# Patient Record
Sex: MALE | Race: WHITE | NOT HISPANIC OR LATINO | Employment: OTHER | URBAN - METROPOLITAN AREA
[De-identification: names, ages, dates, MRNs, and addresses within clinical notes are randomized per-mention and may not be internally consistent; named-entity substitution may affect disease eponyms.]

---

## 2019-05-14 ENCOUNTER — TELEPHONE (OUTPATIENT)
Dept: CARDIOLOGY | Facility: CLINIC | Age: 69
End: 2019-05-14

## 2019-05-14 NOTE — TELEPHONE ENCOUNTER
PATIENT NOTIFIED THAT WE HAVE NO MORE SAMPLES OF RANEXA.   IT WENT GENERIC AND TOLD HIM WE WOULD BE GLAD TO SEND IN A PRESCRIPTION FOR HIM.

## 2019-07-11 RX ORDER — RANOLAZINE 1000 MG/1
1000 TABLET, EXTENDED RELEASE ORAL EVERY 12 HOURS SCHEDULED
Qty: 60 TABLET | Refills: 5 | Status: SHIPPED | OUTPATIENT
Start: 2019-07-11 | End: 2020-01-06

## 2019-11-04 PROBLEM — E78.5 DYSLIPIDEMIA: Status: ACTIVE | Noted: 2019-11-04

## 2019-11-04 PROBLEM — I25.10 CORONARY ATHEROSCLEROSIS: Status: ACTIVE | Noted: 2019-11-04

## 2019-11-04 PROBLEM — I10 ESSENTIAL HYPERTENSION: Status: ACTIVE | Noted: 2019-11-04

## 2019-11-07 ENCOUNTER — OFFICE VISIT (OUTPATIENT)
Dept: CARDIOLOGY | Facility: CLINIC | Age: 69
End: 2019-11-07

## 2019-11-07 VITALS
DIASTOLIC BLOOD PRESSURE: 74 MMHG | SYSTOLIC BLOOD PRESSURE: 168 MMHG | HEIGHT: 71 IN | WEIGHT: 221.5 LBS | OXYGEN SATURATION: 98 % | HEART RATE: 66 BPM | BODY MASS INDEX: 31.01 KG/M2

## 2019-11-07 DIAGNOSIS — I10 ESSENTIAL HYPERTENSION: ICD-10-CM

## 2019-11-07 DIAGNOSIS — E78.5 DYSLIPIDEMIA: ICD-10-CM

## 2019-11-07 DIAGNOSIS — I25.10 ATHEROSCLEROSIS OF NATIVE CORONARY ARTERY OF NATIVE HEART WITHOUT ANGINA PECTORIS: Primary | ICD-10-CM

## 2019-11-07 DIAGNOSIS — Z95.1 HISTORY OF CORONARY ARTERY BYPASS SURGERY: ICD-10-CM

## 2019-11-07 PROCEDURE — 99213 OFFICE O/P EST LOW 20 MIN: CPT | Performed by: INTERNAL MEDICINE

## 2019-11-07 RX ORDER — LISINOPRIL 10 MG/1
10 TABLET ORAL 2 TIMES DAILY
Refills: 4 | COMMUNITY
Start: 2019-10-12 | End: 2021-02-25 | Stop reason: DRUGHIGH

## 2019-11-07 RX ORDER — HYDROCHLOROTHIAZIDE 25 MG/1
25 TABLET ORAL DAILY
Refills: 5 | COMMUNITY
Start: 2019-11-05 | End: 2022-03-24 | Stop reason: SDUPTHER

## 2019-11-08 RX ORDER — AMLODIPINE BESYLATE 2.5 MG/1
2.5 TABLET ORAL DAILY
Qty: 90 TABLET | Refills: 3 | Status: SHIPPED | OUTPATIENT
Start: 2019-11-08 | End: 2021-02-25 | Stop reason: DRUGHIGH

## 2019-11-08 RX ORDER — NITROGLYCERIN 0.4 MG/1
0.4 TABLET SUBLINGUAL
Qty: 25 TABLET | Refills: 3 | Status: SHIPPED | OUTPATIENT
Start: 2019-11-08

## 2019-11-09 NOTE — PROGRESS NOTES
\[facility]\"" HEART SPECIALISTS        Subjective:     Encounter Date:11/07/2019      Patient ID: Slade Martinez is a 68 y.o. male.      HPI: Saw Slade Martinez today for cardiovascular care.  He is a very pleasant 68-year-old male who is in good spirits and feels well.  Mr. Martinez has a history of coronary artery disease and is undergone previous coronary artery bypass surgery 8/25/2011 with a sequential saphenous vein graft to D1 and LAD, saphenous vein graft to marginal branch of the circumflex.  He is undergone previous percutaneous coronary interventions as well and currently remains free of chest pain pressure tightness no left arm neck or jaw discomfort.  He has a history nuclear stress testing 10/4/2018 revealing left ventricular ejection fraction 59% no evidence of ischemia.  Echocardiogram from 10/4/2018 reveals left ventricular ejection fraction 60 to 65% mild mitral and tricuspid insufficiency.    Mr. Martinez denies any progressive dyspnea on exertion and is free of orthopnea or PND no lower extremity.  He denies syncope near syncope or any significant palpitations.  He monitors his blood pressure at home remains primarily under 130/80 today in the office is 168/74.      The following portions of the patient's history were reviewed and updated as appropriate: allergies, current medications, past family history, past medical history, past social history, past surgical history and problem list.    Problem List:  Patient Active Problem List   Diagnosis   • Dyslipidemia   • Essential hypertension   • Coronary atherosclerosis       Past Medical History:  Past Medical History:   Diagnosis Date   • Coronary atherosclerosis 11/4/2019    H/O CABG SVG to first diagonal branch and to the LAD as well as SVG to the lateral marginal branch of the circumflex complicated by right sided subcortical infarct with left sided hemiparesis   • Dyslipidemia 11/4/2019   • Essential hypertension 11/4/2019       Past Surgical History:  No  "past surgical history on file.    Social History:  Social History     Socioeconomic History   • Marital status:      Spouse name: Not on file   • Number of children: Not on file   • Years of education: Not on file   • Highest education level: Not on file   Tobacco Use   • Smoking status: Former Smoker     Types: Cigarettes       Allergies:  Allergies   Allergen Reactions   • Phenergan [Promethazine Hcl] Unknown (See Comments)     .   • Pletal [Cilostazol] Unknown (See Comments)     .         ROS:  Review of Systems   Constitution: Negative for weakness and malaise/fatigue.   HENT: Negative for hearing loss and nosebleeds.    Eyes: Negative for double vision and visual disturbance.   Cardiovascular: Negative for chest pain, claudication, dyspnea on exertion, near-syncope, orthopnea, palpitations, paroxysmal nocturnal dyspnea and syncope.   Respiratory: Negative for cough, shortness of breath, sleep disturbances due to breathing, snoring, sputum production and wheezing.    Endocrine: Negative for cold intolerance, heat intolerance, polydipsia and polyuria.   Hematologic/Lymphatic: Negative for adenopathy and bleeding problem. Does not bruise/bleed easily.   Skin: Negative for flushing and itching.   Musculoskeletal: Negative for back pain, falls, joint pain, joint swelling, muscle weakness and neck pain.   Gastrointestinal: Negative for abdominal pain, dysphagia, heartburn, nausea and vomiting.   Genitourinary: Negative for dysuria and frequency.   Neurological: Negative for disturbances in coordination, dizziness, light-headedness, loss of balance and numbness.   Psychiatric/Behavioral: Negative for altered mental status and memory loss. The patient is not nervous/anxious.    Allergic/Immunologic: Negative for hives and persistent infections.          Objective:         /74 (BP Location: Right arm, Patient Position: Sitting, Cuff Size: Adult)   Pulse 66   Ht 180.3 cm (71\")   Wt 100 kg (221 lb 8 oz)   " SpO2 98%   BMI 30.89 kg/m²     Physical Exam   Constitutional: He is oriented to person, place, and time. He appears well-developed and well-nourished.   HENT:   Head: Normocephalic and atraumatic.   Eyes: Conjunctivae and EOM are normal. Pupils are equal, round, and reactive to light.   Neck: Neck supple. No thyromegaly present.   Cardiovascular: Normal rate, regular rhythm, S1 normal, S2 normal and intact distal pulses. PMI is not displaced. Exam reveals gallop and S4. Exam reveals no S3, no distant heart sounds, no friction rub, no midsystolic click and no opening snap.   No murmur heard.  Pulses:       Carotid pulses are 2+ on the right side, and 2+ on the left side.       Radial pulses are 2+ on the right side, and 2+ on the left side.        Femoral pulses are 2+ on the right side, and 2+ on the left side.       Dorsalis pedis pulses are 2+ on the right side, and 2+ on the left side.        Posterior tibial pulses are 2+ on the right side, and 2+ on the left side.   1/6 systolic murmur left sternal border   Pulmonary/Chest: Effort normal and breath sounds normal. No respiratory distress. He has no wheezes. He has no rales.   Abdominal: Soft. Bowel sounds are normal. He exhibits no distension and no mass. There is no tenderness.   Musculoskeletal: Normal range of motion. He exhibits no edema.   Neurological: He is alert and oriented to person, place, and time.   Skin: Skin is warm and dry. No erythema.   Psychiatric: He has a normal mood and affect.       In-Office Procedure(s):  Procedures    ASCVD RIsk Score::  The ASCVD Risk score (Michelle DC Jr., et al., 2013) failed to calculate for the following reasons:    Cannot find a previous HDL lab    Cannot find a previous total cholesterol lab        Assessment/Plan:         1. Atherosclerosis of native coronary artery of native heart without angina pectoris  Stable     2. History of coronary artery bypass surgery  Stable    3. Essential hypertension  Target less  than 130/80    4. Dyslipidemia  Observe low-cholesterol low saturated fat diet.    Mr. Martinez is free of angina euvolemic and active.  He is tolerating his medications well.  We discussed the importance of continued risk modification with salt restriction is close to 2000 mg a day as possible and following a low-cholesterol low saturated fat diet.  We have encouraged him to maintain and advance his activity level.  We will initiate amlodipine 2.5 mg daily for blood pressure control.  He will monitor his blood pressure and if it exceeds 130/80 he will contact us.  We will otherwise see him in follow-up in 6 months.           James Hubbard MD  11/08/19  .

## 2020-01-06 RX ORDER — RANOLAZINE 1000 MG/1
TABLET, EXTENDED RELEASE ORAL
Qty: 60 TABLET | Refills: 5 | Status: SHIPPED | OUTPATIENT
Start: 2020-01-06 | End: 2020-07-06

## 2020-04-20 ENCOUNTER — TELEMEDICINE (OUTPATIENT)
Dept: CARDIOLOGY | Facility: CLINIC | Age: 70
End: 2020-04-20

## 2020-04-20 VITALS — SYSTOLIC BLOOD PRESSURE: 136 MMHG | DIASTOLIC BLOOD PRESSURE: 81 MMHG

## 2020-04-20 DIAGNOSIS — I10 ESSENTIAL HYPERTENSION: ICD-10-CM

## 2020-04-20 DIAGNOSIS — I25.10 ATHEROSCLEROSIS OF NATIVE CORONARY ARTERY OF NATIVE HEART WITHOUT ANGINA PECTORIS: Primary | ICD-10-CM

## 2020-04-20 DIAGNOSIS — E78.5 DYSLIPIDEMIA: ICD-10-CM

## 2020-04-20 DIAGNOSIS — Z95.1 S/P CABG (CORONARY ARTERY BYPASS GRAFT): ICD-10-CM

## 2020-04-20 PROCEDURE — 99213 OFFICE O/P EST LOW 20 MIN: CPT | Performed by: INTERNAL MEDICINE

## 2020-04-20 NOTE — PROGRESS NOTES
[facility]  You have chosen to receive care through a telehealth visit.  Do you consent to use a video/audio connection for your medical care today? Yes      Subjective:     Encounter Date:04/20/2020      Patient ID: Slade Martinez is a 69 y.o. male.      HPI: Slade Martinez agreed to video visit secondary to the coronavirus pandemic.  He is a pleasant 69-year-old male with known coronary heart disease.  He underwent coronary artery bypass surgery 8/25/2011 with the placement of a sequential saphenous vein graft to the first diagonal and the LAD, saphenous vein graft to the marginal branch of the circumflex.  He is undergone previous percutaneous coronary interventions as well.  Mr. Martinez has remained free of fever cough or increased shortness of breath.  He is been observing the CDC guidelines for social distancing.  He does not report any significant chest pain pressure or tightness.  He has his baseline dyspnea on exertion but this has remained stable.  He is free of orthopnea or PND and does not report lower extremity edema.  He denies syncope near syncope or significant palpitations.  He has a history of hypertension which is controlled.  He has known dyslipidemia and observes a low-cholesterol low saturated fat diet and remains on atorvastatin 40 mg daily.      The following portions of the patient's history were reviewed and updated as appropriate: allergies, current medications, past family history, past medical history, past social history, past surgical history and problem list.    Problem List:  Patient Active Problem List   Diagnosis   • Dyslipidemia   • Essential hypertension   • Coronary atherosclerosis   • S/P CABG (coronary artery bypass graft)   • S/P CABG (coronary artery bypass graft)       Past Medical History:  Past Medical History:   Diagnosis Date   • Coronary atherosclerosis 11/4/2019    H/O CABG SVG to first diagonal branch and to the LAD as well as SVG to the lateral  marginal branch of the circumflex complicated by right sided subcortical infarct with left sided hemiparesis   • Dyslipidemia 11/4/2019   • Essential hypertension 11/4/2019       Past Surgical History:  Past Surgical History:   Procedure Laterality Date   • CAROTID ENDARTERECTOMY  2000    Left   • CHOLECYSTECTOMY     • CORONARY ARTERY BYPASS GRAFT     • CORONARY STENT PLACEMENT      X17 stents       Social History:  Social History     Socioeconomic History   • Marital status:      Spouse name: Not on file   • Number of children: Not on file   • Years of education: Not on file   • Highest education level: Not on file   Tobacco Use   • Smoking status: Former Smoker     Types: Cigarettes   • Smokeless tobacco: Never Used   Substance and Sexual Activity   • Alcohol use: Not Currently   • Drug use: Never   • Sexual activity: Defer       Allergies:  Allergies   Allergen Reactions   • Phenergan [Promethazine Hcl] Unknown (See Comments)     .   • Pletal [Cilostazol] Unknown (See Comments)     .         ROS:  Review of Systems   Constitution: Negative for malaise/fatigue.   HENT: Negative for hearing loss and nosebleeds.    Eyes: Negative for double vision and visual disturbance.   Cardiovascular: Positive for dyspnea on exertion. Negative for chest pain, claudication, near-syncope, orthopnea, palpitations, paroxysmal nocturnal dyspnea and syncope.   Respiratory: Negative for cough, shortness of breath, sleep disturbances due to breathing, snoring, sputum production and wheezing.    Endocrine: Negative for cold intolerance, heat intolerance, polydipsia and polyuria.   Hematologic/Lymphatic: Negative for adenopathy and bleeding problem. Does not bruise/bleed easily.   Skin: Negative for flushing and itching.   Musculoskeletal: Negative for back pain, falls, joint pain, joint swelling, muscle weakness and neck pain.   Gastrointestinal: Negative for abdominal pain, dysphagia, heartburn, nausea and vomiting.    Genitourinary: Negative for dysuria and frequency.   Neurological: Negative for disturbances in coordination, dizziness, light-headedness, loss of balance, numbness and weakness.   Psychiatric/Behavioral: Negative for altered mental status and memory loss. The patient is not nervous/anxious.    Allergic/Immunologic: Negative for hives and persistent infections.          Objective:         /81     Physical Exam not performed    In-Office Procedure(s):  Procedures    ASCVD RIsk Score::  The ASCVD Risk score (Michelle DOW Jr., et al., 2013) failed to calculate for the following reasons:    Cannot find a previous HDL lab    Cannot find a previous total cholesterol lab        Assessment/Plan:         1. Atherosclerosis of native coronary artery of native heart without angina pectoris  Stable free of angina    2. S/P CABG (coronary artery bypass graft)  Stable    3. Essential hypertension  Controlled    4. Dyslipidemia  Encourage low-cholesterol low saturated fat diet and continue statin therapy    Mr. Martinez currently is free of angina and his dyspnea on exertion is at its baseline.  He is not experiencing any significant side effects to his medical regimen.  I have counseled him on the importance of continued risk modification by observing a low-cholesterol low saturated fat salt restricted diet and remaining as active as possible.  He is observing CDC guidelines for the current coronavirus pandemic.    I spent 8 minutes on the video visit with Mr. Martinez in 8 minutes and completing his electronic medical record documentation.           James Hubbard MD  04/20/20  .

## 2020-06-04 ENCOUNTER — TELEPHONE (OUTPATIENT)
Dept: CARDIOLOGY | Facility: CLINIC | Age: 70
End: 2020-06-04

## 2020-06-04 NOTE — TELEPHONE ENCOUNTER
DR Ramirez PT    PT had bronchitis and PCP put him on steroids and antibiotics and since then his B/P is running low 90's/60's and he gets dizzy so he stopped taking lisinopril and norvasc hoping his B/P would not drop and today he took it and it is 116/76. He wants to know what to do.

## 2020-06-05 NOTE — TELEPHONE ENCOUNTER
Veronica tell patient to continue not taking lisinopril and Norvasc, monitor his blood pressure if it exceeds 130/80 will need to resume 1 of the medicines at a time but he can call us at that point.  Or we can set up a visit in 2 to 3 weeks.  Thank you

## 2020-07-06 RX ORDER — RANOLAZINE 1000 MG/1
TABLET, EXTENDED RELEASE ORAL
Qty: 60 TABLET | Refills: 5 | Status: SHIPPED | OUTPATIENT
Start: 2020-07-06 | End: 2020-12-30

## 2020-07-23 ENCOUNTER — OFFICE VISIT (OUTPATIENT)
Dept: CARDIOLOGY | Facility: CLINIC | Age: 70
End: 2020-07-23

## 2020-07-23 VITALS
DIASTOLIC BLOOD PRESSURE: 78 MMHG | HEART RATE: 63 BPM | WEIGHT: 224 LBS | BODY MASS INDEX: 31.36 KG/M2 | SYSTOLIC BLOOD PRESSURE: 128 MMHG | HEIGHT: 71 IN

## 2020-07-23 DIAGNOSIS — E78.5 DYSLIPIDEMIA: ICD-10-CM

## 2020-07-23 DIAGNOSIS — I25.10 ATHEROSCLEROSIS OF NATIVE CORONARY ARTERY OF NATIVE HEART WITHOUT ANGINA PECTORIS: Primary | ICD-10-CM

## 2020-07-23 DIAGNOSIS — I10 ESSENTIAL HYPERTENSION: ICD-10-CM

## 2020-07-23 DIAGNOSIS — Z95.1 S/P CABG (CORONARY ARTERY BYPASS GRAFT): ICD-10-CM

## 2020-07-23 PROCEDURE — 99213 OFFICE O/P EST LOW 20 MIN: CPT | Performed by: INTERNAL MEDICINE

## 2020-07-23 RX ORDER — MECLIZINE HCL 12.5 MG/1
12.5 TABLET ORAL DAILY
COMMUNITY

## 2020-07-23 NOTE — PROGRESS NOTES
[facility] SPECIAL[facility]        Subjective:     Encounter Date:07/23/2020      Patient ID: Slade Martinez is a 69 y.o. male.      HPI: I saw Slade Martinez today for cardiovascular care.  He is a pleasant 69-year-old male who is observing the CDC guidelines for social distance.  He remains free of fever cough or increased shortness of breath.  Mr. Martinez overall is doing well.  He is free of angina.  He does not report any progressive or significant dyspnea on exertion.  He denies orthopnea or PND.  He does have mild lower extremity edema which is nonprogressive.  He denies syncope near syncope or palpitations.  He has a history of hypertension which is controlled.  He has known dyslipidemia and remains on atorvastatin 40 mg daily.  He tolerates his medications well without significant side effects.      The following portions of the patient's history were reviewed and updated as appropriate: allergies, current medications, past family history, past medical history, past social history, past surgical history and problem list.    Problem List:  Patient Active Problem List   Diagnosis   • Dyslipidemia   • Essential hypertension   • Coronary atherosclerosis   • S/P CABG (coronary artery bypass graft)       Past Medical History:  Past Medical History:   Diagnosis Date   • Coronary atherosclerosis 11/4/2019    H/O CABG SVG to first diagonal branch and to the LAD as well as SVG to the lateral marginal branch of the circumflex complicated by right sided subcortical infarct with left sided hemiparesis   • Dyslipidemia 11/4/2019   • Essential hypertension 11/4/2019       Past Surgical History:  Past Surgical History:   Procedure Laterality Date   • CAROTID ENDARTERECTOMY  2000    Left   • CHOLECYSTECTOMY     • CORONARY ARTERY BYPASS GRAFT     • CORONARY STENT PLACEMENT      X17 stents       Social History:  Social History     Socioeconomic History   • Marital status:      Spouse name: Not on file   • Number of  "children: Not on file   • Years of education: Not on file   • Highest education level: Not on file   Tobacco Use   • Smoking status: Former Smoker     Types: Cigarettes   • Smokeless tobacco: Never Used   Substance and Sexual Activity   • Alcohol use: Not Currently   • Drug use: Never   • Sexual activity: Defer       Allergies:  Allergies   Allergen Reactions   • Phenergan [Promethazine Hcl] Irritability     IV ONLY   • Pletal [Cilostazol] Myalgia     .         ROS:  Review of Systems   Constitution: Negative for malaise/fatigue.   HENT: Negative for hearing loss and nosebleeds.    Eyes: Negative for double vision and visual disturbance.   Cardiovascular: Negative for chest pain, claudication, dyspnea on exertion, near-syncope, orthopnea, palpitations, paroxysmal nocturnal dyspnea and syncope.   Respiratory: Negative for cough, shortness of breath, sleep disturbances due to breathing, snoring, sputum production and wheezing.    Endocrine: Negative for cold intolerance, heat intolerance, polydipsia and polyuria.   Hematologic/Lymphatic: Negative for adenopathy and bleeding problem. Does not bruise/bleed easily.   Skin: Negative for flushing and itching.   Musculoskeletal: Negative for back pain, falls, joint pain, joint swelling, muscle weakness and neck pain.   Gastrointestinal: Negative for abdominal pain, dysphagia, heartburn, nausea and vomiting.   Genitourinary: Negative for dysuria and frequency.   Neurological: Negative for disturbances in coordination, dizziness, light-headedness, loss of balance, numbness and weakness.   Psychiatric/Behavioral: Negative for altered mental status and memory loss. The patient is not nervous/anxious.    Allergic/Immunologic: Negative for hives and persistent infections.          Objective:         /78   Pulse 63   Ht 180.3 cm (71\")   Wt 102 kg (224 lb)   BMI 31.24 kg/m²     Physical Exam   Constitutional: He is oriented to person, place, and time. He appears " well-developed and well-nourished.   HENT:   Head: Normocephalic and atraumatic.   Eyes: Pupils are equal, round, and reactive to light. Conjunctivae and EOM are normal.   Neck: Neck supple. No thyromegaly present.   Cardiovascular: Normal rate, regular rhythm, S1 normal, S2 normal and intact distal pulses. PMI is not displaced. Exam reveals gallop and S4. Exam reveals no S3, no distant heart sounds, no friction rub, no midsystolic click and no opening snap.   No murmur heard.  Pulses:       Carotid pulses are 2+ on the right side, and 2+ on the left side.       Radial pulses are 2+ on the right side, and 2+ on the left side.        Femoral pulses are 2+ on the right side, and 2+ on the left side.       Dorsalis pedis pulses are 2+ on the right side, and 2+ on the left side.        Posterior tibial pulses are 2+ on the right side, and 2+ on the left side.   1/6 systolic murmur left sternal border   Pulmonary/Chest: Effort normal and breath sounds normal. No respiratory distress. He has no wheezes. He has no rales.   Abdominal: Soft. Bowel sounds are normal. He exhibits no distension and no mass. There is no tenderness.   Musculoskeletal: Normal range of motion. He exhibits no edema.   Trace bilateral lower extremity edema   Neurological: He is alert and oriented to person, place, and time.   Skin: Skin is warm and dry. No erythema.   Psychiatric: He has a normal mood and affect.       In-Office Procedure(s):  Procedures    ASCVD RIsk Score::  The ASCVD Risk score (Michlele DOW Jr., et al., 2013) failed to calculate for the following reasons:    Cannot find a previous HDL lab    Cannot find a previous total cholesterol lab        Assessment/Plan:         1. Atherosclerosis of native coronary artery of native heart without angina pectoris  Stable free of angina    2. S/P CABG (coronary artery bypass graft)  Stable    3. Essential hypertension  Controlled    4. Dyslipidemia  Controlled    Mr. Martinez has maintained his  activity level while observing the CDC guidelines for social distancing.  He is free of angina and is near euvolemic.  His risk factors are modified he is tolerating his medications well I will see him in follow-up in 6 months, sooner if needed.           James Hubbard MD  07/23/20  .

## 2020-08-20 ENCOUNTER — TELEPHONE (OUTPATIENT)
Dept: CARDIOLOGY | Facility: CLINIC | Age: 70
End: 2020-08-20

## 2020-08-20 DIAGNOSIS — I25.10 ATHEROSCLEROSIS OF NATIVE CORONARY ARTERY OF NATIVE HEART WITHOUT ANGINA PECTORIS: ICD-10-CM

## 2020-08-20 DIAGNOSIS — Z02.4 ENCOUNTER FOR COMMERCIAL DRIVING LICENSE (CDL) EXAM: Primary | ICD-10-CM

## 2020-08-20 DIAGNOSIS — Z95.1 S/P CABG (CORONARY ARTERY BYPASS GRAFT): ICD-10-CM

## 2020-08-20 NOTE — TELEPHONE ENCOUNTER
James Hubbard M.D pt    Patient called, would like to know if Dr. Hubbard can order echo and stress test? States [employer] is requesting.

## 2020-08-21 NOTE — TELEPHONE ENCOUNTER
I spoke with PT an told him know that the echo/stress was ordered, the hospital will be calling him to set up the testing.

## 2020-09-17 ENCOUNTER — HOSPITAL ENCOUNTER (OUTPATIENT)
Dept: NUCLEAR MEDICINE | Facility: HOSPITAL | Age: 70
Discharge: HOME | End: 2020-09-17

## 2020-09-17 ENCOUNTER — HOSPITAL ENCOUNTER (OUTPATIENT)
Dept: CARDIOLOGY | Facility: HOSPITAL | Age: 70
Discharge: HOME | End: 2020-09-17
Admitting: INTERNAL MEDICINE

## 2020-09-17 VITALS
SYSTOLIC BLOOD PRESSURE: 132 MMHG | HEIGHT: 71 IN | DIASTOLIC BLOOD PRESSURE: 75 MMHG | BODY MASS INDEX: 31.36 KG/M2 | HEART RATE: 69 BPM | WEIGHT: 224 LBS

## 2020-09-17 DIAGNOSIS — I25.10 ATHEROSCLEROSIS OF NATIVE CORONARY ARTERY OF NATIVE HEART WITHOUT ANGINA PECTORIS: ICD-10-CM

## 2020-09-17 DIAGNOSIS — Z95.1 S/P CABG (CORONARY ARTERY BYPASS GRAFT): ICD-10-CM

## 2020-09-17 DIAGNOSIS — Z02.4 ENCOUNTER FOR COMMERCIAL DRIVING LICENSE (CDL) EXAM: ICD-10-CM

## 2020-09-17 LAB
AORTIC DIMENSIONLESS INDEX: 0.9 (DI)
BH CV ECHO MEAS - ACS: 2 CM
BH CV ECHO MEAS - AO MAX PG: 5 MMHG
BH CV ECHO MEAS - AO MEAN PG (FULL): 1 MMHG
BH CV ECHO MEAS - AO MEAN PG: 3 MMHG
BH CV ECHO MEAS - AO ROOT AREA (BSA CORRECTED): 1.6
BH CV ECHO MEAS - AO ROOT AREA: 10.2 CM^2
BH CV ECHO MEAS - AO ROOT DIAM: 3.6 CM
BH CV ECHO MEAS - AO V2 MAX: 112 CM/SEC
BH CV ECHO MEAS - AO V2 MEAN: 77.2 CM/SEC
BH CV ECHO MEAS - AO V2 VTI: 26.1 CM
BH CV ECHO MEAS - ASC AORTA: 3 CM
BH CV ECHO MEAS - AVA(I,A): 2.7 CM^2
BH CV ECHO MEAS - AVA(I,D): 2.7 CM^2
BH CV ECHO MEAS - BSA(HAYCOCK): 2.3 M^2
BH CV ECHO MEAS - BSA: 2.2 M^2
BH CV ECHO MEAS - BZI_BMI: 31.2 KILOGRAMS/M^2
BH CV ECHO MEAS - BZI_METRIC_HEIGHT: 180.3 CM
BH CV ECHO MEAS - BZI_METRIC_WEIGHT: 101.6 KG
BH CV ECHO MEAS - CONTRAST EF 4CH: 66 CM2
BH CV ECHO MEAS - EDV(CUBED): 110.6 ML
BH CV ECHO MEAS - EDV(MOD-SP2): 115 ML
BH CV ECHO MEAS - EDV(MOD-SP4): 123 ML
BH CV ECHO MEAS - EDV(TEICH): 107.5 ML
BH CV ECHO MEAS - EF(CUBED): 70.4 %
BH CV ECHO MEAS - EF(MOD-BP): 66 %
BH CV ECHO MEAS - EF(MOD-SP2): 70.4 %
BH CV ECHO MEAS - EF(MOD-SP4): 64.2 %
BH CV ECHO MEAS - EF(TEICH): 61.9 %
BH CV ECHO MEAS - ESV(CUBED): 32.8 ML
BH CV ECHO MEAS - ESV(MOD-SP2): 34 ML
BH CV ECHO MEAS - ESV(MOD-SP4): 44 ML
BH CV ECHO MEAS - ESV(TEICH): 41 ML
BH CV ECHO MEAS - FS: 33.3 %
BH CV ECHO MEAS - IVS/LVPW: 0.92
BH CV ECHO MEAS - IVSD: 1.1 CM
BH CV ECHO MEAS - LAT PEAK E' VEL: 8.4 CM/SEC
BH CV ECHO MEAS - LV DIASTOLIC VOL/BSA (35-75): 55.6 ML/M^2
BH CV ECHO MEAS - LV MASS(C)D: 206.4 GRAMS
BH CV ECHO MEAS - LV MASS(C)DI: 93.3 GRAMS/M^2
BH CV ECHO MEAS - LV MAX PG: 3.1 MMHG
BH CV ECHO MEAS - LV MEAN PG: 2 MMHG
BH CV ECHO MEAS - LV SYSTOLIC VOL/BSA (12-30): 19.9 ML/M^2
BH CV ECHO MEAS - LV V1 MAX: 88.7 CM/SEC
BH CV ECHO MEAS - LV V1 MEAN: 63 CM/SEC
BH CV ECHO MEAS - LV V1 VTI: 22.2 CM
BH CV ECHO MEAS - LVIDD: 4.8 CM
BH CV ECHO MEAS - LVIDS: 3.2 CM
BH CV ECHO MEAS - LVLD AP2: 7.6 CM
BH CV ECHO MEAS - LVLD AP4: 8.3 CM
BH CV ECHO MEAS - LVLS AP2: 6.5 CM
BH CV ECHO MEAS - LVLS AP4: 7.7 CM
BH CV ECHO MEAS - LVOT AREA (M): 3.1 CM^2
BH CV ECHO MEAS - LVOT AREA: 3.1 CM^2
BH CV ECHO MEAS - LVOT DIAM: 2 CM
BH CV ECHO MEAS - LVPWD: 1.2 CM
BH CV ECHO MEAS - MED PEAK E' VEL: 7.6 CM/SEC
BH CV ECHO MEAS - MV A DUR: 0.11 SEC
BH CV ECHO MEAS - MV A MAX VEL: 63.2 CM/SEC
BH CV ECHO MEAS - MV DEC SLOPE: 497 CM/SEC^2
BH CV ECHO MEAS - MV DEC TIME: 140 SEC
BH CV ECHO MEAS - MV E MAX VEL: 74 CM/SEC
BH CV ECHO MEAS - MV E/A: 1.2
BH CV ECHO MEAS - MV MEAN PG: 2 MMHG
BH CV ECHO MEAS - MV P1/2T MAX VEL: 126 CM/SEC
BH CV ECHO MEAS - MV P1/2T: 74.3 MSEC
BH CV ECHO MEAS - MV V2 MEAN: 65.6 CM/SEC
BH CV ECHO MEAS - MV V2 VTI: 33.5 CM
BH CV ECHO MEAS - MVA P1/2T LCG: 1.7 CM^2
BH CV ECHO MEAS - MVA(P1/2T): 3 CM^2
BH CV ECHO MEAS - MVA(VTI): 2.1 CM^2
BH CV ECHO MEAS - PA ACC SLOPE: 1159 CM/SEC^2
BH CV ECHO MEAS - PA ACC TIME: 0.08 SEC
BH CV ECHO MEAS - PA MAX PG: 3.8 MMHG
BH CV ECHO MEAS - PA PR(ACCEL): 42.6 MMHG
BH CV ECHO MEAS - PA V2 MAX: 97.8 CM/SEC
BH CV ECHO MEAS - PULM A REVS DUR: 0.1 SEC
BH CV ECHO MEAS - PULM A REVS VEL: 22.1 CM/SEC
BH CV ECHO MEAS - PULM DIAS VEL: 44.6 CM/SEC
BH CV ECHO MEAS - PULM S/D: 0.93
BH CV ECHO MEAS - PULM SYS VEL: 41.6 CM/SEC
BH CV ECHO MEAS - QP/QS: 1
BH CV ECHO MEAS - RAP SYSTOLE: 3 MMHG
BH CV ECHO MEAS - RV MEAN PG: 1 MMHG
BH CV ECHO MEAS - RV V1 MEAN: 45.1 CM/SEC
BH CV ECHO MEAS - RV V1 VTI: 15.5 CM
BH CV ECHO MEAS - RVOT AREA: 4.5 CM^2
BH CV ECHO MEAS - RVOT DIAM: 2.4 CM
BH CV ECHO MEAS - RVSP: 29 MMHG
BH CV ECHO MEAS - SI(AO): 120.1 ML/M^2
BH CV ECHO MEAS - SI(CUBED): 35.2 ML/M^2
BH CV ECHO MEAS - SI(LVOT): 31.5 ML/M^2
BH CV ECHO MEAS - SI(MOD-SP2): 36.6 ML/M^2
BH CV ECHO MEAS - SI(MOD-SP4): 35.7 ML/M^2
BH CV ECHO MEAS - SI(TEICH): 30.1 ML/M^2
BH CV ECHO MEAS - SV(AO): 265.7 ML
BH CV ECHO MEAS - SV(CUBED): 77.8 ML
BH CV ECHO MEAS - SV(LVOT): 69.7 ML
BH CV ECHO MEAS - SV(MOD-SP2): 81 ML
BH CV ECHO MEAS - SV(MOD-SP4): 79 ML
BH CV ECHO MEAS - SV(RVOT): 70.1 ML
BH CV ECHO MEAS - SV(TEICH): 66.6 ML
BH CV ECHO MEAS - TAPSE (>1.6): 2.1 CM2
BH CV ECHO MEAS - TR MAX PG: 26 MMHG
BH CV ECHO MEAS - TR MAX VEL: 256 CM/SEC
BH CV ECHO MEASUREMENTS AVERAGE E/E' RATIO: 9.25
BH CV XLRA - RV BASE: 3.8 CM
BH CV XLRA - TDI S': 9.1 CM/SEC
LEFT ATRIUM VOLUME INDEX: 24 ML/M2
MAXIMAL PREDICTED HEART RATE: 150 BPM
STRESS TARGET HR: 128 BPM

## 2020-09-17 PROCEDURE — 78452 HT MUSCLE IMAGE SPECT MULT: CPT | Performed by: INTERNAL MEDICINE

## 2020-09-17 PROCEDURE — 93306 TTE W/DOPPLER COMPLETE: CPT | Performed by: INTERNAL MEDICINE

## 2020-09-17 PROCEDURE — A9500 TC99M SESTAMIBI: HCPCS | Performed by: INTERNAL MEDICINE

## 2020-09-17 PROCEDURE — 78452 HT MUSCLE IMAGE SPECT MULT: CPT

## 2020-09-17 PROCEDURE — 93017 CV STRESS TEST TRACING ONLY: CPT

## 2020-09-17 PROCEDURE — 0 TECHNETIUM SESTAMIBI: Performed by: INTERNAL MEDICINE

## 2020-09-17 PROCEDURE — 93306 TTE W/DOPPLER COMPLETE: CPT

## 2020-09-17 PROCEDURE — 25010000002 PERFLUTREN (DEFINITY) 8.476 MG IN SODIUM CHLORIDE 0.9 % 10 ML INJECTION: Performed by: INTERNAL MEDICINE

## 2020-09-17 PROCEDURE — 93018 CV STRESS TEST I&R ONLY: CPT | Performed by: INTERNAL MEDICINE

## 2020-09-17 RX ADMIN — PERFLUTREN 2 ML: 6.52 INJECTION, SUSPENSION INTRAVENOUS at 08:45

## 2020-09-17 RX ADMIN — TECHNETIUM TC 99M SESTAMIBI 1 DOSE: 1 INJECTION INTRAVENOUS at 08:15

## 2020-09-17 RX ADMIN — TECHNETIUM TC 99M SESTAMIBI 1 DOSE: 1 INJECTION INTRAVENOUS at 11:00

## 2020-09-18 LAB
BH CV STRESS BP STAGE 1: NORMAL
BH CV STRESS BP STAGE 2: NORMAL
BH CV STRESS DURATION MIN STAGE 1: 3
BH CV STRESS DURATION MIN STAGE 2: 2
BH CV STRESS DURATION SEC STAGE 1: 0
BH CV STRESS DURATION SEC STAGE 2: 1
BH CV STRESS GRADE STAGE 1: 10
BH CV STRESS GRADE STAGE 2: 12
BH CV STRESS HR STAGE 1: 130
BH CV STRESS HR STAGE 2: 143
BH CV STRESS METS STAGE 1: 5
BH CV STRESS METS STAGE 2: 7.5
BH CV STRESS PROTOCOL 1: NORMAL
BH CV STRESS RECOVERY BP: NORMAL MMHG
BH CV STRESS RECOVERY HR: 85 BPM
BH CV STRESS SPEED STAGE 1: 1.7
BH CV STRESS SPEED STAGE 2: 2.5
BH CV STRESS STAGE 1: 1
BH CV STRESS STAGE 2: 2
MAXIMAL PREDICTED HEART RATE: 150 BPM
PERCENT MAX PREDICTED HR: 100 %
STRESS BASELINE BP: NORMAL MMHG
STRESS BASELINE HR: 68 BPM
STRESS PERCENT HR: 118 %
STRESS POST ESTIMATED WORKLOAD: 5.6 METS
STRESS POST EXERCISE DUR MIN: 5 MIN
STRESS POST EXERCISE DUR SEC: 1 SEC
STRESS POST PEAK BP: NORMAL MMHG
STRESS POST PEAK HR: 150 BPM
STRESS TARGET HR: 128 BPM

## 2020-09-25 ENCOUNTER — TELEPHONE (OUTPATIENT)
Dept: CARDIOLOGY | Facility: CLINIC | Age: 70
End: 2020-09-25

## 2020-10-06 ENCOUNTER — TELEPHONE (OUTPATIENT)
Dept: CARDIOLOGY | Facility: CLINIC | Age: 70
End: 2020-10-06

## 2020-10-06 NOTE — TELEPHONE ENCOUNTER
Patient calling for stress test results please  He also would like to  a copy of stress test and echo this Thursday at [place].

## 2020-10-06 NOTE — TELEPHONE ENCOUNTER
Lvm for the patient to c/b     Take prescribed medications as directed.  Be sure to finish all antibiotics.  May take Tylenol  Drink plenty of fluids.  May use nasal saline spray several times daily.....spray several times into both nostrils and then blow nose gently after 90 seconds.  Wash hands frequently and do not touch eyes to avoid conjunctivitis.  Expect improvement within one week or if symptoms get worse, see primary care provider.

## 2020-10-06 NOTE — TELEPHONE ENCOUNTER
Pt notified with results, he will  a copy of echo and stress test Thurs at [facility] or Fri in [place].

## 2020-10-06 NOTE — TELEPHONE ENCOUNTER
Looks like Veronica has spoken with him re: this, but I do not see your interpretation of the stress test to tell him again.

## 2020-10-06 NOTE — TELEPHONE ENCOUNTER
Diane, his stress test is under results.  It revealed normal contractility no evidence of ischemia.  Thank you

## 2020-12-30 RX ORDER — RANOLAZINE 1000 MG/1
TABLET, EXTENDED RELEASE ORAL
Qty: 60 TABLET | Refills: 11 | Status: SHIPPED | OUTPATIENT
Start: 2020-12-30 | End: 2021-08-19

## 2021-02-11 ENCOUNTER — HOSPITAL ENCOUNTER (EMERGENCY)
Facility: HOSPITAL | Age: 71
Discharge: HOME | End: 2021-02-11
Attending: EMERGENCY MEDICINE | Admitting: EMERGENCY MEDICINE

## 2021-02-11 ENCOUNTER — APPOINTMENT (OUTPATIENT)
Dept: GENERAL RADIOLOGY | Facility: HOSPITAL | Age: 71
End: 2021-02-11

## 2021-02-11 VITALS
TEMPERATURE: 96.5 F | SYSTOLIC BLOOD PRESSURE: 142 MMHG | HEART RATE: 57 BPM | OXYGEN SATURATION: 98 % | HEIGHT: 71 IN | DIASTOLIC BLOOD PRESSURE: 91 MMHG | BODY MASS INDEX: 31.24 KG/M2 | RESPIRATION RATE: 16 BRPM

## 2021-02-11 DIAGNOSIS — U07.1 COVID-19 VIRUS INFECTION: Primary | ICD-10-CM

## 2021-02-11 LAB
ALBUMIN SERPL-MCNC: 3.6 G/DL (ref 3.5–5.2)
ALBUMIN/GLOB SERPL: 1.2 G/DL
ALP SERPL-CCNC: 82 U/L (ref 39–117)
ALT SERPL W P-5'-P-CCNC: 15 U/L (ref 1–41)
ANION GAP SERPL CALCULATED.3IONS-SCNC: 8.5 MMOL/L (ref 5–15)
AST SERPL-CCNC: 16 U/L (ref 1–40)
BASOPHILS # BLD AUTO: 0.03 10*3/MM3 (ref 0–0.2)
BASOPHILS NFR BLD AUTO: 0.4 % (ref 0–1.5)
BILIRUB SERPL-MCNC: 0.7 MG/DL (ref 0–1.2)
BUN SERPL-MCNC: 13 MG/DL (ref 8–23)
BUN/CREAT SERPL: 13 (ref 7–25)
CALCIUM SPEC-SCNC: 9.8 MG/DL (ref 8.6–10.5)
CHLORIDE SERPL-SCNC: 97 MMOL/L (ref 98–107)
CO2 SERPL-SCNC: 28.5 MMOL/L (ref 22–29)
CREAT SERPL-MCNC: 1 MG/DL (ref 0.76–1.27)
DEPRECATED RDW RBC AUTO: 44.4 FL (ref 37–54)
EOSINOPHIL # BLD AUTO: 0.2 10*3/MM3 (ref 0–0.4)
EOSINOPHIL NFR BLD AUTO: 2.7 % (ref 0.3–6.2)
ERYTHROCYTE [DISTWIDTH] IN BLOOD BY AUTOMATED COUNT: 12.6 % (ref 12.3–15.4)
GFR SERPL CREATININE-BSD FRML MDRD: 74 ML/MIN/1.73
GLOBULIN UR ELPH-MCNC: 2.9 GM/DL
GLUCOSE SERPL-MCNC: 95 MG/DL (ref 65–99)
HCT VFR BLD AUTO: 40.8 % (ref 37.5–51)
HGB BLD-MCNC: 14.1 G/DL (ref 13–17.7)
IMM GRANULOCYTES # BLD AUTO: 0.08 10*3/MM3 (ref 0–0.05)
IMM GRANULOCYTES NFR BLD AUTO: 1.1 % (ref 0–0.5)
LYMPHOCYTES # BLD AUTO: 1.44 10*3/MM3 (ref 0.7–3.1)
LYMPHOCYTES NFR BLD AUTO: 19.5 % (ref 19.6–45.3)
MCH RBC QN AUTO: 33.2 PG (ref 26.6–33)
MCHC RBC AUTO-ENTMCNC: 34.6 G/DL (ref 31.5–35.7)
MCV RBC AUTO: 96 FL (ref 79–97)
MONOCYTES # BLD AUTO: 1 10*3/MM3 (ref 0.1–0.9)
MONOCYTES NFR BLD AUTO: 13.5 % (ref 5–12)
NEUTROPHILS NFR BLD AUTO: 4.64 10*3/MM3 (ref 1.7–7)
NEUTROPHILS NFR BLD AUTO: 62.8 % (ref 42.7–76)
NRBC BLD AUTO-RTO: 0 /100 WBC (ref 0–0.2)
NT-PROBNP SERPL-MCNC: 240.3 PG/ML (ref 0–900)
PLATELET # BLD AUTO: 259 10*3/MM3 (ref 140–450)
PMV BLD AUTO: 9.5 FL (ref 6–12)
POTASSIUM SERPL-SCNC: 3.9 MMOL/L (ref 3.5–5.2)
PROT SERPL-MCNC: 6.5 G/DL (ref 6–8.5)
RBC # BLD AUTO: 4.25 10*6/MM3 (ref 4.14–5.8)
SODIUM SERPL-SCNC: 134 MMOL/L (ref 136–145)
TROPONIN T SERPL-MCNC: <0.01 NG/ML (ref 0–0.03)
WBC # BLD AUTO: 7.39 10*3/MM3 (ref 3.4–10.8)

## 2021-02-11 PROCEDURE — 99283 EMERGENCY DEPT VISIT LOW MDM: CPT

## 2021-02-11 PROCEDURE — 84484 ASSAY OF TROPONIN QUANT: CPT | Performed by: EMERGENCY MEDICINE

## 2021-02-11 PROCEDURE — 80053 COMPREHEN METABOLIC PANEL: CPT | Performed by: EMERGENCY MEDICINE

## 2021-02-11 PROCEDURE — 93010 ELECTROCARDIOGRAM REPORT: CPT | Performed by: INTERNAL MEDICINE

## 2021-02-11 PROCEDURE — 83880 ASSAY OF NATRIURETIC PEPTIDE: CPT | Performed by: EMERGENCY MEDICINE

## 2021-02-11 PROCEDURE — 93005 ELECTROCARDIOGRAM TRACING: CPT | Performed by: EMERGENCY MEDICINE

## 2021-02-11 PROCEDURE — 85025 COMPLETE CBC W/AUTO DIFF WBC: CPT | Performed by: EMERGENCY MEDICINE

## 2021-02-11 PROCEDURE — 71045 X-RAY EXAM CHEST 1 VIEW: CPT

## 2021-02-11 RX ORDER — SODIUM CHLORIDE 0.9 % (FLUSH) 0.9 %
10 SYRINGE (ML) INJECTION AS NEEDED
Status: DISCONTINUED | OUTPATIENT
Start: 2021-02-11 | End: 2021-02-11 | Stop reason: HOSPADM

## 2021-02-11 NOTE — ED TRIAGE NOTES
Patient presents to er via private vehicle from home.  Patient tested positive for covid on 01/30/21.  Patient states he has increased shortness of breath over the past two days.  Patient was placed in face mask during first look triage.  Patient was wearing a face mask throughout encounter.  I wore personal protective equipment throughout the encounter.  Hand hygiene was performed before and after patient encounter.     Was treated with steroids, z-pack and antibody infusion.

## 2021-02-11 NOTE — ED PROVIDER NOTES
EMERGENCY DEPARTMENT ENCOUNTER    Room Number:  19/19  PCP: Triny Hannon MD  Historian: Patient  History Limited By: Nothing      HPI  Chief Complaint: Shortness of breath  Context: Slade Martinez is a 70 y.o. male who presents to the ED c/o shortness of breath.  Patient states tested positive for Covid January 30.  Patient has received antibiotics, steroids, antibody infusion.  Patient states he felt like he was getting better.  States he had some increasing shortness of breath last night.  Has had no chest pain.  Has had no leg swelling.  Symptoms gradual in onset.  Has had no diarrhea.  Has had no fevers.  Patient here with his wife for similar symptoms      Location: Shortness of breath  Radiation: None  Character: None  Duration: 1 day  Severity: Moderate  Progression: Improved  Aggravating Factors: Nothing  Alleviating Factors: Nothing        MEDICAL RECORD REVIEW    Patient with history of coronary disease followed here by cardiology          PAST MEDICAL HISTORY  Active Ambulatory Problems     Diagnosis Date Noted   • Dyslipidemia 11/04/2019   • Essential hypertension 11/04/2019   • Coronary atherosclerosis 11/04/2019   • S/P CABG (coronary artery bypass graft) 04/20/2020     Resolved Ambulatory Problems     Diagnosis Date Noted   • S/P CABG (coronary artery bypass graft) 04/20/2020     No Additional Past Medical History         PAST SURGICAL HISTORY  Past Surgical History:   Procedure Laterality Date   • CAROTID ENDARTERECTOMY  2000    Left   • CHOLECYSTECTOMY     • CORONARY ARTERY BYPASS GRAFT     • CORONARY STENT PLACEMENT      X17 stents         FAMILY HISTORY  Family History   Problem Relation Age of Onset   • COPD Mother    • Heart attack Father    • Heart failure Father    • COPD Brother          SOCIAL HISTORY  Social History     Socioeconomic History   • Marital status:      Spouse name: Not on file   • Number of children: Not on file   • Years of education: Not on file   • Highest  education level: Not on file   Tobacco Use   • Smoking status: Former Smoker     Types: Cigarettes   • Smokeless tobacco: Never Used   Substance and Sexual Activity   • Alcohol use: Not Currently   • Drug use: Never   • Sexual activity: Defer         ALLERGIES  Phenergan [promethazine hcl] and Pletal [cilostazol]        REVIEW OF SYSTEMS  Review of Systems   Constitutional: Negative for activity change, appetite change and fever.   HENT: Negative for congestion and sore throat.    Eyes: Negative.    Respiratory: Positive for shortness of breath. Negative for cough.    Cardiovascular: Negative for chest pain and leg swelling.   Gastrointestinal: Negative for abdominal pain, diarrhea and vomiting.   Endocrine: Negative.    Genitourinary: Negative for decreased urine volume and dysuria.   Musculoskeletal: Negative for neck pain.   Skin: Negative for rash and wound.   Allergic/Immunologic: Negative.    Neurological: Negative for weakness, numbness and headaches.   Hematological: Negative.    Psychiatric/Behavioral: Negative.    All other systems reviewed and are negative.           PHYSICAL EXAM  ED Triage Vitals [02/11/21 1310]   Temp Heart Rate Resp BP SpO2   96.5 °F (35.8 °C) 67 19 -- 98 %      Temp src Heart Rate Source Patient Position BP Location FiO2 (%)   Tympanic Monitor -- -- --       Physical Exam   Constitutional: He is oriented to person, place, and time. No distress.   HENT:   Head: Normocephalic and atraumatic.   Eyes: Pupils are equal, round, and reactive to light. EOM are normal.   Neck: Normal range of motion. Neck supple.   Cardiovascular: Normal rate, regular rhythm and normal heart sounds.   Pulmonary/Chest: Effort normal and breath sounds normal. No respiratory distress.   Abdominal: Soft. There is no abdominal tenderness. There is no rebound and no guarding.   Musculoskeletal: Normal range of motion.         General: No edema.   Neurological: He is alert and oriented to person, place, and time. He  has normal sensation and normal strength.   Skin: Skin is warm and dry.   Psychiatric: Mood and affect normal.   Nursing note and vitals reviewed.    Patient was wearing a face mask when I entered the room and they continued to wear a mask throughout their stay in the ED.  I wore PPE, including a gown, gloves, face mask with shield or face mask with goggles whenever I was in the room with patient.   N95 worn.    LAB RESULTS  Recent Results (from the past 24 hour(s))   Comprehensive Metabolic Panel    Collection Time: 02/11/21  1:33 PM    Specimen: Blood   Result Value Ref Range    Glucose 95 65 - 99 mg/dL    BUN 13 8 - 23 mg/dL    Creatinine 1.00 0.76 - 1.27 mg/dL    Sodium 134 (L) 136 - 145 mmol/L    Potassium 3.9 3.5 - 5.2 mmol/L    Chloride 97 (L) 98 - 107 mmol/L    CO2 28.5 22.0 - 29.0 mmol/L    Calcium 9.8 8.6 - 10.5 mg/dL    Total Protein 6.5 6.0 - 8.5 g/dL    Albumin 3.60 3.50 - 5.20 g/dL    ALT (SGPT) 15 1 - 41 U/L    AST (SGOT) 16 1 - 40 U/L    Alkaline Phosphatase 82 39 - 117 U/L    Total Bilirubin 0.7 0.0 - 1.2 mg/dL    eGFR Non African Amer 74 >60 mL/min/1.73    Globulin 2.9 gm/dL    A/G Ratio 1.2 g/dL    BUN/Creatinine Ratio 13.0 7.0 - 25.0    Anion Gap 8.5 5.0 - 15.0 mmol/L   BNP    Collection Time: 02/11/21  1:33 PM    Specimen: Blood   Result Value Ref Range    proBNP 240.3 0.0 - 900.0 pg/mL   Troponin    Collection Time: 02/11/21  1:33 PM    Specimen: Blood   Result Value Ref Range    Troponin T <0.010 0.000 - 0.030 ng/mL   CBC Auto Differential    Collection Time: 02/11/21  1:33 PM    Specimen: Blood   Result Value Ref Range    WBC 7.39 3.40 - 10.80 10*3/mm3    RBC 4.25 4.14 - 5.80 10*6/mm3    Hemoglobin 14.1 13.0 - 17.7 g/dL    Hematocrit 40.8 37.5 - 51.0 %    MCV 96.0 79.0 - 97.0 fL    MCH 33.2 (H) 26.6 - 33.0 pg    MCHC 34.6 31.5 - 35.7 g/dL    RDW 12.6 12.3 - 15.4 %    RDW-SD 44.4 37.0 - 54.0 fl    MPV 9.5 6.0 - 12.0 fL    Platelets 259 140 - 450 10*3/mm3    Neutrophil % 62.8 42.7 - 76.0 %     Lymphocyte % 19.5 (L) 19.6 - 45.3 %    Monocyte % 13.5 (H) 5.0 - 12.0 %    Eosinophil % 2.7 0.3 - 6.2 %    Basophil % 0.4 0.0 - 1.5 %    Immature Grans % 1.1 (H) 0.0 - 0.5 %    Neutrophils, Absolute 4.64 1.70 - 7.00 10*3/mm3    Lymphocytes, Absolute 1.44 0.70 - 3.10 10*3/mm3    Monocytes, Absolute 1.00 (H) 0.10 - 0.90 10*3/mm3    Eosinophils, Absolute 0.20 0.00 - 0.40 10*3/mm3    Basophils, Absolute 0.03 0.00 - 0.20 10*3/mm3    Immature Grans, Absolute 0.08 (H) 0.00 - 0.05 10*3/mm3    nRBC 0.0 0.0 - 0.2 /100 WBC   ECG 12 Lead    Collection Time: 02/11/21  1:37 PM   Result Value Ref Range    QT Interval 450 ms       Ordered the above labs and reviewed the results.        RADIOLOGY  XR Chest 1 View   Final Result   Chronic changes in the chest with a patch of opacity along   the lateral left lower lung zone which is favored represent pneumonia.       This report was finalized on 2/11/2021 2:16 PM by Dr. Luis E Carbajal M.D.               Ordered the above noted radiological studies. Reviewed by me in PACS.            PROCEDURES  Procedures      EKG:          EKG time: 1337  Rhythm/Rate: Normal sinus rhythm 61  P waves and PA: Normal P waves  QRS, axis: Normal QRS  ST and T waves: Flattened T waves diffusely    Interpreted Contemporaneously by me, independently viewed  No prior        MEDICATIONS GIVEN IN ER  Medications   sodium chloride 0.9 % flush 10 mL (has no administration in time range)             PROGRESS AND CONSULTS  ED Course as of Feb 11 1637   Thu Feb 11, 2021   1445 14:45 EST  Patient here for shortness of breath and Covid positive.  Patient's oxygen level is 99% at rest and does not get below 96% with exertion.  Patient's lab work appears normal.  His wife here with him being seen.  Patient's chest x-ray for the most part normal versus small infiltrate.  Discussed options with patient and he will go home.  He stands to return for any concerns.  Patient was placed on cardiac monitoring here and was in  normal sinus rhythm on rhythm strip review.    [SL]      ED Course User Index  [SL] Matthew Marcos MD           MEDICAL DECISION MAKING      MDM  Number of Diagnoses or Management Options  COVID-19 virus infection:      Amount and/or Complexity of Data Reviewed  Clinical lab tests: reviewed and ordered (Normal BMP and troponin)  Tests in the radiology section of CPT®: reviewed and ordered (Chest x-ray with chronic changes and possible pneumonia)  Tests in the medicine section of CPT®: reviewed               DIAGNOSIS  Final diagnoses:   COVID-19 virus infection           DISPOSITION  DISCHARGE    Patient discharged in stable condition.    Reviewed implications of results, diagnosis, meds, responsibility to follow up, warning signs and symptoms of possible worsening, potential complications and reasons to return to ER, including worsening shortness of breath.    Patient/Family voiced understanding of above instructions.    Discussed plan for discharge, as there is no emergent indication for admission. Patient referred to primary care provider for BP management due to today's BP. Pt/family is agreeable and understands need for follow up and repeat testing.  Pt is aware that discharge does not mean that nothing is wrong but it indicates no emergency is present that requires admission and they must continue care with follow-up as given below or physician of their choice.     FOLLOW-UP  Triny Hannon MD  [facility] [address]  [phone]    Schedule an appointment as soon as possible for a visit            Medication List      No changes were made to your prescriptions during this visit.             Latest Documented Vital Signs:  As of 16:37 EST  BP- 142/91 HR- 57 Temp- 96.5 °F (35.8 °C) (Tympanic) O2 sat- 98%                         Matthew Marcos MD  02/11/21 [id number]

## 2021-02-11 NOTE — ED NOTES
Pt ambulated with spO2 monitoring per Dr. Barrett. Pt's O2 sat stayed above 96%. Pt ambulated without any difficulty or assistance.      Mignon Valdes, RN  02/11/21 1990

## 2021-02-12 ENCOUNTER — READMISSION MANAGEMENT (OUTPATIENT)
Dept: CALL CENTER | Facility: HOSPITAL | Age: 71
End: 2021-02-12

## 2021-02-12 LAB — QT INTERVAL: 450 MS

## 2021-02-12 NOTE — OUTREACH NOTE
COVID-19 Week 1 Survey      Responses   [facility] patient discharged from?  [place]   Does the patient have one of the following disease processes/diagnoses(primary or secondary)?  COVID-19   COVID-19 underlying condition?  None   Call Number  Call 1   Week 1 Call successful?  No   Discharge diagnosis  shortness of breath,  Covid 19          Lizeth Barrera, RN

## 2021-02-12 NOTE — OUTREACH NOTE
Prep Survey      Responses   [facility] patient discharged from?  [place]   Is LACE score < 7 ?  Yes   Emergency Room discharge w/ pulse ox?  Yes   Eligibility  Readm Mgmt   Discharge diagnosis  shortness of breath,  Covid 19   Does the patient have one of the following disease processes/diagnoses(primary or secondary)?  COVID-19   Does the patient have Home health ordered?  No   Is there a DME ordered?  Yes   What DME was ordered?  sent home w/ pulse ox   General alerts for this patient  ED Discharge - call x 3 only   Prep survey completed?  Yes          Linnette Cramer RN

## 2021-02-13 ENCOUNTER — READMISSION MANAGEMENT (OUTPATIENT)
Dept: CALL CENTER | Facility: HOSPITAL | Age: 71
End: 2021-02-13

## 2021-02-13 NOTE — OUTREACH NOTE
COVID-19 Week 1 Survey      Responses   [facility] patient discharged from?  [place]   Does the patient have one of the following disease processes/diagnoses(primary or secondary)?  COVID-19   COVID-19 underlying condition?  None   Call Number  Call 2   Week 1 Call successful?  No   Discharge diagnosis  shortness of breath,  Covid 19          Adali Narayanan RN

## 2021-02-14 ENCOUNTER — READMISSION MANAGEMENT (OUTPATIENT)
Dept: CALL CENTER | Facility: HOSPITAL | Age: 71
End: 2021-02-14

## 2021-02-14 NOTE — OUTREACH NOTE
COVID-19 Week 1 Survey      Responses   [facility] patient discharged from?  [place]   Does the patient have one of the following disease processes/diagnoses(primary or secondary)?  COVID-19   COVID-19 underlying condition?  None   Call Number  Call 3   Week 1 Call successful?  No   Discharge diagnosis  shortness of breath,  Covid 19          Reva Mckeon RN

## 2021-02-24 PROBLEM — E78.5 DYSLIPIDEMIA: Chronic | Status: ACTIVE | Noted: 2019-11-04

## 2021-02-24 PROBLEM — I10 ESSENTIAL HYPERTENSION: Chronic | Status: ACTIVE | Noted: 2019-11-04

## 2021-02-24 PROBLEM — I25.10 CORONARY ATHEROSCLEROSIS: Chronic | Status: ACTIVE | Noted: 2019-11-04

## 2021-02-24 PROBLEM — Z95.1 S/P CABG (CORONARY ARTERY BYPASS GRAFT): Chronic | Status: ACTIVE | Noted: 2020-04-20

## 2021-02-25 ENCOUNTER — OFFICE VISIT (OUTPATIENT)
Dept: CARDIOLOGY | Facility: CLINIC | Age: 71
End: 2021-02-25

## 2021-02-25 VITALS
HEIGHT: 71 IN | BODY MASS INDEX: 31.36 KG/M2 | WEIGHT: 224 LBS | DIASTOLIC BLOOD PRESSURE: 62 MMHG | HEART RATE: 57 BPM | SYSTOLIC BLOOD PRESSURE: 120 MMHG

## 2021-02-25 DIAGNOSIS — I73.9 PERIPHERAL ARTERY DISEASE (HCC): Chronic | ICD-10-CM

## 2021-02-25 DIAGNOSIS — I25.10 ATHEROSCLEROSIS OF NATIVE CORONARY ARTERY OF NATIVE HEART WITHOUT ANGINA PECTORIS: Primary | Chronic | ICD-10-CM

## 2021-02-25 DIAGNOSIS — I10 ESSENTIAL HYPERTENSION: Chronic | ICD-10-CM

## 2021-02-25 DIAGNOSIS — U07.1 COVID-19 VIRUS INFECTION: ICD-10-CM

## 2021-02-25 DIAGNOSIS — E78.5 DYSLIPIDEMIA: Chronic | ICD-10-CM

## 2021-02-25 DIAGNOSIS — Z98.890 HISTORY OF LEFT-SIDED CAROTID ENDARTERECTOMY: Chronic | ICD-10-CM

## 2021-02-25 DIAGNOSIS — Z95.1 S/P CABG (CORONARY ARTERY BYPASS GRAFT): Chronic | ICD-10-CM

## 2021-02-25 PROCEDURE — 99214 OFFICE O/P EST MOD 30 MIN: CPT | Performed by: INTERNAL MEDICINE

## 2021-02-25 RX ORDER — LISINOPRIL 20 MG/1
1 TABLET ORAL 2 TIMES DAILY
COMMUNITY
Start: 2020-12-30 | End: 2023-03-03

## 2021-02-25 RX ORDER — AMLODIPINE BESYLATE 5 MG/1
5 TABLET ORAL DAILY
COMMUNITY
Start: 2021-02-16 | End: 2021-05-10

## 2021-02-25 NOTE — PROGRESS NOTES
"Chief Complaint  Coronary Artery Disease    Subjective    History of Present Illness      I saw Slade Martinez today for continued cardiovascular care.  He is a very pleasant 70-year-old male who was Covid + 1/20/2021 he developed a cough shortness of breath and generalized fatigue.  He is gradually improving.  He continues to observe the CDC guidelines during the coronavirus pandemic.  He does not report chest pain pressure or tightness.  He continues to have dyspnea on exertion no orthopnea or PND or lower extremity edema.  He denies syncope near syncope or palpitations.  His blood pressure remains well controlled.  He has a history of hyperlipidemia remains on atorvastatin 40 mg daily.  He has known coronary heart disease.  He is undergone previous coronary artery bypass surgery and multiple percutaneous coronary intervention with stent deployment.  He is status post left carotid endarterectomy in 2000.  Review of echocardiogram from 9/17/2020 reveals preserved left ventricular function no evidence of significant valvular disease.  Nuclear stress test on that same day also demonstrates preserved left ventricular function no evidence of ischemia.  He tolerates his medications well no reported side effects.  Neck MRI obtained 11/1/2016 revealed a right internal carotid artery with 60 to 65% stenosis, left internal carotid free of any significant disease.    Lab data reviewed from 1/11/2021 reveals white count 6.3 hemoglobin 14.6, BUN 11 creatinine 1.2 liver function test within normal limits, triglycerides 158, HDL 45, LDL 67.    Objective   Vital Signs:   /62   Pulse 57   Ht 180.3 cm (71\")   Wt 102 kg (224 lb)   BMI 31.24 kg/m²     Constitutional:       Appearance: Well-developed.   Eyes:      Conjunctiva/sclera: Conjunctivae normal.      Pupils: Pupils are equal, round, and reactive to light.   HENT:      Head: Normocephalic and atraumatic.   Neck:      Musculoskeletal: Neck supple.      Thyroid: No " thyromegaly.   Pulmonary:      Effort: Pulmonary effort is normal. No respiratory distress.      Breath sounds: Decreased air movement present. Examination of the right-lower field reveals decreased breath sounds. Examination of the left-lower field reveals decreased breath sounds. Decreased breath sounds present. No wheezing. No rales.   Cardiovascular:      Normal rate. Regular rhythm.      S4 Gallop. No S3 gallop. Midsystolic click click.   Edema:     Peripheral edema absent.   Abdominal:      General: Bowel sounds are normal. There is no distension.      Palpations: Abdomen is soft. There is no abdominal mass.      Tenderness: There is no abdominal tenderness.   Musculoskeletal: Normal range of motion.   Skin:     General: Skin is warm and dry.      Findings: No erythema.   Neurological:      Mental Status: Alert and oriented to person, place, and time.         Result Review :   The following data was reviewed by: James Hubbard MD on 02/25/2021: Review of lab data from 1/11/2021, lipid profile, comprehensive metabolic panel and CBC  Common labs    Common Labsle 2/11/21 2/11/21    1333 1333   Glucose  95   BUN  13   Creatinine  1.00   eGFR Non African Am  74   Sodium  134 (A)   Potassium  3.9   Chloride  97 (A)   Calcium  9.8   Albumin  3.60   Total Bilirubin  0.7   Alkaline Phosphatase  82   AST (SGOT)  16   ALT (SGPT)  15   WBC 7.39    Hemoglobin 14.1    Hematocrit 40.8    Platelets 259    (A) Abnormal value            Data reviewed: Cardiology studies Neck MRI 11/1/2016, nuclear stress test 9/17/2020, echocardiogram 9/17/2020          Assessment and Plan    1. Atherosclerosis of native coronary artery of native heart without angina pectoris  Stable free of angina    2. S/P CABG (coronary artery bypass graft)  Stable    3. Essential hypertension  Controlled    4. Dyslipidemia  Continue low-cholesterol low saturated fat diet and atorvastatin 40 mg daily    5. Peripheral artery disease (CMS/HCC)  Presently  stable, reassess    6. History of left-sided carotid endarterectomy  Stable reassess    7. COVID-19 virus infection  Resolved    Mr. Martinez is free of angina and appears euvolemic.  His respiratory status is improving following COVID-19 infection.  I will plan to see him in follow-up in 4 months at which time we will obtain a bilateral carotid ultrasound.  I have encouraged him to observe low-cholesterol low saturated fat diet restrict his salt intake is close to 2000 mg a day as possible and remain active while observing the CDC guidelines.        Follow Up   No follow-ups on file.  Patient was given instructions and counseling regarding his condition or for health maintenance advice. Please see specific information pulled into the AVS if appropriate.

## 2021-05-10 RX ORDER — AMLODIPINE BESYLATE 5 MG/1
TABLET ORAL
Qty: 30 TABLET | Refills: 5 | Status: SHIPPED | OUTPATIENT
Start: 2021-05-10 | End: 2021-11-06

## 2021-07-01 ENCOUNTER — OFFICE VISIT (OUTPATIENT)
Dept: CARDIOLOGY | Facility: CLINIC | Age: 71
End: 2021-07-01

## 2021-07-01 VITALS
HEIGHT: 71 IN | WEIGHT: 233 LBS | DIASTOLIC BLOOD PRESSURE: 58 MMHG | HEART RATE: 56 BPM | BODY MASS INDEX: 32.62 KG/M2 | SYSTOLIC BLOOD PRESSURE: 114 MMHG

## 2021-07-01 DIAGNOSIS — Z98.890 HISTORY OF LEFT-SIDED CAROTID ENDARTERECTOMY: Chronic | ICD-10-CM

## 2021-07-01 DIAGNOSIS — I10 ESSENTIAL HYPERTENSION: Chronic | ICD-10-CM

## 2021-07-01 DIAGNOSIS — E78.5 DYSLIPIDEMIA: Chronic | ICD-10-CM

## 2021-07-01 DIAGNOSIS — I25.10 ATHEROSCLEROSIS OF NATIVE CORONARY ARTERY OF NATIVE HEART WITHOUT ANGINA PECTORIS: Primary | Chronic | ICD-10-CM

## 2021-07-01 DIAGNOSIS — Z95.1 S/P CABG (CORONARY ARTERY BYPASS GRAFT): Chronic | ICD-10-CM

## 2021-07-01 DIAGNOSIS — I73.9 PERIPHERAL ARTERY DISEASE (HCC): Chronic | ICD-10-CM

## 2021-07-01 PROCEDURE — 99214 OFFICE O/P EST MOD 30 MIN: CPT | Performed by: INTERNAL MEDICINE

## 2021-07-01 NOTE — PROGRESS NOTES
"Chief Complaint  Coronary Artery Disease    Subjective    History of Present Illness      I saw Slade Martinez today for continued cardiovascular care.  He is a very pleasant 70-year-old male with a prior history of coronary heart disease.  He is status post previous coronary bypass surgery as well as percutaneous coronary interventions.  He is free of chest pain pressure or tightness.  He does not report any significant or progressive dyspnea on exertion and is free of orthopnea or PND.  He reports very mild lower extremity edema.  He denies syncope near syncope no significant palpitations.  He tolerates his medications well without reported side effects.  He has known hyperlipidemia and is on atorvastatin 40 mg daily.  His lipids are monitored by Dr. Triny Hannon his primary care physician.  His lipids remain well controlled.    Objective   Vital Signs:   /58   Pulse 56   Ht 180.3 cm (71\")   Wt 106 kg (233 lb)   BMI 32.50 kg/m²     Constitutional:       Appearance: Well-developed.   Eyes:      Conjunctiva/sclera: Conjunctivae normal.      Pupils: Pupils are equal, round, and reactive to light.   HENT:      Head: Normocephalic and atraumatic.   Neck:      Thyroid: No thyromegaly.   Pulmonary:      Effort: Pulmonary effort is normal. No respiratory distress.      Breath sounds: Normal breath sounds. No wheezing. No rales.   Cardiovascular:      Normal rate. Regular rhythm.      S4 Gallop. No S3 gallop. Midsystolic click click.   Abdominal:      General: Bowel sounds are normal. There is no distension.      Palpations: Abdomen is soft. There is no abdominal mass.      Tenderness: There is no abdominal tenderness.   Musculoskeletal: Normal range of motion.      Cervical back: Neck supple. Skin:     General: Skin is warm and dry.      Findings: No erythema.   Neurological:      Mental Status: Alert and oriented to person, place, and time.         Result Review :     Common labs    Common Labsle 2/11/21 2/11/21    " 1333 1333   Glucose  95   BUN  13   Creatinine  1.00   eGFR Non African Am  74   Sodium  134 (A)   Potassium  3.9   Chloride  97 (A)   Calcium  9.8   Albumin  3.60   Total Bilirubin  0.7   Alkaline Phosphatase  82   AST (SGOT)  16   ALT (SGPT)  15   WBC 7.39    Hemoglobin 14.1    Hematocrit 40.8    Platelets 259    (A) Abnormal value            Data reviewed: Cardiology studies Echocardiogram reviewed from 9/17/2020 reveals preserved left ventricular function no significant valvular abnormality.  Nuclear stress test from 9/17/2020 again revealed normal left ventricular contractility no evidence of ischemia.          Assessment and Plan    1. Atherosclerosis of native coronary artery of native heart without angina pectoris  Free of angina    2. S/P CABG (coronary artery bypass graft)  Free of angina    3. Essential hypertension  Controlled    4. Dyslipidemia  Controlled    5. Peripheral artery disease (CMS/HCC)  Carotid ultrasound by vascular surgery    6. History of left-sided carotid endarterectomy  Carotid ultrasound followed by vascular surgery    Mr. Martinez is in good spirits feels well free of angina and is euvolemic on physical examination.  His blood pressure and lipids are well controlled.  He tolerates his medications well.  I encouraged him to maintain his activity level.  He is to have repeat carotid ultrasound in the near future.  I will see him in follow-up in 6 months sooner if needed.        Follow Up   No follow-ups on file.  Patient was given instructions and counseling regarding his condition or for health maintenance advice. Please see specific information pulled into the AVS if appropriate.

## 2021-08-19 RX ORDER — RANOLAZINE 1000 MG/1
TABLET, EXTENDED RELEASE ORAL
Qty: 60 TABLET | Refills: 5 | Status: SHIPPED | OUTPATIENT
Start: 2021-08-19 | End: 2022-02-18

## 2021-11-02 ENCOUNTER — TRANSCRIBE ORDERS (OUTPATIENT)
Dept: ADMINISTRATIVE | Facility: HOSPITAL | Age: 71
End: 2021-11-02

## 2021-11-02 DIAGNOSIS — I73.9 PERIPHERAL VASCULAR DISEASE, UNSPECIFIED (HCC): Primary | ICD-10-CM

## 2021-11-06 RX ORDER — AMLODIPINE BESYLATE 5 MG/1
TABLET ORAL
Qty: 90 TABLET | Refills: 1 | Status: SHIPPED | OUTPATIENT
Start: 2021-11-06 | End: 2022-03-24 | Stop reason: DRUGHIGH

## 2021-11-09 NOTE — PROGRESS NOTES
"Chief Complaint  No chief complaint on file.    Subjective    History of Present Illness      I saw Slade Martinez today for cardiovascular care.  He is a pleasant 70-year-old male with coronary heart disease.  He is undergone previous coronary artery bypass surgery and percutaneous coronary intervention.  He is free of chest pain pressure or tightness.  He does not report any significant or limiting dyspnea on exertion.  He does not experience orthopnea PND or any significant lower extremity edema.  Review of nuclear stress test from 9/17/2020 reveals left ventricular ejection fraction 62% no evidence of ischemia.  Echocardiogram from the same date also demonstrates preserved left ventricular function no significant valvular abnormality.  He does not report syncope near syncope or any significant palpitations.  He specifically denies any orthostatic dizziness.    His primary complaint is that of left lower extremity discomfort in the hip and upper leg region.  He is undergoing evaluation for peripheral artery disease.  He is scheduled to undergo lower extremity arteriography 11/17/2021    Objective   Vital Signs:   /52   Pulse 60   Ht 180.3 cm (71\")   Wt 103 kg (226 lb)   BMI 31.52 kg/m²     Constitutional:       Appearance: Well-developed.   Eyes:      Conjunctiva/sclera: Conjunctivae normal.      Pupils: Pupils are equal, round, and reactive to light.   HENT:      Head: Normocephalic and atraumatic.   Neck:      Thyroid: No thyromegaly.   Pulmonary:      Effort: Pulmonary effort is normal. No respiratory distress.      Breath sounds: Normal breath sounds. No wheezing. No rales.   Cardiovascular:      Normal rate. Regular rhythm.      S4 Gallop. No S3 gallop. No rub.   Pulses:     Decreased pulses.      Popliteal: 1+ on the left side.     Dorsalis pedis: 0 on the left side.     Posterior tibial: 0 on the left side.  Edema:     Peripheral edema absent.   Abdominal:      General: Bowel sounds are normal. " There is no distension.      Palpations: Abdomen is soft. There is no abdominal mass.      Tenderness: There is no abdominal tenderness.   Musculoskeletal: Normal range of motion.      Cervical back: Neck supple. Skin:     General: Skin is warm and dry.      Findings: No erythema.   Neurological:      Mental Status: Alert and oriented to person, place, and time.         Result Review :     Common labs    Common Labsle 2/11/21 2/11/21    1333 1333   Glucose  95   BUN  13   Creatinine  1.00   eGFR Non African Am  74   Sodium  134 (A)   Potassium  3.9   Chloride  97 (A)   Calcium  9.8   Albumin  3.60   Total Bilirubin  0.7   Alkaline Phosphatase  82   AST (SGOT)  16   ALT (SGPT)  15   WBC 7.39    Hemoglobin 14.1    Hematocrit 40.8    Platelets 259    (A) Abnormal value                      Assessment and Plan    1. Atherosclerosis of native coronary artery of native heart without angina pectoris  Stable free of angina    2. S/P CABG (coronary artery bypass graft)  Stable    3. Essential hypertension  Controlled    4. Hyperlipidemia LDL goal <70  Continue low-cholesterol low saturated fat diet and atorvastatin 40 mg daily    5. Peripheral artery disease (HCC)  Left lower extremity claudication under evaluation    6. History of left-sided carotid endarterectomy  Continue to monitor    7. COVID-19 virus infection  Resolved    Bili history of angina and euvolemic on physical examination.  I have encouraged him to observe a low-cholesterol low saturated fat diet and follow his current medical regimen.  He will complete his evaluation for lower extremity claudication.  I will see him in follow-up in 6 months sooner if needed.        Follow Up   No follow-ups on file.  Patient was given instructions and counseling regarding his condition or for health maintenance advice. Please see specific information pulled into the AVS if appropriate.

## 2021-11-10 ENCOUNTER — OFFICE VISIT (OUTPATIENT)
Dept: CARDIOLOGY | Facility: CLINIC | Age: 71
End: 2021-11-10

## 2021-11-10 VITALS
HEART RATE: 60 BPM | DIASTOLIC BLOOD PRESSURE: 52 MMHG | SYSTOLIC BLOOD PRESSURE: 100 MMHG | WEIGHT: 226 LBS | BODY MASS INDEX: 31.64 KG/M2 | HEIGHT: 71 IN

## 2021-11-10 DIAGNOSIS — I73.9 PERIPHERAL ARTERY DISEASE (HCC): ICD-10-CM

## 2021-11-10 DIAGNOSIS — I25.10 ATHEROSCLEROSIS OF NATIVE CORONARY ARTERY OF NATIVE HEART WITHOUT ANGINA PECTORIS: Primary | ICD-10-CM

## 2021-11-10 DIAGNOSIS — Z95.1 S/P CABG (CORONARY ARTERY BYPASS GRAFT): ICD-10-CM

## 2021-11-10 DIAGNOSIS — Z98.890 HISTORY OF LEFT-SIDED CAROTID ENDARTERECTOMY: ICD-10-CM

## 2021-11-10 DIAGNOSIS — E78.5 HYPERLIPIDEMIA LDL GOAL <70: ICD-10-CM

## 2021-11-10 DIAGNOSIS — U07.1 COVID-19 VIRUS INFECTION: ICD-10-CM

## 2021-11-10 DIAGNOSIS — I10 ESSENTIAL HYPERTENSION: ICD-10-CM

## 2021-11-10 PROCEDURE — 99214 OFFICE O/P EST MOD 30 MIN: CPT | Performed by: INTERNAL MEDICINE

## 2021-11-17 ENCOUNTER — HOSPITAL ENCOUNTER (OUTPATIENT)
Dept: CARDIOLOGY | Facility: HOSPITAL | Age: 71
Discharge: HOME | End: 2021-11-17
Admitting: NURSE PRACTITIONER

## 2021-11-17 DIAGNOSIS — I73.9 PERIPHERAL VASCULAR DISEASE, UNSPECIFIED (HCC): ICD-10-CM

## 2021-11-17 LAB
BH CV LOWER ARTERIAL LEFT GREAT TOE SYS MAX: 122 MMHG
BH CV LOWER ARTERIAL LEFT TBI RATIO: 0.76
BH CV LOWER ARTERIAL RIGHT GREAT TOE SYS MAX: 139 MMHG
BH CV LOWER ARTERIAL RIGHT TBI RATIO: 0.86
MAXIMAL PREDICTED HEART RATE: 149 BPM
STRESS TARGET HR: 127 BPM
UPPER ARTERIAL LEFT ARM BRACHIAL SYS MAX: 161 MMHG
UPPER ARTERIAL RIGHT ARM BRACHIAL SYS MAX: 158 MMHG

## 2021-11-17 PROCEDURE — 93922 UPR/L XTREMITY ART 2 LEVELS: CPT

## 2022-02-18 RX ORDER — RANOLAZINE 1000 MG/1
TABLET, EXTENDED RELEASE ORAL
Qty: 60 TABLET | Refills: 11 | Status: SHIPPED | OUTPATIENT
Start: 2022-02-18 | End: 2022-02-21 | Stop reason: SDUPTHER

## 2022-02-21 RX ORDER — RANOLAZINE 1000 MG/1
1 TABLET, EXTENDED RELEASE ORAL EVERY 12 HOURS
Qty: 60 TABLET | Refills: 11 | Status: SHIPPED | OUTPATIENT
Start: 2022-02-21

## 2022-03-23 NOTE — PROGRESS NOTES
Chief Complaint  No chief complaint on file.    Subjective    History of Present Illness      I saw Slade Martinez today for continued cardiovascular care.  He is a very pleasant 71-year-old male accompanied by his wife today.  Suad has known coronary heart disease.  He is status post coronary artery bypass surgery and percutaneous coronary intervention.  He is free of angina.  He does not report any significant dyspnea on exertion and denies orthopnea or PND.  He does have bilateral lower extremity edema.  This remains stable but is persistent.  He denies syncope near syncope or any significant palpitations.  His blood pressure is controlled.  He tolerates his medications well.  Of note is he is on amlodipine which could be aggravating his lower extremity edema.Nuclear stress test 9/17/2020 revealed preserved left ventricular function no evidence of ischemia.  Echocardiogram demonstrated preserved left ventricular contractility no significant valvular abnormality.    Objective   Vital Signs:   There were no vitals taken for this visit.    Constitutional:       Appearance: Well-developed.   Eyes:      Conjunctiva/sclera: Conjunctivae normal.      Pupils: Pupils are equal, round, and reactive to light.   HENT:      Head: Normocephalic and atraumatic.   Neck:      Thyroid: No thyromegaly.   Pulmonary:      Effort: Pulmonary effort is normal. No respiratory distress.      Breath sounds: Normal breath sounds. No wheezing. No rales.   Cardiovascular:      Normal rate. Regular rhythm.      No gallop. No S3 and S4 gallop. No rub.   Edema:     Peripheral edema present.     Pretibial: bilateral trace edema of the pretibial area.     Ankle: bilateral trace edema of the ankle.  Abdominal:      General: Bowel sounds are normal. There is no distension.      Palpations: Abdomen is soft. There is no abdominal mass.      Tenderness: There is no abdominal tenderness.   Musculoskeletal: Normal range of motion.      Cervical back: Neck  supple. Skin:     General: Skin is warm and dry.      Findings: No erythema.   Neurological:      Mental Status: Alert and oriented to person, place, and time.         Result Review :                   Assessment and Plan    1. Atherosclerosis of native coronary artery of native heart without angina pectoris  Stable free of angina    2. S/P CABG (coronary artery bypass graft)  Stable    3. Essential hypertension  Controlled    4. Dyslipidemia  Continue low-cholesterol low saturated fat diet and atorvastatin 40 mg daily    5. History of left-sided carotid endarterectomy  Stable    6. Peripheral artery disease (HCC)  Stable    7. COVID-19 virus infection  Stable    8.  Lower extremity edema  Suspect secondary to amlodipine    I have asked Slade to reduce his amlodipine from 5 to 2.5 mg daily.  He will monitor his blood pressure if it exceeds 130/80 he will contact me.  If not he will discontinue amlodipine completely and continue to monitor his blood pressure if it exceeds 130/80 he will inform me and we can advance his lisinopril.  In the meantime he will monitor his lower extremity edema to see if there is any improvement off amlodipine.  I will tentatively plan to see him in follow-up in 6 months of course sooner if needed.        Follow Up   No follow-ups on file.  Patient was given instructions and counseling regarding his condition or for health maintenance advice. Please see specific information pulled into the AVS if appropriate.

## 2022-03-24 ENCOUNTER — OFFICE VISIT (OUTPATIENT)
Dept: CARDIOLOGY | Facility: CLINIC | Age: 72
End: 2022-03-24

## 2022-03-24 VITALS
HEIGHT: 71 IN | HEART RATE: 60 BPM | DIASTOLIC BLOOD PRESSURE: 62 MMHG | BODY MASS INDEX: 32.62 KG/M2 | WEIGHT: 233 LBS | SYSTOLIC BLOOD PRESSURE: 118 MMHG

## 2022-03-24 DIAGNOSIS — E78.5 DYSLIPIDEMIA: ICD-10-CM

## 2022-03-24 DIAGNOSIS — I73.9 PERIPHERAL ARTERY DISEASE: ICD-10-CM

## 2022-03-24 DIAGNOSIS — I25.10 ATHEROSCLEROSIS OF NATIVE CORONARY ARTERY OF NATIVE HEART WITHOUT ANGINA PECTORIS: Primary | ICD-10-CM

## 2022-03-24 DIAGNOSIS — Z95.1 S/P CABG (CORONARY ARTERY BYPASS GRAFT): ICD-10-CM

## 2022-03-24 DIAGNOSIS — U07.1 COVID-19 VIRUS INFECTION: ICD-10-CM

## 2022-03-24 DIAGNOSIS — Z98.890 HISTORY OF LEFT-SIDED CAROTID ENDARTERECTOMY: ICD-10-CM

## 2022-03-24 DIAGNOSIS — I10 ESSENTIAL HYPERTENSION: ICD-10-CM

## 2022-03-24 PROCEDURE — 99214 OFFICE O/P EST MOD 30 MIN: CPT | Performed by: INTERNAL MEDICINE

## 2022-03-24 RX ORDER — AMLODIPINE BESYLATE 5 MG/1
2.5 TABLET ORAL DAILY
COMMUNITY
Start: 2022-03-04 | End: 2022-07-11

## 2022-03-24 RX ORDER — HYDROCHLOROTHIAZIDE 25 MG/1
25 TABLET ORAL DAILY
Qty: 90 TABLET | Refills: 1 | Status: SHIPPED | OUTPATIENT
Start: 2022-03-24 | End: 2022-10-06

## 2022-04-29 ENCOUNTER — TELEPHONE (OUTPATIENT)
Dept: CARDIOLOGY | Facility: CLINIC | Age: 72
End: 2022-04-29

## 2022-04-29 NOTE — TELEPHONE ENCOUNTER
Resume amlodipine 2.5 mg daily.  Monitor blood pressure at home and report back after 1 week if blood pressure above 130/80

## 2022-04-29 NOTE — TELEPHONE ENCOUNTER
22-Dr James Carrasco  Pt: Slade Martinez  : 1950  Phone: [phone]  Reason for Call:  Pt. Saw Dr Ramirez and he told pt to stop Norvasc. Pt. States that his blood pressure has raised even though he is taking 2 20mg Lisinpril a day. Pt. States that top number running 160s/170s, bottom number 84/85. Please speak with Dr Ramirez and advise.

## 2022-07-11 RX ORDER — AMLODIPINE BESYLATE 5 MG/1
TABLET ORAL
Qty: 90 TABLET | Refills: 0 | Status: SHIPPED | OUTPATIENT
Start: 2022-07-11 | End: 2023-03-03

## 2022-09-28 NOTE — PROGRESS NOTES
"Chief Complaint  Coronary Artery Disease    Subjective    History of Present Illness      I saw Slade Martinez today for cardiovascular care.  He is a very pleasant 71-year-old male accompanied by his wife.  Slade feels well and denies any chest pain pressure or tightness.  He is free of respiratory insufficiency and does not report orthopnea PND or lower extremity edema.  He denies syncope near syncope or palpitation.  His primary complaint is that of some left hip and lumbar spine discomfort.  He continues to tolerate his medications well no reported side effects.    Objective   Vital Signs:   /60   Pulse 57   Ht 180.3 cm (71\")   Wt 101 kg (222 lb)   BMI 30.96 kg/m²     Constitutional:       Appearance: Well-developed.   Eyes:      Conjunctiva/sclera: Conjunctivae normal.      Pupils: Pupils are equal, round, and reactive to light.   HENT:      Head: Normocephalic and atraumatic.   Neck:      Thyroid: No thyromegaly.   Pulmonary:      Effort: Pulmonary effort is normal. No respiratory distress.      Breath sounds: Normal breath sounds. No wheezing. No rales.   Cardiovascular:      Normal rate. Regular rhythm.      No gallop. No S3 and S4 gallop. No rub.   Edema:     Peripheral edema absent.   Abdominal:      General: Bowel sounds are normal. There is no distension.      Palpations: Abdomen is soft. There is no abdominal mass.      Tenderness: There is no abdominal tenderness.   Musculoskeletal: Normal range of motion.      Cervical back: Neck supple. Skin:     General: Skin is warm and dry.      Findings: No erythema.   Neurological:      Mental Status: Alert and oriented to person, place, and time.         Result Review :                   Assessment and Plan    1. Atherosclerosis of native coronary artery of native heart without angina pectoris  Stable free of angina    2. S/P CABG (coronary artery bypass graft)  Stable    3. Essential hypertension  Controlled    4. Dyslipidemia  Continue atorvastatin " 40 mg daily    5. Peripheral artery disease (HCC)  Stable    6. History of left-sided carotid endarterectomy  Continue to monitor    Slade feels well and has maintained his activity level.  He is free of angina and is euvolemic on examination.  He will continue his current medical regimen.  I have encouraged him to observe a low-cholesterol diet.  I will arrange for follow-up in 6 months sooner if needed.        Follow Up   No follow-ups on file.  Patient was given instructions and counseling regarding his condition or for health maintenance advice. Please see specific information pulled into the AVS if appropriate.

## 2022-09-29 ENCOUNTER — OFFICE VISIT (OUTPATIENT)
Dept: CARDIOLOGY | Facility: CLINIC | Age: 72
End: 2022-09-29

## 2022-09-29 VITALS
SYSTOLIC BLOOD PRESSURE: 102 MMHG | HEIGHT: 71 IN | BODY MASS INDEX: 31.08 KG/M2 | HEART RATE: 57 BPM | DIASTOLIC BLOOD PRESSURE: 60 MMHG | WEIGHT: 222 LBS

## 2022-09-29 DIAGNOSIS — I10 ESSENTIAL HYPERTENSION: Chronic | ICD-10-CM

## 2022-09-29 DIAGNOSIS — I25.10 ATHEROSCLEROSIS OF NATIVE CORONARY ARTERY OF NATIVE HEART WITHOUT ANGINA PECTORIS: Primary | Chronic | ICD-10-CM

## 2022-09-29 DIAGNOSIS — Z95.1 S/P CABG (CORONARY ARTERY BYPASS GRAFT): Chronic | ICD-10-CM

## 2022-09-29 DIAGNOSIS — Z98.890 HISTORY OF LEFT-SIDED CAROTID ENDARTERECTOMY: Chronic | ICD-10-CM

## 2022-09-29 DIAGNOSIS — I73.9 PERIPHERAL ARTERY DISEASE: Chronic | ICD-10-CM

## 2022-09-29 DIAGNOSIS — E78.5 DYSLIPIDEMIA: Chronic | ICD-10-CM

## 2022-09-29 PROCEDURE — 99214 OFFICE O/P EST MOD 30 MIN: CPT | Performed by: INTERNAL MEDICINE

## 2022-09-29 RX ORDER — LORATADINE 10 MG/1
10 TABLET ORAL DAILY
COMMUNITY

## 2022-10-06 DIAGNOSIS — I10 ESSENTIAL HYPERTENSION: ICD-10-CM

## 2022-10-06 RX ORDER — HYDROCHLOROTHIAZIDE 25 MG/1
TABLET ORAL
Qty: 90 TABLET | Refills: 3 | Status: SHIPPED | OUTPATIENT
Start: 2022-10-06 | End: 2023-03-03

## 2023-03-03 ENCOUNTER — APPOINTMENT (OUTPATIENT)
Dept: GENERAL RADIOLOGY | Facility: HOSPITAL | Age: 73
End: 2023-03-03
Payer: MEDICARE

## 2023-03-03 ENCOUNTER — HOSPITAL ENCOUNTER (OUTPATIENT)
Facility: HOSPITAL | Age: 73
Setting detail: OBSERVATION
Discharge: HOME | End: 2023-03-04
Attending: EMERGENCY MEDICINE | Admitting: EMERGENCY MEDICINE
Payer: MEDICARE

## 2023-03-03 DIAGNOSIS — R50.9 FEVER, UNSPECIFIED FEVER CAUSE: Primary | ICD-10-CM

## 2023-03-03 LAB
ALBUMIN SERPL-MCNC: 3.4 G/DL (ref 3.5–5.2)
ALBUMIN/GLOB SERPL: 1.1 G/DL
ALP SERPL-CCNC: 84 U/L (ref 39–117)
ALT SERPL W P-5'-P-CCNC: 13 U/L (ref 1–41)
ANION GAP SERPL CALCULATED.3IONS-SCNC: 11.7 MMOL/L (ref 5–15)
AST SERPL-CCNC: 12 U/L (ref 1–40)
B PARAPERT DNA SPEC QL NAA+PROBE: NOT DETECTED
B PERT DNA SPEC QL NAA+PROBE: NOT DETECTED
BASOPHILS # BLD AUTO: 0.04 10*3/MM3 (ref 0–0.2)
BASOPHILS NFR BLD AUTO: 0.5 % (ref 0–1.5)
BILIRUB SERPL-MCNC: 0.7 MG/DL (ref 0–1.2)
BILIRUB UR QL STRIP: NEGATIVE
BUN SERPL-MCNC: 9 MG/DL (ref 8–23)
BUN/CREAT SERPL: 10.6 (ref 7–25)
C PNEUM DNA NPH QL NAA+NON-PROBE: NOT DETECTED
CALCIUM SPEC-SCNC: 9.2 MG/DL (ref 8.6–10.5)
CHLORIDE SERPL-SCNC: 96 MMOL/L (ref 98–107)
CLARITY UR: CLEAR
CO2 SERPL-SCNC: 23.3 MMOL/L (ref 22–29)
COLOR UR: YELLOW
CREAT SERPL-MCNC: 0.85 MG/DL (ref 0.76–1.27)
CRP SERPL-MCNC: 8.57 MG/DL (ref 0–0.5)
D-LACTATE SERPL-SCNC: 1.9 MMOL/L (ref 0.5–2)
DEPRECATED RDW RBC AUTO: 45.7 FL (ref 37–54)
EGFRCR SERPLBLD CKD-EPI 2021: 91.8 ML/MIN/1.73
EOSINOPHIL # BLD AUTO: 0.7 10*3/MM3 (ref 0–0.4)
EOSINOPHIL NFR BLD AUTO: 7.9 % (ref 0.3–6.2)
ERYTHROCYTE [DISTWIDTH] IN BLOOD BY AUTOMATED COUNT: 12.8 % (ref 12.3–15.4)
ERYTHROCYTE [SEDIMENTATION RATE] IN BLOOD: 46 MM/HR (ref 0–20)
FLUAV SUBTYP SPEC NAA+PROBE: NOT DETECTED
FLUBV RNA ISLT QL NAA+PROBE: NOT DETECTED
GLOBULIN UR ELPH-MCNC: 3.1 GM/DL
GLUCOSE SERPL-MCNC: 133 MG/DL (ref 65–99)
GLUCOSE UR STRIP-MCNC: NEGATIVE MG/DL
HADV DNA SPEC NAA+PROBE: NOT DETECTED
HCOV 229E RNA SPEC QL NAA+PROBE: NOT DETECTED
HCOV HKU1 RNA SPEC QL NAA+PROBE: NOT DETECTED
HCOV NL63 RNA SPEC QL NAA+PROBE: NOT DETECTED
HCOV OC43 RNA SPEC QL NAA+PROBE: NOT DETECTED
HCT VFR BLD AUTO: 36.8 % (ref 37.5–51)
HGB BLD-MCNC: 12.7 G/DL (ref 13–17.7)
HGB UR QL STRIP.AUTO: NEGATIVE
HMPV RNA NPH QL NAA+NON-PROBE: NOT DETECTED
HPIV1 RNA ISLT QL NAA+PROBE: NOT DETECTED
HPIV2 RNA SPEC QL NAA+PROBE: NOT DETECTED
HPIV3 RNA NPH QL NAA+PROBE: NOT DETECTED
HPIV4 P GENE NPH QL NAA+PROBE: NOT DETECTED
IMM GRANULOCYTES # BLD AUTO: 0.03 10*3/MM3 (ref 0–0.05)
IMM GRANULOCYTES NFR BLD AUTO: 0.3 % (ref 0–0.5)
KETONES UR QL STRIP: ABNORMAL
LEUKOCYTE ESTERASE UR QL STRIP.AUTO: NEGATIVE
LYMPHOCYTES # BLD AUTO: 1.35 10*3/MM3 (ref 0.7–3.1)
LYMPHOCYTES NFR BLD AUTO: 15.3 % (ref 19.6–45.3)
M PNEUMO IGG SER IA-ACNC: NOT DETECTED
MCH RBC QN AUTO: 34 PG (ref 26.6–33)
MCHC RBC AUTO-ENTMCNC: 34.5 G/DL (ref 31.5–35.7)
MCV RBC AUTO: 98.4 FL (ref 79–97)
MONOCYTES # BLD AUTO: 1.3 10*3/MM3 (ref 0.1–0.9)
MONOCYTES NFR BLD AUTO: 14.8 % (ref 5–12)
NEUTROPHILS NFR BLD AUTO: 5.39 10*3/MM3 (ref 1.7–7)
NEUTROPHILS NFR BLD AUTO: 61.2 % (ref 42.7–76)
NITRITE UR QL STRIP: NEGATIVE
NRBC BLD AUTO-RTO: 0.1 /100 WBC (ref 0–0.2)
PH UR STRIP.AUTO: 6 [PH] (ref 5–8)
PLATELET # BLD AUTO: 224 10*3/MM3 (ref 140–450)
PMV BLD AUTO: 9.7 FL (ref 6–12)
POTASSIUM SERPL-SCNC: 3.8 MMOL/L (ref 3.5–5.2)
PROT SERPL-MCNC: 6.5 G/DL (ref 6–8.5)
PROT UR QL STRIP: NEGATIVE
RBC # BLD AUTO: 3.74 10*6/MM3 (ref 4.14–5.8)
RHINOVIRUS RNA SPEC NAA+PROBE: NOT DETECTED
RSV RNA NPH QL NAA+NON-PROBE: NOT DETECTED
SARS-COV-2 RNA NPH QL NAA+NON-PROBE: NOT DETECTED
SODIUM SERPL-SCNC: 131 MMOL/L (ref 136–145)
SP GR UR STRIP: 1.02 (ref 1–1.03)
UROBILINOGEN UR QL STRIP: ABNORMAL
WBC NRBC COR # BLD: 8.81 10*3/MM3 (ref 3.4–10.8)

## 2023-03-03 PROCEDURE — 36415 COLL VENOUS BLD VENIPUNCTURE: CPT

## 2023-03-03 PROCEDURE — 87040 BLOOD CULTURE FOR BACTERIA: CPT | Performed by: EMERGENCY MEDICINE

## 2023-03-03 PROCEDURE — 84145 PROCALCITONIN (PCT): CPT | Performed by: STUDENT IN AN ORGANIZED HEALTH CARE EDUCATION/TRAINING PROGRAM

## 2023-03-03 PROCEDURE — 99284 EMERGENCY DEPT VISIT MOD MDM: CPT

## 2023-03-03 PROCEDURE — 83605 ASSAY OF LACTIC ACID: CPT | Performed by: EMERGENCY MEDICINE

## 2023-03-03 PROCEDURE — 71045 X-RAY EXAM CHEST 1 VIEW: CPT

## 2023-03-03 PROCEDURE — G0378 HOSPITAL OBSERVATION PER HR: HCPCS

## 2023-03-03 PROCEDURE — 85652 RBC SED RATE AUTOMATED: CPT | Performed by: EMERGENCY MEDICINE

## 2023-03-03 PROCEDURE — 85025 COMPLETE CBC W/AUTO DIFF WBC: CPT | Performed by: EMERGENCY MEDICINE

## 2023-03-03 PROCEDURE — 0202U NFCT DS 22 TRGT SARS-COV-2: CPT | Performed by: EMERGENCY MEDICINE

## 2023-03-03 PROCEDURE — 86140 C-REACTIVE PROTEIN: CPT | Performed by: EMERGENCY MEDICINE

## 2023-03-03 PROCEDURE — 80053 COMPREHEN METABOLIC PANEL: CPT | Performed by: EMERGENCY MEDICINE

## 2023-03-03 PROCEDURE — 81003 URINALYSIS AUTO W/O SCOPE: CPT | Performed by: EMERGENCY MEDICINE

## 2023-03-03 RX ORDER — SACCHAROMYCES BOULARDII 250 MG
250 CAPSULE ORAL 2 TIMES DAILY
COMMUNITY

## 2023-03-03 RX ORDER — SODIUM CHLORIDE 0.9 % (FLUSH) 0.9 %
10 SYRINGE (ML) INJECTION AS NEEDED
Status: DISCONTINUED | OUTPATIENT
Start: 2023-03-03 | End: 2023-03-04 | Stop reason: HOSPADM

## 2023-03-04 ENCOUNTER — APPOINTMENT (OUTPATIENT)
Dept: CT IMAGING | Facility: HOSPITAL | Age: 73
End: 2023-03-04
Payer: MEDICARE

## 2023-03-04 VITALS
TEMPERATURE: 98.5 F | OXYGEN SATURATION: 94 % | RESPIRATION RATE: 18 BRPM | HEIGHT: 71 IN | DIASTOLIC BLOOD PRESSURE: 62 MMHG | BODY MASS INDEX: 30.24 KG/M2 | WEIGHT: 216 LBS | SYSTOLIC BLOOD PRESSURE: 157 MMHG | HEART RATE: 67 BPM

## 2023-03-04 PROBLEM — S22.41XD: Status: ACTIVE | Noted: 2023-03-04

## 2023-03-04 LAB
ANION GAP SERPL CALCULATED.3IONS-SCNC: 7.1 MMOL/L (ref 5–15)
BUN SERPL-MCNC: 8 MG/DL (ref 8–23)
BUN/CREAT SERPL: 11.3 (ref 7–25)
CALCIUM SPEC-SCNC: 8.9 MG/DL (ref 8.6–10.5)
CHLORIDE SERPL-SCNC: 99 MMOL/L (ref 98–107)
CO2 SERPL-SCNC: 26.9 MMOL/L (ref 22–29)
CREAT SERPL-MCNC: 0.71 MG/DL (ref 0.76–1.27)
DEPRECATED RDW RBC AUTO: 44.4 FL (ref 37–54)
EGFRCR SERPLBLD CKD-EPI 2021: 96.9 ML/MIN/1.73
ERYTHROCYTE [DISTWIDTH] IN BLOOD BY AUTOMATED COUNT: 12.6 % (ref 12.3–15.4)
GLUCOSE SERPL-MCNC: 104 MG/DL (ref 65–99)
HCT VFR BLD AUTO: 31.8 % (ref 37.5–51)
HGB BLD-MCNC: 11 G/DL (ref 13–17.7)
MCH RBC QN AUTO: 33.8 PG (ref 26.6–33)
MCHC RBC AUTO-ENTMCNC: 34.6 G/DL (ref 31.5–35.7)
MCV RBC AUTO: 97.8 FL (ref 79–97)
PLATELET # BLD AUTO: 207 10*3/MM3 (ref 140–450)
PMV BLD AUTO: 9.7 FL (ref 6–12)
POTASSIUM SERPL-SCNC: 3.8 MMOL/L (ref 3.5–5.2)
PROCALCITONIN SERPL-MCNC: 0.08 NG/ML (ref 0–0.25)
RBC # BLD AUTO: 3.25 10*6/MM3 (ref 4.14–5.8)
SODIUM SERPL-SCNC: 133 MMOL/L (ref 136–145)
WBC NRBC COR # BLD: 7.52 10*3/MM3 (ref 3.4–10.8)

## 2023-03-04 PROCEDURE — 80048 BASIC METABOLIC PNL TOTAL CA: CPT | Performed by: STUDENT IN AN ORGANIZED HEALTH CARE EDUCATION/TRAINING PROGRAM

## 2023-03-04 PROCEDURE — 85027 COMPLETE CBC AUTOMATED: CPT | Performed by: STUDENT IN AN ORGANIZED HEALTH CARE EDUCATION/TRAINING PROGRAM

## 2023-03-04 PROCEDURE — G0378 HOSPITAL OBSERVATION PER HR: HCPCS

## 2023-03-04 PROCEDURE — 74176 CT ABD & PELVIS W/O CONTRAST: CPT

## 2023-03-04 PROCEDURE — 71250 CT THORAX DX C-: CPT

## 2023-03-04 RX ORDER — SODIUM CHLORIDE 0.9 % (FLUSH) 0.9 %
10 SYRINGE (ML) INJECTION EVERY 12 HOURS SCHEDULED
Status: DISCONTINUED | OUTPATIENT
Start: 2023-03-04 | End: 2023-03-04 | Stop reason: HOSPADM

## 2023-03-04 RX ORDER — PANTOPRAZOLE SODIUM 40 MG/1
40 TABLET, DELAYED RELEASE ORAL 2 TIMES DAILY
Status: DISCONTINUED | OUTPATIENT
Start: 2023-03-04 | End: 2023-03-04 | Stop reason: HOSPADM

## 2023-03-04 RX ORDER — ATORVASTATIN CALCIUM 20 MG/1
40 TABLET, FILM COATED ORAL DAILY
Status: DISCONTINUED | OUTPATIENT
Start: 2023-03-04 | End: 2023-03-04 | Stop reason: HOSPADM

## 2023-03-04 RX ORDER — ONDANSETRON 2 MG/ML
4 INJECTION INTRAMUSCULAR; INTRAVENOUS EVERY 6 HOURS PRN
Status: DISCONTINUED | OUTPATIENT
Start: 2023-03-04 | End: 2023-03-04 | Stop reason: HOSPADM

## 2023-03-04 RX ORDER — CLOPIDOGREL BISULFATE 75 MG/1
75 TABLET ORAL DAILY
Status: DISCONTINUED | OUTPATIENT
Start: 2023-03-04 | End: 2023-03-04 | Stop reason: HOSPADM

## 2023-03-04 RX ORDER — ASPIRIN 81 MG/1
81 TABLET ORAL DAILY
Status: DISCONTINUED | OUTPATIENT
Start: 2023-03-04 | End: 2023-03-04 | Stop reason: HOSPADM

## 2023-03-04 RX ORDER — ATENOLOL 50 MG/1
50 TABLET ORAL 2 TIMES DAILY
Status: DISCONTINUED | OUTPATIENT
Start: 2023-03-04 | End: 2023-03-04 | Stop reason: HOSPADM

## 2023-03-04 RX ORDER — HYDROCODONE BITARTRATE AND ACETAMINOPHEN 5; 325 MG/1; MG/1
1 TABLET ORAL EVERY 8 HOURS PRN
Status: DISCONTINUED | OUTPATIENT
Start: 2023-03-04 | End: 2023-03-04 | Stop reason: HOSPADM

## 2023-03-04 RX ORDER — CHOLECALCIFEROL (VITAMIN D3) 125 MCG
5 CAPSULE ORAL NIGHTLY PRN
Status: DISCONTINUED | OUTPATIENT
Start: 2023-03-04 | End: 2023-03-04 | Stop reason: HOSPADM

## 2023-03-04 RX ORDER — SODIUM CHLORIDE 9 MG/ML
40 INJECTION, SOLUTION INTRAVENOUS AS NEEDED
Status: DISCONTINUED | OUTPATIENT
Start: 2023-03-04 | End: 2023-03-04 | Stop reason: HOSPADM

## 2023-03-04 RX ORDER — ONDANSETRON 4 MG/1
4 TABLET, FILM COATED ORAL EVERY 6 HOURS PRN
Status: DISCONTINUED | OUTPATIENT
Start: 2023-03-04 | End: 2023-03-04 | Stop reason: HOSPADM

## 2023-03-04 RX ORDER — ISOSORBIDE MONONITRATE 30 MG/1
60 TABLET, EXTENDED RELEASE ORAL 2 TIMES DAILY
Status: DISCONTINUED | OUTPATIENT
Start: 2023-03-04 | End: 2023-03-04 | Stop reason: HOSPADM

## 2023-03-04 RX ORDER — SUCRALFATE 1 G/1
1 TABLET ORAL 2 TIMES DAILY
Status: DISCONTINUED | OUTPATIENT
Start: 2023-03-04 | End: 2023-03-04 | Stop reason: HOSPADM

## 2023-03-04 RX ORDER — RANOLAZINE 500 MG/1
1000 TABLET, EXTENDED RELEASE ORAL EVERY 12 HOURS
Status: DISCONTINUED | OUTPATIENT
Start: 2023-03-04 | End: 2023-03-04 | Stop reason: HOSPADM

## 2023-03-04 RX ORDER — NITROGLYCERIN 0.4 MG/1
0.4 TABLET SUBLINGUAL
Status: DISCONTINUED | OUTPATIENT
Start: 2023-03-04 | End: 2023-03-04 | Stop reason: HOSPADM

## 2023-03-04 RX ORDER — SODIUM CHLORIDE 0.9 % (FLUSH) 0.9 %
10 SYRINGE (ML) INJECTION AS NEEDED
Status: DISCONTINUED | OUTPATIENT
Start: 2023-03-04 | End: 2023-03-04 | Stop reason: HOSPADM

## 2023-03-04 RX ORDER — ACETAMINOPHEN 325 MG/1
650 TABLET ORAL EVERY 4 HOURS PRN
Status: DISCONTINUED | OUTPATIENT
Start: 2023-03-04 | End: 2023-03-04 | Stop reason: HOSPADM

## 2023-03-04 RX ADMIN — CLOPIDOGREL 75 MG: 75 TABLET, FILM COATED ORAL at 08:48

## 2023-03-04 RX ADMIN — Medication 10 ML: at 08:55

## 2023-03-04 RX ADMIN — SUCRALFATE 1 G: 1 TABLET ORAL at 08:48

## 2023-03-04 RX ADMIN — ATORVASTATIN CALCIUM 40 MG: 20 TABLET, FILM COATED ORAL at 08:47

## 2023-03-04 RX ADMIN — ASPIRIN 81 MG: 81 TABLET, COATED ORAL at 08:48

## 2023-03-04 RX ADMIN — ISOSORBIDE MONONITRATE 60 MG: 30 TABLET, EXTENDED RELEASE ORAL at 08:48

## 2023-03-04 RX ADMIN — Medication 10 ML: at 02:00

## 2023-03-04 RX ADMIN — PANTOPRAZOLE SODIUM 40 MG: 40 TABLET, DELAYED RELEASE ORAL at 08:47

## 2023-03-04 NOTE — NURSING NOTE
Pt states he took his own pain medication, Norco 5/325, at about 0845 today.  Pt instructed to not take any home medications pt verbalized understanding.  Pt's wife instructed to take all medications home and not to give any home medications to patient.  Pt's wife verbalized understanding as well.  APRN notified.

## 2023-03-04 NOTE — ED NOTES
Pt ambulatory to triage from home with c/o fever - states was just released from [facility] Wednesday after back surgery. tmax 102.5 at home. Last tylenol 2 hours prior to arrival. Pt wearing mask in triage. Triage personnel wore appropriate PPE    Update: Received notification from primary nurse - pt did not have back surgery, pain was being managed conservatively, per wife and verified by pt's back doctor.

## 2023-03-04 NOTE — PLAN OF CARE
Goal Outcome Evaluation:      Pt discharged to home.  Pt agreeable to discharge plan and verbalized understanding of discharge plans.  Pt transferred to private vehicle via wheelchair.

## 2023-03-04 NOTE — PROGRESS NOTES
ED OBSERVATION PROGRESS/DISCHARGE SUMMARY    Date of Admission: 3/3/2023   LOS: 0 days   PCP: Triny Hannon MD    Final Diagnosis right ninth and 10th rib fractures, fever of unknown origin      Subjective     Hospital Outcome:   Patient is a very pleasant afebrile ambulatory 72-year-old  gentleman admitted to the ED observation unit for fever.  His lab work-up yesterday showed no leukocytosis, normal lactic, normal Pro-Mao but did have an elevated CRP level.  He does have 2 rib fractures that are fairly recent as well as a traumatic lumbar compression fracture seen and treated at [facility].  I suspect this etiology of his elevated CRP inflammatory marker.  Patient's urinalysis and respiratory viral panel yesterday were unremarkable.  He was not started on antibiotics.    He had a fall on Sunday and has had right rib pain ever since.  He developed a fever 2 days subsequently.  Patient's vital signs have remained afebrile overnight.  He appears well is ambulatory and has no complaints this morning.    I suspect the patient could have had a transient viral infection where this reported fever could be secondary to splinting related to his rib fractures.  He appears well.  Blood cultures are pending.  He is agreeable to discharge home today.    ROS:  General: no fevers, chills  Respiratory: no cough, dyspnea  Cardiovascular: no chest pain, palpitations  Abdomen: No abdominal pain, nausea, vomiting, or diarrhea  Neurologic: No focal weakness    Objective   Physical Exam:  I have reviewed the vital signs.  Temp:  [98.1 °F (36.7 °C)-98.9 °F (37.2 °C)] 98.5 °F (36.9 °C)  Heart Rate:  [56-75] 67  Resp:  [16-18] 18  BP: (130-188)/(60-95) 157/62  General Appearance:    Alert, cooperative, no distress  Head:    Normocephalic, atraumatic  Eyes:    Sclerae anicteric  Neck:   Supple, no mass  Chest wall: Reproducible tenderness to right anterior lateral inferior ribs without crepitus or deformity  Lungs: Clear to  auscultation bilaterally, respirations unlabored  Heart: Regular rate and rhythm, S1 and S2 normal, no murmur, rub or gallop  Abdomen:  Soft, non-tender, bowel sounds active, nondistended  Extremities: No clubbing, cyanosis, or edema to lower extremities  Pulses:  2+ and symmetric in distal lower extremities  Skin: No rashes   Neurologic: Oriented x3, Normal strength to extremities    Results Review:    I have reviewed the labs, radiology results and diagnostic studies.    Results from last 7 days   Lab Units 03/04/23  0506   WBC 10*3/mm3 7.52   HEMOGLOBIN g/dL 11.0*   HEMATOCRIT % 31.8*   PLATELETS 10*3/mm3 207     Results from last 7 days   Lab Units 03/04/23  0506 03/03/23  1954   SODIUM mmol/L 133* 131*   POTASSIUM mmol/L 3.8 3.8   CHLORIDE mmol/L 99 96*   CO2 mmol/L 26.9 23.3   BUN mg/dL 8 9   CREATININE mg/dL 0.71* 0.85   CALCIUM mg/dL 8.9 9.2   BILIRUBIN mg/dL  --  0.7   ALK PHOS U/L  --  84   ALT (SGPT) U/L  --  13   AST (SGOT) U/L  --  12   GLUCOSE mg/dL 104* 133*     Imaging Results (Last 24 Hours)     Procedure Component Value Units Date/Time    CT Chest Without Contrast Diagnostic [[id number]] Collected: 03/04/23 0217     Updated: 03/04/23 0236    Narrative:      CT OF THE CHEST WITHOUT CONTRAST; CT OF THE ABDOMEN AND PELVIS WITHOUT  CONTRAST     HISTORY: Chest wall pain. Abdominal pain     COMPARISON: None available.     TECHNIQUE: Axial CT imaging was obtained from the thoracic inlet through  the symphysis pubis.  No IV contrast was administered.     FINDINGS:  CHEST: Reticulonodular infiltrates are noted throughout both lungs,  likely infectious or inflammatory. Patient is also felt to have  background chronic interstitial lung changes. There is a larger 7 mm  subpleural nodule within the lingula. CT follow-up 6 months is  recommended. Thyroid gland is atrophic. Trachea and esophagus are within  normal limits. There are extensive coronary artery calcifications.  Thoracic aorta is normal in caliber.  There are some prominent  mediastinal lymph nodes. For example, a subcarinal lymph node measures  up to 1.8 cm in short axis dimensions. There is a right ninth rib  fracture. There fractures of the right lateral ninth ribs.     ABDOMEN AND PELVIS: The stomach, duodenum, adrenal glands, and spleen  are all normal. No suspicious hepatic lesions are seen. Gallbladder is  absent. Pancreas is atrophic. No hydronephrosis is seen on either side.  No distal ureteral or bladder stones are seen. Prostate gland protrudes  into the base of the bladder. There is diverticulosis. The appendix is  normal. Dense vascular calcifications are noted. Patient has compression  deformity involving the L3 vertebral body. Right loss is approximately  40%. There is lumbar scoliosis, with convexity to the right.       Impression:         1. Fractures of the lateral right ninth and 10th ribs.  2. Suspected chronic interstitial lung changes, with superimposed  reticulonodular infiltrates, likely infectious or inflammatory.  3. 7 mm subpleural nodule within the lingula. Follow-up CT in 6 months  is suggested. Patient also has some prominent mediastinal lymph nodes,  and attention to this on follow-up study is recommended.  4. Compression fracture of L3.     Radiation dose reduction techniques were utilized, including automated  exposure control and exposure modulation based on body size.     This report was finalized on 3/4/2023 2:32 AM by Dr. Shawnee Preston M.D.       CT Abdomen Pelvis Without Contrast [[id number]] Collected: 03/04/23 0217     Updated: 03/04/23 0236    Narrative:      CT OF THE CHEST WITHOUT CONTRAST; CT OF THE ABDOMEN AND PELVIS WITHOUT  CONTRAST     HISTORY: Chest wall pain. Abdominal pain     COMPARISON: None available.     TECHNIQUE: Axial CT imaging was obtained from the thoracic inlet through  the symphysis pubis.  No IV contrast was administered.     FINDINGS:  CHEST: Reticulonodular infiltrates are noted throughout  both lungs,  likely infectious or inflammatory. Patient is also felt to have  background chronic interstitial lung changes. There is a larger 7 mm  subpleural nodule within the lingula. CT follow-up 6 months is  recommended. Thyroid gland is atrophic. Trachea and esophagus are within  normal limits. There are extensive coronary artery calcifications.  Thoracic aorta is normal in caliber. There are some prominent  mediastinal lymph nodes. For example, a subcarinal lymph node measures  up to 1.8 cm in short axis dimensions. There is a right ninth rib  fracture. There fractures of the right lateral ninth ribs.     ABDOMEN AND PELVIS: The stomach, duodenum, adrenal glands, and spleen  are all normal. No suspicious hepatic lesions are seen. Gallbladder is  absent. Pancreas is atrophic. No hydronephrosis is seen on either side.  No distal ureteral or bladder stones are seen. Prostate gland protrudes  into the base of the bladder. There is diverticulosis. The appendix is  normal. Dense vascular calcifications are noted. Patient has compression  deformity involving the L3 vertebral body. Right loss is approximately  40%. There is lumbar scoliosis, with convexity to the right.       Impression:         1. Fractures of the lateral right ninth and 10th ribs.  2. Suspected chronic interstitial lung changes, with superimposed  reticulonodular infiltrates, likely infectious or inflammatory.  3. 7 mm subpleural nodule within the lingula. Follow-up CT in 6 months  is suggested. Patient also has some prominent mediastinal lymph nodes,  and attention to this on follow-up study is recommended.  4. Compression fracture of L3.     Radiation dose reduction techniques were utilized, including automated  exposure control and exposure modulation based on body size.     This report was finalized on 3/4/2023 2:32 AM by Dr. Shawnee Preston M.D.       XR Chest 1 View [[id number]] Collected: 03/03/23 2003     Updated: 03/03/23 2012     Narrative:      Emergency portable view of the chest on 03/03/2023     CLINICAL HISTORY: Postop fever     This correlated to a prior portable chest x-ray from [facility] on 02/11/2021.     FINDINGS: There are multiple sternal wires from previous cardiac  surgery.. There is a loop recorder device projecting over the  anteromedial soft tissues of the left mid hemithorax. The  cardiomediastinal silhouette is upper limits of normal in size. The  pulmonary vasculature is within normal limits. There is mild  interstitial prominence in the periphery of the mid to lower lung zones,  right greater than left, unchanged since prior chest x-ray 02/11/2021  likely chronic. No focal dense airspace consolidation is seen. There are  several horizontal linear opacities at the lung bases which were present  previously and likely represent linear areas of bibasilar atelectasis or  scarring. The costophrenic angles are sharp.       Impression:      1. No convincing acute change when compared to the prior chest x-ray on  02/11/2021 with no definite active disease seen in the chest  2. There has been a previous median sternotomy and there is a loop  recorder device projecting over the anterior medial soft tissues of the  left mid hemithorax. The patient has a background pattern of  interstitial prominence within the periphery of the mid to lower lung  zones right greater than which is most likely chronic as it unchanged  since the prior chest x-ray on 02/11/2021. There are bibasilar linear  areas of atelectasis or scarring. The remainder of the chest x-ray is  unremarkable.     This report was finalized on 3/3/2023 8:09 PM by Dr. Joe Park M.D.             I have reviewed the medications.  ---------------------------------------------------------------------------------------------  Assessment & Plan   Assessment/Problem List    Fever of unknown origin      Plan:    Fever of unknown origin  -Chest x-ray no definite  disease  -UA unremarkable  -CTA of the chest abdomen and pelvis shows no infectious process but right ninth and 10th rib fractures  -WBC 8, lactate 1.9, Pro-Mao 0.08  -CRP 8.57, ESR 46  -Respiratory panel PCR negative  -Blood cultures pending     Recent L3 compression fracture  -Evaluated by [facility], conservative treatment  -Continue analgesics as needed     CAD  Hypertension  -Status post CABG  -Continue aspirin, Plavix, statin  -Continue Ranexa, Imdur, atenolol  -Monitor with vital signs every 4 hours     COPD  -No acute exacerbation  -Albuterol as needed    Disposition: Home    Follow-up after Discharge: PCP    This note will serve as a discharge summary    Adrianna Plata, APRN 03/04/23 09:24 EST          I have worn appropriate PPE during this patient encounter, sanitized my hands both with entering and exiting patient's room.    38 minutes has been spent by [facility] providers in the care of this patient while under observation status

## 2023-03-04 NOTE — H&P
[facility]   HISTORY AND PHYSICAL    Patient Name: Slade Martinez  : 1950  MRN: [id number]  Primary Care Physician:  Triny Hannon MD  Date of admission: 3/3/2023    Subjective   Subjective     Chief Complaint:   Chief Complaint   Patient presents with   • Fever         HPI:    Slade Maritnez is a 72 y.o. male with history of CAD status post CABG, COPD hypertension, and recent L3 compression fracture secondary to a fall who presents to [facility] ER with fever.  Patient states over the past 2 to 3 days he has not felt well.  He states he has been tired with generalized muscle aches.  He states he has had cold chills and has been having fevers at home.  Wife at bedside states he has had low-grade fevers for the past couple of days and they will low 100s however today his temp went up to 102.5  Patient reports he did have a slight diffuse headache today.  He denies any lightheadedness or dizziness.  Denies any cough or congestion.  Denies shortness of breath and chest pain.  Denies abdominal pain and nausea.  Denies diarrhea.  Denies pain with urination or change in frequency.  He reports pain in the back of his recent compression fracture however states controlled well with his home pain medication.    In the ER, chest x-ray reveals no definite active disease.  UA with no signs of infection.  Laboratory work-up notes a normal white count, lactate, and Pro-Mao.  ESR and CRP elevated.    Review of Systems   All systems were reviewed and negative except for: what is mentioned above in the HPI.     Personal History     Past Medical History:   Diagnosis Date   • Coronary atherosclerosis 2019    H/O CABG SVG to first diagonal branch and to the LAD as well as SVG to the lateral marginal branch of the circumflex complicated by right sided subcortical infarct with left sided hemiparesis   • Dyslipidemia 2019   • Essential hypertension 2019   • Injury of back     fractured  vertebra 2/26/23       Past Surgical History:   Procedure Laterality Date   • CAROTID ENDARTERECTOMY  2000    Left   • CHOLECYSTECTOMY     • CORONARY ARTERY BYPASS GRAFT     • CORONARY STENT PLACEMENT      X17 stents       Family History: family history includes COPD in his brother and mother; Heart attack in his father; Heart failure in his father. Otherwise pertinent FHx was reviewed and not pertinent to current issue.    Social History:  reports that he has quit smoking. His smoking use included cigarettes. He has never used smokeless tobacco. He reports that he does not currently use alcohol. He reports that he does not use drugs.    Home Medications:  Multiple Vitamins-Minerals, aspirin, atenolol, atorvastatin, clopidogrel, fish oil, isosorbide mononitrate, linaclotide, loratadine, meclizine, nitroglycerin, pantoprazole, ranolazine, saccharomyces boulardii, and sucralfate    Allergies:  Allergies   Allergen Reactions   • Pletal [Cilostazol] Myalgia     .       Objective   Objective     Vitals:   Temp:  [98.1 °F (36.7 °C)] 98.1 °F (36.7 °C)  Heart Rate:  [63-75] 63  Resp:  [16] 16  BP: (162-188)/(73-95) 162/95  Physical Exam    Constitutional: 72-year-old male, well-nourished, no acute distress on room air   Eyes: PERRLA, sclerae anicteric, no conjunctival injection   HENT: NCAT, mucous membranes moist   Neck: Supple, no thyromegaly, no lymphadenopathy, trachea midline   Respiratory: Clear to auscultation bilaterally, nonlabored respirations    Cardiovascular: RRR, no murmurs, rubs, or gallops, palpable pedal pulses bilaterally   Gastrointestinal: Positive bowel sounds, soft, nontender, nondistended   Musculoskeletal: No bilateral ankle edema, no clubbing or cyanosis to extremities, some tenderness to palpation of the lumbar spine   Psychiatric: Appropriate affect, cooperative   Neurologic: Oriented x 3, strength symmetric in all extremities, Cranial Nerves grossly intact to confrontation, speech clear   Skin:  No rashes     Result Review    Result Review:  I have personally reviewed the results from the time of this admission to 3/4/2023 00:16 EST and agree with these findings:  [x]  Laboratory list / accordion  []  Microbiology  [x]  Radiology  []  EKG/Telemetry   []  Cardiology/Vascular   []  Pathology  [x]  Old records  []  Other:  Most notable findings include:     XR Chest 1 View    Result Date: 3/3/2023  Emergency portable view of the chest on 03/03/2023  CLINICAL HISTORY: Postop fever  This correlated to a prior portable chest x-ray from [facility] on 02/11/2021.  FINDINGS: There are multiple sternal wires from previous cardiac surgery.. There is a loop recorder device projecting over the anteromedial soft tissues of the left mid hemithorax. The cardiomediastinal silhouette is upper limits of normal in size. The pulmonary vasculature is within normal limits. There is mild interstitial prominence in the periphery of the mid to lower lung zones, right greater than left, unchanged since prior chest x-ray 02/11/2021 likely chronic. No focal dense airspace consolidation is seen. There are several horizontal linear opacities at the lung bases which were present previously and likely represent linear areas of bibasilar atelectasis or scarring. The costophrenic angles are sharp.      1. No convincing acute change when compared to the prior chest x-ray on 02/11/2021 with no definite active disease seen in the chest 2. There has been a previous median sternotomy and there is a loop recorder device projecting over the anterior medial soft tissues of the left mid hemithorax. The patient has a background pattern of interstitial prominence within the periphery of the mid to lower lung zones right greater than which is most likely chronic as it unchanged since the prior chest x-ray on 02/11/2021. There are bibasilar linear areas of atelectasis or scarring. The remainder of the chest x-ray is unremarkable.  This  report was finalized on 3/3/2023 8:09 PM by Dr. Joe Park M.D.      XR L Spine Ap & Lateral    Result Date: 3/1/2023  REVIEWING YOUR TEST RESULTS IN [facility] IS NOT A SUBSTITUTE FOR DISCUSSING THOSE RESULTS WITH YOUR HEALTH CARE PROVIDER. PLEASE CONTACT YOUR PROVIDER VIA [employer] TO DISCUSS ANY QUESTIONS OR CONCERNS YOU MAY HAVE REGARDING THESE TEST RESULTS.  RADIOLOGY REPORT FACILITY:  [facility] UNIT/AGE/GENDER: N.4C  IN      AGE:72 Y          SEX:M PATIENT NAME/:  OTIS GUZMAN, LUCERO    1950 UNIT NUMBER:  [id number] ACCOUNT NUMBER:  [id number] ACCESSION NUMBER:  [id number] XR L SPINE AP AND LATERAL DATE: 3/1/2023 9:40 AM. COMPARISON: 2023 INDICATION: L3 compression fracture. FINDINGS: L3 compression fractures present with stable mild loss of height. There is concave deformity of the superior endplate. This is approximately 30%. Loss of height is greatest within the central portion. There is osteopenia present. There is no new fracture identified. Facet arthropathy is noted. And disc narrowing is present at L5-S1. There is extensive aortic atherosclerosis. IMPRESSION one. Stable L3 superior endplate compression fracture with 30% loss of height. Dictated by: Sigifredo Allen M.D. Images and Report reviewed and interpreted by: Sigifredo Allen M.D. <PS><Electronically signed by: Sigifredo Allen M.D.> 2023 1055 D: 2023 1053 T: 2023 1053    MRI Brain Wo & W Con    Result Date: 3/1/2023  REVIEWING YOUR TEST RESULTS IN [facility] IS NOT A SUBSTITUTE FOR DISCUSSING THOSE RESULTS WITH YOUR HEALTH CARE PROVIDER. PLEASE CONTACT YOUR PROVIDER VIA [employer] TO DISCUSS ANY QUESTIONS OR CONCERNS YOU MAY HAVE REGARDING THESE TEST RESULTS.  RADIOLOGY REPORT FACILITY:  [facility] UNIT/AGE/GENDER: N.4C  IN      AGE:72 Y          SEX:M PATIENT NAME/:  OTIS GUZMAN, LUCERO    1950 UNIT NUMBER:  [id number] ACCOUNT NUMBER:  [id number] ACCESSION NUMBER:  [id number]  CLINICAL HISTORY: Dizziness, persistent/recurrent, cardiac or vascular cause suspected     COMPARISON: CT head history 20/7/2023 TECHNIQUE: Sagittal T1; axial T1, T2, FLAIR, gradient echo, diffusion. Axial, sagittal, and coronal T1 postcontrast. FINDINGS: There is no evidence of intracranial hemorrhage, mass or edema. Chronic infarct within the left parietal lobe. There are non-specific signal changes in the periventricular and the white matter of the cerebral hemispheres in the nir on the T2 weighted and flair images. There is no diffusion abnormality to suggest acute ischemic change. No abnormal postcontrast intracranial enhancement. Incidental note of developmental venous anomaly within the left frontal lobe. The ventricles and basilar cisterns are normal in size and configuration for the patients age. There is no evidence of mass effect or shift of the midline structures. Limited imaging of the orbits demonstrates no orbital abnormality. There are normal flow voids in the arteries at the level of the [place]. Small right and trace left mastoid air cell effusions. Mucosal thickening of the ethmoid air cells and the right maxillary sinus. IMPRESSION: No acute intracranial findings. No acute ischemic change or abnormal postcontrast enhancement. Chronic infarct within the left parietal lobe. FLAIR hyperintense signal foci within the supratentorial white matter and the nir likely represent chronic small vessel ischemic change. Dictated by: Nisha Gallo M.D. Images and Report reviewed and interpreted by: Nisha Gallo M.D. <PS><Electronically signed by: Nisha Gallo M.D.> 03/01/2023 0737 D: 03/01/2023 0725 T: 03/01/2023 0725    Duplex US Carotid, Bilateral    Result Date: 2/27/2023  REVIEWING YOUR TEST RESULTS IN [facility] IS NOT A SUBSTITUTE FOR DISCUSSING THOSE RESULTS WITH YOUR HEALTH CARE PROVIDER. PLEASE CONTACT YOUR PROVIDER VIA [employer] TO DISCUSS ANY QUESTIONS OR CONCERNS YOU MAY HAVE  REGARDING THESE TEST RESULTS.  RADIOLOGY REPORT FACILITY: [facility] AGE/GENDER:  AGE: 72 Y            GENDER: M PATIENT NAME/: OTIS GUZMAN R    : 1950 UNIT NUMBER: [id number] ACCESSION NUMBER: [id number] ACCOUNT: PROCEDURE PERFORMED: DUPLEX US CAROTID, BILATERAL Conclusions: Right internal carotid artery with stenosis in the range of 50-69%. Left internal carotid artery with plaque, but <50% stenosis. Bilateral vertebral arteries with antegrade flow. No prior examination for comparison.   THIS DOCUMENT HAS BEEN ELECTRONICALLY SIGNED BY: Franc Garcia MD    CT Lumbar Spine Wo Contrast    Result Date: 2023  [facility]ING [facility] TEST RESULTS [facility] IS NOT A SUBSTITUTE FOR DISCUSSING THOSE RESULTS WITH YOUR HEALTH CARE PROVIDER. PLEASE CONTACT YOUR PROVIDER VIA [employer] TO DISCUSS ANY QUESTIONS OR CONCERNS YOU MAY HAVE REGARDING THESE TEST RESULTS.  RADIOLOGY REPORT FACILITY:  [facility] UNIT/AGE/GENDER: N.ED  ER      AGE:72 Y          SEX:M PATIENT NAME/:  OTIS GUZMAN R    1950 UNIT NUMBER:  [id number] ACCOUNT NUMBER:  [id number] ACCESSION NUMBER:  [id number] CT LUMBAR SPINE CLINICAL HISTORY:Back trauma, no prior imaging (Age >= 16y)     COMPARISON: CT chest 2023 TECHNIQUE: This CT study consists of contiguous axial images performed through the lumbar spine. Sagittal and coronal reformatted images were also performed. Dose reduction technique was utilized per ALARA protocol. FINDINGS: Transitional segmental anatomy with lumbarization of the S1 vertebral body with a rudimentary disc at the S1-2 level. The last rib-bearing vertebral body is designated as T12. Straightening of lumbar lordosis. The upper lumbar spine extends to the left. Acute compression deformity of L3 superior endplate with height loss of the central aspect of the vertebral body, approximately one third height loss, and one fourth height loss anteriorly. No other fracture identified. Remaining  lumbar vertebral body heights are maintained. Loss of disc space height at L5-S1, severe right laterally. Degenerative changes with resulting spinal canal and neural foraminal stenoses. Spinal canal stenosis is greatest at L3-4 where there is a left asymmetric disc bulge, ligament of flavum thickening, and likely a left-sided synovial cyst projecting into the spinal canal contributing to high-grade left asymmetric spinal canal stenosis. There is up to moderate neuroforaminal stenosis as seen on the right at L5-S1. IMPRESSION: Acute appearing compression deformity of L3 superior endplate with mild height loss of the vertebral body, greatest centrally. Degenerative changes, worst at L3-4 with resulting high-grade spinal canal stenosis at this level. Dictated by: Nisha Gallo M.D. Images and Report reviewed and interpreted by: Nisha Gallo M.D. <PS><Electronically signed by: Nisha Gallo M.D.> 2023 1227 D: 2023 1219 T: 2023 1219    XR Chest 1 Vw    Result Date: 2023  REVIEWING YOUR TEST RESULTS IN [facility] IS NOT A SUBSTITUTE FOR DISCUSSING THOSE RESULTS WITH YOUR HEALTH CARE PROVIDER. PLEASE CONTACT YOUR PROVIDER VIA [employer] TO DISCUSS ANY QUESTIONS OR CONCERNS YOU MAY HAVE REGARDING THESE TEST RESULTS.  RADIOLOGY REPORT FACILITY:  [facility] UNIT/AGE/GENDER: N.ED  ER      AGE:72 Y          SEX:M PATIENT NAME/:  OTIS GUZMAN R    1950 UNIT NUMBER:  [id number] ACCOUNT NUMBER:  [id number] ACCESSION NUMBER:  [id number] XR CHEST 1 VW 2023 9:14 AM HISTORY: Syncope. TECHNIQUE: Single view chest. COMPARISON: 2023. FINDINGS: A left chest wall loop recorder device is noted. There is stable enlargement of the cardiomediastinal silhouette with surgical changes of prior CABG. There is mild pulmonary vascular congestion and mild bibasilar atelectasis. No pleural effusion or pneumothorax is identified. IMPRESSION: Cardiomegaly with mild vascular congestion,  but no evidence of overt edema. Dictated by: Rafael iNelson M.D. Images and Report reviewed and interpreted by: Rafael Nielson M.D. <PS><Electronically signed by: Rafael Nielson M.D.> 2023 1023 D: 2023 1022 T: 2023 1022    CT Head Wo Contrast    Result Date: 2023  REVIEWING YOUR TEST RESULTS IN [facility] IS NOT A SUBSTITUTE FOR DISCUSSING THOSE RESULTS WITH YOUR HEALTH CARE PROVIDER. PLEASE CONTACT YOUR PROVIDER VIA [facility] TO DISCUSS ANY QUESTIONS OR CONCERNS YOU MAY HAVE REGARDING THESE TEST RESULTS.  RADIOLOGY REPORT FACILITY:  [facility] UNIT/AGE/GENDER: N.ED  ER      AGE:72 Y          SEX:M PATIENT NAME/:  OTIS GUZMAN R    1950 UNIT NUMBER:  [id number] ACCOUNT NUMBER:  [id number] ACCESSION NUMBER:  [id number] CLINICAL HISTORY: Head trauma, minor (Age >= 65y)       COMPARISON: None. TECHNIQUE: CT images of the head were performed without IV contrast. Dose reduction technique was utilized per ALARA protocol. FINDINGS:   There is no evidence of hemorrhage, mass or extra-axial fluid collection. Grey-white differentiation is maintained with no evidence of edema. There are non-specific foci of hypodensity in the periventricular and deep white matter of the cerebral hemispheres. Chronic infarct within the posterior lateral left frontal lobe and within the posterior superior right frontal lobe. The cerebellum and brainstem are unremarkable. There is no mass effect or shift of the intracranial structures. The ventricles, basilar cisterns and cortical sulci are normal in size and configuration for the patients stated age. The calvarium demonstrates no evidence of fracture or focal lesion. The visualized portions of the orbits are normal.     IMPRESSION: No acute intracranial findings on noncontrast head CT. Dictated by: Nisha Gallo M.D. Images and Report reviewed and interpreted by: Nisha Gallo M.D. <PS><Electronically signed by: Nisha Gallo M.D.>  02/27/2023 0932 D: 02/27/2023 0929 T: 02/27/2023 0929      Assessment & Plan   Assessment / Plan     Brief Patient Summary:  Slade Martinez is a 72 y.o. male who admitted to the observation unit for further evaluation of fever of unknown origin.  Plan for continued monitoring, CT of the chest and abdomen.    Active Hospital Problems:  Active Hospital Problems    Diagnosis    • **Fever of unknown origin      Plan:     Fever of unknown origin  -Chest x-ray no definite disease  -UA clean  -CTA of the chest abdomen and pelvis without pending  -WBC 8, lactate 1.9, Pro-Mao 0.08  -CRP 8.57, ESR 46  -Respiratory panel PCR negative  -Blood cultures pending    Recent L3 compression fracture  -Evaluated by Reggie khalil, conservative treatment  -Continue analgesics as needed    CAD  Hypertension  -Status post CABG  -Continue aspirin, Plavix, statin  -Continue Ranexa, Imdur, atenolol  -Monitor with vital signs every 4 hours    COPD  -No acute exacerbation  -Albuterol as needed      DVT prophylaxis:  Mechanical DVT prophylaxis orders are present.    CODE STATUS:    Code Status (Patient has no pulse and is not breathing): CPR (Attempt to Resuscitate)  Medical Interventions (Patient has pulse or is breathing): Full Support    Admission Status:  I believe this patient meets observation status.    75 minutes have been spent by [facility] providers in the care of this patient while under observation status.    I have discussed plan of care with patient including advance care plan and/or surrogate decision maker.  Patient advises that their spouse, Tricia Martinez will be their primary surrogate decision maker    I wore an face mask, eye protection, and gloves during this patient encounter. Patient also wearing a surgical mask. Hand hygeine performed before and after seeing the patient.      Electronically signed by Arabella Chandra PA-C, 03/04/23, 12:16 AM EST.

## 2023-03-04 NOTE — PROGRESS NOTES
MD ATTESTATION NOTE    The NADIYA and I have discussed this patient's history, physical exam, and treatment plan.  I have reviewed the documentation and personally had a face to face interaction with the patient. I affirm the documentation and agree with the treatment and plan.  The attached note describes my personal findings.      I provided a substantive portion of the care of the patient.  I personally performed the physical exam in its entirety, and below are my findings.  For this patient encounter, the patient wore surgical mask, I wore full protective PPE including N95 and eye protection.      Brief HPI: 72-year-old gentleman with back pain and fever.  Patient said he fell 3 days ago and was told that he has compression fractures in the lumbar spine.  He was seen at [facility] for that and discharged earlier this week taking Tylenol 3 for the pain.  Fever started today and really had no other localizing symptoms which would explain the fever.  He is being admitted the observation unit after the work-up in the ED is fairly unremarkable.  He did have a slightly elevated lactic acid, but no other identifiable sources of infection are found.  We will continue to look for sources, covered with broad-spectrum antibiotics, and watch his lab values.    PHYSICAL EXAM  ED Triage Vitals   Temp Heart Rate Resp BP SpO2   03/03/23 1941 03/03/23 1941 03/03/23 1941 03/03/23 1945 03/03/23 1941   98.1 °F (36.7 °C) 75 16 (!) 188/85 90 %      Temp src Heart Rate Source Patient Position BP Location FiO2 (%)   03/03/23 1941 03/03/23 1941 03/03/23 1945 03/03/23 1945 --   Oral Monitor Lying Left arm          GENERAL: no acute distress, not acutely toxic at this time, fevers down  HENT: nares patent  EYES: no scleral icterus  CV: regular rhythm, normal rate  RESPIRATORY: normal effort, CTA bilateral  ABDOMEN: soft, benign  MUSCULOSKELETAL: no deformity, tenderness palpation of the lumbar spine without an obvious deformity or  step-off.  NEURO: alert, moves all extremities, follows commands, neurovascular intact  PSYCH:  calm, cooperative  SKIN: warm, dry    Vital signs and nursing notes reviewed.    MDM DISCUSSION        Plan: Patient is admitted the observation unit for further evaluation of fever of unknown origin.  We will also treat him symptomatically for the known compression fractures in the lumbar spine, however do not think this is the etiology.  Continue to cover with broad-spectrum antibiotics and work-up.

## 2023-03-04 NOTE — ED NOTES
"Nursing report ED to floor  Slade Martinez  72 y.o.  male    HPI :   Chief Complaint   Patient presents with    Fever       Admitting doctor:   Enzo Mathis MD    Admitting diagnosis:   The encounter diagnosis was Fever, unspecified fever cause.    Code status:   Current Code Status       Date Active Code Status Order ID Comments User Context       Not on file            Allergies:   Pletal [cilostazol]    Isolation:   Enhanced Droplet/Contact     Intake and Output  No intake or output data in the 24 hours ending 03/03/23 2340    Weight:       03/03/23 1945   Weight: 98 kg (216 lb)       Most recent vitals:   Vitals:    03/03/23 1941 03/03/23 1945 03/03/23 2031 03/03/23 2201   BP:  (!) 188/85 167/73 162/95   BP Location:  Left arm     Patient Position:  Lying     Pulse: 75  68 63   Resp: 16      Temp: 98.1 °F (36.7 °C)      TempSrc: Oral      SpO2: 90%  97% 98%   Weight:  98 kg (216 lb)     Height:  180.3 cm (71\")         Active LDAs/IV Access:   Lines, Drains & Airways       Active LDAs       Name Placement date Placement time Site Days    Peripheral IV 03/03/23 2019 Left Antecubital 03/03/23 2019  Antecubital  less than 1                    Labs (abnormal labs have a star):   Labs Reviewed   COMPREHENSIVE METABOLIC PANEL - Abnormal; Notable for the following components:       Result Value    Glucose 133 (*)     Sodium 131 (*)     Chloride 96 (*)     Albumin 3.4 (*)     All other components within normal limits    Narrative:     GFR Normal >60  Chronic Kidney Disease <60  Kidney Failure <15    The GFR formula is only valid for adults with stable renal function between ages 18 and 70.   SEDIMENTATION RATE - Abnormal; Notable for the following components:    Sed Rate 46 (*)     All other components within normal limits   C-REACTIVE PROTEIN - Abnormal; Notable for the following components:    C-Reactive Protein 8.57 (*)     All other components within normal limits   CBC WITH AUTO DIFFERENTIAL - Abnormal; Notable " for the following components:    RBC 3.74 (*)     Hemoglobin 12.7 (*)     Hematocrit 36.8 (*)     MCV 98.4 (*)     MCH 34.0 (*)     Lymphocyte % 15.3 (*)     Monocyte % 14.8 (*)     Eosinophil % 7.9 (*)     Monocytes, Absolute 1.30 (*)     Eosinophils, Absolute 0.70 (*)     All other components within normal limits   URINALYSIS W/ MICROSCOPIC IF INDICATED (NO CULTURE) - Abnormal; Notable for the following components:    Ketones, UA Trace (*)     All other components within normal limits    Narrative:     Urine microscopic not indicated.   RESPIRATORY PANEL PCR W/ COVID-19 (SARS-COV-2) NI/[facility]/RAVI/PAD/COR/MAD/NIKKO IN-HOUSE, NP SWAB IN [facility], 3-4 HR TAT - Normal    Narrative:     In the setting of a positive respiratory panel with a viral infection PLUS a negative procalcitonin without other underlying concern for bacterial infection, consider observing off antibiotics or discontinuation of antibiotics and continue supportive care. If the respiratory panel is positive for atypical bacterial infection (Bordetella pertussis, Chlamydophila pneumoniae, or Mycoplasma pneumoniae), consider antibiotic de-escalation to target atypical bacterial infection.   LACTIC ACID, PLASMA - Normal   BLOOD CULTURE   BLOOD CULTURE   CBC AND DIFFERENTIAL    Narrative:     The following orders were created for panel order CBC & Differential.  Procedure                               Abnormality         Status                     ---------                               -----------         ------                     CBC Auto Differential[[id number]]        Abnormal            Final result                 Please view results for these tests on the individual orders.       EKG:   No orders to display       Meds given in ED:   Medications   sodium chloride 0.9 % flush 10 mL (has no administration in time range)       Imaging results:  XR Chest 1 View    Result Date: 3/3/2023  1. No convincing acute change when compared to the prior chest x-ray on  02/11/2021 with no definite active disease seen in the chest 2. There has been a previous median sternotomy and there is a loop recorder device projecting over the anterior medial soft tissues of the left mid hemithorax. The patient has a background pattern of interstitial prominence within the periphery of the mid to lower lung zones right greater than which is most likely chronic as it unchanged since the prior chest x-ray on 02/11/2021. There are bibasilar linear areas of atelectasis or scarring. The remainder of the chest x-ray is unremarkable.  This report was finalized on 3/3/2023 8:09 PM by Dr. Joe Park M.D.       Ambulatory status:   - up x1 assist    Social issues:   Social History     Socioeconomic History    Marital status:    Tobacco Use    Smoking status: Former     Types: Cigarettes    Smokeless tobacco: Never   Substance and Sexual Activity    Alcohol use: Not Currently    Drug use: Never    Sexual activity: Defer       NIH Stroke Scale:         Trinh Collier RN  03/03/23 23:40 EST

## 2023-03-04 NOTE — ED PROVIDER NOTES
EMERGENCY DEPARTMENT ENCOUNTER    Room Number:  34/34  Date seen:  3/3/2023  PCP: Triny Hannon MD  Historian: Patient, wife      HPI:  Chief Complaint: Fever  A complete HPI/ROS/PMH/PSH/SH/FH are unobtainable due to: Nothing  Context: Slade Martinez is a 72 y.o. male who presents to the ED c/o fever.  Patient reports associated chills today.  He reports that he was just recently released from [facility] after he was admitted for a syncopal episode with fall and secondary lumbar spine fracture.  He has been taking Tylenol 3 for the pain in his back.  He denies cough.  He denies vomiting or diarrhea.  No headache.  No rash.  No dysuria.              PAST MEDICAL HISTORY  Active Ambulatory Problems     Diagnosis Date Noted   • Dyslipidemia 11/04/2019   • Essential hypertension 11/04/2019   • Coronary atherosclerosis 11/04/2019   • S/P CABG (coronary artery bypass graft) 04/20/2020   • Peripheral artery disease (HCC) 02/25/2021   • History of left-sided carotid endarterectomy 02/25/2021   • COVID-19 virus infection 02/25/2021     Resolved Ambulatory Problems     Diagnosis Date Noted   • S/P CABG (coronary artery bypass graft) 04/20/2020     Past Medical History:   Diagnosis Date   • Injury of back          PAST SURGICAL HISTORY  Past Surgical History:   Procedure Laterality Date   • CAROTID ENDARTERECTOMY  2000    Left   • CHOLECYSTECTOMY     • CORONARY ARTERY BYPASS GRAFT     • CORONARY STENT PLACEMENT      X17 stents         FAMILY HISTORY  Family History   Problem Relation Age of Onset   • COPD Mother    • Heart attack Father    • Heart failure Father    • COPD Brother          SOCIAL HISTORY  Social History     Socioeconomic History   • Marital status:    Tobacco Use   • Smoking status: Former     Types: Cigarettes   • Smokeless tobacco: Never   Substance and Sexual Activity   • Alcohol use: Not Currently   • Drug use: Never   • Sexual activity: Defer         ALLERGIES  Pletal  [cilostazol]        REVIEW OF SYSTEMS  Review of Systems   Review of all 14 systems is negative other than stated in the HPI above.      PHYSICAL EXAM  ED Triage Vitals   Temp Heart Rate Resp BP SpO2   03/03/23 1941 03/03/23 1941 03/03/23 1941 03/03/23 1945 03/03/23 1941   98.1 °F (36.7 °C) 75 16 (!) 188/85 90 %      Temp src Heart Rate Source Patient Position BP Location FiO2 (%)   03/03/23 1941 03/03/23 1941 03/03/23 1945 03/03/23 1945 --   Oral Monitor Lying Left arm        Physical Exam      GENERAL: Awake and alert, no acute distress  HENT: nares patent  EYES: no scleral icterus, EOMI  CV: regular rhythm, normal rate  RESPIRATORY: normal effort, lungs clear bilaterally  ABDOMEN: soft, mild right upper quadrant tenderness without rebound or guarding, negative Shields sign  MUSCULOSKELETAL: no deformity  NEURO: alert, moves all extremities, follows commands, cranial nerves II through XII grossly intact, speech fluent and clear  PSYCH:  calm, cooperative  SKIN: warm, dry, no rash    Vital signs and nursing notes reviewed.          LAB RESULTS  Recent Results (from the past 24 hour(s))   Comprehensive Metabolic Panel    Collection Time: 03/03/23  7:54 PM    Specimen: Blood   Result Value Ref Range    Glucose 133 (H) 65 - 99 mg/dL    BUN 9 8 - 23 mg/dL    Creatinine 0.85 0.76 - 1.27 mg/dL    Sodium 131 (L) 136 - 145 mmol/L    Potassium 3.8 3.5 - 5.2 mmol/L    Chloride 96 (L) 98 - 107 mmol/L    CO2 23.3 22.0 - 29.0 mmol/L    Calcium 9.2 8.6 - 10.5 mg/dL    Total Protein 6.5 6.0 - 8.5 g/dL    Albumin 3.4 (L) 3.5 - 5.2 g/dL    ALT (SGPT) 13 1 - 41 U/L    AST (SGOT) 12 1 - 40 U/L    Alkaline Phosphatase 84 39 - 117 U/L    Total Bilirubin 0.7 0.0 - 1.2 mg/dL    Globulin 3.1 gm/dL    A/G Ratio 1.1 g/dL    BUN/Creatinine Ratio 10.6 7.0 - 25.0    Anion Gap 11.7 5.0 - 15.0 mmol/L    eGFR 91.8 >60.0 mL/min/1.73   Lactic Acid, Plasma    Collection Time: 03/03/23  7:54 PM    Specimen: Blood   Result Value Ref Range    Lactate  1.9 0.5 - 2.0 mmol/L   Sedimentation Rate    Collection Time: 03/03/23  7:54 PM    Specimen: Blood   Result Value Ref Range    Sed Rate 46 (H) 0 - 20 mm/hr   C-reactive Protein    Collection Time: 03/03/23  7:54 PM    Specimen: Blood   Result Value Ref Range    C-Reactive Protein 8.57 (H) 0.00 - 0.50 mg/dL   CBC Auto Differential    Collection Time: 03/03/23  7:54 PM    Specimen: Blood   Result Value Ref Range    WBC 8.81 3.40 - 10.80 10*3/mm3    RBC 3.74 (L) 4.14 - 5.80 10*6/mm3    Hemoglobin 12.7 (L) 13.0 - 17.7 g/dL    Hematocrit 36.8 (L) 37.5 - 51.0 %    MCV 98.4 (H) 79.0 - 97.0 fL    MCH 34.0 (H) 26.6 - 33.0 pg    MCHC 34.5 31.5 - 35.7 g/dL    RDW 12.8 12.3 - 15.4 %    RDW-SD 45.7 37.0 - 54.0 fl    MPV 9.7 6.0 - 12.0 fL    Platelets 224 140 - 450 10*3/mm3    Neutrophil % 61.2 42.7 - 76.0 %    Lymphocyte % 15.3 (L) 19.6 - 45.3 %    Monocyte % 14.8 (H) 5.0 - 12.0 %    Eosinophil % 7.9 (H) 0.3 - 6.2 %    Basophil % 0.5 0.0 - 1.5 %    Immature Grans % 0.3 0.0 - 0.5 %    Neutrophils, Absolute 5.39 1.70 - 7.00 10*3/mm3    Lymphocytes, Absolute 1.35 0.70 - 3.10 10*3/mm3    Monocytes, Absolute 1.30 (H) 0.10 - 0.90 10*3/mm3    Eosinophils, Absolute 0.70 (H) 0.00 - 0.40 10*3/mm3    Basophils, Absolute 0.04 0.00 - 0.20 10*3/mm3    Immature Grans, Absolute 0.03 0.00 - 0.05 10*3/mm3    nRBC 0.1 0.0 - 0.2 /100 WBC   Respiratory Panel PCR w/COVID-19(SARS-CoV-2) NI/[facility]/RAVI/PAD/COR/MAD/NIKKO In-House, NP Swab in UTM/VTM, 3-4 HR TAT - Swab, Nasopharynx    Collection Time: 03/03/23  8:20 PM    Specimen: Nasopharynx; Swab   Result Value Ref Range    ADENOVIRUS, PCR Not Detected Not Detected    Coronavirus 229E Not Detected Not Detected    Coronavirus HKU1 Not Detected Not Detected    Coronavirus NL63 Not Detected Not Detected    Coronavirus OC43 Not Detected Not Detected    COVID19 Not Detected Not Detected - Ref. Range    Human Metapneumovirus Not Detected Not Detected    Human Rhinovirus/Enterovirus Not Detected Not Detected     Influenza A PCR Not Detected Not Detected    Influenza B PCR Not Detected Not Detected    Parainfluenza Virus 1 Not Detected Not Detected    Parainfluenza Virus 2 Not Detected Not Detected    Parainfluenza Virus 3 Not Detected Not Detected    Parainfluenza Virus 4 Not Detected Not Detected    RSV, PCR Not Detected Not Detected    Bordetella pertussis pcr Not Detected Not Detected    Bordetella parapertussis PCR Not Detected Not Detected    Chlamydophila pneumoniae PCR Not Detected Not Detected    Mycoplasma pneumo by PCR Not Detected Not Detected   Urinalysis With Microscopic If Indicated (No Culture) - Urine, Clean Catch    Collection Time: 03/03/23  9:33 PM    Specimen: Urine, Clean Catch   Result Value Ref Range    Color, UA Yellow Yellow, Straw    Appearance, UA Clear Clear    pH, UA 6.0 5.0 - 8.0    Specific Gravity, UA 1.025 1.005 - 1.030    Glucose, UA Negative Negative    Ketones, UA Trace (A) Negative    Bilirubin, UA Negative Negative    Blood, UA Negative Negative    Protein, UA Negative Negative    Leuk Esterase, UA Negative Negative    Nitrite, UA Negative Negative    Urobilinogen, UA 1.0 E.U./dL 0.2 - 1.0 E.U./dL       Ordered the above labs and reviewed the results.        RADIOLOGY  XR Chest 1 View    Result Date: 3/3/2023  Emergency portable view of the chest on 03/03/2023  CLINICAL HISTORY: Postop fever  This correlated to a prior portable chest x-ray from [facility] on 02/11/2021.  FINDINGS: There are multiple sternal wires from previous cardiac surgery.. There is a loop recorder device projecting over the anteromedial soft tissues of the left mid hemithorax. The cardiomediastinal silhouette is upper limits of normal in size. The pulmonary vasculature is within normal limits. There is mild interstitial prominence in the periphery of the mid to lower lung zones, right greater than left, unchanged since prior chest x-ray 02/11/2021 likely chronic. No focal dense airspace  consolidation is seen. There are several horizontal linear opacities at the lung bases which were present previously and likely represent linear areas of bibasilar atelectasis or scarring. The costophrenic angles are sharp.      1. No convincing acute change when compared to the prior chest x-ray on 02/11/2021 with no definite active disease seen in the chest 2. There has been a previous median sternotomy and there is a loop recorder device projecting over the anterior medial soft tissues of the left mid hemithorax. The patient has a background pattern of interstitial prominence within the periphery of the mid to lower lung zones right greater than which is most likely chronic as it unchanged since the prior chest x-ray on 02/11/2021. There are bibasilar linear areas of atelectasis or scarring. The remainder of the chest x-ray is unremarkable.  This report was finalized on 3/3/2023 8:09 PM by Dr. Joe Park M.D.        Ordered the above noted radiological studies. Reviewed by me in PACS.            PROCEDURES  Procedures              MEDICATIONS GIVEN IN ER  Medications   sodium chloride 0.9 % flush 10 mL (has no administration in time range)                   MEDICAL DECISION MAKING, PROGRESS, and CONSULTS    All labs have been independently reviewed by me.  All radiology studies have been reviewed by me and I have also reviewed the radiology report.   EKG's independently viewed and interpreted by me.  Discussion below represents my analysis of pertinent findings related to patient's condition, differential diagnosis, treatment plan and final disposition.      Additional sources:  - Discussed/ obtained information from independent historians: Patient's wife at bedside who provided much of the history and HPI above    - External (non-ED) record review: Recent discharge summary from [facility] reviewed as summarized below          Orders placed during this visit:  Orders Placed This Encounter   Procedures   •  Respiratory Panel PCR w/COVID-19(SARS-CoV-2) NI/[facility]/RAVI/PAD/COR/MAD/NIKKO In-House, NP Swab in UTM/VTM, 3-4 HR TAT - Swab, Nasopharynx   • Blood Culture - Blood,   • Blood Culture - Blood,   • XR Chest 1 View   • Comprehensive Metabolic Panel   • Lactic Acid, Plasma   • Sedimentation Rate   • C-reactive Protein   • CBC Auto Differential   • Urinalysis With Microscopic If Indicated (No Culture) - Urine, Clean Catch   • Cardiac Monitoring   • Pulse Oximetry, Continuous   • Monitor Blood Pressure   • Insert Peripheral IV   • CBC & Differential           Differential diagnosis:    Viral syndrome  Pneumonia  UTI  Sepsis        Independent interpretation of labs, radiology studies, and discussions with consultants:  ED Course as of 03/03/23 2228   Fri Mar 03, 2023   2014 WBC: 8.81 [JR]   2014 Hemoglobin(!): 12.7 [JR]   2016 Record review: I reviewed discharge summary from [facility] dated 3/1/2023.  Discharge diagnosis was syncope secondary to orthostasis.  He has a history of CAD status post CABG, L3 spinal compression fracture.  They held his HCTZ and reduced his Imdur to 30 mg daily, atenolol 50 mg daily.  Spine surgery evaluated him for an L3 compression fracture and recommended conservative treatment. [JR]   2147 COVID19: Not Detected [JR]   2153 Leukocytes, UA: Negative [JR]   2212 Patient informed that his infectious work-up has been reassuring.  I explained that blood cultures are still pending although based on his preliminary lab work I have a low suspicion for bacteremia.  I discussed plan to observe patient overnight to monitor for recurrent fever and also to follow-up on blood cultures.  He is agreeable with this plan. [JR]   2227 Discussed with Charley in the ED observation unit who agrees to admit for continued monitoring. [JR]      ED Course User Index  [JR] Miguelito Stockton MD             I wore an N95 mask, face shield, and gloves during this patient encounter.  Patient also wearing a surgical  mask.  Hand hygeine performed before and after seeing the patient.    DIAGNOSIS  Final diagnoses:   Fever, unspecified fever cause         DISPOSITION  Admit            Latest Documented Vital Signs:  As of 22:28 EST  BP- 162/95 HR- 63 Temp- 98.1 °F (36.7 °C) (Oral) O2 sat- 98%              --    Please note that portions of this were completed with a voice recognition program.       Note Disclaimer: At [facility], we believe that sharing information builds trust and better relationships. You are receiving this note because you are receiving care at [facility] or recently visited. It is possible you will see health information before a provider has talked with you about it. This kind of information can be easy to misunderstand. To help you fully understand what it means for your health, we urge you to discuss this note with your provider.           Miguelito Stockton MD  03/03/23 9121

## 2023-03-04 NOTE — PLAN OF CARE
Goal Outcome Evaluation:      Pt came in fever/chills. Released from [place] 3 days ago for syncopal episode. No events overnight. Afebrile PT A & O x 4, up ad cecilio. NSR on the monitor. VSS. Morning EKG completed. No c/o chest pain, dizziness or soa. NPO since midnight. Consult for Cardiology to see pt in the morning. Will continue to monitor.

## 2023-03-08 LAB
BACTERIA SPEC AEROBE CULT: NORMAL
BACTERIA SPEC AEROBE CULT: NORMAL

## 2023-07-30 ENCOUNTER — HOSPITAL ENCOUNTER (EMERGENCY)
Facility: HOSPITAL | Age: 73
Discharge: HOME | End: 2023-07-30
Attending: EMERGENCY MEDICINE | Admitting: EMERGENCY MEDICINE
Payer: MEDICARE

## 2023-07-30 VITALS
BODY MASS INDEX: 28 KG/M2 | RESPIRATION RATE: 18 BRPM | OXYGEN SATURATION: 99 % | HEART RATE: 80 BPM | DIASTOLIC BLOOD PRESSURE: 70 MMHG | SYSTOLIC BLOOD PRESSURE: 136 MMHG | WEIGHT: 200 LBS | HEIGHT: 71 IN | TEMPERATURE: 97 F

## 2023-07-30 DIAGNOSIS — L60.0 INGROWN TOENAIL OF LEFT FOOT: Primary | ICD-10-CM

## 2023-07-30 DIAGNOSIS — L03.032 CELLULITIS OF GREAT TOE OF LEFT FOOT: ICD-10-CM

## 2023-07-30 PROCEDURE — 99283 EMERGENCY DEPT VISIT LOW MDM: CPT

## 2023-07-30 RX ORDER — CEPHALEXIN 500 MG/1
500 CAPSULE ORAL 4 TIMES DAILY
Qty: 28 CAPSULE | Refills: 0 | Status: SHIPPED | OUTPATIENT
Start: 2023-07-30 | End: 2023-08-06

## 2023-09-21 ENCOUNTER — APPOINTMENT (OUTPATIENT)
Dept: CT IMAGING | Facility: HOSPITAL | Age: 73
DRG: 065 | End: 2023-09-21
Payer: MEDICARE

## 2023-09-21 ENCOUNTER — HOSPITAL ENCOUNTER (INPATIENT)
Facility: HOSPITAL | Age: 73
LOS: 1 days | Discharge: HOME | DRG: 065 | End: 2023-09-22
Attending: EMERGENCY MEDICINE | Admitting: INTERNAL MEDICINE
Payer: MEDICARE

## 2023-09-21 DIAGNOSIS — K55.069 OCCLUSION OF SUPERIOR MESENTERIC ARTERY: ICD-10-CM

## 2023-09-21 DIAGNOSIS — E87.6 HYPOKALEMIA: ICD-10-CM

## 2023-09-21 DIAGNOSIS — R10.32 LEFT LOWER QUADRANT ABDOMINAL PAIN: ICD-10-CM

## 2023-09-21 DIAGNOSIS — I70.90 ATHEROSCLEROSIS: ICD-10-CM

## 2023-09-21 DIAGNOSIS — R41.82 ALTERED MENTAL STATUS, UNSPECIFIED ALTERED MENTAL STATUS TYPE: Primary | ICD-10-CM

## 2023-09-21 DIAGNOSIS — E87.1 HYPONATREMIA: ICD-10-CM

## 2023-09-21 PROBLEM — K55.1 CHRONIC MESENTERIC ISCHEMIA: Chronic | Status: ACTIVE | Noted: 2023-09-21

## 2023-09-21 PROBLEM — R10.9 ABDOMINAL PAIN: Status: ACTIVE | Noted: 2023-09-21

## 2023-09-21 PROBLEM — U07.1 COVID-19 VIRUS INFECTION: Status: RESOLVED | Noted: 2021-02-25 | Resolved: 2023-09-21

## 2023-09-21 LAB
ALBUMIN SERPL-MCNC: 3.5 G/DL (ref 3.5–5.2)
ALBUMIN/GLOB SERPL: 1.2 G/DL
ALP SERPL-CCNC: 70 U/L (ref 39–117)
ALT SERPL W P-5'-P-CCNC: 8 U/L (ref 1–41)
AMMONIA BLD-SCNC: 13 UMOL/L (ref 16–60)
AMPHET+METHAMPHET UR QL: NEGATIVE
ANION GAP SERPL CALCULATED.3IONS-SCNC: 9.4 MMOL/L (ref 5–15)
AST SERPL-CCNC: 14 U/L (ref 1–40)
ATMOSPHERIC PRESS: 754.2 MMHG
BARBITURATES UR QL SCN: NEGATIVE
BASE EXCESS BLDV CALC-SCNC: 1.1 MMOL/L (ref -2–2)
BASOPHILS # BLD AUTO: 0.02 10*3/MM3 (ref 0–0.2)
BASOPHILS NFR BLD AUTO: 0.3 % (ref 0–1.5)
BENZODIAZ UR QL SCN: NEGATIVE
BILIRUB SERPL-MCNC: 0.8 MG/DL (ref 0–1.2)
BILIRUB UR QL STRIP: NEGATIVE
BUN SERPL-MCNC: 9 MG/DL (ref 8–23)
BUN/CREAT SERPL: 11.1 (ref 7–25)
CALCIUM SPEC-SCNC: 8.5 MG/DL (ref 8.6–10.5)
CANNABINOIDS SERPL QL: NEGATIVE
CHLORIDE SERPL-SCNC: 93 MMOL/L (ref 98–107)
CLARITY UR: CLEAR
CO2 BLDA-SCNC: 26.8 MMOL/L (ref 23–27)
CO2 SERPL-SCNC: 26.6 MMOL/L (ref 22–29)
COCAINE UR QL: NEGATIVE
COLOR UR: YELLOW
CREAT SERPL-MCNC: 0.81 MG/DL (ref 0.76–1.27)
D-LACTATE SERPL-SCNC: 1.3 MMOL/L (ref 0.5–2)
DEPRECATED RDW RBC AUTO: 44.1 FL (ref 37–54)
DEVICE COMMENT: ABNORMAL
EGFRCR SERPLBLD CKD-EPI 2021: 93.1 ML/MIN/1.73
EOSINOPHIL # BLD AUTO: 0.06 10*3/MM3 (ref 0–0.4)
EOSINOPHIL NFR BLD AUTO: 1 % (ref 0.3–6.2)
ERYTHROCYTE [DISTWIDTH] IN BLOOD BY AUTOMATED COUNT: 12.2 % (ref 12.3–15.4)
ETHANOL BLD-MCNC: <10 MG/DL (ref 0–10)
ETHANOL UR QL: <0.01 %
FENTANYL UR-MCNC: NEGATIVE NG/ML
GEN 5 2HR TROPONIN T REFLEX: 11 NG/L
GLOBULIN UR ELPH-MCNC: 2.9 GM/DL
GLUCOSE SERPL-MCNC: 106 MG/DL (ref 65–99)
GLUCOSE UR STRIP-MCNC: NEGATIVE MG/DL
HCO3 BLDV-SCNC: 25.6 MMOL/L (ref 22–28)
HCT VFR BLD AUTO: 35.1 % (ref 37.5–51)
HGB BLD-MCNC: 12.2 G/DL (ref 13–17.7)
HGB UR QL STRIP.AUTO: NEGATIVE
IMM GRANULOCYTES # BLD AUTO: 0.01 10*3/MM3 (ref 0–0.05)
IMM GRANULOCYTES NFR BLD AUTO: 0.2 % (ref 0–0.5)
INR PPP: 1.21 (ref 0.9–1.1)
KETONES UR QL STRIP: NEGATIVE
LEUKOCYTE ESTERASE UR QL STRIP.AUTO: NEGATIVE
LYMPHOCYTES # BLD AUTO: 1.22 10*3/MM3 (ref 0.7–3.1)
LYMPHOCYTES NFR BLD AUTO: 20.7 % (ref 19.6–45.3)
MAGNESIUM SERPL-MCNC: 1.7 MG/DL (ref 1.6–2.4)
MCH RBC QN AUTO: 34.6 PG (ref 26.6–33)
MCHC RBC AUTO-ENTMCNC: 34.8 G/DL (ref 31.5–35.7)
MCV RBC AUTO: 99.4 FL (ref 79–97)
METHADONE UR QL SCN: NEGATIVE
MODALITY: ABNORMAL
MONOCYTES # BLD AUTO: 0.73 10*3/MM3 (ref 0.1–0.9)
MONOCYTES NFR BLD AUTO: 12.4 % (ref 5–12)
NEUTROPHILS NFR BLD AUTO: 3.85 10*3/MM3 (ref 1.7–7)
NEUTROPHILS NFR BLD AUTO: 65.4 % (ref 42.7–76)
NITRITE UR QL STRIP: NEGATIVE
NRBC BLD AUTO-RTO: 0 /100 WBC (ref 0–0.2)
OPIATES UR QL: NEGATIVE
OXYCODONE UR QL SCN: NEGATIVE
PCO2 BLDV: 39.6 MM HG (ref 41–51)
PH BLDV: 7.42 PH UNITS (ref 7.31–7.41)
PH UR STRIP.AUTO: 6.5 [PH] (ref 5–8)
PLATELET # BLD AUTO: 173 10*3/MM3 (ref 140–450)
PMV BLD AUTO: 10.2 FL (ref 6–12)
PO2 BLDV: 23.4 MM HG (ref 35–45)
POTASSIUM SERPL-SCNC: 3.1 MMOL/L (ref 3.5–5.2)
PROCALCITONIN SERPL-MCNC: 0.22 NG/ML (ref 0–0.25)
PROT SERPL-MCNC: 6.4 G/DL (ref 6–8.5)
PROT UR QL STRIP: NEGATIVE
PROTHROMBIN TIME: 15.4 SECONDS (ref 11.7–14.2)
QT INTERVAL: 492 MS
QTC INTERVAL: 475 MS
RBC # BLD AUTO: 3.53 10*6/MM3 (ref 4.14–5.8)
SAO2 % BLDCOV: 42.3 % (ref 45–75)
SODIUM SERPL-SCNC: 129 MMOL/L (ref 136–145)
SP GR UR STRIP: 1.01 (ref 1–1.03)
TOTAL RATE: 18 BREATHS/MINUTE
TROPONIN T DELTA: -2 NG/L
TROPONIN T SERPL HS-MCNC: 13 NG/L
TSH SERPL DL<=0.05 MIU/L-ACNC: 2.64 UIU/ML (ref 0.27–4.2)
UROBILINOGEN UR QL STRIP: NORMAL
WBC NRBC COR # BLD: 5.89 10*3/MM3 (ref 3.4–10.8)

## 2023-09-21 PROCEDURE — 82803 BLOOD GASES ANY COMBINATION: CPT

## 2023-09-21 PROCEDURE — 87040 BLOOD CULTURE FOR BACTERIA: CPT | Performed by: EMERGENCY MEDICINE

## 2023-09-21 PROCEDURE — 85025 COMPLETE CBC W/AUTO DIFF WBC: CPT | Performed by: EMERGENCY MEDICINE

## 2023-09-21 PROCEDURE — 80053 COMPREHEN METABOLIC PANEL: CPT | Performed by: EMERGENCY MEDICINE

## 2023-09-21 PROCEDURE — 99285 EMERGENCY DEPT VISIT HI MDM: CPT

## 2023-09-21 PROCEDURE — 80307 DRUG TEST PRSMV CHEM ANLYZR: CPT | Performed by: EMERGENCY MEDICINE

## 2023-09-21 PROCEDURE — 84484 ASSAY OF TROPONIN QUANT: CPT | Performed by: EMERGENCY MEDICINE

## 2023-09-21 PROCEDURE — 70450 CT HEAD/BRAIN W/O DYE: CPT

## 2023-09-21 PROCEDURE — 84145 PROCALCITONIN (PCT): CPT | Performed by: EMERGENCY MEDICINE

## 2023-09-21 PROCEDURE — 25510000001 IOPAMIDOL 61 % SOLUTION: Performed by: EMERGENCY MEDICINE

## 2023-09-21 PROCEDURE — 93005 ELECTROCARDIOGRAM TRACING: CPT | Performed by: EMERGENCY MEDICINE

## 2023-09-21 PROCEDURE — 81003 URINALYSIS AUTO W/O SCOPE: CPT | Performed by: EMERGENCY MEDICINE

## 2023-09-21 PROCEDURE — 25010000002 SODIUM CHLORIDE 0.9 % WITH KCL 20 MEQ 20-0.9 MEQ/L-% SOLUTION: Performed by: INTERNAL MEDICINE

## 2023-09-21 PROCEDURE — 85610 PROTHROMBIN TIME: CPT | Performed by: EMERGENCY MEDICINE

## 2023-09-21 PROCEDURE — 93010 ELECTROCARDIOGRAM REPORT: CPT | Performed by: INTERNAL MEDICINE

## 2023-09-21 PROCEDURE — 82077 ASSAY SPEC XCP UR&BREATH IA: CPT | Performed by: EMERGENCY MEDICINE

## 2023-09-21 PROCEDURE — 36415 COLL VENOUS BLD VENIPUNCTURE: CPT

## 2023-09-21 PROCEDURE — 82140 ASSAY OF AMMONIA: CPT | Performed by: EMERGENCY MEDICINE

## 2023-09-21 PROCEDURE — 83735 ASSAY OF MAGNESIUM: CPT | Performed by: EMERGENCY MEDICINE

## 2023-09-21 PROCEDURE — 74177 CT ABD & PELVIS W/CONTRAST: CPT

## 2023-09-21 PROCEDURE — 83605 ASSAY OF LACTIC ACID: CPT | Performed by: EMERGENCY MEDICINE

## 2023-09-21 PROCEDURE — 84443 ASSAY THYROID STIM HORMONE: CPT | Performed by: EMERGENCY MEDICINE

## 2023-09-21 RX ORDER — AMOXICILLIN 250 MG
2 CAPSULE ORAL 2 TIMES DAILY
Status: DISCONTINUED | OUTPATIENT
Start: 2023-09-21 | End: 2023-09-22 | Stop reason: HOSPADM

## 2023-09-21 RX ORDER — SODIUM CHLORIDE 0.9 % (FLUSH) 0.9 %
10 SYRINGE (ML) INJECTION AS NEEDED
Status: DISCONTINUED | OUTPATIENT
Start: 2023-09-21 | End: 2023-09-22 | Stop reason: HOSPADM

## 2023-09-21 RX ORDER — BISACODYL 10 MG
10 SUPPOSITORY, RECTAL RECTAL DAILY PRN
Status: DISCONTINUED | OUTPATIENT
Start: 2023-09-21 | End: 2023-09-22 | Stop reason: HOSPADM

## 2023-09-21 RX ORDER — CLOPIDOGREL BISULFATE 75 MG/1
75 TABLET ORAL DAILY
Status: DISCONTINUED | OUTPATIENT
Start: 2023-09-22 | End: 2023-09-22

## 2023-09-21 RX ORDER — SACCHAROMYCES BOULARDII 250 MG
250 CAPSULE ORAL 2 TIMES DAILY
Status: DISCONTINUED | OUTPATIENT
Start: 2023-09-21 | End: 2023-09-22 | Stop reason: HOSPADM

## 2023-09-21 RX ORDER — ONDANSETRON 2 MG/ML
4 INJECTION INTRAMUSCULAR; INTRAVENOUS EVERY 6 HOURS PRN
Status: DISCONTINUED | OUTPATIENT
Start: 2023-09-21 | End: 2023-09-22 | Stop reason: HOSPADM

## 2023-09-21 RX ORDER — ATORVASTATIN CALCIUM 20 MG/1
40 TABLET, FILM COATED ORAL DAILY
Status: DISCONTINUED | OUTPATIENT
Start: 2023-09-22 | End: 2023-09-22

## 2023-09-21 RX ORDER — ACETAMINOPHEN 325 MG/1
650 TABLET ORAL EVERY 4 HOURS PRN
Status: DISCONTINUED | OUTPATIENT
Start: 2023-09-21 | End: 2023-09-22 | Stop reason: HOSPADM

## 2023-09-21 RX ORDER — SODIUM CHLORIDE AND POTASSIUM CHLORIDE 150; 900 MG/100ML; MG/100ML
100 INJECTION, SOLUTION INTRAVENOUS CONTINUOUS
Status: DISCONTINUED | OUTPATIENT
Start: 2023-09-21 | End: 2023-09-22 | Stop reason: HOSPADM

## 2023-09-21 RX ORDER — ONDANSETRON 4 MG/1
4 TABLET, FILM COATED ORAL EVERY 6 HOURS PRN
Status: DISCONTINUED | OUTPATIENT
Start: 2023-09-21 | End: 2023-09-22 | Stop reason: HOSPADM

## 2023-09-21 RX ORDER — ASPIRIN 81 MG/1
81 TABLET ORAL DAILY
Status: DISCONTINUED | OUTPATIENT
Start: 2023-09-22 | End: 2023-09-22 | Stop reason: HOSPADM

## 2023-09-21 RX ORDER — POLYETHYLENE GLYCOL 3350 17 G/17G
17 POWDER, FOR SOLUTION ORAL DAILY PRN
Status: DISCONTINUED | OUTPATIENT
Start: 2023-09-21 | End: 2023-09-22 | Stop reason: HOSPADM

## 2023-09-21 RX ORDER — PANTOPRAZOLE SODIUM 40 MG/1
40 TABLET, DELAYED RELEASE ORAL 2 TIMES DAILY
Status: DISCONTINUED | OUTPATIENT
Start: 2023-09-21 | End: 2023-09-22 | Stop reason: HOSPADM

## 2023-09-21 RX ORDER — SODIUM CHLORIDE 9 MG/ML
125 INJECTION, SOLUTION INTRAVENOUS CONTINUOUS
Status: DISCONTINUED | OUTPATIENT
Start: 2023-09-21 | End: 2023-09-21

## 2023-09-21 RX ORDER — ISOSORBIDE MONONITRATE 60 MG/1
60 TABLET, EXTENDED RELEASE ORAL 2 TIMES DAILY
Status: DISCONTINUED | OUTPATIENT
Start: 2023-09-21 | End: 2023-09-22 | Stop reason: HOSPADM

## 2023-09-21 RX ORDER — ATENOLOL 50 MG/1
50 TABLET ORAL 2 TIMES DAILY
Status: DISCONTINUED | OUTPATIENT
Start: 2023-09-21 | End: 2023-09-22 | Stop reason: HOSPADM

## 2023-09-21 RX ORDER — RANOLAZINE 500 MG/1
1000 TABLET, EXTENDED RELEASE ORAL EVERY 12 HOURS
Status: DISCONTINUED | OUTPATIENT
Start: 2023-09-21 | End: 2023-09-22 | Stop reason: HOSPADM

## 2023-09-21 RX ORDER — BISACODYL 5 MG/1
5 TABLET, DELAYED RELEASE ORAL DAILY PRN
Status: DISCONTINUED | OUTPATIENT
Start: 2023-09-21 | End: 2023-09-22 | Stop reason: HOSPADM

## 2023-09-21 RX ORDER — ATENOLOL 50 MG/1
50 TABLET ORAL ONCE
Status: COMPLETED | OUTPATIENT
Start: 2023-09-21 | End: 2023-09-21

## 2023-09-21 RX ORDER — UREA 10 %
3 LOTION (ML) TOPICAL NIGHTLY PRN
Status: DISCONTINUED | OUTPATIENT
Start: 2023-09-21 | End: 2023-09-22 | Stop reason: HOSPADM

## 2023-09-21 RX ADMIN — POTASSIUM CHLORIDE AND SODIUM CHLORIDE 100 ML/HR: 900; 150 INJECTION, SOLUTION INTRAVENOUS at 20:20

## 2023-09-21 RX ADMIN — ATENOLOL 50 MG: 50 TABLET ORAL at 18:29

## 2023-09-21 RX ADMIN — SODIUM CHLORIDE 125 ML/HR: 9 INJECTION, SOLUTION INTRAVENOUS at 16:53

## 2023-09-21 RX ADMIN — IOPAMIDOL 85 ML: 612 INJECTION, SOLUTION INTRAVENOUS at 12:28

## 2023-09-21 RX ADMIN — SODIUM CHLORIDE 500 ML: 9 INJECTION, SOLUTION INTRAVENOUS at 12:04

## 2023-09-21 RX ADMIN — SODIUM CHLORIDE 125 ML/HR: 9 INJECTION, SOLUTION INTRAVENOUS at 14:00

## 2023-09-21 NOTE — ED TRIAGE NOTES
Patient reports he had abdominal pain that started on Sunday and vomited on Tuesday this week. Patient reports he has been getting progressively more weak throughout this week.

## 2023-09-21 NOTE — CONSULTS
Name: Slade Martinez ADMIT: 2023   : 1950  PCP: Triny Hannon MD    MRN: [id number] LOS: 0 days   AGE/SEX: 72 y.o. male  ROOM: [address]    Patient Care Team:  Triny Hannon MD as PCP - General (Internal Medicine)  aJmes Hubbard MD as Consulting Physician (Cardiology)  Chief Complaint   Patient presents with    Weakness - Generalized    Fall     CC: Abdominal pain    History of Present Illness  72-year-old gentleman known to my partner Dr. Junie Alcala for evaluation of cerebrovascular disease, presents to the emergency room with complaints of abdominal pain.  The patient is confused and several family members are present in the room, and a number of stories are being conveyed, so it is difficult to determine the precise nature and sequence of symptoms.  About a week ago he experienced gradual onset of left-sided abdominal pain with associated nausea, a single episode of vomiting and diarrhea.  Pain is constant, stabbing, and unaffected by food.  Patient has been able to eat satisfactorily, and has not vomited in 2 days.  Earlier today he experienced mental status changes including agitation, disorientation and unsteady gait, which resulted in a fall.  At present he is having pain and tenderness in his abdomen, about the same as it has been over the last several days.  Is known to have coronary artery disease post multiple revascularizations including coronary bypass procedures and multiple percutaneous revascularizations.  Also experienced stroke during coronary bypass, apparently affecting the left hemisphere.  Previous carotid endarterectomy.  Apparently ambulates satisfactorily without claudication symptoms.  Intentional 40 pound weight loss since December, which his family corroborates.  Review of Systems   Constitutional:  Positive for activity change. Negative for appetite change, chills, fatigue and fever.   Respiratory: Negative.     Gastrointestinal:  Positive  for abdominal pain, diarrhea and nausea. Negative for blood in stool.   Skin: Negative.    Neurological:  Positive for tremors.   Psychiatric/Behavioral:  Positive for agitation and confusion.      Past Medical History:   Diagnosis Date    Coronary atherosclerosis 11/04/2019    H/O CABG SVG to first diagonal branch and to the LAD as well as SVG to the lateral marginal branch of the circumflex complicated by right sided subcortical infarct with left sided hemiparesis    Dyslipidemia 11/04/2019    Essential hypertension 11/04/2019    Injury of back     fractured vertebra 2/26/23     Past Surgical History:   Procedure Laterality Date    CAROTID ENDARTERECTOMY  2000    Left    CHOLECYSTECTOMY      CORONARY ARTERY BYPASS GRAFT      CORONARY STENT PLACEMENT      X17 stents     Family History   Problem Relation Age of Onset    COPD Mother     Heart attack Father     Heart failure Father     COPD Brother      Social History     Tobacco Use    Smoking status: Former     Types: Cigarettes    Smokeless tobacco: Never   Vaping Use    Vaping Use: Never used   Substance Use Topics    Alcohol use: Not Currently    Drug use: Never     Medications Prior to Admission   Medication Sig Dispense Refill Last Dose    aspirin 81 MG tablet Take 1 tablet by mouth Daily.       atenolol (TENORMIN) 50 MG tablet Take 1 tablet by mouth 2 (Two) Times a Day.       atorvastatin (LIPITOR) 40 MG tablet Take 1 tablet by mouth Daily.       clopidogrel (PLAVIX) 75 MG tablet Take 1 tablet by mouth Daily.       isosorbide mononitrate (IMDUR) 60 MG 24 hr tablet Take 1 tablet by mouth 2 (Two) Times a Day.       linaclotide (LINZESS) 72 MCG capsule capsule Take 1 capsule by mouth 3 (Three) Times a Week if Needed.       loratadine (CLARITIN) 10 MG tablet Take 1 tablet by mouth Daily.       meclizine (ANTIVERT) 12.5 MG tablet Take 1 tablet by mouth Daily.       Multiple Vitamins-Minerals (MULTIVITAMIN ADULT EXTRA C PO) Take 1 tablet by mouth Daily.        "nitroglycerin (NITROSTAT) 0.4 MG SL tablet Place 1 tablet under the tongue Every 5 (Five) Minutes As Needed for Chest Pain. Take no more than 3 doses in 15 minutes. 25 tablet 3     Omega-3 Fatty Acids (FISH OIL) 1000 MG capsule capsule Take 1 capsule by mouth Daily.       pantoprazole (PROTONIX) 40 MG EC tablet Take 1 tablet by mouth 2 (Two) Times a Day.       ranolazine (RANEXA) 1000 MG 12 hr tablet Take 1 tablet by mouth Every 12 (Twelve) Hours. 60 tablet 11     saccharomyces boulardii (FLORASTOR) 250 MG capsule Take 1 capsule by mouth 2 (Two) Times a Day.       sucralfate (CARAFATE) 1 g tablet Take 1 tablet by mouth 2 (Two) Times a Day.         sodium chloride, 125 mL/hr, Last Rate: Stopped (09/21/23 1531)    Pletal [cilostazol]    Vital Signs and Labs:  Vital Signs Patient Vitals for the past 24 hrs:   BP Temp Temp src Pulse Resp SpO2 Height Weight   09/21/23 1502 174/75 -- -- 62 16 95 % -- --   09/21/23 1400 170/80 -- -- 58 16 95 % -- --   09/21/23 1204 178/83 -- -- 55 18 96 % -- --   09/21/23 1031 173/87 -- -- 55 -- 99 % -- --   09/21/23 1017 -- -- -- -- -- -- 180.3 cm (71\") 94.4 kg (208 lb 1.6 oz)   09/21/23 1013 162/80 -- -- -- -- -- -- --   09/21/23 1006 -- 98.9 °F (37.2 °C) Tympanic 74 20 98 % -- --     BMI:  Body mass index is 29.02 kg/m².    Physical Exam:  Physical Exam  Vitals reviewed.   Constitutional:       General: He is not in acute distress.     Appearance: He is not toxic-appearing or diaphoretic.   HENT:      Head: Normocephalic and atraumatic.      Right Ear: External ear normal.      Left Ear: External ear normal.      Nose: Nose normal.      Mouth/Throat:      Mouth: Mucous membranes are dry.   Eyes:      General: No scleral icterus.     Extraocular Movements: Extraocular movements intact.      Conjunctiva/sclera: Conjunctivae normal.      Pupils: Pupils are equal, round, and reactive to light.   Cardiovascular:      Rate and Rhythm: Normal rate and regular rhythm.      Pulses: Normal " pulses.      Heart sounds: Normal heart sounds.      Comments: Radial, femoral and pedal artery pulses palpated bilaterally.  Incisions proximal medial calves from previous endoscopic GSV harvest for coronary bypass.  Pulmonary:      Effort: Pulmonary effort is normal. No respiratory distress.      Breath sounds: Wheezing present. No rhonchi.      Comments: Satisfactory air movement.  Abdominal:      Comments: Round, nondistended.  No tympany.  Normal active bowel sounds.  Mild diffuse tenderness, worse in the left mid abdomen.  No guarding or rigidity.   Musculoskeletal:         General: No swelling.      Right lower leg: No edema.      Left lower leg: No edema.   Skin:     General: Skin is warm and dry.      Capillary Refill: Capillary refill takes less than 2 seconds.      Coloration: Skin is not jaundiced or pale.      Findings: No bruising, erythema, lesion or rash.   Neurological:      Mental Status: He is alert.      Cranial Nerves: No cranial nerve deficit.      Comments: Restless without agitation.  Taking blood pressure cuff off.  Tremulous.  Speech fluent, but disoriented to purpose.  No obvious sensorimotor abnormalities of the extremities.   Psychiatric:      Comments: Restless, tremulous.      CBC    Results from last 7 days   Lab Units 09/21/23  1052   WBC 10*3/mm3 5.89   HEMOGLOBIN g/dL 12.2*   PLATELETS 10*3/mm3 173     Cr Clearance Estimated Creatinine Clearance: 95.2 mL/min (by C-G formula based on SCr of 0.81 mg/dL).  Coag   Results from last 7 days   Lab Units 09/21/23  1052   INR  1.21*     HbA1C   Lab Results   Component Value Date    HGBA1C 5.8 (H) 02/28/2023     CMP   Results from last 7 days   Lab Units 09/21/23  1530 09/21/23  1052   SODIUM mmol/L  --  129*   POTASSIUM mmol/L  --  3.1*   CHLORIDE mmol/L  --  93*   CO2 mmol/L  --  26.6   BUN mg/dL  --  9   CREATININE mg/dL  --  0.81   GLUCOSE mg/dL  --  106*   ALBUMIN g/dL  --  3.5   BILIRUBIN mg/dL  --  0.8   ALK PHOS U/L  --  70   AST  (SGOT) U/L  --  14   ALT (SGPT) U/L  --  8   AMMONIA umol/L 13*  --      UA    Results from last 7 days   Lab Units 09/21/23  1420   NITRITE UA  Negative     Radiology(recent) CT Head Without Contrast    Result Date: 9/21/2023   There is a focus of encephalomalacia within the left parietal lobe that is compatible with a chronic infarct within the left MCA distribution and this measures up to 4.8 x 1.7 cm in greatest axial dimensions. Moderate changes of chronic small vessel ischemic phenomena are noted. There is no CT evidence to suggest acute intracranial pathology. The etiology of the patient's confusion and progressive weakness is not further elucidated on this examination; and if further assessment is required, one could obtain an MRI of the brain for follow-up.   Radiation dose reduction techniques were utilized, including automated exposure control and exposure modulation based on body size.  This report was finalized on 9/21/2023 1:45 PM by Dr. Beltran Tripathi M.D.      CT Abdomen Pelvis With Contrast    Result Date: 9/21/2023  1. SMA occlusion and additional areas of severe atherosclerosis  2. Progression of L3 compression deformity  3. Incidental findings as above.  This report was finalized on 9/21/2023 1:01 PM by Dr. Jesus Flores M.D.       CT abdomen demonstrating chronic-appearing calcified SMA occlusion:    Similar findings were present on CT abdomen and pelvis March, 2023.    Active Hospital Problems    Diagnosis  POA    **Altered mental status [R41.82]  Yes    Abdominal pain [R10.9]  Yes    Chronic mesenteric ischemia [K55.1]  Yes    S/P CABG (coronary artery bypass graft) [Z95.1]  Not Applicable    Dyslipidemia [E78.5]  Yes    Essential hypertension [I10]  Yes      Resolved Hospital Problems   No resolved problems to display.     Problem Points:  3:  Patient has a new problem, with no additional work-up planned (max of 1)  Total problem points:3    Data Points:  1:  I have reviewed or order clinical  lab test  1:  I have reviewed or order radiology test (except heart catheterization or echo)  2:  I have personally and independently review of image, tracing, or specimen  Total data points:4 or more    Risk Points:  Moderate: New diagnosis with unknown prognosis    MDM requires 2/3 (Problem points, Data points and Risk)  MDM Prob point Data point Risk   SF 1 1 Minimal   Low 2 2 Low   Mod 3 3 Moderate   High 4 4 High     Code requires 3/3 (MDM, History and Exam)  Code MDM History Exam Time   05964 SF/Low Detailed Detailed 30   73505 Mod Comprehensive Comprehensive 50   45752 High Comprehensive Comprehensive 70     Detailed history:  4 elements HPI or status of 3 chronic problems; 2-9 system ROS  Comprehensive:  4 elements HPI or status of 3 chronic problems;  10 system ROS    Detailed Exam:  12 findings from any organ system  Comprehensive Exam:  2 findings from each of 9 systems.   42800    Assessment & Plan       Altered mental status    Dyslipidemia    Essential hypertension    S/P CABG (coronary artery bypass graft)    Abdominal pain    Chronic mesenteric ischemia    72 y.o. male admitted to the hospital with mental status changes and a fall, in the context of a 1 week history of left-sided abdominal pain with nausea and diarrhea.  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  No abnormalities associated with the left hemiabdomen.  SMA findings are similar to previous CT scan March of this year.  Acute mesenteric ischemia seems unlikely, and his intestinal tract does not seem to be immediately threatening.  He would seem to have few revascularization options, owing to the extensive calcifications in his aorta and superior mesenteric artery rendering both endovascular stent angioplasty or open SMA bypass not to be favorable from an anatomic perspective.   Another diagnosis in the differential is a gastrointestinal illness with associated diarrhea, dehydration and electrolyte abnormalities.  Plan hospital admission, rehydration and anticipate correction of electrolyte abnormalities.  Serial exam.  Plans to follow.  Patient is known to have severe atherosclerosis involving coronary, carotid and mesenteric arterial distributions.  Present medications include DAPT and atorvastatin.    I discussed the patients findings and my recommendations with patient and family.    Sanford Monge MD  09/21/23  16:18 EDT    Please call my office with any question: [phone]

## 2023-09-21 NOTE — PLAN OF CARE
Goal Outcome Evaluation:   Patient was admitted from ER with altered mental status and abdominal pain. Patient had elevated blood pressure upon arrival to unit. This RN notified MD Graves. Patients family at bedside. Patient on room air.

## 2023-09-21 NOTE — ED NOTES
Nursing report ED to floor  Slade Martinez  72 y.o.  male    HPI :   Chief Complaint   Patient presents with    Weakness - Generalized    Fall       Admitting doctor:   Shaye Graves MD    Admitting diagnosis:   The primary encounter diagnosis was Altered mental status, unspecified altered mental status type. Diagnoses of Left lower quadrant abdominal pain, Atherosclerosis, Occlusion of superior mesenteric artery, Hypokalemia, and Hyponatremia were also pertinent to this visit.    Code status:   Current Code Status       Date Active Code Status Order ID Comments User Context       Prior            Allergies:   Pletal [cilostazol]    Isolation:   No active isolations    Intake and Output  No intake or output data in the 24 hours ending 09/21/23 1540    Weight:       09/21/23  1017   Weight: 94.4 kg (208 lb 1.6 oz)       Most recent vitals:   Vitals:    09/21/23 1031 09/21/23 1204 09/21/23 1400 09/21/23 1502   BP: 173/87 178/83 170/80 174/75   Pulse: 55 55 58 62   Resp:  18 16 16   Temp:       TempSrc:       SpO2: 99% 96% 95% 95%   Weight:       Height:           Active LDAs/IV Access:   Lines, Drains & Airways       Active LDAs       Name Placement date Placement time Site Days    Peripheral IV 09/21/23 1018 Right Antecubital 09/21/23  1018  Antecubital  less than 1                    Labs (abnormal labs have a star):   Labs Reviewed   COMPREHENSIVE METABOLIC PANEL - Abnormal; Notable for the following components:       Result Value    Glucose 106 (*)     Sodium 129 (*)     Potassium 3.1 (*)     Chloride 93 (*)     Calcium 8.5 (*)     All other components within normal limits    Narrative:     GFR Normal >60  Chronic Kidney Disease <60  Kidney Failure <15    The GFR formula is only valid for adults with stable renal function between ages 18 and 70.   PROTIME-INR - Abnormal; Notable for the following components:    Protime 15.4 (*)     INR 1.21 (*)     All other components within normal limits   CBC WITH AUTO  "DIFFERENTIAL - Abnormal; Notable for the following components:    RBC 3.53 (*)     Hemoglobin 12.2 (*)     Hematocrit 35.1 (*)     MCV 99.4 (*)     MCH 34.6 (*)     RDW 12.2 (*)     Monocyte % 12.4 (*)     All other components within normal limits   BLOOD GAS, VENOUS - Abnormal; Notable for the following components:    pH, Venous 7.419 (*)     pCO2, Venous 39.6 (*)     pO2, Venous 23.4 (*)     O2 Saturation, Venous 42.3 (*)     All other components within normal limits   TROPONIN - Normal    Narrative:     High Sensitive Troponin T Reference Range:  <10.0 ng/L- Negative Female for AMI  <15.0 ng/L- Negative Male for AMI  >=10 - Abnormal Female indicating possible myocardial injury.  >=15 - Abnormal Male indicating possible myocardial injury.   Clinicians would have to utilize clinical acumen, EKG, Troponin, and serial changes to determine if it is an Acute Myocardial Infarction or myocardial injury due to an underlying chronic condition.        URINALYSIS W/ MICROSCOPIC IF INDICATED (NO CULTURE) - Normal    Narrative:     Urine microscopic not indicated.   MAGNESIUM - Normal   TSH - Normal   PROCALCITONIN - Normal    Narrative:     As a Marker for Sepsis (Non-Neonates):    1. <0.5 ng/mL represents a low risk of severe sepsis and/or septic shock.  2. >2 ng/mL represents a high risk of severe sepsis and/or septic shock.    As a Marker for Lower Respiratory Tract Infections that require antibiotic therapy:    PCT on Admission    Antibiotic Therapy       6-12 Hrs later    >0.5                Strongly Recommended  >0.25 - <0.5        Recommended   0.1 - 0.25          Discouraged              Remeasure/reassess PCT  <0.1                Strongly Discouraged     Remeasure/reassess PCT    As 28 day mortality risk marker: \"Change in Procalcitonin Result\" (>80% or <=80%) if Day 0 (or Day 1) and Day 4 values are available. Refer to http://www.[employer]-pct-calculator.com    Change in PCT <=80%  A decrease of PCT levels below or " equal to 80% defines a positive change in PCT test result representing a higher risk for 28-day all-cause mortality of patients diagnosed with severe sepsis for septic shock.    Change in PCT >80%  A decrease of PCT levels of more than 80% defines a negative change in PCT result representing a lower risk for 28-day all-cause mortality of patients diagnosed with severe sepsis or septic shock.      LACTIC ACID, PLASMA - Normal   BLOOD CULTURE   BLOOD CULTURE   BLOOD GAS, VENOUS   ETHANOL   URINE DRUG SCREEN   HIGH SENSITIVITIY TROPONIN T 2HR   AMMONIA   POCT GLUCOSE FINGERSTICK   CBC AND DIFFERENTIAL    Narrative:     The following orders were created for panel order CBC & Differential.  Procedure                               Abnormality         Status                     ---------                               -----------         ------                     CBC Auto Differential[[id number]]        Abnormal            Final result                 Please view results for these tests on the individual orders.       EKG:   ECG 12 Lead Other; Generalized weakness   Final Result   HEART RATE= 56  bpm   RR Interval= 1071  ms   MA Interval= 235  ms   P Horizontal Axis= -81  deg   P Front Axis= -25  deg   QRSD Interval= 108  ms   QT Interval= 492  ms   QTcB= 475  ms   QRS Axis= 10  deg   T Wave Axis= 80  deg   - ABNORMAL ECG -   Sinus rhythm   Prolonged MA interval   RSR' in V1 or V2, probably normal variant   Borderline T abnormalities, lateral leads   No change from previous tracing   Electronically Signed By: Gurwinder Hurd ([facility]) 21-Sep-2023 13:00:37   Date and Time of Study: 2023-09-21 10:56:16          Meds given in ED:   Medications   sodium chloride 0.9 % flush 10 mL (has no administration in time range)   sodium chloride 0.9 % flush 10 mL (has no administration in time range)   sodium chloride 0.9 % infusion (0 mL/hr Intravenous Stopped 9/21/23 1531)   Potassium Replacement - Follow Nurse / BPA Driven Protocol  (has no administration in time range)   sodium chloride 0.9 % bolus 500 mL (0 mL Intravenous Stopped 9/21/23 1257)   iopamidol (ISOVUE-300) 61 % injection 100 mL (85 mL Intravenous Given 9/21/23 1228)       Imaging results:  CT Head Without Contrast    Result Date: 9/21/2023   There is a focus of encephalomalacia within the left parietal lobe that is compatible with a chronic infarct within the left MCA distribution and this measures up to 4.8 x 1.7 cm in greatest axial dimensions. Moderate changes of chronic small vessel ischemic phenomena are noted. There is no CT evidence to suggest acute intracranial pathology. The etiology of the patient's confusion and progressive weakness is not further elucidated on this examination; and if further assessment is required, one could obtain an MRI of the brain for follow-up.   Radiation dose reduction techniques were utilized, including automated exposure control and exposure modulation based on body size.  This report was finalized on 9/21/2023 1:45 PM by Dr. Beltran Tripathi M.D.      CT Abdomen Pelvis With Contrast    Result Date: 9/21/2023  1. SMA occlusion and additional areas of severe atherosclerosis  2. Progression of L3 compression deformity  3. Incidental findings as above.  This report was finalized on 9/21/2023 1:01 PM by Dr. Jesus Flores M.D.       Ambulatory status:   - bedrest at this time     Social issues:   Social History     Socioeconomic History    Marital status:    Tobacco Use    Smoking status: Former     Types: Cigarettes    Smokeless tobacco: Never   Vaping Use    Vaping Use: Never used   Substance and Sexual Activity    Alcohol use: Not Currently    Drug use: Never    Sexual activity: Defer       NIH Stroke Scale:       Carolina Salcido RN  09/21/23 15:40 EDT

## 2023-09-21 NOTE — ED PROVIDER NOTES
EMERGENCY DEPARTMENT ENCOUNTER    Room Number:  N545/1  Date of encounter:  9/21/2023  PCP: Triny Hannon MD  Historian: Patient and spouse  Relevant information and history provided by sources other than the patient will be included below and in the ED Course.  Review of pertinent past medical records may also be included in record below and ED Course.    HPI:  Chief Complaint: Abdominal pain and confusion  A complete HPI/ROS/PMH/PSH/SH/FH are unobtainable due to: History is obtained from the patient as well as the spouse.  Context: Slade Martinez is a 72 y.o. male who presents to the ED c/o patient symptoms started on Sunday.  Today is Friday.  Started after Religion with some pain in the left side of his abdomen mainly left lower side of his abdomen.  Felt very similar to his previous diverticulitis.  The pain persisted.  He was prescribed Bentyl in the past when he had diverticulitis.  He has been taking Bentyl but the pain persisted.  He has had decreased intake over the past several days not eating or drinking much because it would make the pain worse.  He had 4 loose bowel movements but no definitive diarrhea.  No blood in the diarrhea.  No vomiting.  The symptoms persisted with no resolution and then last night spouse felt that he started getting fever.  He had episodes of trembling and shaking.  At baseline he has some jerking but it was worse.  Spouse also states he started being confused and talking out of his head and not making sense.  This morning when she was driving to [facility] the patient felt that his wife stopped to  to people and they got in the backseat.  The wife states this did not occur.  The patient was talking to the 2 people in the backseat..  The spouse states this happened in the past when he has had his previous strokes when he was admitted to [facility] ([place]  He does have some residual mild weakness on the left side.  There potentially was report that he had some chest pain  last night.  When I am seeing the patient now he denies pain anywhere no headache or chest pain.  But when I do press on his abdomen he does report some abdominal pain in the left lower side.  He is aware that he did have abdominal pain he is not really aware of his confusion.  He does not know all the specifics about his past history or why he is here at this time.  He denies any visual change, speech change.  He has generalized weakness but no new focal weakness or sensory change        Previous Episodes: Yes with diverticulitis and yes confusion with previous strokes.  Current Symptoms: See above    MEDICAL HISTORY REVIEWED  I can see this patient was seen 7/30/2023 in the emergency department had an ingrown toenail and cellulitis of the great toe.  Patient was treated in the ER with antibiotics and discharged home.  Patient has a history of peripheral artery disease.  History of coronary artery disease status post CABG.  History of elevated cholesterol and hypertension.  Did review the patient's medicine list.      PAST MEDICAL HISTORY  Active Ambulatory Problems     Diagnosis Date Noted    Dyslipidemia 11/04/2019    Essential hypertension 11/04/2019    Coronary atherosclerosis 11/04/2019    S/P CABG (coronary artery bypass graft) 04/20/2020    Peripheral artery disease 02/25/2021    History of left-sided carotid endarterectomy 02/25/2021    Fever of unknown origin 03/03/2023    Closed traumatic nondisplaced fracture of two ribs of right side with routine healing 03/04/2023     Resolved Ambulatory Problems     Diagnosis Date Noted    S/P CABG (coronary artery bypass graft) 04/20/2020    COVID-19 virus infection 02/25/2021     Past Medical History:   Diagnosis Date    Injury of back          PAST SURGICAL HISTORY  Past Surgical History:   Procedure Laterality Date    CAROTID ENDARTERECTOMY  2000    Left    CHOLECYSTECTOMY      CORONARY ARTERY BYPASS GRAFT      CORONARY STENT PLACEMENT      X17 stents          FAMILY HISTORY  Family History   Problem Relation Age of Onset    COPD Mother     Heart attack Father     Heart failure Father     COPD Brother          SOCIAL HISTORY  Social History     Socioeconomic History    Marital status:    Tobacco Use    Smoking status: Former     Types: Cigarettes    Smokeless tobacco: Never   Vaping Use    Vaping Use: Never used   Substance and Sexual Activity    Alcohol use: Not Currently    Drug use: Never    Sexual activity: Defer         ALLERGIES  Pletal [cilostazol]        REVIEW OF SYSTEMS  Review of Systems     All systems reviewed and negative except for those discussed in HPI.       PHYSICAL EXAM    I have reviewed the triage vital signs and nursing notes.    ED Triage Vitals   Temp Heart Rate Resp BP SpO2   09/21/23 1006 09/21/23 1006 09/21/23 1006 09/21/23 1013 09/21/23 1006   98.9 °F (37.2 °C) 74 20 162/80 98 %      Temp src Heart Rate Source Patient Position BP Location FiO2 (%)   09/21/23 1006 -- -- -- --   Tympanic           GENERAL: This is an elderly male..  He is sickly appearing.  No acute cardiovascular or respiratory distress.Vital signs on my initial evaluation have been reviewed and unremarkable  HENT: nares patent  Head/neck/ face are symmetric without gross deformity, signs of trauma, or swelling  EYES: no scleral icterus, no conjunctival pallor.  Extraocular muscles intact.  No visual impairment.  NECK: Supple, no meningismus  CV: regular rhythm, regular rate with intact distal pulses.  RESPIRATORY: normal effort and no respiratory distress.  Clear to auscultation bilaterally  ABDOMEN: soft and obese.  Does have some reproducible pain in palpation to the left mid and left lower quadrant of his abdomen.  Normal bowel sounds.  There is no guarding  MUSCULOSKELETAL: no deformity.  No edema.  Intact distal pulses to upper and lower extremities that are equal strong symmetric.  NEURO: alert and appropriate, moves all extremities, follows commands.   Patient confuses easily.  He is alert and oriented x3.  Cannot remember specifics about his medical condition or recent history.  Sometimes he will mumble and will not answer questions.  Mainly likes to stay in bed with his eyes closed.  He does have some increased tremors more than normal more prominent to his upper extremity when we raise his upper extremities bilaterally.  He has generalized weakness but no focal motor or sensory changes.  Patient's NIH stroke scale is 0.  SKIN: warm, dry    Vital signs and nursing notes reviewed.        LAB RESULTS  Recent Results (from the past 24 hour(s))   Comprehensive Metabolic Panel    Collection Time: 09/21/23 10:52 AM    Specimen: Blood   Result Value Ref Range    Glucose 106 (H) 65 - 99 mg/dL    BUN 9 8 - 23 mg/dL    Creatinine 0.81 0.76 - 1.27 mg/dL    Sodium 129 (L) 136 - 145 mmol/L    Potassium 3.1 (L) 3.5 - 5.2 mmol/L    Chloride 93 (L) 98 - 107 mmol/L    CO2 26.6 22.0 - 29.0 mmol/L    Calcium 8.5 (L) 8.6 - 10.5 mg/dL    Total Protein 6.4 6.0 - 8.5 g/dL    Albumin 3.5 3.5 - 5.2 g/dL    ALT (SGPT) 8 1 - 41 U/L    AST (SGOT) 14 1 - 40 U/L    Alkaline Phosphatase 70 39 - 117 U/L    Total Bilirubin 0.8 0.0 - 1.2 mg/dL    Globulin 2.9 gm/dL    A/G Ratio 1.2 g/dL    BUN/Creatinine Ratio 11.1 7.0 - 25.0    Anion Gap 9.4 5.0 - 15.0 mmol/L    eGFR 93.1 >60.0 mL/min/1.73   Protime-INR    Collection Time: 09/21/23 10:52 AM    Specimen: Blood   Result Value Ref Range    Protime 15.4 (H) 11.7 - 14.2 Seconds    INR 1.21 (H) 0.90 - 1.10   High Sensitivity Troponin T    Collection Time: 09/21/23 10:52 AM    Specimen: Blood   Result Value Ref Range    HS Troponin T 13 <15 ng/L   Magnesium    Collection Time: 09/21/23 10:52 AM    Specimen: Blood   Result Value Ref Range    Magnesium 1.7 1.6 - 2.4 mg/dL   CBC Auto Differential    Collection Time: 09/21/23 10:52 AM    Specimen: Blood   Result Value Ref Range    WBC 5.89 3.40 - 10.80 10*3/mm3    RBC 3.53 (L) 4.14 - 5.80 10*6/mm3     Hemoglobin 12.2 (L) 13.0 - 17.7 g/dL    Hematocrit 35.1 (L) 37.5 - 51.0 %    MCV 99.4 (H) 79.0 - 97.0 fL    MCH 34.6 (H) 26.6 - 33.0 pg    MCHC 34.8 31.5 - 35.7 g/dL    RDW 12.2 (L) 12.3 - 15.4 %    RDW-SD 44.1 37.0 - 54.0 fl    MPV 10.2 6.0 - 12.0 fL    Platelets 173 140 - 450 10*3/mm3    Neutrophil % 65.4 42.7 - 76.0 %    Lymphocyte % 20.7 19.6 - 45.3 %    Monocyte % 12.4 (H) 5.0 - 12.0 %    Eosinophil % 1.0 0.3 - 6.2 %    Basophil % 0.3 0.0 - 1.5 %    Immature Grans % 0.2 0.0 - 0.5 %    Neutrophils, Absolute 3.85 1.70 - 7.00 10*3/mm3    Lymphocytes, Absolute 1.22 0.70 - 3.10 10*3/mm3    Monocytes, Absolute 0.73 0.10 - 0.90 10*3/mm3    Eosinophils, Absolute 0.06 0.00 - 0.40 10*3/mm3    Basophils, Absolute 0.02 0.00 - 0.20 10*3/mm3    Immature Grans, Absolute 0.01 0.00 - 0.05 10*3/mm3    nRBC 0.0 0.0 - 0.2 /100 WBC   TSH    Collection Time: 09/21/23 10:52 AM    Specimen: Blood   Result Value Ref Range    TSH 2.640 0.270 - 4.200 uIU/mL   Ethanol    Collection Time: 09/21/23 10:52 AM    Specimen: Blood   Result Value Ref Range    Ethanol <10 0 - 10 mg/dL    Ethanol % <0.010 %   Procalcitonin    Collection Time: 09/21/23 10:52 AM    Specimen: Blood   Result Value Ref Range    Procalcitonin 0.22 0.00 - 0.25 ng/mL   ECG 12 Lead Other; Generalized weakness    Collection Time: 09/21/23 10:56 AM   Result Value Ref Range    QT Interval 492 ms    QTC Interval 475 ms   Lactic Acid, Plasma    Collection Time: 09/21/23 12:14 PM    Specimen: Blood   Result Value Ref Range    Lactate 1.3 0.5 - 2.0 mmol/L   Blood Gas, Venous -    Collection Time: 09/21/23 12:15 PM    Specimen: Venous Blood   Result Value Ref Range    pH, Venous 7.419 (H) 7.310 - 7.410 pH Units    pCO2, Venous 39.6 (L) 41.0 - 51.0 mm Hg    pO2, Venous 23.4 (L) 35.0 - 45.0 mm Hg    HCO3, Venous 25.6 22.0 - 28.0 mmol/L    Base Excess, Venous 1.1 -2.0 - 2.0 mmol/L    O2 Saturation, Venous 42.3 (L) 45.0 - 75.0 %    CO2 Content 26.8 23 - 27 mmol/L    Barometric Pressure  for Blood Gas 754.2000 mmHg    Modality Room Air     Rate 18 Breaths/minute    Device Comment [id number]    Urinalysis With Microscopic If Indicated (No Culture) - Urine, Clean Catch    Collection Time: 09/21/23  2:20 PM    Specimen: Urine, Clean Catch   Result Value Ref Range    Color, UA Yellow Yellow, Straw    Appearance, UA Clear Clear    pH, UA 6.5 5.0 - 8.0    Specific Gravity, UA 1.015 1.005 - 1.030    Glucose, UA Negative Negative    Ketones, UA Negative Negative    Bilirubin, UA Negative Negative    Blood, UA Negative Negative    Protein, UA Negative Negative    Leuk Esterase, UA Negative Negative    Nitrite, UA Negative Negative    Urobilinogen, UA 1.0 E.U./dL 0.2 - 1.0 E.U./dL   Urine Drug Screen - Urine, Clean Catch    Collection Time: 09/21/23  2:20 PM    Specimen: Urine, Clean Catch   Result Value Ref Range    Amphet/Methamphet, Screen Negative Negative    Barbiturates Screen, Urine Negative Negative    Benzodiazepine Screen, Urine Negative Negative    Cocaine Screen, Urine Negative Negative    Opiate Screen Negative Negative    THC, Screen, Urine Negative Negative    Methadone Screen, Urine Negative Negative    Oxycodone Screen, Urine Negative Negative    Fentanyl, Urine Negative Negative   High Sensitivity Troponin T 2Hr    Collection Time: 09/21/23  3:30 PM    Specimen: Blood   Result Value Ref Range    HS Troponin T 11 <15 ng/L    Troponin T Delta -2 >=-4 - <+4 ng/L   Ammonia    Collection Time: 09/21/23  3:30 PM    Specimen: Blood   Result Value Ref Range    Ammonia 13 (L) 16 - 60 umol/L       Ordered the above labs and independently reviewed the results.        RADIOLOGY  CT Head Without Contrast    Result Date: 9/21/2023  CT HEAD WITHOUT CONTRAST  CLINICAL HISTORY: Confused. Progressive weakness.  TECHNIQUE: CT scan of the head was obtained with 3 mm axial soft tissue algorithm images. No intravenous contrast was administered. Sagittal and coronal reconstructions were obtained.  COMPARISON: No  previous similar studies are available for comparison.  FINDINGS:   There is a focus of encephalomalacia within the left parietal lobe that is compatible with a chronic infarct within the left MCA distribution and measures up to approximately 4.8 x 1.7 cm in greatest axial dimensions. There are moderate changes of chronic small vessel ischemic phenomenon. The basal ganglia and thalami are unremarkable. The posterior fossa structures are within normal limits. Atherosclerotic calcifications are incidentally appreciated within the intracranial vasculature.  Incidental note is made of mild mucosal thickening within the ethmoid air cells and under pneumatization of the right mastoid air cells.       There is a focus of encephalomalacia within the left parietal lobe that is compatible with a chronic infarct within the left MCA distribution and this measures up to 4.8 x 1.7 cm in greatest axial dimensions. Moderate changes of chronic small vessel ischemic phenomena are noted. There is no CT evidence to suggest acute intracranial pathology. The etiology of the patient's confusion and progressive weakness is not further elucidated on this examination; and if further assessment is required, one could obtain an MRI of the brain for follow-up.   Radiation dose reduction techniques were utilized, including automated exposure control and exposure modulation based on body size.  This report was finalized on 9/21/2023 1:45 PM by Dr. Beltran Tripathi M.D.      CT Abdomen Pelvis With Contrast    Result Date: 9/21/2023  EXAMINATION: CT OF THE ABDOMEN AND PELVIS WITH CONTRAST  TECHNIQUE: Computed tomography of the abdomen and pelvis after the uneventful administration of nonionic intravenous contrast per protocol. Radiation dose reduction techniques were utilized, including automated exposure control and exposure modulation based on body size.  HISTORY: Left-sided abdominal pain  COMPARISON: 3/4/2023  FINDINGS: Limited evaluation of the  inferior thorax demonstrates atelectasis, without consolidation, pleural effusion, or pneumothorax. The heart is normal in size and configuration, without pericardial effusion. Coronary calcifications are seen.  The gallbladder is absent. Colonic diverticula are seen, without evidence of acute diverticulitis.  The liver, spleen, adrenal glands, kidneys, pancreas, stomach, small bowel, appendix, large bowel, urinary bladder are normal. There is near occlusion of the left femoral artery with areas of severe atherosclerotic seen elsewhere. No intraperitoneal fluid collection or free gas are seen. No enlarged lymph nodes are seen. 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  Bone windows demonstrate degenerative changes, without suspicious osseous lesion seen.L3 compression deformity has progressed      1. SMA occlusion and additional areas of severe atherosclerosis  2. Progression of L3 compression deformity  3. Incidental findings as above.  This report was finalized on 9/21/2023 1:01 PM by Dr. Jesus Flores M.D.       I ordered the above noted radiological studies. Reviewed by me and discussed with radiologist.  See dictation for official radiology interpretation.      PROCEDURES    Procedures      MEDICATIONS GIVEN IN ER    Medications   sodium chloride 0.9 % flush 10 mL (has no administration in time range)   sodium chloride 0.9 % flush 10 mL (has no administration in time range)   sodium chloride 0.9 % infusion (0 mL/hr Intravenous Stopped 9/21/23 1531)   Potassium Replacement - Follow Nurse / BPA Driven Protocol (has no administration in time range)   sodium chloride 0.9 % bolus 500 mL (0 mL Intravenous Stopped 9/21/23 1257)   iopamidol (ISOVUE-300) 61 % injection 100 mL (85 mL Intravenous Given 9/21/23 1228)         All labs have been independently reviewed by me.  All radiology studies have been reviewed by me and I discussed with radiologist dictating the report when indicated below.  All EKG's independently viewed and interpreted  by me.  Discussion below represents my analysis of pertinent findings related to patient's condition, differential diagnosis, treatment plan and final disposition.        PROGRESS, DATA ANALYSIS, CONSULTS, AND MEDICAL DECISION MAKING    Differential diagnosis includes   - hepatobiliary pathology such as cholecystitis, cholangitis, and symptomatic cholelithiasis  -PUD  -Mesenteric ischemia  - Pancreatitis  - Dyspepsia  - Small bowel or large bowel obstruction  - Appendicitis  - Diverticulitis  - UTI including pyelonephritis  - Ureteral stone  - Zoster  - Colitis, including infectious and ischemic  - Atypical ACS    Differential diagnosis for altered mental status includes:  - vital sign abnormalities such as HTN encephalopathy, hypotension, hypoxemia, hypercarbia, heat stroke, or hypothermia  - toxic/metabolic pathology such as hypoglycemia, DKA, hypo/hyper-natremia, thyroid storm, myxedema coma, medication side effect (either intentional or accidental)  - infectious etiology  - intracranial pathology such as stroke, seizure, intracranial mass, intracranial hemorrhage  - psychiatric pathology    This started with abdominal pain in the left lower side very typical of his past diverticulitis.  Not eating or drinking much with some loose stools.  Then developed fevers last night.  He then developed confusion last night and this morning.  Informed the patient and the spouse in the room of the test that we will order.  All questions answered at this time.  This gentleman will need to be admitted to the hospital.    ED Course as of 09/21/23 1627   Thu Sep 21, 2023   1110 My own independent TURP rotation of the EKG that was done at 10:56 AM reveals a rate of 56 it is sinus rhythm there is some intraventricular conduction delay with normal axis I do not appreciate any acute injury pattern there are some nonspecific T wave changes and QT looks unremarkable  I compared to the previous EKG that was done on 2/11/2021 and I do  not see any significant change. [MM]   1254 pH, Venous(!): 7.419 [MM]   1254 pCO2, Venous(!): 39.6 [MM]   1254 Ethanol %: <0.010 [MM]   1255 Lactate: 1.3 [MM]   1255 Sodium(!): 129 [MM]   1255 Potassium(!): 3.1 [MM]   1256 Hemoglobin(!): 12.2  Chronic anemia no significant change. [MM]   1427 I reviewed the CT scan report from the radiologist there is a superior mesenteric artery occluded patient has also severe atherosclerosis.  Has progression of an L3 compression deformity and some other incidental findings.  Please see complete dictated report from radiologist. [MM]   1428 Patient has an area of left parietal lobe encephalomalacia concerning for area of chronic infarct in the left MCA distribution that measures 4.8 x 1.7 cm there is other areas of chronic small vessel ischemic changes no acute hemorrhage.  Please see complete dictated report from radiologist [MM]   1453 I have reevaluated this patient.  Patient is sleeping and does not appear to be in any pain or discomfort.  According to the patient's sister and wife he has been sleeping.  He will states he has to go to the bathroom but then does not urinate or have a BM.  He is talkative.  He might be a little confused but not as confused as what he was prior to arrival here.  He states he has some abdominal pain.  But he again does not appear to be in pain.  On repeat exam of his belly it is soft but does report some left-sided abdominal pain in the left mid and left lower quadrant.  There is no guarding or rebound.  He denies chest pain or headache.  He is alert to his name and his age.  He is little confused about the month and the year.  Patient's family states that this gentleman has a very long history of abdominal pain and also has a long history of significant heart disease and coronary artery disease. [MM]   1455 I clinically suspect this SMA occlusion is likely something longstanding.  Patient CO2 is normal.  His lactic acid is normal as well.  He  sees Dr. Alcala from vascular surgery in the past.  We will put a consult out to them.  This gentleman will need to be admitted to the hospital.  Talked at length with the patient and family.  I am not certain of the all of the etiology of his symptoms.  We will also check an ammonia level on him [MM]   1532 I did discuss the case with Dr Jesus who agrees to admit the patient to the hospital.  I informed her of the patient's presenting symptoms and results of the test..  All questions answered at this time [MM]      ED Course User Index  [MM] Aric Mccauley MD       AS OF 16:27 EDT VITALS:    BP - 174/75  HR - 62  TEMP - 98.9 °F (37.2 °C) (Tympanic)  02 SATS - 95%    SOCIAL DETERMINANTS OF HEALTH THAT IMPACT OR LIMIT CARE (For example..Homelessness,safe discharge, inability to obtain care, follow up, or prescriptions):      DIAGNOSIS  Final diagnoses:   Altered mental status, unspecified altered mental status type   Left lower quadrant abdominal pain   Atherosclerosis   Occlusion of superior mesenteric artery   Hypokalemia   Hyponatremia         DISPOSITION  I have reviewed the test results with my patient and explained the current treatment plan.  I answered all of the patient's questions.  The patient will be admitted to monitor bed at this time.  The patient is not hypotensive and is tolerating their current disease condition well enough for a monitored bed at this time.  The patient's current condition does not require intensive care treatment at this time.            DICTATED UTILIZING DRAGON DICTATION    Note Disclaimer: At [facility], we believe that sharing information builds trust and better relationships. You are receiving this note because you recently visited [facility]. It is possible you will see health information before a provider has talked with you about it. This kind of information can be easy to misunderstand. To help you fully understand what it means for your health, we urge you to  discuss this note with your provider.       Aric Mccauley MD  09/21/23 5589

## 2023-09-22 ENCOUNTER — APPOINTMENT (OUTPATIENT)
Dept: MRI IMAGING | Facility: HOSPITAL | Age: 73
DRG: 065 | End: 2023-09-22
Payer: MEDICARE

## 2023-09-22 VITALS
SYSTOLIC BLOOD PRESSURE: 169 MMHG | HEART RATE: 56 BPM | OXYGEN SATURATION: 97 % | WEIGHT: 204.37 LBS | TEMPERATURE: 97.4 F | RESPIRATION RATE: 18 BRPM | HEIGHT: 71 IN | DIASTOLIC BLOOD PRESSURE: 52 MMHG | BODY MASS INDEX: 28.61 KG/M2

## 2023-09-22 LAB
ANION GAP SERPL CALCULATED.3IONS-SCNC: 12 MMOL/L (ref 5–15)
BUN SERPL-MCNC: 8 MG/DL (ref 8–23)
BUN/CREAT SERPL: 10.4 (ref 7–25)
CALCIUM SPEC-SCNC: 8.2 MG/DL (ref 8.6–10.5)
CHLORIDE SERPL-SCNC: 102 MMOL/L (ref 98–107)
CO2 SERPL-SCNC: 20 MMOL/L (ref 22–29)
CREAT SERPL-MCNC: 0.77 MG/DL (ref 0.76–1.27)
DEPRECATED RDW RBC AUTO: 45 FL (ref 37–54)
EGFRCR SERPLBLD CKD-EPI 2021: 94.5 ML/MIN/1.73
ERYTHROCYTE [DISTWIDTH] IN BLOOD BY AUTOMATED COUNT: 12 % (ref 12.3–15.4)
GLUCOSE SERPL-MCNC: 91 MG/DL (ref 65–99)
HCT VFR BLD AUTO: 34.9 % (ref 37.5–51)
HGB BLD-MCNC: 11.7 G/DL (ref 13–17.7)
MCH RBC QN AUTO: 34.4 PG (ref 26.6–33)
MCHC RBC AUTO-ENTMCNC: 33.5 G/DL (ref 31.5–35.7)
MCV RBC AUTO: 102.6 FL (ref 79–97)
PLATELET # BLD AUTO: 183 10*3/MM3 (ref 140–450)
PMV BLD AUTO: 10.4 FL (ref 6–12)
POTASSIUM SERPL-SCNC: 4 MMOL/L (ref 3.5–5.2)
RBC # BLD AUTO: 3.4 10*6/MM3 (ref 4.14–5.8)
SODIUM SERPL-SCNC: 134 MMOL/L (ref 136–145)
WBC NRBC COR # BLD: 6.39 10*3/MM3 (ref 3.4–10.8)

## 2023-09-22 PROCEDURE — 97165 OT EVAL LOW COMPLEX 30 MIN: CPT

## 2023-09-22 PROCEDURE — 70551 MRI BRAIN STEM W/O DYE: CPT

## 2023-09-22 PROCEDURE — 97535 SELF CARE MNGMENT TRAINING: CPT

## 2023-09-22 PROCEDURE — 80048 BASIC METABOLIC PNL TOTAL CA: CPT | Performed by: INTERNAL MEDICINE

## 2023-09-22 PROCEDURE — 85027 COMPLETE CBC AUTOMATED: CPT | Performed by: INTERNAL MEDICINE

## 2023-09-22 PROCEDURE — 25010000002 SODIUM CHLORIDE 0.9 % WITH KCL 20 MEQ 20-0.9 MEQ/L-% SOLUTION: Performed by: INTERNAL MEDICINE

## 2023-09-22 RX ORDER — CLOPIDOGREL BISULFATE 75 MG/1
75 TABLET ORAL DAILY
Status: DISCONTINUED | OUTPATIENT
Start: 2023-09-22 | End: 2023-09-22 | Stop reason: HOSPADM

## 2023-09-22 RX ORDER — CLOPIDOGREL BISULFATE 75 MG/1
75 TABLET ORAL NIGHTLY
Status: DISCONTINUED | OUTPATIENT
Start: 2023-09-22 | End: 2023-09-22

## 2023-09-22 RX ORDER — ATORVASTATIN CALCIUM 20 MG/1
40 TABLET, FILM COATED ORAL NIGHTLY
Status: DISCONTINUED | OUTPATIENT
Start: 2023-09-22 | End: 2023-09-22 | Stop reason: HOSPADM

## 2023-09-22 RX ADMIN — ATENOLOL 50 MG: 50 TABLET ORAL at 01:07

## 2023-09-22 RX ADMIN — RANOLAZINE 1000 MG: 500 TABLET, FILM COATED, EXTENDED RELEASE ORAL at 12:18

## 2023-09-22 RX ADMIN — PANTOPRAZOLE SODIUM 40 MG: 40 TABLET, DELAYED RELEASE ORAL at 01:08

## 2023-09-22 RX ADMIN — Medication 250 MG: at 09:00

## 2023-09-22 RX ADMIN — RANOLAZINE 1000 MG: 500 TABLET, FILM COATED, EXTENDED RELEASE ORAL at 01:07

## 2023-09-22 RX ADMIN — ASPIRIN 81 MG: 81 TABLET, COATED ORAL at 08:59

## 2023-09-22 RX ADMIN — PANTOPRAZOLE SODIUM 40 MG: 40 TABLET, DELAYED RELEASE ORAL at 09:00

## 2023-09-22 RX ADMIN — Medication 250 MG: at 01:07

## 2023-09-22 RX ADMIN — CLOPIDOGREL BISULFATE 75 MG: 75 TABLET, FILM COATED ORAL at 09:00

## 2023-09-22 RX ADMIN — ISOSORBIDE MONONITRATE 60 MG: 60 TABLET, EXTENDED RELEASE ORAL at 01:06

## 2023-09-22 RX ADMIN — ATENOLOL 50 MG: 50 TABLET ORAL at 09:00

## 2023-09-22 RX ADMIN — POTASSIUM CHLORIDE AND SODIUM CHLORIDE 100 ML/HR: 900; 150 INJECTION, SOLUTION INTRAVENOUS at 06:09

## 2023-09-22 RX ADMIN — ISOSORBIDE MONONITRATE 60 MG: 60 TABLET, EXTENDED RELEASE ORAL at 08:59

## 2023-09-22 NOTE — PLAN OF CARE
Goal Outcome Evaluation:  Plan of Care Reviewed With: patient        Progress: no change  Pt been alert and oriented x4.  Pt been up to the bathroom with walker with stand by assist. Pt denies any pain or discomfort.

## 2023-09-22 NOTE — CASE MANAGEMENT/SOCIAL WORK
Discharge Planning Assessment  [facility]     Patient Name: Slade Martinez  MRN: [id number]  Today's Date: 9/22/2023    Admit Date: 9/21/2023    Plan: Home no additional dc orders   Discharge Needs Assessment       Row Name 09/22/23 1624       Living Environment    People in Home spouse    Current Living Arrangements home    Potentially Unsafe Housing Conditions none    Primary Care Provided by self;spouse/significant other    Provides Primary Care For no one, unable/limited ability to care for self    Family Caregiver if Needed spouse    Quality of Family Relationships helpful;involved    Able to Return to Prior Arrangements yes       Resource/Environmental Concerns    Resource/Environmental Concerns none    Transportation Concerns none       Transition Planning    Patient/Family Anticipates Transition to home with family    Patient/Family Anticipated Services at Transition none    Transportation Anticipated family or friend will provide       Discharge Needs Assessment    Readmission Within the Last 30 Days no previous admission in last 30 days    Equipment Currently Used at Home none    Concerns to be Addressed denies needs/concerns at this time;no discharge needs identified;discharge planning    Anticipated Changes Related to Illness none    Equipment Needed After Discharge none                   Discharge Plan       Row Name 09/22/23 1611       Plan    Plan Home no additional dc orders    Roadmap to Recovery Yes    Patient/Family in Agreement with Plan yes    Provided Post Acute Provider List? N/A    Plan Comments CCP spoke with pt and wife Tricia [phone] at bedside, introduced self and explained CCP role. Verified facesheet and confirmed local pharmacy is [facility] [place]. Pt denies problems with medication costs or management. Pt denies advance directive. PCP is Dr. Triny Hannon. Pt lives at home with wife, in a home with no steps to enter the home. Prior to admission pt is IADL with mobility. Pt has a  walking stick and foldable cane if needed but rarely uses. Pt has been to [facility] in the past and used outpt PT. CCP discussed dc planning and pt reports plan is home with wife. Pt much improved from admission and they deny any dc needs. Juan Diego SINGLETON/CCP                  Continued Care and Services - Admitted Since 9/21/2023    Coordination has not been started for this encounter.       Expected Discharge Date and Time       Expected Discharge Date Expected Discharge Time    Sep 22, 2023            Demographic Summary       Row Name 09/22/23 1623       General Information    Admission Type inpatient    Referral Source admission list    Reason for Consult discharge planning    Preferred Language English       Contact Information    Permission Granted to Share Info With facility ;family/designee                   Functional Status       Row Name 09/22/23 1624       Functional Status    Usual Activity Tolerance good    Current Activity Tolerance moderate       Functional Status, IADL    Medications independent    Meal Preparation independent    Housekeeping independent    Laundry independent    Shopping independent       Mental Status    General Appearance WDL WDL       Mental Status Summary    Recent Changes in Mental Status/Cognitive Functioning no changes       Employment/    Employment Status retired                   Psychosocial    No documentation.                  Abuse/Neglect    No documentation.                  Legal    No documentation.                  Substance Abuse    No documentation.                  Patient Forms    No documentation.                     Anastasia Fernandez RN

## 2023-09-22 NOTE — CONSULTS
Visited with patient and patient's spouse. Discussed spiritual background and opportunities for prayer support. Received thanks for visit

## 2023-09-22 NOTE — THERAPY EVALUATION
Patient Name: Slade Martinez  : 1950    MRN: [id number]                              Today's Date: 2023       Admit Date: 2023    Visit Dx:     ICD-10-CM ICD-9-CM   1. Altered mental status, unspecified altered mental status type  R41.82 780.97   2. Left lower quadrant abdominal pain  R10.32 789.04   3. Atherosclerosis  I70.90 440.9   4. Occlusion of superior mesenteric artery  K55.069 557.0   5. Hypokalemia  E87.6 276.8   6. Hyponatremia  E87.1 276.1     Patient Active Problem List   Diagnosis    Dyslipidemia    Essential hypertension    Coronary atherosclerosis    S/P CABG (coronary artery bypass graft)    Peripheral artery disease    History of left-sided carotid endarterectomy    Fever of unknown origin    Closed traumatic nondisplaced fracture of two ribs of right side with routine healing    Altered mental status    Abdominal pain    Chronic mesenteric ischemia     Past Medical History:   Diagnosis Date    Coronary atherosclerosis 2019    H/O CABG SVG to first diagonal branch and to the LAD as well as SVG to the lateral marginal branch of the circumflex complicated by right sided subcortical infarct with left sided hemiparesis    Dyslipidemia 2019    Essential hypertension 2019    Injury of back     fractured vertebra 23     Past Surgical History:   Procedure Laterality Date    CAROTID ENDARTERECTOMY      Left    CHOLECYSTECTOMY      CORONARY ARTERY BYPASS GRAFT      CORONARY STENT PLACEMENT      X17 stents      General Information       Row Name 23 1405          OT Time and Intention    Document Type evaluation  -JW     Mode of Treatment occupational therapy  -JW       Row Name 23 1405          General Information    Patient Profile Reviewed yes  -JW     Prior Level of Function independent:;ADL's;all household mobility  w/o AD  -JW     Existing Precautions/Restrictions no known precautions/restrictions  -JW     Barriers to Rehab none identified  -JW        Row Name 09/22/23 1405          Living Environment    People in Home spouse  -       Row Name 09/22/23 1405          Cognition    Orientation Status (Cognition) oriented x 4  -               User Key  (r) = Recorded By, (t) = Taken By, (c) = Cosigned By      Initials Name Provider Type    Genny Doll OT Occupational Therapist                     Mobility/ADL's       Row Name 09/22/23 1406          Bed Mobility    Bed Mobility supine-sit;sit-supine  -     Supine-Sit [place] (Bed Mobility) independent  -     Sit-Supine [place] (Bed Mobility) independent  -       Row Name 09/22/23 1406          Transfers    Transfers toilet transfer;sit-stand transfer  -[facility] Name 09/22/23 1406          Sit-Stand Transfer    Sit-Stand [place] (Transfers) supervision  -JW       Row Name 09/22/23 1406          Toilet Transfer    Type (Toilet Transfer) sit-stand;stand-sit  -     [place] Level (Toilet Transfer) supervision  -     Assistive Device (Toilet Transfer) commode;grab bars/safety frame  -JW       Row Name 09/22/23 1406          Functional Mobility    Functional Mobility- Ind. Level supervision required  -     Functional Mobility- Comment fxl ambulation around the room w/o AD, no LOB  -[facility] Name 09/22/23 1406          Activities of Daily Living    BADL Assessment/Intervention grooming;lower body dressing  -JW       Row Name 09/22/23 1406          Grooming Assessment/Training    [place] Level (Grooming) oral care regimen;wash face, hands;shave face;independent  -     Position (Grooming) sink side;unsupported standing  -JW       Row Name 09/22/23 1406          Lower Body Dressing Assessment/Training    [place] Level (Lower Body Dressing) don;doff;socks;independent  -     Position (Lower Body Dressing) edge of bed sitting  -               User Key  (r) = Recorded By, (t) = Taken By, (c) = Cosigned By      Initials Name Provider Type    Genny Doll OT  Occupational Therapist                   Obj/Interventions       Row Name 09/22/23 1407          Sensory Assessment (Somatosensory)    Sensory Assessment (Somatosensory) sensation intact  -JW       Row Name 09/22/23 1407          Vision Assessment/Intervention    Visual Impairment/Limitations WNL  -JW       Row Name 09/22/23 1407          Range of Motion Comprehensive    General Range of Motion no range of motion deficits identified  -JW       Row Name 09/22/23 1407          Strength Comprehensive (MMT)    General Manual Muscle Testing (MMT) Assessment no strength deficits identified  -JW       Row Name 09/22/23 1407          Balance    Balance Assessment sitting static balance;sitting dynamic balance;standing static balance;standing dynamic balance  -     Static Sitting Balance independent  -     Dynamic Sitting Balance independent  -JW     Position, Sitting Balance sitting edge of bed  -     Static Standing Balance independent  -     Dynamic Standing Balance supervision  -     Balance Interventions occupation based/functional task  -               User Key  (r) = Recorded By, (t) = Taken By, (c) = Cosigned By      Initials Name Provider Type    JW Genny Vidal OT Occupational Therapist                   Goals/Plan       Row Name 09/22/23 1411          Transfer Goal 1 (OT)    Activity/Assistive Device (Transfer Goal 1, OT) transfers, all  -     [place] Level/Cues Needed (Transfer Goal 1, OT) independent  -     Time Frame (Transfer Goal 1, OT) short term goal (STG);2 weeks  -     Progress/Outcome (Transfer Goal 1, OT) goal met  -               User Key  (r) = Recorded By, (t) = Taken By, (c) = Cosigned By      Initials Name Provider Type    JW Genny Vidal OT Occupational Therapist                   Clinical Impression       Row Name 09/22/23 1407          Pain Assessment    Pretreatment Pain Rating 0/10 - no pain  -     Posttreatment Pain Rating 0/10 - no pain  -JW       Row Name 09/22/23  1407          Plan of Care Review    Plan of Care Reviewed With patient  -     Outcome Evaluation Pt is a 73 y/o M admitted AMS, abd pain, N/V, weakness. 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 Pt lives with his wife and is indep with ADLs and fxl mobility w/o AD. Pt working part time. Pt presents today back to baseline fxn. He performs fxl ambulation around the room and into the bathroom w/o AD. Completes toileting TF (I) and grooming at the sink w/o assist. No acute OT needs identified at this time, will sign off  -[facility] Name 09/22/23 1407          Therapy Assessment/Plan (OT)    Criteria for Skilled Therapeutic Interventions Met (OT) no problems identified which require skilled intervention  -     Therapy Frequency (OT) evaluation only  -       Row Name 09/22/23 1407          Therapy Plan Review/Discharge Plan (OT)    Anticipated Discharge Disposition (OT) home  -[facility] Name 09/22/23 1407          Positioning and Restraints    Pre-Treatment Position in bed  -     Post Treatment Position bed  -JW     In Bed fowlers;call light within reach;encouraged to call for assist;with family/caregiver  bed alarm not on upon OT entry  -               User Key  (r) = Recorded By, (t) = Taken By, (c) = Cosigned By      Initials Name Provider Type    Genny Doll OT Occupational Therapist                   Outcome Measures       Row Name 09/22/23 1411          How much help from another is currently needed...    Putting on and taking off regular lower body clothing? 4  -JW     Bathing (including washing, rinsing, and drying) 4  -JW     Toileting (which includes using toilet bed pan or urinal) 4  -JW     Putting on and taking off regular upper body clothing 4  -JW     Taking care of personal grooming (such as brushing teeth) 4  -JW     Eating meals 4  -JW     AM-PAC 6 Clicks Score (OT) 24  -       Row Name 09/22/23 1411          Functional Assessment    Outcome Measure Options AM-PAC 6 Clicks Daily  Activity (OT)  -               User Key  (r) = Recorded By, (t) = Taken By, (c) = Cosigned By      Initials Name Provider Type     Genny Vidal OT Occupational Therapist                    Occupational Therapy Education       Title: PT OT SLP Therapies (Done)       Topic: Occupational Therapy (Done)       Point: ADL training (Done)       Description:   Instruct learner(s) on proper safety adaptation and remediation techniques during self care or transfers.   Instruct in proper use of assistive devices.                  Learning Progress Summary             Patient Acceptance, E, VU by  at 9/22/2023 1411                         Point: Home exercise program (Done)       Description:   Instruct learner(s) on appropriate technique for monitoring, assisting and/or progressing therapeutic exercises/activities.                  Learning Progress Summary             Patient Acceptance, E, VU by  at 9/22/2023 1411                         Point: Precautions (Done)       Description:   Instruct learner(s) on prescribed precautions during self-care and functional transfers.                  Learning Progress Summary             Patient Acceptance, E, VU by  at 9/22/2023 1411                         Point: Body mechanics (Done)       Description:   Instruct learner(s) on proper positioning and spine alignment during self-care, functional mobility activities and/or exercises.                  Learning Progress Summary             Patient Acceptance, E, VU by  at 9/22/2023 1411                                         User Key       Initials Effective Dates Name Provider Type [facility] 06/10/21 -  Genny Vidal OT Occupational Therapist OT                  OT Recommendation and Plan  Therapy Frequency (OT): evaluation only  Plan of Care Review  Plan of Care Reviewed With: patient  Outcome Evaluation: Pt is a 73 y/o M admitted AMS, abd pain, N/V, weakness. 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 Pt lives with his  wife and is indep with ADLs and fxl mobility w/o AD. Pt working part time. Pt presents today back to baseline fxn. He performs fxl ambulation around the room and into the bathroom w/o AD. Completes toileting TF (I) and grooming at the sink w/o assist. No acute OT needs identified at this time, will sign off     Time Calculation:   Evaluation Complexity (OT)  Review Occupational Profile/Medical/Therapy History Complexity: brief/low complexity  Assessment, Occupational Performance/Identification of Deficit Complexity: 1-3 performance deficits  Clinical Decision Making Complexity (OT): problem focused assessment/low complexity  Overall Complexity of Evaluation (OT): low complexity     Time Calculation- OT       Row Name 09/22/23 1411             Time Calculation- OT    OT Start Time 0923  -      OT Stop Time 0600  -      OT Time Calculation (min) 1237 min  -      OT Received On 09/22/23  -         Timed Charges    75342 - OT Self Care/Mgmt Minutes 8  -JW         Untimed Charges    OT Eval/Re-eval Minutes 5  -JW         Total Minutes    Timed Charges Total Minutes 8  -JW      Untimed Charges Total Minutes 5  -JW       Total Minutes 13  -JW                User Key  (r) = Recorded By, (t) = Taken By, (c) = Cosigned By      Initials Name Provider Type     Genny Vidal OT Occupational Therapist                  Therapy Charges for Today       Code Description Service Date Service Provider Modifiers Qty    [id number]  OT SELF CARE/MGMT/TRAIN EA 15 MIN 9/22/2023 Genny Vidal OT GO 1    [id number] HC OT EVAL LOW COMPLEXITY 3 9/22/2023 Genny Vidal OT GO 1                 Genny Vidal OT  9/22/2023

## 2023-09-22 NOTE — DISCHARGE SUMMARY
PHYSICIAN DISCHARGE SUMMARY                                                                        [facility]    Patient Identification:  Name: Slade Martinez  Age: 72 y.o.  Sex: male  :  1950  MRN: [id number]  Primary Care Physician: Triny Hannon MD    Admit date: 2023  Discharge date and time:2023  Discharged Condition: good    Discharge Diagnoses:  Active Hospital Problems    Diagnosis  POA    **Altered mental status [R41.82]  Yes    Abdominal pain [R10.9]  Yes    Chronic mesenteric ischemia [K55.1]  Yes    S/P CABG (coronary artery bypass graft) [Z95.1]  Not Applicable    Dyslipidemia [E78.5]  Yes    Essential hypertension [I10]  Yes      Resolved Hospital Problems   No resolved problems to display.          PMHX:   Past Medical History:   Diagnosis Date    Coronary atherosclerosis 2019    H/O CABG SVG to first diagonal branch and to the LAD as well as SVG to the lateral marginal branch of the circumflex complicated by right sided subcortical infarct with left sided hemiparesis    Dyslipidemia 2019    Essential hypertension 2019    Injury of back     fractured vertebra 23     PSHX:   Past Surgical History:   Procedure Laterality Date    CAROTID ENDARTERECTOMY      Left    CHOLECYSTECTOMY      CORONARY ARTERY BYPASS GRAFT      CORONARY STENT PLACEMENT      X17 stents       Hospital Course: Slade Martinez  is a  72 year old gentleman who presented to the emergency room with confusion and abdominal pain which started four days ago; he has some nausea and vomiting two days ago; no fever or chills; the patient is not able to give the history but the wife is present and answering questions; he has been getting weaker; the wife thought he might have diverticulitis and she gave him bentyl but he did not improve; he has had poor po intake; he has not bloody stool; while his wife was  driving to the hospital the patient was talking to people he thought were in the backseat who were not present; he has a past history of stroke; no change in speech or vision; no difficulty swallowing; no focal numbness weakness or tingling.  The patient was admitted to the hospital and seen by vascular surgery.  He was feeling better after being in the hospital for a day or so.  He was able to tolerate diet and vascular surgery did not feel any intervention was needed at this time.  He has a follow-up appoint with Dr. Alcala.  He will follow-up with his primary care physician 1 week for ongoing care.    Consults:     Consults       Date and Time Order Name Status Description    9/21/2023  2:31 PM Vascular Surgery Consult Completed           Results from last 7 days   Lab Units 09/22/23  0459   WBC 10*3/mm3 6.39   HEMOGLOBIN g/dL 11.7*   HEMATOCRIT % 34.9*   PLATELETS 10*3/mm3 183     Results from last 7 days   Lab Units 09/22/23  0459   SODIUM mmol/L 134*   POTASSIUM mmol/L 4.0   CHLORIDE mmol/L 102   CO2 mmol/L 20.0*   BUN mg/dL 8   CREATININE mg/dL 0.77   GLUCOSE mg/dL 91   CALCIUM mg/dL 8.2*     Significant Diagnostic Studies:   WBC   Date Value Ref Range Status   09/22/2023 6.39 3.40 - 10.80 10*3/mm3 Final     Hemoglobin   Date Value Ref Range Status   09/22/2023 11.7 (L) 13.0 - 17.7 g/dL Final     Hematocrit   Date Value Ref Range Status   09/22/2023 34.9 (L) 37.5 - 51.0 % Final     Platelets   Date Value Ref Range Status   09/22/2023 183 140 - 450 10*3/mm3 Final     Sodium   Date Value Ref Range Status   09/22/2023 134 (L) 136 - 145 mmol/L Final     Potassium   Date Value Ref Range Status   09/22/2023 4.0 3.5 - 5.2 mmol/L Final     Comment:     Slight hemolysis detected by analyzer. Results may be affected.     Chloride   Date Value Ref Range Status   09/22/2023 102 98 - 107 mmol/L Final     CO2   Date Value Ref Range Status   09/22/2023 20.0 (L) 22.0 - 29.0 mmol/L Final     BUN   Date Value Ref Range Status    09/22/2023 8 8 - 23 mg/dL Final     Creatinine   Date Value Ref Range Status   09/22/2023 0.77 0.76 - 1.27 mg/dL Final     Glucose   Date Value Ref Range Status   09/22/2023 91 65 - 99 mg/dL Final     Calcium   Date Value Ref Range Status   09/22/2023 8.2 (L) 8.6 - 10.5 mg/dL Final     Magnesium   Date Value Ref Range Status   09/21/2023 1.7 1.6 - 2.4 mg/dL Final     AST (SGOT)   Date Value Ref Range Status   09/21/2023 14 1 - 40 U/L Final     ALT (SGPT)   Date Value Ref Range Status   09/21/2023 8 1 - 41 U/L Final     Alkaline Phosphatase   Date Value Ref Range Status   09/21/2023 70 39 - 117 U/L Final     INR   Date Value Ref Range Status   09/21/2023 1.21 (H) 0.90 - 1.10 Final     Color, UA   Date Value Ref Range Status   09/21/2023 Yellow Yellow, Straw Final     Appearance, UA   Date Value Ref Range Status   09/21/2023 Clear Clear Final     pH, UA   Date Value Ref Range Status   09/21/2023 6.5 5.0 - 8.0 Final     Glucose, UA   Date Value Ref Range Status   09/21/2023 Negative Negative Final     Ketones, UA   Date Value Ref Range Status   09/21/2023 Negative Negative Final     Blood, UA   Date Value Ref Range Status   09/21/2023 Negative Negative Final     Leuk Esterase, UA   Date Value Ref Range Status   09/21/2023 Negative Negative Final     Bilirubin, UA   Date Value Ref Range Status   09/21/2023 Negative Negative Final     Urobilinogen, UA   Date Value Ref Range Status   09/21/2023 1.0 E.U./dL 0.2 - 1.0 E.U./dL Final     HS Troponin T   Date Value Ref Range Status   09/21/2023 11 <15 ng/L Final   09/21/2023 13 <15 ng/L Final     No components found for: HGBA1C;2  No components found for: TSH;2  Imaging Results (All)       Procedure Component Value Units Date/Time    MRI Brain Without Contrast [[id number]] Collected: 09/22/23 1207     Updated: 09/22/23 1307    Narrative:      MRI OF THE BRAIN WITHOUT CONTRAST     CLINICAL HISTORY: Delirium.     TECHNIQUE: MRI of the brain was obtained with sagittal T1, axial  T1,  axial FLAIR, axial T2, axial diffusion, and susceptibility weighted  images.     COMPARISON: CT head dated 09/21/2023.  FINDINGS:     There is a focus of encephalomalacia within the left parietal lobe that  is compatible with a chronic infarct within the left MCA distribution.  This area of encephalomalacia measures up to approximately 4.8 x 2.7 cm  in greatest axial dimensions. Additional smaller foci of  encephalomalacia are noted within the right precentral gyrus, compatible  with chronic infarcts within the right MCA distribution. These measure  up to approximately 4 mm in maximal diameter. Small foci of hemosiderin  deposition are appreciated at the sites of these chronic infarcts. There  are mild-to-moderate changes of chronic small vessel ischemic  phenomenon.     There are no abnormal foci of restricted diffusion. The ventricles,  sulci, and cisterns are age-appropriate. The major intracranial flow  related signal voids are within normal limits.     Fluid signal intensity is incidentally noted within the right mastoid  air cells statistically likely representative of an incidental mastoid  effusion. Mild mucosal thickening is seen within the ethmoids.       Impression:         There is no evidence for acute intracranial pathology.     There are findings compatible with chronic infarcts within the left  superior parietal lobule and right precentral gyrus within both MCA  distributions. Mild-to-moderate changes of chronic small vessel ischemic  phenomena are noted.              This report was finalized on 9/22/2023 1:04 PM by Dr. Beltran Tripathi M.D.       CT Head Without Contrast [[id number]] Collected: 09/21/23 1332     Updated: 09/21/23 1348    Narrative:      CT HEAD WITHOUT CONTRAST     CLINICAL HISTORY: Confused. Progressive weakness.     TECHNIQUE: CT scan of the head was obtained with 3 mm axial soft tissue  algorithm images. No intravenous contrast was administered. Sagittal and  coronal  reconstructions were obtained.     COMPARISON: No previous similar studies are available for comparison.     FINDINGS:       There is a focus of encephalomalacia within the left parietal lobe that  is compatible with a chronic infarct within the left MCA distribution  and measures up to approximately 4.8 x 1.7 cm in greatest axial  dimensions. There are moderate changes of chronic small vessel ischemic  phenomenon. The basal ganglia and thalami are unremarkable. The  posterior fossa structures are within normal limits. Atherosclerotic  calcifications are incidentally appreciated within the intracranial  vasculature.     Incidental note is made of mild mucosal thickening within the ethmoid  air cells and under pneumatization of the right mastoid air cells.       Impression:         There is a focus of encephalomalacia within the left parietal lobe that  is compatible with a chronic infarct within the left MCA distribution  and this measures up to 4.8 x 1.7 cm in greatest axial dimensions.  Moderate changes of chronic small vessel ischemic phenomena are noted.  There is no CT evidence to suggest acute intracranial pathology. The  etiology of the patient's confusion and progressive weakness is not  further elucidated on this examination; and if further assessment is  required, one could obtain an MRI of the brain for follow-up.        Radiation dose reduction techniques were utilized, including automated  exposure control and exposure modulation based on body size.     This report was finalized on 9/21/2023 1:45 PM by Dr. Beltran Tripathi M.D.       CT Abdomen Pelvis With Contrast [[id number]] Collected: 09/21/23 1259     Updated: 09/21/23 1304    Narrative:      EXAMINATION: CT OF THE ABDOMEN AND PELVIS WITH CONTRAST     TECHNIQUE: Computed tomography of the abdomen and pelvis after the  uneventful administration of nonionic intravenous contrast per protocol.  Radiation dose reduction techniques were utilized, including  automated  exposure control and exposure modulation based on body size.     HISTORY: Left-sided abdominal pain     COMPARISON: 3/4/2023     FINDINGS: Limited evaluation of the inferior thorax demonstrates  atelectasis, without consolidation, pleural effusion, or pneumothorax.  The heart is normal in size and configuration, without pericardial  effusion. Coronary calcifications are seen.     The gallbladder is absent. Colonic diverticula are seen, without  evidence of acute diverticulitis.     The liver, spleen, adrenal glands, kidneys, pancreas, stomach, small  bowel, appendix, large bowel, urinary bladder are normal. There is near  occlusion of the left femoral artery with areas of severe  atherosclerotic seen elsewhere. No intraperitoneal fluid collection or  free gas are seen. No enlarged lymph nodes are seen. 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     Bone windows demonstrate degenerative changes, without suspicious  osseous lesion seen.L3 compression deformity has progressed       Impression:      1. 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 Progression of L3 compression deformity     3. Incidental findings as above.     This report was finalized on 9/21/2023 1:01 PM by Dr. Jesus Flores M.D.             Lab Results (last 7 days)       Procedure Component Value Units Date/Time    Blood Culture - Blood, Arm, Left [[id number]]  (Normal) Collected: 09/21/23 1214    Specimen: Blood from Arm, Left Updated: 09/22/23 1230     Blood Culture No growth at 24 hours    Blood Culture - Blood, Arm, Left [[id number]]  (Normal) Collected: 09/21/23 1137    Specimen: Blood from Arm, Left Updated: 09/22/23 1145     Blood Culture No growth at 24 hours    Basic Metabolic Panel [[id number]]  (Abnormal) Collected: 09/22/23 0459    Specimen: Blood Updated: 09/22/23 0543     Glucose 91 mg/dL      BUN 8 mg/dL      Creatinine 0.77 mg/dL      Sodium 134 mmol/L      Potassium 4.0 mmol/L      Comment: Slight hemolysis detected by  analyzer. Results may be affected.        Chloride 102 mmol/L      CO2 20.0 mmol/L      Calcium 8.2 mg/dL      BUN/Creatinine Ratio 10.4     Anion Gap 12.0 mmol/L      eGFR 94.5 mL/min/1.73     Narrative:      GFR Normal >60  Chronic Kidney Disease <60  Kidney Failure <15    The GFR formula is only valid for adults with stable renal function between ages 18 and 70.    CBC (No Diff) [[id number]]  (Abnormal) Collected: 09/22/23 0459    Specimen: Blood Updated: 09/22/23 0523     WBC 6.39 10*3/mm3      RBC 3.40 10*6/mm3      Hemoglobin 11.7 g/dL      Hematocrit 34.9 %      .6 fL      MCH 34.4 pg      MCHC 33.5 g/dL      RDW 12.0 %      RDW-SD 45.0 fl      MPV 10.4 fL      Platelets 183 10*3/mm3     Ammonia [[id number]]  (Abnormal) Collected: 09/21/23 1530    Specimen: Blood Updated: 09/21/23 1559     Ammonia 13 umol/L     High Sensitivity Troponin T 2Hr [[id number]]  (Normal) Collected: 09/21/23 1530    Specimen: Blood Updated: 09/21/23 1559     HS Troponin T 11 ng/L      Troponin T Delta -2 ng/L     Narrative:      High Sensitive Troponin T Reference Range:  <10.0 ng/L- Negative Female for AMI  <15.0 ng/L- Negative Male for AMI  >=10 - Abnormal Female indicating possible myocardial injury.  >=15 - Abnormal Male indicating possible myocardial injury.   Clinicians would have to utilize clinical acumen, EKG, Troponin, and serial changes to determine if it is an Acute Myocardial Infarction or myocardial injury due to an underlying chronic condition.         Urine Drug Screen - Urine, Clean Catch [[id number]]  (Normal) Collected: 09/21/23 1420    Specimen: Urine, Clean Catch Updated: 09/21/23 1542     Amphet/Methamphet, Screen Negative     Barbiturates Screen, Urine Negative     Benzodiazepine Screen, Urine Negative     Cocaine Screen, Urine Negative     Opiate Screen Negative     THC, Screen, Urine Negative     Methadone Screen, Urine Negative     Oxycodone Screen, Urine Negative     Fentanyl, Urine Negative     Narrative:      Negative Thresholds Per Drugs Screened:    Amphetamines                 500 ng/ml  Barbiturates                 200 ng/ml  Benzodiazepines              100 ng/ml  Cocaine                      300 ng/ml  Methadone                    300 ng/ml  Opiates                      300 ng/ml  Oxycodone                    100 ng/ml  THC                           50 ng/ml  Fentanyl                       5 ng/ml      The Normal Value for all drugs tested is negative. This report includes final unconfirmed screening results to be used for medical treatment purposes only. Unconfirmed results must not be used for non-medical purposes such as employment or legal testing. Clinical consideration should be applied to any drug of abuse test, particularly when unconfirmed results are used.            Urinalysis With Microscopic If Indicated (No Culture) - Urine, Clean Catch [[id number]]  (Normal) Collected: 09/21/23 1420    Specimen: Urine, Clean Catch Updated: 09/21/23 1514     Color, UA Yellow     Appearance, UA Clear     pH, UA 6.5     Specific Gravity, UA 1.015     Glucose, UA Negative     Ketones, UA Negative     Bilirubin, UA Negative     Blood, UA Negative     Protein, UA Negative     Leuk Esterase, UA Negative     Nitrite, UA Negative     Urobilinogen, UA 1.0 E.U./dL    Narrative:      Urine microscopic not indicated.    Lactic Acid, Plasma [[id number]]  (Normal) Collected: 09/21/23 1214    Specimen: Blood Updated: 09/21/23 1240     Lactate 1.3 mmol/L     Blood Gas, Venous - [[id number]]  (Abnormal) Collected: 09/21/23 1215    Specimen: Venous Blood Updated: 09/21/23 1218     pH, Venous 7.419 pH Units      pCO2, Venous 39.6 mm Hg      pO2, Venous 23.4 mm Hg      HCO3, Venous 25.6 mmol/L      Base Excess, Venous 1.1 mmol/L      Comment: Serial Number: [id number]:  [id number]        O2 Saturation, Venous 42.3 %      CO2 Content 26.8 mmol/L      Barometric Pressure for Blood Gas 754.2000 mmHg      Modality Room Air      "Rate 18 Breaths/minute      Device Comment [id number]    Procalcitonin [[id number]]  (Normal) Collected: 09/21/23 1052    Specimen: Blood Updated: 09/21/23 1208     Procalcitonin 0.22 ng/mL     Narrative:      As a Marker for Sepsis (Non-Neonates):    1. <0.5 ng/mL represents a low risk of severe sepsis and/or septic shock.  2. >2 ng/mL represents a high risk of severe sepsis and/or septic shock.    As a Marker for Lower Respiratory Tract Infections that require antibiotic therapy:    PCT on Admission    Antibiotic Therapy       6-12 Hrs later    >0.5                Strongly Recommended  >0.25 - <0.5        Recommended   0.1 - 0.25          Discouraged              Remeasure/reassess PCT  <0.1                Strongly Discouraged     Remeasure/reassess PCT    As 28 day mortality risk marker: \"Change in Procalcitonin Result\" (>80% or <=80%) if Day 0 (or Day 1) and Day 4 values are available. Refer to http://www.[employer]-pct-calculator.com    Change in PCT <=80%  A decrease of PCT levels below or equal to 80% defines a positive change in PCT test result representing a higher risk for 28-day all-cause mortality of patients diagnosed with severe sepsis for septic shock.    Change in PCT >80%  A decrease of PCT levels of more than 80% defines a negative change in PCT result representing a lower risk for 28-day all-cause mortality of patients diagnosed with severe sepsis or septic shock.       Ethanol [[id number]] Collected: 09/21/23 1052    Specimen: Blood Updated: 09/21/23 1156     Ethanol <10 mg/dL      Ethanol % <0.010 %     TSH [[id number]]  (Normal) Collected: 09/21/23 1052    Specimen: Blood Updated: 09/21/23 1143     TSH 2.640 uIU/mL     Protime-INR [[id number]]  (Abnormal) Collected: 09/21/23 1052    Specimen: Blood Updated: 09/21/23 1126     Protime 15.4 Seconds      INR 1.21    Magnesium [[id number]]  (Normal) Collected: 09/21/23 1052    Specimen: Blood Updated: 09/21/23 1126     Magnesium 1.7 mg/dL     Comprehensive " Metabolic Panel [[id number]]  (Abnormal) Collected: 09/21/23 1052    Specimen: Blood Updated: 09/21/23 1126     Glucose 106 mg/dL      BUN 9 mg/dL      Creatinine 0.81 mg/dL      Sodium 129 mmol/L      Potassium 3.1 mmol/L      Chloride 93 mmol/L      CO2 26.6 mmol/L      Calcium 8.5 mg/dL      Total Protein 6.4 g/dL      Albumin 3.5 g/dL      ALT (SGPT) 8 U/L      AST (SGOT) 14 U/L      Alkaline Phosphatase 70 U/L      Total Bilirubin 0.8 mg/dL      Globulin 2.9 gm/dL      A/G Ratio 1.2 g/dL      BUN/Creatinine Ratio 11.1     Anion Gap 9.4 mmol/L      eGFR 93.1 mL/min/1.73     Narrative:      GFR Normal >60  Chronic Kidney Disease <60  Kidney Failure <15    The GFR formula is only valid for adults with stable renal function between ages 18 and 70.    High Sensitivity Troponin T [[id number]]  (Normal) Collected: 09/21/23 1052    Specimen: Blood Updated: 09/21/23 1126     HS Troponin T 13 ng/L     Narrative:      High Sensitive Troponin T Reference Range:  <10.0 ng/L- Negative Female for AMI  <15.0 ng/L- Negative Male for AMI  >=10 - Abnormal Female indicating possible myocardial injury.  >=15 - Abnormal Male indicating possible myocardial injury.   Clinicians would have to utilize clinical acumen, EKG, Troponin, and serial changes to determine if it is an Acute Myocardial Infarction or myocardial injury due to an underlying chronic condition.         CBC & Differential [[id number]]  (Abnormal) Collected: 09/21/23 1052    Specimen: Blood Updated: 09/21/23 1107    Narrative:      The following orders were created for panel order CBC & Differential.  Procedure                               Abnormality         Status                     ---------                               -----------         ------                     CBC Auto Differential[[id number]]        Abnormal            Final result                 Please view results for these tests on the individual orders.    CBC Auto Differential [[id number]]  (Abnormal)  "Collected: 09/21/23 1052    Specimen: Blood Updated: 09/21/23 1107     WBC 5.89 10*3/mm3      RBC 3.53 10*6/mm3      Hemoglobin 12.2 g/dL      Hematocrit 35.1 %      MCV 99.4 fL      MCH 34.6 pg      MCHC 34.8 g/dL      RDW 12.2 %      RDW-SD 44.1 fl      MPV 10.2 fL      Platelets 173 10*3/mm3      Neutrophil % 65.4 %      Lymphocyte % 20.7 %      Monocyte % 12.4 %      Eosinophil % 1.0 %      Basophil % 0.3 %      Immature Grans % 0.2 %      Neutrophils, Absolute 3.85 10*3/mm3      Lymphocytes, Absolute 1.22 10*3/mm3      Monocytes, Absolute 0.73 10*3/mm3      Eosinophils, Absolute 0.06 10*3/mm3      Basophils, Absolute 0.02 10*3/mm3      Immature Grans, Absolute 0.01 10*3/mm3      nRBC 0.0 /100 WBC           /52 (BP Location: Right arm, Patient Position: Lying)   Pulse 56   Temp 97.4 °F (36.3 °C) (Oral)   Resp 18   Ht 180.3 cm (71\")   Wt 92.7 kg (204 lb 5.9 oz)   SpO2 97%   BMI 28.50 kg/m²     Discharge Exam:  General Appearance:    Alert, cooperative, no distress                          Head:    Normocephalic, without obvious abnormality, atraumatic                          Eyes:                            Throat:   Lips, tongue, gums normal                          Neck:   Supple, symmetrical, trachea midline, no JVD                        Lungs:     Clear to auscultation bilaterally, respirations unlabored                Chest Wall:    No tenderness or deformity                        Heart:    Regular rate and rhythm, S1 and S2 normal, no murmur,no  Rub  or gallop                  Abdomen:     Soft, non-tender, bowel sounds active, no masses, no organomegaly                  Extremities:   Extremities normal, atraumatic, no cyanosis or edema                             Skin:   Skin is warm and dry,  no rashes or palpable lesions                  Neurologic:   no focal deficits noted     Disposition:  Home    Activity as tolerated    Diet as tolerated  Diet Order   Procedures    Diet: Cardiac " Diets; Healthy Heart (2-3 Na+); Texture: Regular Texture (IDDSI 7); Fluid Consistency: Thin (IDDSI 0)       Patient Instructions:      Discharge Medications        Continue These Medications        Instructions Start Date   aspirin 81 MG tablet   81 mg, Oral, Daily      atenolol 50 MG tablet  Commonly known as: TENORMIN   50 mg, Oral, 2 Times Daily      atorvastatin 40 MG tablet  Commonly known as: LIPITOR   40 mg, Oral, Daily      clopidogrel 75 MG tablet  Commonly known as: PLAVIX   75 mg, Oral, Daily      fish oil 1000 MG capsule capsule   1,000 mg, Oral, Daily      isosorbide mononitrate 60 MG 24 hr tablet  Commonly known as: IMDUR   60 mg, Oral, 2 Times Daily      linaclotide 72 MCG capsule capsule  Commonly known as: LINZESS   72 mcg, Oral, 3 Times Weekly PRN      loratadine 10 MG tablet  Commonly known as: CLARITIN   10 mg, Oral, Daily      meclizine 12.5 MG tablet  Commonly known as: ANTIVERT   12.5 mg, Oral, Daily      MULTIVITAMIN ADULT EXTRA C PO   1 tablet, Oral, Daily      nitroglycerin 0.4 MG SL tablet  Commonly known as: NITROSTAT   0.4 mg, Sublingual, Every 5 Minutes PRN, Take no more than 3 doses in 15 minutes.       pantoprazole 40 MG EC tablet  Commonly known as: PROTONIX   40 mg, Oral, 2 Times Daily      ranolazine 1000 MG 12 hr tablet  Commonly known as: RANEXA   1,000 mg, Oral, Every 12 Hours      saccharomyces boulardii 250 MG capsule  Commonly known as: FLORASTOR   250 mg, Oral, 2 Times Daily      sucralfate 1 g tablet  Commonly known as: CARAFATE   1 g, Oral, 2 Times Daily             No future appointments.   Follow-up Information       Triny Hannon MD Follow up in 1 week(s).    Specialty: Internal Medicine  Contact information:  [address] [facility] [address] [phone]                           Discharge Order (From admission, onward)       Start     Ordered    09/22/23 1619  Discharge patient  Once        Expected Discharge Date: 09/22/23   Discharge Disposition: Home or  Self Care   Physician of Record for Attribution - Please select from Treatment Team: VINOD MATTA [[id number]]   Review needed by CMO to determine Physician of Record: No      Question Answer Comment   Physician of Record for Attribution - Please select from Treatment Team VINOD MATTA    Review needed by CMO to determine Physician of Record No        09/22/23 1620                    Total time spent discharging patient including evaluation,post hospitalization follow up,  medication and post hospitalization instructions and education total time exceeds 30 minutes.    Signed:  Vinod Matta MD  9/22/2023  16:22 EDT

## 2023-09-22 NOTE — PROGRESS NOTES
"Name: Slade Martinez ADMIT: 2023   : 1950  PCP: Triny Hannon MD    MRN: [id number] LOS: 1 days   AGE/SEX: 72 y.o. male  ROOM: [address]    [place], Subsequent Hospital Care    72 y.o. male admitted to the hospital with abdominal pain, nausea and diarrhea, and experienced a fall with disorientation.  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  Feels much better today.  No abdominal pain.  No nausea or vomiting, and one small formed stool.    Scheduled Medications:   aspirin, 81 mg, Oral, Daily  atenolol, 50 mg, Oral, BID  atorvastatin, 40 mg, Oral, Daily  clopidogrel, 75 mg, Oral, Daily  isosorbide mononitrate, 60 mg, Oral, BID  pantoprazole, 40 mg, Oral, BID  ranolazine, 1,000 mg, Oral, Q12H  saccharomyces boulardii, 250 mg, Oral, BID  senna-docusate sodium, 2 tablet, Oral, BID    Active Infusions:  sodium chloride 0.9 % with KCl 20 mEq, 100 mL/hr, Last Rate: 100 mL/hr (23 0609)    Vital Signs  Vital Signs Patient Vitals for the past 24 hrs:   BP Temp Temp src Pulse Resp SpO2 Height Weight   23 0628 147/62 97.9 °F (36.6 °C) Oral 71 18 98 % -- --   23 0419 -- -- -- -- -- -- -- 92.7 kg (204 lb 5.9 oz)   23 2337 152/63 97.9 °F (36.6 °C) Oral 70 18 98 % -- --   23 1700 (!) 188/79 -- -- -- -- -- -- --   23 1634 (!) 208/77 98.9 °F (37.2 °C) Oral 62 18 93 % -- --   23 1631 (!) 201/74 -- -- 62 -- 97 % -- --   23 1502 174/75 -- -- 62 16 95 % -- --   23 1400 170/80 -- -- 58 16 95 % -- --   23 1204 178/83 -- -- 55 18 96 % -- --   23 1031 173/87 -- -- 55 -- 99 % -- --   23 1017 -- -- -- -- -- -- 180.3 cm (71\") 94.4 kg (208 lb 1.6 oz)   23 1013 162/80 -- -- -- -- -- -- --   23 1006 -- 98.9 °F (37.2 °C) Tympanic 74 20 98 % -- --     I/O:  I/O last 3 completed shifts:  In: 210 [P.O.:210]  Out: -     Physical Exam: Sensorium clear.  Looks " well.  BP still high.  Abdomen nondistended, no tympany.  Absolutely soft and nontender today, much improved from yesterday.  No guarding or rigidity.     CBC    Results from last 7 days   Lab Units 09/22/23  0459 09/21/23  1052   WBC 10*3/mm3 6.39 5.89   HEMOGLOBIN g/dL 11.7* 12.2*   PLATELETS 10*3/mm3 183 173     BMP   Results from last 7 days   Lab Units 09/22/23  0459 09/21/23  1052   SODIUM mmol/L 134* 129*   POTASSIUM mmol/L 4.0 3.1*   CHLORIDE mmol/L 102 93*   CO2 mmol/L 20.0* 26.6   BUN mg/dL 8 9   CREATININE mg/dL 0.77 0.81   GLUCOSE mg/dL 91 106*   MAGNESIUM mg/dL  --  1.7     Cr Clearance Estimated Creatinine Clearance: 99.5 mL/min (by C-G formula based on SCr of 0.77 mg/dL).  Assessment & Plan   Assessment & Plan    Altered mental status    Dyslipidemia    Essential hypertension    S/P CABG (coronary artery bypass graft)    Abdominal pain    Chronic mesenteric ischemia    72 y.o. male with abdominal pain nausea and diarrhea causing mental status changes and resulting in a fall.  Also has mesenteric atherosclerosis, with what I believe is chronic minimally symptomatic or asymptomatic mesenteric ischemia.  Pain, nausea and diarrhea have resolved.  I do not believe he has acute mesenteric ischemia.  From surgical perspective, I plan no further diagnostic testing or intervention.  If he does well with breakfast, he may be able to go home.  Continue follow-up as previously scheduled with Dr. Alcala.      Sanford Monge MD  09/22/23  07:33 EDT    Please call my office with any question: [phone]    Active Hospital Problems    Diagnosis  POA    **Altered mental status [R41.82]  Yes    Abdominal pain [R10.9]  Yes    Chronic mesenteric ischemia [K55.1]  Yes    S/P CABG (coronary artery bypass graft) [Z95.1]  Not Applicable    Dyslipidemia [E78.5]  Yes    Essential hypertension [I10]  Yes      Resolved Hospital Problems   No resolved problems to display.

## 2023-09-22 NOTE — H&P
HISTORY AND PHYSICAL   [facility]        Date of Admission: 2023  Patient Identification:  Name: Slade Martinez  Age: 72 y.o.  Sex: male  :  1950  MRN: [id number]                     Primary Care Physician: Triny Hannon MD    Chief Complaint:  72 year old gentleman who presented to the emergency room with confusion and abdominal pain which started four days ago; he has some nausea and vomiting two days ago; no fever or chills; the patient is not able to give the history but the wife is present and answering questions; he has been getting weaker; the wife thought he might have diverticulitis and she gave him bentyl but he did not improve; he has had poor po intake; he has not bloody stool; while his wife was driving to the hospital the patient was talking to people he thought were in the backseat who were not present; he has a past history of stroke; no change in speech or vision; no difficulty swallowing; no focal numbness weakness or tingling    History of Present Illness:   As above    Past Medical History:  Past Medical History:   Diagnosis Date    Coronary atherosclerosis 2019    H/O CABG SVG to first diagonal branch and to the LAD as well as SVG to the lateral marginal branch of the circumflex complicated by right sided subcortical infarct with left sided hemiparesis    Dyslipidemia 2019    Essential hypertension 2019    Injury of back     fractured vertebra 23     Past Surgical History:  Past Surgical History:   Procedure Laterality Date    CAROTID ENDARTERECTOMY      Left    CHOLECYSTECTOMY      CORONARY ARTERY BYPASS GRAFT      CORONARY STENT PLACEMENT      X17 stents      Home Meds:  Medications Prior to Admission   Medication Sig Dispense Refill Last Dose    aspirin 81 MG tablet Take 1 tablet by mouth Daily.   2023    atenolol (TENORMIN) 50 MG tablet Take 1 tablet by mouth 2 (Two) Times a Day.   2023    atorvastatin (LIPITOR) 40 MG  tablet Take 1 tablet by mouth Daily.   9/20/2023    clopidogrel (PLAVIX) 75 MG tablet Take 1 tablet by mouth Daily.   9/21/2023    isosorbide mononitrate (IMDUR) 60 MG 24 hr tablet Take 1 tablet by mouth 2 (Two) Times a Day.   9/21/2023    linaclotide (LINZESS) 72 MCG capsule capsule Take 1 capsule by mouth 3 (Three) Times a Week if Needed.   Past Week at 0500    loratadine (CLARITIN) 10 MG tablet Take 1 tablet by mouth Daily.   9/20/2023    meclizine (ANTIVERT) 12.5 MG tablet Take 1 tablet by mouth Daily.   9/21/2023    Multiple Vitamins-Minerals (MULTIVITAMIN ADULT EXTRA C PO) Take 1 tablet by mouth Daily.   9/20/2023    Omega-3 Fatty Acids (FISH OIL) 1000 MG capsule capsule Take 1 capsule by mouth Daily.   9/20/2023    pantoprazole (PROTONIX) 40 MG EC tablet Take 1 tablet by mouth 2 (Two) Times a Day.   9/21/2023    ranolazine (RANEXA) 1000 MG 12 hr tablet Take 1 tablet by mouth Every 12 (Twelve) Hours. 60 tablet 11 9/21/2023    saccharomyces boulardii (FLORASTOR) 250 MG capsule Take 1 capsule by mouth 2 (Two) Times a Day.   9/20/2023    sucralfate (CARAFATE) 1 g tablet Take 1 tablet by mouth 2 (Two) Times a Day.   9/21/2023    nitroglycerin (NITROSTAT) 0.4 MG SL tablet Place 1 tablet under the tongue Every 5 (Five) Minutes As Needed for Chest Pain. Take no more than 3 doses in 15 minutes. 25 tablet 3 Unknown       Allergies:  Allergies   Allergen Reactions    Pletal [Cilostazol] Myalgia     .     Immunizations:  Immunization History   Administered Date(s) Administered    COVID-19 (MODERNA) 1st,2nd,3rd Dose Monovalent 05/10/2021, 06/10/2021     Social History:   Social History     Social History Narrative    Not on file     Social History     Socioeconomic History    Marital status:    Tobacco Use    Smoking status: Former     Types: Cigarettes    Smokeless tobacco: Never   Vaping Use    Vaping Use: Never used   Substance and Sexual Activity    Alcohol use: Not Currently    Drug use: Never    Sexual  "activity: Defer       Family History:  Family History   Problem Relation Age of Onset    COPD Mother     Heart attack Father     Heart failure Father     COPD Brother         Review of Systems  Not obtainable from the patient      Objective:  T Max 24 hrs: Temp (24hrs), Av.9 °F (37.2 °C), Min:98.9 °F (37.2 °C), Max:98.9 °F (37.2 °C)    Vitals Ranges:   Temp:  [98.9 °F (37.2 °C)] 98.9 °F (37.2 °C)  Heart Rate:  [55-74] 62  Resp:  [16-20] 18  BP: (162-208)/(74-87) 188/79      Exam:  BP (!) 188/79   Pulse 62   Temp 98.9 °F (37.2 °C) (Oral)   Resp 18   Ht 180.3 cm (71\")   Wt 94.4 kg (208 lb 1.6 oz)   SpO2 93%   BMI 29.02 kg/m²     General Appearance:    Alert, cooperative, no distress, appears stated age   Head:    Normocephalic, without obvious abnormality, atraumatic   Eyes:    PERRL, conjunctivae/corneas clear, EOM's intact, both eyes   Ears:    Normal external ear canals, both ears   Nose:   Nares normal, septum midline, mucosa normal, no drainage    or sinus tenderness   Throat:   Lips, mucosa, and tongue normal   Neck:   Supple, symmetrical, trachea midline, no adenopathy;     thyroid:  no enlargement/tenderness/nodules; no carotid    bruit or JVD   Back:     Symmetric, no curvature, ROM normal, no CVA tenderness   Lungs:     Decreased breath sounds bilaterally, respirations unlabored   Chest Wall:    No tenderness or deformity    Heart:    Regular rate and rhythm, S1 and S2 normal, no murmur, rub   or gallop   Abdomen:     Soft, nontender, bowel sounds active all four quadrants,     no masses, no hepatomegaly, no splenomegaly   Extremities:   Extremities normal, atraumatic, no cyanosis or edema                       .    Data Review:  Labs in chart were reviewed.  WBC   Date Value Ref Range Status   2023 5.89 3.40 - 10.80 10*3/mm3 Final     Hemoglobin   Date Value Ref Range Status   2023 12.2 (L) 13.0 - 17.7 g/dL Final     Hematocrit   Date Value Ref Range Status   2023 35.1 (L) 37.5 " - 51.0 % Final     Platelets   Date Value Ref Range Status   09/21/2023 173 140 - 450 10*3/mm3 Final     Sodium   Date Value Ref Range Status   09/21/2023 129 (L) 136 - 145 mmol/L Final     Potassium   Date Value Ref Range Status   09/21/2023 3.1 (L) 3.5 - 5.2 mmol/L Final     Chloride   Date Value Ref Range Status   09/21/2023 93 (L) 98 - 107 mmol/L Final     CO2   Date Value Ref Range Status   09/21/2023 26.6 22.0 - 29.0 mmol/L Final     BUN   Date Value Ref Range Status   09/21/2023 9 8 - 23 mg/dL Final     Creatinine   Date Value Ref Range Status   09/21/2023 0.81 0.76 - 1.27 mg/dL Final     Glucose   Date Value Ref Range Status   09/21/2023 106 (H) 65 - 99 mg/dL Final     Calcium   Date Value Ref Range Status   09/21/2023 8.5 (L) 8.6 - 10.5 mg/dL Final     Magnesium   Date Value Ref Range Status   09/21/2023 1.7 1.6 - 2.4 mg/dL Final     AST (SGOT)   Date Value Ref Range Status   09/21/2023 14 1 - 40 U/L Final     ALT (SGPT)   Date Value Ref Range Status   09/21/2023 8 1 - 41 U/L Final     Alkaline Phosphatase   Date Value Ref Range Status   09/21/2023 70 39 - 117 U/L Final       Results from last 7 days   Lab Units 09/21/23  1052   TSH uIU/mL 2.640          Imaging Results (All)       Procedure Component Value Units Date/Time    CT Head Without Contrast [[id number]] Collected: 09/21/23 1332     Updated: 09/21/23 1348    Narrative:      CT HEAD WITHOUT CONTRAST     CLINICAL HISTORY: Confused. Progressive weakness.     TECHNIQUE: CT scan of the head was obtained with 3 mm axial soft tissue  algorithm images. No intravenous contrast was administered. Sagittal and  coronal reconstructions were obtained.     COMPARISON: No previous similar studies are available for comparison.     FINDINGS:       There is a focus of encephalomalacia within the left parietal lobe that  is compatible with a chronic infarct within the left MCA distribution  and measures up to approximately 4.8 x 1.7 cm in greatest axial  dimensions.  There are moderate changes of chronic small vessel ischemic  phenomenon. The basal ganglia and thalami are unremarkable. The  posterior fossa structures are within normal limits. Atherosclerotic  calcifications are incidentally appreciated within the intracranial  vasculature.     Incidental note is made of mild mucosal thickening within the ethmoid  air cells and under pneumatization of the right mastoid air cells.       Impression:         There is a focus of encephalomalacia within the left parietal lobe that  is compatible with a chronic infarct within the left MCA distribution  and this measures up to 4.8 x 1.7 cm in greatest axial dimensions.  Moderate changes of chronic small vessel ischemic phenomena are noted.  There is no CT evidence to suggest acute intracranial pathology. The  etiology of the patient's confusion and progressive weakness is not  further elucidated on this examination; and if further assessment is  required, one could obtain an MRI of the brain for follow-up.        Radiation dose reduction techniques were utilized, including automated  exposure control and exposure modulation based on body size.     This report was finalized on 9/21/2023 1:45 PM by Dr. Beltran Tripathi M.D.       CT Abdomen Pelvis With Contrast [[id number]] Collected: 09/21/23 1259     Updated: 09/21/23 1304    Narrative:      EXAMINATION: CT OF THE ABDOMEN AND PELVIS WITH CONTRAST     TECHNIQUE: Computed tomography of the abdomen and pelvis after the  uneventful administration of nonionic intravenous contrast per protocol.  Radiation dose reduction techniques were utilized, including automated  exposure control and exposure modulation based on body size.     HISTORY: Left-sided abdominal pain     COMPARISON: 3/4/2023     FINDINGS: Limited evaluation of the inferior thorax demonstrates  atelectasis, without consolidation, pleural effusion, or pneumothorax.  The heart is normal in size and configuration, without  pericardial  effusion. Coronary calcifications are seen.     The gallbladder is absent. Colonic diverticula are seen, without  evidence of acute diverticulitis.     The liver, spleen, adrenal glands, kidneys, pancreas, stomach, small  bowel, appendix, large bowel, urinary bladder are normal. There is near  occlusion of the left femoral artery with areas of severe  atherosclerotic seen elsewhere. No intraperitoneal fluid collection or  free gas are seen. No enlarged lymph nodes are seen. 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     Bone windows demonstrate degenerative changes, without suspicious  osseous lesion seen.L3 compression deformity has progressed       Impression:      1. 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 Progression of L3 compression deformity     3. Incidental findings as above.     This report was finalized on 9/21/2023 1:01 PM by Dr. Jesus Flores M.D.                 Assessment:  Active Hospital Problems    Diagnosis  POA    **Altered mental status [R41.82]  Yes    Abdominal pain [R10.9]  Yes    Chronic mesenteric ischemia [K55.1]  Yes    S/P CABG (coronary artery bypass graft) [Z95.1]  Not Applicable    Dyslipidemia [E78.5]  Yes    Essential hypertension [I10]  Yes      Resolved Hospital Problems   No resolved problems to display.   Hypokalemia  Hyponatremia  Anemia  Weakness  cad    Plan:  Will continue fluids  Appreciate input from vascular  Check mri brain  Replace potassium  Pt.ot to see  Trend labs  Dw patient and wife as well as ed provider    Shaye Graves MD  9/21/2023  22:14 EDT

## 2023-09-22 NOTE — PLAN OF CARE
Goal Outcome Evaluation:  Plan of Care Reviewed With: patient           Outcome Evaluation: Pt is a 71 y/o M admitted AMS, abd pain, N/V, weakness. 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 Pt lives with his wife and is indep with ADLs and fxl mobility w/o AD. Pt working part time. Pt presents today back to baseline fxn. He performs fxl ambulation around the room and into the bathroom w/o AD. Completes toileting TF (I) and grooming at the sink w/o assist. No acute OT needs identified at this time, will sign off

## 2023-09-26 LAB
BACTERIA SPEC AEROBE CULT: NORMAL
BACTERIA SPEC AEROBE CULT: NORMAL

## 2023-09-26 NOTE — CASE MANAGEMENT/SOCIAL WORK
Case Management Discharge Note      Final Note: Home no additional dc orders noted for CCP. Juan Diego SINGLETON/CCP    Provided Post Acute Provider List?: N/A    Selected Continued Care - Discharged on 9/22/2023 Admission date: 9/21/2023 - Discharge disposition: Home or Self Care      Destination    No services have been selected for the patient.                Durable Medical Equipment    No services have been selected for the patient.                Dialysis/Infusion    No services have been selected for the patient.                Home Medical Care    No services have been selected for the patient.                Therapy    No services have been selected for the patient.                Community Resources    No services have been selected for the patient.                Community & DME    No services have been selected for the patient.                    Transportation Services  Private: Car    Final Discharge Disposition Code: 01 - home or self-care

## 2024-01-01 ENCOUNTER — APPOINTMENT (OUTPATIENT)
Dept: GENERAL RADIOLOGY | Facility: HOSPITAL | Age: 74
DRG: 329 | End: 2024-01-01
Payer: MEDICARE

## 2024-01-01 ENCOUNTER — APPOINTMENT (OUTPATIENT)
Dept: CT IMAGING | Facility: HOSPITAL | Age: 74
DRG: 329 | End: 2024-01-01
Payer: MEDICARE

## 2024-01-01 ENCOUNTER — APPOINTMENT (OUTPATIENT)
Dept: CARDIOLOGY | Facility: HOSPITAL | Age: 74
DRG: 329 | End: 2024-01-01
Payer: MEDICARE

## 2024-01-01 ENCOUNTER — HOSPITAL ENCOUNTER (INPATIENT)
Facility: HOSPITAL | Age: 74
LOS: 17 days | DRG: 329 | End: 2024-07-19
Attending: EMERGENCY MEDICINE | Admitting: HOSPITALIST
Payer: MEDICARE

## 2024-01-01 VITALS
OXYGEN SATURATION: 95 % | TEMPERATURE: 101.1 F | RESPIRATION RATE: 26 BRPM | SYSTOLIC BLOOD PRESSURE: 104 MMHG | HEIGHT: 71 IN | BODY MASS INDEX: 27.19 KG/M2 | DIASTOLIC BLOOD PRESSURE: 49 MMHG | WEIGHT: 194.22 LBS | HEART RATE: 93 BPM

## 2024-01-01 DIAGNOSIS — E87.20 LACTIC ACIDOSIS: ICD-10-CM

## 2024-01-01 DIAGNOSIS — D72.829 LEUKOCYTOSIS, UNSPECIFIED TYPE: ICD-10-CM

## 2024-01-01 DIAGNOSIS — R07.9 CHEST PAIN, UNSPECIFIED TYPE: ICD-10-CM

## 2024-01-01 DIAGNOSIS — R10.32 LEFT LOWER QUADRANT ABDOMINAL PAIN: Primary | ICD-10-CM

## 2024-01-01 DIAGNOSIS — I16.0 HYPERTENSIVE URGENCY: ICD-10-CM

## 2024-01-01 DIAGNOSIS — K55.9 SMALL BOWEL ISCHEMIA: ICD-10-CM

## 2024-01-01 DIAGNOSIS — E87.1 HYPONATREMIA: ICD-10-CM

## 2024-01-01 LAB
ADV 40+41 DNA STL QL NAA+NON-PROBE: NOT DETECTED
ALBUMIN SERPL-MCNC: 1.5 G/DL (ref 3.5–5.2)
ALBUMIN SERPL-MCNC: 1.7 G/DL (ref 3.5–5.2)
ALBUMIN SERPL-MCNC: 1.7 G/DL (ref 3.5–5.2)
ALBUMIN SERPL-MCNC: 1.8 G/DL (ref 3.5–5.2)
ALBUMIN SERPL-MCNC: 1.9 G/DL (ref 3.5–5.2)
ALBUMIN SERPL-MCNC: 2 G/DL (ref 3.5–5.2)
ALBUMIN SERPL-MCNC: 2.2 G/DL (ref 3.5–5.2)
ALBUMIN SERPL-MCNC: 2.3 G/DL (ref 3.5–5.2)
ALBUMIN SERPL-MCNC: 2.3 G/DL (ref 3.5–5.2)
ALBUMIN SERPL-MCNC: 2.5 G/DL (ref 3.5–5.2)
ALBUMIN SERPL-MCNC: 2.5 G/DL (ref 3.5–5.2)
ALBUMIN SERPL-MCNC: 2.9 G/DL (ref 3.5–5.2)
ALBUMIN SERPL-MCNC: 2.9 G/DL (ref 3.5–5.2)
ALBUMIN SERPL-MCNC: 3 G/DL (ref 3.5–5.2)
ALBUMIN SERPL-MCNC: 3 G/DL (ref 3.5–5.2)
ALBUMIN SERPL-MCNC: 3.1 G/DL (ref 3.5–5.2)
ALBUMIN SERPL-MCNC: 3.3 G/DL (ref 3.5–5.2)
ALBUMIN SERPL-MCNC: 3.8 G/DL (ref 3.5–5.2)
ALBUMIN/GLOB SERPL: 0.5 G/DL
ALBUMIN/GLOB SERPL: 0.5 G/DL
ALBUMIN/GLOB SERPL: 0.6 G/DL
ALBUMIN/GLOB SERPL: 0.7 G/DL
ALBUMIN/GLOB SERPL: 0.7 G/DL
ALBUMIN/GLOB SERPL: 0.8 G/DL
ALBUMIN/GLOB SERPL: 1 G/DL
ALBUMIN/GLOB SERPL: 1.1 G/DL
ALP SERPL-CCNC: 110 U/L (ref 39–117)
ALP SERPL-CCNC: 114 U/L (ref 39–117)
ALP SERPL-CCNC: 115 U/L (ref 39–117)
ALP SERPL-CCNC: 129 U/L (ref 39–117)
ALP SERPL-CCNC: 85 U/L (ref 39–117)
ALP SERPL-CCNC: 86 U/L (ref 39–117)
ALP SERPL-CCNC: 86 U/L (ref 39–117)
ALP SERPL-CCNC: 88 U/L (ref 39–117)
ALP SERPL-CCNC: 91 U/L (ref 39–117)
ALP SERPL-CCNC: 91 U/L (ref 39–117)
ALP SERPL-CCNC: 97 U/L (ref 39–117)
ALT SERPL W P-5'-P-CCNC: 102 U/L (ref 1–41)
ALT SERPL W P-5'-P-CCNC: 1431 U/L (ref 1–41)
ALT SERPL W P-5'-P-CCNC: 16 U/L (ref 1–41)
ALT SERPL W P-5'-P-CCNC: 18 U/L (ref 1–41)
ALT SERPL W P-5'-P-CCNC: 20 U/L (ref 1–41)
ALT SERPL W P-5'-P-CCNC: 22 U/L (ref 1–41)
ALT SERPL W P-5'-P-CCNC: 34 U/L (ref 1–41)
ALT SERPL W P-5'-P-CCNC: 37 U/L (ref 1–41)
ALT SERPL W P-5'-P-CCNC: 41 U/L (ref 1–41)
ALT SERPL W P-5'-P-CCNC: 42 U/L (ref 1–41)
ALT SERPL W P-5'-P-CCNC: 475 U/L (ref 1–41)
ANION GAP SERPL CALCULATED.3IONS-SCNC: 10.6 MMOL/L (ref 5–15)
ANION GAP SERPL CALCULATED.3IONS-SCNC: 10.7 MMOL/L (ref 5–15)
ANION GAP SERPL CALCULATED.3IONS-SCNC: 10.7 MMOL/L (ref 5–15)
ANION GAP SERPL CALCULATED.3IONS-SCNC: 11 MMOL/L (ref 5–15)
ANION GAP SERPL CALCULATED.3IONS-SCNC: 11.7 MMOL/L (ref 5–15)
ANION GAP SERPL CALCULATED.3IONS-SCNC: 12 MMOL/L (ref 5–15)
ANION GAP SERPL CALCULATED.3IONS-SCNC: 12.2 MMOL/L (ref 5–15)
ANION GAP SERPL CALCULATED.3IONS-SCNC: 13 MMOL/L (ref 5–15)
ANION GAP SERPL CALCULATED.3IONS-SCNC: 13.7 MMOL/L (ref 5–15)
ANION GAP SERPL CALCULATED.3IONS-SCNC: 14.1 MMOL/L (ref 5–15)
ANION GAP SERPL CALCULATED.3IONS-SCNC: 14.6 MMOL/L (ref 5–15)
ANION GAP SERPL CALCULATED.3IONS-SCNC: 19.1 MMOL/L (ref 5–15)
ANION GAP SERPL CALCULATED.3IONS-SCNC: 7.1 MMOL/L (ref 5–15)
ANION GAP SERPL CALCULATED.3IONS-SCNC: 9 MMOL/L (ref 5–15)
ANION GAP SERPL CALCULATED.3IONS-SCNC: 9.8 MMOL/L (ref 5–15)
APTT PPP: 37.2 SECONDS (ref 22.7–35.4)
ARTERIAL PATENCY WRIST A: ABNORMAL
ARTERIAL PATENCY WRIST A: ABNORMAL
ARTERIAL PATENCY WRIST A: POSITIVE
AST SERPL-CCNC: 1492 U/L (ref 1–40)
AST SERPL-CCNC: 16 U/L (ref 1–40)
AST SERPL-CCNC: 19 U/L (ref 1–40)
AST SERPL-CCNC: 22 U/L (ref 1–40)
AST SERPL-CCNC: 25 U/L (ref 1–40)
AST SERPL-CCNC: 287 U/L (ref 1–40)
AST SERPL-CCNC: 44 U/L (ref 1–40)
AST SERPL-CCNC: 46 U/L (ref 1–40)
AST SERPL-CCNC: 4714 U/L (ref 1–40)
AST SERPL-CCNC: 62 U/L (ref 1–40)
AST SERPL-CCNC: 80 U/L (ref 1–40)
ASTRO TYP 1-8 RNA STL QL NAA+NON-PROBE: NOT DETECTED
ATMOSPHERIC PRESS: 740.9 MMHG
ATMOSPHERIC PRESS: 746.6 MMHG
ATMOSPHERIC PRESS: 746.8 MMHG
ATMOSPHERIC PRESS: 746.8 MMHG
ATMOSPHERIC PRESS: 747.1 MMHG
ATMOSPHERIC PRESS: 748.7 MMHG
ATMOSPHERIC PRESS: 749.3 MMHG
ATMOSPHERIC PRESS: 749.4 MMHG
ATMOSPHERIC PRESS: 749.6 MMHG
ATMOSPHERIC PRESS: 752 MMHG
ATMOSPHERIC PRESS: 752.1 MMHG
BACTERIA BLD CULT: ABNORMAL
BACTERIA SPEC AEROBE CULT: ABNORMAL
BACTERIA SPEC AEROBE CULT: NORMAL
BACTERIA SPEC AEROBE CULT: NORMAL
BACTERIA SPEC RESP CULT: NORMAL
BACTERIA UR QL AUTO: NORMAL /HPF
BASE EXCESS BLDA CALC-SCNC: -0.7 MMOL/L (ref 0–2)
BASE EXCESS BLDA CALC-SCNC: -2.2 MMOL/L (ref 0–2)
BASE EXCESS BLDA CALC-SCNC: -7.7 MMOL/L (ref 0–2)
BASE EXCESS BLDA CALC-SCNC: 0.1 MMOL/L (ref 0–2)
BASE EXCESS BLDA CALC-SCNC: 0.9 MMOL/L (ref 0–2)
BASE EXCESS BLDA CALC-SCNC: 0.9 MMOL/L (ref 0–2)
BASE EXCESS BLDA CALC-SCNC: 2.9 MMOL/L (ref 0–2)
BASE EXCESS BLDA CALC-SCNC: 3.6 MMOL/L (ref 0–2)
BASE EXCESS BLDA CALC-SCNC: 3.9 MMOL/L (ref 0–2)
BASE EXCESS BLDA CALC-SCNC: 4 MMOL/L (ref 0–2)
BASE EXCESS BLDA CALC-SCNC: 5 MMOL/L (ref 0–2)
BASOPHILS # BLD AUTO: 0.02 10*3/MM3 (ref 0–0.2)
BASOPHILS # BLD AUTO: 0.02 10*3/MM3 (ref 0–0.2)
BASOPHILS # BLD AUTO: 0.03 10*3/MM3 (ref 0–0.2)
BASOPHILS # BLD AUTO: 0.04 10*3/MM3 (ref 0–0.2)
BASOPHILS # BLD AUTO: 0.05 10*3/MM3 (ref 0–0.2)
BASOPHILS # BLD AUTO: 0.05 10*3/MM3 (ref 0–0.2)
BASOPHILS NFR BLD AUTO: 0.1 % (ref 0–1.5)
BASOPHILS NFR BLD AUTO: 0.1 % (ref 0–1.5)
BASOPHILS NFR BLD AUTO: 0.2 % (ref 0–1.5)
BASOPHILS NFR BLD AUTO: 0.3 % (ref 0–1.5)
BASOPHILS NFR BLD AUTO: 0.3 % (ref 0–1.5)
BASOPHILS NFR BLD AUTO: 0.4 % (ref 0–1.5)
BDY SITE: ABNORMAL
BDY SITE: NORMAL
BH CV ECHO MEAS - EDV(CUBED): 98.4 ML
BH CV ECHO MEAS - EDV(MOD-SP2): 90 ML
BH CV ECHO MEAS - EDV(MOD-SP4): 85 ML
BH CV ECHO MEAS - EF(MOD-BP): 67.1 %
BH CV ECHO MEAS - EF(MOD-SP2): 68.9 %
BH CV ECHO MEAS - EF(MOD-SP4): 63.5 %
BH CV ECHO MEAS - ESV(CUBED): 34 ML
BH CV ECHO MEAS - ESV(MOD-SP2): 28 ML
BH CV ECHO MEAS - ESV(MOD-SP4): 31 ML
BH CV ECHO MEAS - FS: 29.8 %
BH CV ECHO MEAS - IVS/LVPW: 1.13 CM
BH CV ECHO MEAS - IVSD: 1.21 CM
BH CV ECHO MEAS - LAT PEAK E' VEL: 6.4 CM/SEC
BH CV ECHO MEAS - LV MASS(C)D: 192.9 GRAMS
BH CV ECHO MEAS - LVIDD: 4.6 CM
BH CV ECHO MEAS - LVIDS: 3.2 CM
BH CV ECHO MEAS - LVPWD: 1.08 CM
BH CV ECHO MEAS - MED PEAK E' VEL: 4.5 CM/SEC
BH CV ECHO MEAS - MV A DUR: 0.14 SEC
BH CV ECHO MEAS - MV A MAX VEL: 70.6 CM/SEC
BH CV ECHO MEAS - MV DEC SLOPE: 368.6 CM/SEC2
BH CV ECHO MEAS - MV DEC TIME: 0.23 SEC
BH CV ECHO MEAS - MV E MAX VEL: 78.8 CM/SEC
BH CV ECHO MEAS - MV E/A: 1.12
BH CV ECHO MEAS - MV MAX PG: 3.2 MMHG
BH CV ECHO MEAS - MV MEAN PG: 1.63 MMHG
BH CV ECHO MEAS - MV P1/2T: 73.8 MSEC
BH CV ECHO MEAS - MV V2 VTI: 30.2 CM
BH CV ECHO MEAS - MVA(P1/2T): 3 CM2
BH CV ECHO MEAS - SV(MOD-SP2): 62 ML
BH CV ECHO MEAS - SV(MOD-SP4): 54 ML
BH CV ECHO MEASUREMENTS AVERAGE E/E' RATIO: 14.46
BILIRUB SERPL-MCNC: 0.5 MG/DL (ref 0–1.2)
BILIRUB SERPL-MCNC: 0.6 MG/DL (ref 0–1.2)
BILIRUB SERPL-MCNC: 0.7 MG/DL (ref 0–1.2)
BILIRUB SERPL-MCNC: 0.7 MG/DL (ref 0–1.2)
BILIRUB SERPL-MCNC: 0.8 MG/DL (ref 0–1.2)
BILIRUB SERPL-MCNC: 0.8 MG/DL (ref 0–1.2)
BILIRUB SERPL-MCNC: 0.9 MG/DL (ref 0–1.2)
BILIRUB SERPL-MCNC: 0.9 MG/DL (ref 0–1.2)
BILIRUB SERPL-MCNC: 1.3 MG/DL (ref 0–1.2)
BILIRUB SERPL-MCNC: 1.5 MG/DL (ref 0–1.2)
BILIRUB SERPL-MCNC: 2.8 MG/DL (ref 0–1.2)
BILIRUB UR QL STRIP: NEGATIVE
BOTTLE TYPE: ABNORMAL
BUN BLDA-MCNC: 83 MG/DL (ref 8–26)
BUN SERPL-MCNC: 11 MG/DL (ref 8–23)
BUN SERPL-MCNC: 13 MG/DL (ref 8–23)
BUN SERPL-MCNC: 17 MG/DL (ref 8–23)
BUN SERPL-MCNC: 17 MG/DL (ref 8–23)
BUN SERPL-MCNC: 18 MG/DL (ref 8–23)
BUN SERPL-MCNC: 18 MG/DL (ref 8–23)
BUN SERPL-MCNC: 20 MG/DL (ref 8–23)
BUN SERPL-MCNC: 20 MG/DL (ref 8–23)
BUN SERPL-MCNC: 23 MG/DL (ref 8–23)
BUN SERPL-MCNC: 24 MG/DL (ref 8–23)
BUN SERPL-MCNC: 26 MG/DL (ref 8–23)
BUN SERPL-MCNC: 45 MG/DL (ref 8–23)
BUN SERPL-MCNC: 7 MG/DL (ref 8–23)
BUN SERPL-MCNC: 77 MG/DL (ref 8–23)
BUN/CREAT SERPL: 14.7 (ref 7–25)
BUN/CREAT SERPL: 19.4 (ref 7–25)
BUN/CREAT SERPL: 20.6 (ref 7–25)
BUN/CREAT SERPL: 23.6 (ref 7–25)
BUN/CREAT SERPL: 24.7 (ref 7–25)
BUN/CREAT SERPL: 24.7 (ref 7–25)
BUN/CREAT SERPL: 27 (ref 7–25)
BUN/CREAT SERPL: 27.7 (ref 7–25)
BUN/CREAT SERPL: 27.9 (ref 7–25)
BUN/CREAT SERPL: 28.2 (ref 7–25)
BUN/CREAT SERPL: 32.4 (ref 7–25)
BUN/CREAT SERPL: 33.3 (ref 7–25)
BUN/CREAT SERPL: 35.4 (ref 7–25)
BUN/CREAT SERPL: 35.8 (ref 7–25)
BUN/CREAT SERPL: 37.1 (ref 7–25)
BUN/CREAT SERPL: 39.9 (ref 7–25)
BUN/CREAT SERPL: 40 (ref 7–25)
BUN/CREAT SERPL: 40.9 (ref 7–25)
BUN/CREAT SERPL: 9.3 (ref 7–25)
C CAYETANENSIS DNA STL QL NAA+NON-PROBE: NOT DETECTED
C COLI+JEJ+UPSA DNA STL QL NAA+NON-PROBE: NOT DETECTED
C DIFF TOX GENS STL QL NAA+PROBE: NEGATIVE
CA-I BLD-MCNC: 4.8 MG/DL (ref 4.6–5.4)
CA-I BLDA-SCNC: 1.21 MMOL/L (ref 2.2–2.55)
CA-I SERPL ISE-MCNC: 1.21 MMOL/L (ref 1.15–1.35)
CALCIUM SPEC-SCNC: 7.6 MG/DL (ref 8.6–10.5)
CALCIUM SPEC-SCNC: 7.7 MG/DL (ref 8.6–10.5)
CALCIUM SPEC-SCNC: 8 MG/DL (ref 8.6–10.5)
CALCIUM SPEC-SCNC: 8.1 MG/DL (ref 8.6–10.5)
CALCIUM SPEC-SCNC: 8.2 MG/DL (ref 8.6–10.5)
CALCIUM SPEC-SCNC: 8.3 MG/DL (ref 8.6–10.5)
CALCIUM SPEC-SCNC: 8.4 MG/DL (ref 8.6–10.5)
CALCIUM SPEC-SCNC: 8.5 MG/DL (ref 8.6–10.5)
CALCIUM SPEC-SCNC: 8.7 MG/DL (ref 8.6–10.5)
CALCIUM SPEC-SCNC: 8.9 MG/DL (ref 8.6–10.5)
CALCIUM SPEC-SCNC: 9 MG/DL (ref 8.6–10.5)
CALCIUM SPEC-SCNC: 9.1 MG/DL (ref 8.6–10.5)
CALCIUM SPEC-SCNC: 9.1 MG/DL (ref 8.6–10.5)
CALCIUM SPEC-SCNC: 9.4 MG/DL (ref 8.6–10.5)
CALCIUM SPEC-SCNC: 9.6 MG/DL (ref 8.6–10.5)
CHLORIDE BLDA-SCNC: 112 MMOL/L (ref 98–107)
CHLORIDE SERPL-SCNC: 103 MMOL/L (ref 98–107)
CHLORIDE SERPL-SCNC: 103 MMOL/L (ref 98–107)
CHLORIDE SERPL-SCNC: 104 MMOL/L (ref 98–107)
CHLORIDE SERPL-SCNC: 105 MMOL/L (ref 98–107)
CHLORIDE SERPL-SCNC: 106 MMOL/L (ref 98–107)
CHLORIDE SERPL-SCNC: 107 MMOL/L (ref 98–107)
CHLORIDE SERPL-SCNC: 108 MMOL/L (ref 98–107)
CHLORIDE SERPL-SCNC: 108 MMOL/L (ref 98–107)
CHLORIDE SERPL-SCNC: 111 MMOL/L (ref 98–107)
CHLORIDE SERPL-SCNC: 112 MMOL/L (ref 98–107)
CHLORIDE SERPL-SCNC: 114 MMOL/L (ref 98–107)
CHLORIDE SERPL-SCNC: 91 MMOL/L (ref 98–107)
CHLORIDE SERPL-SCNC: 92 MMOL/L (ref 98–107)
CHLORIDE SERPL-SCNC: 93 MMOL/L (ref 98–107)
CHLORIDE SERPL-SCNC: 94 MMOL/L (ref 98–107)
CHLORIDE SERPL-SCNC: 95 MMOL/L (ref 98–107)
CHLORIDE SERPL-SCNC: 95 MMOL/L (ref 98–107)
CHLORIDE SERPL-SCNC: 97 MMOL/L (ref 98–107)
CHLORIDE SERPL-SCNC: 98 MMOL/L (ref 98–107)
CLARITY UR: CLEAR
CO2 BLDA-SCNC: 18.1 MMOL/L (ref 23–27)
CO2 BLDA-SCNC: 21.7 MMOL/L (ref 23–27)
CO2 BLDA-SCNC: 25.1 MMOL/L (ref 23–27)
CO2 BLDA-SCNC: 25.8 MMOL/L (ref 23–27)
CO2 BLDA-SCNC: 26.3 MMOL/L (ref 23–27)
CO2 BLDA-SCNC: 26.8 MMOL/L (ref 23–27)
CO2 BLDA-SCNC: 27.3 MMOL/L (ref 23–27)
CO2 BLDA-SCNC: 27.4 MMOL/L (ref 23–27)
CO2 BLDA-SCNC: 30 MMOL/L (ref 23–27)
CO2 BLDA-SCNC: 30 MMOL/L (ref 23–27)
CO2 BLDA-SCNC: 31.7 MMOL/L (ref 23–27)
CO2 SERPL-SCNC: 19 MMOL/L (ref 22–29)
CO2 SERPL-SCNC: 20 MMOL/L (ref 22–29)
CO2 SERPL-SCNC: 20.3 MMOL/L (ref 22–29)
CO2 SERPL-SCNC: 20.9 MMOL/L (ref 22–29)
CO2 SERPL-SCNC: 22.2 MMOL/L (ref 22–29)
CO2 SERPL-SCNC: 22.3 MMOL/L (ref 22–29)
CO2 SERPL-SCNC: 22.3 MMOL/L (ref 22–29)
CO2 SERPL-SCNC: 23 MMOL/L (ref 22–29)
CO2 SERPL-SCNC: 23.4 MMOL/L (ref 22–29)
CO2 SERPL-SCNC: 23.9 MMOL/L (ref 22–29)
CO2 SERPL-SCNC: 24 MMOL/L (ref 22–29)
CO2 SERPL-SCNC: 24.4 MMOL/L (ref 22–29)
CO2 SERPL-SCNC: 24.9 MMOL/L (ref 22–29)
CO2 SERPL-SCNC: 25 MMOL/L (ref 22–29)
CO2 SERPL-SCNC: 25 MMOL/L (ref 22–29)
CO2 SERPL-SCNC: 25.3 MMOL/L (ref 22–29)
CO2 SERPL-SCNC: 25.8 MMOL/L (ref 22–29)
CO2 SERPL-SCNC: 26 MMOL/L (ref 22–29)
CO2 SERPL-SCNC: 27 MMOL/L (ref 22–29)
COLOR UR: ABNORMAL
CREAT BLDA-MCNC: 1.97 MG/DL (ref 0.6–1.3)
CREAT SERPL-MCNC: 0.62 MG/DL (ref 0.76–1.27)
CREAT SERPL-MCNC: 0.63 MG/DL (ref 0.76–1.27)
CREAT SERPL-MCNC: 0.63 MG/DL (ref 0.76–1.27)
CREAT SERPL-MCNC: 0.65 MG/DL (ref 0.76–1.27)
CREAT SERPL-MCNC: 0.67 MG/DL (ref 0.76–1.27)
CREAT SERPL-MCNC: 0.69 MG/DL (ref 0.76–1.27)
CREAT SERPL-MCNC: 0.71 MG/DL (ref 0.76–1.27)
CREAT SERPL-MCNC: 0.71 MG/DL (ref 0.76–1.27)
CREAT SERPL-MCNC: 0.72 MG/DL (ref 0.76–1.27)
CREAT SERPL-MCNC: 0.73 MG/DL (ref 0.76–1.27)
CREAT SERPL-MCNC: 0.75 MG/DL (ref 0.76–1.27)
CREAT SERPL-MCNC: 0.75 MG/DL (ref 0.76–1.27)
CREAT SERPL-MCNC: 0.86 MG/DL (ref 0.76–1.27)
CREAT SERPL-MCNC: 0.97 MG/DL (ref 0.76–1.27)
CREAT SERPL-MCNC: 1.03 MG/DL (ref 0.76–1.27)
CREAT SERPL-MCNC: 1.1 MG/DL (ref 0.76–1.27)
CREAT SERPL-MCNC: 1.93 MG/DL (ref 0.76–1.27)
CREAT UR-MCNC: 153.4 MG/DL
CRP SERPL-MCNC: 6.41 MG/DL (ref 0–0.5)
CRYPTOSP DNA STL QL NAA+NON-PROBE: NOT DETECTED
D-LACTATE SERPL-SCNC: 2.2 MMOL/L (ref 0.5–2)
D-LACTATE SERPL-SCNC: 2.3 MMOL/L (ref 0.5–2)
D-LACTATE SERPL-SCNC: 2.5 MMOL/L (ref 0.5–2)
D-LACTATE SERPL-SCNC: 2.5 MMOL/L (ref 0.5–2)
D-LACTATE SERPL-SCNC: 2.6 MMOL/L (ref 0.5–2)
D-LACTATE SERPL-SCNC: 2.7 MMOL/L (ref 0.5–2)
D-LACTATE SERPL-SCNC: 2.8 MMOL/L (ref 0.5–2)
D-LACTATE SERPL-SCNC: 3.3 MMOL/L (ref 0.5–2)
D-LACTATE SERPL-SCNC: 4.1 MMOL/L (ref 0.5–2)
D-LACTATE SERPL-SCNC: 4.4 MMOL/L (ref 0.5–2)
D-LACTATE SERPL-SCNC: 4.9 MMOL/L (ref 0.5–2)
D-LACTATE SERPL-SCNC: 5 MMOL/L (ref 0.5–2)
D-LACTATE SERPL-SCNC: 5.2 MMOL/L (ref 0.5–2)
D-LACTATE SERPL-SCNC: 5.3 MMOL/L
DEPRECATED RDW RBC AUTO: 47.2 FL (ref 37–54)
DEPRECATED RDW RBC AUTO: 47.3 FL (ref 37–54)
DEPRECATED RDW RBC AUTO: 47.3 FL (ref 37–54)
DEPRECATED RDW RBC AUTO: 47.7 FL (ref 37–54)
DEPRECATED RDW RBC AUTO: 48 FL (ref 37–54)
DEPRECATED RDW RBC AUTO: 48.2 FL (ref 37–54)
DEPRECATED RDW RBC AUTO: 48.5 FL (ref 37–54)
DEPRECATED RDW RBC AUTO: 48.6 FL (ref 37–54)
DEPRECATED RDW RBC AUTO: 48.6 FL (ref 37–54)
DEPRECATED RDW RBC AUTO: 48.9 FL (ref 37–54)
DEPRECATED RDW RBC AUTO: 49 FL (ref 37–54)
DEPRECATED RDW RBC AUTO: 49.2 FL (ref 37–54)
DEPRECATED RDW RBC AUTO: 49.6 FL (ref 37–54)
DEPRECATED RDW RBC AUTO: 50.5 FL (ref 37–54)
DEPRECATED RDW RBC AUTO: 50.6 FL (ref 37–54)
DEPRECATED RDW RBC AUTO: 51.2 FL (ref 37–54)
DEPRECATED RDW RBC AUTO: 51.3 FL (ref 37–54)
DEVICE COMMENT 2: ABNORMAL
DEVICE COMMENT: ABNORMAL
DEVICE COMMENT: NORMAL
E HISTOLYT DNA STL QL NAA+NON-PROBE: NOT DETECTED
EAEC PAA PLAS AGGR+AATA ST NAA+NON-PRB: NOT DETECTED
EC STX1+STX2 GENES STL QL NAA+NON-PROBE: NOT DETECTED
EGFRCR SERPLBLD CKD-EPI 2021: 100.3 ML/MIN/1.73
EGFRCR SERPLBLD CKD-EPI 2021: 35.9 ML/MIN/1.73
EGFRCR SERPLBLD CKD-EPI 2021: 70.4 ML/MIN/1.73
EGFRCR SERPLBLD CKD-EPI 2021: 76.2 ML/MIN/1.73
EGFRCR SERPLBLD CKD-EPI 2021: 81.9 ML/MIN/1.73
EGFRCR SERPLBLD CKD-EPI 2021: 90.9 ML/MIN/1.73
EGFRCR SERPLBLD CKD-EPI 2021: 94.7 ML/MIN/1.73
EGFRCR SERPLBLD CKD-EPI 2021: 94.7 ML/MIN/1.73
EGFRCR SERPLBLD CKD-EPI 2021: 95.5 ML/MIN/1.73
EGFRCR SERPLBLD CKD-EPI 2021: 95.9 ML/MIN/1.73
EGFRCR SERPLBLD CKD-EPI 2021: 96.3 ML/MIN/1.73
EGFRCR SERPLBLD CKD-EPI 2021: 96.3 ML/MIN/1.73
EGFRCR SERPLBLD CKD-EPI 2021: 97.1 ML/MIN/1.73
EGFRCR SERPLBLD CKD-EPI 2021: 98 ML/MIN/1.73
EGFRCR SERPLBLD CKD-EPI 2021: 98.9 ML/MIN/1.73
EGFRCR SERPLBLD CKD-EPI 2021: 99.8 ML/MIN/1.73
EGFRCR SERPLBLD CKD-EPI 2021: 99.8 ML/MIN/1.73
ELASTASE PANC STL-MCNT: 235 UG ELAST./G
EOSINOPHIL # BLD AUTO: 0 10*3/MM3 (ref 0–0.4)
EOSINOPHIL # BLD AUTO: 0.02 10*3/MM3 (ref 0–0.4)
EOSINOPHIL # BLD AUTO: 0.02 10*3/MM3 (ref 0–0.4)
EOSINOPHIL # BLD AUTO: 0.08 10*3/MM3 (ref 0–0.4)
EOSINOPHIL # BLD AUTO: 0.11 10*3/MM3 (ref 0–0.4)
EOSINOPHIL # BLD AUTO: 0.14 10*3/MM3 (ref 0–0.4)
EOSINOPHIL NFR BLD AUTO: 0 % (ref 0.3–6.2)
EOSINOPHIL NFR BLD AUTO: 0.1 % (ref 0.3–6.2)
EOSINOPHIL NFR BLD AUTO: 0.1 % (ref 0.3–6.2)
EOSINOPHIL NFR BLD AUTO: 0.5 % (ref 0.3–6.2)
EOSINOPHIL NFR BLD AUTO: 0.6 % (ref 0.3–6.2)
EOSINOPHIL NFR BLD AUTO: 1.3 % (ref 0.3–6.2)
EPEC EAE GENE STL QL NAA+NON-PROBE: NOT DETECTED
ERYTHROCYTE [DISTWIDTH] IN BLOOD BY AUTOMATED COUNT: 14.3 % (ref 12.3–15.4)
ERYTHROCYTE [DISTWIDTH] IN BLOOD BY AUTOMATED COUNT: 14.3 % (ref 12.3–15.4)
ERYTHROCYTE [DISTWIDTH] IN BLOOD BY AUTOMATED COUNT: 14.4 % (ref 12.3–15.4)
ERYTHROCYTE [DISTWIDTH] IN BLOOD BY AUTOMATED COUNT: 14.5 % (ref 12.3–15.4)
ERYTHROCYTE [DISTWIDTH] IN BLOOD BY AUTOMATED COUNT: 14.6 % (ref 12.3–15.4)
ERYTHROCYTE [DISTWIDTH] IN BLOOD BY AUTOMATED COUNT: 14.6 % (ref 12.3–15.4)
ERYTHROCYTE [DISTWIDTH] IN BLOOD BY AUTOMATED COUNT: 14.7 % (ref 12.3–15.4)
ERYTHROCYTE [DISTWIDTH] IN BLOOD BY AUTOMATED COUNT: 14.8 % (ref 12.3–15.4)
ERYTHROCYTE [DISTWIDTH] IN BLOOD BY AUTOMATED COUNT: 14.8 % (ref 12.3–15.4)
ERYTHROCYTE [DISTWIDTH] IN BLOOD BY AUTOMATED COUNT: 14.9 % (ref 12.3–15.4)
ERYTHROCYTE [SEDIMENTATION RATE] IN BLOOD: 43 MM/HR (ref 0–20)
ETEC LTA+ST1A+ST1B TOX ST NAA+NON-PROBE: NOT DETECTED
G LAMBLIA DNA STL QL NAA+NON-PROBE: NOT DETECTED
GAS FLOW AIRWAY: 15 LPM
GAS FLOW AIRWAY: 2 LPM
GAS FLOW AIRWAY: 6 LPM
GEN 5 2HR TROPONIN T REFLEX: 18 NG/L
GEN 5 2HR TROPONIN T REFLEX: 20 NG/L
GLOBULIN UR ELPH-MCNC: 2.8 GM/DL
GLOBULIN UR ELPH-MCNC: 2.8 GM/DL
GLOBULIN UR ELPH-MCNC: 3 GM/DL
GLOBULIN UR ELPH-MCNC: 3.2 GM/DL
GLOBULIN UR ELPH-MCNC: 3.2 GM/DL
GLOBULIN UR ELPH-MCNC: 3.3 GM/DL
GLOBULIN UR ELPH-MCNC: 3.4 GM/DL
GLOBULIN UR ELPH-MCNC: 3.4 GM/DL
GLOBULIN UR ELPH-MCNC: 3.5 GM/DL
GLOBULIN UR ELPH-MCNC: 3.5 GM/DL
GLOBULIN UR ELPH-MCNC: 3.8 GM/DL
GLUCOSE BLDC GLUCOMTR-MCNC: 106 MG/DL (ref 70–130)
GLUCOSE BLDC GLUCOMTR-MCNC: 110 MG/DL (ref 70–130)
GLUCOSE BLDC GLUCOMTR-MCNC: 122 MG/DL (ref 70–130)
GLUCOSE BLDC GLUCOMTR-MCNC: 129 MG/DL (ref 70–130)
GLUCOSE BLDC GLUCOMTR-MCNC: 129 MG/DL (ref 70–130)
GLUCOSE BLDC GLUCOMTR-MCNC: 134 MG/DL (ref 70–130)
GLUCOSE BLDC GLUCOMTR-MCNC: 150 MG/DL (ref 70–130)
GLUCOSE BLDC GLUCOMTR-MCNC: 152 MG/DL (ref 70–130)
GLUCOSE BLDC GLUCOMTR-MCNC: 155 MG/DL (ref 70–130)
GLUCOSE BLDC GLUCOMTR-MCNC: 158 MG/DL (ref 70–130)
GLUCOSE BLDC GLUCOMTR-MCNC: 160 MG/DL (ref 70–130)
GLUCOSE BLDC GLUCOMTR-MCNC: 161 MG/DL (ref 70–130)
GLUCOSE BLDC GLUCOMTR-MCNC: 164 MG/DL (ref 70–130)
GLUCOSE BLDC GLUCOMTR-MCNC: 169 MG/DL (ref 70–130)
GLUCOSE BLDC GLUCOMTR-MCNC: 171 MG/DL (ref 70–130)
GLUCOSE BLDC GLUCOMTR-MCNC: 177 MG/DL (ref 65–99)
GLUCOSE BLDC GLUCOMTR-MCNC: 177 MG/DL (ref 70–130)
GLUCOSE BLDC GLUCOMTR-MCNC: 199 MG/DL (ref 70–130)
GLUCOSE BLDC GLUCOMTR-MCNC: 200 MG/DL (ref 70–130)
GLUCOSE BLDC GLUCOMTR-MCNC: 201 MG/DL (ref 70–130)
GLUCOSE BLDC GLUCOMTR-MCNC: 206 MG/DL (ref 70–130)
GLUCOSE BLDC GLUCOMTR-MCNC: 210 MG/DL (ref 70–130)
GLUCOSE BLDC GLUCOMTR-MCNC: 253 MG/DL (ref 70–130)
GLUCOSE BLDC GLUCOMTR-MCNC: 37 MG/DL (ref 70–130)
GLUCOSE SERPL-MCNC: 113 MG/DL (ref 65–99)
GLUCOSE SERPL-MCNC: 124 MG/DL (ref 65–99)
GLUCOSE SERPL-MCNC: 127 MG/DL (ref 65–99)
GLUCOSE SERPL-MCNC: 131 MG/DL (ref 65–99)
GLUCOSE SERPL-MCNC: 131 MG/DL (ref 65–99)
GLUCOSE SERPL-MCNC: 135 MG/DL (ref 65–99)
GLUCOSE SERPL-MCNC: 136 MG/DL (ref 65–99)
GLUCOSE SERPL-MCNC: 137 MG/DL (ref 65–99)
GLUCOSE SERPL-MCNC: 145 MG/DL (ref 65–99)
GLUCOSE SERPL-MCNC: 146 MG/DL (ref 65–99)
GLUCOSE SERPL-MCNC: 160 MG/DL (ref 65–99)
GLUCOSE SERPL-MCNC: 165 MG/DL (ref 65–99)
GLUCOSE SERPL-MCNC: 167 MG/DL (ref 65–99)
GLUCOSE SERPL-MCNC: 167 MG/DL (ref 65–99)
GLUCOSE SERPL-MCNC: 184 MG/DL (ref 65–99)
GLUCOSE SERPL-MCNC: 204 MG/DL (ref 65–99)
GLUCOSE SERPL-MCNC: 213 MG/DL (ref 65–99)
GLUCOSE SERPL-MCNC: 94 MG/DL (ref 65–99)
GLUCOSE SERPL-MCNC: 99 MG/DL (ref 65–99)
GLUCOSE UR STRIP-MCNC: NEGATIVE MG/DL
GRAM STN SPEC: ABNORMAL
GRAM STN SPEC: NORMAL
GRAM STN SPEC: NORMAL
HCO3 BLDA-SCNC: 18.3 MMOL/L (ref 22–28)
HCO3 BLDA-SCNC: 20.8 MMOL/L (ref 22–28)
HCO3 BLDA-SCNC: 23.9 MMOL/L (ref 22–28)
HCO3 BLDA-SCNC: 24.7 MMOL/L (ref 22–28)
HCO3 BLDA-SCNC: 25.4 MMOL/L (ref 22–28)
HCO3 BLDA-SCNC: 25.5 MMOL/L (ref 22–28)
HCO3 BLDA-SCNC: 26 MMOL/L (ref 22–28)
HCO3 BLDA-SCNC: 26.4 MMOL/L (ref 22–28)
HCO3 BLDA-SCNC: 28.6 MMOL/L (ref 22–28)
HCO3 BLDA-SCNC: 29 MMOL/L (ref 22–28)
HCO3 BLDA-SCNC: 30.1 MMOL/L (ref 22–28)
HCT VFR BLD AUTO: 30.7 % (ref 37.5–51)
HCT VFR BLD AUTO: 30.9 % (ref 37.5–51)
HCT VFR BLD AUTO: 31.6 % (ref 37.5–51)
HCT VFR BLD AUTO: 31.7 % (ref 37.5–51)
HCT VFR BLD AUTO: 32 % (ref 37.5–51)
HCT VFR BLD AUTO: 32.2 % (ref 37.5–51)
HCT VFR BLD AUTO: 32.4 % (ref 37.5–51)
HCT VFR BLD AUTO: 32.6 % (ref 37.5–51)
HCT VFR BLD AUTO: 32.8 % (ref 37.5–51)
HCT VFR BLD AUTO: 33.8 % (ref 37.5–51)
HCT VFR BLD AUTO: 33.9 % (ref 37.5–51)
HCT VFR BLD AUTO: 34.2 % (ref 37.5–51)
HCT VFR BLD AUTO: 34.4 % (ref 37.5–51)
HCT VFR BLD AUTO: 36.9 % (ref 37.5–51)
HCT VFR BLD AUTO: 38.4 % (ref 37.5–51)
HCT VFR BLD AUTO: 38.7 % (ref 37.5–51)
HCT VFR BLD AUTO: 39 % (ref 37.5–51)
HCT VFR BLD AUTO: 39.4 % (ref 37.5–51)
HCT VFR BLD AUTO: 39.5 % (ref 37.5–51)
HCT VFR BLDA CALC: 32 % (ref 38–51)
HEMODILUTION: NO
HGB BLD-MCNC: 10 G/DL (ref 13–17.7)
HGB BLD-MCNC: 10.4 G/DL (ref 13–17.7)
HGB BLD-MCNC: 10.4 G/DL (ref 13–17.7)
HGB BLD-MCNC: 10.5 G/DL (ref 13–17.7)
HGB BLD-MCNC: 10.7 G/DL (ref 13–17.7)
HGB BLD-MCNC: 10.7 G/DL (ref 13–17.7)
HGB BLD-MCNC: 11.1 G/DL (ref 13–17.7)
HGB BLD-MCNC: 11.2 G/DL (ref 13–17.7)
HGB BLD-MCNC: 11.2 G/DL (ref 13–17.7)
HGB BLD-MCNC: 11.3 G/DL (ref 13–17.7)
HGB BLD-MCNC: 11.9 G/DL (ref 13–17.7)
HGB BLD-MCNC: 12.6 G/DL (ref 13–17.7)
HGB BLD-MCNC: 12.6 G/DL (ref 13–17.7)
HGB BLD-MCNC: 12.8 G/DL (ref 13–17.7)
HGB BLD-MCNC: 12.9 G/DL (ref 13–17.7)
HGB BLD-MCNC: 13.1 G/DL (ref 13–17.7)
HGB BLD-MCNC: 9.6 G/DL (ref 13–17.7)
HGB BLD-MCNC: 9.8 G/DL (ref 13–17.7)
HGB BLD-MCNC: 9.9 G/DL (ref 13–17.7)
HGB BLDA-MCNC: 10.9 G/DL (ref 12–17)
HGB UR QL STRIP.AUTO: NEGATIVE
HOLD SPECIMEN: NORMAL
HOLD SPECIMEN: NORMAL
HYALINE CASTS UR QL AUTO: NORMAL /LPF
IMM GRANULOCYTES # BLD AUTO: 0.06 10*3/MM3 (ref 0–0.05)
IMM GRANULOCYTES # BLD AUTO: 0.08 10*3/MM3 (ref 0–0.05)
IMM GRANULOCYTES # BLD AUTO: 0.09 10*3/MM3 (ref 0–0.05)
IMM GRANULOCYTES # BLD AUTO: 0.11 10*3/MM3 (ref 0–0.05)
IMM GRANULOCYTES # BLD AUTO: 0.2 10*3/MM3 (ref 0–0.05)
IMM GRANULOCYTES # BLD AUTO: 0.25 10*3/MM3 (ref 0–0.05)
IMM GRANULOCYTES NFR BLD AUTO: 0.5 % (ref 0–0.5)
IMM GRANULOCYTES NFR BLD AUTO: 0.5 % (ref 0–0.5)
IMM GRANULOCYTES NFR BLD AUTO: 0.6 % (ref 0–0.5)
IMM GRANULOCYTES NFR BLD AUTO: 0.6 % (ref 0–0.5)
IMM GRANULOCYTES NFR BLD AUTO: 1.3 % (ref 0–0.5)
IMM GRANULOCYTES NFR BLD AUTO: 1.3 % (ref 0–0.5)
INHALED O2 CONCENTRATION: 100 %
INHALED O2 CONCENTRATION: 45 %
INHALED O2 CONCENTRATION: 50 %
INHALED O2 CONCENTRATION: 50 %
INHALED O2 CONCENTRATION: 55 %
INHALED O2 CONCENTRATION: 55 %
INHALED O2 CONCENTRATION: 60 %
INHALED O2 CONCENTRATION: 65 %
INR PPP: 1.05 (ref 0.9–1.1)
INR PPP: 2.89 (ref 0.9–1.1)
INSPIRATORY TIME: 1
INSPIRATORY TIME: 32
ISOLATED FROM: ABNORMAL
KETONES UR QL STRIP: NEGATIVE
LAB AP CASE REPORT: NORMAL
LEFT ATRIUM VOLUME INDEX: 20.6 ML/M2
LEUKOCYTE ESTERASE UR QL STRIP.AUTO: NEGATIVE
LIPASE SERPL-CCNC: 18 U/L (ref 13–60)
LYMPHOCYTES # BLD AUTO: 1.12 10*3/MM3 (ref 0.7–3.1)
LYMPHOCYTES # BLD AUTO: 1.32 10*3/MM3 (ref 0.7–3.1)
LYMPHOCYTES # BLD AUTO: 1.39 10*3/MM3 (ref 0.7–3.1)
LYMPHOCYTES # BLD AUTO: 1.61 10*3/MM3 (ref 0.7–3.1)
LYMPHOCYTES # BLD AUTO: 1.63 10*3/MM3 (ref 0.7–3.1)
LYMPHOCYTES # BLD AUTO: 2.37 10*3/MM3 (ref 0.7–3.1)
LYMPHOCYTES NFR BLD AUTO: 11.1 % (ref 19.6–45.3)
LYMPHOCYTES NFR BLD AUTO: 12.6 % (ref 19.6–45.3)
LYMPHOCYTES NFR BLD AUTO: 15.1 % (ref 19.6–45.3)
LYMPHOCYTES NFR BLD AUTO: 6.7 % (ref 19.6–45.3)
LYMPHOCYTES NFR BLD AUTO: 7.2 % (ref 19.6–45.3)
LYMPHOCYTES NFR BLD AUTO: 8 % (ref 19.6–45.3)
Lab: ABNORMAL
MAGNESIUM SERPL-MCNC: 1.8 MG/DL (ref 1.6–2.4)
MAGNESIUM SERPL-MCNC: 2 MG/DL (ref 1.6–2.4)
MAGNESIUM SERPL-MCNC: 2.2 MG/DL (ref 1.6–2.4)
MAGNESIUM SERPL-MCNC: 2.2 MG/DL (ref 1.6–2.4)
MAGNESIUM SERPL-MCNC: 2.3 MG/DL (ref 1.6–2.4)
MAGNESIUM SERPL-MCNC: 2.5 MG/DL (ref 1.6–2.4)
MAGNESIUM SERPL-MCNC: 2.7 MG/DL (ref 1.6–2.4)
MCH RBC QN AUTO: 28.7 PG (ref 26.6–33)
MCH RBC QN AUTO: 29.1 PG (ref 26.6–33)
MCH RBC QN AUTO: 29.2 PG (ref 26.6–33)
MCH RBC QN AUTO: 29.3 PG (ref 26.6–33)
MCH RBC QN AUTO: 29.5 PG (ref 26.6–33)
MCH RBC QN AUTO: 29.5 PG (ref 26.6–33)
MCH RBC QN AUTO: 29.8 PG (ref 26.6–33)
MCH RBC QN AUTO: 29.9 PG (ref 26.6–33)
MCH RBC QN AUTO: 30 PG (ref 26.6–33)
MCH RBC QN AUTO: 30.1 PG (ref 26.6–33)
MCH RBC QN AUTO: 30.1 PG (ref 26.6–33)
MCH RBC QN AUTO: 30.2 PG (ref 26.6–33)
MCH RBC QN AUTO: 30.4 PG (ref 26.6–33)
MCH RBC QN AUTO: 30.9 PG (ref 26.6–33)
MCHC RBC AUTO-ENTMCNC: 30.7 G/DL (ref 31.5–35.7)
MCHC RBC AUTO-ENTMCNC: 30.9 G/DL (ref 31.5–35.7)
MCHC RBC AUTO-ENTMCNC: 31.3 G/DL (ref 31.5–35.7)
MCHC RBC AUTO-ENTMCNC: 32.1 G/DL (ref 31.5–35.7)
MCHC RBC AUTO-ENTMCNC: 32.2 G/DL (ref 31.5–35.7)
MCHC RBC AUTO-ENTMCNC: 32.4 G/DL (ref 31.5–35.7)
MCHC RBC AUTO-ENTMCNC: 32.5 G/DL (ref 31.5–35.7)
MCHC RBC AUTO-ENTMCNC: 32.6 G/DL (ref 31.5–35.7)
MCHC RBC AUTO-ENTMCNC: 32.7 G/DL (ref 31.5–35.7)
MCHC RBC AUTO-ENTMCNC: 32.7 G/DL (ref 31.5–35.7)
MCHC RBC AUTO-ENTMCNC: 32.8 G/DL (ref 31.5–35.7)
MCHC RBC AUTO-ENTMCNC: 33 G/DL (ref 31.5–35.7)
MCHC RBC AUTO-ENTMCNC: 33.1 G/DL (ref 31.5–35.7)
MCHC RBC AUTO-ENTMCNC: 33.2 G/DL (ref 31.5–35.7)
MCHC RBC AUTO-ENTMCNC: 33.2 G/DL (ref 31.5–35.7)
MCV RBC AUTO: 89.4 FL (ref 79–97)
MCV RBC AUTO: 89.8 FL (ref 79–97)
MCV RBC AUTO: 90.8 FL (ref 79–97)
MCV RBC AUTO: 91.2 FL (ref 79–97)
MCV RBC AUTO: 91.4 FL (ref 79–97)
MCV RBC AUTO: 91.4 FL (ref 79–97)
MCV RBC AUTO: 91.5 FL (ref 79–97)
MCV RBC AUTO: 91.6 FL (ref 79–97)
MCV RBC AUTO: 91.6 FL (ref 79–97)
MCV RBC AUTO: 91.9 FL (ref 79–97)
MCV RBC AUTO: 92.4 FL (ref 79–97)
MCV RBC AUTO: 92.5 FL (ref 79–97)
MCV RBC AUTO: 92.5 FL (ref 79–97)
MCV RBC AUTO: 93.1 FL (ref 79–97)
MCV RBC AUTO: 93.1 FL (ref 79–97)
MCV RBC AUTO: 95.3 FL (ref 79–97)
MCV RBC AUTO: 95.5 FL (ref 79–97)
MODALITY: ABNORMAL
MODALITY: NORMAL
MONOCYTES # BLD AUTO: 0.78 10*3/MM3 (ref 0.1–0.9)
MONOCYTES # BLD AUTO: 1.27 10*3/MM3 (ref 0.1–0.9)
MONOCYTES # BLD AUTO: 1.3 10*3/MM3 (ref 0.1–0.9)
MONOCYTES # BLD AUTO: 1.49 10*3/MM3 (ref 0.1–0.9)
MONOCYTES # BLD AUTO: 2.12 10*3/MM3 (ref 0.1–0.9)
MONOCYTES # BLD AUTO: 2.46 10*3/MM3 (ref 0.1–0.9)
MONOCYTES NFR BLD AUTO: 10.1 % (ref 5–12)
MONOCYTES NFR BLD AUTO: 11.3 % (ref 5–12)
MONOCYTES NFR BLD AUTO: 12.2 % (ref 5–12)
MONOCYTES NFR BLD AUTO: 14.2 % (ref 5–12)
MONOCYTES NFR BLD AUTO: 4 % (ref 5–12)
MONOCYTES NFR BLD AUTO: 8.2 % (ref 5–12)
MRSA DNA SPEC QL NAA+PROBE: NORMAL
NEUTROPHILS NFR BLD AUTO: 11.35 10*3/MM3 (ref 1.7–7)
NEUTROPHILS NFR BLD AUTO: 12.89 10*3/MM3 (ref 1.7–7)
NEUTROPHILS NFR BLD AUTO: 13.3 10*3/MM3 (ref 1.7–7)
NEUTROPHILS NFR BLD AUTO: 14.01 10*3/MM3 (ref 1.7–7)
NEUTROPHILS NFR BLD AUTO: 17.28 10*3/MM3 (ref 1.7–7)
NEUTROPHILS NFR BLD AUTO: 7.52 10*3/MM3 (ref 1.7–7)
NEUTROPHILS NFR BLD AUTO: 70.4 % (ref 42.7–76)
NEUTROPHILS NFR BLD AUTO: 74.6 % (ref 42.7–76)
NEUTROPHILS NFR BLD AUTO: 77 % (ref 42.7–76)
NEUTROPHILS NFR BLD AUTO: 77.5 % (ref 42.7–76)
NEUTROPHILS NFR BLD AUTO: 83.2 % (ref 42.7–76)
NEUTROPHILS NFR BLD AUTO: 87.8 % (ref 42.7–76)
NITRITE UR QL STRIP: NEGATIVE
NOROVIRUS GI+II RNA STL QL NAA+NON-PROBE: NOT DETECTED
NOTIFIED WHO: ABNORMAL
NRBC BLD AUTO-RTO: 0 /100 WBC (ref 0–0.2)
NRBC BLD AUTO-RTO: 0.4 /100 WBC (ref 0–0.2)
NT-PROBNP SERPL-MCNC: 734 PG/ML (ref 0–900)
O2 A-A PPRESDIFF RESPIRATORY: 0.1 MMHG
O2 A-A PPRESDIFF RESPIRATORY: 0.2 MMHG
O2 A-A PPRESDIFF RESPIRATORY: 0.3 MMHG
OSMOLALITY SERPL: 270 MOSM/KG (ref 280–301)
OSMOLALITY UR: 515 MOSM/KG
P SHIGELLOIDES DNA STL QL NAA+NON-PROBE: NOT DETECTED
PATH REPORT.FINAL DX SPEC: NORMAL
PATH REPORT.GROSS SPEC: NORMAL
PAW @ PEAK INSP FLOW SETTING VENT: 15 CMH2O
PCO2 BLDA: 30 MM HG (ref 35–45)
PCO2 BLDA: 30.8 MM HG (ref 35–45)
PCO2 BLDA: 32.8 MM HG (ref 35–45)
PCO2 BLDA: 35.4 MM HG (ref 35–45)
PCO2 BLDA: 36.2 MM HG (ref 35–45)
PCO2 BLDA: 38.3 MM HG (ref 35–45)
PCO2 BLDA: 38.4 MM HG (ref 35–45)
PCO2 BLDA: 42.5 MM HG (ref 35–45)
PCO2 BLDA: 43.3 MM HG (ref 35–45)
PCO2 BLDA: 47.5 MM HG (ref 35–45)
PCO2 BLDA: 52 MM HG (ref 35–45)
PEEP RESPIRATORY: 10 CM[H2O]
PEEP RESPIRATORY: 4 CM[H2O]
PEEP RESPIRATORY: 5 CM[H2O]
PEEP RESPIRATORY: 6 CM[H2O]
PEEP RESPIRATORY: 6 CM[H2O]
PH BLDA: 7.29 PH UNITS (ref 7.35–7.45)
PH BLDA: 7.37 PH UNITS (ref 7.35–7.45)
PH BLDA: 7.38 PH UNITS (ref 7.35–7.45)
PH BLDA: 7.39 PH UNITS (ref 7.35–7.45)
PH BLDA: 7.39 PH UNITS (ref 7.35–7.45)
PH BLDA: 7.4 PH UNITS (ref 7.35–7.45)
PH BLDA: 7.44 PH UNITS (ref 7.35–7.45)
PH BLDA: 7.44 PH UNITS (ref 7.35–7.45)
PH BLDA: 7.51 PH UNITS (ref 7.35–7.45)
PH BLDA: 7.52 PH UNITS (ref 7.35–7.45)
PH BLDA: 7.54 PH UNITS (ref 7.35–7.45)
PH UR STRIP.AUTO: 6 [PH] (ref 5–8)
PHOSPHATE SERPL-MCNC: 2.1 MG/DL (ref 2.5–4.5)
PHOSPHATE SERPL-MCNC: 2.3 MG/DL (ref 2.5–4.5)
PHOSPHATE SERPL-MCNC: 2.5 MG/DL (ref 2.5–4.5)
PHOSPHATE SERPL-MCNC: 2.5 MG/DL (ref 2.5–4.5)
PHOSPHATE SERPL-MCNC: 2.6 MG/DL (ref 2.5–4.5)
PHOSPHATE SERPL-MCNC: 2.7 MG/DL (ref 2.5–4.5)
PHOSPHATE SERPL-MCNC: 3.1 MG/DL (ref 2.5–4.5)
PHOSPHATE SERPL-MCNC: 3.3 MG/DL (ref 2.5–4.5)
PHOSPHATE SERPL-MCNC: 3.4 MG/DL (ref 2.5–4.5)
PHOSPHATE SERPL-MCNC: 3.4 MG/DL (ref 2.5–4.5)
PHOSPHATE SERPL-MCNC: 3.6 MG/DL (ref 2.5–4.5)
PHOSPHATE SERPL-MCNC: 4.3 MG/DL (ref 2.5–4.5)
PHOSPHATE SERPL-MCNC: 4.6 MG/DL (ref 2.5–4.5)
PLATELET # BLD AUTO: 129 10*3/MM3 (ref 140–450)
PLATELET # BLD AUTO: 187 10*3/MM3 (ref 140–450)
PLATELET # BLD AUTO: 205 10*3/MM3 (ref 140–450)
PLATELET # BLD AUTO: 244 10*3/MM3 (ref 140–450)
PLATELET # BLD AUTO: 253 10*3/MM3 (ref 140–450)
PLATELET # BLD AUTO: 267 10*3/MM3 (ref 140–450)
PLATELET # BLD AUTO: 277 10*3/MM3 (ref 140–450)
PLATELET # BLD AUTO: 279 10*3/MM3 (ref 140–450)
PLATELET # BLD AUTO: 279 10*3/MM3 (ref 140–450)
PLATELET # BLD AUTO: 294 10*3/MM3 (ref 140–450)
PLATELET # BLD AUTO: 298 10*3/MM3 (ref 140–450)
PLATELET # BLD AUTO: 310 10*3/MM3 (ref 140–450)
PLATELET # BLD AUTO: 321 10*3/MM3 (ref 140–450)
PLATELET # BLD AUTO: 327 10*3/MM3 (ref 140–450)
PLATELET # BLD AUTO: 332 10*3/MM3 (ref 140–450)
PLATELET # BLD AUTO: 334 10*3/MM3 (ref 140–450)
PLATELET # BLD AUTO: 342 10*3/MM3 (ref 140–450)
PLATELET # BLD AUTO: 347 10*3/MM3 (ref 140–450)
PLATELET # BLD AUTO: 59 10*3/MM3 (ref 140–450)
PMV BLD AUTO: 10.3 FL (ref 6–12)
PMV BLD AUTO: 10.3 FL (ref 6–12)
PMV BLD AUTO: 10.4 FL (ref 6–12)
PMV BLD AUTO: 10.7 FL (ref 6–12)
PMV BLD AUTO: 11.2 FL (ref 6–12)
PMV BLD AUTO: 11.6 FL (ref 6–12)
PMV BLD AUTO: 12.2 FL (ref 6–12)
PMV BLD AUTO: 12.4 FL (ref 6–12)
PMV BLD AUTO: 12.8 FL (ref 6–12)
PMV BLD AUTO: 9.4 FL (ref 6–12)
PMV BLD AUTO: 9.5 FL (ref 6–12)
PMV BLD AUTO: 9.6 FL (ref 6–12)
PMV BLD AUTO: 9.6 FL (ref 6–12)
PMV BLD AUTO: 9.7 FL (ref 6–12)
PMV BLD AUTO: 9.8 FL (ref 6–12)
PMV BLD AUTO: 9.8 FL (ref 6–12)
PMV BLD AUTO: 9.9 FL (ref 6–12)
PMV BLD AUTO: 9.9 FL (ref 6–12)
PMV BLD AUTO: ABNORMAL FL
PO2 BLD: 100 MM[HG] (ref 0–500)
PO2 BLD: 107 MM[HG] (ref 0–500)
PO2 BLD: 115 MM[HG] (ref 0–500)
PO2 BLD: 129 MM[HG] (ref 0–500)
PO2 BLD: 168 MM[HG] (ref 0–500)
PO2 BLD: 185 MM[HG] (ref 0–500)
PO2 BLD: 204 MM[HG] (ref 0–500)
PO2 BLD: 51 MM[HG] (ref 0–500)
PO2 BLDA: 102 MM HG (ref 80–100)
PO2 BLDA: 51.1 MM HG (ref 80–100)
PO2 BLDA: 51.1 MM HG (ref 80–100)
PO2 BLDA: 55.2 MM HG (ref 80–100)
PO2 BLDA: 62.9 MM HG (ref 80–100)
PO2 BLDA: 63.3 MM HG (ref 80–100)
PO2 BLDA: 69.3 MM HG (ref 80–100)
PO2 BLDA: 74 MM HG (ref 80–100)
PO2 BLDA: 75.6 MM HG (ref 80–100)
PO2 BLDA: 77.5 MM HG (ref 80–100)
PO2 BLDA: 92.6 MM HG (ref 80–100)
POTASSIUM BLDA-SCNC: 5.2 MMOL/L (ref 3.5–5.2)
POTASSIUM SERPL-SCNC: 3.1 MMOL/L (ref 3.5–5.2)
POTASSIUM SERPL-SCNC: 3.3 MMOL/L (ref 3.5–5.2)
POTASSIUM SERPL-SCNC: 3.5 MMOL/L (ref 3.5–5.2)
POTASSIUM SERPL-SCNC: 3.5 MMOL/L (ref 3.5–5.2)
POTASSIUM SERPL-SCNC: 3.6 MMOL/L (ref 3.5–5.2)
POTASSIUM SERPL-SCNC: 3.7 MMOL/L (ref 3.5–5.2)
POTASSIUM SERPL-SCNC: 3.8 MMOL/L (ref 3.5–5.2)
POTASSIUM SERPL-SCNC: 3.9 MMOL/L (ref 3.5–5.2)
POTASSIUM SERPL-SCNC: 4 MMOL/L (ref 3.5–5.2)
POTASSIUM SERPL-SCNC: 4.1 MMOL/L (ref 3.5–5.2)
POTASSIUM SERPL-SCNC: 4.2 MMOL/L (ref 3.5–5.2)
POTASSIUM SERPL-SCNC: 4.2 MMOL/L (ref 3.5–5.2)
POTASSIUM SERPL-SCNC: 4.3 MMOL/L (ref 3.5–5.2)
POTASSIUM SERPL-SCNC: 4.5 MMOL/L (ref 3.5–5.2)
POTASSIUM SERPL-SCNC: 4.6 MMOL/L (ref 3.5–5.2)
POTASSIUM SERPL-SCNC: 4.9 MMOL/L (ref 3.5–5.2)
POTASSIUM SERPL-SCNC: 5.2 MMOL/L (ref 3.5–5.2)
POTASSIUM UR-SCNC: 56.5 MMOL/L
PROCALCITONIN SERPL-MCNC: 0.12 NG/ML (ref 0–0.25)
PROT ?TM UR-MCNC: 42.8 MG/DL
PROT SERPL-MCNC: 4.3 G/DL (ref 6–8.5)
PROT SERPL-MCNC: 4.7 G/DL (ref 6–8.5)
PROT SERPL-MCNC: 5 G/DL (ref 6–8.5)
PROT SERPL-MCNC: 5 G/DL (ref 6–8.5)
PROT SERPL-MCNC: 5.4 G/DL (ref 6–8.5)
PROT SERPL-MCNC: 5.7 G/DL (ref 6–8.5)
PROT SERPL-MCNC: 6 G/DL (ref 6–8.5)
PROT SERPL-MCNC: 6 G/DL (ref 6–8.5)
PROT SERPL-MCNC: 7.3 G/DL (ref 6–8.5)
PROT UR QL STRIP: ABNORMAL
PROT/CREAT UR: 279 MG/G CREA (ref 0–200)
PROTHROMBIN TIME: 13.9 SECONDS (ref 11.7–14.2)
PROTHROMBIN TIME: 30.5 SECONDS (ref 11.7–14.2)
QT INTERVAL: 309 MS
QT INTERVAL: 312 MS
QT INTERVAL: 326 MS
QT INTERVAL: 327 MS
QT INTERVAL: 333 MS
QT INTERVAL: 363 MS
QT INTERVAL: 365 MS
QT INTERVAL: 384 MS
QT INTERVAL: 392 MS
QT INTERVAL: 406 MS
QTC INTERVAL: 426 MS
QTC INTERVAL: 428 MS
QTC INTERVAL: 432 MS
QTC INTERVAL: 437 MS
QTC INTERVAL: 442 MS
QTC INTERVAL: 443 MS
QTC INTERVAL: 454 MS
QTC INTERVAL: 472 MS
QTC INTERVAL: 481 MS
QTC INTERVAL: 495 MS
RBC # BLD AUTO: 3.32 10*6/MM3 (ref 4.14–5.8)
RBC # BLD AUTO: 3.34 10*6/MM3 (ref 4.14–5.8)
RBC # BLD AUTO: 3.38 10*6/MM3 (ref 4.14–5.8)
RBC # BLD AUTO: 3.39 10*6/MM3 (ref 4.14–5.8)
RBC # BLD AUTO: 3.48 10*6/MM3 (ref 4.14–5.8)
RBC # BLD AUTO: 3.48 10*6/MM3 (ref 4.14–5.8)
RBC # BLD AUTO: 3.56 10*6/MM3 (ref 4.14–5.8)
RBC # BLD AUTO: 3.58 10*6/MM3 (ref 4.14–5.8)
RBC # BLD AUTO: 3.58 10*6/MM3 (ref 4.14–5.8)
RBC # BLD AUTO: 3.63 10*6/MM3 (ref 4.14–5.8)
RBC # BLD AUTO: 3.67 10*6/MM3 (ref 4.14–5.8)
RBC # BLD AUTO: 3.74 10*6/MM3 (ref 4.14–5.8)
RBC # BLD AUTO: 3.83 10*6/MM3 (ref 4.14–5.8)
RBC # BLD AUTO: 3.99 10*6/MM3 (ref 4.14–5.8)
RBC # BLD AUTO: 4.18 10*6/MM3 (ref 4.14–5.8)
RBC # BLD AUTO: 4.21 10*6/MM3 (ref 4.14–5.8)
RBC # BLD AUTO: 4.26 10*6/MM3 (ref 4.14–5.8)
RBC # BLD AUTO: 4.27 10*6/MM3 (ref 4.14–5.8)
RBC # BLD AUTO: 4.31 10*6/MM3 (ref 4.14–5.8)
RBC # UR STRIP: NORMAL /HPF
READ BACK: YES
REF LAB TEST METHOD: NORMAL
RVA RNA STL QL NAA+NON-PROBE: NOT DETECTED
S ENT+BONG DNA STL QL NAA+NON-PROBE: NOT DETECTED
SAO2 % BLDCOA: 86.8 % (ref 92–98.5)
SAO2 % BLDCOA: 89.6 % (ref 92–98.5)
SAO2 % BLDCOA: 90.3 % (ref 92–98.5)
SAO2 % BLDCOA: 91.3 % (ref 92–98.5)
SAO2 % BLDCOA: 91.8 % (ref 92–98.5)
SAO2 % BLDCOA: 93.3 % (ref 92–98.5)
SAO2 % BLDCOA: 95.2 % (ref 92–98.5)
SAO2 % BLDCOA: 95.4 % (ref 92–98.5)
SAO2 % BLDCOA: 95.4 % (ref 92–98.5)
SAO2 % BLDCOA: 97 % (ref 92–98.5)
SAO2 % BLDCOA: 98.1 % (ref 92–98.5)
SAPO I+II+IV+V RNA STL QL NAA+NON-PROBE: NOT DETECTED
SET MECH RESP RATE: 10
SET MECH RESP RATE: 12
SET MECH RESP RATE: 14
SET MECH RESP RATE: 24
SHIGELLA SP+EIEC IPAH ST NAA+NON-PROBE: NOT DETECTED
SODIUM BLD-SCNC: 142 MMOL/L (ref 136–145)
SODIUM SERPL-SCNC: 127 MMOL/L (ref 136–145)
SODIUM SERPL-SCNC: 129 MMOL/L (ref 136–145)
SODIUM SERPL-SCNC: 130 MMOL/L (ref 136–145)
SODIUM SERPL-SCNC: 131 MMOL/L (ref 136–145)
SODIUM SERPL-SCNC: 132 MMOL/L (ref 136–145)
SODIUM SERPL-SCNC: 135 MMOL/L (ref 136–145)
SODIUM SERPL-SCNC: 136 MMOL/L (ref 136–145)
SODIUM SERPL-SCNC: 137 MMOL/L (ref 136–145)
SODIUM SERPL-SCNC: 139 MMOL/L (ref 136–145)
SODIUM SERPL-SCNC: 140 MMOL/L (ref 136–145)
SODIUM SERPL-SCNC: 142 MMOL/L (ref 136–145)
SODIUM SERPL-SCNC: 142 MMOL/L (ref 136–145)
SODIUM SERPL-SCNC: 143 MMOL/L (ref 136–145)
SODIUM SERPL-SCNC: 143 MMOL/L (ref 136–145)
SODIUM SERPL-SCNC: 144 MMOL/L (ref 136–145)
SODIUM SERPL-SCNC: 145 MMOL/L (ref 136–145)
SODIUM SERPL-SCNC: 149 MMOL/L (ref 136–145)
SODIUM UR-SCNC: 41 MMOL/L
SP GR UR STRIP: 1.02 (ref 1–1.03)
SQUAMOUS #/AREA URNS HPF: NORMAL /HPF
TOTAL RATE: 14 BREATHS/MINUTE
TOTAL RATE: 15 BREATHS/MINUTE
TOTAL RATE: 17 BREATHS/MINUTE
TOTAL RATE: 20 BREATHS/MINUTE
TOTAL RATE: 21 BREATHS/MINUTE
TOTAL RATE: 24 BREATHS/MINUTE
TOTAL RATE: 24 BREATHS/MINUTE
TOTAL RATE: 27 BREATHS/MINUTE
TOTAL RATE: 27 BREATHS/MINUTE
TOTAL RATE: 32 BREATHS/MINUTE
TRIGL SERPL-MCNC: 106 MG/DL (ref 0–150)
TRIGL SERPL-MCNC: 201 MG/DL (ref 0–150)
TROPONIN T DELTA: -2 NG/L
TROPONIN T DELTA: -3 NG/L
TROPONIN T SERPL HS-MCNC: 20 NG/L
TROPONIN T SERPL HS-MCNC: 23 NG/L
URATE SERPL-MCNC: 2.4 MG/DL (ref 3.4–7)
URATE SERPL-MCNC: 2.7 MG/DL (ref 3.4–7)
URATE SERPL-MCNC: 3.2 MG/DL (ref 3.4–7)
URATE SERPL-MCNC: 4.4 MG/DL (ref 3.4–7)
UROBILINOGEN UR QL STRIP: ABNORMAL
V CHOL+PARA+VUL DNA STL QL NAA+NON-PROBE: NOT DETECTED
V CHOLERAE DNA STL QL NAA+NON-PROBE: NOT DETECTED
VENTILATOR MODE: ABNORMAL
VENTILATOR MODE: NORMAL
VT ON VENT VENT: 531 ML
VT ON VENT VENT: 749 ML
WBC # UR STRIP: NORMAL /HPF
WBC NRBC COR # BLD AUTO: 10.67 10*3/MM3 (ref 3.4–10.8)
WBC NRBC COR # BLD AUTO: 12.88 10*3/MM3 (ref 3.4–10.8)
WBC NRBC COR # BLD AUTO: 14.31 10*3/MM3 (ref 3.4–10.8)
WBC NRBC COR # BLD AUTO: 14.68 10*3/MM3 (ref 3.4–10.8)
WBC NRBC COR # BLD AUTO: 15.17 10*3/MM3 (ref 3.4–10.8)
WBC NRBC COR # BLD AUTO: 15.5 10*3/MM3 (ref 3.4–10.8)
WBC NRBC COR # BLD AUTO: 15.55 10*3/MM3 (ref 3.4–10.8)
WBC NRBC COR # BLD AUTO: 16.43 10*3/MM3 (ref 3.4–10.8)
WBC NRBC COR # BLD AUTO: 16.78 10*3/MM3 (ref 3.4–10.8)
WBC NRBC COR # BLD AUTO: 17.29 10*3/MM3 (ref 3.4–10.8)
WBC NRBC COR # BLD AUTO: 17.89 10*3/MM3 (ref 3.4–10.8)
WBC NRBC COR # BLD AUTO: 17.91 10*3/MM3 (ref 3.4–10.8)
WBC NRBC COR # BLD AUTO: 18.77 10*3/MM3 (ref 3.4–10.8)
WBC NRBC COR # BLD AUTO: 18.83 10*3/MM3 (ref 3.4–10.8)
WBC NRBC COR # BLD AUTO: 18.87 10*3/MM3 (ref 3.4–10.8)
WBC NRBC COR # BLD AUTO: 19.67 10*3/MM3 (ref 3.4–10.8)
WBC NRBC COR # BLD AUTO: 23.17 10*3/MM3 (ref 3.4–10.8)
WBC NRBC COR # BLD AUTO: 27.88 10*3/MM3 (ref 3.4–10.8)
WBC NRBC COR # BLD AUTO: 29.86 10*3/MM3 (ref 3.4–10.8)
WHOLE BLOOD HOLD COAG: NORMAL
WHOLE BLOOD HOLD SPECIMEN: NORMAL
Y ENTEROCOL DNA STL QL NAA+NON-PROBE: NOT DETECTED

## 2024-01-01 PROCEDURE — 80053 COMPREHEN METABOLIC PANEL: CPT | Performed by: INTERNAL MEDICINE

## 2024-01-01 PROCEDURE — 83605 ASSAY OF LACTIC ACID: CPT

## 2024-01-01 PROCEDURE — 99232 SBSQ HOSP IP/OBS MODERATE 35: CPT

## 2024-01-01 PROCEDURE — 93308 TTE F-UP OR LMTD: CPT

## 2024-01-01 PROCEDURE — 71045 X-RAY EXAM CHEST 1 VIEW: CPT

## 2024-01-01 PROCEDURE — 25810000003 SODIUM CHLORIDE 0.9 % SOLUTION: Performed by: INTERNAL MEDICINE

## 2024-01-01 PROCEDURE — 94799 UNLISTED PULMONARY SVC/PX: CPT

## 2024-01-01 PROCEDURE — 25010000002 ONDANSETRON PER 1 MG: Performed by: SURGERY

## 2024-01-01 PROCEDURE — 25010000002 FENTANYL CITRATE (PF) 2500 MCG/50ML SOLUTION: Performed by: INTERNAL MEDICINE

## 2024-01-01 PROCEDURE — 80053 COMPREHEN METABOLIC PANEL: CPT | Performed by: HOSPITALIST

## 2024-01-01 PROCEDURE — 93005 ELECTROCARDIOGRAM TRACING: CPT

## 2024-01-01 PROCEDURE — 99232 SBSQ HOSP IP/OBS MODERATE 35: CPT | Performed by: INTERNAL MEDICINE

## 2024-01-01 PROCEDURE — 85025 COMPLETE CBC W/AUTO DIFF WBC: CPT | Performed by: INTERNAL MEDICINE

## 2024-01-01 PROCEDURE — 63710000001 INSULIN REGULAR HUMAN PER 5 UNITS: Performed by: INTERNAL MEDICINE

## 2024-01-01 PROCEDURE — 83605 ASSAY OF LACTIC ACID: CPT | Performed by: EMERGENCY MEDICINE

## 2024-01-01 PROCEDURE — 74022 RADEX COMPL AQT ABD SERIES: CPT

## 2024-01-01 PROCEDURE — 25010000002 CALCIUM GLUCONATE PER 10 ML: Performed by: SURGERY

## 2024-01-01 PROCEDURE — 99222 1ST HOSP IP/OBS MODERATE 55: CPT | Performed by: PHYSICIAN ASSISTANT

## 2024-01-01 PROCEDURE — 82803 BLOOD GASES ANY COMBINATION: CPT

## 2024-01-01 PROCEDURE — 94760 N-INVAS EAR/PLS OXIMETRY 1: CPT

## 2024-01-01 PROCEDURE — 84550 ASSAY OF BLOOD/URIC ACID: CPT | Performed by: INTERNAL MEDICINE

## 2024-01-01 PROCEDURE — 85025 COMPLETE CBC W/AUTO DIFF WBC: CPT

## 2024-01-01 PROCEDURE — 82948 REAGENT STRIP/BLOOD GLUCOSE: CPT

## 2024-01-01 PROCEDURE — 94761 N-INVAS EAR/PLS OXIMETRY MLT: CPT

## 2024-01-01 PROCEDURE — 83735 ASSAY OF MAGNESIUM: CPT | Performed by: INTERNAL MEDICINE

## 2024-01-01 PROCEDURE — 82330 ASSAY OF CALCIUM: CPT | Performed by: SURGERY

## 2024-01-01 PROCEDURE — 84478 ASSAY OF TRIGLYCERIDES: CPT | Performed by: SURGERY

## 2024-01-01 PROCEDURE — 25010000002 AMIODARONE IN DEXTROSE 5% 360-4.14 MG/200ML-% SOLUTION: Performed by: NURSE PRACTITIONER

## 2024-01-01 PROCEDURE — 83735 ASSAY OF MAGNESIUM: CPT | Performed by: SURGERY

## 2024-01-01 PROCEDURE — 25010000002 PIPERACILLIN SOD-TAZOBACTAM PER 1 G: Performed by: HOSPITALIST

## 2024-01-01 PROCEDURE — 85027 COMPLETE CBC AUTOMATED: CPT | Performed by: INTERNAL MEDICINE

## 2024-01-01 PROCEDURE — 63710000001 INSULIN GLARGINE PER 5 UNITS: Performed by: INTERNAL MEDICINE

## 2024-01-01 PROCEDURE — 93010 ELECTROCARDIOGRAM REPORT: CPT | Performed by: INTERNAL MEDICINE

## 2024-01-01 PROCEDURE — 25010000002 PIPERACILLIN SOD-TAZOBACTAM PER 1 G: Performed by: INTERNAL MEDICINE

## 2024-01-01 PROCEDURE — 25010000002 PROPOFOL 10 MG/ML EMULSION: Performed by: INTERNAL MEDICINE

## 2024-01-01 PROCEDURE — 82653 EL-1 FECAL QUANTITATIVE: CPT | Performed by: INTERNAL MEDICINE

## 2024-01-01 PROCEDURE — 94003 VENT MGMT INPAT SUBQ DAY: CPT

## 2024-01-01 PROCEDURE — 80069 RENAL FUNCTION PANEL: CPT | Performed by: INTERNAL MEDICINE

## 2024-01-01 PROCEDURE — 86140 C-REACTIVE PROTEIN: CPT | Performed by: INTERNAL MEDICINE

## 2024-01-01 PROCEDURE — 93325 DOPPLER ECHO COLOR FLOW MAPG: CPT

## 2024-01-01 PROCEDURE — 25010000002 FENTANYL CITRATE (PF) 50 MCG/ML SOLUTION: Performed by: ANESTHESIOLOGY

## 2024-01-01 PROCEDURE — 87507 IADNA-DNA/RNA PROBE TQ 12-25: CPT | Performed by: NURSE PRACTITIONER

## 2024-01-01 PROCEDURE — 25010000002 HYDROMORPHONE 1 MG/ML SOLUTION: Performed by: SURGERY

## 2024-01-01 PROCEDURE — 25010000002 AMIODARONE IN DEXTROSE 5% 150-4.21 MG/100ML-% SOLUTION: Performed by: INTERNAL MEDICINE

## 2024-01-01 PROCEDURE — 25810000003 SODIUM CHLORIDE 0.9 % SOLUTION

## 2024-01-01 PROCEDURE — 25010000002 PIPERACILLIN SOD-TAZOBACTAM PER 1 G: Performed by: EMERGENCY MEDICINE

## 2024-01-01 PROCEDURE — 99233 SBSQ HOSP IP/OBS HIGH 50: CPT | Performed by: INTERNAL MEDICINE

## 2024-01-01 PROCEDURE — 81001 URINALYSIS AUTO W/SCOPE: CPT | Performed by: EMERGENCY MEDICINE

## 2024-01-01 PROCEDURE — 25010000002 FUROSEMIDE PER 20 MG: Performed by: INTERNAL MEDICINE

## 2024-01-01 PROCEDURE — 25810000003 SODIUM CHLORIDE 0.9 % SOLUTION: Performed by: HOSPITALIST

## 2024-01-01 PROCEDURE — 71260 CT THORAX DX C+: CPT

## 2024-01-01 PROCEDURE — 25010000002 METOCLOPRAMIDE PER 10 MG: Performed by: SURGERY

## 2024-01-01 PROCEDURE — 25010000002 PIPERACILLIN SOD-TAZOBACTAM PER 1 G: Performed by: SURGERY

## 2024-01-01 PROCEDURE — 85027 COMPLETE CBC AUTOMATED: CPT | Performed by: HOSPITALIST

## 2024-01-01 PROCEDURE — 82803 BLOOD GASES ANY COMBINATION: CPT | Performed by: INTERNAL MEDICINE

## 2024-01-01 PROCEDURE — 83690 ASSAY OF LIPASE: CPT | Performed by: EMERGENCY MEDICINE

## 2024-01-01 PROCEDURE — 85730 THROMBOPLASTIN TIME PARTIAL: CPT | Performed by: SURGERY

## 2024-01-01 PROCEDURE — 84100 ASSAY OF PHOSPHORUS: CPT | Performed by: SURGERY

## 2024-01-01 PROCEDURE — 80048 BASIC METABOLIC PNL TOTAL CA: CPT | Performed by: HOSPITALIST

## 2024-01-01 PROCEDURE — 83735 ASSAY OF MAGNESIUM: CPT

## 2024-01-01 PROCEDURE — 25510000001 IOPAMIDOL 61 % SOLUTION: Performed by: HOSPITALIST

## 2024-01-01 PROCEDURE — 99024 POSTOP FOLLOW-UP VISIT: CPT | Performed by: SURGERY

## 2024-01-01 PROCEDURE — 80069 RENAL FUNCTION PANEL: CPT

## 2024-01-01 PROCEDURE — 80053 COMPREHEN METABOLIC PANEL: CPT | Performed by: SURGERY

## 2024-01-01 PROCEDURE — 0 INSULIN REGULAR HUMAN PER 5 UNITS: Performed by: SURGERY

## 2024-01-01 PROCEDURE — 93325 DOPPLER ECHO COLOR FLOW MAPG: CPT | Performed by: INTERNAL MEDICINE

## 2024-01-01 PROCEDURE — B548ZZA ULTRASONOGRAPHY OF SUPERIOR VENA CAVA, GUIDANCE: ICD-10-PCS | Performed by: INTERNAL MEDICINE

## 2024-01-01 PROCEDURE — 25010000002 POTASSIUM CHLORIDE PER 2 MEQ: Performed by: INTERNAL MEDICINE

## 2024-01-01 PROCEDURE — 25010000002 DIGOXIN PER 500 MCG: Performed by: INTERNAL MEDICINE

## 2024-01-01 PROCEDURE — 36600 WITHDRAWAL OF ARTERIAL BLOOD: CPT | Performed by: INTERNAL MEDICINE

## 2024-01-01 PROCEDURE — 87641 MR-STAPH DNA AMP PROBE: CPT | Performed by: HOSPITALIST

## 2024-01-01 PROCEDURE — 84100 ASSAY OF PHOSPHORUS: CPT | Performed by: INTERNAL MEDICINE

## 2024-01-01 PROCEDURE — 83605 ASSAY OF LACTIC ACID: CPT | Performed by: INTERNAL MEDICINE

## 2024-01-01 PROCEDURE — 85027 COMPLETE CBC AUTOMATED: CPT | Performed by: SURGERY

## 2024-01-01 PROCEDURE — 93005 ELECTROCARDIOGRAM TRACING: CPT | Performed by: HOSPITALIST

## 2024-01-01 PROCEDURE — 83605 ASSAY OF LACTIC ACID: CPT | Performed by: HOSPITALIST

## 2024-01-01 PROCEDURE — 25010000002 LABETALOL 5 MG/ML SOLUTION: Performed by: EMERGENCY MEDICINE

## 2024-01-01 PROCEDURE — 87040 BLOOD CULTURE FOR BACTERIA: CPT | Performed by: EMERGENCY MEDICINE

## 2024-01-01 PROCEDURE — 87493 C DIFF AMPLIFIED PROBE: CPT | Performed by: INTERNAL MEDICINE

## 2024-01-01 PROCEDURE — 87205 SMEAR GRAM STAIN: CPT | Performed by: INTERNAL MEDICINE

## 2024-01-01 PROCEDURE — 3E0436Z INTRODUCTION OF NUTRITIONAL SUBSTANCE INTO CENTRAL VEIN, PERCUTANEOUS APPROACH: ICD-10-PCS | Performed by: STUDENT IN AN ORGANIZED HEALTH CARE EDUCATION/TRAINING PROGRAM

## 2024-01-01 PROCEDURE — 93308 TTE F-UP OR LMTD: CPT | Performed by: INTERNAL MEDICINE

## 2024-01-01 PROCEDURE — 36600 WITHDRAWAL OF ARTERIAL BLOOD: CPT

## 2024-01-01 PROCEDURE — 84484 ASSAY OF TROPONIN QUANT: CPT

## 2024-01-01 PROCEDURE — 99285 EMERGENCY DEPT VISIT HI MDM: CPT

## 2024-01-01 PROCEDURE — 25010000002 POTASSIUM CHLORIDE PER 2 MEQ OF POTASSIUM: Performed by: SURGERY

## 2024-01-01 PROCEDURE — 74018 RADEX ABDOMEN 1 VIEW: CPT

## 2024-01-01 PROCEDURE — 83930 ASSAY OF BLOOD OSMOLALITY: CPT

## 2024-01-01 PROCEDURE — 85027 COMPLETE CBC AUTOMATED: CPT | Performed by: NURSE PRACTITIONER

## 2024-01-01 PROCEDURE — 93005 ELECTROCARDIOGRAM TRACING: CPT | Performed by: INTERNAL MEDICINE

## 2024-01-01 PROCEDURE — 99232 SBSQ HOSP IP/OBS MODERATE 35: CPT | Performed by: PHYSICIAN ASSISTANT

## 2024-01-01 PROCEDURE — G0378 HOSPITAL OBSERVATION PER HR: HCPCS

## 2024-01-01 PROCEDURE — 80047 BASIC METABLC PNL IONIZED CA: CPT

## 2024-01-01 PROCEDURE — 84478 ASSAY OF TRIGLYCERIDES: CPT | Performed by: INTERNAL MEDICINE

## 2024-01-01 PROCEDURE — C1751 CATH, INF, PER/CENT/MIDLINE: HCPCS

## 2024-01-01 PROCEDURE — 93321 DOPPLER ECHO F-UP/LMTD STD: CPT | Performed by: INTERNAL MEDICINE

## 2024-01-01 PROCEDURE — 84133 ASSAY OF URINE POTASSIUM: CPT | Performed by: INTERNAL MEDICINE

## 2024-01-01 PROCEDURE — 25510000001 PERFLUTREN (DEFINITY) 8.476 MG IN SODIUM CHLORIDE (PF) 0.9 % 10 ML INJECTION: Performed by: INTERNAL MEDICINE

## 2024-01-01 PROCEDURE — 80053 COMPREHEN METABOLIC PANEL: CPT

## 2024-01-01 PROCEDURE — 25810000003 SODIUM CHLORIDE 0.9 % SOLUTION: Performed by: SURGERY

## 2024-01-01 PROCEDURE — 83935 ASSAY OF URINE OSMOLALITY: CPT | Performed by: NURSE PRACTITIONER

## 2024-01-01 PROCEDURE — 85610 PROTHROMBIN TIME: CPT | Performed by: SURGERY

## 2024-01-01 PROCEDURE — 25010000002 POTASSIUM CHLORIDE 10 MEQ/100ML SOLUTION: Performed by: HOSPITALIST

## 2024-01-01 PROCEDURE — 85025 COMPLETE CBC W/AUTO DIFF WBC: CPT | Performed by: HOSPITALIST

## 2024-01-01 PROCEDURE — 25810000003 SODIUM CHLORIDE 0.9 % SOLUTION: Performed by: NURSE PRACTITIONER

## 2024-01-01 PROCEDURE — 74176 CT ABD & PELVIS W/O CONTRAST: CPT

## 2024-01-01 PROCEDURE — 0 BUPIVACAINE LIPOSOME 1.3 % SUSPENSION 20 ML VIAL: Performed by: SURGERY

## 2024-01-01 PROCEDURE — 88307 TISSUE EXAM BY PATHOLOGIST: CPT | Performed by: SURGERY

## 2024-01-01 PROCEDURE — 94664 DEMO&/EVAL PT USE INHALER: CPT

## 2024-01-01 PROCEDURE — 63710000001 INSULIN REGULAR HUMAN PER 5 UNITS: Performed by: SURGERY

## 2024-01-01 PROCEDURE — 97530 THERAPEUTIC ACTIVITIES: CPT

## 2024-01-01 PROCEDURE — 82570 ASSAY OF URINE CREATININE: CPT

## 2024-01-01 PROCEDURE — 85610 PROTHROMBIN TIME: CPT | Performed by: EMERGENCY MEDICINE

## 2024-01-01 PROCEDURE — 25010000002 FENTANYL CITRATE (PF) 50 MCG/ML SOLUTION: Performed by: INTERNAL MEDICINE

## 2024-01-01 PROCEDURE — 25010000002 HYDROMORPHONE PER 4 MG: Performed by: SURGERY

## 2024-01-01 PROCEDURE — 74177 CT ABD & PELVIS W/CONTRAST: CPT

## 2024-01-01 PROCEDURE — 25010000002 LABETALOL 5 MG/ML SOLUTION: Performed by: HOSPITALIST

## 2024-01-01 PROCEDURE — 0BH17EZ INSERTION OF ENDOTRACHEAL AIRWAY INTO TRACHEA, VIA NATURAL OR ARTIFICIAL OPENING: ICD-10-PCS | Performed by: INTERNAL MEDICINE

## 2024-01-01 PROCEDURE — 83880 ASSAY OF NATRIURETIC PEPTIDE: CPT | Performed by: EMERGENCY MEDICINE

## 2024-01-01 PROCEDURE — 71250 CT THORAX DX C-: CPT

## 2024-01-01 PROCEDURE — 25810000003 SODIUM CHLORIDE 0.9 % SOLUTION: Performed by: EMERGENCY MEDICINE

## 2024-01-01 PROCEDURE — 84132 ASSAY OF SERUM POTASSIUM: CPT | Performed by: INTERNAL MEDICINE

## 2024-01-01 PROCEDURE — 99222 1ST HOSP IP/OBS MODERATE 55: CPT | Performed by: INTERNAL MEDICINE

## 2024-01-01 PROCEDURE — 99223 1ST HOSP IP/OBS HIGH 75: CPT | Performed by: SURGERY

## 2024-01-01 PROCEDURE — 99232 SBSQ HOSP IP/OBS MODERATE 35: CPT | Performed by: STUDENT IN AN ORGANIZED HEALTH CARE EDUCATION/TRAINING PROGRAM

## 2024-01-01 PROCEDURE — 94002 VENT MGMT INPAT INIT DAY: CPT

## 2024-01-01 PROCEDURE — 25010000002 MAGNESIUM SULFATE PER 500 MG OF MAGNESIUM: Performed by: SURGERY

## 2024-01-01 PROCEDURE — 44120 REMOVAL OF SMALL INTESTINE: CPT

## 2024-01-01 PROCEDURE — 5A1955Z RESPIRATORY VENTILATION, GREATER THAN 96 CONSECUTIVE HOURS: ICD-10-PCS | Performed by: INTERNAL MEDICINE

## 2024-01-01 PROCEDURE — 84156 ASSAY OF PROTEIN URINE: CPT

## 2024-01-01 PROCEDURE — 25010000002 POTASSIUM CHLORIDE PER 2 MEQ

## 2024-01-01 PROCEDURE — 87154 CUL TYP ID BLD PTHGN 6+ TRGT: CPT | Performed by: INTERNAL MEDICINE

## 2024-01-01 PROCEDURE — 0DB80ZZ EXCISION OF SMALL INTESTINE, OPEN APPROACH: ICD-10-PCS | Performed by: SURGERY

## 2024-01-01 PROCEDURE — 85652 RBC SED RATE AUTOMATED: CPT | Performed by: INTERNAL MEDICINE

## 2024-01-01 PROCEDURE — 83735 ASSAY OF MAGNESIUM: CPT | Performed by: HOSPITALIST

## 2024-01-01 PROCEDURE — C9290 INJ, BUPIVACAINE LIPOSOME: HCPCS | Performed by: SURGERY

## 2024-01-01 PROCEDURE — 25010000002 HYDROMORPHONE PER 4 MG: Performed by: ANESTHESIOLOGY

## 2024-01-01 PROCEDURE — 84484 ASSAY OF TROPONIN QUANT: CPT | Performed by: INTERNAL MEDICINE

## 2024-01-01 PROCEDURE — 87070 CULTURE OTHR SPECIMN AEROBIC: CPT | Performed by: INTERNAL MEDICINE

## 2024-01-01 PROCEDURE — 84300 ASSAY OF URINE SODIUM: CPT | Performed by: NURSE PRACTITIONER

## 2024-01-01 PROCEDURE — 93321 DOPPLER ECHO F-UP/LMTD STD: CPT

## 2024-01-01 PROCEDURE — 84132 ASSAY OF SERUM POTASSIUM: CPT

## 2024-01-01 PROCEDURE — 94640 AIRWAY INHALATION TREATMENT: CPT

## 2024-01-01 PROCEDURE — 87147 CULTURE TYPE IMMUNOLOGIC: CPT | Performed by: INTERNAL MEDICINE

## 2024-01-01 PROCEDURE — 84100 ASSAY OF PHOSPHORUS: CPT

## 2024-01-01 PROCEDURE — 25010000002 LABETALOL 5 MG/ML SOLUTION: Performed by: ANESTHESIOLOGY

## 2024-01-01 PROCEDURE — 25010000002 PHENYLEPHRINE 10 MG/ML SOLUTION: Performed by: INTERNAL MEDICINE

## 2024-01-01 PROCEDURE — 31500 INSERT EMERGENCY AIRWAY: CPT

## 2024-01-01 PROCEDURE — 84484 ASSAY OF TROPONIN QUANT: CPT | Performed by: HOSPITALIST

## 2024-01-01 PROCEDURE — 87040 BLOOD CULTURE FOR BACTERIA: CPT | Performed by: INTERNAL MEDICINE

## 2024-01-01 PROCEDURE — 93005 ELECTROCARDIOGRAM TRACING: CPT | Performed by: SURGERY

## 2024-01-01 PROCEDURE — 25010000002 HYDROMORPHONE PER 4 MG: Performed by: HOSPITALIST

## 2024-01-01 PROCEDURE — 36415 COLL VENOUS BLD VENIPUNCTURE: CPT

## 2024-01-01 PROCEDURE — 25010000002 MORPHINE PER 10 MG: Performed by: HOSPITALIST

## 2024-01-01 PROCEDURE — 85018 HEMOGLOBIN: CPT

## 2024-01-01 PROCEDURE — 99222 1ST HOSP IP/OBS MODERATE 55: CPT | Performed by: SURGERY

## 2024-01-01 PROCEDURE — 02HV33Z INSERTION OF INFUSION DEVICE INTO SUPERIOR VENA CAVA, PERCUTANEOUS APPROACH: ICD-10-PCS | Performed by: INTERNAL MEDICINE

## 2024-01-01 PROCEDURE — 36415 COLL VENOUS BLD VENIPUNCTURE: CPT | Performed by: EMERGENCY MEDICINE

## 2024-01-01 PROCEDURE — 83735 ASSAY OF MAGNESIUM: CPT | Performed by: EMERGENCY MEDICINE

## 2024-01-01 PROCEDURE — 84145 PROCALCITONIN (PCT): CPT | Performed by: EMERGENCY MEDICINE

## 2024-01-01 PROCEDURE — 25010000002 AMIODARONE IN DEXTROSE 5% 360-4.14 MG/200ML-% SOLUTION: Performed by: INTERNAL MEDICINE

## 2024-01-01 PROCEDURE — 44120 REMOVAL OF SMALL INTESTINE: CPT | Performed by: SURGERY

## 2024-01-01 PROCEDURE — 25010000002 FUROSEMIDE PER 20 MG: Performed by: SURGERY

## 2024-01-01 PROCEDURE — 25010000002 HYALURONIDASE (HUMAN) 150 UNIT/ML SOLUTION 1 ML VIAL: Performed by: INTERNAL MEDICINE

## 2024-01-01 PROCEDURE — 97162 PT EVAL MOD COMPLEX 30 MIN: CPT

## 2024-01-01 DEVICE — PROXIMATE RELOADABLE LINEAR CUTTER WITH SAFETY LOCK-OUT, 75MM
Type: IMPLANTABLE DEVICE | Site: ABDOMEN | Status: FUNCTIONAL
Brand: PROXIMATE

## 2024-01-01 DEVICE — PROXIMATE LINEAR CUTTER RELOAD, BLUE, 75MM
Type: IMPLANTABLE DEVICE | Site: ABDOMEN | Status: FUNCTIONAL
Brand: PROXIMATE

## 2024-01-01 DEVICE — HORIZON TI LG 6 CLIPS/CART
Type: IMPLANTABLE DEVICE | Site: ABDOMEN | Status: FUNCTIONAL
Brand: WECK

## 2024-01-01 RX ORDER — CLOPIDOGREL BISULFATE 75 MG/1
75 TABLET ORAL DAILY
Status: DISCONTINUED | OUTPATIENT
Start: 2024-01-01 | End: 2024-01-01

## 2024-01-01 RX ORDER — NOREPINEPHRINE BITARTRATE 0.03 MG/ML
INJECTION, SOLUTION INTRAVENOUS
Status: COMPLETED
Start: 2024-01-01 | End: 2024-01-01

## 2024-01-01 RX ORDER — FENTANYL CITRATE 50 UG/ML
100 INJECTION, SOLUTION INTRAMUSCULAR; INTRAVENOUS ONCE
Status: COMPLETED | OUTPATIENT
Start: 2024-01-01 | End: 2024-01-01

## 2024-01-01 RX ORDER — BUPIVACAINE HYDROCHLORIDE AND EPINEPHRINE 5; 5 MG/ML; UG/ML
INJECTION, SOLUTION EPIDURAL; INTRACAUDAL; PERINEURAL AS NEEDED
Status: DISCONTINUED | OUTPATIENT
Start: 2024-01-01 | End: 2024-01-01 | Stop reason: HOSPADM

## 2024-01-01 RX ORDER — BISACODYL 5 MG/1
10 TABLET, DELAYED RELEASE ORAL DAILY PRN
Status: CANCELLED | OUTPATIENT
Start: 2024-01-01

## 2024-01-01 RX ORDER — UREA 10 %
1000 LOTION (ML) TOPICAL DAILY
Status: DISCONTINUED | OUTPATIENT
Start: 2024-01-01 | End: 2024-01-01

## 2024-01-01 RX ORDER — MIDAZOLAM HYDROCHLORIDE 1 MG/ML
INJECTION INTRAMUSCULAR; INTRAVENOUS
Status: DISCONTINUED
Start: 2024-01-01 | End: 2024-01-01 | Stop reason: HOSPADM

## 2024-01-01 RX ORDER — IBUPROFEN 600 MG/1
1 TABLET ORAL
Status: DISCONTINUED | OUTPATIENT
Start: 2024-01-01 | End: 2024-01-01 | Stop reason: HOSPADM

## 2024-01-01 RX ORDER — IPRATROPIUM BROMIDE AND ALBUTEROL SULFATE 2.5; .5 MG/3ML; MG/3ML
3 SOLUTION RESPIRATORY (INHALATION)
Status: DISCONTINUED | OUTPATIENT
Start: 2024-01-01 | End: 2024-01-01

## 2024-01-01 RX ORDER — BISACODYL 10 MG
10 SUPPOSITORY, RECTAL RECTAL ONCE
Status: COMPLETED | OUTPATIENT
Start: 2024-01-01 | End: 2024-01-01

## 2024-01-01 RX ORDER — NALOXONE HCL 0.4 MG/ML
0.2 VIAL (ML) INJECTION AS NEEDED
Status: DISCONTINUED | OUTPATIENT
Start: 2024-01-01 | End: 2024-01-01 | Stop reason: HOSPADM

## 2024-01-01 RX ORDER — CHOLECALCIFEROL (VITAMIN D3) 25 MCG
2000 TABLET ORAL DAILY
Status: DISCONTINUED | OUTPATIENT
Start: 2024-01-01 | End: 2024-01-01

## 2024-01-01 RX ORDER — SODIUM CHLORIDE 0.9 % (FLUSH) 0.9 %
10 SYRINGE (ML) INJECTION AS NEEDED
Status: DISCONTINUED | OUTPATIENT
Start: 2024-01-01 | End: 2024-01-01 | Stop reason: HOSPADM

## 2024-01-01 RX ORDER — NICOTINE POLACRILEX 4 MG
15 LOZENGE BUCCAL
Status: DISCONTINUED | OUTPATIENT
Start: 2024-01-01 | End: 2024-01-01 | Stop reason: HOSPADM

## 2024-01-01 RX ORDER — BISACODYL 10 MG
10 SUPPOSITORY, RECTAL RECTAL DAILY PRN
Status: DISCONTINUED | OUTPATIENT
Start: 2024-01-01 | End: 2024-01-01 | Stop reason: HOSPADM

## 2024-01-01 RX ORDER — SODIUM CHLORIDE 0.9 % (FLUSH) 0.9 %
10 SYRINGE (ML) INJECTION EVERY 12 HOURS SCHEDULED
Status: DISCONTINUED | OUTPATIENT
Start: 2024-01-01 | End: 2024-01-01 | Stop reason: HOSPADM

## 2024-01-01 RX ORDER — FENTANYL CITRATE 50 UG/ML
25 INJECTION, SOLUTION INTRAMUSCULAR; INTRAVENOUS
Status: DISCONTINUED | OUTPATIENT
Start: 2024-01-01 | End: 2024-01-01 | Stop reason: HOSPADM

## 2024-01-01 RX ORDER — MORPHINE SULFATE 2 MG/ML
INJECTION, SOLUTION INTRAMUSCULAR; INTRAVENOUS
Status: DISCONTINUED
Start: 2024-01-01 | End: 2024-01-01 | Stop reason: WASHOUT

## 2024-01-01 RX ORDER — PANTOPRAZOLE SODIUM 40 MG/10ML
40 INJECTION, POWDER, LYOPHILIZED, FOR SOLUTION INTRAVENOUS
Status: DISCONTINUED | OUTPATIENT
Start: 2024-01-01 | End: 2024-01-01 | Stop reason: HOSPADM

## 2024-01-01 RX ORDER — PHENYLEPHRINE HCL IN 0.9% NACL 0.5 MG/5ML
.5-3 SYRINGE (ML) INTRAVENOUS
Status: DISCONTINUED | OUTPATIENT
Start: 2024-01-01 | End: 2024-01-01

## 2024-01-01 RX ORDER — IPRATROPIUM BROMIDE AND ALBUTEROL SULFATE 2.5; .5 MG/3ML; MG/3ML
3 SOLUTION RESPIRATORY (INHALATION) ONCE AS NEEDED
Status: DISCONTINUED | OUTPATIENT
Start: 2024-01-01 | End: 2024-01-01 | Stop reason: HOSPADM

## 2024-01-01 RX ORDER — PANTOPRAZOLE SODIUM 40 MG/1
40 TABLET, DELAYED RELEASE ORAL 2 TIMES DAILY
Status: DISCONTINUED | OUTPATIENT
Start: 2024-01-01 | End: 2024-01-01

## 2024-01-01 RX ORDER — HYDROCODONE BITARTRATE AND ACETAMINOPHEN 7.5; 325 MG/1; MG/1
1 TABLET ORAL EVERY 4 HOURS PRN
Status: DISPENSED | OUTPATIENT
Start: 2024-01-01 | End: 2024-01-01

## 2024-01-01 RX ORDER — FENTANYL CITRATE 50 UG/ML
50 INJECTION, SOLUTION INTRAMUSCULAR; INTRAVENOUS ONCE AS NEEDED
Status: COMPLETED | OUTPATIENT
Start: 2024-01-01 | End: 2024-01-01

## 2024-01-01 RX ORDER — POTASSIUM CHLORIDE 7.45 MG/ML
10 INJECTION INTRAVENOUS
Status: DISCONTINUED | OUTPATIENT
Start: 2024-01-01 | End: 2024-01-01

## 2024-01-01 RX ORDER — IPRATROPIUM BROMIDE AND ALBUTEROL SULFATE 2.5; .5 MG/3ML; MG/3ML
3 SOLUTION RESPIRATORY (INHALATION) EVERY 4 HOURS PRN
Status: DISCONTINUED | OUTPATIENT
Start: 2024-01-01 | End: 2024-01-01 | Stop reason: HOSPADM

## 2024-01-01 RX ORDER — HYDROCODONE BITARTRATE AND ACETAMINOPHEN 5; 325 MG/1; MG/1
1 TABLET ORAL ONCE AS NEEDED
Status: DISCONTINUED | OUTPATIENT
Start: 2024-01-01 | End: 2024-01-01 | Stop reason: HOSPADM

## 2024-01-01 RX ORDER — AMLODIPINE BESYLATE 5 MG/1
5 TABLET ORAL
Status: DISCONTINUED | OUTPATIENT
Start: 2024-01-01 | End: 2024-01-01

## 2024-01-01 RX ORDER — FLUMAZENIL 0.1 MG/ML
0.2 INJECTION INTRAVENOUS AS NEEDED
Status: DISCONTINUED | OUTPATIENT
Start: 2024-01-01 | End: 2024-01-01 | Stop reason: HOSPADM

## 2024-01-01 RX ORDER — DROPERIDOL 2.5 MG/ML
0.62 INJECTION, SOLUTION INTRAMUSCULAR; INTRAVENOUS
Status: DISCONTINUED | OUTPATIENT
Start: 2024-01-01 | End: 2024-01-01 | Stop reason: HOSPADM

## 2024-01-01 RX ORDER — HYDROCODONE BITARTRATE AND ACETAMINOPHEN 7.5; 325 MG/1; MG/1
1 TABLET ORAL EVERY 4 HOURS PRN
Status: DISCONTINUED | OUTPATIENT
Start: 2024-01-01 | End: 2024-01-01 | Stop reason: HOSPADM

## 2024-01-01 RX ORDER — PROMETHAZINE HYDROCHLORIDE 25 MG/1
25 SUPPOSITORY RECTAL ONCE AS NEEDED
Status: DISCONTINUED | OUTPATIENT
Start: 2024-01-01 | End: 2024-01-01 | Stop reason: HOSPADM

## 2024-01-01 RX ORDER — PANTOPRAZOLE SODIUM 40 MG/10ML
40 INJECTION, POWDER, LYOPHILIZED, FOR SOLUTION INTRAVENOUS
Status: DISCONTINUED | OUTPATIENT
Start: 2024-01-01 | End: 2024-01-01

## 2024-01-01 RX ORDER — LOPERAMIDE HYDROCHLORIDE 2 MG/1
2 CAPSULE ORAL ONCE
Status: COMPLETED | OUTPATIENT
Start: 2024-01-01 | End: 2024-01-01

## 2024-01-01 RX ORDER — ACETAMINOPHEN 650 MG/1
650 SUPPOSITORY RECTAL EVERY 4 HOURS PRN
Status: DISCONTINUED | OUTPATIENT
Start: 2024-01-01 | End: 2024-01-01

## 2024-01-01 RX ORDER — SODIUM CHLORIDE 9 MG/ML
100 INJECTION, SOLUTION INTRAVENOUS CONTINUOUS
Status: DISCONTINUED | OUTPATIENT
Start: 2024-01-01 | End: 2024-01-01

## 2024-01-01 RX ORDER — ACETAMINOPHEN 160 MG/5ML
650 SOLUTION ORAL EVERY 6 HOURS PRN
Status: DISCONTINUED | OUTPATIENT
Start: 2024-01-01 | End: 2024-01-01

## 2024-01-01 RX ORDER — ACETAMINOPHEN 325 MG/1
650 TABLET ORAL EVERY 4 HOURS PRN
Status: DISCONTINUED | OUTPATIENT
Start: 2024-01-01 | End: 2024-01-01 | Stop reason: SDUPTHER

## 2024-01-01 RX ORDER — FUROSEMIDE 10 MG/ML
40 INJECTION INTRAMUSCULAR; INTRAVENOUS ONCE
Status: COMPLETED | OUTPATIENT
Start: 2024-01-01 | End: 2024-01-01

## 2024-01-01 RX ORDER — AMOXICILLIN 250 MG
2 CAPSULE ORAL 2 TIMES DAILY
Status: DISCONTINUED | OUTPATIENT
Start: 2024-01-01 | End: 2024-01-01 | Stop reason: HOSPADM

## 2024-01-01 RX ORDER — HEPARIN SODIUM 10000 [USP'U]/100ML
18 INJECTION, SOLUTION INTRAVENOUS
Status: DISCONTINUED | OUTPATIENT
Start: 2024-01-01 | End: 2024-01-01

## 2024-01-01 RX ORDER — POLYETHYLENE GLYCOL 3350 17 G/17G
17 POWDER, FOR SOLUTION ORAL DAILY PRN
Status: DISCONTINUED | OUTPATIENT
Start: 2024-01-01 | End: 2024-01-01 | Stop reason: HOSPADM

## 2024-01-01 RX ORDER — ACETAMINOPHEN 160 MG/5ML
650 SOLUTION ORAL EVERY 4 HOURS PRN
Status: DISCONTINUED | OUTPATIENT
Start: 2024-01-01 | End: 2024-01-01

## 2024-01-01 RX ORDER — LABETALOL HYDROCHLORIDE 5 MG/ML
5 INJECTION, SOLUTION INTRAVENOUS
Status: DISCONTINUED | OUTPATIENT
Start: 2024-01-01 | End: 2024-01-01 | Stop reason: HOSPADM

## 2024-01-01 RX ORDER — FAMOTIDINE 10 MG/ML
20 INJECTION, SOLUTION INTRAVENOUS ONCE
Status: COMPLETED | OUTPATIENT
Start: 2024-01-01 | End: 2024-01-01

## 2024-01-01 RX ORDER — ONDANSETRON 2 MG/ML
4 INJECTION INTRAMUSCULAR; INTRAVENOUS EVERY 6 HOURS PRN
Status: DISCONTINUED | OUTPATIENT
Start: 2024-01-01 | End: 2024-01-01 | Stop reason: HOSPADM

## 2024-01-01 RX ORDER — ONDANSETRON 2 MG/ML
4 INJECTION INTRAMUSCULAR; INTRAVENOUS ONCE AS NEEDED
Status: DISCONTINUED | OUTPATIENT
Start: 2024-01-01 | End: 2024-01-01 | Stop reason: HOSPADM

## 2024-01-01 RX ORDER — METOCLOPRAMIDE HYDROCHLORIDE 5 MG/ML
10 INJECTION INTRAMUSCULAR; INTRAVENOUS EVERY 6 HOURS
Status: DISCONTINUED | OUTPATIENT
Start: 2024-01-01 | End: 2024-01-01

## 2024-01-01 RX ORDER — FENTANYL CITRATE 50 UG/ML
50 INJECTION, SOLUTION INTRAMUSCULAR; INTRAVENOUS
Status: DISCONTINUED | OUTPATIENT
Start: 2024-01-01 | End: 2024-01-01 | Stop reason: HOSPADM

## 2024-01-01 RX ORDER — HYDROCODONE BITARTRATE AND ACETAMINOPHEN 7.5; 325 MG/1; MG/1
1 TABLET ORAL EVERY 4 HOURS PRN
Status: DISCONTINUED | OUTPATIENT
Start: 2024-01-01 | End: 2024-01-01

## 2024-01-01 RX ORDER — HYDRALAZINE HYDROCHLORIDE 20 MG/ML
5 INJECTION INTRAMUSCULAR; INTRAVENOUS
Status: DISCONTINUED | OUTPATIENT
Start: 2024-01-01 | End: 2024-01-01 | Stop reason: HOSPADM

## 2024-01-01 RX ORDER — SODIUM CHLORIDE 9 MG/ML
40 INJECTION, SOLUTION INTRAVENOUS AS NEEDED
Status: DISCONTINUED | OUTPATIENT
Start: 2024-01-01 | End: 2024-01-01 | Stop reason: HOSPADM

## 2024-01-01 RX ORDER — BISACODYL 5 MG/1
5 TABLET, DELAYED RELEASE ORAL DAILY PRN
Status: DISCONTINUED | OUTPATIENT
Start: 2024-01-01 | End: 2024-01-01 | Stop reason: HOSPADM

## 2024-01-01 RX ORDER — SUCRALFATE 1 G/1
1 TABLET ORAL
Status: DISCONTINUED | OUTPATIENT
Start: 2024-01-01 | End: 2024-01-01

## 2024-01-01 RX ORDER — MECLIZINE HCL 12.5 MG/1
12.5 TABLET ORAL DAILY PRN
Status: DISCONTINUED | OUTPATIENT
Start: 2024-01-01 | End: 2024-01-01

## 2024-01-01 RX ORDER — NOREPINEPHRINE BITARTRATE 0.03 MG/ML
.02-.3 INJECTION, SOLUTION INTRAVENOUS
Status: DISCONTINUED | OUTPATIENT
Start: 2024-01-01 | End: 2024-01-01

## 2024-01-01 RX ORDER — PREGABALIN 75 MG/1
150 CAPSULE ORAL 3 TIMES DAILY
Status: DISCONTINUED | OUTPATIENT
Start: 2024-01-01 | End: 2024-01-01 | Stop reason: HOSPADM

## 2024-01-01 RX ORDER — ATORVASTATIN CALCIUM 20 MG/1
40 TABLET, FILM COATED ORAL NIGHTLY
Status: DISCONTINUED | OUTPATIENT
Start: 2024-01-01 | End: 2024-01-01

## 2024-01-01 RX ORDER — HYDROMORPHONE HYDROCHLORIDE 1 MG/ML
0.25 INJECTION, SOLUTION INTRAMUSCULAR; INTRAVENOUS; SUBCUTANEOUS
Status: DISCONTINUED | OUTPATIENT
Start: 2024-01-01 | End: 2024-01-01 | Stop reason: HOSPADM

## 2024-01-01 RX ORDER — SODIUM CHLORIDE 0.9 % (FLUSH) 0.9 %
3-10 SYRINGE (ML) INJECTION AS NEEDED
Status: DISCONTINUED | OUTPATIENT
Start: 2024-01-01 | End: 2024-01-01 | Stop reason: HOSPADM

## 2024-01-01 RX ORDER — FUROSEMIDE 10 MG/ML
40 INJECTION INTRAMUSCULAR; INTRAVENOUS EVERY 8 HOURS
Status: COMPLETED | OUTPATIENT
Start: 2024-01-01 | End: 2024-01-01

## 2024-01-01 RX ORDER — SODIUM CHLORIDE 0.9 % (FLUSH) 0.9 %
3 SYRINGE (ML) INJECTION EVERY 12 HOURS SCHEDULED
Status: DISCONTINUED | OUTPATIENT
Start: 2024-01-01 | End: 2024-01-01 | Stop reason: HOSPADM

## 2024-01-01 RX ORDER — METOCLOPRAMIDE HYDROCHLORIDE 5 MG/ML
10 INJECTION INTRAMUSCULAR; INTRAVENOUS EVERY 6 HOURS
Status: COMPLETED | OUTPATIENT
Start: 2024-01-01 | End: 2024-01-01

## 2024-01-01 RX ORDER — POLYETHYLENE GLYCOL 3350 17 G/17G
17 POWDER, FOR SOLUTION ORAL DAILY PRN
Status: DISCONTINUED | OUTPATIENT
Start: 2024-01-01 | End: 2024-01-01

## 2024-01-01 RX ORDER — MIDAZOLAM HYDROCHLORIDE 1 MG/ML
0.5 INJECTION INTRAMUSCULAR; INTRAVENOUS
Status: DISCONTINUED | OUTPATIENT
Start: 2024-01-01 | End: 2024-01-01 | Stop reason: HOSPADM

## 2024-01-01 RX ORDER — SODIUM CHLORIDE 0.9 % (FLUSH) 0.9 %
20 SYRINGE (ML) INJECTION AS NEEDED
Status: DISCONTINUED | OUTPATIENT
Start: 2024-01-01 | End: 2024-01-01 | Stop reason: HOSPADM

## 2024-01-01 RX ORDER — LABETALOL HYDROCHLORIDE 5 MG/ML
10 INJECTION, SOLUTION INTRAVENOUS
Status: DISCONTINUED | OUTPATIENT
Start: 2024-01-01 | End: 2024-01-01 | Stop reason: HOSPADM

## 2024-01-01 RX ORDER — PROMETHAZINE HYDROCHLORIDE 25 MG/1
25 TABLET ORAL ONCE AS NEEDED
Status: DISCONTINUED | OUTPATIENT
Start: 2024-01-01 | End: 2024-01-01 | Stop reason: HOSPADM

## 2024-01-01 RX ORDER — BISACODYL 5 MG/1
5 TABLET, DELAYED RELEASE ORAL DAILY PRN
Status: DISCONTINUED | OUTPATIENT
Start: 2024-01-01 | End: 2024-01-01

## 2024-01-01 RX ORDER — ATENOLOL 50 MG/1
50 TABLET ORAL ONCE
Status: COMPLETED | OUTPATIENT
Start: 2024-01-01 | End: 2024-01-01

## 2024-01-01 RX ORDER — HEPARIN SODIUM 5000 [USP'U]/ML
80 INJECTION, SOLUTION INTRAVENOUS; SUBCUTANEOUS ONCE
Status: DISCONTINUED | OUTPATIENT
Start: 2024-01-01 | End: 2024-01-01

## 2024-01-01 RX ORDER — EPHEDRINE SULFATE 50 MG/ML
5 INJECTION, SOLUTION INTRAVENOUS ONCE AS NEEDED
Status: DISCONTINUED | OUTPATIENT
Start: 2024-01-01 | End: 2024-01-01 | Stop reason: HOSPADM

## 2024-01-01 RX ORDER — MAGNESIUM HYDROXIDE 1200 MG/15ML
LIQUID ORAL AS NEEDED
Status: DISCONTINUED | OUTPATIENT
Start: 2024-01-01 | End: 2024-01-01 | Stop reason: HOSPADM

## 2024-01-01 RX ORDER — DEXTROSE MONOHYDRATE 25 G/50ML
25 INJECTION, SOLUTION INTRAVENOUS
Status: DISCONTINUED | OUTPATIENT
Start: 2024-01-01 | End: 2024-01-01 | Stop reason: HOSPADM

## 2024-01-01 RX ORDER — HEPARIN SODIUM 5000 [USP'U]/ML
40-80 INJECTION, SOLUTION INTRAVENOUS; SUBCUTANEOUS EVERY 6 HOURS PRN
Status: DISCONTINUED | OUTPATIENT
Start: 2024-01-01 | End: 2024-01-01

## 2024-01-01 RX ORDER — MORPHINE SULFATE 2 MG/ML
2 INJECTION, SOLUTION INTRAMUSCULAR; INTRAVENOUS
Status: DISCONTINUED | OUTPATIENT
Start: 2024-01-01 | End: 2024-01-01

## 2024-01-01 RX ORDER — LIDOCAINE HYDROCHLORIDE 10 MG/ML
0.5 INJECTION, SOLUTION INFILTRATION; PERINEURAL ONCE AS NEEDED
Status: DISCONTINUED | OUTPATIENT
Start: 2024-01-01 | End: 2024-01-01 | Stop reason: HOSPADM

## 2024-01-01 RX ORDER — POTASSIUM CHLORIDE 29.8 MG/ML
20 INJECTION INTRAVENOUS
Status: COMPLETED | OUTPATIENT
Start: 2024-01-01 | End: 2024-01-01

## 2024-01-01 RX ORDER — HYDROCHLOROTHIAZIDE 25 MG/1
25 TABLET ORAL DAILY PRN
Status: DISCONTINUED | OUTPATIENT
Start: 2024-01-01 | End: 2024-01-01

## 2024-01-01 RX ORDER — NOREPINEPHRINE BITARTRATE 0.03 MG/ML
.02-.3 INJECTION, SOLUTION INTRAVENOUS
Status: DISCONTINUED | OUTPATIENT
Start: 2024-01-01 | End: 2024-01-01 | Stop reason: HOSPADM

## 2024-01-01 RX ORDER — CHLORHEXIDINE GLUCONATE ORAL RINSE 1.2 MG/ML
15 SOLUTION DENTAL EVERY 12 HOURS SCHEDULED
Status: DISCONTINUED | OUTPATIENT
Start: 2024-01-01 | End: 2024-01-01 | Stop reason: HOSPADM

## 2024-01-01 RX ORDER — SODIUM CHLORIDE, SODIUM LACTATE, POTASSIUM CHLORIDE, CALCIUM CHLORIDE 600; 310; 30; 20 MG/100ML; MG/100ML; MG/100ML; MG/100ML
9 INJECTION, SOLUTION INTRAVENOUS CONTINUOUS
Status: DISCONTINUED | OUTPATIENT
Start: 2024-01-01 | End: 2024-01-01

## 2024-01-01 RX ORDER — CLOPIDOGREL BISULFATE 75 MG/1
75 TABLET ORAL DAILY
Status: DISCONTINUED | OUTPATIENT
Start: 2024-01-01 | End: 2024-01-01 | Stop reason: HOSPADM

## 2024-01-01 RX ORDER — MECLIZINE HCL 12.5 MG/1
12.5 TABLET ORAL DAILY
Status: DISCONTINUED | OUTPATIENT
Start: 2024-01-01 | End: 2024-01-01

## 2024-01-01 RX ORDER — SIMETHICONE 80 MG
80 TABLET,CHEWABLE ORAL 4 TIMES DAILY PRN
Status: DISCONTINUED | OUTPATIENT
Start: 2024-01-01 | End: 2024-01-01

## 2024-01-01 RX ORDER — BISACODYL 10 MG
10 SUPPOSITORY, RECTAL RECTAL DAILY PRN
Status: DISCONTINUED | OUTPATIENT
Start: 2024-01-01 | End: 2024-01-01

## 2024-01-01 RX ORDER — GLYCOPYRROLATE 0.2 MG/ML
INJECTION INTRAMUSCULAR; INTRAVENOUS
Status: DISCONTINUED
Start: 2024-01-01 | End: 2024-01-01 | Stop reason: HOSPADM

## 2024-01-01 RX ORDER — NITROGLYCERIN 0.4 MG/1
0.4 TABLET SUBLINGUAL
Status: DISCONTINUED | OUTPATIENT
Start: 2024-01-01 | End: 2024-01-01 | Stop reason: HOSPADM

## 2024-01-01 RX ORDER — FENTANYL CITRATE 50 UG/ML
50 INJECTION, SOLUTION INTRAMUSCULAR; INTRAVENOUS
Status: DISCONTINUED | OUTPATIENT
Start: 2024-01-01 | End: 2024-01-01

## 2024-01-01 RX ORDER — DEXMEDETOMIDINE HYDROCHLORIDE 4 UG/ML
.2-1.5 INJECTION, SOLUTION INTRAVENOUS
Status: DISCONTINUED | OUTPATIENT
Start: 2024-01-01 | End: 2024-01-01 | Stop reason: HOSPADM

## 2024-01-01 RX ORDER — DIPHENHYDRAMINE HYDROCHLORIDE 50 MG/ML
12.5 INJECTION INTRAMUSCULAR; INTRAVENOUS
Status: DISCONTINUED | OUTPATIENT
Start: 2024-01-01 | End: 2024-01-01 | Stop reason: HOSPADM

## 2024-01-01 RX ORDER — SODIUM CHLORIDE 450 MG/100ML
125 INJECTION, SOLUTION INTRAVENOUS CONTINUOUS
Status: DISCONTINUED | OUTPATIENT
Start: 2024-01-01 | End: 2024-01-01

## 2024-01-01 RX ORDER — POTASSIUM CHLORIDE 29.8 MG/ML
20 INJECTION INTRAVENOUS ONCE
Status: COMPLETED | OUTPATIENT
Start: 2024-01-01 | End: 2024-01-01

## 2024-01-01 RX ORDER — BISACODYL 5 MG/1
10 TABLET, DELAYED RELEASE ORAL DAILY PRN
Status: DISCONTINUED | OUTPATIENT
Start: 2024-01-01 | End: 2024-01-01

## 2024-01-01 RX ORDER — LISINOPRIL 40 MG/1
40 TABLET ORAL
Status: DISCONTINUED | OUTPATIENT
Start: 2024-01-01 | End: 2024-01-01

## 2024-01-01 RX ORDER — CETIRIZINE HYDROCHLORIDE 10 MG/1
10 TABLET ORAL DAILY
Status: DISCONTINUED | OUTPATIENT
Start: 2024-01-01 | End: 2024-01-01

## 2024-01-01 RX ORDER — HYDROMORPHONE HYDROCHLORIDE 1 MG/ML
0.5 INJECTION, SOLUTION INTRAMUSCULAR; INTRAVENOUS; SUBCUTANEOUS
Status: DISPENSED | OUTPATIENT
Start: 2024-01-01 | End: 2024-01-01

## 2024-01-01 RX ORDER — AMOXICILLIN 250 MG
2 CAPSULE ORAL 2 TIMES DAILY
Status: DISCONTINUED | OUTPATIENT
Start: 2024-01-01 | End: 2024-01-01

## 2024-01-01 RX ORDER — DIGOXIN 0.25 MG/ML
500 INJECTION INTRAMUSCULAR; INTRAVENOUS ONCE
Status: COMPLETED | OUTPATIENT
Start: 2024-01-01 | End: 2024-01-01

## 2024-01-01 RX ORDER — SODIUM CHLORIDE 1 G/1
2 TABLET ORAL ONCE
Status: COMPLETED | OUTPATIENT
Start: 2024-01-01 | End: 2024-01-01

## 2024-01-01 RX ORDER — HYDROCODONE BITARTRATE AND ACETAMINOPHEN 7.5; 325 MG/1; MG/1
1 TABLET ORAL EVERY 8 HOURS PRN
Status: DISCONTINUED | OUTPATIENT
Start: 2024-01-01 | End: 2024-01-01

## 2024-01-01 RX ORDER — ATENOLOL 50 MG/1
50 TABLET ORAL 2 TIMES DAILY
Status: DISCONTINUED | OUTPATIENT
Start: 2024-01-01 | End: 2024-01-01

## 2024-01-01 RX ORDER — TORSEMIDE 20 MG/1
20 TABLET ORAL DAILY
Status: DISCONTINUED | OUTPATIENT
Start: 2024-01-01 | End: 2024-01-01

## 2024-01-01 RX ORDER — SODIUM CHLORIDE 9 MG/ML
50 INJECTION, SOLUTION INTRAVENOUS CONTINUOUS
Status: DISCONTINUED | OUTPATIENT
Start: 2024-01-01 | End: 2024-01-01

## 2024-01-01 RX ORDER — ASPIRIN 325 MG
325 TABLET ORAL ONCE
Status: DISCONTINUED | OUTPATIENT
Start: 2024-01-01 | End: 2024-01-01

## 2024-01-01 RX ORDER — FENTANYL CITRATE-0.9 % NACL/PF 10 MCG/ML
50-300 PLASTIC BAG, INJECTION (ML) INTRAVENOUS
Status: DISCONTINUED | OUTPATIENT
Start: 2024-01-01 | End: 2024-01-01 | Stop reason: HOSPADM

## 2024-01-01 RX ORDER — DICYCLOMINE HYDROCHLORIDE 10 MG/1
10 CAPSULE ORAL EVERY 6 HOURS PRN
Status: DISCONTINUED | OUTPATIENT
Start: 2024-01-01 | End: 2024-01-01

## 2024-01-01 RX ORDER — PSYLLIUM SEED (WITH DEXTROSE)
2 POWDER (GRAM) ORAL DAILY
Status: DISCONTINUED | OUTPATIENT
Start: 2024-01-01 | End: 2024-01-01

## 2024-01-01 RX ORDER — LABETALOL HYDROCHLORIDE 5 MG/ML
10 INJECTION, SOLUTION INTRAVENOUS ONCE
Status: COMPLETED | OUTPATIENT
Start: 2024-01-01 | End: 2024-01-01

## 2024-01-01 RX ORDER — SENNOSIDES A AND B 8.6 MG/1
2 TABLET, FILM COATED ORAL NIGHTLY
Status: DISCONTINUED | OUTPATIENT
Start: 2024-01-01 | End: 2024-01-01

## 2024-01-01 RX ORDER — SACCHAROMYCES BOULARDII 250 MG
250 CAPSULE ORAL NIGHTLY
Status: DISCONTINUED | OUTPATIENT
Start: 2024-01-01 | End: 2024-01-01

## 2024-01-01 RX ORDER — GUAIFENESIN 600 MG/1
600 TABLET, EXTENDED RELEASE ORAL EVERY 12 HOURS SCHEDULED
Status: COMPLETED | OUTPATIENT
Start: 2024-01-01 | End: 2024-01-01

## 2024-01-01 RX ADMIN — PANTOPRAZOLE SODIUM 40 MG: 40 INJECTION, POWDER, FOR SOLUTION INTRAVENOUS at 18:13

## 2024-01-01 RX ADMIN — Medication 10 ML: at 09:00

## 2024-01-01 RX ADMIN — PANTOPRAZOLE SODIUM 40 MG: 40 INJECTION, POWDER, FOR SOLUTION INTRAVENOUS at 07:46

## 2024-01-01 RX ADMIN — FENTANYL CITRATE 100 MCG/HR: 0.05 INJECTION, SOLUTION INTRAMUSCULAR; INTRAVENOUS at 05:24

## 2024-01-01 RX ADMIN — DICYCLOMINE HYDROCHLORIDE 10 MG: 10 CAPSULE ORAL at 12:08

## 2024-01-01 RX ADMIN — ATENOLOL 50 MG: 50 TABLET ORAL at 21:27

## 2024-01-01 RX ADMIN — SODIUM CHLORIDE 1000 ML: 9 INJECTION, SOLUTION INTRAVENOUS at 15:16

## 2024-01-01 RX ADMIN — Medication 1000 MCG: at 09:04

## 2024-01-01 RX ADMIN — ACETAMINOPHEN 650 MG: 160 SOLUTION ORAL at 21:40

## 2024-01-01 RX ADMIN — LISINOPRIL 40 MG: 40 TABLET ORAL at 09:11

## 2024-01-01 RX ADMIN — PREGABALIN 150 MG: 75 CAPSULE ORAL at 20:14

## 2024-01-01 RX ADMIN — ACETAMINOPHEN 325MG 650 MG: 325 TABLET ORAL at 23:49

## 2024-01-01 RX ADMIN — Medication 10 ML: at 21:47

## 2024-01-01 RX ADMIN — AMLODIPINE BESYLATE 5 MG: 5 TABLET ORAL at 08:32

## 2024-01-01 RX ADMIN — AMIODARONE HYDROCHLORIDE 150 MG: 1.5 INJECTION, SOLUTION INTRAVENOUS at 13:07

## 2024-01-01 RX ADMIN — PANTOPRAZOLE SODIUM 40 MG: 40 INJECTION, POWDER, FOR SOLUTION INTRAVENOUS at 17:09

## 2024-01-01 RX ADMIN — HYDROCODONE BITARTRATE AND ACETAMINOPHEN 1 TABLET: 7.5; 325 TABLET ORAL at 13:26

## 2024-01-01 RX ADMIN — INSULIN HUMAN 4 UNITS: 100 INJECTION, SOLUTION PARENTERAL at 12:24

## 2024-01-01 RX ADMIN — IPRATROPIUM BROMIDE AND ALBUTEROL SULFATE 3 ML: .5; 3 SOLUTION RESPIRATORY (INHALATION) at 02:13

## 2024-01-01 RX ADMIN — Medication 10 ML: at 08:33

## 2024-01-01 RX ADMIN — PIPERACILLIN AND TAZOBACTAM 3.38 G: 3; .375 INJECTION, POWDER, FOR SOLUTION INTRAVENOUS at 09:48

## 2024-01-01 RX ADMIN — PANTOPRAZOLE SODIUM 40 MG: 40 TABLET, DELAYED RELEASE ORAL at 09:08

## 2024-01-01 RX ADMIN — ACETAMINOPHEN 650 MG: 650 SUPPOSITORY RECTAL at 04:12

## 2024-01-01 RX ADMIN — POTASSIUM CHLORIDE 10 MEQ: 7.46 INJECTION, SOLUTION INTRAVENOUS at 14:04

## 2024-01-01 RX ADMIN — LISINOPRIL 40 MG: 40 TABLET ORAL at 08:39

## 2024-01-01 RX ADMIN — Medication 2000 UNITS: at 08:32

## 2024-01-01 RX ADMIN — CLOPIDOGREL BISULFATE 75 MG: 75 TABLET, FILM COATED ORAL at 09:08

## 2024-01-01 RX ADMIN — PIPERACILLIN AND TAZOBACTAM 3.38 G: 3; .375 INJECTION, POWDER, FOR SOLUTION INTRAVENOUS at 01:31

## 2024-01-01 RX ADMIN — Medication 10 ML: at 09:19

## 2024-01-01 RX ADMIN — LISINOPRIL 40 MG: 40 TABLET ORAL at 08:54

## 2024-01-01 RX ADMIN — FUROSEMIDE 40 MG: 10 INJECTION, SOLUTION INTRAMUSCULAR; INTRAVENOUS at 12:31

## 2024-01-01 RX ADMIN — ONDANSETRON 4 MG: 2 INJECTION INTRAMUSCULAR; INTRAVENOUS at 10:44

## 2024-01-01 RX ADMIN — POTASSIUM CHLORIDE 10 MEQ: 7.46 INJECTION, SOLUTION INTRAVENOUS at 10:21

## 2024-01-01 RX ADMIN — HYDROMORPHONE HYDROCHLORIDE 0.5 MG: 1 INJECTION, SOLUTION INTRAMUSCULAR; INTRAVENOUS; SUBCUTANEOUS at 04:17

## 2024-01-01 RX ADMIN — PIPERACILLIN AND TAZOBACTAM 3.38 G: 3; .375 INJECTION, POWDER, FOR SOLUTION INTRAVENOUS at 16:53

## 2024-01-01 RX ADMIN — PREGABALIN 150 MG: 75 CAPSULE ORAL at 15:41

## 2024-01-01 RX ADMIN — AMLODIPINE BESYLATE 5 MG: 5 TABLET ORAL at 08:37

## 2024-01-01 RX ADMIN — Medication 2 WAFER: at 11:16

## 2024-01-01 RX ADMIN — Medication 10 ML: at 09:08

## 2024-01-01 RX ADMIN — ONDANSETRON 4 MG: 2 INJECTION INTRAMUSCULAR; INTRAVENOUS at 20:28

## 2024-01-01 RX ADMIN — METOPROLOL TARTRATE 5 MG: 1 INJECTION, SOLUTION INTRAVENOUS at 15:59

## 2024-01-01 RX ADMIN — INSULIN HUMAN 4 UNITS: 100 INJECTION, SOLUTION PARENTERAL at 01:16

## 2024-01-01 RX ADMIN — Medication 10 ML: at 09:29

## 2024-01-01 RX ADMIN — PIPERACILLIN AND TAZOBACTAM 3.38 G: 3; .375 INJECTION, POWDER, FOR SOLUTION INTRAVENOUS at 00:34

## 2024-01-01 RX ADMIN — CLOPIDOGREL BISULFATE 75 MG: 75 TABLET, FILM COATED ORAL at 08:38

## 2024-01-01 RX ADMIN — INSULIN GLARGINE 10 UNITS: 100 INJECTION, SOLUTION SUBCUTANEOUS at 09:10

## 2024-01-01 RX ADMIN — DICYCLOMINE HYDROCHLORIDE 10 MG: 10 CAPSULE ORAL at 11:29

## 2024-01-01 RX ADMIN — IPRATROPIUM BROMIDE AND ALBUTEROL SULFATE 3 ML: .5; 3 SOLUTION RESPIRATORY (INHALATION) at 19:10

## 2024-01-01 RX ADMIN — Medication 10 ML: at 21:38

## 2024-01-01 RX ADMIN — LABETALOL HYDROCHLORIDE 10 MG: 5 INJECTION, SOLUTION INTRAVENOUS at 15:49

## 2024-01-01 RX ADMIN — PIPERACILLIN AND TAZOBACTAM 3.38 G: 3; .375 INJECTION, POWDER, FOR SOLUTION INTRAVENOUS at 05:54

## 2024-01-01 RX ADMIN — AMIODARONE HYDROCHLORIDE 0.5 MG/MIN: 1.8 INJECTION, SOLUTION INTRAVENOUS at 09:52

## 2024-01-01 RX ADMIN — PROPOFOL INJECTABLE EMULSION 90 MCG/KG/MIN: 10 INJECTION, EMULSION INTRAVENOUS at 16:14

## 2024-01-01 RX ADMIN — SUCRALFATE 1 G: 1 TABLET ORAL at 16:32

## 2024-01-01 RX ADMIN — PIPERACILLIN AND TAZOBACTAM 4.5 G: 4; .5 INJECTION, POWDER, FOR SOLUTION INTRAVENOUS at 05:28

## 2024-01-01 RX ADMIN — PREGABALIN 150 MG: 75 CAPSULE ORAL at 08:38

## 2024-01-01 RX ADMIN — ATENOLOL 50 MG: 50 TABLET ORAL at 09:36

## 2024-01-01 RX ADMIN — Medication 2000 UNITS: at 09:08

## 2024-01-01 RX ADMIN — ATENOLOL 50 MG: 50 TABLET ORAL at 20:14

## 2024-01-01 RX ADMIN — Medication 10 ML: at 21:42

## 2024-01-01 RX ADMIN — PREGABALIN 150 MG: 75 CAPSULE ORAL at 21:25

## 2024-01-01 RX ADMIN — POTASSIUM CHLORIDE 20 MEQ: 29.8 INJECTION, SOLUTION INTRAVENOUS at 15:31

## 2024-01-01 RX ADMIN — PREGABALIN 150 MG: 75 CAPSULE ORAL at 08:53

## 2024-01-01 RX ADMIN — HYDROMORPHONE HYDROCHLORIDE 1 MG: 1 INJECTION, SOLUTION INTRAMUSCULAR; INTRAVENOUS; SUBCUTANEOUS at 20:32

## 2024-01-01 RX ADMIN — ACETAMINOPHEN 325MG 650 MG: 325 TABLET ORAL at 13:12

## 2024-01-01 RX ADMIN — PREGABALIN 150 MG: 75 CAPSULE ORAL at 16:03

## 2024-01-01 RX ADMIN — FENTANYL CITRATE 75 MCG/HR: 0.05 INJECTION, SOLUTION INTRAMUSCULAR; INTRAVENOUS at 12:56

## 2024-01-01 RX ADMIN — SUCRALFATE 1 G: 1 TABLET ORAL at 11:29

## 2024-01-01 RX ADMIN — METOCLOPRAMIDE 10 MG: 5 INJECTION, SOLUTION INTRAMUSCULAR; INTRAVENOUS at 10:44

## 2024-01-01 RX ADMIN — SODIUM CHLORIDE 500 ML: 9 INJECTION, SOLUTION INTRAVENOUS at 03:06

## 2024-01-01 RX ADMIN — AMIODARONE HYDROCHLORIDE 0.5 MG/MIN: 1.8 INJECTION, SOLUTION INTRAVENOUS at 10:21

## 2024-01-01 RX ADMIN — FUROSEMIDE 40 MG: 10 INJECTION, SOLUTION INTRAMUSCULAR; INTRAVENOUS at 14:30

## 2024-01-01 RX ADMIN — HYDROCODONE BITARTRATE AND ACETAMINOPHEN 1 TABLET: 7.5; 325 TABLET ORAL at 06:17

## 2024-01-01 RX ADMIN — PIPERACILLIN AND TAZOBACTAM 4.5 G: 4; .5 INJECTION, POWDER, FOR SOLUTION INTRAVENOUS at 12:24

## 2024-01-01 RX ADMIN — ACETAMINOPHEN 325MG 650 MG: 325 TABLET ORAL at 04:46

## 2024-01-01 RX ADMIN — METOPROLOL TARTRATE 2.5 MG: 1 INJECTION, SOLUTION INTRAVENOUS at 05:28

## 2024-01-01 RX ADMIN — POTASSIUM CHLORIDE 20 MEQ: 29.8 INJECTION, SOLUTION INTRAVENOUS at 14:29

## 2024-01-01 RX ADMIN — LISINOPRIL 40 MG: 40 TABLET ORAL at 11:09

## 2024-01-01 RX ADMIN — PREGABALIN 150 MG: 75 CAPSULE ORAL at 09:30

## 2024-01-01 RX ADMIN — CETIRIZINE HYDROCHLORIDE 10 MG: 10 TABLET ORAL at 08:38

## 2024-01-01 RX ADMIN — INSULIN HUMAN 2 UNITS: 100 INJECTION, SOLUTION PARENTERAL at 18:20

## 2024-01-01 RX ADMIN — SUCRALFATE 1 G: 1 TABLET ORAL at 16:31

## 2024-01-01 RX ADMIN — Medication 10 ML: at 09:09

## 2024-01-01 RX ADMIN — INSULIN HUMAN 4 UNITS: 100 INJECTION, SOLUTION PARENTERAL at 06:36

## 2024-01-01 RX ADMIN — INSULIN HUMAN 2 UNITS: 100 INJECTION, SOLUTION PARENTERAL at 12:31

## 2024-01-01 RX ADMIN — PANTOPRAZOLE SODIUM 40 MG: 40 INJECTION, POWDER, FOR SOLUTION INTRAVENOUS at 18:05

## 2024-01-01 RX ADMIN — Medication 10 ML: at 22:42

## 2024-01-01 RX ADMIN — AMLODIPINE BESYLATE 5 MG: 5 TABLET ORAL at 20:19

## 2024-01-01 RX ADMIN — IPRATROPIUM BROMIDE AND ALBUTEROL SULFATE 3 ML: .5; 3 SOLUTION RESPIRATORY (INHALATION) at 15:39

## 2024-01-01 RX ADMIN — PROPOFOL INJECTABLE EMULSION 100 MCG/KG/MIN: 10 INJECTION, EMULSION INTRAVENOUS at 14:17

## 2024-01-01 RX ADMIN — CETIRIZINE HYDROCHLORIDE 10 MG: 10 TABLET ORAL at 09:11

## 2024-01-01 RX ADMIN — Medication 10 ML: at 21:40

## 2024-01-01 RX ADMIN — PANTOPRAZOLE SODIUM 40 MG: 40 INJECTION, POWDER, FOR SOLUTION INTRAVENOUS at 16:53

## 2024-01-01 RX ADMIN — ATENOLOL 50 MG: 50 TABLET ORAL at 08:52

## 2024-01-01 RX ADMIN — PANTOPRAZOLE SODIUM 40 MG: 40 TABLET, DELAYED RELEASE ORAL at 08:53

## 2024-01-01 RX ADMIN — AMIODARONE HYDROCHLORIDE 0.5 MG/MIN: 1.8 INJECTION, SOLUTION INTRAVENOUS at 21:08

## 2024-01-01 RX ADMIN — CETIRIZINE HYDROCHLORIDE 10 MG: 10 TABLET ORAL at 08:32

## 2024-01-01 RX ADMIN — PANTOPRAZOLE SODIUM 40 MG: 40 TABLET, DELAYED RELEASE ORAL at 21:19

## 2024-01-01 RX ADMIN — Medication 10 ML: at 21:41

## 2024-01-01 RX ADMIN — PREGABALIN 150 MG: 75 CAPSULE ORAL at 20:32

## 2024-01-01 RX ADMIN — FUROSEMIDE 40 MG: 10 INJECTION, SOLUTION INTRAMUSCULAR; INTRAVENOUS at 22:17

## 2024-01-01 RX ADMIN — ATORVASTATIN CALCIUM 40 MG: 20 TABLET, FILM COATED ORAL at 20:28

## 2024-01-01 RX ADMIN — HYDROMORPHONE HYDROCHLORIDE 1 MG: 1 INJECTION, SOLUTION INTRAMUSCULAR; INTRAVENOUS; SUBCUTANEOUS at 22:56

## 2024-01-01 RX ADMIN — IPRATROPIUM BROMIDE AND ALBUTEROL SULFATE 3 ML: .5; 3 SOLUTION RESPIRATORY (INHALATION) at 12:33

## 2024-01-01 RX ADMIN — PIPERACILLIN AND TAZOBACTAM 3.38 G: 3; .375 INJECTION, POWDER, FOR SOLUTION INTRAVENOUS at 01:02

## 2024-01-01 RX ADMIN — PIPERACILLIN AND TAZOBACTAM 3.38 G: 3; .375 INJECTION, POWDER, FOR SOLUTION INTRAVENOUS at 09:04

## 2024-01-01 RX ADMIN — FENTANYL CITRATE 50 MCG: 50 INJECTION, SOLUTION INTRAMUSCULAR; INTRAVENOUS at 12:45

## 2024-01-01 RX ADMIN — HYDROMORPHONE HYDROCHLORIDE 1 MG: 1 INJECTION, SOLUTION INTRAMUSCULAR; INTRAVENOUS; SUBCUTANEOUS at 05:29

## 2024-01-01 RX ADMIN — PIPERACILLIN AND TAZOBACTAM 3.38 G: 3; .375 INJECTION, POWDER, FOR SOLUTION INTRAVENOUS at 17:02

## 2024-01-01 RX ADMIN — DICYCLOMINE HYDROCHLORIDE 10 MG: 10 CAPSULE ORAL at 05:20

## 2024-01-01 RX ADMIN — PANTOPRAZOLE SODIUM 40 MG: 40 INJECTION, POWDER, FOR SOLUTION INTRAVENOUS at 09:10

## 2024-01-01 RX ADMIN — MECLIZINE HCL 12.5 MG 12.5 MG: 12.5 TABLET ORAL at 09:08

## 2024-01-01 RX ADMIN — Medication 10 ML: at 08:40

## 2024-01-01 RX ADMIN — SODIUM CHLORIDE 100 ML/HR: 9 INJECTION, SOLUTION INTRAVENOUS at 17:54

## 2024-01-01 RX ADMIN — SODIUM CHLORIDE 125 ML/HR: 9 INJECTION, SOLUTION INTRAVENOUS at 09:27

## 2024-01-01 RX ADMIN — Medication 250 MG: at 20:14

## 2024-01-01 RX ADMIN — SODIUM CHLORIDE 1000 ML: 9 INJECTION, SOLUTION INTRAVENOUS at 15:53

## 2024-01-01 RX ADMIN — DEXMEDETOMIDINE HYDROCHLORIDE 0.2 MCG/KG/HR: 4 INJECTION, SOLUTION INTRAVENOUS at 20:23

## 2024-01-01 RX ADMIN — PANTOPRAZOLE SODIUM 40 MG: 40 INJECTION, POWDER, FOR SOLUTION INTRAVENOUS at 06:36

## 2024-01-01 RX ADMIN — PREGABALIN 150 MG: 75 CAPSULE ORAL at 15:36

## 2024-01-01 RX ADMIN — Medication 2000 UNITS: at 09:11

## 2024-01-01 RX ADMIN — Medication 10 ML: at 22:53

## 2024-01-01 RX ADMIN — AMIODARONE HYDROCHLORIDE 1 MG/MIN: 1.8 INJECTION, SOLUTION INTRAVENOUS at 10:45

## 2024-01-01 RX ADMIN — PROPOFOL INJECTABLE EMULSION 10 MCG/KG/MIN: 10 INJECTION, EMULSION INTRAVENOUS at 17:18

## 2024-01-01 RX ADMIN — MORPHINE SULFATE 2 MG: 2 INJECTION, SOLUTION INTRAMUSCULAR; INTRAVENOUS at 16:29

## 2024-01-01 RX ADMIN — IPRATROPIUM BROMIDE AND ALBUTEROL SULFATE 3 ML: .5; 3 SOLUTION RESPIRATORY (INHALATION) at 14:43

## 2024-01-01 RX ADMIN — AMIODARONE HYDROCHLORIDE 0.5 MG/MIN: 1.8 INJECTION, SOLUTION INTRAVENOUS at 09:49

## 2024-01-01 RX ADMIN — INSULIN HUMAN 4 UNITS: 100 INJECTION, SOLUTION PARENTERAL at 00:24

## 2024-01-01 RX ADMIN — ATENOLOL 50 MG: 50 TABLET ORAL at 17:06

## 2024-01-01 RX ADMIN — HYDROMORPHONE HYDROCHLORIDE 0.5 MG: 1 INJECTION, SOLUTION INTRAMUSCULAR; INTRAVENOUS; SUBCUTANEOUS at 08:54

## 2024-01-01 RX ADMIN — Medication 10 ML: at 08:08

## 2024-01-01 RX ADMIN — SUCRALFATE 1 G: 1 TABLET ORAL at 05:04

## 2024-01-01 RX ADMIN — PREGABALIN 150 MG: 75 CAPSULE ORAL at 15:03

## 2024-01-01 RX ADMIN — PANCRELIPASE 24000 UNITS OF LIPASE: 60000; 12000; 38000 CAPSULE, DELAYED RELEASE PELLETS ORAL at 07:37

## 2024-01-01 RX ADMIN — CETIRIZINE HYDROCHLORIDE 10 MG: 10 TABLET ORAL at 08:37

## 2024-01-01 RX ADMIN — PIPERACILLIN AND TAZOBACTAM 4.5 G: 4; .5 INJECTION, POWDER, FOR SOLUTION INTRAVENOUS at 21:47

## 2024-01-01 RX ADMIN — ATENOLOL 50 MG: 50 TABLET ORAL at 20:24

## 2024-01-01 RX ADMIN — AMIODARONE HYDROCHLORIDE 0.5 MG/MIN: 1.8 INJECTION, SOLUTION INTRAVENOUS at 21:39

## 2024-01-01 RX ADMIN — PANTOPRAZOLE SODIUM 40 MG: 40 TABLET, DELAYED RELEASE ORAL at 08:37

## 2024-01-01 RX ADMIN — PIPERACILLIN AND TAZOBACTAM 3.38 G: 3; .375 INJECTION, POWDER, FOR SOLUTION INTRAVENOUS at 15:53

## 2024-01-01 RX ADMIN — METOPROLOL TARTRATE 5 MG: 1 INJECTION, SOLUTION INTRAVENOUS at 20:00

## 2024-01-01 RX ADMIN — IPRATROPIUM BROMIDE AND ALBUTEROL SULFATE 3 ML: .5; 3 SOLUTION RESPIRATORY (INHALATION) at 10:50

## 2024-01-01 RX ADMIN — SENNOSIDES AND DOCUSATE SODIUM 2 TABLET: 50; 8.6 TABLET ORAL at 21:41

## 2024-01-01 RX ADMIN — METOPROLOL TARTRATE 5 MG: 1 INJECTION, SOLUTION INTRAVENOUS at 17:07

## 2024-01-01 RX ADMIN — CLOPIDOGREL BISULFATE 75 MG: 75 TABLET, FILM COATED ORAL at 08:37

## 2024-01-01 RX ADMIN — Medication 10 ML: at 08:54

## 2024-01-01 RX ADMIN — FENTANYL CITRATE 50 MCG: 50 INJECTION, SOLUTION INTRAMUSCULAR; INTRAVENOUS at 17:51

## 2024-01-01 RX ADMIN — Medication 10 ML: at 20:16

## 2024-01-01 RX ADMIN — IPRATROPIUM BROMIDE AND ALBUTEROL SULFATE 3 ML: .5; 3 SOLUTION RESPIRATORY (INHALATION) at 15:32

## 2024-01-01 RX ADMIN — PROPOFOL INJECTABLE EMULSION 40 MCG/KG/MIN: 10 INJECTION, EMULSION INTRAVENOUS at 13:58

## 2024-01-01 RX ADMIN — PANTOPRAZOLE SODIUM 40 MG: 40 TABLET, DELAYED RELEASE ORAL at 08:39

## 2024-01-01 RX ADMIN — SODIUM CHLORIDE 125 ML/HR: 4.5 INJECTION, SOLUTION INTRAVENOUS at 04:29

## 2024-01-01 RX ADMIN — IPRATROPIUM BROMIDE AND ALBUTEROL SULFATE 3 ML: .5; 3 SOLUTION RESPIRATORY (INHALATION) at 23:39

## 2024-01-01 RX ADMIN — AMIODARONE HYDROCHLORIDE 0.5 MG/MIN: 1.8 INJECTION, SOLUTION INTRAVENOUS at 21:42

## 2024-01-01 RX ADMIN — INSULIN HUMAN 12 UNITS: 100 INJECTION, SOLUTION PARENTERAL at 18:21

## 2024-01-01 RX ADMIN — PANTOPRAZOLE SODIUM 40 MG: 40 INJECTION, POWDER, FOR SOLUTION INTRAVENOUS at 06:15

## 2024-01-01 RX ADMIN — SODIUM CHLORIDE 100 ML/HR: 9 INJECTION, SOLUTION INTRAVENOUS at 20:54

## 2024-01-01 RX ADMIN — AMIODARONE HYDROCHLORIDE 0.5 MG/MIN: 1.8 INJECTION, SOLUTION INTRAVENOUS at 11:45

## 2024-01-01 RX ADMIN — FENTANYL CITRATE 90 MCG/HR: 0.05 INJECTION, SOLUTION INTRAMUSCULAR; INTRAVENOUS at 04:59

## 2024-01-01 RX ADMIN — Medication 2000 UNITS: at 08:53

## 2024-01-01 RX ADMIN — Medication 10 ML: at 08:01

## 2024-01-01 RX ADMIN — PIPERACILLIN AND TAZOBACTAM 3.38 G: 3; .375 INJECTION, POWDER, FOR SOLUTION INTRAVENOUS at 09:29

## 2024-01-01 RX ADMIN — CETIRIZINE HYDROCHLORIDE 10 MG: 10 TABLET ORAL at 08:54

## 2024-01-01 RX ADMIN — Medication 10 ML: at 20:22

## 2024-01-01 RX ADMIN — PREGABALIN 150 MG: 75 CAPSULE ORAL at 16:28

## 2024-01-01 RX ADMIN — IPRATROPIUM BROMIDE AND ALBUTEROL SULFATE 3 ML: .5; 3 SOLUTION RESPIRATORY (INHALATION) at 16:40

## 2024-01-01 RX ADMIN — PANCRELIPASE 24000 UNITS OF LIPASE: 60000; 12000; 38000 CAPSULE, DELAYED RELEASE PELLETS ORAL at 16:31

## 2024-01-01 RX ADMIN — LABETALOL HYDROCHLORIDE 5 MG: 5 INJECTION, SOLUTION INTRAVENOUS at 22:47

## 2024-01-01 RX ADMIN — MECLIZINE HCL 12.5 MG 12.5 MG: 12.5 TABLET ORAL at 10:01

## 2024-01-01 RX ADMIN — FENTANYL CITRATE 200 MCG/HR: 0.05 INJECTION, SOLUTION INTRAMUSCULAR; INTRAVENOUS at 11:04

## 2024-01-01 RX ADMIN — ATORVASTATIN CALCIUM 40 MG: 20 TABLET, FILM COATED ORAL at 21:27

## 2024-01-01 RX ADMIN — Medication 250 MG: at 21:14

## 2024-01-01 RX ADMIN — SODIUM CHLORIDE 125 ML/HR: 4.5 INJECTION, SOLUTION INTRAVENOUS at 20:17

## 2024-01-01 RX ADMIN — PROPOFOL INJECTABLE EMULSION 50 MCG/KG/MIN: 10 INJECTION, EMULSION INTRAVENOUS at 05:53

## 2024-01-01 RX ADMIN — HYDROMORPHONE HYDROCHLORIDE 0.5 MG: 1 INJECTION, SOLUTION INTRAMUSCULAR; INTRAVENOUS; SUBCUTANEOUS at 14:27

## 2024-01-01 RX ADMIN — PREGABALIN 150 MG: 75 CAPSULE ORAL at 16:29

## 2024-01-01 RX ADMIN — HYALURONIDASE (HUMAN RECOMBINANT) 150 UNITS: 150 INJECTION, SOLUTION SUBCUTANEOUS at 14:29

## 2024-01-01 RX ADMIN — PREGABALIN 150 MG: 75 CAPSULE ORAL at 08:32

## 2024-01-01 RX ADMIN — MECLIZINE HCL 12.5 MG 12.5 MG: 12.5 TABLET ORAL at 08:37

## 2024-01-01 RX ADMIN — PIPERACILLIN AND TAZOBACTAM 4.5 G: 4; .5 INJECTION, POWDER, FOR SOLUTION INTRAVENOUS at 14:00

## 2024-01-01 RX ADMIN — ACETAMINOPHEN 325MG 650 MG: 325 TABLET ORAL at 08:38

## 2024-01-01 RX ADMIN — INSULIN HUMAN 8 UNITS: 100 INJECTION, SOLUTION PARENTERAL at 12:12

## 2024-01-01 RX ADMIN — ATENOLOL 50 MG: 50 TABLET ORAL at 08:32

## 2024-01-01 RX ADMIN — SENNOSIDES AND DOCUSATE SODIUM 2 TABLET: 50; 8.6 TABLET ORAL at 08:33

## 2024-01-01 RX ADMIN — PIPERACILLIN AND TAZOBACTAM 3.38 G: 3; .375 INJECTION, POWDER, FOR SOLUTION INTRAVENOUS at 16:29

## 2024-01-01 RX ADMIN — AMIODARONE HYDROCHLORIDE 0.5 MG/MIN: 1.8 INJECTION, SOLUTION INTRAVENOUS at 10:32

## 2024-01-01 RX ADMIN — LISINOPRIL 40 MG: 40 TABLET ORAL at 20:19

## 2024-01-01 RX ADMIN — ATORVASTATIN CALCIUM 40 MG: 20 TABLET, FILM COATED ORAL at 20:14

## 2024-01-01 RX ADMIN — IPRATROPIUM BROMIDE AND ALBUTEROL SULFATE 3 ML: .5; 3 SOLUTION RESPIRATORY (INHALATION) at 07:20

## 2024-01-01 RX ADMIN — MECLIZINE HCL 12.5 MG 12.5 MG: 12.5 TABLET ORAL at 09:10

## 2024-01-01 RX ADMIN — Medication 10 ML: at 08:24

## 2024-01-01 RX ADMIN — PANTOPRAZOLE SODIUM 40 MG: 40 INJECTION, POWDER, FOR SOLUTION INTRAVENOUS at 06:24

## 2024-01-01 RX ADMIN — CALCIUM GLUCONATE: 98 INJECTION, SOLUTION INTRAVENOUS at 18:07

## 2024-01-01 RX ADMIN — Medication 10 ML: at 11:02

## 2024-01-01 RX ADMIN — SODIUM CHLORIDE 125 ML/HR: 9 INJECTION, SOLUTION INTRAVENOUS at 00:48

## 2024-01-01 RX ADMIN — Medication 10 ML: at 09:16

## 2024-01-01 RX ADMIN — ACETAMINOPHEN 325MG 650 MG: 325 TABLET ORAL at 09:22

## 2024-01-01 RX ADMIN — HYDROMORPHONE HYDROCHLORIDE 0.5 MG: 1 INJECTION, SOLUTION INTRAMUSCULAR; INTRAVENOUS; SUBCUTANEOUS at 20:47

## 2024-01-01 RX ADMIN — MECLIZINE HCL 12.5 MG 12.5 MG: 12.5 TABLET ORAL at 08:39

## 2024-01-01 RX ADMIN — Medication 15 G: at 08:51

## 2024-01-01 RX ADMIN — Medication 0.08 MCG/KG/MIN: at 03:38

## 2024-01-01 RX ADMIN — LABETALOL HYDROCHLORIDE 10 MG: 5 INJECTION, SOLUTION INTRAVENOUS at 17:05

## 2024-01-01 RX ADMIN — ATORVASTATIN CALCIUM 40 MG: 20 TABLET, FILM COATED ORAL at 21:14

## 2024-01-01 RX ADMIN — ACETAMINOPHEN 325MG 650 MG: 325 TABLET ORAL at 15:41

## 2024-01-01 RX ADMIN — Medication 2000 UNITS: at 08:38

## 2024-01-01 RX ADMIN — HYDROMORPHONE HYDROCHLORIDE 0.5 MG: 1 INJECTION, SOLUTION INTRAMUSCULAR; INTRAVENOUS; SUBCUTANEOUS at 20:22

## 2024-01-01 RX ADMIN — METOCLOPRAMIDE 10 MG: 5 INJECTION, SOLUTION INTRAMUSCULAR; INTRAVENOUS at 21:38

## 2024-01-01 RX ADMIN — AMLODIPINE BESYLATE 5 MG: 5 TABLET ORAL at 09:10

## 2024-01-01 RX ADMIN — Medication 250 MG: at 21:20

## 2024-01-01 RX ADMIN — FENTANYL CITRATE 100 MCG/HR: 0.05 INJECTION, SOLUTION INTRAMUSCULAR; INTRAVENOUS at 23:37

## 2024-01-01 RX ADMIN — ATENOLOL 50 MG: 50 TABLET ORAL at 08:37

## 2024-01-01 RX ADMIN — METOPROLOL TARTRATE 5 MG: 1 INJECTION, SOLUTION INTRAVENOUS at 17:46

## 2024-01-01 RX ADMIN — FAMOTIDINE 20 MG: 10 INJECTION INTRAVENOUS at 19:29

## 2024-01-01 RX ADMIN — Medication 10 ML: at 20:32

## 2024-01-01 RX ADMIN — HYDROMORPHONE HYDROCHLORIDE 0.25 MG: 1 INJECTION, SOLUTION INTRAMUSCULAR; INTRAVENOUS; SUBCUTANEOUS at 22:42

## 2024-01-01 RX ADMIN — FENTANYL CITRATE 100 MCG/HR: 0.05 INJECTION, SOLUTION INTRAMUSCULAR; INTRAVENOUS at 18:25

## 2024-01-01 RX ADMIN — DICYCLOMINE HYDROCHLORIDE 10 MG: 10 CAPSULE ORAL at 17:47

## 2024-01-01 RX ADMIN — PROPOFOL INJECTABLE EMULSION 40 MCG/KG/MIN: 10 INJECTION, EMULSION INTRAVENOUS at 18:24

## 2024-01-01 RX ADMIN — ONDANSETRON 4 MG: 2 INJECTION INTRAMUSCULAR; INTRAVENOUS at 15:50

## 2024-01-01 RX ADMIN — AMIODARONE HYDROCHLORIDE 0.5 MG/MIN: 1.8 INJECTION, SOLUTION INTRAVENOUS at 21:57

## 2024-01-01 RX ADMIN — IPRATROPIUM BROMIDE AND ALBUTEROL SULFATE 3 ML: .5; 3 SOLUTION RESPIRATORY (INHALATION) at 20:03

## 2024-01-01 RX ADMIN — Medication 2000 UNITS: at 18:23

## 2024-01-01 RX ADMIN — PIPERACILLIN AND TAZOBACTAM 3.38 G: 3; .375 INJECTION, POWDER, FOR SOLUTION INTRAVENOUS at 18:13

## 2024-01-01 RX ADMIN — PROPOFOL INJECTABLE EMULSION 80 MCG/KG/MIN: 10 INJECTION, EMULSION INTRAVENOUS at 18:35

## 2024-01-01 RX ADMIN — PIPERACILLIN AND TAZOBACTAM 4.5 G: 4; .5 INJECTION, POWDER, FOR SOLUTION INTRAVENOUS at 14:30

## 2024-01-01 RX ADMIN — HYDROMORPHONE HYDROCHLORIDE 0.5 MG: 1 INJECTION, SOLUTION INTRAMUSCULAR; INTRAVENOUS; SUBCUTANEOUS at 18:16

## 2024-01-01 RX ADMIN — METOPROLOL TARTRATE 5 MG: 1 INJECTION, SOLUTION INTRAVENOUS at 08:22

## 2024-01-01 RX ADMIN — Medication 15 G: at 09:07

## 2024-01-01 RX ADMIN — PROPOFOL INJECTABLE EMULSION 10 MCG/KG/MIN: 10 INJECTION, EMULSION INTRAVENOUS at 15:34

## 2024-01-01 RX ADMIN — MECLIZINE HCL 12.5 MG 12.5 MG: 12.5 TABLET ORAL at 08:54

## 2024-01-01 RX ADMIN — CHLORHEXIDINE GLUCONATE 15 ML: 1.2 RINSE ORAL at 20:56

## 2024-01-01 RX ADMIN — MINERAL OIL, PETROLATUM, PHENYLEPHRINE HCL 1 APPLICATION: 14; 74.9; .25 OINTMENT RECTAL at 20:22

## 2024-01-01 RX ADMIN — METOCLOPRAMIDE 10 MG: 5 INJECTION, SOLUTION INTRAMUSCULAR; INTRAVENOUS at 21:54

## 2024-01-01 RX ADMIN — IPRATROPIUM BROMIDE AND ALBUTEROL SULFATE 3 ML: .5; 3 SOLUTION RESPIRATORY (INHALATION) at 11:40

## 2024-01-01 RX ADMIN — LISINOPRIL 40 MG: 40 TABLET ORAL at 10:01

## 2024-01-01 RX ADMIN — METOPROLOL TARTRATE 5 MG: 1 INJECTION, SOLUTION INTRAVENOUS at 09:48

## 2024-01-01 RX ADMIN — ATENOLOL 50 MG: 50 TABLET ORAL at 21:14

## 2024-01-01 RX ADMIN — METOPROLOL TARTRATE 5 MG: 1 INJECTION, SOLUTION INTRAVENOUS at 23:23

## 2024-01-01 RX ADMIN — PREGABALIN 150 MG: 75 CAPSULE ORAL at 21:54

## 2024-01-01 RX ADMIN — INSULIN GLARGINE 10 UNITS: 100 INJECTION, SOLUTION SUBCUTANEOUS at 12:11

## 2024-01-01 RX ADMIN — PROPOFOL INJECTABLE EMULSION 15 MCG/KG/MIN: 10 INJECTION, EMULSION INTRAVENOUS at 12:36

## 2024-01-01 RX ADMIN — METOPROLOL TARTRATE 5 MG: 1 INJECTION, SOLUTION INTRAVENOUS at 17:09

## 2024-01-01 RX ADMIN — Medication 10 ML: at 08:34

## 2024-01-01 RX ADMIN — PIPERACILLIN AND TAZOBACTAM 4.5 G: 4; .5 INJECTION, POWDER, FOR SOLUTION INTRAVENOUS at 06:24

## 2024-01-01 RX ADMIN — METOCLOPRAMIDE 10 MG: 5 INJECTION, SOLUTION INTRAMUSCULAR; INTRAVENOUS at 04:23

## 2024-01-01 RX ADMIN — PANTOPRAZOLE SODIUM 40 MG: 40 TABLET, DELAYED RELEASE ORAL at 21:14

## 2024-01-01 RX ADMIN — ATENOLOL 50 MG: 50 TABLET ORAL at 21:54

## 2024-01-01 RX ADMIN — CHLORHEXIDINE GLUCONATE 15 ML: 1.2 RINSE ORAL at 09:21

## 2024-01-01 RX ADMIN — Medication 2000 UNITS: at 08:37

## 2024-01-01 RX ADMIN — FENTANYL CITRATE 200 MCG/HR: 0.05 INJECTION, SOLUTION INTRAMUSCULAR; INTRAVENOUS at 01:24

## 2024-01-01 RX ADMIN — ATORVASTATIN CALCIUM 40 MG: 20 TABLET, FILM COATED ORAL at 20:32

## 2024-01-01 RX ADMIN — CALCIUM GLUCONATE: 98 INJECTION, SOLUTION INTRAVENOUS at 18:28

## 2024-01-01 RX ADMIN — PANTOPRAZOLE SODIUM 40 MG: 40 INJECTION, POWDER, FOR SOLUTION INTRAVENOUS at 07:15

## 2024-01-01 RX ADMIN — SODIUM CHLORIDE 1000 ML: 9 INJECTION, SOLUTION INTRAVENOUS at 08:45

## 2024-01-01 RX ADMIN — Medication 2 WAFER: at 09:08

## 2024-01-01 RX ADMIN — PIPERACILLIN AND TAZOBACTAM 3.38 G: 3; .375 INJECTION, POWDER, FOR SOLUTION INTRAVENOUS at 16:04

## 2024-01-01 RX ADMIN — POTASSIUM CHLORIDE 10 MEQ: 7.46 INJECTION, SOLUTION INTRAVENOUS at 17:34

## 2024-01-01 RX ADMIN — ACETAMINOPHEN 325MG 650 MG: 325 TABLET ORAL at 05:05

## 2024-01-01 RX ADMIN — FENTANYL CITRATE 100 MCG/HR: 0.05 INJECTION, SOLUTION INTRAMUSCULAR; INTRAVENOUS at 21:41

## 2024-01-01 RX ADMIN — SODIUM CHLORIDE 125 ML/HR: 9 INJECTION, SOLUTION INTRAVENOUS at 01:05

## 2024-01-01 RX ADMIN — IPRATROPIUM BROMIDE AND ALBUTEROL SULFATE 3 ML: .5; 3 SOLUTION RESPIRATORY (INHALATION) at 11:35

## 2024-01-01 RX ADMIN — POTASSIUM CHLORIDE 20 MEQ: 29.8 INJECTION, SOLUTION INTRAVENOUS at 16:08

## 2024-01-01 RX ADMIN — PROPOFOL INJECTABLE EMULSION 50 MCG/KG/MIN: 10 INJECTION, EMULSION INTRAVENOUS at 09:55

## 2024-01-01 RX ADMIN — DEXMEDETOMIDINE HYDROCHLORIDE 0.2 MCG/KG/HR: 4 INJECTION, SOLUTION INTRAVENOUS at 09:42

## 2024-01-01 RX ADMIN — IPRATROPIUM BROMIDE AND ALBUTEROL SULFATE 3 ML: .5; 3 SOLUTION RESPIRATORY (INHALATION) at 20:33

## 2024-01-01 RX ADMIN — AMLODIPINE BESYLATE 5 MG: 5 TABLET ORAL at 08:40

## 2024-01-01 RX ADMIN — POTASSIUM CHLORIDE 10 MEQ: 7.46 INJECTION, SOLUTION INTRAVENOUS at 08:25

## 2024-01-01 RX ADMIN — PREGABALIN 150 MG: 75 CAPSULE ORAL at 08:07

## 2024-01-01 RX ADMIN — DICYCLOMINE HYDROCHLORIDE 10 MG: 10 CAPSULE ORAL at 18:35

## 2024-01-01 RX ADMIN — IPRATROPIUM BROMIDE AND ALBUTEROL SULFATE 3 ML: .5; 3 SOLUTION RESPIRATORY (INHALATION) at 19:49

## 2024-01-01 RX ADMIN — FENTANYL CITRATE 200 MCG/HR: 0.05 INJECTION, SOLUTION INTRAMUSCULAR; INTRAVENOUS at 05:54

## 2024-01-01 RX ADMIN — HYDROMORPHONE HYDROCHLORIDE 1 MG: 1 INJECTION, SOLUTION INTRAMUSCULAR; INTRAVENOUS; SUBCUTANEOUS at 20:33

## 2024-01-01 RX ADMIN — Medication 10 ML: at 21:12

## 2024-01-01 RX ADMIN — PANTOPRAZOLE SODIUM 40 MG: 40 INJECTION, POWDER, FOR SOLUTION INTRAVENOUS at 05:50

## 2024-01-01 RX ADMIN — PANTOPRAZOLE SODIUM 40 MG: 40 TABLET, DELAYED RELEASE ORAL at 21:25

## 2024-01-01 RX ADMIN — ATENOLOL 50 MG: 50 TABLET ORAL at 21:19

## 2024-01-01 RX ADMIN — METOPROLOL TARTRATE 5 MG: 1 INJECTION, SOLUTION INTRAVENOUS at 08:33

## 2024-01-01 RX ADMIN — PROPOFOL INJECTABLE EMULSION 20 MCG/KG/MIN: 10 INJECTION, EMULSION INTRAVENOUS at 04:59

## 2024-01-01 RX ADMIN — PANTOPRAZOLE SODIUM 40 MG: 40 INJECTION, POWDER, FOR SOLUTION INTRAVENOUS at 06:12

## 2024-01-01 RX ADMIN — METOCLOPRAMIDE 10 MG: 5 INJECTION, SOLUTION INTRAMUSCULAR; INTRAVENOUS at 17:02

## 2024-01-01 RX ADMIN — GUAIFENESIN 600 MG: 600 TABLET, EXTENDED RELEASE ORAL at 22:19

## 2024-01-01 RX ADMIN — HYDROMORPHONE HYDROCHLORIDE 1 MG: 1 INJECTION, SOLUTION INTRAMUSCULAR; INTRAVENOUS; SUBCUTANEOUS at 01:15

## 2024-01-01 RX ADMIN — HYDROMORPHONE HYDROCHLORIDE 0.25 MG: 1 INJECTION, SOLUTION INTRAMUSCULAR; INTRAVENOUS; SUBCUTANEOUS at 22:48

## 2024-01-01 RX ADMIN — Medication 10 ML: at 20:50

## 2024-01-01 RX ADMIN — IPRATROPIUM BROMIDE AND ALBUTEROL SULFATE 3 ML: .5; 3 SOLUTION RESPIRATORY (INHALATION) at 07:13

## 2024-01-01 RX ADMIN — DICYCLOMINE HYDROCHLORIDE 10 MG: 10 CAPSULE ORAL at 09:19

## 2024-01-01 RX ADMIN — PIPERACILLIN AND TAZOBACTAM 4.5 G: 4; .5 INJECTION, POWDER, FOR SOLUTION INTRAVENOUS at 21:39

## 2024-01-01 RX ADMIN — ACETAMINOPHEN 325MG 650 MG: 325 TABLET ORAL at 08:51

## 2024-01-01 RX ADMIN — BISACODYL 10 MG: 10 SUPPOSITORY RECTAL at 08:23

## 2024-01-01 RX ADMIN — SUCRALFATE 1 G: 1 TABLET ORAL at 16:46

## 2024-01-01 RX ADMIN — INSULIN HUMAN 4 UNITS: 100 INJECTION, SOLUTION PARENTERAL at 01:05

## 2024-01-01 RX ADMIN — INSULIN HUMAN 4 UNITS: 100 INJECTION, SOLUTION PARENTERAL at 06:47

## 2024-01-01 RX ADMIN — PANTOPRAZOLE SODIUM 40 MG: 40 TABLET, DELAYED RELEASE ORAL at 20:29

## 2024-01-01 RX ADMIN — CLOPIDOGREL BISULFATE 75 MG: 75 TABLET, FILM COATED ORAL at 08:32

## 2024-01-01 RX ADMIN — PIPERACILLIN AND TAZOBACTAM 4.5 G: 4; .5 INJECTION, POWDER, FOR SOLUTION INTRAVENOUS at 21:42

## 2024-01-01 RX ADMIN — PREGABALIN 150 MG: 75 CAPSULE ORAL at 21:38

## 2024-01-01 RX ADMIN — Medication 15 G: at 20:14

## 2024-01-01 RX ADMIN — DICYCLOMINE HYDROCHLORIDE 10 MG: 10 CAPSULE ORAL at 22:16

## 2024-01-01 RX ADMIN — HYDROMORPHONE HYDROCHLORIDE 1 MG: 1 INJECTION, SOLUTION INTRAMUSCULAR; INTRAVENOUS; SUBCUTANEOUS at 16:41

## 2024-01-01 RX ADMIN — HYDROMORPHONE HYDROCHLORIDE 1 MG: 1 INJECTION, SOLUTION INTRAMUSCULAR; INTRAVENOUS; SUBCUTANEOUS at 11:36

## 2024-01-01 RX ADMIN — CHLORHEXIDINE GLUCONATE 15 ML: 1.2 RINSE ORAL at 07:47

## 2024-01-01 RX ADMIN — CHLORHEXIDINE GLUCONATE 15 ML: 1.2 RINSE ORAL at 21:42

## 2024-01-01 RX ADMIN — SODIUM CHLORIDE TAB 1 GM 2 G: 1 TAB at 16:50

## 2024-01-01 RX ADMIN — PIPERACILLIN AND TAZOBACTAM 3.38 G: 3; .375 INJECTION, POWDER, FOR SOLUTION INTRAVENOUS at 22:17

## 2024-01-01 RX ADMIN — Medication 10 ML: at 21:54

## 2024-01-01 RX ADMIN — AMIODARONE HYDROCHLORIDE 0.5 MG/MIN: 1.8 INJECTION, SOLUTION INTRAVENOUS at 22:50

## 2024-01-01 RX ADMIN — PIPERACILLIN AND TAZOBACTAM 3.38 G: 3; .375 INJECTION, POWDER, FOR SOLUTION INTRAVENOUS at 00:00

## 2024-01-01 RX ADMIN — PANTOPRAZOLE SODIUM 40 MG: 40 INJECTION, POWDER, FOR SOLUTION INTRAVENOUS at 17:10

## 2024-01-01 RX ADMIN — CALCIUM GLUCONATE: 98 INJECTION, SOLUTION INTRAVENOUS at 18:13

## 2024-01-01 RX ADMIN — CLOPIDOGREL BISULFATE 75 MG: 75 TABLET, FILM COATED ORAL at 08:33

## 2024-01-01 RX ADMIN — Medication 1000 MCG: at 08:32

## 2024-01-01 RX ADMIN — PANTOPRAZOLE SODIUM 40 MG: 40 INJECTION, POWDER, FOR SOLUTION INTRAVENOUS at 18:49

## 2024-01-01 RX ADMIN — POTASSIUM CHLORIDE 20 MEQ: 29.8 INJECTION, SOLUTION INTRAVENOUS at 05:26

## 2024-01-01 RX ADMIN — METOPROLOL TARTRATE 5 MG: 1 INJECTION, SOLUTION INTRAVENOUS at 12:25

## 2024-01-01 RX ADMIN — Medication 10 ML: at 21:43

## 2024-01-01 RX ADMIN — CETIRIZINE HYDROCHLORIDE 10 MG: 10 TABLET ORAL at 09:08

## 2024-01-01 RX ADMIN — CLOPIDOGREL BISULFATE 75 MG: 75 TABLET, FILM COATED ORAL at 16:19

## 2024-01-01 RX ADMIN — ACETAMINOPHEN 325MG 650 MG: 325 TABLET ORAL at 18:04

## 2024-01-01 RX ADMIN — AMIODARONE HYDROCHLORIDE 0.5 MG/MIN: 1.8 INJECTION, SOLUTION INTRAVENOUS at 08:41

## 2024-01-01 RX ADMIN — Medication 10 ML: at 21:19

## 2024-01-01 RX ADMIN — Medication 0.02 MCG/KG/MIN: at 12:24

## 2024-01-01 RX ADMIN — PIPERACILLIN AND TAZOBACTAM 3.38 G: 3; .375 INJECTION, POWDER, FOR SOLUTION INTRAVENOUS at 08:08

## 2024-01-01 RX ADMIN — PREGABALIN 150 MG: 75 CAPSULE ORAL at 21:14

## 2024-01-01 RX ADMIN — AMLODIPINE BESYLATE 5 MG: 5 TABLET ORAL at 09:08

## 2024-01-01 RX ADMIN — PREGABALIN 150 MG: 75 CAPSULE ORAL at 20:28

## 2024-01-01 RX ADMIN — PIPERACILLIN AND TAZOBACTAM 3.38 G: 3; .375 INJECTION, POWDER, FOR SOLUTION INTRAVENOUS at 00:52

## 2024-01-01 RX ADMIN — ATORVASTATIN CALCIUM 40 MG: 20 TABLET, FILM COATED ORAL at 21:38

## 2024-01-01 RX ADMIN — Medication 1000 MCG: at 12:08

## 2024-01-01 RX ADMIN — SUCRALFATE 1 G: 1 TABLET ORAL at 12:53

## 2024-01-01 RX ADMIN — AMIODARONE HYDROCHLORIDE 0.5 MG/MIN: 1.8 INJECTION, SOLUTION INTRAVENOUS at 21:53

## 2024-01-01 RX ADMIN — PREGABALIN 150 MG: 75 CAPSULE ORAL at 15:23

## 2024-01-01 RX ADMIN — AMIODARONE HYDROCHLORIDE 0.5 MG/MIN: 1.8 INJECTION, SOLUTION INTRAVENOUS at 17:07

## 2024-01-01 RX ADMIN — SUCRALFATE 1 G: 1 TABLET ORAL at 13:31

## 2024-01-01 RX ADMIN — PIPERACILLIN AND TAZOBACTAM 3.38 G: 3; .375 INJECTION, POWDER, FOR SOLUTION INTRAVENOUS at 09:49

## 2024-01-01 RX ADMIN — SODIUM CHLORIDE 1000 ML: 9 INJECTION, SOLUTION INTRAVENOUS at 21:11

## 2024-01-01 RX ADMIN — METOPROLOL TARTRATE 5 MG: 1 INJECTION, SOLUTION INTRAVENOUS at 18:21

## 2024-01-01 RX ADMIN — METOCLOPRAMIDE 10 MG: 5 INJECTION, SOLUTION INTRAMUSCULAR; INTRAVENOUS at 16:03

## 2024-01-01 RX ADMIN — Medication 10 ML: at 20:28

## 2024-01-01 RX ADMIN — CLOPIDOGREL BISULFATE 75 MG: 75 TABLET, FILM COATED ORAL at 15:42

## 2024-01-01 RX ADMIN — FENTANYL CITRATE 100 MCG: 50 INJECTION, SOLUTION INTRAMUSCULAR; INTRAVENOUS at 12:10

## 2024-01-01 RX ADMIN — PROPOFOL INJECTABLE EMULSION 70 MCG/KG/MIN: 10 INJECTION, EMULSION INTRAVENOUS at 22:16

## 2024-01-01 RX ADMIN — DICYCLOMINE HYDROCHLORIDE 10 MG: 10 CAPSULE ORAL at 00:16

## 2024-01-01 RX ADMIN — SODIUM CHLORIDE 75 ML/HR: 4.5 INJECTION, SOLUTION INTRAVENOUS at 11:45

## 2024-01-01 RX ADMIN — SODIUM CHLORIDE 100 ML/HR: 9 INJECTION, SOLUTION INTRAVENOUS at 20:19

## 2024-01-01 RX ADMIN — SODIUM CHLORIDE 100 ML/HR: 9 INJECTION, SOLUTION INTRAVENOUS at 08:14

## 2024-01-01 RX ADMIN — PANCRELIPASE 24000 UNITS OF LIPASE: 60000; 12000; 38000 CAPSULE, DELAYED RELEASE PELLETS ORAL at 12:40

## 2024-01-01 RX ADMIN — METOCLOPRAMIDE 10 MG: 5 INJECTION, SOLUTION INTRAMUSCULAR; INTRAVENOUS at 03:55

## 2024-01-01 RX ADMIN — PANTOPRAZOLE SODIUM 40 MG: 40 INJECTION, POWDER, FOR SOLUTION INTRAVENOUS at 16:07

## 2024-01-01 RX ADMIN — PANTOPRAZOLE SODIUM 40 MG: 40 TABLET, DELAYED RELEASE ORAL at 20:14

## 2024-01-01 RX ADMIN — Medication 2 WAFER: at 12:38

## 2024-01-01 RX ADMIN — Medication 10 ML: at 09:49

## 2024-01-01 RX ADMIN — IPRATROPIUM BROMIDE AND ALBUTEROL SULFATE 3 ML: .5; 3 SOLUTION RESPIRATORY (INHALATION) at 08:04

## 2024-01-01 RX ADMIN — PANTOPRAZOLE SODIUM 40 MG: 40 INJECTION, POWDER, FOR SOLUTION INTRAVENOUS at 05:29

## 2024-01-01 RX ADMIN — ACETAMINOPHEN 325MG 650 MG: 325 TABLET ORAL at 15:03

## 2024-01-01 RX ADMIN — CALCIUM GLUCONATE: 98 INJECTION, SOLUTION INTRAVENOUS at 17:51

## 2024-01-01 RX ADMIN — CHLORHEXIDINE GLUCONATE 15 ML: 1.2 RINSE ORAL at 21:40

## 2024-01-01 RX ADMIN — IOPAMIDOL 95 ML: 612 INJECTION, SOLUTION INTRAVENOUS at 15:39

## 2024-01-01 RX ADMIN — CETIRIZINE HYDROCHLORIDE 10 MG: 10 TABLET ORAL at 18:23

## 2024-01-01 RX ADMIN — SODIUM CHLORIDE 125 ML/HR: 9 INJECTION, SOLUTION INTRAVENOUS at 16:37

## 2024-01-01 RX ADMIN — CETIRIZINE HYDROCHLORIDE 10 MG: 10 TABLET ORAL at 08:07

## 2024-01-01 RX ADMIN — PROPOFOL INJECTABLE EMULSION 20 MCG/KG/MIN: 10 INJECTION, EMULSION INTRAVENOUS at 09:52

## 2024-01-01 RX ADMIN — ACETAMINOPHEN 325MG 650 MG: 325 TABLET ORAL at 20:28

## 2024-01-01 RX ADMIN — ATENOLOL 50 MG: 50 TABLET ORAL at 09:08

## 2024-01-01 RX ADMIN — AMLODIPINE BESYLATE 5 MG: 5 TABLET ORAL at 09:37

## 2024-01-01 RX ADMIN — IPRATROPIUM BROMIDE AND ALBUTEROL SULFATE 3 ML: .5; 3 SOLUTION RESPIRATORY (INHALATION) at 10:35

## 2024-01-01 RX ADMIN — Medication 1000 MCG: at 08:39

## 2024-01-01 RX ADMIN — MECLIZINE HCL 12.5 MG 12.5 MG: 12.5 TABLET ORAL at 08:52

## 2024-01-01 RX ADMIN — Medication 1000 MCG: at 09:10

## 2024-01-01 RX ADMIN — LOPERAMIDE HYDROCHLORIDE 2 MG: 2 CAPSULE ORAL at 22:43

## 2024-01-01 RX ADMIN — MORPHINE SULFATE 2 MG: 2 INJECTION, SOLUTION INTRAMUSCULAR; INTRAVENOUS at 09:45

## 2024-01-01 RX ADMIN — METOPROLOL TARTRATE 5 MG: 1 INJECTION, SOLUTION INTRAVENOUS at 17:48

## 2024-01-01 RX ADMIN — AMLODIPINE BESYLATE 5 MG: 5 TABLET ORAL at 08:52

## 2024-01-01 RX ADMIN — PIPERACILLIN AND TAZOBACTAM 3.38 G: 3; .375 INJECTION, POWDER, FOR SOLUTION INTRAVENOUS at 13:44

## 2024-01-01 RX ADMIN — PREGABALIN 150 MG: 75 CAPSULE ORAL at 15:40

## 2024-01-01 RX ADMIN — Medication 15 G: at 21:30

## 2024-01-01 RX ADMIN — ATENOLOL 50 MG: 50 TABLET ORAL at 20:32

## 2024-01-01 RX ADMIN — ATENOLOL 50 MG: 50 TABLET ORAL at 09:11

## 2024-01-01 RX ADMIN — ATORVASTATIN CALCIUM 40 MG: 20 TABLET, FILM COATED ORAL at 21:19

## 2024-01-01 RX ADMIN — POTASSIUM CHLORIDE 10 MEQ: 7.46 INJECTION, SOLUTION INTRAVENOUS at 00:12

## 2024-01-01 RX ADMIN — SUCRALFATE 1 G: 1 TABLET ORAL at 11:09

## 2024-01-01 RX ADMIN — LISINOPRIL 40 MG: 40 TABLET ORAL at 08:37

## 2024-01-01 RX ADMIN — Medication 250 MG: at 20:28

## 2024-01-01 RX ADMIN — IPRATROPIUM BROMIDE AND ALBUTEROL SULFATE 3 ML: .5; 3 SOLUTION RESPIRATORY (INHALATION) at 15:01

## 2024-01-01 RX ADMIN — HYDROMORPHONE HYDROCHLORIDE 0.5 MG: 1 INJECTION, SOLUTION INTRAMUSCULAR; INTRAVENOUS; SUBCUTANEOUS at 23:23

## 2024-01-01 RX ADMIN — FENTANYL CITRATE 200 MCG/HR: 0.05 INJECTION, SOLUTION INTRAMUSCULAR; INTRAVENOUS at 19:07

## 2024-01-01 RX ADMIN — PROPOFOL INJECTABLE EMULSION 20 MCG/KG/MIN: 10 INJECTION, EMULSION INTRAVENOUS at 05:26

## 2024-01-01 RX ADMIN — PERFLUTREN 3 ML: 6.52 INJECTION, SUSPENSION INTRAVENOUS at 11:20

## 2024-01-01 RX ADMIN — AMIODARONE HYDROCHLORIDE 0.5 MG/MIN: 1.8 INJECTION, SOLUTION INTRAVENOUS at 12:12

## 2024-01-01 RX ADMIN — METOPROLOL TARTRATE 5 MG: 1 INJECTION, SOLUTION INTRAVENOUS at 12:12

## 2024-01-01 RX ADMIN — ATORVASTATIN CALCIUM 40 MG: 20 TABLET, FILM COATED ORAL at 21:54

## 2024-01-01 RX ADMIN — ACETAMINOPHEN 325MG 650 MG: 325 TABLET ORAL at 21:19

## 2024-01-01 RX ADMIN — ATENOLOL 50 MG: 50 TABLET ORAL at 20:28

## 2024-01-01 RX ADMIN — SENNOSIDES AND DOCUSATE SODIUM 2 TABLET: 50; 8.6 TABLET ORAL at 20:53

## 2024-01-01 RX ADMIN — ACETAMINOPHEN 325MG 650 MG: 325 TABLET ORAL at 18:23

## 2024-01-01 RX ADMIN — FENTANYL CITRATE 50 MCG: 50 INJECTION, SOLUTION INTRAMUSCULAR; INTRAVENOUS at 19:31

## 2024-01-01 RX ADMIN — PIPERACILLIN AND TAZOBACTAM 4.5 G: 4; .5 INJECTION, POWDER, FOR SOLUTION INTRAVENOUS at 06:48

## 2024-01-01 RX ADMIN — CLOPIDOGREL BISULFATE 75 MG: 75 TABLET, FILM COATED ORAL at 10:36

## 2024-01-01 RX ADMIN — CHLORHEXIDINE GLUCONATE 15 ML: 1.2 RINSE ORAL at 10:49

## 2024-01-01 RX ADMIN — ACETAMINOPHEN 650 MG: 650 SUPPOSITORY RECTAL at 15:42

## 2024-01-01 RX ADMIN — ATENOLOL 50 MG: 50 TABLET ORAL at 21:38

## 2024-01-01 RX ADMIN — CLOPIDOGREL BISULFATE 75 MG: 75 TABLET, FILM COATED ORAL at 16:32

## 2024-01-01 RX ADMIN — METOCLOPRAMIDE 10 MG: 5 INJECTION, SOLUTION INTRAMUSCULAR; INTRAVENOUS at 10:36

## 2024-01-01 RX ADMIN — METOPROLOL TARTRATE 5 MG: 1 INJECTION, SOLUTION INTRAVENOUS at 23:56

## 2024-01-01 RX ADMIN — HYDROMORPHONE HYDROCHLORIDE 0.25 MG: 1 INJECTION, SOLUTION INTRAMUSCULAR; INTRAVENOUS; SUBCUTANEOUS at 23:06

## 2024-01-01 RX ADMIN — IPRATROPIUM BROMIDE AND ALBUTEROL SULFATE 3 ML: .5; 3 SOLUTION RESPIRATORY (INHALATION) at 15:08

## 2024-01-01 RX ADMIN — IPRATROPIUM BROMIDE AND ALBUTEROL SULFATE 3 ML: .5; 3 SOLUTION RESPIRATORY (INHALATION) at 20:08

## 2024-01-01 RX ADMIN — MORPHINE SULFATE 2 MG: 2 INJECTION, SOLUTION INTRAMUSCULAR; INTRAVENOUS at 12:36

## 2024-01-01 RX ADMIN — PROPOFOL INJECTABLE EMULSION 60 MCG/KG/MIN: 10 INJECTION, EMULSION INTRAVENOUS at 00:04

## 2024-01-01 RX ADMIN — Medication 10 ML: at 08:00

## 2024-01-01 RX ADMIN — Medication 0.02 MCG/KG/MIN: at 08:40

## 2024-01-01 RX ADMIN — SODIUM CHLORIDE 1000 ML: 9 INJECTION, SOLUTION INTRAVENOUS at 05:54

## 2024-01-01 RX ADMIN — PROPOFOL INJECTABLE EMULSION 50 MCG/KG/MIN: 10 INJECTION, EMULSION INTRAVENOUS at 03:38

## 2024-01-01 RX ADMIN — FUROSEMIDE 40 MG: 10 INJECTION, SOLUTION INTRAMUSCULAR; INTRAVENOUS at 05:54

## 2024-01-01 RX ADMIN — DEXMEDETOMIDINE HYDROCHLORIDE 0.4 MCG/KG/HR: 4 INJECTION, SOLUTION INTRAVENOUS at 20:47

## 2024-01-01 RX ADMIN — POTASSIUM CHLORIDE 10 MEQ: 7.46 INJECTION, SOLUTION INTRAVENOUS at 15:41

## 2024-01-01 RX ADMIN — CETIRIZINE HYDROCHLORIDE 10 MG: 10 TABLET ORAL at 08:52

## 2024-01-01 RX ADMIN — AMLODIPINE BESYLATE 5 MG: 5 TABLET ORAL at 08:07

## 2024-01-01 RX ADMIN — SUCRALFATE 1 G: 1 TABLET ORAL at 17:05

## 2024-01-01 RX ADMIN — ATENOLOL 50 MG: 50 TABLET ORAL at 08:40

## 2024-01-01 RX ADMIN — Medication 10 ML: at 08:38

## 2024-01-01 RX ADMIN — INSULIN HUMAN 2 UNITS: 100 INJECTION, SOLUTION PARENTERAL at 06:24

## 2024-01-01 RX ADMIN — DEXMEDETOMIDINE HYDROCHLORIDE 0.6 MCG/KG/HR: 4 INJECTION, SOLUTION INTRAVENOUS at 04:32

## 2024-01-01 RX ADMIN — MECLIZINE HCL 12.5 MG 12.5 MG: 12.5 TABLET ORAL at 08:07

## 2024-01-01 RX ADMIN — PIPERACILLIN AND TAZOBACTAM 4.5 G: 4; .5 INJECTION, POWDER, FOR SOLUTION INTRAVENOUS at 05:56

## 2024-01-01 RX ADMIN — METOPROLOL TARTRATE 5 MG: 1 INJECTION, SOLUTION INTRAVENOUS at 17:26

## 2024-01-01 RX ADMIN — SUCRALFATE 1 G: 1 TABLET ORAL at 07:37

## 2024-01-01 RX ADMIN — AMLODIPINE BESYLATE 5 MG: 5 TABLET ORAL at 08:54

## 2024-01-01 RX ADMIN — INSULIN HUMAN 4 UNITS: 100 INJECTION, SOLUTION PARENTERAL at 18:26

## 2024-01-01 RX ADMIN — HYDROMORPHONE HYDROCHLORIDE 1 MG: 1 INJECTION, SOLUTION INTRAMUSCULAR; INTRAVENOUS; SUBCUTANEOUS at 09:28

## 2024-01-01 RX ADMIN — PANTOPRAZOLE SODIUM 40 MG: 40 INJECTION, POWDER, FOR SOLUTION INTRAVENOUS at 07:55

## 2024-01-01 RX ADMIN — SENNOSIDES 2 TABLET: 8.6 TABLET, FILM COATED ORAL at 20:14

## 2024-01-01 RX ADMIN — POTASSIUM CHLORIDE 10 MEQ: 7.46 INJECTION, SOLUTION INTRAVENOUS at 22:50

## 2024-01-01 RX ADMIN — PANTOPRAZOLE SODIUM 40 MG: 40 INJECTION, POWDER, FOR SOLUTION INTRAVENOUS at 17:50

## 2024-01-01 RX ADMIN — PIPERACILLIN AND TAZOBACTAM 3.38 G: 3; .375 INJECTION, POWDER, FOR SOLUTION INTRAVENOUS at 17:34

## 2024-01-01 RX ADMIN — PIPERACILLIN AND TAZOBACTAM 3.38 G: 3; .375 INJECTION, POWDER, FOR SOLUTION INTRAVENOUS at 10:00

## 2024-01-01 RX ADMIN — IPRATROPIUM BROMIDE AND ALBUTEROL SULFATE 3 ML: .5; 3 SOLUTION RESPIRATORY (INHALATION) at 07:28

## 2024-01-01 RX ADMIN — IOPAMIDOL 85 ML: 612 INJECTION, SOLUTION INTRAVENOUS at 11:08

## 2024-01-01 RX ADMIN — SIMETHICONE 80 MG: 80 TABLET, CHEWABLE ORAL at 09:45

## 2024-01-01 RX ADMIN — CHLORHEXIDINE GLUCONATE 15 ML: 1.2 RINSE ORAL at 20:50

## 2024-01-01 RX ADMIN — Medication 250 MG: at 21:28

## 2024-01-01 RX ADMIN — ACETAMINOPHEN 325MG 650 MG: 325 TABLET ORAL at 10:01

## 2024-01-01 RX ADMIN — ACETAMINOPHEN 325MG 650 MG: 325 TABLET ORAL at 06:00

## 2024-01-01 RX ADMIN — PHENYLEPHRINE HYDROCHLORIDE 0.5 MCG/KG/MIN: 50 INJECTION INTRAVENOUS at 10:44

## 2024-01-01 RX ADMIN — METOPROLOL TARTRATE 5 MG: 1 INJECTION, SOLUTION INTRAVENOUS at 15:42

## 2024-01-01 RX ADMIN — SODIUM CHLORIDE 50 ML/HR: 9 INJECTION, SOLUTION INTRAVENOUS at 23:12

## 2024-01-01 RX ADMIN — Medication 10 ML: at 21:09

## 2024-01-01 RX ADMIN — FENTANYL CITRATE 50 MCG/HR: 0.05 INJECTION, SOLUTION INTRAMUSCULAR; INTRAVENOUS at 13:04

## 2024-01-01 RX ADMIN — SODIUM CHLORIDE 100 ML/HR: 9 INJECTION, SOLUTION INTRAVENOUS at 22:28

## 2024-01-01 RX ADMIN — SUCRALFATE 1 G: 1 TABLET ORAL at 06:00

## 2024-01-01 RX ADMIN — AMIODARONE HYDROCHLORIDE 0.5 MG/MIN: 1.8 INJECTION, SOLUTION INTRAVENOUS at 22:17

## 2024-01-01 RX ADMIN — PREGABALIN 150 MG: 75 CAPSULE ORAL at 21:19

## 2024-01-01 RX ADMIN — Medication 10 ML: at 20:51

## 2024-01-01 RX ADMIN — SODIUM CHLORIDE 125 ML/HR: 9 INJECTION, SOLUTION INTRAVENOUS at 09:07

## 2024-01-01 RX ADMIN — SODIUM CHLORIDE 100 ML/HR: 9 INJECTION, SOLUTION INTRAVENOUS at 07:39

## 2024-01-01 RX ADMIN — PREGABALIN 150 MG: 75 CAPSULE ORAL at 09:08

## 2024-01-01 RX ADMIN — TORSEMIDE 20 MG: 20 TABLET ORAL at 18:04

## 2024-01-01 RX ADMIN — CLOPIDOGREL BISULFATE 75 MG: 75 TABLET, FILM COATED ORAL at 08:53

## 2024-01-01 RX ADMIN — CLOPIDOGREL BISULFATE 75 MG: 75 TABLET, FILM COATED ORAL at 15:18

## 2024-01-01 RX ADMIN — HYDROMORPHONE HYDROCHLORIDE 1 MG: 1 INJECTION, SOLUTION INTRAMUSCULAR; INTRAVENOUS; SUBCUTANEOUS at 03:09

## 2024-01-01 RX ADMIN — PREGABALIN 150 MG: 75 CAPSULE ORAL at 08:37

## 2024-01-01 RX ADMIN — HYDROMORPHONE HYDROCHLORIDE 0.5 MG: 1 INJECTION, SOLUTION INTRAMUSCULAR; INTRAVENOUS; SUBCUTANEOUS at 04:23

## 2024-01-01 RX ADMIN — PIPERACILLIN AND TAZOBACTAM 3.38 G: 3; .375 INJECTION, POWDER, FOR SOLUTION INTRAVENOUS at 16:37

## 2024-01-01 RX ADMIN — Medication 10 ML: at 20:33

## 2024-01-01 RX ADMIN — ATENOLOL 50 MG: 50 TABLET ORAL at 08:07

## 2024-01-01 RX ADMIN — PANTOPRAZOLE SODIUM 40 MG: 40 TABLET, DELAYED RELEASE ORAL at 08:32

## 2024-01-01 RX ADMIN — FENTANYL CITRATE 90 MCG/HR: 0.05 INJECTION, SOLUTION INTRAMUSCULAR; INTRAVENOUS at 15:31

## 2024-01-01 RX ADMIN — PIPERACILLIN AND TAZOBACTAM 4.5 G: 4; .5 INJECTION, POWDER, FOR SOLUTION INTRAVENOUS at 14:34

## 2024-01-01 RX ADMIN — METOPROLOL TARTRATE 5 MG: 1 INJECTION, SOLUTION INTRAVENOUS at 01:25

## 2024-01-01 RX ADMIN — ATENOLOL 50 MG: 50 TABLET ORAL at 08:54

## 2024-01-01 RX ADMIN — Medication 1000 MCG: at 08:37

## 2024-01-01 RX ADMIN — PROPOFOL INJECTABLE EMULSION 40 MCG/KG/MIN: 10 INJECTION, EMULSION INTRAVENOUS at 21:42

## 2024-01-01 RX ADMIN — DIGOXIN 500 MCG: 0.25 INJECTION INTRAMUSCULAR; INTRAVENOUS at 01:03

## 2024-01-01 RX ADMIN — PANTOPRAZOLE SODIUM 40 MG: 40 INJECTION, POWDER, FOR SOLUTION INTRAVENOUS at 18:21

## 2024-01-01 RX ADMIN — ONDANSETRON 4 MG: 2 INJECTION INTRAMUSCULAR; INTRAVENOUS at 06:15

## 2024-01-01 RX ADMIN — Medication 10 ML: at 09:18

## 2024-01-01 RX ADMIN — PANTOPRAZOLE SODIUM 40 MG: 40 INJECTION, POWDER, FOR SOLUTION INTRAVENOUS at 08:23

## 2024-01-01 RX ADMIN — POTASSIUM CHLORIDE 20 MEQ: 29.8 INJECTION, SOLUTION INTRAVENOUS at 06:24

## 2024-01-01 RX ADMIN — HYDROMORPHONE HYDROCHLORIDE 1 MG: 1 INJECTION, SOLUTION INTRAMUSCULAR; INTRAVENOUS; SUBCUTANEOUS at 01:02

## 2024-01-01 RX ADMIN — PANTOPRAZOLE SODIUM 40 MG: 40 TABLET, DELAYED RELEASE ORAL at 09:11

## 2024-01-01 RX ADMIN — CHLORHEXIDINE GLUCONATE 15 ML: 1.2 RINSE ORAL at 08:33

## 2024-01-01 RX ADMIN — IPRATROPIUM BROMIDE AND ALBUTEROL SULFATE 3 ML: .5; 3 SOLUTION RESPIRATORY (INHALATION) at 15:48

## 2024-01-01 RX ADMIN — SENNOSIDES AND DOCUSATE SODIUM 2 TABLET: 50; 8.6 TABLET ORAL at 15:18

## 2024-01-01 RX ADMIN — IPRATROPIUM BROMIDE AND ALBUTEROL SULFATE 3 ML: .5; 3 SOLUTION RESPIRATORY (INHALATION) at 11:39

## 2024-01-01 RX ADMIN — PIPERACILLIN AND TAZOBACTAM 4.5 G: 4; .5 INJECTION, POWDER, FOR SOLUTION INTRAVENOUS at 21:33

## 2024-01-01 RX ADMIN — CLOPIDOGREL BISULFATE 75 MG: 75 TABLET, FILM COATED ORAL at 09:11

## 2024-01-01 RX ADMIN — PIPERACILLIN AND TAZOBACTAM 3.38 G: 3; .375 INJECTION, POWDER, FOR SOLUTION INTRAVENOUS at 00:47

## 2024-01-01 RX ADMIN — BISACODYL 10 MG: 5 TABLET, COATED ORAL at 09:04

## 2024-03-15 DIAGNOSIS — I65.23 BILATERAL CAROTID ARTERY STENOSIS: Primary | ICD-10-CM

## 2024-04-04 ENCOUNTER — HOSPITAL ENCOUNTER (OUTPATIENT)
Facility: HOSPITAL | Age: 74
Setting detail: OBSERVATION
Discharge: HOME | End: 2024-04-05
Attending: EMERGENCY MEDICINE | Admitting: EMERGENCY MEDICINE
Payer: MEDICARE

## 2024-04-04 ENCOUNTER — APPOINTMENT (OUTPATIENT)
Dept: CARDIOLOGY | Facility: HOSPITAL | Age: 74
End: 2024-04-04
Payer: MEDICARE

## 2024-04-04 ENCOUNTER — APPOINTMENT (OUTPATIENT)
Dept: CT IMAGING | Facility: HOSPITAL | Age: 74
End: 2024-04-04
Payer: MEDICARE

## 2024-04-04 ENCOUNTER — APPOINTMENT (OUTPATIENT)
Dept: GENERAL RADIOLOGY | Facility: HOSPITAL | Age: 74
End: 2024-04-04
Payer: MEDICARE

## 2024-04-04 DIAGNOSIS — R07.9 CHEST PAIN WITH HIGH RISK FOR CARDIAC ETIOLOGY: Primary | ICD-10-CM

## 2024-04-04 LAB
ALBUMIN SERPL-MCNC: 3.6 G/DL (ref 3.5–5.2)
ALBUMIN/GLOB SERPL: 1 G/DL
ALP SERPL-CCNC: 87 U/L (ref 39–117)
ALT SERPL W P-5'-P-CCNC: 7 U/L (ref 1–41)
ANION GAP SERPL CALCULATED.3IONS-SCNC: 11.4 MMOL/L (ref 5–15)
AST SERPL-CCNC: 12 U/L (ref 1–40)
BASOPHILS # BLD AUTO: 0.06 10*3/MM3 (ref 0–0.2)
BASOPHILS NFR BLD AUTO: 0.7 % (ref 0–1.5)
BH CV LOWER VASCULAR LEFT COMMON FEMORAL AUGMENT: NORMAL
BH CV LOWER VASCULAR LEFT COMMON FEMORAL COMPETENT: NORMAL
BH CV LOWER VASCULAR LEFT COMMON FEMORAL COMPRESS: NORMAL
BH CV LOWER VASCULAR LEFT COMMON FEMORAL PHASIC: NORMAL
BH CV LOWER VASCULAR LEFT COMMON FEMORAL SPONT: NORMAL
BH CV LOWER VASCULAR RIGHT COMMON FEMORAL AUGMENT: NORMAL
BH CV LOWER VASCULAR RIGHT COMMON FEMORAL COMPETENT: NORMAL
BH CV LOWER VASCULAR RIGHT COMMON FEMORAL COMPRESS: NORMAL
BH CV LOWER VASCULAR RIGHT COMMON FEMORAL PHASIC: NORMAL
BH CV LOWER VASCULAR RIGHT COMMON FEMORAL SPONT: NORMAL
BH CV LOWER VASCULAR RIGHT DISTAL FEMORAL COMPRESS: NORMAL
BH CV LOWER VASCULAR RIGHT GASTRONEMIUS COMPRESS: NORMAL
BH CV LOWER VASCULAR RIGHT GREATER SAPH AK COMPRESS: NORMAL
BH CV LOWER VASCULAR RIGHT GREATER SAPH BK COMPRESS: NORMAL
BH CV LOWER VASCULAR RIGHT LESSER SAPH COMPRESS: NORMAL
BH CV LOWER VASCULAR RIGHT MID FEMORAL AUGMENT: NORMAL
BH CV LOWER VASCULAR RIGHT MID FEMORAL COMPETENT: NORMAL
BH CV LOWER VASCULAR RIGHT MID FEMORAL COMPRESS: NORMAL
BH CV LOWER VASCULAR RIGHT MID FEMORAL PHASIC: NORMAL
BH CV LOWER VASCULAR RIGHT MID FEMORAL SPONT: NORMAL
BH CV LOWER VASCULAR RIGHT PERONEAL COMPRESS: NORMAL
BH CV LOWER VASCULAR RIGHT POPLITEAL AUGMENT: NORMAL
BH CV LOWER VASCULAR RIGHT POPLITEAL COMPETENT: NORMAL
BH CV LOWER VASCULAR RIGHT POPLITEAL COMPRESS: NORMAL
BH CV LOWER VASCULAR RIGHT POPLITEAL PHASIC: NORMAL
BH CV LOWER VASCULAR RIGHT POPLITEAL SPONT: NORMAL
BH CV LOWER VASCULAR RIGHT POSTERIOR TIBIAL COMPRESS: NORMAL
BH CV LOWER VASCULAR RIGHT PROFUNDA FEMORAL COMPRESS: NORMAL
BH CV LOWER VASCULAR RIGHT PROXIMAL FEMORAL COMPRESS: NORMAL
BH CV LOWER VASCULAR RIGHT SAPHENOFEMORAL JUNCTION COMPRESS: NORMAL
BH CV LOWER VASCULAR RIGHT SOLEAL COMPRESS: NORMAL
BH CV VAS PRELIMINARY FINDINGS SCRIPTING: 1
BILIRUB SERPL-MCNC: 0.6 MG/DL (ref 0–1.2)
BUN SERPL-MCNC: 11 MG/DL (ref 8–23)
BUN/CREAT SERPL: 11.8 (ref 7–25)
CALCIUM SPEC-SCNC: 9.3 MG/DL (ref 8.6–10.5)
CHLORIDE SERPL-SCNC: 101 MMOL/L (ref 98–107)
CO2 SERPL-SCNC: 24.6 MMOL/L (ref 22–29)
CREAT SERPL-MCNC: 0.93 MG/DL (ref 0.76–1.27)
D DIMER PPP FEU-MCNC: 1.4 MCGFEU/ML (ref 0–0.74)
DEPRECATED RDW RBC AUTO: 46 FL (ref 37–54)
EGFRCR SERPLBLD CKD-EPI 2021: 86.2 ML/MIN/1.73
EOSINOPHIL # BLD AUTO: 0.26 10*3/MM3 (ref 0–0.4)
EOSINOPHIL NFR BLD AUTO: 2.8 % (ref 0.3–6.2)
ERYTHROCYTE [DISTWIDTH] IN BLOOD BY AUTOMATED COUNT: 12.8 % (ref 12.3–15.4)
GEN 5 2HR TROPONIN T REFLEX: 12 NG/L
GLOBULIN UR ELPH-MCNC: 3.5 GM/DL
GLUCOSE SERPL-MCNC: 112 MG/DL (ref 65–99)
HCT VFR BLD AUTO: 39.9 % (ref 37.5–51)
HGB BLD-MCNC: 13.2 G/DL (ref 13–17.7)
HOLD SPECIMEN: NORMAL
HOLD SPECIMEN: NORMAL
IMM GRANULOCYTES # BLD AUTO: 0.02 10*3/MM3 (ref 0–0.05)
IMM GRANULOCYTES NFR BLD AUTO: 0.2 % (ref 0–0.5)
LYMPHOCYTES # BLD AUTO: 2.25 10*3/MM3 (ref 0.7–3.1)
LYMPHOCYTES NFR BLD AUTO: 24.6 % (ref 19.6–45.3)
MCH RBC QN AUTO: 32.4 PG (ref 26.6–33)
MCHC RBC AUTO-ENTMCNC: 33.1 G/DL (ref 31.5–35.7)
MCV RBC AUTO: 98 FL (ref 79–97)
MONOCYTES # BLD AUTO: 1.07 10*3/MM3 (ref 0.1–0.9)
MONOCYTES NFR BLD AUTO: 11.7 % (ref 5–12)
NEUTROPHILS NFR BLD AUTO: 5.5 10*3/MM3 (ref 1.7–7)
NEUTROPHILS NFR BLD AUTO: 60 % (ref 42.7–76)
NRBC BLD AUTO-RTO: 0 /100 WBC (ref 0–0.2)
PLATELET # BLD AUTO: 282 10*3/MM3 (ref 140–450)
PMV BLD AUTO: 10.1 FL (ref 6–12)
POTASSIUM SERPL-SCNC: 4.2 MMOL/L (ref 3.5–5.2)
PROT SERPL-MCNC: 7.1 G/DL (ref 6–8.5)
QT INTERVAL: 351 MS
QT INTERVAL: 367 MS
QT INTERVAL: 436 MS
QTC INTERVAL: 436 MS
QTC INTERVAL: 482 MS
QTC INTERVAL: 486 MS
RBC # BLD AUTO: 4.07 10*6/MM3 (ref 4.14–5.8)
SODIUM SERPL-SCNC: 137 MMOL/L (ref 136–145)
TROPONIN T DELTA: -1 NG/L
TROPONIN T SERPL HS-MCNC: 13 NG/L
WBC NRBC COR # BLD AUTO: 9.16 10*3/MM3 (ref 3.4–10.8)
WHOLE BLOOD HOLD COAG: NORMAL
WHOLE BLOOD HOLD SPECIMEN: NORMAL

## 2024-04-04 PROCEDURE — 25510000001 IOPAMIDOL PER 1 ML: Performed by: EMERGENCY MEDICINE

## 2024-04-04 PROCEDURE — G0378 HOSPITAL OBSERVATION PER HR: HCPCS

## 2024-04-04 PROCEDURE — 36415 COLL VENOUS BLD VENIPUNCTURE: CPT

## 2024-04-04 PROCEDURE — 71275 CT ANGIOGRAPHY CHEST: CPT

## 2024-04-04 PROCEDURE — 80053 COMPREHEN METABOLIC PANEL: CPT | Performed by: EMERGENCY MEDICINE

## 2024-04-04 PROCEDURE — 93971 EXTREMITY STUDY: CPT | Performed by: SURGERY

## 2024-04-04 PROCEDURE — 25810000003 SODIUM CHLORIDE 0.9 % SOLUTION: Performed by: EMERGENCY MEDICINE

## 2024-04-04 PROCEDURE — 85025 COMPLETE CBC W/AUTO DIFF WBC: CPT | Performed by: EMERGENCY MEDICINE

## 2024-04-04 PROCEDURE — 99285 EMERGENCY DEPT VISIT HI MDM: CPT

## 2024-04-04 PROCEDURE — 84484 ASSAY OF TROPONIN QUANT: CPT | Performed by: EMERGENCY MEDICINE

## 2024-04-04 PROCEDURE — 85379 FIBRIN DEGRADATION QUANT: CPT | Performed by: EMERGENCY MEDICINE

## 2024-04-04 PROCEDURE — 93010 ELECTROCARDIOGRAM REPORT: CPT | Performed by: INTERNAL MEDICINE

## 2024-04-04 PROCEDURE — 96365 THER/PROPH/DIAG IV INF INIT: CPT

## 2024-04-04 PROCEDURE — 96366 THER/PROPH/DIAG IV INF ADDON: CPT

## 2024-04-04 PROCEDURE — 71045 X-RAY EXAM CHEST 1 VIEW: CPT

## 2024-04-04 PROCEDURE — 99214 OFFICE O/P EST MOD 30 MIN: CPT | Performed by: INTERNAL MEDICINE

## 2024-04-04 PROCEDURE — 93971 EXTREMITY STUDY: CPT

## 2024-04-04 PROCEDURE — 25010000002 AMIODARONE IN DEXTROSE 5% 360-4.14 MG/200ML-% SOLUTION: Performed by: INTERNAL MEDICINE

## 2024-04-04 PROCEDURE — 93005 ELECTROCARDIOGRAM TRACING: CPT

## 2024-04-04 PROCEDURE — 93005 ELECTROCARDIOGRAM TRACING: CPT | Performed by: EMERGENCY MEDICINE

## 2024-04-04 PROCEDURE — 93005 ELECTROCARDIOGRAM TRACING: CPT | Performed by: INTERNAL MEDICINE

## 2024-04-04 RX ORDER — ONDANSETRON 4 MG/1
4 TABLET, ORALLY DISINTEGRATING ORAL EVERY 6 HOURS PRN
Status: DISCONTINUED | OUTPATIENT
Start: 2024-04-04 | End: 2024-04-05 | Stop reason: HOSPADM

## 2024-04-04 RX ORDER — SODIUM CHLORIDE 9 MG/ML
40 INJECTION, SOLUTION INTRAVENOUS AS NEEDED
Status: DISCONTINUED | OUTPATIENT
Start: 2024-04-04 | End: 2024-04-05 | Stop reason: HOSPADM

## 2024-04-04 RX ORDER — SODIUM CHLORIDE 0.9 % (FLUSH) 0.9 %
10 SYRINGE (ML) INJECTION EVERY 12 HOURS SCHEDULED
Status: DISCONTINUED | OUTPATIENT
Start: 2024-04-04 | End: 2024-04-05 | Stop reason: HOSPADM

## 2024-04-04 RX ORDER — AMLODIPINE BESYLATE 5 MG/1
5 TABLET ORAL DAILY PRN
Status: DISCONTINUED | OUTPATIENT
Start: 2024-04-04 | End: 2024-04-05 | Stop reason: HOSPADM

## 2024-04-04 RX ORDER — AMLODIPINE BESYLATE 5 MG/1
5 TABLET ORAL DAILY PRN
COMMUNITY

## 2024-04-04 RX ORDER — NITROGLYCERIN 0.4 MG/1
0.4 TABLET SUBLINGUAL
Status: DISCONTINUED | OUTPATIENT
Start: 2024-04-04 | End: 2024-04-05 | Stop reason: HOSPADM

## 2024-04-04 RX ORDER — CLOPIDOGREL BISULFATE 75 MG/1
75 TABLET ORAL DAILY
Status: DISCONTINUED | OUTPATIENT
Start: 2024-04-05 | End: 2024-04-05 | Stop reason: HOSPADM

## 2024-04-04 RX ORDER — ASPIRIN 81 MG/1
324 TABLET, CHEWABLE ORAL ONCE
Status: COMPLETED | OUTPATIENT
Start: 2024-04-04 | End: 2024-04-04

## 2024-04-04 RX ORDER — ASPIRIN 81 MG/1
81 TABLET ORAL DAILY
Status: DISCONTINUED | OUTPATIENT
Start: 2024-04-05 | End: 2024-04-05 | Stop reason: HOSPADM

## 2024-04-04 RX ORDER — HYDROCHLOROTHIAZIDE 25 MG/1
25 TABLET ORAL DAILY PRN
Status: DISCONTINUED | OUTPATIENT
Start: 2024-04-04 | End: 2024-04-05 | Stop reason: HOSPADM

## 2024-04-04 RX ORDER — PANTOPRAZOLE SODIUM 40 MG/1
40 TABLET, DELAYED RELEASE ORAL 2 TIMES DAILY
Status: DISCONTINUED | OUTPATIENT
Start: 2024-04-04 | End: 2024-04-05 | Stop reason: HOSPADM

## 2024-04-04 RX ORDER — HYDROCHLOROTHIAZIDE 25 MG/1
25 TABLET ORAL DAILY PRN
COMMUNITY

## 2024-04-04 RX ORDER — RANOLAZINE 500 MG/1
1000 TABLET, EXTENDED RELEASE ORAL EVERY 12 HOURS
Status: DISCONTINUED | OUTPATIENT
Start: 2024-04-04 | End: 2024-04-05 | Stop reason: HOSPADM

## 2024-04-04 RX ORDER — ACETAMINOPHEN 325 MG/1
650 TABLET ORAL EVERY 4 HOURS PRN
Status: DISCONTINUED | OUTPATIENT
Start: 2024-04-04 | End: 2024-04-05 | Stop reason: HOSPADM

## 2024-04-04 RX ORDER — POTASSIUM CHLORIDE 750 MG/1
20 TABLET, FILM COATED, EXTENDED RELEASE ORAL DAILY
Status: DISCONTINUED | OUTPATIENT
Start: 2024-04-05 | End: 2024-04-05 | Stop reason: HOSPADM

## 2024-04-04 RX ORDER — ATENOLOL 50 MG/1
50 TABLET ORAL 2 TIMES DAILY
Status: DISCONTINUED | OUTPATIENT
Start: 2024-04-04 | End: 2024-04-05 | Stop reason: HOSPADM

## 2024-04-04 RX ORDER — BACLOFEN 10 MG/1
10 TABLET ORAL NIGHTLY PRN
Status: DISCONTINUED | OUTPATIENT
Start: 2024-04-04 | End: 2024-04-05 | Stop reason: HOSPADM

## 2024-04-04 RX ORDER — LISINOPRIL 40 MG/1
40 TABLET ORAL 2 TIMES DAILY
COMMUNITY

## 2024-04-04 RX ORDER — LINACLOTIDE 145 UG/1
1 CAPSULE, GELATIN COATED ORAL DAILY
COMMUNITY
Start: 2023-11-10

## 2024-04-04 RX ORDER — ONDANSETRON 2 MG/ML
4 INJECTION INTRAMUSCULAR; INTRAVENOUS EVERY 6 HOURS PRN
Status: DISCONTINUED | OUTPATIENT
Start: 2024-04-04 | End: 2024-04-05 | Stop reason: HOSPADM

## 2024-04-04 RX ORDER — SODIUM CHLORIDE 0.9 % (FLUSH) 0.9 %
10 SYRINGE (ML) INJECTION AS NEEDED
Status: DISCONTINUED | OUTPATIENT
Start: 2024-04-04 | End: 2024-04-05 | Stop reason: HOSPADM

## 2024-04-04 RX ORDER — ISOSORBIDE MONONITRATE 30 MG/1
60 TABLET, EXTENDED RELEASE ORAL 2 TIMES DAILY
Status: DISCONTINUED | OUTPATIENT
Start: 2024-04-04 | End: 2024-04-05 | Stop reason: HOSPADM

## 2024-04-04 RX ORDER — LISINOPRIL 20 MG/1
40 TABLET ORAL 2 TIMES DAILY
Status: DISCONTINUED | OUTPATIENT
Start: 2024-04-04 | End: 2024-04-05 | Stop reason: HOSPADM

## 2024-04-04 RX ORDER — ATORVASTATIN CALCIUM 20 MG/1
40 TABLET, FILM COATED ORAL NIGHTLY
Status: DISCONTINUED | OUTPATIENT
Start: 2024-04-04 | End: 2024-04-05 | Stop reason: HOSPADM

## 2024-04-04 RX ORDER — POTASSIUM CHLORIDE 750 MG/1
20 TABLET, FILM COATED, EXTENDED RELEASE ORAL DAILY
COMMUNITY

## 2024-04-04 RX ORDER — ASPIRIN 325 MG
325 TABLET ORAL ONCE
Status: DISCONTINUED | OUTPATIENT
Start: 2024-04-04 | End: 2024-04-04

## 2024-04-04 RX ORDER — SUCRALFATE 1 G/1
1 TABLET ORAL 2 TIMES DAILY
Status: DISCONTINUED | OUTPATIENT
Start: 2024-04-04 | End: 2024-04-05 | Stop reason: HOSPADM

## 2024-04-04 RX ORDER — BACLOFEN 10 MG/1
10 TABLET ORAL
COMMUNITY
Start: 2024-03-01

## 2024-04-04 RX ORDER — TRIAMCINOLONE ACETONIDE 1 MG/G
1 CREAM TOPICAL 2 TIMES DAILY
COMMUNITY

## 2024-04-04 RX ADMIN — NITROGLYCERIN 1 INCH: 20 OINTMENT TOPICAL at 12:11

## 2024-04-04 RX ADMIN — Medication 10 ML: at 21:22

## 2024-04-04 RX ADMIN — SODIUM CHLORIDE 500 ML: 9 INJECTION, SOLUTION INTRAVENOUS at 13:22

## 2024-04-04 RX ADMIN — LISINOPRIL 40 MG: 20 TABLET ORAL at 21:22

## 2024-04-04 RX ADMIN — ATENOLOL 50 MG: 50 TABLET ORAL at 22:35

## 2024-04-04 RX ADMIN — ISOSORBIDE MONONITRATE 60 MG: 30 TABLET, EXTENDED RELEASE ORAL at 21:22

## 2024-04-04 RX ADMIN — SUCRALFATE 1 G: 1 TABLET ORAL at 21:22

## 2024-04-04 RX ADMIN — AMIODARONE HYDROCHLORIDE 0.5 MG/MIN: 1.8 INJECTION, SOLUTION INTRAVENOUS at 22:59

## 2024-04-04 RX ADMIN — RANOLAZINE 1000 MG: 500 TABLET, FILM COATED, EXTENDED RELEASE ORAL at 22:35

## 2024-04-04 RX ADMIN — ATORVASTATIN CALCIUM 40 MG: 20 TABLET, FILM COATED ORAL at 21:22

## 2024-04-04 RX ADMIN — PANTOPRAZOLE SODIUM 40 MG: 40 TABLET, DELAYED RELEASE ORAL at 21:22

## 2024-04-04 RX ADMIN — ASPIRIN 243 MG: 81 TABLET, CHEWABLE ORAL at 12:10

## 2024-04-04 RX ADMIN — AMIODARONE HYDROCHLORIDE 1 MG/MIN: 1.8 INJECTION, SOLUTION INTRAVENOUS at 17:23

## 2024-04-04 RX ADMIN — IOPAMIDOL 95 ML: 755 INJECTION, SOLUTION INTRAVENOUS at 14:11

## 2024-04-04 NOTE — NURSING NOTE
Pt converted to NSR with 1 AVB for about 10 minutes around 1830.  Now back in Afib.  Amio continuing to infuse.

## 2024-04-04 NOTE — PLAN OF CARE
Goal Outcome Evaluation:   Pt admitted for CP.  CP decreased after ntg patch applied in ED.  Still remains at a 2/10.  In new onset afib.  Loading amiodarone.  Monitoring Qtc intervals qshift to ensure <500.  Monitoring tele closely as well.

## 2024-04-04 NOTE — H&P
"[facility]   HISTORY AND PHYSICAL    Patient Name: Sldae Martinez  : 1950  MRN: [id number]  Primary Care Physician:  Triny Hannon MD  Date of admission: 2024    Subjective   Subjective     Chief Complaint:   Chief Complaint   Patient presents with    Chest Pain         HPI:    Slade Martinez is a 73 y.o. male with a PMH of CAD s/p 2-vessel coronary artery bypass graft and 19 stents presenting to the ED with chest pain and pressure that started this morning after he got out of bed around 6:30 am. Patient stated he went ahead and took his morning medications when the pain started with no relief of symptoms. Pt also stated he felt as if his heart was \"beating out of his chest.\" The pain is on the left side of his chest and radiates to his shoulder and down the arm. Patient is still currently having chest pain. Patient denies SOA, nausea, or vomiting.    ED evaluation reviewed: Patient arrived slightly tachycardic at 114. High sensitivity troponin negative, D-Dimer 1.40, CTA chest negative for PE, EKG sinus tachycardia.    Review of Systems   All systems were reviewed and negative except for: those mentioned in the HPI.    Personal History     Past Medical History:   Diagnosis Date    Coronary atherosclerosis 2019    H/O CABG SVG to first diagonal branch and to the LAD as well as SVG to the lateral marginal branch of the circumflex complicated by right sided subcortical infarct with left sided hemiparesis    Dyslipidemia 2019    Essential hypertension 2019    Injury of back     fractured vertebra 23       Past Surgical History:   Procedure Laterality Date    CAROTID ENDARTERECTOMY      Left    CHOLECYSTECTOMY      CORONARY ARTERY BYPASS GRAFT      CORONARY STENT PLACEMENT      X17 stents       Family History: family history includes COPD in his brother and mother; Heart attack in his father; Heart failure in his father. Otherwise pertinent FHx was reviewed and not " pertinent to current issue.    Social History:  reports that he has quit smoking. His smoking use included cigarettes. He has never used smokeless tobacco. He reports that he does not currently use alcohol. He reports that he does not use drugs.    Home Medications:  Multiple Vitamins-Minerals, amLODIPine, aspirin, atenolol, atorvastatin, baclofen, clopidogrel, fish oil, hydroCHLOROthiazide, isosorbide mononitrate, linaclotide, lisinopril, loratadine, meclizine, nitroglycerin, pantoprazole, potassium chloride, ranolazine, saccharomyces boulardii, sucralfate, and triamcinolone    Allergies:  Allergies   Allergen Reactions    Pletal [Cilostazol] Myalgia     .       Objective   Objective     Vitals:   Temp:  [96.9 °F (36.1 °C)-98.2 °F (36.8 °C)] 98.2 °F (36.8 °C)  Heart Rate:  [107-114] 107  Resp:  [16] 16  BP: (136-168)/(70-99) 137/70  Physical Exam     GENERAL: 73 y.o. YO male a/o x 4, NAD  SKIN: Warm pink and dry   HEENT:  PERRL, EOM intact, conjunctivae normal, sclerae clear  NECK: supple, no JVD  LUNGS: Clear to auscultation bilaterally without wheezes, rales or rhonchi.  No accessory muscle use and no nasal flaring.  CARDIAC:  Tachycardia with regular rhythm, S1-S2.  No murmurs, rubs or gallops.  No peripheral edema.  Equal pulses bilaterally.  ABDOMEN: Soft, nontender, nondistended.  No guarding or rebound tenderness.  Normal bowel sounds.  MUSCULOSKELETAL: Moves all extremities well.  No deformity.  NEURO: Cranial nerves II through XII grossly intact.  No gross focal deficits.  Alert.  Normal speech and motor.  PSYCH: Normal mood and affect      Result Review    Result Review:  I have personally reviewed the results from the time of this admission to 4/4/2024 15:41 EDT and agree with these findings:  [x]  Laboratory list / accordion  []  Microbiology  [x]  Radiology  [x]  EKG/Telemetry   []  Cardiology/Vascular   []  Pathology  [x]  Old records  []  Other:  Most notable findings include: Negative troponin,  D-Dimer 1.40    XR Chest 1 View    Result Date: 4/4/2024  No focal pulmonary consolidation. Borderline heart size. Follow-up as clinical indications persist.  This report was finalized on 4/4/2024 12:29 PM by Dr. Fredrick Love M.D on Workstation: [id number]       CT ANGIOGRAPHY OF THE CHEST WITH INTRAVENOUS CONTRAST AND 3D  RECONSTRUCTIONS     HISTORY: Chest pain.     The CT scan was performed as an emergency procedure with CT angiography  protocol using intravenous contrast and 3D reconstructions. The  following findings are present:  1. The pulmonary arteries are well-opacified and there is no evidence of  pulmonary embolus. The thoracic aorta shows no evidence of aneurysm or  dissection. There is prominent coronary artery calcification.     2. The lungs are well expanded with some minimal vague chronic  interstitial scarring with clearing of somewhat more diffuse  interstitial changes noted on the chest CT scan dated 03/04/2023. There  are a few scattered calcified granulomas as well as several noncalcified  nodules measuring up to 6 mm which are unchanged from 03/04/2023, the  largest is a subpleural nodule in the left lower lobe as seen on image  114. A follow-up CT scan without contrast in 1 year may be appropriate  to ensure appropriate longer-term stability.     3. There are several slightly prominent mediastinal lymph nodes that are  unchanged. There is no hilar or axillary adenopathy. There is no  pericardial effusion. The CT images through the upper liver, spleen, and  both adrenal glands are unremarkable.        Radiation dose reduction techniques were utilized, including automated  exposure control and exposure modulation based on body size.        This report was finalized on 4/4/2024 2:59 PM by Dr. Milo Oliveira M.D  on Workstation: [id number]          Assessment & Plan   Assessment / Plan     Brief Patient Summary:  Slade Martinez is a 73 y.o. male who presented to the ED with chest pain being  admitted to the Observation Unit for further evaluation and workup.    Active Hospital Problems:  Active Hospital Problems    Diagnosis     **Chest pain      Plan:     Chest Pain:  Tachycardia  CAD  - Consult cardiology  - Trend Troponin  - Serial ECGs  - CBC and CMP in AM  - NPO after midnight  - IV fluids    Pulmonary nodule  - Seen incidentally on imaging  - Radiology recommends repeat CT scan in 1 year  - Follow up with PCP    Hyperlipidemia  - Continue home medication regimen    Hypertension  - Continue home medication regimen.    DVT prophylaxis:  Mechanical DVT prophylaxis orders are present.        CODE STATUS:    Level Of Support Discussed With: Patient  Code Status (Patient has no pulse and is not breathing): CPR (Attempt to Resuscitate)  Medical Interventions (Patient has pulse or is breathing): Full Support    Admission Status:  I believe this patient meets observation status.     75 minutes has been spent by [facility] providers in the care of this patient while under observation status    I wore an appropriate PPE during this patient encounter.  Hand hygeine performed before and after seeing the patient.    This note was written by FLORES Pierre. I have read, reviewed, and edited for any mistakes.  Patient was seen by myself with student.  I agree with assessment and plan.     Electronically signed by KOLTON Nuno, 04/04/24, 3:41 PM EDT.

## 2024-04-04 NOTE — PROGRESS NOTES
SHARED VISIT: This visit was performed by BOTH a physician and an APC. The substantive portion of the medical decision making was performed by this attesting physician who made or approved the management plan and takes responsibility for patient management. All studies in the APC note (if performed) were independently interpreted by me.   MD ATTESTATION NOTE    The NADIYA and I have discussed this patient's history, physical exam, and treatment plan.  I have reviewed the documentation and personally had a face to face interaction with the patient. I affirm the documentation and agree with the treatment and plan.  The attached note describes my personal findings.      I provided a substantive portion of the care of the patient.  I personally performed the physical exam in its entirety, and below are my findings.    History is obtained from patient, spouse and family at bedside  Brief HPI: This is a gentleman that is 73 years old history of hypertension hyperlipidemia history of coronary artery disease and peripheral vascular disease.  This gentleman had a bypass approximately 12 years ago.  He states that he has also had 19 stents.  He started this morning at about 630 when he awoke with some chest tightness felt like a crushing it was right substernally.  There was a radiation down the left arm.  Really did not report much shortness of breath.  No nausea or vomiting.  He states that he also checked his heart rate and it was fast about 130 bpm.  Denies any palpitations at this time.  When I am seeing him right now he states the discomfort and pain is essentially 100% resolved.  He is on nitroglycerin paste.  He states this is similar to his previous heart problems.  He also does not entertain any recent exertional chest pain or exertional shortness of breath over the past few days.  Denies any fevers chills coughs or colds.  He was admitted to the observation unit for further evaluation and monitoring and cardiology  consult.    General : Elderly male.  Appears comfortable.  He is in no acute cardiovascular respiratory distress.  Patient is awake alert and oriented  HEENT: NCAT  CV: Patient's heart rate is 92 low 100s.  Is normal sinus on the monitor.  Normal inspection.  Pain is not present with palpation or deep breathing  Respiratory: CTA bilaterally  Abd: Soft and nontender  Ext: No acute abnormalities.  Intact distal pulses to upper and lower extremities are equal strong and symmetric.  Skin: No rash  Neuro: Cranial nerves II through XII grossly intact as tested.  No acute lateralizing deficits.  No focal motor or sensory changes  Psych: Normal mood and affect    I have reviewed the patient's vital signs, lab work, EKG and imaging.  I reviewed the EKG.  There is a tachycardia without seeing any distinct P waves.  There is fluctuation in his heart rate.  I do not appreciate any acute injury pattern there are some nonspecific ST and T wave changes again cardiology has been consulted.  Plan:  1.  Chest pain: Patient's EKG and heart enzymes are unremarkable other than the tachycardia..  Also had a CT angiogram of his chest that showed no acute pulmonary embolism.  He has some chronic changes with some adenopathy and some interstitial changes.  Patient also had a right venous Doppler which was negative for any acute DVT.  He currently is feeling much better and essentially pain-free.  He is on nitroglycerin paste.  He has received an aspirin.  He feels this is similar to his previous pain from a cardiac etiology.  But again the EKG shows no acute changes or injury pattern and troponins are stable.  Cardiology has been consulted.  Informed him and the family the results of the tests.  All questions answered at this time.      17:01 EDT  Cardiology Dr. Barajas consulted with the EP physician Dr. Webster concerning the abnormal rhythm on the EKG.  Concern for an elevated junctional rhythm.  Will get a go ahead and try  amiodarone tonight.  Will see if he converts.  If he does not convert potentially adenosine or electrical cardioversion tomorrow.  I discussed the plan with Dr. Barajas the cardiologist.  Note Disclaimer: At [facility], we believe that sharing information builds trust and better relationships. You are receiving this note because you recently visited [facility]. It is possible you will see health information before a provider has talked with you about it. This kind of information can be easy to misunderstand. To help you fully understand what it means for your health, we urge you to discuss this note with your provider.

## 2024-04-04 NOTE — ED PROVIDER NOTES
EMERGENCY DEPARTMENT ENCOUNTER    Room Number:  23/23  PCP: Triny Hannon MD    HPI:  Chief Complaint: Chest pain  A complete HPI/ROS/PMH/PSH/SH/FH are unobtainable due to: None  Context: Slade Martinez is a 73 y.o. male who presents to the ED c/o acute chest pain.  Onset 6:30 AM this morning.  Pain has been mostly constant in his words.  He is retrosternal pain that radiates down the left arm.  It feels like heaviness.  He is unsure if this worsens with walking.  Not pleuritic in nature.  He has an extensive history of CAD.        PAST MEDICAL HISTORY  Active Ambulatory Problems     Diagnosis Date Noted    Dyslipidemia 11/04/2019    Essential hypertension 11/04/2019    Coronary atherosclerosis 11/04/2019    S/P CABG (coronary artery bypass graft) 04/20/2020    Peripheral artery disease 02/25/2021    History of left-sided carotid endarterectomy 02/25/2021    Fever of unknown origin 03/03/2023    Closed traumatic nondisplaced fracture of two ribs of right side with routine healing 03/04/2023    Altered mental status 09/21/2023    Abdominal pain 09/21/2023    Chronic mesenteric ischemia 09/21/2023     Resolved Ambulatory Problems     Diagnosis Date Noted    S/P CABG (coronary artery bypass graft) 04/20/2020    COVID-19 virus infection 02/25/2021     Past Medical History:   Diagnosis Date    Injury of back          PAST SURGICAL HISTORY  Past Surgical History:   Procedure Laterality Date    CAROTID ENDARTERECTOMY  2000    Left    CHOLECYSTECTOMY      CORONARY ARTERY BYPASS GRAFT      CORONARY STENT PLACEMENT      X17 stents         FAMILY HISTORY  Family History   Problem Relation Age of Onset    COPD Mother     Heart attack Father     Heart failure Father     COPD Brother          SOCIAL HISTORY  Social History     Socioeconomic History    Marital status:    Tobacco Use    Smoking status: Former     Types: Cigarettes    Smokeless tobacco: Never   Vaping Use    Vaping status: Never Used   Substance and  Sexual Activity    Alcohol use: Not Currently    Drug use: Never    Sexual activity: Defer         ALLERGIES  Pletal [cilostazol]        REVIEW OF SYSTEMS  Review of Systems     Included in HPI  All systems reviewed and negative except for those discussed in HPI.       PHYSICAL EXAM  ED Triage Vitals   Temp Heart Rate Resp BP SpO2   04/04/24 1143 04/04/24 1143 04/04/24 1143 04/04/24 1147 04/04/24 1143   96.9 °F (36.1 °C) 114 16 168/99 98 %      Temp src Heart Rate Source Patient Position BP Location FiO2 (%)   04/04/24 1143 04/04/24 1143 -- -- --   Tympanic Monitor          Physical Exam      GENERAL: no acute distress  HENT: nares patent  EYES: no scleral icterus  CV: regular rhythm, normal rate, 2+ radial pulses bilaterally  RESPIRATORY: normal effort, clear to auscultation bilaterally  ABDOMEN: soft, nontender  MUSCULOSKELETAL: no deformity, 1+ right lower extremity edema  NEURO: alert, moves all extremities, follows commands  PSYCH:  calm, cooperative  SKIN: warm, dry    Vital signs and nursing notes reviewed.          LAB RESULTS  Recent Results (from the past 24 hour(s))   ECG 12 Lead ED Triage Standing Order; Chest Pain    Collection Time: 04/04/24 11:47 AM   Result Value Ref Range    QT Interval 351 ms    QTC Interval 482 ms   Comprehensive Metabolic Panel    Collection Time: 04/04/24 12:00 PM    Specimen: Blood   Result Value Ref Range    Glucose 112 (H) 65 - 99 mg/dL    BUN 11 8 - 23 mg/dL    Creatinine 0.93 0.76 - 1.27 mg/dL    Sodium 137 136 - 145 mmol/L    Potassium 4.2 3.5 - 5.2 mmol/L    Chloride 101 98 - 107 mmol/L    CO2 24.6 22.0 - 29.0 mmol/L    Calcium 9.3 8.6 - 10.5 mg/dL    Total Protein 7.1 6.0 - 8.5 g/dL    Albumin 3.6 3.5 - 5.2 g/dL    ALT (SGPT) 7 1 - 41 U/L    AST (SGOT) 12 1 - 40 U/L    Alkaline Phosphatase 87 39 - 117 U/L    Total Bilirubin 0.6 0.0 - 1.2 mg/dL    Globulin 3.5 gm/dL    A/G Ratio 1.0 g/dL    BUN/Creatinine Ratio 11.8 7.0 - 25.0    Anion Gap 11.4 5.0 - 15.0 mmol/L    eGFR  86.2 >60.0 mL/min/1.73   High Sensitivity Troponin T    Collection Time: 04/04/24 12:00 PM    Specimen: Blood   Result Value Ref Range    HS Troponin T 13 <22 ng/L   Green Top (Gel)    Collection Time: 04/04/24 12:00 PM   Result Value Ref Range    Extra Tube Hold for add-ons.    Lavender Top    Collection Time: 04/04/24 12:00 PM   Result Value Ref Range    Extra Tube hold for add-on    Gold Top - SST    Collection Time: 04/04/24 12:00 PM   Result Value Ref Range    Extra Tube Hold for add-ons.    Light Blue Top    Collection Time: 04/04/24 12:00 PM   Result Value Ref Range    Extra Tube Hold for add-ons.    CBC Auto Differential    Collection Time: 04/04/24 12:00 PM    Specimen: Blood   Result Value Ref Range    WBC 9.16 3.40 - 10.80 10*3/mm3    RBC 4.07 (L) 4.14 - 5.80 10*6/mm3    Hemoglobin 13.2 13.0 - 17.7 g/dL    Hematocrit 39.9 37.5 - 51.0 %    MCV 98.0 (H) 79.0 - 97.0 fL    MCH 32.4 26.6 - 33.0 pg    MCHC 33.1 31.5 - 35.7 g/dL    RDW 12.8 12.3 - 15.4 %    RDW-SD 46.0 37.0 - 54.0 fl    MPV 10.1 6.0 - 12.0 fL    Platelets 282 140 - 450 10*3/mm3    Neutrophil % 60.0 42.7 - 76.0 %    Lymphocyte % 24.6 19.6 - 45.3 %    Monocyte % 11.7 5.0 - 12.0 %    Eosinophil % 2.8 0.3 - 6.2 %    Basophil % 0.7 0.0 - 1.5 %    Immature Grans % 0.2 0.0 - 0.5 %    Neutrophils, Absolute 5.50 1.70 - 7.00 10*3/mm3    Lymphocytes, Absolute 2.25 0.70 - 3.10 10*3/mm3    Monocytes, Absolute 1.07 (H) 0.10 - 0.90 10*3/mm3    Eosinophils, Absolute 0.26 0.00 - 0.40 10*3/mm3    Basophils, Absolute 0.06 0.00 - 0.20 10*3/mm3    Immature Grans, Absolute 0.02 0.00 - 0.05 10*3/mm3    nRBC 0.0 0.0 - 0.2 /100 WBC   D-dimer, Quantitative    Collection Time: 04/04/24 12:00 PM    Specimen: Blood   Result Value Ref Range    D-Dimer, Quantitative 1.40 (H) 0.00 - 0.74 MCGFEU/mL   Duplex Venous Lower Extremity - RIGHT    Collection Time: 04/04/24 12:52 PM   Result Value Ref Range    Right Common Femoral Spont Y     Right Common Femoral Competent Y     Right  Common Femoral Phasic Y     Right Common Femoral Compress C     Right Common Femoral Augment Y     Right Saphenofemoral Junction Compress C     Right Profunda Femoral Compress C     Right Proximal Femoral Compress C     Right Mid Femoral Spont Y     Right Mid Femoral Competent Y     Right Mid Femoral Phasic Y     Right Mid Femoral Compress C     Right Mid Femoral Augment Y     Right Distal Femoral Compress C     Right Popliteal Spont Y     Right Popliteal Competent Y     Right Popliteal Phasic Y     Right Popliteal Compress C     Right Popliteal Augment Y     Right Posterior Tibial Compress C     Right Peroneal Compress C     Right Gastronemius Compress C     BH CV LOWER VASCULAR RIGHT SOLEAL COMPRESS C     Right Greater Saph AK Compress C     Right Greater Saph BK Compress C     Right Lesser Saph Compress C     Left Common Femoral Spont Y     Left Common Femoral Competent Y     Left Common Femoral Phasic Y     Left Common Femoral Compress C     Left Common Femoral Augment Y     BH CV VAS PRELIMINARY FINDINGS SCRIPTING 1.0        Ordered the above labs and reviewed the results.        RADIOLOGY  CT Angiogram Chest    Result Date: 4/4/2024  CT ANGIOGRAPHY OF THE CHEST WITH INTRAVENOUS CONTRAST AND 3D RECONSTRUCTIONS  HISTORY: Chest pain.  The CT scan was performed as an emergency procedure with CT angiography protocol using intravenous contrast and 3D reconstructions. The following findings are present: 1. The pulmonary arteries are well-opacified and there is no evidence of pulmonary embolus. The thoracic aorta shows no evidence of aneurysm or dissection. There is prominent coronary artery calcification.  2. The lungs are well expanded with some minimal vague chronic interstitial scarring with clearing of somewhat more diffuse interstitial changes noted on the chest CT scan dated 03/04/2023. There are a few scattered calcified granulomas as well as several noncalcified nodules measuring up to 6 mm which are  unchanged from 03/04/2023, the largest is a subpleural nodule in the left lower lobe as seen on image 114. A follow-up CT scan without contrast in 1 year may be appropriate to ensure appropriate longer-term stability.  3. There are several slightly prominent mediastinal lymph nodes that are unchanged. There is no hilar or axillary adenopathy. There is no pericardial effusion. The CT images through the upper liver, spleen, and both adrenal glands are unremarkable.   Radiation dose reduction techniques were utilized, including automated exposure control and exposure modulation based on body size.       Duplex Venous Lower Extremity - RIGHT    Result Date: 4/4/2024    Normal right lower extremity venous duplex scan.     XR Chest 1 View    Result Date: 4/4/2024  XR CHEST 1 VW-  HISTORY: Male who is 73 years-old, chest pain  TECHNIQUE: Frontal view of the chest  COMPARISON: 3/3/2023  FINDINGS: The heart size is borderline. Pulmonary vasculature is unremarkable. Sternotomy wires are noted. Aorta is calcified. An electronic device projects over the cardiac shadow. No focal pulmonary consolidation, pleural effusion, or pneumothorax. No acute osseous process.      No focal pulmonary consolidation. Borderline heart size. Follow-up as clinical indications persist.  This report was finalized on 4/4/2024 12:29 PM by Dr. Fredrick Love M.D on Workstation: [facility]       Ordered the above noted radiological studies. Reviewed by me in PACS.        MEDICATIONS GIVEN IN ER  Medications   sodium chloride 0.9 % flush 10 mL (has no administration in time range)   nitroglycerin (NITROSTAT) ointment 1 inch (1 inch Topical Given 4/4/24 1211)   aspirin chewable tablet 324 mg (243 mg Oral Given 4/4/24 1210)   sodium chloride 0.9 % bolus 500 mL (0 mL Intravenous Stopped 4/4/24 1414)   iopamidol (ISOVUE-370) 76 % injection 95 mL (95 mL Intravenous Given by Other 4/4/24 1411)         ORDERS PLACED DURING THIS VISIT:  Orders Placed This  Encounter   Procedures    XR Chest 1 View    CT Angiogram Chest    [place] Draw    Comprehensive Metabolic Panel    High Sensitivity Troponin T    CBC Auto Differential    D-dimer, Quantitative    High Sensitivity Troponin T 2Hr    NPO Diet NPO Type: Strict NPO    Undress & Gown    Continuous Pulse Oximetry    Oxygen Therapy- Nasal Cannula; Titrate 1-6 LPM Per SpO2; 90 - 95%    ECG 12 Lead ED Triage Standing Order; Chest Pain    ECG 12 Lead ED Triage Standing Order; Chest Pain    Insert Peripheral IV    Initiate ED Observation Status    CBC & Differential    Green Top (Gel)    Lavender Top    Gold Top - SST    Light Blue Top         OUTPATIENT MEDICATION MANAGEMENT:  Current Facility-Administered Medications Ordered in Epic   Medication Dose Route Frequency Provider Last Rate Last Admin    sodium chloride 0.9 % flush 10 mL  10 mL Intravenous PRN Davon Ch II, MD         Current Outpatient Medications Ordered in Epic   Medication Sig Dispense Refill    aspirin 81 MG tablet Take 1 tablet by mouth Daily.      atenolol (TENORMIN) 50 MG tablet Take 1 tablet by mouth 2 (Two) Times a Day.      atorvastatin (LIPITOR) 40 MG tablet Take 1 tablet by mouth Daily.      clopidogrel (PLAVIX) 75 MG tablet Take 1 tablet by mouth Daily.      isosorbide mononitrate (IMDUR) 60 MG 24 hr tablet Take 1 tablet by mouth 2 (Two) Times a Day.      linaclotide (LINZESS) 72 MCG capsule capsule Take 1 capsule by mouth 3 (Three) Times a Week if Needed.      loratadine (CLARITIN) 10 MG tablet Take 1 tablet by mouth Daily.      meclizine (ANTIVERT) 12.5 MG tablet Take 1 tablet by mouth Daily.      Multiple Vitamins-Minerals (MULTIVITAMIN ADULT EXTRA C PO) Take 1 tablet by mouth Daily.      nitroglycerin (NITROSTAT) 0.4 MG SL tablet Place 1 tablet under the tongue Every 5 (Five) Minutes As Needed for Chest Pain. Take no more than 3 doses in 15 minutes. 25 tablet 3    Omega-3 Fatty Acids (FISH OIL) 1000 MG capsule capsule Take 1 capsule by  mouth Daily.      pantoprazole (PROTONIX) 40 MG EC tablet Take 1 tablet by mouth 2 (Two) Times a Day.      ranolazine (RANEXA) 1000 MG 12 hr tablet Take 1 tablet by mouth Every 12 (Twelve) Hours. 60 tablet 11    saccharomyces boulardii (FLORASTOR) 250 MG capsule Take 1 capsule by mouth 2 (Two) Times a Day.      sucralfate (CARAFATE) 1 g tablet Take 1 tablet by mouth 2 (Two) Times a Day.         PROCEDURES  Procedures          MEDICAL DECISION MAKING, PROGRESS, and CONSULTS    Discussion below represents my analysis of pertinent findings related to patient's condition, differential diagnosis, treatment plan and final disposition.          Differential diagnosis:    Differential diagnosis includes but not limited to acute coronary syndrome, pulmonary embolism, thoracic aortic dissection, pneumonia, pneumothorax, musculoskeletal pain, GERD or esophageal spasm, anxiety, myocarditis/pericarditis, esophageal rupture, pancreatitis.     History: 1  EC  Age: 1  Risk factors: 2    HEAR score: 7    HEART Score: 7            Independent interpretation of labs, radiology studies, and discussions with consultants:  ED Course as of 24 1437   Thu 2024   1157 EKG independently interpreted by myself.  Time 11:47 AM.  Junctional rhythm.  Heart rate 113.  ST depression in the precordial leads specifically V3 through V6. [TD]   1238 D-Dimer, Quant(!): 1.40 [TD]   1426 CT angiogram of the chest independently interpreted by myself.  I see no evidence of pulmonary embolism. [TD]   1436 I discussed the case with KOLTON Waller.  We reviewed the patient's labs, history, imaging.  She will admit to observation. [TD]      ED Course User Index  [TD] Davon Ch II, MD         I discussed the case with Dr. Oliveira, radiology.  CTA is negative.        DIAGNOSIS  Final diagnoses:   Chest pain with high risk for cardiac etiology         DISPOSITION  Admit      Latest Documented Vital Signs:  As of 14:37 EDT  BP-  160/81 HR- 107 Temp- 96.9 °F (36.1 °C) (Tympanic) O2 sat- 98%      --    Please note that portions of this were completed with a voice recognition program.       Note Disclaimer: At [facility], we believe that sharing information builds trust and better relationships. You are receiving this note because you are receiving care at [facility] or recently visited. It is possible you will see health information before a provider has talked with you about it. This kind of information can be easy to misunderstand. To help you fully understand what it means for your health, we urge you to discuss this note with your provider.         Davon Ch II, MD  04/04/24 [id number]

## 2024-04-04 NOTE — ED NOTES
Pt woke c racing heart then started having cp.  He has 3 nitro since 0900.  Last one 15 minutes ago

## 2024-04-05 ENCOUNTER — APPOINTMENT (OUTPATIENT)
Dept: CARDIOLOGY | Facility: HOSPITAL | Age: 74
End: 2024-04-05
Payer: MEDICARE

## 2024-04-05 VITALS
SYSTOLIC BLOOD PRESSURE: 134 MMHG | RESPIRATION RATE: 16 BRPM | HEART RATE: 60 BPM | HEIGHT: 71 IN | WEIGHT: 200 LBS | DIASTOLIC BLOOD PRESSURE: 64 MMHG | TEMPERATURE: 97.9 F | BODY MASS INDEX: 28 KG/M2 | OXYGEN SATURATION: 99 %

## 2024-04-05 LAB
ANION GAP SERPL CALCULATED.3IONS-SCNC: 11.4 MMOL/L (ref 5–15)
ASCENDING AORTA: 2.6 CM
BH CV ECHO MEAS - ACS: 1.7 CM
BH CV ECHO MEAS - AO MAX PG: 9.4 MMHG
BH CV ECHO MEAS - AO MEAN PG: 4.5 MMHG
BH CV ECHO MEAS - AO ROOT DIAM: 3 CM
BH CV ECHO MEAS - AO V2 MAX: 153.2 CM/SEC
BH CV ECHO MEAS - AO V2 VTI: 36.7 CM
BH CV ECHO MEAS - AVA(I,D): 2.09 CM2
BH CV ECHO MEAS - EDV(CUBED): 139.3 ML
BH CV ECHO MEAS - EDV(MOD-SP2): 103 ML
BH CV ECHO MEAS - EDV(MOD-SP4): 102 ML
BH CV ECHO MEAS - EF(MOD-BP): 56.3 %
BH CV ECHO MEAS - EF(MOD-SP2): 63.1 %
BH CV ECHO MEAS - EF(MOD-SP4): 51 %
BH CV ECHO MEAS - ESV(CUBED): 50.7 ML
BH CV ECHO MEAS - ESV(MOD-SP2): 38 ML
BH CV ECHO MEAS - ESV(MOD-SP4): 50 ML
BH CV ECHO MEAS - FS: 28.6 %
BH CV ECHO MEAS - IVS/LVPW: 1.09 CM
BH CV ECHO MEAS - IVSD: 1.08 CM
BH CV ECHO MEAS - LAT PEAK E' VEL: 6.5 CM/SEC
BH CV ECHO MEAS - LV DIASTOLIC VOL/BSA (35-75): 48.4 CM2
BH CV ECHO MEAS - LV MASS(C)D: 203.9 GRAMS
BH CV ECHO MEAS - LV MAX PG: 3.5 MMHG
BH CV ECHO MEAS - LV MEAN PG: 1.81 MMHG
BH CV ECHO MEAS - LV SYSTOLIC VOL/BSA (12-30): 23.7 CM2
BH CV ECHO MEAS - LV V1 MAX: 93 CM/SEC
BH CV ECHO MEAS - LV V1 VTI: 25.8 CM
BH CV ECHO MEAS - LVIDD: 5.2 CM
BH CV ECHO MEAS - LVIDS: 3.7 CM
BH CV ECHO MEAS - LVOT AREA: 3 CM2
BH CV ECHO MEAS - LVOT DIAM: 1.95 CM
BH CV ECHO MEAS - LVPWD: 1 CM
BH CV ECHO MEAS - MED PEAK E' VEL: 6.9 CM/SEC
BH CV ECHO MEAS - MV A DUR: 0.15 SEC
BH CV ECHO MEAS - MV A MAX VEL: 84.5 CM/SEC
BH CV ECHO MEAS - MV DEC SLOPE: 344 CM/SEC2
BH CV ECHO MEAS - MV DEC TIME: 0.21 SEC
BH CV ECHO MEAS - MV E MAX VEL: 105 CM/SEC
BH CV ECHO MEAS - MV E/A: 1.24
BH CV ECHO MEAS - MV MAX PG: 5.7 MMHG
BH CV ECHO MEAS - MV MEAN PG: 1.79 MMHG
BH CV ECHO MEAS - MV P1/2T: 95.9 MSEC
BH CV ECHO MEAS - MV V2 VTI: 38.7 CM
BH CV ECHO MEAS - MVA(P1/2T): 2.29 CM2
BH CV ECHO MEAS - MVA(VTI): 1.98 CM2
BH CV ECHO MEAS - PA ACC TIME: 0.11 SEC
BH CV ECHO MEAS - PA V2 MAX: 86.9 CM/SEC
BH CV ECHO MEAS - PULM A REVS DUR: 0.13 SEC
BH CV ECHO MEAS - PULM A REVS VEL: 17 CM/SEC
BH CV ECHO MEAS - PULM DIAS VEL: 31.7 CM/SEC
BH CV ECHO MEAS - PULM S/D: 1
BH CV ECHO MEAS - PULM SYS VEL: 31.7 CM/SEC
BH CV ECHO MEAS - RAP SYSTOLE: 3 MMHG
BH CV ECHO MEAS - RV MAX PG: 1.57 MMHG
BH CV ECHO MEAS - RV V1 MAX: 62.6 CM/SEC
BH CV ECHO MEAS - RV V1 VTI: 16 CM
BH CV ECHO MEAS - RVSP: 33 MMHG
BH CV ECHO MEAS - SI(MOD-SP2): 30.8 ML/M2
BH CV ECHO MEAS - SI(MOD-SP4): 24.7 ML/M2
BH CV ECHO MEAS - SV(LVOT): 76.8 ML
BH CV ECHO MEAS - SV(MOD-SP2): 65 ML
BH CV ECHO MEAS - SV(MOD-SP4): 52 ML
BH CV ECHO MEAS - TAPSE (>1.6): 1.93 CM
BH CV ECHO MEAS - TR MAX PG: 30.1 MMHG
BH CV ECHO MEAS - TR MAX VEL: 274.5 CM/SEC
BH CV ECHO MEASUREMENTS AVERAGE E/E' RATIO: 15.67
BH CV XLRA - RV BASE: 3.4 CM
BH CV XLRA - RV LENGTH: 8.1 CM
BH CV XLRA - RV MID: 2.8 CM
BH CV XLRA - TDI S': 9 CM/SEC
BUN SERPL-MCNC: 11 MG/DL (ref 8–23)
BUN/CREAT SERPL: 11.8 (ref 7–25)
CALCIUM SPEC-SCNC: 8.8 MG/DL (ref 8.6–10.5)
CHLORIDE SERPL-SCNC: 107 MMOL/L (ref 98–107)
CO2 SERPL-SCNC: 22.6 MMOL/L (ref 22–29)
CREAT SERPL-MCNC: 0.93 MG/DL (ref 0.76–1.27)
DEPRECATED RDW RBC AUTO: 42.4 FL (ref 37–54)
EGFRCR SERPLBLD CKD-EPI 2021: 86.2 ML/MIN/1.73
ERYTHROCYTE [DISTWIDTH] IN BLOOD BY AUTOMATED COUNT: 12.1 % (ref 12.3–15.4)
GLUCOSE SERPL-MCNC: 100 MG/DL (ref 65–99)
HCT VFR BLD AUTO: 35.4 % (ref 37.5–51)
HGB BLD-MCNC: 12 G/DL (ref 13–17.7)
LEFT ATRIUM VOLUME INDEX: 22.4 ML/M2
MCH RBC QN AUTO: 32.6 PG (ref 26.6–33)
MCHC RBC AUTO-ENTMCNC: 33.9 G/DL (ref 31.5–35.7)
MCV RBC AUTO: 96.2 FL (ref 79–97)
PLATELET # BLD AUTO: 246 10*3/MM3 (ref 140–450)
PMV BLD AUTO: 10.2 FL (ref 6–12)
POTASSIUM SERPL-SCNC: 3.5 MMOL/L (ref 3.5–5.2)
QT INTERVAL: 479 MS
QTC INTERVAL: 459 MS
RBC # BLD AUTO: 3.68 10*6/MM3 (ref 4.14–5.8)
SINUS: 3 CM
SODIUM SERPL-SCNC: 141 MMOL/L (ref 136–145)
STJ: 2.47 CM
TROPONIN T SERPL HS-MCNC: 17 NG/L
TSH SERPL DL<=0.05 MIU/L-ACNC: 4.12 UIU/ML (ref 0.27–4.2)
WBC NRBC COR # BLD AUTO: 6.98 10*3/MM3 (ref 3.4–10.8)

## 2024-04-05 PROCEDURE — 80048 BASIC METABOLIC PNL TOTAL CA: CPT | Performed by: PHYSICIAN ASSISTANT

## 2024-04-05 PROCEDURE — 93010 ELECTROCARDIOGRAM REPORT: CPT | Performed by: INTERNAL MEDICINE

## 2024-04-05 PROCEDURE — 25510000001 PERFLUTREN (DEFINITY) 8.476 MG IN SODIUM CHLORIDE (PF) 0.9 % 10 ML INJECTION: Performed by: INTERNAL MEDICINE

## 2024-04-05 PROCEDURE — 85027 COMPLETE CBC AUTOMATED: CPT | Performed by: PHYSICIAN ASSISTANT

## 2024-04-05 PROCEDURE — G0378 HOSPITAL OBSERVATION PER HR: HCPCS

## 2024-04-05 PROCEDURE — 99214 OFFICE O/P EST MOD 30 MIN: CPT | Performed by: INTERNAL MEDICINE

## 2024-04-05 PROCEDURE — 93306 TTE W/DOPPLER COMPLETE: CPT

## 2024-04-05 PROCEDURE — 84443 ASSAY THYROID STIM HORMONE: CPT | Performed by: INTERNAL MEDICINE

## 2024-04-05 PROCEDURE — 84484 ASSAY OF TROPONIN QUANT: CPT | Performed by: PHYSICIAN ASSISTANT

## 2024-04-05 PROCEDURE — 93306 TTE W/DOPPLER COMPLETE: CPT | Performed by: INTERNAL MEDICINE

## 2024-04-05 PROCEDURE — 96366 THER/PROPH/DIAG IV INF ADDON: CPT

## 2024-04-05 PROCEDURE — 93005 ELECTROCARDIOGRAM TRACING: CPT | Performed by: PHYSICIAN ASSISTANT

## 2024-04-05 RX ORDER — AMIODARONE HYDROCHLORIDE 200 MG/1
200 TABLET ORAL DAILY
Qty: 30 TABLET | Refills: 0 | Status: SHIPPED | OUTPATIENT
Start: 2024-04-05

## 2024-04-05 RX ORDER — AMIODARONE HYDROCHLORIDE 200 MG/1
200 TABLET ORAL
Status: DISCONTINUED | OUTPATIENT
Start: 2024-04-05 | End: 2024-04-05 | Stop reason: HOSPADM

## 2024-04-05 RX ADMIN — CLOPIDOGREL BISULFATE 75 MG: 75 TABLET, FILM COATED ORAL at 07:59

## 2024-04-05 RX ADMIN — ISOSORBIDE MONONITRATE 60 MG: 30 TABLET, EXTENDED RELEASE ORAL at 11:30

## 2024-04-05 RX ADMIN — POTASSIUM CHLORIDE 20 MEQ: 750 TABLET, EXTENDED RELEASE ORAL at 08:00

## 2024-04-05 RX ADMIN — LISINOPRIL 40 MG: 20 TABLET ORAL at 11:30

## 2024-04-05 RX ADMIN — ATENOLOL 50 MG: 50 TABLET ORAL at 11:31

## 2024-04-05 RX ADMIN — SUCRALFATE 1 G: 1 TABLET ORAL at 07:59

## 2024-04-05 RX ADMIN — PANTOPRAZOLE SODIUM 40 MG: 40 TABLET, DELAYED RELEASE ORAL at 07:59

## 2024-04-05 RX ADMIN — ASPIRIN 81 MG: 81 TABLET, COATED ORAL at 08:00

## 2024-04-05 RX ADMIN — PERFLUTREN 2 ML: 6.52 INJECTION, SUSPENSION INTRAVENOUS at 12:35

## 2024-04-05 RX ADMIN — AMIODARONE HYDROCHLORIDE 200 MG: 200 TABLET ORAL at 09:58

## 2024-04-05 RX ADMIN — RANOLAZINE 1000 MG: 500 TABLET, FILM COATED, EXTENDED RELEASE ORAL at 11:30

## 2024-04-05 NOTE — NURSING NOTE
Patient's EKG this morning showing SR with a heart rate of 55. Provider acknowledged the strip. Will continue the amiodarone drip at 16.6ml/hr. Cardiology to start him on an oral dose when they see him this morning.

## 2024-04-05 NOTE — NURSING NOTE
Heart rhythm strip printed and reviewed with Provider prior to rate change. Paperwork filed. Rate change from 33.3 to 16.6ml/hr. Heart rate currently 61, Afib

## 2024-04-05 NOTE — DISCHARGE SUMMARY
ED OBSERVATION PROGRESS/DISCHARGE SUMMARY    Date of Admission: 4/4/2024   LOS: 0 days   PCP: Triny Hannon MD    Final Diagnosis tachycardia     Subjective   No acute events overnight.  No pain or dyspnea currently.    Hospital Outcome:   73-year-old male admitted to the observation unit with a complaint of chest pain.  Troponin was 13, 12, 17.  D-dimer 1.40.  Chest x-ray shows no focal pulmonary consolidation, borderline heart size.  CT angio chest shows that the pulmonary arteries are well opacified and there is no evidence of pulmonary embolism.  The thoracic aortic shows no evidence of aneurysm or dissection.  There is prominent coronary artery calcifications.  The lungs are well-expanded with some minimal vague chronic interstitial scarring with clearing of somewhat more diffuse interstitial changes noted on the CT scan of 3/4/2023.  There are a few scattered calcified granulomas as well as several noncalcified nodules measuring up to 6 mm which are unchanged from 3/4/2023.  The largest is a subpleural nodule in the left lower lobe seen on image 114.  Follow-up CT scan without contrast in 1 year is appropriate.  There are several slightly prominent mediastinal lymph nodes that are unchanged.  There is no hilar or axillary adenopathy.  There is no pericardial effusion.  CT images through the upper liver, spleen, and both adrenal glands are is unremarkable.  Venous Doppler of the right lower extremity is normal.  EKG shows atrial flutter with a rate of 105.    Patient was started on an amiodarone drip and has converted to sinus rhythm.  Cardiology has been consulted and they have seen the patient.  He had an echocardiogram which showed normal left ventricular systolic function, calculated ejection fraction 56 to 60%, mild mitral valve regurgitation present, mild to moderate tricuspid valve regurgitation present, no evidence of pericardial effusion.  He was started on oral amiodarone.  He will be  discharged home with plans for close follow-up with Dr. Hood.    ROS:  General: no fevers, chills  Respiratory: no cough, dyspnea  Cardiovascular: no chest pain, palpitations  Abdomen: No abdominal pain, nausea, vomiting, or diarrhea  Neurologic: No focal weakness    Objective   Physical Exam:  I have reviewed the vital signs.  Temp:  [97.9 °F (36.6 °C)-99.9 °F (37.7 °C)] 97.9 °F (36.6 °C)  Heart Rate:  [57-82] 60  Resp:  [16] 16  BP: (105-153)/(54-68) 134/64  General Appearance:    Alert, cooperative, no distress  Head:    Normocephalic, atraumatic  Eyes:    Sclerae anicteric  Neck:   Supple, no mass  Lungs: Clear to auscultation bilaterally, respirations unlabored  Heart: Regular rate and rhythm, S1 and S2 normal, no murmur, rub or gallop  Abdomen:  Soft, non-tender, bowel sounds active, nondistended  Extremities: No clubbing, cyanosis, or edema to lower extremities  Pulses:  2+ and symmetric in distal lower extremities  Skin: No rashes   Neurologic: Oriented x3, Normal strength to extremities    Results Review:    I have reviewed the labs, radiology results and diagnostic studies.    Results from last 7 days   Lab Units 04/05/24  0432   WBC 10*3/mm3 6.98   HEMOGLOBIN g/dL 12.0*   HEMATOCRIT % 35.4*   PLATELETS 10*3/mm3 246     Results from last 7 days   Lab Units 04/05/24  0432 04/04/24  1200   SODIUM mmol/L 141 137   POTASSIUM mmol/L 3.5 4.2   CHLORIDE mmol/L 107 101   CO2 mmol/L 22.6 24.6   BUN mg/dL 11 11   CREATININE mg/dL 0.93 0.93   CALCIUM mg/dL 8.8 9.3   BILIRUBIN mg/dL  --  0.6   ALK PHOS U/L  --  87   ALT (SGPT) U/L  --  7   AST (SGOT) U/L  --  12   GLUCOSE mg/dL 100* 112*     Imaging Results (Last 24 Hours)       Procedure Component Value Units Date/Time    CT Angiogram Chest [[id number]] Collected: 04/04/24 1436     Updated: 04/04/24 1502    Narrative:      CT ANGIOGRAPHY OF THE CHEST WITH INTRAVENOUS CONTRAST AND 3D  RECONSTRUCTIONS     HISTORY: Chest pain.     The CT scan was performed as an  emergency procedure with CT angiography  protocol using intravenous contrast and 3D reconstructions. The  following findings are present:  1. The pulmonary arteries are well-opacified and there is no evidence of  pulmonary embolus. The thoracic aorta shows no evidence of aneurysm or  dissection. There is prominent coronary artery calcification.     2. The lungs are well expanded with some minimal vague chronic  interstitial scarring with clearing of somewhat more diffuse  interstitial changes noted on the chest CT scan dated 03/04/2023. There  are a few scattered calcified granulomas as well as several noncalcified  nodules measuring up to 6 mm which are unchanged from 03/04/2023, the  largest is a subpleural nodule in the left lower lobe as seen on image  114. A follow-up CT scan without contrast in 1 year may be appropriate  to ensure appropriate longer-term stability.     3. There are several slightly prominent mediastinal lymph nodes that are  unchanged. There is no hilar or axillary adenopathy. There is no  pericardial effusion. The CT images through the upper liver, spleen, and  both adrenal glands are unremarkable.        Radiation dose reduction techniques were utilized, including automated  exposure control and exposure modulation based on body size.        This report was finalized on 4/4/2024 2:59 PM by Dr. Milo Oliveira M.D  on Workstation: [id number]               I have reviewed the medications.  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  -TSH was normal at 4.12.  -Patient may be discharged home with 200 mg of amiodarone daily.  -He needs to follow-up with Dr. Hood early next  week.  -If this recurs consider EP referral for possible ablation.     Pulmonary nodules  - Seen incidentally on imaging  - Radiology recommends repeat CT scan in 1 year  - Follow up with PCP     Hyperlipidemia  - Continue atorvastatin    Hypertension  - Continue home medication regimen.  -Check blood pressure at home daily.    Disposition: Home    Follow-up after Discharge: Follow-up with Dr. Hood early next week.  Follow-up with your primary care provider in the next 1 to 2 weeks.    This note will serve as a discharge summary    Abby Villegas, SHERRILL 04/05/24 17:01 EDT    I have worn appropriate PPE during this patient encounter, sanitized my hands both with entering and exiting patient's room.      30 minutes has been spent by [facility] providers in the care of this patient while under observation status

## 2024-04-05 NOTE — PLAN OF CARE
Goal Outcome Evaluation:      Patient continues in the observation unit for treatment of chest pain. Pain level has maintained at 2 out of 10 throughout the evening. Cardiology consulting. Currently on an Amiodarone drip at 16.6ml/hr. Vss. Will continue to monitor for any changes

## 2024-04-05 NOTE — DISCHARGE INSTRUCTIONS
I am sending you home with a prescription for amiodarone.    Follow-up with Dr. Hood early next week.

## 2024-04-05 NOTE — PROGRESS NOTES
LOS: 0 days   Patient Care Team:  Triny Hannon MD as PCP - General (Internal Medicine)  James Hubbard MD as Consulting Physician (Cardiology)    Chief Complaint: Follow-up slow AVNRT, chest discomfort, coronary artery disease.    Interval History: He converted on IV amiodarone, and feels much better.  He has not had further chest discomfort.  He is not short of breath.    Vital Signs:  Temp:  [97.9 °F (36.6 °C)-99.9 °F (37.7 °C)] 97.9 °F (36.6 °C)  Heart Rate:  [] 60  Resp:  [16] 16  BP: (105-160)/(54-82) 134/64    Intake/Output Summary (Last 24 hours) at 4/5/2024 1323  Last data filed at 4/5/2024 0739  Gross per 24 hour   Intake 1100 ml   Output 100 ml   Net 1000 ml       Physical Exam:   General Appearance:    No acute distress, alert and oriented x4   Lungs:     Clear to auscultation bilaterally     Heart:    Regular rhythm and normal rate.  No murmurs, gallops, or rubs noted.    Abdomen:     Soft, nontender, nondistended.    Extremities:   Trace edema of the lower right lower extremity (improved).     Results Review:    Results from last 7 days   Lab Units 04/05/24  0432   SODIUM mmol/L 141   POTASSIUM mmol/L 3.5   CHLORIDE mmol/L 107   CO2 mmol/L 22.6   BUN mg/dL 11   CREATININE mg/dL 0.93   GLUCOSE mg/dL 100*   CALCIUM mg/dL 8.8     Results from last 7 days   Lab Units 04/05/24  0432 04/04/24  1423 04/04/24  1200   HSTROP T ng/L 17 12 13     Results from last 7 days   Lab Units 04/05/24  0432   WBC 10*3/mm3 6.98   HEMOGLOBIN g/dL 12.0*   HEMATOCRIT % 35.4*   PLATELETS 10*3/mm3 246                       I reviewed the patient's new clinical results.        Assessment:  1.  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  Palpitations, secondary to #1  3.  Chest pain secondary to #1  4.  Coronary artery disease (3 vessel CABG in 2011 and multiple interventions afterwards, none recently)  5.  History of CVA x 2 following his CABG in 2011  6.  Carotid artery disease, status post left CEA in  2000  7.  Hypertension  8.  Dyslipidemia    Plan:  -Again, I discussed his EKG on presentation with Dr. Webster from electrophysiology.  He felt that this was very likely a slow reentrant AVNRT.  Much less likely, it may have been atrial tachycardia.  He did well on IV amiodarone and converted out of this.  I feel like this is also the cause for his chest discomfort and palpitations.  I am going to place him on amiodarone 200 mg/day at discharge.  He will remain on his atenolol.    -Again, symptoms resolved after his rhythm improved.  I do not feel that he requires stress testing.  He did not have ischemic changes, and his troponin was negative.    -TSH was checked and is normal at 4.12.    -I did review his echocardiogram.  His ejection fraction is normal at 55 to 60%.  He had mild to moderate tricuspid regurgitation.    -He is okay to discharge on the amiodarone.  He is going to follow-up with Dr. Hood, his normal cardiologist.  We tried to call his office several times, but could not get an answer.  The patient will need to call early next week for follow-up.  I will defer to Dr. Hood whether he needs to stay on the amiodarone long-term.  If this recurs, could consider EP referral for ablation.    -Discussed in detail with the patient and his wife.    Quentin Barajas MD  04/05/24  13:23 EDT

## 2024-04-05 NOTE — CONSULTS
Date of Consultation: 24    Referral Provider: Davon Ch II*     Reason for Consultation: Chest pain, tachycardia.    Encounter Provider: Quentin Barajas MD    Group of Service: [facility]     Patient Name: Slade Martinez    :1950    Chief complaint: Chest pain, palpitations.    History of Present Illness:      This is a very pleasant 73 year-old male who is normally followed by Dr. James Hubbard cardiology.  He has a history of coronary artery disease and underwent a 3 vessel CABG in  (sequential SVG to D1 and LAD, SVG to OM).  He evidently had 2 strokes following his CABG for which he has recovered.  He has also had numerous stents in the past, although none recently.  He also has a history of a left carotid endarterectomy.    The patient was doing well up until earlier today.  He had palpitations and felt like his heart was pounding, accompanied by chest pressure over the left precordium with some radiation down the left arm.  He did not feel short of breath.  However, when he came in to the emergency department, his EKG showed what appeared to be a junctional or atrial tachycardia with a rate of around 113.  When I saw him, he was still in this rhythm with a rate of 110.  He does not have a history of atrial fibrillation in the past.  His troponin was negative when I saw him, his chest discomfort had improved significantly.    For unclear reasons, he does have unilateral edema of the right lower extremity, although a venous Doppler ultrasound was normal.  He also had a CT angiogram of the chest which showed no evidence of pulmonary embolism or aortic pathology.  There was heavy coronary artery calcification noted.      Past Medical History:   Diagnosis Date    Coronary atherosclerosis 2019    H/O CABG SVG to first diagonal branch and to the LAD as well as SVG to the lateral marginal branch of the circumflex complicated by right sided subcortical infarct  with left sided hemiparesis    Dyslipidemia 11/04/2019    Essential hypertension 11/04/2019    Injury of back     fractured vertebra 2/26/23         Past Surgical History:   Procedure Laterality Date    CAROTID ENDARTERECTOMY  2000    Left    CHOLECYSTECTOMY      CORONARY ARTERY BYPASS GRAFT      CORONARY STENT PLACEMENT      X17 stents         Allergies   Allergen Reactions    Pletal [Cilostazol] Myalgia     .         No current facility-administered medications on file prior to encounter.     Current Outpatient Medications on File Prior to Encounter   Medication Sig Dispense Refill    amLODIPine (NORVASC) 5 MG tablet Take 1 tablet by mouth Daily As Needed (Instructed to not take medication if BP <110/70).      aspirin 81 MG tablet Take 1 tablet by mouth Daily.      atenolol (TENORMIN) 50 MG tablet Take 1 tablet by mouth 2 (Two) Times a Day.      atorvastatin (LIPITOR) 40 MG tablet Take 1 tablet by mouth Every Night.      baclofen (LIORESAL) 10 MG tablet Take 1 tablet by mouth every night at bedtime.      clopidogrel (PLAVIX) 75 MG tablet Take 1 tablet by mouth Daily.      isosorbide mononitrate (IMDUR) 60 MG 24 hr tablet Take 1 tablet by mouth 2 (Two) Times a Day.      Linzess 145 MCG capsule capsule Take 1 capsule by mouth Daily.      lisinopril (PRINIVIL,ZESTRIL) 40 MG tablet Take 1 tablet by mouth 2 (Two) Times a Day.      loratadine (CLARITIN) 10 MG tablet Take 1 tablet by mouth Daily.      meclizine (ANTIVERT) 12.5 MG tablet Take 1 tablet by mouth Daily.      Multiple Vitamins-Minerals (MULTIVITAMIN ADULT EXTRA C PO) Take 1 tablet by mouth Daily.      nitroglycerin (NITROSTAT) 0.4 MG SL tablet Place 1 tablet under the tongue Every 5 (Five) Minutes As Needed for Chest Pain. Take no more than 3 doses in 15 minutes. 25 tablet 3    Omega-3 Fatty Acids (FISH OIL) 1000 MG capsule capsule Take 1 capsule by mouth Daily.      pantoprazole (PROTONIX) 40 MG EC tablet Take 1 tablet by mouth 2 (Two) Times a Day.       "potassium chloride 10 MEQ CR tablet Take 2 tablets by mouth Daily.      ranolazine (RANEXA) 1000 MG 12 hr tablet Take 1 tablet by mouth Every 12 (Twelve) Hours. 60 tablet 11    saccharomyces boulardii (FLORASTOR) 250 MG capsule Take 1 capsule by mouth 2 (Two) Times a Day.      sucralfate (CARAFATE) 1 g tablet Take 1 tablet by mouth 2 (Two) Times a Day.      triamcinolone (KENALOG) 0.1 % cream Apply 1 Application topically to the appropriate area as directed 2 (Two) Times a Day.      [DISCONTINUED] linaclotide (LINZESS) 72 MCG capsule capsule Take 1 capsule by mouth 3 (Three) Times a Week if Needed.      hydroCHLOROthiazide 25 MG tablet Take 1 tablet by mouth Daily As Needed (swelling).           Social History     Socioeconomic History    Marital status:    Tobacco Use    Smoking status: Former     Types: Cigarettes    Smokeless tobacco: Never   Vaping Use    Vaping status: Never Used   Substance and Sexual Activity    Alcohol use: Not Currently    Drug use: Never    Sexual activity: Defer         Family History   Problem Relation Age of Onset    COPD Mother     Heart attack Father     Heart failure Father     COPD Brother        REVIEW OF SYSTEMS:   Pertinent positives are noted in the HPI above.  Otherwise, all other systems were reviewed, and are negative.     Objective:     Vitals:    04/04/24 1516 04/04/24 1824 04/04/24 1825 04/04/24 1938   BP: 137/70 105/68  113/54   BP Location: Left arm Left arm  Left arm   Patient Position: Lying Lying  Lying   Pulse: 107 58 82 62   Resp: 16   16   Temp: 98.2 °F (36.8 °C)   98.8 °F (37.1 °C)   TempSrc: Oral   Oral   SpO2: 99%   97%   Weight:       Height:         Body mass index is 27.89 kg/m².  Flowsheet Rows      Flowsheet Row First Filed Value   Admission Height 180.3 cm (71\") Documented at 04/04/2024 1147   Admission Weight 90.7 kg (200 lb) Documented at 04/04/2024 1147             General:    No acute distress, alert and oriented x4, pleasant                   " Head:    Normocephalic, atraumatic.   Eyes:          Conjunctivae and sclerae normal, no icterus.   Throat:   No oral lesions, no thrush, oral mucosa moist.    Neck:   Supple, trachea midline.   Lungs:     Clear to auscultation bilaterally     Heart:    Regular rhythm and mildly tachycardic rate.  No murmurs, gallops, or rubs noted.   Abdomen:     Soft, non-tender, non-distended, positive bowel sounds.    Extremities:   1+ edema of the right lower extremity.   Pulses:   Pulses palpable and equal bilaterally.    Skin:   No bleeding or rash.   Neuro:   Non-focal.  Moves all extremities well.    Psychiatric:   Normal mood and affect.             Lab Review:                Results from last 7 days   Lab Units 04/04/24  1200   SODIUM mmol/L 137   POTASSIUM mmol/L 4.2   CHLORIDE mmol/L 101   CO2 mmol/L 24.6   BUN mg/dL 11   CREATININE mg/dL 0.93   GLUCOSE mg/dL 112*   CALCIUM mg/dL 9.3     Results from last 7 days   Lab Units 04/04/24  1423 04/04/24  1200   HSTROP T ng/L 12 13     Results from last 7 days   Lab Units 04/04/24  1200   WBC 10*3/mm3 9.16   HEMOGLOBIN g/dL 13.2   HEMATOCRIT % 39.9   PLATELETS 10*3/mm3 282                       EKG (reviewed by me personally): SVT versus atrial tachycardia, nonspecific ST changes.      Assessment:   1.  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  Palpitations, secondary to #1  3.  Chest pain  4.  Coronary artery disease (3 vessel CABG in 2011 and multiple interventions afterwards, none recently)  5.  History of CVA x 2 following his CABG in 2011  6.  Carotid artery disease, status post left CEA in 2000  7.  Hypertension  8.  Dyslipidemia    Plan:       I reviewed his presenting EKG with Dr. Webster.  He felt that this was most likely a slow reentrant tachycardia (AVNRT).  On a much less chance, this could be an atrial tachycardia.  I suspect that this is the cause of his symptoms, including the chest discomfort.    I am going to start him on IV amiodarone and see if  he will convert out of this.  We will also see if his symptoms resolved.  If not, I can try adenosine tomorrow morning under controlled circumstances.  I am also going to check an echocardiogram at this time.  He had an echo at Dr. Hubbard' office within the last several months, and I will get this for comparison as well.  However, given the chest pain, I want to make sure he does not have any new wall motion abnormalities.    His high-sensitivity troponin is reassuring.  However, I will keep him n.p.o. after midnight in case a stress test or other ischemic testing is needed tomorrow morning.    Discussed in detail with the patient and his family at bedside.    Thank you very much for this consult.    Amrik Barajas MD

## 2024-05-05 ENCOUNTER — APPOINTMENT (OUTPATIENT)
Dept: CT IMAGING | Facility: HOSPITAL | Age: 74
DRG: 324 | End: 2024-05-05
Payer: MEDICARE

## 2024-05-05 ENCOUNTER — HOSPITAL ENCOUNTER (INPATIENT)
Facility: HOSPITAL | Age: 74
LOS: 2 days | Discharge: HOME | DRG: 324 | End: 2024-05-09
Attending: EMERGENCY MEDICINE | Admitting: STUDENT IN AN ORGANIZED HEALTH CARE EDUCATION/TRAINING PROGRAM
Payer: MEDICARE

## 2024-05-05 ENCOUNTER — APPOINTMENT (OUTPATIENT)
Dept: MRI IMAGING | Facility: HOSPITAL | Age: 74
DRG: 324 | End: 2024-05-05
Payer: MEDICARE

## 2024-05-05 ENCOUNTER — APPOINTMENT (OUTPATIENT)
Dept: GENERAL RADIOLOGY | Facility: HOSPITAL | Age: 74
DRG: 324 | End: 2024-05-05
Payer: MEDICARE

## 2024-05-05 DIAGNOSIS — R07.2 PRECORDIAL PAIN: ICD-10-CM

## 2024-05-05 DIAGNOSIS — R07.9 RIGHT-SIDED CHEST PAIN: Primary | ICD-10-CM

## 2024-05-05 DIAGNOSIS — M51.34 DEGENERATIVE DISC DISEASE, THORACIC: ICD-10-CM

## 2024-05-05 LAB
ALBUMIN SERPL-MCNC: 3.9 G/DL (ref 3.5–5.2)
ALBUMIN/GLOB SERPL: 1.3 G/DL
ALP SERPL-CCNC: 83 U/L (ref 39–117)
ALT SERPL W P-5'-P-CCNC: 14 U/L (ref 1–41)
AMYLASE SERPL-CCNC: 22 U/L (ref 28–100)
ANION GAP SERPL CALCULATED.3IONS-SCNC: 10.1 MMOL/L (ref 5–15)
AST SERPL-CCNC: 12 U/L (ref 1–40)
BACTERIA UR QL AUTO: NORMAL /HPF
BASOPHILS # BLD AUTO: 0.01 10*3/MM3 (ref 0–0.2)
BASOPHILS NFR BLD AUTO: 0.1 % (ref 0–1.5)
BILIRUB SERPL-MCNC: 0.6 MG/DL (ref 0–1.2)
BILIRUB UR QL STRIP: NEGATIVE
BUN SERPL-MCNC: 10 MG/DL (ref 8–23)
BUN/CREAT SERPL: 11.6 (ref 7–25)
CALCIUM SPEC-SCNC: 9.3 MG/DL (ref 8.6–10.5)
CHLORIDE SERPL-SCNC: 99 MMOL/L (ref 98–107)
CLARITY UR: CLEAR
CO2 SERPL-SCNC: 25.9 MMOL/L (ref 22–29)
COLOR UR: YELLOW
CREAT SERPL-MCNC: 0.86 MG/DL (ref 0.76–1.27)
DEPRECATED RDW RBC AUTO: 46.3 FL (ref 37–54)
EGFRCR SERPLBLD CKD-EPI 2021: 90.9 ML/MIN/1.73
EOSINOPHIL # BLD AUTO: 0 10*3/MM3 (ref 0–0.4)
EOSINOPHIL NFR BLD AUTO: 0 % (ref 0.3–6.2)
ERYTHROCYTE [DISTWIDTH] IN BLOOD BY AUTOMATED COUNT: 13 % (ref 12.3–15.4)
GEN 5 2HR TROPONIN T REFLEX: 11 NG/L
GLOBULIN UR ELPH-MCNC: 3.1 GM/DL
GLUCOSE SERPL-MCNC: 174 MG/DL (ref 65–99)
GLUCOSE UR STRIP-MCNC: NEGATIVE MG/DL
HCT VFR BLD AUTO: 38.1 % (ref 37.5–51)
HGB BLD-MCNC: 12.6 G/DL (ref 13–17.7)
HGB UR QL STRIP.AUTO: NEGATIVE
HOLD SPECIMEN: NORMAL
HOLD SPECIMEN: NORMAL
HYALINE CASTS UR QL AUTO: NORMAL /LPF
IMM GRANULOCYTES # BLD AUTO: 0.06 10*3/MM3 (ref 0–0.05)
IMM GRANULOCYTES NFR BLD AUTO: 0.5 % (ref 0–0.5)
KETONES UR QL STRIP: NEGATIVE
LEUKOCYTE ESTERASE UR QL STRIP.AUTO: ABNORMAL
LIPASE SERPL-CCNC: 11 U/L (ref 13–60)
LYMPHOCYTES # BLD AUTO: 1.24 10*3/MM3 (ref 0.7–3.1)
LYMPHOCYTES NFR BLD AUTO: 10.6 % (ref 19.6–45.3)
MCH RBC QN AUTO: 32.1 PG (ref 26.6–33)
MCHC RBC AUTO-ENTMCNC: 33.1 G/DL (ref 31.5–35.7)
MCV RBC AUTO: 97.2 FL (ref 79–97)
MONOCYTES # BLD AUTO: 0.55 10*3/MM3 (ref 0.1–0.9)
MONOCYTES NFR BLD AUTO: 4.7 % (ref 5–12)
NEUTROPHILS NFR BLD AUTO: 84.1 % (ref 42.7–76)
NEUTROPHILS NFR BLD AUTO: 9.85 10*3/MM3 (ref 1.7–7)
NITRITE UR QL STRIP: NEGATIVE
NRBC BLD AUTO-RTO: 0 /100 WBC (ref 0–0.2)
PH UR STRIP.AUTO: 6 [PH] (ref 5–8)
PLATELET # BLD AUTO: 272 10*3/MM3 (ref 140–450)
PMV BLD AUTO: 9.7 FL (ref 6–12)
POTASSIUM SERPL-SCNC: 4.4 MMOL/L (ref 3.5–5.2)
PROT SERPL-MCNC: 7 G/DL (ref 6–8.5)
PROT UR QL STRIP: NEGATIVE
QT INTERVAL: 465 MS
QTC INTERVAL: 477 MS
RBC # BLD AUTO: 3.92 10*6/MM3 (ref 4.14–5.8)
RBC # UR STRIP: NORMAL /HPF
REF LAB TEST METHOD: NORMAL
SODIUM SERPL-SCNC: 135 MMOL/L (ref 136–145)
SP GR UR STRIP: >1.03 (ref 1–1.03)
SQUAMOUS #/AREA URNS HPF: NORMAL /HPF
TROPONIN T DELTA: -1 NG/L
TROPONIN T SERPL HS-MCNC: 11 NG/L
TROPONIN T SERPL HS-MCNC: 12 NG/L
UROBILINOGEN UR QL STRIP: ABNORMAL
WBC # UR STRIP: NORMAL /HPF
WBC NRBC COR # BLD AUTO: 11.71 10*3/MM3 (ref 3.4–10.8)
WHOLE BLOOD HOLD COAG: NORMAL
WHOLE BLOOD HOLD SPECIMEN: NORMAL

## 2024-05-05 PROCEDURE — 72146 MRI CHEST SPINE W/O DYE: CPT

## 2024-05-05 PROCEDURE — 80053 COMPREHEN METABOLIC PANEL: CPT | Performed by: EMERGENCY MEDICINE

## 2024-05-05 PROCEDURE — 71275 CT ANGIOGRAPHY CHEST: CPT

## 2024-05-05 PROCEDURE — 25010000002 KETOROLAC TROMETHAMINE PER 15 MG: Performed by: INTERNAL MEDICINE

## 2024-05-05 PROCEDURE — 83690 ASSAY OF LIPASE: CPT | Performed by: INTERNAL MEDICINE

## 2024-05-05 PROCEDURE — 36415 COLL VENOUS BLD VENIPUNCTURE: CPT

## 2024-05-05 PROCEDURE — 93010 ELECTROCARDIOGRAM REPORT: CPT | Performed by: INTERNAL MEDICINE

## 2024-05-05 PROCEDURE — 84484 ASSAY OF TROPONIN QUANT: CPT | Performed by: EMERGENCY MEDICINE

## 2024-05-05 PROCEDURE — 82150 ASSAY OF AMYLASE: CPT | Performed by: INTERNAL MEDICINE

## 2024-05-05 PROCEDURE — 25510000001 IOPAMIDOL PER 1 ML: Performed by: EMERGENCY MEDICINE

## 2024-05-05 PROCEDURE — G0378 HOSPITAL OBSERVATION PER HR: HCPCS

## 2024-05-05 PROCEDURE — 71045 X-RAY EXAM CHEST 1 VIEW: CPT

## 2024-05-05 PROCEDURE — 81001 URINALYSIS AUTO W/SCOPE: CPT | Performed by: INTERNAL MEDICINE

## 2024-05-05 PROCEDURE — 84484 ASSAY OF TROPONIN QUANT: CPT | Performed by: NURSE PRACTITIONER

## 2024-05-05 PROCEDURE — 93005 ELECTROCARDIOGRAM TRACING: CPT

## 2024-05-05 PROCEDURE — 99285 EMERGENCY DEPT VISIT HI MDM: CPT

## 2024-05-05 PROCEDURE — 25010000002 KETOROLAC TROMETHAMINE PER 15 MG: Performed by: EMERGENCY MEDICINE

## 2024-05-05 PROCEDURE — 93005 ELECTROCARDIOGRAM TRACING: CPT | Performed by: EMERGENCY MEDICINE

## 2024-05-05 PROCEDURE — 99214 OFFICE O/P EST MOD 30 MIN: CPT | Performed by: INTERNAL MEDICINE

## 2024-05-05 PROCEDURE — 85025 COMPLETE CBC W/AUTO DIFF WBC: CPT | Performed by: EMERGENCY MEDICINE

## 2024-05-05 RX ORDER — KETOROLAC TROMETHAMINE 30 MG/ML
30 INJECTION, SOLUTION INTRAMUSCULAR; INTRAVENOUS EVERY 6 HOURS PRN
Status: DISCONTINUED | OUTPATIENT
Start: 2024-05-05 | End: 2024-05-09 | Stop reason: HOSPADM

## 2024-05-05 RX ORDER — ACETAMINOPHEN 500 MG
1000 TABLET ORAL ONCE
Status: COMPLETED | OUTPATIENT
Start: 2024-05-05 | End: 2024-05-05

## 2024-05-05 RX ORDER — PANTOPRAZOLE SODIUM 40 MG/1
40 TABLET, DELAYED RELEASE ORAL 2 TIMES DAILY
Status: DISCONTINUED | OUTPATIENT
Start: 2024-05-05 | End: 2024-05-09 | Stop reason: HOSPADM

## 2024-05-05 RX ORDER — MECLIZINE HCL 12.5 MG/1
12.5 TABLET ORAL DAILY
Status: DISCONTINUED | OUTPATIENT
Start: 2024-05-06 | End: 2024-05-09 | Stop reason: HOSPADM

## 2024-05-05 RX ORDER — ATENOLOL 50 MG/1
50 TABLET ORAL 2 TIMES DAILY
Status: DISCONTINUED | OUTPATIENT
Start: 2024-05-05 | End: 2024-05-09 | Stop reason: HOSPADM

## 2024-05-05 RX ORDER — HYDROCODONE BITARTRATE AND ACETAMINOPHEN 5; 325 MG/1; MG/1
1 TABLET ORAL EVERY 6 HOURS PRN
Status: DISCONTINUED | OUTPATIENT
Start: 2024-05-05 | End: 2024-05-07 | Stop reason: SDUPTHER

## 2024-05-05 RX ORDER — KETOROLAC TROMETHAMINE 15 MG/ML
15 INJECTION, SOLUTION INTRAMUSCULAR; INTRAVENOUS ONCE
Status: COMPLETED | OUTPATIENT
Start: 2024-05-05 | End: 2024-05-05

## 2024-05-05 RX ORDER — LISINOPRIL 40 MG/1
40 TABLET ORAL 2 TIMES DAILY
Status: DISCONTINUED | OUTPATIENT
Start: 2024-05-05 | End: 2024-05-06

## 2024-05-05 RX ORDER — SUCRALFATE 1 G/1
1 TABLET ORAL NIGHTLY
Status: DISCONTINUED | OUTPATIENT
Start: 2024-05-05 | End: 2024-05-09 | Stop reason: HOSPADM

## 2024-05-05 RX ORDER — ATORVASTATIN CALCIUM 20 MG/1
40 TABLET, FILM COATED ORAL NIGHTLY
Status: DISCONTINUED | OUTPATIENT
Start: 2024-05-05 | End: 2024-05-09 | Stop reason: HOSPADM

## 2024-05-05 RX ORDER — AMLODIPINE BESYLATE 5 MG/1
5 TABLET ORAL DAILY PRN
Status: DISCONTINUED | OUTPATIENT
Start: 2024-05-05 | End: 2024-05-06

## 2024-05-05 RX ORDER — SODIUM CHLORIDE 0.9 % (FLUSH) 0.9 %
10 SYRINGE (ML) INJECTION AS NEEDED
Status: DISCONTINUED | OUTPATIENT
Start: 2024-05-05 | End: 2024-05-09 | Stop reason: HOSPADM

## 2024-05-05 RX ORDER — AMIODARONE HYDROCHLORIDE 200 MG/1
200 TABLET ORAL EVERY OTHER DAY
Status: DISCONTINUED | OUTPATIENT
Start: 2024-05-05 | End: 2024-05-05

## 2024-05-05 RX ORDER — HYDROCHLOROTHIAZIDE 25 MG/1
25 TABLET ORAL DAILY PRN
Status: DISCONTINUED | OUTPATIENT
Start: 2024-05-05 | End: 2024-05-06

## 2024-05-05 RX ORDER — SODIUM CHLORIDE 9 MG/ML
40 INJECTION, SOLUTION INTRAVENOUS AS NEEDED
Status: DISCONTINUED | OUTPATIENT
Start: 2024-05-05 | End: 2024-05-09 | Stop reason: HOSPADM

## 2024-05-05 RX ORDER — ASPIRIN 325 MG
325 TABLET ORAL ONCE
Status: DISCONTINUED | OUTPATIENT
Start: 2024-05-05 | End: 2024-05-05

## 2024-05-05 RX ORDER — ISOSORBIDE MONONITRATE 60 MG/1
60 TABLET, EXTENDED RELEASE ORAL 2 TIMES DAILY
Status: DISCONTINUED | OUTPATIENT
Start: 2024-05-05 | End: 2024-05-06

## 2024-05-05 RX ORDER — BACLOFEN 10 MG/1
10 TABLET ORAL NIGHTLY
Status: DISCONTINUED | OUTPATIENT
Start: 2024-05-05 | End: 2024-05-09 | Stop reason: HOSPADM

## 2024-05-05 RX ORDER — RANOLAZINE 500 MG/1
1000 TABLET, EXTENDED RELEASE ORAL EVERY 12 HOURS
Status: DISCONTINUED | OUTPATIENT
Start: 2024-05-05 | End: 2024-05-08

## 2024-05-05 RX ORDER — POTASSIUM CHLORIDE 750 MG/1
20 TABLET, FILM COATED, EXTENDED RELEASE ORAL DAILY
Status: DISCONTINUED | OUTPATIENT
Start: 2024-05-05 | End: 2024-05-09 | Stop reason: HOSPADM

## 2024-05-05 RX ORDER — CLOPIDOGREL BISULFATE 75 MG/1
75 TABLET ORAL DAILY
Status: DISCONTINUED | OUTPATIENT
Start: 2024-05-05 | End: 2024-05-09 | Stop reason: HOSPADM

## 2024-05-05 RX ORDER — SODIUM CHLORIDE 0.9 % (FLUSH) 0.9 %
10 SYRINGE (ML) INJECTION EVERY 12 HOURS SCHEDULED
Status: DISCONTINUED | OUTPATIENT
Start: 2024-05-05 | End: 2024-05-09 | Stop reason: HOSPADM

## 2024-05-05 RX ORDER — SACCHAROMYCES BOULARDII 250 MG
250 CAPSULE ORAL NIGHTLY
Status: DISCONTINUED | OUTPATIENT
Start: 2024-05-05 | End: 2024-05-09 | Stop reason: HOSPADM

## 2024-05-05 RX ADMIN — ATORVASTATIN CALCIUM 40 MG: 20 TABLET, FILM COATED ORAL at 20:47

## 2024-05-05 RX ADMIN — LISINOPRIL 40 MG: 20 TABLET ORAL at 20:50

## 2024-05-05 RX ADMIN — KETOROLAC TROMETHAMINE 30 MG: 30 INJECTION, SOLUTION INTRAMUSCULAR at 23:08

## 2024-05-05 RX ADMIN — HYDROCODONE BITARTRATE AND ACETAMINOPHEN 1 TABLET: 5; 325 TABLET ORAL at 18:23

## 2024-05-05 RX ADMIN — CLOPIDOGREL BISULFATE 75 MG: 75 TABLET, FILM COATED ORAL at 15:32

## 2024-05-05 RX ADMIN — KETOROLAC TROMETHAMINE 15 MG: 15 INJECTION, SOLUTION INTRAMUSCULAR; INTRAVENOUS at 09:47

## 2024-05-05 RX ADMIN — Medication 10 ML: at 20:50

## 2024-05-05 RX ADMIN — ISOSORBIDE MONONITRATE 60 MG: 30 TABLET, EXTENDED RELEASE ORAL at 20:48

## 2024-05-05 RX ADMIN — SUCRALFATE 1 G: 1 TABLET ORAL at 20:50

## 2024-05-05 RX ADMIN — BACLOFEN 10 MG: 10 TABLET ORAL at 20:47

## 2024-05-05 RX ADMIN — ATENOLOL 50 MG: 50 TABLET ORAL at 20:47

## 2024-05-05 RX ADMIN — HYDROCODONE BITARTRATE AND ACETAMINOPHEN 1 TABLET: 5; 325 TABLET ORAL at 12:44

## 2024-05-05 RX ADMIN — PANTOPRAZOLE SODIUM 40 MG: 40 TABLET, DELAYED RELEASE ORAL at 20:47

## 2024-05-05 RX ADMIN — RANOLAZINE 1000 MG: 500 TABLET, FILM COATED, EXTENDED RELEASE ORAL at 15:32

## 2024-05-05 RX ADMIN — NITROGLYCERIN 1 INCH: 20 OINTMENT TOPICAL at 12:44

## 2024-05-05 RX ADMIN — KETOROLAC TROMETHAMINE 30 MG: 30 INJECTION, SOLUTION INTRAMUSCULAR at 16:55

## 2024-05-05 RX ADMIN — ACETAMINOPHEN 1000 MG: 500 TABLET ORAL at 08:14

## 2024-05-05 RX ADMIN — IOPAMIDOL 95 ML: 755 INJECTION, SOLUTION INTRAVENOUS at 08:26

## 2024-05-05 RX ADMIN — Medication 10 ML: at 10:48

## 2024-05-05 RX ADMIN — POTASSIUM CHLORIDE 20 MEQ: 750 TABLET, EXTENDED RELEASE ORAL at 15:32

## 2024-05-05 NOTE — Clinical Note
First balloon inflation max pressure = 18 mahsa. First balloon inflation duration = 5 seconds. Second inflation of balloon - Max pressure = 20 mahsa. 2nd Inflation of balloon - Duration = 8 seconds. 2nd inflation was done at 15:19 EDT.

## 2024-05-05 NOTE — Clinical Note
First balloon inflation max pressure = 6 mahsa. First balloon inflation duration = 10 seconds. Second inflation of balloon - Max pressure = 6 mahsa. 2nd Inflation of balloon - Duration = 10 seconds. 2nd inflation was done at 15:40 EDT. Third inflation of balloon - Max pressure = 6 mahsa. 3rd Inflation of balloon - Duration = 10 seconds. 3rd inflation was done at 15:40 EDT. Fourth inflation of balloon - Max pressure = 6 mahsa. 4th Inflation of b alloon - Duration = 10 seconds. 4th inflation was done at 15:40 EDT.

## 2024-05-05 NOTE — Clinical Note
First balloon inflation max pressure = 14 mahsa. First balloon inflation duration = 7 seconds. Second inflation of balloon - Max pressure = 14 mahsa. 2nd Inflation of balloon - Duration = 7 seconds. 2nd inflation was done at 15:29 EDT. Third inflation of balloon - Max pressure = 14 mahsa. 3rd Inflation of balloon - Duration = 7 seconds. 3rd inflation was done at 15:29 EDT. Fourth inflation of balloon - Max pressure = 14 mahsa. 4th Inflation of  balloon - Duration = 7 seconds. 4th inflation was done at 15:29 EDT.

## 2024-05-05 NOTE — Clinical Note
First balloon inflation max pressure = 16 mahsa. First balloon inflation duration = 5 seconds. Second inflation of balloon - Max pressure = 16 mahsa. 2nd Inflation of balloon - Duration = 5 seconds. 2nd inflation was done at 15:14 EDT.

## 2024-05-05 NOTE — ED PROVIDER NOTES
EMERGENCY DEPARTMENT ENCOUNTER  Room Number:  16/16  PCP: Triny Hannon MD  Independent Historians: Patient and spouse      HPI:  Chief Complaint: had concerns including Chest Pain.       A complete HPI/ROS/PMH/PSH/SH/FH are unobtainable due to: None    Chronic or social conditions impacting patient care (Social Determinants of Health): None      Context: Slade Martinez is a 73 y.o. male with a medical history of coronary artery disease, essential hypertension and dyslipidemia who presents to the ED c/o acute chest pain x 2 weeks that is been constantly present but seems to wax and wane with certain movements and deep inspirations.  No cough or fevers.  No hemoptysis.  Patient was admitted here last month and evaluated for chest discomfort and abnormal rhythm.  Since then he reports that he has been to his primary cardiologist who is done a noncontrast CT of his chest, unremarkable stress test, and has had his medications adjusted.  Patient has persistent discomfort for which he has been taking Tylenol.  Patient spouse reports that he is also completed course of azithromycin and Medrol Dosepak without relief of symptoms.    Review of prior external notes (non-ED) -and- Review of prior external test results outside of this encounter:  Cardiology consult note 4/4/2024 reviewed: Patient with a history of coronary disease status post CABG in 2011 presented for evaluation of palpitations, chest pressure and pounding in his chest radiating down his left arm.  Ultimately the patient was discharged home with diagnosis of slow reentrant AVNRT and started on amiodarone daily  ===========================  TTE 4/5/2024 revealed EF 56-60%  ===========================  Cardiolite stress test 4/26/2024 revealed normal perfusion study with a EF 60%-interpreted by Dr. Abdul      PAST MEDICAL HISTORY  Active Ambulatory Problems     Diagnosis Date Noted    Dyslipidemia 11/04/2019    Essential hypertension 11/04/2019     Coronary atherosclerosis 11/04/2019    S/P CABG (coronary artery bypass graft) 04/20/2020    Peripheral artery disease 02/25/2021    History of left-sided carotid endarterectomy 02/25/2021    Fever of unknown origin 03/03/2023    Closed traumatic nondisplaced fracture of two ribs of right side with routine healing 03/04/2023    Altered mental status 09/21/2023    Abdominal pain 09/21/2023    Chronic mesenteric ischemia 09/21/2023    Chest pain 04/04/2024     Resolved Ambulatory Problems     Diagnosis Date Noted    S/P CABG (coronary artery bypass graft) 04/20/2020    COVID-19 virus infection 02/25/2021     Past Medical History:   Diagnosis Date    Injury of back          PAST SURGICAL HISTORY  Past Surgical History:   Procedure Laterality Date    CAROTID ENDARTERECTOMY  2000    Left    CHOLECYSTECTOMY      CORONARY ARTERY BYPASS GRAFT      CORONARY STENT PLACEMENT      X17 stents         FAMILY HISTORY  Family History   Problem Relation Age of Onset    COPD Mother     Heart attack Father     Heart failure Father     COPD Brother          SOCIAL HISTORY  Social History     Socioeconomic History    Marital status:    Tobacco Use    Smoking status: Former     Types: Cigarettes    Smokeless tobacco: Never   Vaping Use    Vaping status: Never Used   Substance and Sexual Activity    Alcohol use: Not Currently    Drug use: Never    Sexual activity: Defer         ALLERGIES  Pletal [cilostazol]      REVIEW OF SYSTEMS  Review of Systems  Included in HPI  All systems reviewed and negative except for those discussed in HPI.      PHYSICAL EXAM    I have reviewed the triage vital signs and nursing notes.    ED Triage Vitals   Temp Heart Rate Resp BP SpO2   05/05/24 0713 05/05/24 0713 05/05/24 0713 05/05/24 0745 05/05/24 0713   97.6 °F (36.4 °C) 69 16 161/67 95 %      Temp src Heart Rate Source Patient Position BP Location FiO2 (%)   -- 05/05/24 0801 05/05/24 0801 05/05/24 0801 --    Monitor Lying Left arm          Physical  Exam    Physical Exam   Constitutional: No distress.  Nontoxic  HENT:  Head: Normocephalic and atraumatic.   Oropharynx: Mucous membranes are moist.   Eyes: . No scleral icterus. No conjunctival pallor.  Neck: Normal range of motion. Neck supple.   Cardiovascular: Pink warm and well perfused throughout.  Regular  Pulmonary/Chest: No respiratory distress.  No tachypnea or increased work of breathing appreciated.  No chest wall rash, evidence of trauma or focal tenderness.  Abdominal: Soft. There is no tenderness. There is no rebound and no guarding.  Benign  Musculoskeletal: Moves all extremities equally.    Neurological: Alert and oriented.  No acute focal deficit appreciated.  Skin: Skin is pink, warm, and dry.   Psychiatric: Mood and affect normal.   Nursing note and vitals reviewed.             LAB RESULTS  Recent Results (from the past 24 hour(s))   ECG 12 Lead ED Triage Standing Order; Chest Pain    Collection Time: 05/05/24  7:16 AM   Result Value Ref Range    QT Interval 465 ms    QTC Interval 477 ms   Comprehensive Metabolic Panel    Collection Time: 05/05/24  7:36 AM    Specimen: Blood   Result Value Ref Range    Glucose 174 (H) 65 - 99 mg/dL    BUN 10 8 - 23 mg/dL    Creatinine 0.86 0.76 - 1.27 mg/dL    Sodium 135 (L) 136 - 145 mmol/L    Potassium 4.4 3.5 - 5.2 mmol/L    Chloride 99 98 - 107 mmol/L    CO2 25.9 22.0 - 29.0 mmol/L    Calcium 9.3 8.6 - 10.5 mg/dL    Total Protein 7.0 6.0 - 8.5 g/dL    Albumin 3.9 3.5 - 5.2 g/dL    ALT (SGPT) 14 1 - 41 U/L    AST (SGOT) 12 1 - 40 U/L    Alkaline Phosphatase 83 39 - 117 U/L    Total Bilirubin 0.6 0.0 - 1.2 mg/dL    Globulin 3.1 gm/dL    A/G Ratio 1.3 g/dL    BUN/Creatinine Ratio 11.6 7.0 - 25.0    Anion Gap 10.1 5.0 - 15.0 mmol/L    eGFR 90.9 >60.0 mL/min/1.73   High Sensitivity Troponin T    Collection Time: 05/05/24  7:36 AM    Specimen: Blood   Result Value Ref Range    HS Troponin T 11 <22 ng/L   Green Top (Gel)    Collection Time: 05/05/24  7:36 AM    Result Value Ref Range    Extra Tube Hold for add-ons.    Lavender Top    Collection Time: 05/05/24  7:36 AM   Result Value Ref Range    Extra Tube hold for add-on    Gold Top - SST    Collection Time: 05/05/24  7:36 AM   Result Value Ref Range    Extra Tube Hold for add-ons.    Light Blue Top    Collection Time: 05/05/24  7:36 AM   Result Value Ref Range    Extra Tube Hold for add-ons.    CBC Auto Differential    Collection Time: 05/05/24  7:36 AM    Specimen: Blood   Result Value Ref Range    WBC 11.71 (H) 3.40 - 10.80 10*3/mm3    RBC 3.92 (L) 4.14 - 5.80 10*6/mm3    Hemoglobin 12.6 (L) 13.0 - 17.7 g/dL    Hematocrit 38.1 37.5 - 51.0 %    MCV 97.2 (H) 79.0 - 97.0 fL    MCH 32.1 26.6 - 33.0 pg    MCHC 33.1 31.5 - 35.7 g/dL    RDW 13.0 12.3 - 15.4 %    RDW-SD 46.3 37.0 - 54.0 fl    MPV 9.7 6.0 - 12.0 fL    Platelets 272 140 - 450 10*3/mm3    Neutrophil % 84.1 (H) 42.7 - 76.0 %    Lymphocyte % 10.6 (L) 19.6 - 45.3 %    Monocyte % 4.7 (L) 5.0 - 12.0 %    Eosinophil % 0.0 (L) 0.3 - 6.2 %    Basophil % 0.1 0.0 - 1.5 %    Immature Grans % 0.5 0.0 - 0.5 %    Neutrophils, Absolute 9.85 (H) 1.70 - 7.00 10*3/mm3    Lymphocytes, Absolute 1.24 0.70 - 3.10 10*3/mm3    Monocytes, Absolute 0.55 0.10 - 0.90 10*3/mm3    Eosinophils, Absolute 0.00 0.00 - 0.40 10*3/mm3    Basophils, Absolute 0.01 0.00 - 0.20 10*3/mm3    Immature Grans, Absolute 0.06 (H) 0.00 - 0.05 10*3/mm3    nRBC 0.0 0.0 - 0.2 /100 WBC         RADIOLOGY  CT Angiogram Chest Pulmonary Embolism    Result Date: 5/5/2024  CT ANGIOGRAM CHEST PULMONARY EMBOLISM-  INDICATIONS: Right-sided pain  TECHNIQUE: Radiation dose reduction techniques were utilized, including automated exposure control and exposure modulation based on body size. CT angiography of the chest. Three-dimensional reconstructions  COMPARISON: 4/4/2024  FINDINGS:  No pulmonary embolism. No aortic dissection.  The heart size is normal without pericardial effusion. Coronary arterial calcification is  present. A 13 mm short axis subcarinal lymph node is mildly prominent, nonspecific, but decreased, previously 17 mm. 1.1 cm short axis left hilar lymph node on axial image 87 is stable.  The airways appear clear.  No pleural effusion or pneumothorax.  The lungs show no focal pulmonary consolidation or mass. Old granulomatous disease is present. A previously noted 6 mm left upper lobe nodule is stable, axial image 109. Some previous nodules are no longer seen; for example, on the prior exam, right lower lobe nodule on image 94 measured 7 mm, and is not apparent on the current exam. No suspicious new nodules are noted. A left upper lobe bulla is present.  Upper abdominal structures show no acute findings. Gallbladder is surgically absent. Fatty infiltration of the pancreas is seen. Small left adrenal adenoma is present.  Degenerative changes are seen in the spine. No acute fracture is identified.       No pulmonary embolism.  This report was finalized on 5/5/2024 8:56 AM by Dr. Fredrick Love M.D on Workstation: [email or web address]      XR Chest 1 View    Result Date: 5/5/2024  XR CHEST 1 VW-  HISTORY: Male who is 73 years-old, chest pain  TECHNIQUE: Frontal view of the chest  COMPARISON: 4/4/2024  FINDINGS: The heart size is borderline. Cardiac loop recorder is apparent. Sternotomy wires are present. Pulmonary vasculature is are unremarkable. No focal pulmonary consolidation, pleural effusion, or pneumothorax. No acute osseous process.      No no focal pulmonary consolidation. Borderline heart size. Follow-up as clinical indications persist.  This report was finalized on 5/5/2024 7:47 AM by Dr. Fredrick Love M.D on Workstation: [email or web address]         MEDICATIONS GIVEN IN ER  Medications   sodium chloride 0.9 % flush 10 mL (has no administration in time range)   aspirin tablet 325 mg (325 mg Oral Not Given 5/5/24 0746)   sodium chloride 0.9 % flush 10 mL (has no administration in time range)   sodium chloride 0.9 % flush 10  mL (has no administration in time range)   sodium chloride 0.9 % infusion 40 mL (has no administration in time range)   acetaminophen (TYLENOL) tablet 1,000 mg (1,000 mg Oral Given 5/5/24 0814)   iopamidol (ISOVUE-370) 76 % injection 100 mL (95 mL Intravenous Given by Other 5/5/24 0881)   ketorolac (TORADOL) injection 15 mg (15 mg Intravenous Given 5/5/24 0979)         ORDERS PLACED DURING THIS VISIT:  Orders Placed This Encounter   Procedures    XR Chest 1 View    CT Angiogram Chest Pulmonary Embolism    MRI Thoracic Spine Without Contrast    [place] Draw    Comprehensive Metabolic Panel    High Sensitivity Troponin T    CBC Auto Differential    NPO Diet NPO Type: Strict NPO    Undress & Gown    Continuous Pulse Oximetry    Vital Signs    Intake & Output    Weigh Patient    Oral Care    Saline Lock & Maintain IV Access    Place Sequential Compression Device    Maintain Sequential Compression Device    Code Status and Medical Interventions:    Cardiology (on-call MD unless specified)    Inpatient Cardiology Consult    Oxygen Therapy- Nasal Cannula; Titrate 1-6 LPM Per SpO2; 90 - 95%    ECG 12 Lead ED Triage Standing Order; Chest Pain    ECG 12 Lead ED Triage Standing Order; Chest Pain    Telemetry Scan    Telemetry Scan    Telemetry Scan    Telemetry Scan    Insert Peripheral IV    Insert Peripheral IV    Initiate ED Observation Status    CBC & Differential    Green Top (Gel)    Lavender Top    Gold Top - SST    Light Blue Top         OUTPATIENT MEDICATION MANAGEMENT:  Current Facility-Administered Medications Ordered in Epic   Medication Dose Route Frequency Provider Last Rate Last Admin    aspirin tablet 325 mg  325 mg Oral Once Omega Gallo MD        sodium chloride 0.9 % flush 10 mL  10 mL Intravenous PRN Omega Gallo MD        sodium chloride 0.9 % flush 10 mL  10 mL Intravenous Q12H Art Leonard APRN        sodium chloride 0.9 % flush 10 mL  10 mL Intravenous PRN Art Leonard APRN        sodium  chloride 0.9 % infusion 40 mL  40 mL Intravenous JAYESHN Art Leonard APRN         Current Outpatient Medications Ordered in Epic   Medication Sig Dispense Refill    amiodarone (PACERONE) 200 MG tablet Take 1 tablet by mouth Daily. (Patient taking differently: Take 1 tablet by mouth Every Other Day.) 30 tablet 0    amLODIPine (NORVASC) 5 MG tablet Take 1 tablet by mouth Daily As Needed (Instructed to not take medication if BP <110/70).      aspirin 81 MG tablet Take 1 tablet by mouth Daily.      atenolol (TENORMIN) 50 MG tablet Take 1 tablet by mouth 2 (Two) Times a Day.      atorvastatin (LIPITOR) 40 MG tablet Take 1 tablet by mouth Every Night.      baclofen (LIORESAL) 10 MG tablet Take 1 tablet by mouth every night at bedtime.      clopidogrel (PLAVIX) 75 MG tablet Take 1 tablet by mouth Daily.      hydroCHLOROthiazide 25 MG tablet Take 1 tablet by mouth Daily As Needed (swelling).      isosorbide mononitrate (IMDUR) 60 MG 24 hr tablet Take 1 tablet by mouth 2 (Two) Times a Day.      Linzess 145 MCG capsule capsule Take 1 capsule by mouth Daily.      lisinopril (PRINIVIL,ZESTRIL) 40 MG tablet Take 1 tablet by mouth 2 (Two) Times a Day.      loratadine (CLARITIN) 10 MG tablet Take 1 tablet by mouth Daily.      meclizine (ANTIVERT) 12.5 MG tablet Take 1 tablet by mouth Daily.      Multiple Vitamins-Minerals (MULTIVITAMIN ADULT EXTRA C PO) Take 1 tablet by mouth Daily.      nitroglycerin (NITROSTAT) 0.4 MG SL tablet Place 1 tablet under the tongue Every 5 (Five) Minutes As Needed for Chest Pain. Take no more than 3 doses in 15 minutes. 25 tablet 3    Omega-3 Fatty Acids (FISH OIL) 1000 MG capsule capsule Take 1 capsule by mouth Daily.      pantoprazole (PROTONIX) 40 MG EC tablet Take 1 tablet by mouth 2 (Two) Times a Day.      potassium chloride 10 MEQ CR tablet Take 2 tablets by mouth Daily.      ranolazine (RANEXA) 1000 MG 12 hr tablet Take 1 tablet by mouth Every 12 (Twelve) Hours. 60 tablet 11    saccharomyces  boulardii (FLORASTOR) 250 MG capsule Take 1 capsule by mouth 2 (Two) Times a Day.      sucralfate (CARAFATE) 1 g tablet Take 1 tablet by mouth 2 (Two) Times a Day.      triamcinolone (KENALOG) 0.1 % cream Apply 1 Application topically to the appropriate area as directed 2 (Two) Times a Day.           PROCEDURES  Procedures            PROGRESS, DATA ANALYSIS, CONSULTS, AND MEDICAL DECISION MAKING  All labs have been independently interpreted by me.  All radiology studies have been reviewed by me. All EKG's have been independently viewed and interpreted by me.  Discussion below represents my analysis of pertinent findings related to patient's condition, differential diagnosis, treatment plan and final disposition.    Differential diagnosis:   My differential diagnosis for chest pain includes but is not limited to:  Muscle strain, costochondritis, myositis, pleurisy, rib fracture, intercostal neuritis, herpes zoster, tumor, myocardial infarction, coronary syndrome, unstable angina, angina, aortic dissection, mitral valve prolapse, pericarditis, palpitations, pulmonary embolus, pneumonia, pneumothorax, lung cancer, GERD, esophagitis, esophageal spasm      Clinical Scores: HEART Score: 5                  ED Course as of 05/05/24 1017   Sun May 05, 2024   0724 EKG           EKG time: 0716  Rhythm/Rate: Sinus, 60  P waves and NJ: First-degree AV block  QRS, axis: Narrow complex  ST and T waves: No STEMI; QTc within normal limits    Interpreted Contemporaneously by me, independently viewed  Comparison: 4/5/2024-no acute change   [RS]   0727 RADIOLOGY      Study: Single view chest  Findings: Evidence of prior sternotomy; no pneumothorax  I independently viewed and interpreted these images contemporaneously with treatment.    [RS]   0833 HS Troponin T: 11 [RS]   0833 WBC(!): 11.71 [RS]   0833 Hemoglobin(!): 12.6 [RS]   0833 Platelets: 272 [RS]   0833 Immature Grans, Absolute(!): 0.06 [RS]   0833 Glucose(!): 174 [RS]   0833  BUN: 10 [RS]   0833 Creatinine: 0.86 [RS]   0833 Sodium(!): 135 [RS]   0833 Potassium: 4.4 [RS]   0833 ALT (SGPT): 14 [RS]   0833 AST (SGOT): 12 [RS]   0833 Total Bilirubin: 0.6 [RS]   0916 Patient continues to rest comfortably.  I reviewed laboratory radiologic findings with patient and spouse.  We will consult with patient's primary cardiologist for disposition planning as no acute life threats are identified at this time. [RS]   0931 Review of Carlo reveals that the patient is receiving ketamine and gabapentin for his pain complaints. [RS]   0953 CONSULT        Provider: BAILEY ADAME    Discussion: Reviewed patient history, ED presentation and evaluation as well as prior evaluations.  Agreeable to accept patient for further evaluation and treatment to the observation unit.    Agreeable c treatment and planned disposition.         [RS]   0953 Patient with intractable pain unable to go home without further evaluation and treatment.  No cardiac source of discomfort identified.  Consider admission.  Patient agreeable. [RS]   1016 CONSULT        Provider: Dr. Hood-cardiology    Discussion: Reviewed patient history, ED presentation evaluation.  He is very familiar with this patient.  He feels that the patient has had a very thorough cardiac evaluation at this point and has found nothing.  He agrees with plan to evaluate musculoskeletal sources of discomfort.    Agreeable c treatment and planned disposition.         [RS]      ED Course User Index  [RS] Omega Gallo MD         Prescription drug monitoring program review:     AS OF 10:17 EDT VITALS:    BP - 158/66  HR - 52  TEMP - 97.6 °F (36.4 °C)  O2 SATS - 99%    COMPLEXITY OF CARE  The patient requires admission.      DIAGNOSIS  Final diagnoses:   Right-sided chest pain   Degenerative disc disease, thoracic         DISPOSITION  ED Disposition       ED Disposition   Decision to Admit    Condition   --    Comment   --                  DISCHARGE    Patient  discharged in stable condition.    Reviewed implications of results, diagnosis, meds, responsibility to follow up, warning signs and symptoms of possible worsening, potential complications and reasons to return to ER.    Patient/Family voiced understanding of above instructions.    Discussed plan for discharge, as there is no emergent indication for admission. Patient referred to primary care provider for regular health maintenance. Pt/family is agreeable and understands need for follow up and possible repeat testing.  Pt is aware that discharge does not mean that nothing is wrong but it indicates no emergency is present that requires admission and they must continue care with follow-up as given below or physician of their choice.     FOLLOW-UP  No follow-up provider specified.       Medication List      No changes were made to your prescriptions during this visit.           Please note that portions of this document were completed with a voice recognition program.    Note Disclaimer: At [facility], we believe that sharing information builds trust and better relationships. You are receiving this note because you recently visited [facility]. It is possible you will see health information before a provider has talked with you about it. This kind of information can be easy to misunderstand. To help you fully understand what it means for your health, we urge you to discuss this note with your provider.         Omega Gallo MD  05/05/24 5821       Omega Gallo MD  05/05/24 0931

## 2024-05-05 NOTE — Clinical Note
First balloon inflation max pressure = 6 mahsa. First balloon inflation duration = 10 seconds. Second inflation of balloon - Max pressure = 6 mahsa. 2nd Inflation of balloon - Duration = 10 seconds. 2nd inflation was done at 15:41 EDT. Third inflation of balloon - Max pressure = 6 mahsa. 3rd Inflation of balloon - Duration = 10 seconds. 3rd inflation was done at 15:42 EDT. Fourth inflation of balloon - Max pressure = 6 mahsa. 4th Inflation of b alloon - Duration = 10 seconds. 4th inflation was done at 15:42 EDT.

## 2024-05-05 NOTE — Clinical Note
First balloon inflation max pressure = 20 mahsa. First balloon inflation duration = 6 seconds. Second inflation of balloon - Max pressure = 20 mahsa. 2nd Inflation of balloon - Duration = 7 seconds. 2nd inflation was done at 15:32 EDT. Third inflation of balloon - Max pressure = 22 mahsa. 3rd Inflation of balloon - Duration = 7 seconds. 3rd inflation was done at 15:32 EDT. Fourth inflation of balloon - Max pressure = 26 mahsa. 4th Inflation of  balloon - Duration = 10 seconds. 4th inflation was done at 15:33 EDT.

## 2024-05-05 NOTE — Clinical Note
First balloon inflation max pressure = 6 mahsa. First balloon inflation duration = 10 seconds. Second inflation of balloon - Max pressure = 6 mahsa. 2nd Inflation of balloon - Duration = 10 seconds. 2nd inflation was done at 15:43 EDT. Third inflation of balloon - Max pressure = 6 mahsa. 3rd Inflation of balloon - Duration = 10 seconds. 3rd inflation was done at 15:43 EDT. Fourth inflation of balloon - Max pressure = 6 mahsa. 4th Inflation of b alloon - Duration = 10 seconds. 4th inflation was done at 15:44 EDT.

## 2024-05-05 NOTE — Clinical Note
First balloon inflation max pressure = 20 mahsa. First balloon inflation duration = 20 seconds. Second inflation of balloon - Max pressure = 20 mahsa. 2nd Inflation of balloon - Duration = 10 seconds. 2nd inflation was done at 16:17 EDT. Third inflation of balloon - Max pressure = 20 mahsa. 3rd Inflation of balloon - Duration = 5 seconds. 3rd inflation was done at 16:17 EDT. Fourth inflation of balloon - Max pressure = 20 mahsa. 4th Inflation o f balloon - Duration = 5 seconds. 4th inflation was done at 16:17 EDT.

## 2024-05-05 NOTE — Clinical Note
First balloon inflation max pressure = 12 mahsa. First balloon inflation duration = 10 seconds. Second inflation of balloon - Max pressure = 14 mahsa. 2nd Inflation of balloon - Duration = 5 seconds. Third inflation of balloon - Max pressure = 14 mahsa. 3rd Inflation of balloon - Duration = 5 seconds.

## 2024-05-05 NOTE — Clinical Note
Hemostasis started on the right radial artery. R-Band was used in achieving hemostasis. Radial compression device applied to vessel. Hemostasis achieved successfully. Closure device additional comment: Tr band with 13cc of air

## 2024-05-05 NOTE — Clinical Note
A 4 fr sheath was successfully inserted using micropuncture technique with ultrasound guidance into the right femoral artery. Sheath insertion not delayed. Angio RFA

## 2024-05-05 NOTE — Clinical Note
First balloon inflation max pressure = 20 mahsa. First balloon inflation duration = 5 seconds. Second inflation of balloon - Max pressure = 20 mahsa. 2nd Inflation of balloon - Duration = 10 seconds. 2nd inflation was done at 15:47 EDT.

## 2024-05-05 NOTE — CONSULTS
Date of Hospital Visit: 24  Encounter Provider: Negro Locke MD  Place of Service: [facility] CARDIOLOGY  Patient Name: Slade Martinez  :1950  [id number]  Referral Provider: Omega Gallo MD    Chief complaint: Chest epigastric discomfort    History of Present Illness: 73-year-old gentleman he has had prior history of a CABG.  He has a history of hypertension and has had strokes after his CABG.  He had a number of stents placed prior to that and has been followed by cardiologist that who subsequently is retired and he is now cardiologist does not work 60% of the time nor come to the hospital.  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  Since that time he has done terribly.  He has had unsteadiness with his gait he has had abdominal and chest discomfort like he is having now he has had some dizziness.  No PND orthopnea edema he has undergone a stress test as an outpatient that is reportedly normal.  He has been treated with a Z-Manjinder and steroids without any change.  He has had a prior history of shingles and he feels like this feels differently.  He describes the pain sometimes is sharp sometimes is dull it is epigastric it kind of goes through to his back he is got some on the right side on the right side.  No shortness of breath no nausea no vomiting he is had some palpitations but not much.  He just feels miserable and he thinks it is from the amiodarone.  They have cut that back but it has not really made much change he has not had any injury that he is aware of      Past Medical History:   Diagnosis Date    Constipation     Coronary atherosclerosis 2019    H/O CABG SVG to first diagonal branch and to the LAD as well as SVG to the lateral marginal branch of the circumflex complicated by right sided subcortical infarct with left sided hemiparesis    Dyslipidemia  11/04/2019    Essential hypertension 11/04/2019    Injury of back     fractured vertebra 2/26/23    Peripheral edema     Stroke 2011    Vertigo        Past Surgical History:   Procedure Laterality Date    CAROTID ENDARTERECTOMY  2000    Left    CHOLECYSTECTOMY      CORONARY ARTERY BYPASS GRAFT      CORONARY STENT PLACEMENT      X17 stents       Medications Prior to Admission   Medication Sig Dispense Refill Last Dose    amLODIPine (NORVASC) 5 MG tablet Take 1 tablet by mouth Daily As Needed (Instructed to not take medication if BP <110/70).   Past Month    aspirin 81 MG tablet Take 1 tablet by mouth Daily.   5/5/2024    atenolol (TENORMIN) 50 MG tablet Take 1 tablet by mouth 2 (Two) Times a Day.   5/5/2024    atorvastatin (LIPITOR) 40 MG tablet Take 1 tablet by mouth Every Night.   5/4/2024    baclofen (LIORESAL) 10 MG tablet Take 1 tablet by mouth every night at bedtime.   5/4/2024    clopidogrel (PLAVIX) 75 MG tablet Take 1 tablet by mouth Daily.   5/5/2024    hydroCHLOROthiazide 25 MG tablet Take 1 tablet by mouth Daily As Needed (swelling).   5/4/2024    isosorbide mononitrate (IMDUR) 60 MG 24 hr tablet Take 1 tablet by mouth 2 (Two) Times a Day.   5/5/2024    Linzess 145 MCG capsule capsule Take 1 capsule by mouth Every Other Day.   5/4/2024    lisinopril (PRINIVIL,ZESTRIL) 40 MG tablet Take 1 tablet by mouth 2 (Two) Times a Day.   5/5/2024    loratadine (CLARITIN) 10 MG tablet Take 1 tablet by mouth Daily.   5/5/2024    meclizine (ANTIVERT) 12.5 MG tablet Take 1 tablet by mouth Daily.   5/5/2024    Multiple Vitamins-Minerals (MULTIVITAMIN ADULT EXTRA C PO) Take 1 tablet by mouth Daily.   5/5/2024    nitroglycerin (NITROSTAT) 0.4 MG SL tablet Place 1 tablet under the tongue Every 5 (Five) Minutes As Needed for Chest Pain. Take no more than 3 doses in 15 minutes. 25 tablet 3 Past Month    Omega-3 Fatty Acids (FISH OIL) 1000 MG capsule capsule Take 1 capsule by mouth Daily.   5/5/2024    pantoprazole (PROTONIX)  40 MG EC tablet Take 1 tablet by mouth 2 (Two) Times a Day.   5/5/2024    potassium chloride 10 MEQ CR tablet Take 2 tablets by mouth Daily.   5/5/2024    ranolazine (RANEXA) 1000 MG 12 hr tablet Take 1 tablet by mouth Every 12 (Twelve) Hours. 60 tablet 11 5/5/2024    saccharomyces boulardii (FLORASTOR) 250 MG capsule Take 1 capsule by mouth Every Night.   5/4/2024    sucralfate (CARAFATE) 1 g tablet Take 1 tablet by mouth every night at bedtime.   5/5/2024    triamcinolone (KENALOG) 0.1 % cream Apply 1 Application topically to the appropriate area as directed 2 (Two) Times a Day As Needed for Rash.       amiodarone (PACERONE) 200 MG tablet Take 1 tablet by mouth Daily. (Patient taking differently: Take 1 tablet by mouth Every Other Day.) 30 tablet 0 5/3/2024       Current Meds  Scheduled Meds:atenolol, 50 mg, Oral, BID  atorvastatin, 40 mg, Oral, Nightly  baclofen, 10 mg, Oral, Nightly  clopidogrel, 75 mg, Oral, Daily  isosorbide mononitrate, 60 mg, Oral, BID  lisinopril, 40 mg, Oral, BID  [START ON 5/6/2024] meclizine, 12.5 mg, Oral, Daily  pantoprazole, 40 mg, Oral, BID  potassium chloride, 20 mEq, Oral, Daily  ranolazine, 1,000 mg, Oral, Q12H  saccharomyces boulardii, 250 mg, Oral, Nightly  sodium chloride, 10 mL, Intravenous, Q12H  sucralfate, 1 g, Oral, Nightly      Continuous Infusions:   PRN Meds:.  amLODIPine    hydroCHLOROthiazide    HYDROcodone-acetaminophen    ketorolac    sodium chloride    sodium chloride    sodium chloride    Allergies as of 05/05/2024 - Reviewed 05/05/2024   Allergen Reaction Noted    Pletal [cilostazol] Myalgia 11/04/2019       Social History     Socioeconomic History    Marital status:    Tobacco Use    Smoking status: Former     Types: Cigarettes    Smokeless tobacco: Never   Vaping Use    Vaping status: Never Used   Substance and Sexual Activity    Alcohol use: Not Currently    Drug use: Never    Sexual activity: Defer       Family History   Problem Relation Age of Onset  "   COPD Mother     Heart attack Father     Heart failure Father     COPD Brother        REVIEW OF SYSTEMS:   ROS was performed and is negative except as outlined in HPI     REVIEW OF SYSTEMS:   CONSTITUTIONAL: No weight loss, fever, chills, weakness or fatigue.   HEENT: Eyes: No visual loss, blurred vision, double vision or yellow sclerae. Ears, Nose, Throat: No hearing loss, sneezing, congestion, runny nose or sore throat.   SKIN: No rash or itching.     RESPIRATORY: No shortness of breath, hemoptysis, cough or sputum.   GASTROINTESTINAL: No anorexia, nausea, vomiting or diarrhea. No abdominal pain, bright red blood per rectum or melena.  GENITOURINARY: No burning on urination, hematuria or increased frequency.  NEUROLOGICAL: No headache, dizziness, syncope, paralysis, ataxia, numbness or tingling in the extremities. No change in bowel or bladder control.   MUSCULOSKELETAL: No muscle, back pain, joint pain or stiffness.   HEMATOLOGIC: No anemia, bleeding or bruising.   LYMPHATICS: No enlarged nodes. No history of splenectomy.   PSYCHIATRIC: No history of depression, anxiety, hallucinations.   ENDOCRINOLOGIC: No reports of sweating, cold or heat intolerance. No polyuria or polydipsia.       Objective:   Temp:  [97.6 °F (36.4 °C)-97.7 °F (36.5 °C)] 97.7 °F (36.5 °C)  Heart Rate:  [50-69] 53  Resp:  [16-18] 18  BP: (147-187)/(61-74) 166/62  Body mass index is 27.34 kg/m².  Flowsheet Rows      Flowsheet Row First Filed Value   Admission Height 180.3 cm (71\") Documented at 05/05/2024 0745   Admission Weight 90.7 kg (199 lb 15.3 oz) Documented at 05/05/2024 0745          Vitals:    05/05/24 1303   BP: 166/62   Pulse:    Resp:    Temp:    SpO2:        Head:    Normocephalic, without obvious abnormality, atraumatic   Eyes:            Lids and lashes normal, conjunctivae and sclerae normal, no   icterus, no pallor   Ears:    Ears appear intact with no abnormalities noted   Throat:   No oral lesions, dentition good   Neck:   " No adenopathy, supple, trachea midline, no thyromegaly, no   carotid bruit, no JVD   Lungs:     Breath sounds are equal and clear to auscultation    Heart:    Normal S1 and S2, RRR, No M/G/R   Abdomen:     Normal bowel sounds, no masses, no organomegaly, soft        non-tender, non-distended, no guarding   Extremities:   Moves all extremities well, no edema, no cyanosis, no redness   Pulses:   Pulses palpable and equal bilaterally.    Skin:  Psychiatric:   No bleeding, bruising or rash    Awake, alert and oriented x 3, normal mood and affect             I personally viewed and interpreted the patient's EKG/Telemetry data    Assessment:  Active Hospital Problems    Diagnosis  POA    **Chest pain [R07.9]  Yes      Resolved Hospital Problems   No resolved problems to display.       Plan: The gentleman has had a prolonged episode of discomfort troponins are normal ECG is normal other than he has first-degree AV block.  He has this pain I am not really sure the exact etiology of it he had his gallbladder removed and he had a CT of his chest that was okay I am going to check a couple of things about his pancreas they are going to get a thoracic spine MRI when to give him some Toradol we will get a watch this and see how it plays out I do not really think it is ischemic right now.  I am going to let him eat does not seem to be affected by food and when to stop the Nitropaste I do not think it is ischemia and we will get a stop the amiodarone.  I would asked the arrhythmia team to see him because I feel like he probably ought to just have an ablation he clearly is not tolerating the amiodarone.  There is a small chance it could be shingles and the rash has not appeared    Negro Locke MD  05/05/24  13:33 EDT.

## 2024-05-05 NOTE — Clinical Note
First balloon inflation max pressure = 24 mahsa. First balloon inflation duration = 5 seconds. Second inflation of balloon - Max pressure = 24 mahsa. 2nd Inflation of balloon - Duration = 5 seconds. 2nd inflation was done at 15:25 EDT.

## 2024-05-05 NOTE — Clinical Note
First balloon inflation max pressure = 16 mahsa. First balloon inflation duration = 15 seconds. Second inflation of balloon - Max pressure = 20 mahsa. 2nd Inflation of balloon - Duration = 120 seconds. 2nd inflation was done at 16:07 EDT. Third inflation of balloon - Max pressure = 20 mahsa. 3rd Inflation of balloon - Duration = 60 seconds. 3rd inflation was done at 16:09 EDT.

## 2024-05-05 NOTE — Clinical Note
First balloon inflation max pressure = 12 mahsa. First balloon inflation duration = 60 seconds. Second inflation of balloon - Max pressure = 12 mahsa. 2nd Inflation of balloon - Duration = 120 seconds. 2nd inflation was done at 15:54 EDT. Third inflation of balloon - Max pressure = 12 mahsa. 3rd Inflation of balloon - Duration = 63 seconds. 3rd inflation was done at 15:56 EDT.

## 2024-05-05 NOTE — ED NOTES
".Nursing report ED to floor  Slade Martinez  73 y.o.  male    HPI :  HPI (Adult)  Stated Reason for Visit: chest pain    Chief Complaint  Chief Complaint   Patient presents with    Chest Pain       Admitting doctor:   Ayush Nguyễn MD    Admitting diagnosis:   The primary encounter diagnosis was Right-sided chest pain. A diagnosis of Degenerative disc disease, thoracic was also pertinent to this visit.    Code status:   Current Code Status       Date Active Code Status Order ID Comments User Context       5/5/2024 1005 CPR (Attempt to Resuscitate) [id number]  Art Leonard APRN ED        Question Answer    Code Status (Patient has no pulse and is not breathing) CPR (Attempt to Resuscitate)    Medical Interventions (Patient has pulse or is breathing) Full Support    Level Of Support Discussed With Patient                    Allergies:   Pletal [cilostazol]    Isolation:   No active isolations    Intake and Output  No intake or output data in the 24 hours ending 05/05/24 1018    Weight:       05/05/24  0745   Weight: 90.7 kg (199 lb 15.3 oz)       Most recent vitals:   Vitals:    05/05/24 0745 05/05/24 0801 05/05/24 0832 05/05/24 1001   BP: 161/67 147/61 158/66 167/74   BP Location:  Left arm Left arm Left arm   Patient Position:  Lying Lying Lying   Pulse:  50 52 52   Resp:  16 16 16   Temp:       SpO2:  98% 99% 100%   Weight: 90.7 kg (199 lb 15.3 oz)      Height: 180.3 cm (71\")          Active LDAs/IV Access:   Lines, Drains & Airways       Active LDAs       Name Placement date Placement time Site Days    Peripheral IV 05/05/24 0735 Right Antecubital 05/05/24  0735  Antecubital  less than 1                    Labs (abnormal labs have a star):   Labs Reviewed   COMPREHENSIVE METABOLIC PANEL - Abnormal; Notable for the following components:       Result Value    Glucose 174 (*)     Sodium 135 (*)     All other components within normal limits    Narrative:     GFR Normal >60  Chronic Kidney Disease <60  Kidney " Failure <15    The GFR formula is only valid for adults with stable renal function between ages 18 and 70.   CBC WITH AUTO DIFFERENTIAL - Abnormal; Notable for the following components:    WBC 11.71 (*)     RBC 3.92 (*)     Hemoglobin 12.6 (*)     MCV 97.2 (*)     Neutrophil % 84.1 (*)     Lymphocyte % 10.6 (*)     Monocyte % 4.7 (*)     Eosinophil % 0.0 (*)     Neutrophils, Absolute 9.85 (*)     Immature Grans, Absolute 0.06 (*)     All other components within normal limits   TROPONIN - Normal    Narrative:     High Sensitive Troponin T Reference Range:  <14.0 ng/L- Negative Female for AMI  <22.0 ng/L- Negative Male for AMI  >=14 - Abnormal Female indicating possible myocardial injury.  >=22 - Abnormal Male indicating possible myocardial injury.   Clinicians would have to utilize clinical acumen, EKG, Troponin, and serial changes to determine if it is an Acute Myocardial Infarction or myocardial injury due to an underlying chronic condition.        RAINBOW DRAW    Narrative:     The following orders were created for panel order Deal Draw.  Procedure                               Abnormality         Status                     ---------                               -----------         ------                     Green Top (Gel)[[id number]]                                  Final result               Lavender Top[[id number]]                                     Final result               Gold Top - SST[[id number]]                                   Final result               Light Blue Top[[id number]]                                   Final result                 Please view results for these tests on the individual orders.   CBC AND DIFFERENTIAL    Narrative:     The following orders were created for panel order CBC & Differential.  Procedure                               Abnormality         Status                     ---------                               -----------         ------                     CBC Auto  Differential[[id number]]        Abnormal            Final result                 Please view results for these tests on the individual orders.   GREEN TOP   LAVENDER TOP   GOLD TOP - SST   LIGHT BLUE TOP       EKG:   ECG 12 Lead ED Triage Standing Order; Chest Pain   Preliminary Result   HEART RATE= 63  bpm   RR Interval= 952  ms   WI Interval= 281  ms   P Horizontal Axis= -86  deg   P Front Axis= 23  deg   QRSD Interval= 107  ms   QT Interval= 465  ms   QTcB= 477  ms   QRS Axis= 26  deg   T Wave Axis= 70  deg   - ABNORMAL ECG -   Sinus rhythm   Prolonged WI interval   Borderline prolonged QT interval   Electronically Signed By:    Date and Time of Study: 2024-05-05 07:16:45      Telemetry Scan   Final Result      Telemetry Scan   Final Result      Telemetry Scan   Final Result      Telemetry Scan   Final Result          Meds given in ED:   Medications   sodium chloride 0.9 % flush 10 mL (has no administration in time range)   aspirin tablet 325 mg (325 mg Oral Not Given 5/5/24 0746)   sodium chloride 0.9 % flush 10 mL (has no administration in time range)   sodium chloride 0.9 % flush 10 mL (has no administration in time range)   sodium chloride 0.9 % infusion 40 mL (has no administration in time range)   acetaminophen (TYLENOL) tablet 1,000 mg (1,000 mg Oral Given 5/5/24 0814)   iopamidol (ISOVUE-370) 76 % injection 100 mL (95 mL Intravenous Given by Other 5/5/24 0851)   ketorolac (TORADOL) injection 15 mg (15 mg Intravenous Given 5/5/24 0999)       Imaging results:  CT Angiogram Chest Pulmonary Embolism    Result Date: 5/5/2024   No pulmonary embolism.  This report was finalized on 5/5/2024 8:56 AM by Dr. Fredrick Love M.D on Workstation: [facility] Chest 1 View    Result Date: 5/5/2024  No no focal pulmonary consolidation. Borderline heart size. Follow-up as clinical indications persist.  This report was finalized on 5/5/2024 7:47 AM by Dr. Fredrick Love M.D on Workstation: [employer]        Ambulatory status:   - ad cecilio    Social issues:   Social History     Socioeconomic History    Marital status:    Tobacco Use    Smoking status: Former     Types: Cigarettes    Smokeless tobacco: Never   Vaping Use    Vaping status: Never Used   Substance and Sexual Activity    Alcohol use: Not Currently    Drug use: Never    Sexual activity: Defer       Peripheral Neurovascular  Peripheral Neurovascular (Adult)  Peripheral Neurovascular WDL: WDL, pulse assessment  Pulse Assessment: radial    Neuro Cognitive  Neuro Cognitive (Adult)  Cognitive/Neuro/Behavioral WDL: WDL, orientation, arousability  Arousal Level: opens eyes spontaneously  Orientation: oriented x 4    Learning  Learning Assessment (Adult)  Learning Readiness and Ability: no barriers identified  Education Provided  Person Taught: patient, spouse    Respiratory  Respiratory WDL  Respiratory WDL: WDL    Abdominal Pain       Pain Assessments  Pain (Adult)  (0-10) Pain Rating: Rest: 9  (0-10) Pain Rating: Activity: 9  Preferred Pain Scale: FACES (Lewis-Baker FACES Pain Rating Scale)  FACES Pain Rating: Rest: 6-->hurts even more  Pain Location: chest  Pain Radiation to: back  Pain Goal/Acceptable Level: 4  Pain Management Interventions: see MAR  Response to Pain Interventions: intervention not effective per patient    NIH Stroke Scale       Tiffani Ryan RN  05/05/24 10:18 EDT

## 2024-05-05 NOTE — ED NOTES
Patient to ER via car from home for chest pain  Patient currently wearing heart monitor for same, states getting worse    Patient reports hx of 19 stents

## 2024-05-05 NOTE — H&P
. [facility]   HISTORY AND PHYSICAL    Patient Name: Slade Martinez  : 1950  MRN: [id number]  Primary Care Physician:  Triny Hannon MD  Date of admission: 2024    Subjective   Subjective     Chief Complaint: Chest and back pain    HPI:    Slade Martinez is a 73 y.o. male with past medical history including but not limited to CAD s/p CABG, CVA, carotid stenosis, hypertension, and hyperlipidemia presents to [facility] with distant back pain for the past week.  Patient reports midsternal chest pain that radiates into his mid upper back and into his right lateral chest wall.  Pain is sharp and stabbing.  Pain is nonexertional and nonpleuritic.  Symptoms exacerbated with movements and when reaching out with his upper body.  Report associated shortness of breath at rest.  Denies nausea, vomiting or diaphoresis.    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    Initial evaluation in the ED include positive troponin 11, creatinine 26, glucose 174, WBC 11.71, hemoglobin 12.6 and platelets 272.  CTA chest shows no evidence of PE and EKG shows NSR and first-degree AV block    Review of Systems   All systems were reviewed and negative except for: That mentioned above in HPI    Personal History     Past Medical History:   Diagnosis Date    Coronary atherosclerosis 2019    H/O CABG SVG to first diagonal branch and to the LAD as well as SVG to the lateral marginal branch of the circumflex complicated by right sided subcortical infarct with left sided hemiparesis    Dyslipidemia 2019    Essential hypertension 2019    Injury of back     fractured vertebra 23       Past Surgical History:   Procedure Laterality Date    CAROTID ENDARTERECTOMY      Left    CHOLECYSTECTOMY      CORONARY ARTERY BYPASS GRAFT       CORONARY STENT PLACEMENT      X17 stents       Family History: family history includes COPD in his brother and mother; Heart attack in his father; Heart failure in his father. Otherwise pertinent FHx was reviewed and not pertinent to current issue.    Social History:  reports that he has quit smoking. His smoking use included cigarettes. He has never used smokeless tobacco. He reports that he does not currently use alcohol. He reports that he does not use drugs.    Home Medications:  Multiple Vitamins-Minerals, amLODIPine, amiodarone, aspirin, atenolol, atorvastatin, baclofen, clopidogrel, fish oil, hydroCHLOROthiazide, isosorbide mononitrate, linaclotide, lisinopril, loratadine, meclizine, nitroglycerin, pantoprazole, potassium chloride, ranolazine, saccharomyces boulardii, sucralfate, and triamcinolone    Allergies:  Allergies   Allergen Reactions    Pletal [Cilostazol] Myalgia     .       Objective   Objective     Vitals:   Temp:  [97.6 °F (36.4 °C)] 97.6 °F (36.4 °C)  Heart Rate:  [50-69] 52  Resp:  [16] 16  BP: (147-167)/(61-74) 167/74  Physical Exam    Constitutional: Awake, alert   Eyes: PERRLA, sclerae anicteric, no conjunctival injection   HENT: NCAT, mucous membranes moist   Neck: Supple, no thyromegaly, no lymphadenopathy, trachea midline   Respiratory: Clear to auscultation bilaterally, nonlabored respirations    Cardiovascular: RRR, no murmurs, rubs, or gallops, palpable pedal pulses bilaterally   Gastrointestinal: Positive bowel sounds, soft, nontender, nondistended   Musculoskeletal: No bilateral ankle edema, no clubbing or cyanosis to extremities   Psychiatric: Appropriate affect, cooperative   Neurologic: Oriented x 3, strength symmetric in all extremities, Cranial Nerves grossly intact to confrontation, speech clear   Skin: No rashes     Result Review    Result Review:  I have personally reviewed the results from the time of this admission to 5/5/2024 10:29 EDT and agree with these findings:  [x]   Laboratory list / accordion  []  Microbiology  [x]  Radiology  [x]  EKG/Telemetry   []  Cardiology/Vascular   []  Pathology  []  Old records  []  Other:        Assessment & Plan   Assessment / Plan     Brief Patient Summary:  Slade Martinez is a 73 y.o. male who is being admitted to the observation unit due to chest and back pain    Active Hospital Problems:  Active Hospital Problems    Diagnosis     **Chest pain      Plan:   CAD s/p CABG  Chest and back pain  AVNRT   -Cardiology consult  -EKG shows NSR with first-degree AV block  -CTA chest negative acute  -Initial troponin 11, trend  -MRI thoracic spine without  -Analgesic as needed  -Cardiac monitoring  -Continue amiodarone and atenolol  -Continue isosorbide, Ranexa, aspirin and Plavix    Hypertension  -Continue amlodipine, hydrochlorothiazide, lisinopril  -Vital signs per nursing    Hyperlipidemia  -Continue statin    DVT prophylaxis:  Mechanical DVT prophylaxis orders are present.      CODE STATUS:    Level Of Support Discussed With: Patient  Code Status (Patient has no pulse and is not breathing): CPR (Attempt to Resuscitate)  Medical Interventions (Patient has pulse or is breathing): Full Support    Admission Status:  I believe this patient meets observation status.    74 minutes has been spent by [facility] providers in the care of this patient while under observation status    During patient visit, I utilized appropriate personal protective equipment including gloves. Appropriate PPE was worn during the entire visit.  Hand hygiene was completed before and after    Electronically signed by SHERRILL Nettles, 05/05/24, 10:29 AM EDT.

## 2024-05-05 NOTE — Clinical Note
A 6 fr sheath was successfully inserted using micropuncture technique with ultrasound guidance into the right femoral vein. Sheath insertion not delayed.

## 2024-05-05 NOTE — PLAN OF CARE
Goal Outcome Evaluation:      Family have been bedside. Patient is alert, oriented assistx1 ambulatory. He is aware of plan of care. Chest pain has been present all day. Relief only by nitro. VSS

## 2024-05-05 NOTE — Clinical Note
First balloon inflation max pressure = 15 mahsa. First balloon inflation duration = 10 seconds. Second inflation of balloon - Max pressure = 20 mahsa. 2nd Inflation of balloon - Duration = 20 seconds. 2nd inflation was done at 16:14 EDT. Third inflation of balloon - Max pressure = 20 mahsa. 3rd Inflation of balloon - Duration = 20 seconds. 3rd inflation was done at 16:15 EDT. Fourth inflation of balloon - Max pressure = 20 mahsa. 4th Inflation  of balloon - Duration = 20 seconds. 4th inflation was done at 16:16 EDT.

## 2024-05-06 LAB
ANION GAP SERPL CALCULATED.3IONS-SCNC: 7.5 MMOL/L (ref 5–15)
BASOPHILS # BLD AUTO: 0.07 10*3/MM3 (ref 0–0.2)
BASOPHILS NFR BLD AUTO: 0.6 % (ref 0–1.5)
BUN SERPL-MCNC: 14 MG/DL (ref 8–23)
BUN/CREAT SERPL: 14.3 (ref 7–25)
CALCIUM SPEC-SCNC: 8.7 MG/DL (ref 8.6–10.5)
CHLORIDE SERPL-SCNC: 101 MMOL/L (ref 98–107)
CO2 SERPL-SCNC: 26.5 MMOL/L (ref 22–29)
CREAT SERPL-MCNC: 0.98 MG/DL (ref 0.76–1.27)
DEPRECATED RDW RBC AUTO: 44.7 FL (ref 37–54)
DEPRECATED RDW RBC AUTO: 46.5 FL (ref 37–54)
EGFRCR SERPLBLD CKD-EPI 2021: 80.9 ML/MIN/1.73
EOSINOPHIL # BLD AUTO: 0.29 10*3/MM3 (ref 0–0.4)
EOSINOPHIL NFR BLD AUTO: 2.3 % (ref 0.3–6.2)
ERYTHROCYTE [DISTWIDTH] IN BLOOD BY AUTOMATED COUNT: 12.9 % (ref 12.3–15.4)
ERYTHROCYTE [DISTWIDTH] IN BLOOD BY AUTOMATED COUNT: 13.1 % (ref 12.3–15.4)
GLUCOSE BLDC GLUCOMTR-MCNC: 182 MG/DL (ref 70–130)
GLUCOSE SERPL-MCNC: 86 MG/DL (ref 65–99)
HCT VFR BLD AUTO: 34.9 % (ref 37.5–51)
HCT VFR BLD AUTO: 36.3 % (ref 37.5–51)
HGB BLD-MCNC: 11.4 G/DL (ref 13–17.7)
HGB BLD-MCNC: 11.5 G/DL (ref 13–17.7)
IMM GRANULOCYTES # BLD AUTO: 0.09 10*3/MM3 (ref 0–0.05)
IMM GRANULOCYTES NFR BLD AUTO: 0.7 % (ref 0–0.5)
LYMPHOCYTES # BLD AUTO: 3.49 10*3/MM3 (ref 0.7–3.1)
LYMPHOCYTES NFR BLD AUTO: 27.5 % (ref 19.6–45.3)
MCH RBC QN AUTO: 30.7 PG (ref 26.6–33)
MCH RBC QN AUTO: 30.7 PG (ref 26.6–33)
MCHC RBC AUTO-ENTMCNC: 31.7 G/DL (ref 31.5–35.7)
MCHC RBC AUTO-ENTMCNC: 32.7 G/DL (ref 31.5–35.7)
MCV RBC AUTO: 94.1 FL (ref 79–97)
MCV RBC AUTO: 97.1 FL (ref 79–97)
MONOCYTES # BLD AUTO: 1.34 10*3/MM3 (ref 0.1–0.9)
MONOCYTES NFR BLD AUTO: 10.6 % (ref 5–12)
NEUTROPHILS NFR BLD AUTO: 58.3 % (ref 42.7–76)
NEUTROPHILS NFR BLD AUTO: 7.4 10*3/MM3 (ref 1.7–7)
NRBC BLD AUTO-RTO: 0 /100 WBC (ref 0–0.2)
PLATELET # BLD AUTO: 239 10*3/MM3 (ref 140–450)
PLATELET # BLD AUTO: 281 10*3/MM3 (ref 140–450)
PMV BLD AUTO: 9.6 FL (ref 6–12)
PMV BLD AUTO: 9.6 FL (ref 6–12)
POTASSIUM SERPL-SCNC: 4.4 MMOL/L (ref 3.5–5.2)
QT INTERVAL: 466 MS
QTC INTERVAL: 425 MS
RBC # BLD AUTO: 3.71 10*6/MM3 (ref 4.14–5.8)
RBC # BLD AUTO: 3.74 10*6/MM3 (ref 4.14–5.8)
SODIUM SERPL-SCNC: 135 MMOL/L (ref 136–145)
TROPONIN T SERPL HS-MCNC: 14 NG/L
WBC NRBC COR # BLD AUTO: 12.68 10*3/MM3 (ref 3.4–10.8)
WBC NRBC COR # BLD AUTO: 9.12 10*3/MM3 (ref 3.4–10.8)

## 2024-05-06 PROCEDURE — 92928 PRQ TCAT PLMT NTRAC ST 1 LES: CPT | Performed by: INTERNAL MEDICINE

## 2024-05-06 PROCEDURE — 93005 ELECTROCARDIOGRAM TRACING: CPT | Performed by: INTERNAL MEDICINE

## 2024-05-06 PROCEDURE — 93010 ELECTROCARDIOGRAM REPORT: CPT | Performed by: INTERNAL MEDICINE

## 2024-05-06 PROCEDURE — C1725 CATH, TRANSLUMIN NON-LASER: HCPCS | Performed by: INTERNAL MEDICINE

## 2024-05-06 PROCEDURE — 85027 COMPLETE CBC AUTOMATED: CPT | Performed by: NURSE PRACTITIONER

## 2024-05-06 PROCEDURE — C1887 CATHETER, GUIDING: HCPCS | Performed by: INTERNAL MEDICINE

## 2024-05-06 PROCEDURE — 92972 PERQ TRLUML CORONRY LITHOTRP: CPT | Performed by: INTERNAL MEDICINE

## 2024-05-06 PROCEDURE — 85347 COAGULATION TIME ACTIVATED: CPT

## 2024-05-06 PROCEDURE — 93005 ELECTROCARDIOGRAM TRACING: CPT | Performed by: NURSE PRACTITIONER

## 2024-05-06 PROCEDURE — 02F03ZZ FRAGMENTATION IN CORONARY ARTERY, ONE ARTERY, PERCUTANEOUS APPROACH: ICD-10-PCS | Performed by: INTERNAL MEDICINE

## 2024-05-06 PROCEDURE — C1874 STENT, COATED/COV W/DEL SYS: HCPCS | Performed by: INTERNAL MEDICINE

## 2024-05-06 PROCEDURE — 84484 ASSAY OF TROPONIN QUANT: CPT | Performed by: NURSE PRACTITIONER

## 2024-05-06 PROCEDURE — 80048 BASIC METABOLIC PNL TOTAL CA: CPT | Performed by: NURSE PRACTITIONER

## 2024-05-06 PROCEDURE — C9600 PERC DRUG-EL COR STENT SING: HCPCS | Performed by: INTERNAL MEDICINE

## 2024-05-06 PROCEDURE — C1894 INTRO/SHEATH, NON-LASER: HCPCS | Performed by: INTERNAL MEDICINE

## 2024-05-06 PROCEDURE — 85025 COMPLETE CBC W/AUTO DIFF WBC: CPT | Performed by: INTERNAL MEDICINE

## 2024-05-06 PROCEDURE — 4A023N7 MEASUREMENT OF CARDIAC SAMPLING AND PRESSURE, LEFT HEART, PERCUTANEOUS APPROACH: ICD-10-PCS | Performed by: INTERNAL MEDICINE

## 2024-05-06 PROCEDURE — 36556 INSERT NON-TUNNEL CV CATH: CPT | Performed by: INTERNAL MEDICINE

## 2024-05-06 PROCEDURE — 25010000002 FENTANYL CITRATE (PF) 50 MCG/ML SOLUTION: Performed by: INTERNAL MEDICINE

## 2024-05-06 PROCEDURE — 25010000002 HEPARIN (PORCINE) PER 1000 UNITS: Performed by: INTERNAL MEDICINE

## 2024-05-06 PROCEDURE — G0378 HOSPITAL OBSERVATION PER HR: HCPCS

## 2024-05-06 PROCEDURE — 93455 CORONARY ART/GRFT ANGIO S&I: CPT | Performed by: INTERNAL MEDICINE

## 2024-05-06 PROCEDURE — 25010000002 KETOROLAC TROMETHAMINE PER 15 MG: Performed by: INTERNAL MEDICINE

## 2024-05-06 PROCEDURE — 25010000002 ONDANSETRON PER 1 MG: Performed by: INTERNAL MEDICINE

## 2024-05-06 PROCEDURE — 92978 ENDOLUMINL IVUS OCT C 1ST: CPT | Performed by: INTERNAL MEDICINE

## 2024-05-06 PROCEDURE — 25010000002 PROTAMINE SULFATE PER 10 MG: Performed by: INTERNAL MEDICINE

## 2024-05-06 PROCEDURE — 25810000003 SODIUM CHLORIDE 0.9 % SOLUTION: Performed by: INTERNAL MEDICINE

## 2024-05-06 PROCEDURE — C1769 GUIDE WIRE: HCPCS | Performed by: INTERNAL MEDICINE

## 2024-05-06 PROCEDURE — 25510000001 IOPAMIDOL PER 1 ML: Performed by: INTERNAL MEDICINE

## 2024-05-06 PROCEDURE — 25010000002 MIDAZOLAM PER 1 MG: Performed by: INTERNAL MEDICINE

## 2024-05-06 PROCEDURE — 99214 OFFICE O/P EST MOD 30 MIN: CPT | Performed by: NURSE PRACTITIONER

## 2024-05-06 PROCEDURE — B2111ZZ FLUOROSCOPY OF MULTIPLE CORONARY ARTERIES USING LOW OSMOLAR CONTRAST: ICD-10-PCS | Performed by: INTERNAL MEDICINE

## 2024-05-06 PROCEDURE — 027035Z DILATION OF CORONARY ARTERY, ONE ARTERY WITH TWO DRUG-ELUTING INTRALUMINAL DEVICES, PERCUTANEOUS APPROACH: ICD-10-PCS | Performed by: INTERNAL MEDICINE

## 2024-05-06 PROCEDURE — B241ZZ3 ULTRASONOGRAPHY OF MULTIPLE CORONARY ARTERIES, INTRAVASCULAR: ICD-10-PCS | Performed by: INTERNAL MEDICINE

## 2024-05-06 PROCEDURE — 25010000002 DOPAMINE PER 40 MG: Performed by: INTERNAL MEDICINE

## 2024-05-06 PROCEDURE — C1753 CATH, INTRAVAS ULTRASOUND: HCPCS | Performed by: INTERNAL MEDICINE

## 2024-05-06 PROCEDURE — 99215 OFFICE O/P EST HI 40 MIN: CPT | Performed by: INTERNAL MEDICINE

## 2024-05-06 PROCEDURE — 25010000002 PHENYLEPHRINE 10 MG/ML SOLUTION: Performed by: INTERNAL MEDICINE

## 2024-05-06 PROCEDURE — 82948 REAGENT STRIP/BLOOD GLUCOSE: CPT

## 2024-05-06 PROCEDURE — C1761: HCPCS | Performed by: INTERNAL MEDICINE

## 2024-05-06 PROCEDURE — 25010000002 MORPHINE PER 10 MG: Performed by: EMERGENCY MEDICINE

## 2024-05-06 DEVICE — XIENCE SKYPOINT™ EVEROLIMUS ELUTING CORONARY STENT SYSTEM 3.50 MM X 23 MM / RAPID-EXCHANGE
Type: IMPLANTABLE DEVICE | Site: CORONARY | Status: FUNCTIONAL
Brand: XIENCE SKYPOINT™

## 2024-05-06 DEVICE — GRAFTMASTER CORONARY STENT GRAFT SYSTEM 4.00 MM X 19 MM / RAPID-EXCHANGE
Type: IMPLANTABLE DEVICE | Site: CORONARY | Status: FUNCTIONAL
Brand: GRAFTMASTER

## 2024-05-06 DEVICE — XIENCE SKYPOINT™ EVEROLIMUS ELUTING CORONARY STENT SYSTEM 4.50 MM X 12 MM / RAPID-EXCHANGE
Type: IMPLANTABLE DEVICE | Site: CORONARY | Status: FUNCTIONAL
Brand: XIENCE SKYPOINT™

## 2024-05-06 DEVICE — XIENCE SKYPOINT™ EVEROLIMUS ELUTING CORONARY STENT SYSTEM 3.50 MM X 18 MM / RAPID-EXCHANGE
Type: IMPLANTABLE DEVICE | Site: CORONARY | Status: FUNCTIONAL
Brand: XIENCE SKYPOINT™

## 2024-05-06 RX ORDER — ONDANSETRON 4 MG/1
4 TABLET, ORALLY DISINTEGRATING ORAL EVERY 6 HOURS PRN
Status: DISCONTINUED | OUTPATIENT
Start: 2024-05-06 | End: 2024-05-09 | Stop reason: HOSPADM

## 2024-05-06 RX ORDER — FENTANYL CITRATE 50 UG/ML
INJECTION, SOLUTION INTRAMUSCULAR; INTRAVENOUS
Status: DISCONTINUED | OUTPATIENT
Start: 2024-05-06 | End: 2024-05-06 | Stop reason: HOSPADM

## 2024-05-06 RX ORDER — DOPAMINE HYDROCHLORIDE 160 MG/100ML
2-20 INJECTION, SOLUTION INTRAVENOUS
Status: DISCONTINUED | OUTPATIENT
Start: 2024-05-06 | End: 2024-05-08

## 2024-05-06 RX ORDER — SODIUM CHLORIDE 9 MG/ML
100 INJECTION, SOLUTION INTRAVENOUS CONTINUOUS
Status: ACTIVE | OUTPATIENT
Start: 2024-05-06 | End: 2024-05-07

## 2024-05-06 RX ORDER — LIDOCAINE HYDROCHLORIDE 20 MG/ML
INJECTION, SOLUTION INFILTRATION; PERINEURAL
Status: DISCONTINUED | OUTPATIENT
Start: 2024-05-06 | End: 2024-05-06 | Stop reason: HOSPADM

## 2024-05-06 RX ORDER — VERAPAMIL HYDROCHLORIDE 2.5 MG/ML
INJECTION, SOLUTION INTRAVENOUS
Status: DISCONTINUED | OUTPATIENT
Start: 2024-05-06 | End: 2024-05-06 | Stop reason: HOSPADM

## 2024-05-06 RX ORDER — SODIUM CHLORIDE 9 MG/ML
125 INJECTION, SOLUTION INTRAVENOUS CONTINUOUS
Status: DISCONTINUED | OUTPATIENT
Start: 2024-05-06 | End: 2024-05-08

## 2024-05-06 RX ORDER — HEPARIN SODIUM 1000 [USP'U]/ML
INJECTION, SOLUTION INTRAVENOUS; SUBCUTANEOUS
Status: DISCONTINUED | OUTPATIENT
Start: 2024-05-06 | End: 2024-05-06 | Stop reason: HOSPADM

## 2024-05-06 RX ORDER — DOPAMINE HYDROCHLORIDE 160 MG/100ML
INJECTION, SOLUTION INTRAVENOUS
Status: COMPLETED | OUTPATIENT
Start: 2024-05-06 | End: 2024-05-06

## 2024-05-06 RX ORDER — NITROGLYCERIN 0.4 MG/1
0.4 TABLET SUBLINGUAL
Status: DISCONTINUED | OUTPATIENT
Start: 2024-05-06 | End: 2024-05-09 | Stop reason: HOSPADM

## 2024-05-06 RX ORDER — NALOXONE HCL 0.4 MG/ML
0.4 VIAL (ML) INJECTION
Status: DISCONTINUED | OUTPATIENT
Start: 2024-05-06 | End: 2024-05-09 | Stop reason: HOSPADM

## 2024-05-06 RX ORDER — HYDROCODONE BITARTRATE AND ACETAMINOPHEN 5; 325 MG/1; MG/1
1 TABLET ORAL EVERY 4 HOURS PRN
Status: DISCONTINUED | OUTPATIENT
Start: 2024-05-06 | End: 2024-05-09 | Stop reason: HOSPADM

## 2024-05-06 RX ORDER — TEMAZEPAM 15 MG/1
15 CAPSULE ORAL NIGHTLY PRN
Status: DISCONTINUED | OUTPATIENT
Start: 2024-05-06 | End: 2024-05-09 | Stop reason: HOSPADM

## 2024-05-06 RX ORDER — NOREPINEPHRINE BITARTRATE 0.03 MG/ML
.02-.3 INJECTION, SOLUTION INTRAVENOUS
Status: DISCONTINUED | OUTPATIENT
Start: 2024-05-06 | End: 2024-05-08

## 2024-05-06 RX ORDER — ONDANSETRON 2 MG/ML
4 INJECTION INTRAMUSCULAR; INTRAVENOUS EVERY 6 HOURS PRN
Status: DISCONTINUED | OUTPATIENT
Start: 2024-05-06 | End: 2024-05-09 | Stop reason: HOSPADM

## 2024-05-06 RX ORDER — PHENYLEPHRINE HYDROCHLORIDE 10 MG/ML
INJECTION INTRAVENOUS
Status: DISCONTINUED | OUTPATIENT
Start: 2024-05-06 | End: 2024-05-06 | Stop reason: HOSPADM

## 2024-05-06 RX ORDER — NOREPINEPHRINE BITARTRATE/D5W 8 MG/250ML
PLASTIC BAG, INJECTION (ML) INTRAVENOUS
Status: COMPLETED | OUTPATIENT
Start: 2024-05-06 | End: 2024-05-06

## 2024-05-06 RX ORDER — MIDAZOLAM HYDROCHLORIDE 1 MG/ML
INJECTION INTRAMUSCULAR; INTRAVENOUS
Status: DISCONTINUED | OUTPATIENT
Start: 2024-05-06 | End: 2024-05-06 | Stop reason: HOSPADM

## 2024-05-06 RX ORDER — PROTAMINE SULFATE 10 MG/ML
INJECTION, SOLUTION INTRAVENOUS
Status: DISCONTINUED | OUTPATIENT
Start: 2024-05-06 | End: 2024-05-06 | Stop reason: HOSPADM

## 2024-05-06 RX ORDER — ACETAMINOPHEN 325 MG/1
650 TABLET ORAL EVERY 4 HOURS PRN
Status: DISCONTINUED | OUTPATIENT
Start: 2024-05-06 | End: 2024-05-09 | Stop reason: HOSPADM

## 2024-05-06 RX ORDER — MORPHINE SULFATE 2 MG/ML
2 INJECTION, SOLUTION INTRAMUSCULAR; INTRAVENOUS
Status: DISCONTINUED | OUTPATIENT
Start: 2024-05-06 | End: 2024-05-09 | Stop reason: HOSPADM

## 2024-05-06 RX ORDER — MORPHINE SULFATE 2 MG/ML
2 INJECTION, SOLUTION INTRAMUSCULAR; INTRAVENOUS ONCE
Status: COMPLETED | OUTPATIENT
Start: 2024-05-06 | End: 2024-05-06

## 2024-05-06 RX ORDER — ONDANSETRON 2 MG/ML
INJECTION INTRAMUSCULAR; INTRAVENOUS
Status: DISCONTINUED | OUTPATIENT
Start: 2024-05-06 | End: 2024-05-06 | Stop reason: HOSPADM

## 2024-05-06 RX ADMIN — ATENOLOL 50 MG: 50 TABLET ORAL at 12:59

## 2024-05-06 RX ADMIN — KETOROLAC TROMETHAMINE 30 MG: 30 INJECTION, SOLUTION INTRAMUSCULAR at 10:33

## 2024-05-06 RX ADMIN — POTASSIUM CHLORIDE 20 MEQ: 750 TABLET, EXTENDED RELEASE ORAL at 08:35

## 2024-05-06 RX ADMIN — HYDROCODONE BITARTRATE AND ACETAMINOPHEN 1 TABLET: 5; 325 TABLET ORAL at 04:16

## 2024-05-06 RX ADMIN — SODIUM CHLORIDE 125 ML/HR: 9 INJECTION, SOLUTION INTRAVENOUS at 12:59

## 2024-05-06 RX ADMIN — LISINOPRIL 40 MG: 20 TABLET ORAL at 08:35

## 2024-05-06 RX ADMIN — CLOPIDOGREL BISULFATE 75 MG: 75 TABLET, FILM COATED ORAL at 08:35

## 2024-05-06 RX ADMIN — ISOSORBIDE MONONITRATE 60 MG: 30 TABLET, EXTENDED RELEASE ORAL at 12:59

## 2024-05-06 RX ADMIN — RANOLAZINE 1000 MG: 500 TABLET, FILM COATED, EXTENDED RELEASE ORAL at 04:16

## 2024-05-06 RX ADMIN — Medication 250 MG: at 00:12

## 2024-05-06 RX ADMIN — MORPHINE SULFATE 2 MG: 2 INJECTION, SOLUTION INTRAMUSCULAR; INTRAVENOUS at 14:29

## 2024-05-06 RX ADMIN — MECLIZINE HYDROCHLORIDE 12.5 MG: 25 TABLET ORAL at 08:35

## 2024-05-06 RX ADMIN — PANTOPRAZOLE SODIUM 40 MG: 40 TABLET, DELAYED RELEASE ORAL at 08:36

## 2024-05-06 NOTE — PROGRESS NOTES
"Slade Martinez  1950 73 y.o.  [id number]      Patient Care Team:  Triny Hannon MD as PCP - General (Internal Medicine)  James Hubbard MD as Consulting Physician (Cardiology)    CC: Prior history of cath and stent, history of slow AVNRT    Interval History: He came in with epigastric and substernal chest discomfort.  Troponins have been negative he continues to have chest pain not really radiating now he feels like maybe this was like what he had before his prior stents      Objective   Vital Signs  Temp:  [97 °F (36.1 °C)-98.4 °F (36.9 °C)] 97 °F (36.1 °C)  Heart Rate:  [51-66] 51  Resp:  [16-18] 16  BP: (123-179)/(48-67) 170/55    Intake/Output Summary (Last 24 hours) at 5/6/2024 1305  Last data filed at 5/6/2024 0600  Gross per 24 hour   Intake 480 ml   Output 0 ml   Net 480 ml     Flowsheet Rows      Flowsheet Row First Filed Value   Admission Height 180.3 cm (71\") Documented at 05/05/2024 0745   Admission Weight 90.7 kg (199 lb 15.3 oz) Documented at 05/05/2024 0745            Physical Exam:   General Appearance:    Alert,oriented, in no acute distress   Lungs:     Clear to auscultation,BS are equal    Heart:    Normal S1 and S2, RRR without murmur, gallop or rub   HEENT:    Sclerae are clear, no JVD or adenopathy   Abdomen:     Normal bowel sounds, soft nontender, nondistended, no HSM   Extremities:   Moves all extremities well, no edema, no cyanosis, no             Redness, no rash     Medication Review:      atenolol, 50 mg, Oral, BID  atorvastatin, 40 mg, Oral, Nightly  baclofen, 10 mg, Oral, Nightly  clopidogrel, 75 mg, Oral, Daily  isosorbide mononitrate, 60 mg, Oral, BID  lisinopril, 40 mg, Oral, BID  meclizine, 12.5 mg, Oral, Daily  pantoprazole, 40 mg, Oral, BID  potassium chloride, 20 mEq, Oral, Daily  ranolazine, 1,000 mg, Oral, Q12H  saccharomyces boulardii, 250 mg, Oral, Nightly  sodium chloride, 10 mL, Intravenous, Q12H  sucralfate, 1 g, Oral, Nightly      sodium chloride, 125 " mL/hr, Last Rate: 125 mL/hr (05/06/24 1259)          I reviewed the patient's new clinical results.  I personally viewed and interpreted the patient's EKG/Telemetry data    Assessment/Plan  Active Hospital Problems    Diagnosis  POA    **Chest pain [R07.9]  Yes      Resolved Hospital Problems   No resolved problems to display.       Is a gentleman with known coronary disease a prior CABG and PCI in the past he now is in with prolonged episode of pain it is really been going off and on for probably a month he underwent a stress test as an outpatient reportedly that is normal although I have not been able to get to see it he continues to have pain despite normal troponins and no ECG change we have looked at his abdomen we have looked at his lungs we have looked at his back we have looks manage pancreas none of those things seem to be causing this problem he continues to have symptoms I think we have to take him to the Cath Lab look at his bypasses see if there is something going on and if there is we will place a stent I spoke about the risk and benefits of doing this with him and his family they all agree and are willing to move forward.  Further decisions will be based on the findings at the time of his cath he had a two-vessel CABG done at [facility] we will likely have to going through his left wrist to do this cath    Negro Locke MD  05/06/24  13:05 EDT

## 2024-05-06 NOTE — PROGRESS NOTES
WENDI ANDREWS ATTESTATION NOTE    The NADIYA and I have discussed this patient's history, physical exam, and treatment plan.  I have reviewed the documentation and personally had a face to face interaction with the patient. I affirm the documentation and agree with the treatment and plan.  The attached note describes my personal findings.      I provided a substantive portion of the care of this patient. I personally performed the physical exam, in its entirety.    Slade Martinez is a 73 y.o. male who presented to the emergency department yesterday complaining of chest pain.  The patient reports constant chest pain.  He is admitted to the observation unit for further management.  He has had stable negative troponins.  He has normal chemistries and blood counts.  He has had an MRI of his thoracic spine which showed multilevel disc desiccation.  He had negative CTA of the chest.  Cardiology has been consulted and is following.  They also involved electrophysiology who has discontinued his amiodarone.  They are going to plan outpatient discussion about ablation.  The patient reports persistent constant pain.      On exam:  GENERAL: Awake, alert, no acute distress  SKIN: Warm, dry  HENT: Normocephalic, atraumatic  EYES: no scleral icterus  CV: regular rhythm, regular rate  RESPIRATORY: normal effort, lungs clear  ABDOMEN: soft, nontender, nondistended  MUSCULOSKELETAL: no deformity  NEURO: alert, moves all extremities, follows commands        Labs  Recent Results (from the past 24 hour(s))   High Sensitivity Troponin T 2Hr    Collection Time: 05/05/24  1:00 PM    Specimen: Blood   Result Value Ref Range    HS Troponin T 11 <22 ng/L    Troponin T Delta -1 >=-4 - <+4 ng/L   Amylase    Collection Time: 05/05/24  1:00 PM    Specimen: Blood   Result Value Ref Range    Amylase 22 (L) 28 - 100 U/L   Lipase    Collection Time: 05/05/24  1:00 PM    Specimen: Blood   Result Value Ref Range    Lipase 11 (L) 13 - 60 U/L   Urinalysis With  Culture If Indicated - Urine, Clean Catch    Collection Time: 05/05/24  5:09 PM    Specimen: Urine, Clean Catch   Result Value Ref Range    Color, UA Yellow Yellow, Straw    Appearance, UA Clear Clear    pH, UA 6.0 5.0 - 8.0    Specific Gravity, UA >1.030 (H) 1.005 - 1.030    Glucose, UA Negative Negative    Ketones, UA Negative Negative    Bilirubin, UA Negative Negative    Blood, UA Negative Negative    Protein, UA Negative Negative    Leuk Esterase, UA Trace (A) Negative    Nitrite, UA Negative Negative    Urobilinogen, UA 1.0 E.U./dL 0.2 - 1.0 E.U./dL   Urinalysis, Microscopic Only - Urine, Clean Catch    Collection Time: 05/05/24  5:09 PM    Specimen: Urine, Clean Catch   Result Value Ref Range    RBC, UA 0-2 None Seen, 0-2 /HPF    WBC, UA 0-2 None Seen, 0-2 /HPF    Bacteria, UA None Seen None Seen /HPF    Squamous Epithelial Cells, UA 0-2 None Seen, 0-2 /HPF    Hyaline Casts, UA None Seen None Seen /LPF    Methodology Automated Microscopy    ECG 12 Lead Chest Pain    Collection Time: 05/06/24  4:02 AM   Result Value Ref Range    QT Interval 466 ms    QTC Interval 425 ms   CBC (No Diff)    Collection Time: 05/06/24  4:12 AM    Specimen: Blood   Result Value Ref Range    WBC 9.12 3.40 - 10.80 10*3/mm3    RBC 3.71 (L) 4.14 - 5.80 10*6/mm3    Hemoglobin 11.4 (L) 13.0 - 17.7 g/dL    Hematocrit 34.9 (L) 37.5 - 51.0 %    MCV 94.1 79.0 - 97.0 fL    MCH 30.7 26.6 - 33.0 pg    MCHC 32.7 31.5 - 35.7 g/dL    RDW 12.9 12.3 - 15.4 %    RDW-SD 44.7 37.0 - 54.0 fl    MPV 9.6 6.0 - 12.0 fL    Platelets 239 140 - 450 10*3/mm3   Basic Metabolic Panel    Collection Time: 05/06/24  4:12 AM    Specimen: Arm, Right; Blood   Result Value Ref Range    Glucose 86 65 - 99 mg/dL    BUN 14 8 - 23 mg/dL    Creatinine 0.98 0.76 - 1.27 mg/dL    Sodium 135 (L) 136 - 145 mmol/L    Potassium 4.4 3.5 - 5.2 mmol/L    Chloride 101 98 - 107 mmol/L    CO2 26.5 22.0 - 29.0 mmol/L    Calcium 8.7 8.6 - 10.5 mg/dL    BUN/Creatinine Ratio 14.3 7.0 - 25.0     Anion Gap 7.5 5.0 - 15.0 mmol/L    eGFR 80.9 >60.0 mL/min/1.73   High Sensitivity Troponin T    Collection Time: 05/06/24  4:12 AM    Specimen: Arm, Right; Blood   Result Value Ref Range    HS Troponin T 14 <22 ng/L       Radiology  MRI Thoracic Spine Without Contrast    Result Date: 5/5/2024  MRI EXAMINATION THORACIC SPINE WITHOUT CONTRAST  HISTORY: Back pain.  COMPARISON: No prior MRI examination of the thoracic spine is available for comparison.  FINDINGS: The alignment of the thoracic spine is within normal limits. There is a Schmorl's node involving the inferior plate of T11, small-to-moderate in size. There is mild disc desiccation from T6 to T12. There is no evidence of cord signal abnormality on the sagittal or axial T2 sequences. There is no evidence of disc herniation or central canal stenosis.      1.  Mild multilevel disc desiccation and small-to-moderate size Schmorl's node involving the superior endplate of T11 is noted. 2. There is no evidence of disc herniation, cord signal abnormality or of cord compression.  This report was finalized on 5/5/2024 8:53 PM by Dr. Robb Lauren M.D on Workstation: [id number]           Note Disclaimer: At [facility], we believe that sharing information builds trust and better relationships. You are receiving this note because you recently visited [facility]. It is possible you will see health information before a provider has talked with you about it. This kind of information can be easy to misunderstand. To help you fully understand what it means for your health, we urge you to discuss this note with your provider.

## 2024-05-06 NOTE — PROGRESS NOTES
ED OBSERVATION PROGRESS/DISCHARGE SUMMARY    Date of Admission: 5/5/2024   LOS: 0 days   PCP: Triny Hannon MD    Final Diagnosis:       Subjective still endorses chest pain this morning.  Denies dyspnea, nausea, vomiting.    Hospital Outcome:     Slade Martinez is a 73 y.o. male who presented to [facility] with due to chest pain radiating into his back x 1 week.  Patient reports midsternal chest pain that radiates into his mid upper back and into his right lateral chest wall.  Pain is sharp and stabbing.  Pain is nonexertional and nonpleuritic.  Symptoms exacerbated with movements and when reaching out with his upper body.  Report associated shortness of breath at rest.  Denies nausea, vomiting or diaphoresis.     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     5/6  Evaluated by Dr. Silva with EP.  They agree with discontinuing the patient's amiodarone.  He will follow-up with them in 8 weeks to determine if further intervention is needed such as EP study/ablation.  He will go for cardiac catheterization later today.      ROS:  General: no fevers, chills  Respiratory: no cough, dyspnea  Cardiovascular: + chest pain, no palpitations  Abdomen: No abdominal pain, nausea, vomiting, or diarrhea  Neurologic: No focal weakness    Objective   Physical Exam:  I have reviewed the vital signs.  Temp:  [97 °F (36.1 °C)-98.4 °F (36.9 °C)] 97 °F (36.1 °C)  Heart Rate:  [51-66] 51  Resp:  [16-18] 16  BP: (123-179)/(48-67) 170/55  General Appearance:    Alert, cooperative, no distress  Head:    Normocephalic, atraumatic  Eyes:    Sclerae anicteric  Neck:   Supple, no mass  Lungs: Clear to auscultation bilaterally, respirations unlabored  Heart: Regular rate and rhythm, S1 and S2 normal, no murmur, rub or gallop  Abdomen:  Soft, non-tender, bowel  sounds active, nondistended  Extremities: No clubbing, cyanosis, or edema to lower extremities  Pulses:  2+ and symmetric in distal lower extremities  Skin: No rashes   Neurologic: Oriented x3, Normal strength to extremities    Results Review:    I have reviewed the labs, radiology results and diagnostic studies.    Results from last 7 days   Lab Units 05/06/24  0412   WBC 10*3/mm3 9.12   HEMOGLOBIN g/dL 11.4*   HEMATOCRIT % 34.9*   PLATELETS 10*3/mm3 239     Results from last 7 days   Lab Units 05/06/24  0412 05/05/24  0736   SODIUM mmol/L 135* 135*   POTASSIUM mmol/L 4.4 4.4   CHLORIDE mmol/L 101 99   CO2 mmol/L 26.5 25.9   BUN mg/dL 14 10   CREATININE mg/dL 0.98 0.86   CALCIUM mg/dL 8.7 9.3   BILIRUBIN mg/dL  --  0.6   ALK PHOS U/L  --  83   ALT (SGPT) U/L  --  14   AST (SGOT) U/L  --  12   GLUCOSE mg/dL 86 174*     Imaging Results (Last 24 Hours)       Procedure Component Value Units Date/Time    MRI Thoracic Spine Without Contrast [[id number]] Collected: 05/05/24 1600     Updated: 05/05/24 2056    Narrative:      MRI EXAMINATION THORACIC SPINE WITHOUT CONTRAST     HISTORY: Back pain.     COMPARISON: No prior MRI examination of the thoracic spine is available  for comparison.     FINDINGS: The alignment of the thoracic spine is within normal limits.  There is a Schmorl's node involving the inferior plate of T11,  small-to-moderate in size. There is mild disc desiccation from T6 to  T12. There is no evidence of cord signal abnormality on the sagittal or  axial T2 sequences. There is no evidence of disc herniation or central  canal stenosis.       Impression:      1.  Mild multilevel disc desiccation and small-to-moderate size  Schmorl's node involving the superior endplate of T11 is noted.   2. There is no evidence of disc herniation, cord signal abnormality or  of cord compression.     This report was finalized on 5/5/2024 8:53 PM by Dr. Robb Lauren M.D  on Workstation: [id number]               I have  reviewed the medications.  ---------------------------------------------------------------------------------------------  Assessment & Plan   Assessment/Problem List    Chest pain    Plan:    CAD s/p CABG  Chest and back pain  AVNRT   -Cardiology following  -EKG shows NSR with first-degree AV block  -CTA chest negative acute  -High-sensitivity troponin 11, 12, 11, 14  -MRI thoracic spine without shows mild multilevel disc desiccation and small to moderate size Schmorl's node involving the superior endplate of T11 is noted, there is no evidence of disc herniation, cord signal abnormality or of cord compression.  -Continue atenolol  -Discontinue amiodarone per cardiology  -Continue isosorbide, Ranexa, aspirin and Plavix  -EP cardiology to see today.  EP study/ablation may be beneficial in the future.  He will follow-up with them in 8 weeks and discuss options at that time.  -Plan for cardiac catheterization later today    Hypertension  -Continue amlodipine, hydrochlorothiazide, lisinopril  -Vital signs per nursing     Hyperlipidemia  -Continue statin     DVT prophylaxis:  Mechanical DVT prophylaxis orders are present.    Disposition: To Cath Lab later today.  Cardiology to assume care.    Follow-up after Discharge: Cardiology    This note will serve as a progress/transfer note    Abby Villegas, APRN 05/06/24 12:59 EDT    I have worn appropriate PPE during this patient encounter, sanitized my hands both with entering and exiting patient's room.    52 minutes has been spent by [facility] providers in the care of this patient while under observation status

## 2024-05-06 NOTE — PROGRESS NOTES
Patient brought to the cardiac catheterization lab secondary to unstable angina.    Unfortunately had very severe complicated coronary artery disease including an occluded circumflex coronary artery at the ostium with previously placed stent in that area along with an ulcerated 95% ostial to proximal left main stenosis and 90% mid LAD stenosis with previously placed stents in the LAD.  The native right coronary artery did not have critical disease.  SVG to diagonal and OM were patent.    -Balloon angioplasty of the left main.  Unable to advance balloons to the mid LAD secondary to the left main stenosis which subsequently had to be stented and postdilated with a 4 mm noncompliant trek balloon to 20 mahsa.   -GuideLiner support was then used for angioplasty to the mid LAD, however the proximal to mid LAD to would not dilate with a 3.5 mm NC balloon.  As such a 3.5 mm shockwave was used to perform balloon lithotripsy to the mid LAD.  Following this the mid LAD was ballooned to high-pressure with a noncompliant balloon.  Subsequently it was noted that there was a perforation of the mid LAD.  Prolonged balloon inflation with a 4 mm balloon was then performed with improvement in the perforation but this did require a 4.0 x 19 mm Graftmaster stent to stop the flow.  -Limited echo showed no free fluid but some concern for pericardial hematoma.  The RV did not look to be significantly compressed.      As we are putting the patient onto the stretcher he became acutely hypotensive.  Will put him back on the table and obtain vein and arterial access in the right groin.  Levophed was started.  Pressure did improve.  -Bypass grafts still open.  LAD still open with no evidence of residual flow from the perforation.  The arterial line and vein line were left in place.    -I think he is critically ill at this point in time.  He is not a mechanical support option based on his iliac artery stenosis which I visualized on the right and  has previously been documented on the left.  Continue supportive vasopressors.  20 mg of protamine also given to reverse heparin.  I discussed with the family that he is critically ill

## 2024-05-06 NOTE — CONSULTS
Electrophysiology Hospital Consult            Patient Name: Slade Martinez  Age/Sex: 73 y.o. male  : 1950  MRN: [id number]    Date of Admission: 2024  Date of Encounter Visit: 24  Encounter Provider: SHERRILL Ontiveros  Referring Provider: Omega Gallo MD  Place of Service: [facility] CARDIOLOGY  Patient Care Team:  Triny Hannon MD as PCP - General (Internal Medicine)  James Hubbard MD as Consulting Physician (Cardiology)      Subjective:   EP Consultation for: SVT last admission, not tolerating amiodarone    Chief Complaint: Chest pain    History of Present Illness:  Slade Martinez is a 73 y.o. male previously followed with Dr. Hubbard and now with Dr. Hood.     Hospitalized in early April with chest pain and palpitations, was seen by Dr. Barajas. History of CAD, s/p CABG ,CVA x 2 (following CABG), multiple stents after CABG, PAD, s/p left CEA ,  HTN and HLD.     He had tachycardia--HR around 113---reviewed with EP, thought to be a slow reentrant tachycardia or AT. Treated with amiodarone and converted to SR. DC home to follow up with Dr. Hood. He seems to be intolerant to amiodarone and Dr. Hood had him decrease dose.     He presented yesterday with chest pain. He has maintained SR and cardiac work up has been negative. Amiodarone has been stopped and EP has been ask to see.    He noted some issues with palpitations in March but was doing well prior to that.     So far unclear etiology of chest pain.     Past Medical History:  Past Medical History:   Diagnosis Date    Constipation     Coronary atherosclerosis 2019    H/O CABG SVG to first diagonal branch and to the LAD as well as SVG to the lateral marginal branch of the circumflex complicated by right sided subcortical infarct with left sided hemiparesis    Dyslipidemia 2019    Essential hypertension 2019    Injury of back     fractured vertebra 23     Peripheral edema     Stroke 2011    Vertigo        Past Surgical History:   Procedure Laterality Date    CAROTID ENDARTERECTOMY  2000    Left    CHOLECYSTECTOMY      CORONARY ARTERY BYPASS GRAFT      CORONARY STENT PLACEMENT      X17 stents       Home Medications:   Medications Prior to Admission   Medication Sig Dispense Refill Last Dose    amLODIPine (NORVASC) 5 MG tablet Take 1 tablet by mouth Daily As Needed (Instructed to not take medication if BP <110/70).   Past Month    aspirin 81 MG tablet Take 1 tablet by mouth Daily.   5/5/2024    atenolol (TENORMIN) 50 MG tablet Take 1 tablet by mouth 2 (Two) Times a Day.   5/5/2024    atorvastatin (LIPITOR) 40 MG tablet Take 1 tablet by mouth Every Night.   5/4/2024    baclofen (LIORESAL) 10 MG tablet Take 1 tablet by mouth every night at bedtime.   5/4/2024    clopidogrel (PLAVIX) 75 MG tablet Take 1 tablet by mouth Daily.   5/5/2024    hydroCHLOROthiazide 25 MG tablet Take 1 tablet by mouth Daily As Needed (swelling).   5/4/2024    isosorbide mononitrate (IMDUR) 60 MG 24 hr tablet Take 1 tablet by mouth 2 (Two) Times a Day.   5/5/2024    Linzess 145 MCG capsule capsule Take 1 capsule by mouth Every Other Day.   5/4/2024    lisinopril (PRINIVIL,ZESTRIL) 40 MG tablet Take 1 tablet by mouth 2 (Two) Times a Day.   5/5/2024    loratadine (CLARITIN) 10 MG tablet Take 1 tablet by mouth Daily.   5/5/2024    meclizine (ANTIVERT) 12.5 MG tablet Take 1 tablet by mouth Daily.   5/5/2024    Multiple Vitamins-Minerals (MULTIVITAMIN ADULT EXTRA C PO) Take 1 tablet by mouth Daily.   5/5/2024    nitroglycerin (NITROSTAT) 0.4 MG SL tablet Place 1 tablet under the tongue Every 5 (Five) Minutes As Needed for Chest Pain. Take no more than 3 doses in 15 minutes. 25 tablet 3 Past Month    Omega-3 Fatty Acids (FISH OIL) 1000 MG capsule capsule Take 1 capsule by mouth Daily.   5/5/2024    pantoprazole (PROTONIX) 40 MG EC tablet Take 1 tablet by mouth 2 (Two) Times a Day.   5/5/2024     potassium chloride 10 MEQ CR tablet Take 2 tablets by mouth Daily.   5/5/2024    ranolazine (RANEXA) 1000 MG 12 hr tablet Take 1 tablet by mouth Every 12 (Twelve) Hours. 60 tablet 11 5/5/2024    saccharomyces boulardii (FLORASTOR) 250 MG capsule Take 1 capsule by mouth Every Night.   5/4/2024    sucralfate (CARAFATE) 1 g tablet Take 1 tablet by mouth every night at bedtime.   5/5/2024    triamcinolone (KENALOG) 0.1 % cream Apply 1 Application topically to the appropriate area as directed 2 (Two) Times a Day As Needed for Rash.       amiodarone (PACERONE) 200 MG tablet Take 1 tablet by mouth Daily. (Patient taking differently: Take 1 tablet by mouth Every Other Day.) 30 tablet 0 5/3/2024       Allergies:  Allergies   Allergen Reactions    Pletal [Cilostazol] Myalgia     .       Past Social History:  Social History     Socioeconomic History    Marital status:    Tobacco Use    Smoking status: Former     Types: Cigarettes    Smokeless tobacco: Never   Vaping Use    Vaping status: Never Used   Substance and Sexual Activity    Alcohol use: Not Currently    Drug use: Never    Sexual activity: Defer       Past Family History:  Family History   Problem Relation Age of Onset    COPD Mother     Heart attack Father     Heart failure Father     COPD Brother        Review of Systems: All systems reviewed. Pertinent positives identified in HPI. All other systems are negative.     14 point ROS was performed and is negative except as outlined in HPI.     Objective:     Objective:  Vital Signs (last 24 hours)         05/05 0700  05/06 0659 05/06 0700  05/06 0830   Most Recent      Temp (°F) 97.6 -  98.4      98.1     98.1 (36.7) 05/06 0716    Heart Rate 50 -  69      62     62 05/06 0716    Resp 16 -  18      16     16 05/06 0716    /48 -  187/62      152/64     152/64 05/06 0716    SpO2 (%) 95 -  100      95     95 05/06 0716          Temp:  [97.7 °F (36.5 °C)-98.4 °F (36.9 °C)] 98.1 °F (36.7 °C)  Heart Rate:  [52-66]  62  Resp:  [16-18] 16  BP: (123-187)/(48-74) 152/64  Body mass index is 27.34 kg/m².        Physical Exam:   Vitals reviewed.   Constitutional:       Appearance: Healthy appearance. Not in distress.   HENT:      Nose: Nose normal.   Pulmonary:      Effort: Pulmonary effort is normal.      Breath sounds: Normal breath sounds.   Cardiovascular:      Normal rate. Normal S1. Normal S2.    Edema:     Peripheral edema absent.   Abdominal:      Palpations: Abdomen is soft.   Musculoskeletal: Normal range of motion.      Cervical back: Neck supple. Skin:     General: Skin is warm and dry.   Neurological:      Mental Status: Alert and oriented to person, place and time.         Labs:   Lab Review:     Results from last 7 days   Lab Units 24  0412 24  0736   SODIUM mmol/L 135* 135*   POTASSIUM mmol/L 4.4 4.4   CHLORIDE mmol/L 101 99   CO2 mmol/L 26.5 25.9   BUN mg/dL 14 10   CREATININE mg/dL 0.98 0.86   GLUCOSE mg/dL 86 174*   CALCIUM mg/dL 8.7 9.3   AST (SGOT) U/L  --  12   ALT (SGPT) U/L  --  14     Results from last 7 days   Lab Units 24  0412 24  1300 24  1051   HSTROP T ng/L 14 11 12     Results from last 7 days   Lab Units 24  0412   WBC 10*3/mm3 9.12   HEMOGLOBIN g/dL 11.4*   HEMATOCRIT % 34.9*   PLATELETS 10*3/mm3 239         Imagin/5/2024 Echo:      Interpretation Summary         Left ventricular systolic function is normal. Left ventricular ejection fraction appears to be 56 - 60%.    Left ventricular wall thickness is consistent with hypertrophy. Sigmoid-shaped ventricular septum is present.    Left ventricular diastolic function was indeterminate.    Normal right ventricular cavity size and systolic function noted.    The left atrial cavity is mildly dilated.    The aortic valve leaflets are mildly to moderately calcified    Mild mitral valve regurgitation is present.    Mild to moderate tricuspid valve regurgitation is present.    Calculated right ventricular systolic  pressure from tricuspid regurgitation is 33 mmHg.    There is no evidence of pericardial effusion.       EKG:           I personally viewed and interpreted the patient's EKG/Telemetry tracings.    Assessment:       Chest pain        Plan:     Dr. Silva and I saw him this morning reviewed his EKG, tele from last and this admission.     Agree likely some type of reentrant tachycardia or AT, none this admission.     Unclear etiology of chest pain, unlikely to be from amiodarone but guess it could be.     It has been discontinued but will take some time to clear his system given long half life.     Discussed possible EPS/ablation---will have office call him with appt in about 8 weeks and can see how he is doing and further discuss treatment options at that time.     Thank you for allowing me to participate in the care of Slade Martinez. Feel free to contact me directly with any further questions or concerns.    SHERRILL Ontiveros  [facility]  05/06/24  08:30 EDT

## 2024-05-06 NOTE — PLAN OF CARE
Goal Outcome Evaluation:  Plan of Care Reviewed With: patient         Patient still complaining of intermittent chest pain. Norco and toradol given as needed. Vital signs stable. Cardiology and electrophysiology consulted. Will closely monitor.

## 2024-05-06 NOTE — CONSULTS
Pt up, in chair, wife at bedside, visit welcomed.    Chp reviewed Advance Directive with Pt & Spouse; left copy with them to review with family.    Pt / Sp spoke of Gnosticist jessica and strong family & Sabianist support. Chp prayed for strength, comfort, hope, and healing.    Pastoral Care Office remains available.

## 2024-05-07 ENCOUNTER — APPOINTMENT (OUTPATIENT)
Dept: CARDIOLOGY | Facility: HOSPITAL | Age: 74
DRG: 324 | End: 2024-05-07
Payer: MEDICARE

## 2024-05-07 LAB
ACT BLD: 266 SECONDS (ref 82–152)
ACT BLD: 287 SECONDS (ref 82–152)
ACT BLD: 325 SECONDS (ref 82–152)
ANION GAP SERPL CALCULATED.3IONS-SCNC: 8 MMOL/L (ref 5–15)
BUN SERPL-MCNC: 16 MG/DL (ref 8–23)
BUN/CREAT SERPL: 17 (ref 7–25)
CALCIUM SPEC-SCNC: 8.6 MG/DL (ref 8.6–10.5)
CHLORIDE SERPL-SCNC: 106 MMOL/L (ref 98–107)
CO2 SERPL-SCNC: 20 MMOL/L (ref 22–29)
CREAT SERPL-MCNC: 0.94 MG/DL (ref 0.76–1.27)
DEPRECATED RDW RBC AUTO: 45 FL (ref 37–54)
EGFRCR SERPLBLD CKD-EPI 2021: 85.1 ML/MIN/1.73
ERYTHROCYTE [DISTWIDTH] IN BLOOD BY AUTOMATED COUNT: 13 % (ref 12.3–15.4)
GEN 5 2HR TROPONIN T REFLEX: 412 NG/L
GLUCOSE SERPL-MCNC: 124 MG/DL (ref 65–99)
HCT VFR BLD AUTO: 34 % (ref 37.5–51)
HGB BLD-MCNC: 11.4 G/DL (ref 13–17.7)
MCH RBC QN AUTO: 31.7 PG (ref 26.6–33)
MCHC RBC AUTO-ENTMCNC: 33.5 G/DL (ref 31.5–35.7)
MCV RBC AUTO: 94.4 FL (ref 79–97)
PLATELET # BLD AUTO: 220 10*3/MM3 (ref 140–450)
PMV BLD AUTO: 10 FL (ref 6–12)
POTASSIUM SERPL-SCNC: 4.8 MMOL/L (ref 3.5–5.2)
QT INTERVAL: 393 MS
QT INTERVAL: 394 MS
QTC INTERVAL: 452 MS
QTC INTERVAL: 459 MS
RBC # BLD AUTO: 3.6 10*6/MM3 (ref 4.14–5.8)
SODIUM SERPL-SCNC: 134 MMOL/L (ref 136–145)
TROPONIN T DELTA: 66 NG/L
TROPONIN T SERPL HS-MCNC: 251 NG/L
TROPONIN T SERPL HS-MCNC: 346 NG/L
WBC NRBC COR # BLD AUTO: 8.59 10*3/MM3 (ref 3.4–10.8)

## 2024-05-07 PROCEDURE — 93325 DOPPLER ECHO COLOR FLOW MAPG: CPT

## 2024-05-07 PROCEDURE — 25810000003 SODIUM CHLORIDE 0.9 % SOLUTION: Performed by: INTERNAL MEDICINE

## 2024-05-07 PROCEDURE — 84484 ASSAY OF TROPONIN QUANT: CPT | Performed by: INTERNAL MEDICINE

## 2024-05-07 PROCEDURE — 93308 TTE F-UP OR LMTD: CPT

## 2024-05-07 PROCEDURE — 93010 ELECTROCARDIOGRAM REPORT: CPT | Performed by: INTERNAL MEDICINE

## 2024-05-07 PROCEDURE — 80048 BASIC METABOLIC PNL TOTAL CA: CPT | Performed by: INTERNAL MEDICINE

## 2024-05-07 PROCEDURE — 93005 ELECTROCARDIOGRAM TRACING: CPT | Performed by: INTERNAL MEDICINE

## 2024-05-07 PROCEDURE — 99233 SBSQ HOSP IP/OBS HIGH 50: CPT | Performed by: INTERNAL MEDICINE

## 2024-05-07 PROCEDURE — 93325 DOPPLER ECHO COLOR FLOW MAPG: CPT | Performed by: INTERNAL MEDICINE

## 2024-05-07 PROCEDURE — 93321 DOPPLER ECHO F-UP/LMTD STD: CPT

## 2024-05-07 PROCEDURE — 93321 DOPPLER ECHO F-UP/LMTD STD: CPT | Performed by: INTERNAL MEDICINE

## 2024-05-07 PROCEDURE — 25010000002 MORPHINE PER 10 MG: Performed by: INTERNAL MEDICINE

## 2024-05-07 PROCEDURE — 93308 TTE F-UP OR LMTD: CPT | Performed by: INTERNAL MEDICINE

## 2024-05-07 PROCEDURE — 85027 COMPLETE CBC AUTOMATED: CPT | Performed by: INTERNAL MEDICINE

## 2024-05-07 RX ORDER — ATROPINE SULFATE 0.4 MG/ML
0.4 INJECTION, SOLUTION INTRAMUSCULAR; INTRAVENOUS; SUBCUTANEOUS ONCE AS NEEDED
Status: DISCONTINUED | OUTPATIENT
Start: 2024-05-07 | End: 2024-05-07

## 2024-05-07 RX ORDER — ASPIRIN 81 MG/1
81 TABLET ORAL DAILY
Status: DISCONTINUED | OUTPATIENT
Start: 2024-05-07 | End: 2024-05-09 | Stop reason: HOSPADM

## 2024-05-07 RX ADMIN — RANOLAZINE 1000 MG: 500 TABLET, FILM COATED, EXTENDED RELEASE ORAL at 02:45

## 2024-05-07 RX ADMIN — BACLOFEN 10 MG: 10 TABLET ORAL at 20:02

## 2024-05-07 RX ADMIN — Medication 10 ML: at 20:03

## 2024-05-07 RX ADMIN — MORPHINE SULFATE 2 MG: 2 INJECTION, SOLUTION INTRAMUSCULAR; INTRAVENOUS at 02:38

## 2024-05-07 RX ADMIN — ATENOLOL 50 MG: 50 TABLET ORAL at 08:32

## 2024-05-07 RX ADMIN — Medication 250 MG: at 20:02

## 2024-05-07 RX ADMIN — SODIUM CHLORIDE 100 ML/HR: 9 INJECTION, SOLUTION INTRAVENOUS at 07:08

## 2024-05-07 RX ADMIN — HYDROCODONE BITARTRATE AND ACETAMINOPHEN 1 TABLET: 5; 325 TABLET ORAL at 22:22

## 2024-05-07 RX ADMIN — ATORVASTATIN CALCIUM 40 MG: 20 TABLET, FILM COATED ORAL at 20:02

## 2024-05-07 RX ADMIN — RANOLAZINE 1000 MG: 500 TABLET, FILM COATED, EXTENDED RELEASE ORAL at 14:16

## 2024-05-07 RX ADMIN — SUCRALFATE 1 G: 1 TABLET ORAL at 20:02

## 2024-05-07 RX ADMIN — CLOPIDOGREL BISULFATE 75 MG: 75 TABLET, FILM COATED ORAL at 08:33

## 2024-05-07 RX ADMIN — HYDROCODONE BITARTRATE AND ACETAMINOPHEN 1 TABLET: 5; 325 TABLET ORAL at 06:07

## 2024-05-07 RX ADMIN — Medication 10 ML: at 08:34

## 2024-05-07 RX ADMIN — PANTOPRAZOLE SODIUM 40 MG: 40 TABLET, DELAYED RELEASE ORAL at 08:33

## 2024-05-07 RX ADMIN — ATENOLOL 50 MG: 50 TABLET ORAL at 20:02

## 2024-05-07 RX ADMIN — ASPIRIN 81 MG: 81 TABLET, COATED ORAL at 13:07

## 2024-05-07 RX ADMIN — PANTOPRAZOLE SODIUM 40 MG: 40 TABLET, DELAYED RELEASE ORAL at 20:02

## 2024-05-07 RX ADMIN — POTASSIUM CHLORIDE 20 MEQ: 750 TABLET, EXTENDED RELEASE ORAL at 08:32

## 2024-05-07 RX ADMIN — MECLIZINE HYDROCHLORIDE 12.5 MG: 25 TABLET ORAL at 08:32

## 2024-05-07 NOTE — PLAN OF CARE
Goal Outcome Evaluation:  Plan of Care Reviewed With: patient, spouse        Progress: improving  Outcome Evaluation: Pt. admitted for chest pain, PCI to L main. R radial approach, CDI & soft. R femoral venous & arterial sheaths removed today per order, pt tolerated well. Site CDI & soft. Up with SBA, ambulated in hallway with [facility] staff. NSR 70s, -140s, on RA. Plans to transfer to CVI/CVU when bed is available. Care ongoing.

## 2024-05-07 NOTE — CONSULTS
Visited patient at bedside, family also in room.     Patient and family appreciative of  support and visit. Shared they met  Jordan yesterday in the ER/Obs and that he was helpful.     This  shared how to reach out as desired. Visit shortened so patient could consult with MD. Pastoral care remains available.

## 2024-05-07 NOTE — CASE MANAGEMENT/SOCIAL WORK
Discharge Planning Assessment  [facility]     Patient Name: Slade Martinez  MRN: [id number]  Today's Date: 5/7/2024    Admit Date: 5/5/2024    Plan: Plan is home  Pt denies needs   Discharge Needs Assessment       Row Name 05/07/24 1205       Living Environment    People in Home spouse    Current Living Arrangements home    Potentially Unsafe Housing Conditions none    In the past 12 months has the electric, gas, oil, or water company threatened to shut off services in your home? No    Primary Care Provided by self    Provides Primary Care For no one    Family Caregiver if Needed spouse    Quality of Family Relationships other (see comments)    Able to Return to Prior Arrangements yes       Resource/Environmental Concerns    Resource/Environmental Concerns none    Transportation Concerns none       Transportation Needs    In the past 12 months, has lack of transportation kept you from medical appointments or from getting medications? no    In the past 12 months, has lack of transportation kept you from meetings, work, or from getting things needed for daily living? No       Food Insecurity    Within the past 12 months, you worried that your food would run out before you got the money to buy more. Never true    Within the past 12 months, the food you bought just didn't last and you didn't have money to get more. Never true       Transition Planning    Patient/Family Anticipates Transition to home    Patient/Family Anticipated Services at Transition none    Transportation Anticipated family or friend will provide       Discharge Needs Assessment    Equipment Currently Used at Home none    Concerns to be Addressed discharge planning    Anticipated Changes Related to Illness none                   Discharge Plan       Row Name 05/07/24 1206       Plan    Plan Plan is home  Pt denies needs    Plan Comments CCP spoke to patient at bedside to discuss discharge planning.  Pt lives in a single story house with his wife.   He is IADL's.  He uses n DME to ambulate.  He has no past HH history  He has been to reha at [facility] after a stroke.  He plans to return home  He denies needs   CCP following                  Continued Care and Services - Admitted Since 5/5/2024    No active coordination exists for this encounter.          Demographic Summary    No documentation.                  Functional Status    No documentation.                  Psychosocial    No documentation.                  Abuse/Neglect    No documentation.                  Legal    No documentation.                  Substance Abuse    No documentation.                  Patient Forms    No documentation.                     Demetria Reeder RN

## 2024-05-07 NOTE — PLAN OF CARE
Problem: Adult Inpatient Plan of Care  Goal: Plan of Care Review  Outcome: Ongoing, Progressing  Flowsheets (Taken 5/7/2024 8843)  Progress: improving  Plan of Care Reviewed With:   patient   family  Outcome Evaluation: Transferred to CICU from cath lab. TR band off per protocol. UOP good, no BM. Levo and dopamine off. Family updated at bedside.

## 2024-05-07 NOTE — PROGRESS NOTES
"Slade Martinez  1950 73 y.o.  [id number]      Patient Care Team:  Triny Hannon MD as PCP - General (Internal Medicine)  James Hubbard MD as Consulting Physician (Cardiology)    CC: Prior history of cath and stent, history of slow AVNRT, unstable angina status post difficult complex PCI yesterday complicated by perforation of the coronary artery    Interval History: He is doing better today but the procedure was pretty tough yesterday it was a heroic effort by the interventionist    Objective   Vital Signs  Temp:  [97.5 °F (36.4 °C)-98.7 °F (37.1 °C)] 98.4 °F (36.9 °C)  Heart Rate:  [67-84] 75  Resp:  [12-22] 22  BP: ()/() 151/70    Intake/Output Summary (Last 24 hours) at 5/7/2024 1128  Last data filed at 5/7/2024 0842  Gross per 24 hour   Intake 2170.02 ml   Output 1200 ml   Net 970.02 ml     Flowsheet Rows      Flowsheet Row First Filed Value   Admission Height 180.3 cm (71\") Documented at 05/05/2024 0745   Admission Weight 90.7 kg (199 lb 15.3 oz) Documented at 05/05/2024 0745            Physical Exam:   General Appearance:    Alert,oriented, in no acute distress   Lungs:     Clear to auscultation,BS are equal    Heart:    Normal S1 and S2, RRR without murmur, gallop or rub   HEENT:    Sclerae are clear, no JVD or adenopathy   Abdomen:     Normal bowel sounds, soft nontender, nondistended, no HSM   Extremities:   Moves all extremities well, no edema, no cyanosis, no             Redness, no rash     Medication Review:      aspirin, 81 mg, Oral, Daily  atenolol, 50 mg, Oral, BID  atorvastatin, 40 mg, Oral, Nightly  baclofen, 10 mg, Oral, Nightly  clopidogrel, 75 mg, Oral, Daily  meclizine, 12.5 mg, Oral, Daily  pantoprazole, 40 mg, Oral, BID  potassium chloride, 20 mEq, Oral, Daily  ranolazine, 1,000 mg, Oral, Q12H  saccharomyces boulardii, 250 mg, Oral, Nightly  sodium chloride, 10 mL, Intravenous, Q12H  sucralfate, 1 g, Oral, Nightly      DOPamine, 2-20 mcg/kg/min, Last Rate: Stopped " (05/06/24 2300)  norepinephrine, 0.02-0.3 mcg/kg/min, Last Rate: Stopped (05/07/24 0348)  sodium chloride, 125 mL/hr, Last Rate: 125 mL/hr (05/06/24 1259)          I reviewed the patient's new clinical results.  I personally viewed and interpreted the patient's EKG/Telemetry data    Assessment/Plan  Active Hospital Problems    Diagnosis  POA    **Chest pain [R07.9]  Yes      Resolved Hospital Problems   No resolved problems to display.     Well he looks pretty good today I had a tough tough intervention yesterday.  This apprising thing was he was so sick from a coronary standpoint but had normal troponins evidently had a normal stress test.  Will have to remember that moving forward.  His RCA looks pretty good his LAD looks pretty good now.  And his LV function is still preserved we did a limited echo today and it looks okay.  We are going to pull his sheaths and then see how he is doing and maybe move out of the CICU later today or tomorrow.     Negro Locke MD  05/07/24  11:28 EDT

## 2024-05-07 NOTE — CONSULTS
Group: [facility]         CONSULT NOTE    Patient Identification:  Slade Martinez  73 y.o.  male  1950  [id number]            Requesting physician: Dr. Locke    Reason for Consultation: Shock, ICU care    CC: Chest pain     History of Present Illness:  Slade Martinez is a 73-year-old male with past medical history of coronary artery disease (CABG x 2), hyperlipidemia, hypertension, and fractured vertebrae in his lower back.  He was recently diagnosed with slow reentrant AVNRT/atrial tachycardia started on amiodarone per  cardiology.    He originally presented to the hospital on 5/5/2024 with complaints of back pain, midsternal chest pain with radiation.  Reported associated shortness of breath.  He was evaluated cardiology, who initially felt that patient's pain was not ischemia related.  Evaluation and possible differential diagnosis came back negative and patient was taken to the cardiac Cath Lab on 5/6/2024 to further evaluate if pain was secondary to unstable angina.    In the cardiac Cath Lab, patient was found to have severe complicated coronary artery disease including occluded circumflex artery at the ostium, ulcerated 95% ostial to proximal left main stenosis and 90% mid LAD stenosis with previously placed stents to the LAD.  Patient underwent balloon angioplasty left main, the LAD proved much more difficult to intervene upon.  During procedure, patient was noted to have a perforation of the mid LAD which was improved after Graftmaster stent was placed.  Limited echo showed some concern for pericardial hematoma without RV impression.  Patient became acutely hypotensive after cardiac cath,  requiring supportive vasopressors.  Overnight, patient has been able to be successfully weaned off of vasopressor support.  His pain has been relatively controlled, only real complaint is of low back pain which is chronic.    Review of Systems:  CONSTITUTIONAL:  Denies fevers or chills  EYE:  No new  vision changes  EAR:  No change in hearing  CARDIAC:  +chest pain (improved)  PULMONARY:  No productive cough or shortness of breath  GI:  No diarrhea, hematemesis or hematochezia  RENAL:  No dysuria or urinary frequency  MUSCULOSKELETAL:  + low back pain  ENDOCRINE:  No heat or cold intolerance  INTEGUMENTARY: No skin rashes  NEUROLOGICAL:  No dizziness or confusion.  No seizure activity  PSYCHIATRIC:  No new anxiety or depression  12 system review of systems performed and all else negative     Past Medical History:  Past Medical History:   Diagnosis Date    Constipation     Coronary atherosclerosis 11/04/2019    H/O CABG SVG to first diagonal branch and to the LAD as well as SVG to the lateral marginal branch of the circumflex complicated by right sided subcortical infarct with left sided hemiparesis    Dyslipidemia 11/04/2019    Essential hypertension 11/04/2019    Injury of back     fractured vertebra 2/26/23    Peripheral edema     Stroke 2011    Vertigo        Past Surgical History:  Past Surgical History:   Procedure Laterality Date    CAROTID ENDARTERECTOMY  2000    Left    CHOLECYSTECTOMY      CORONARY ARTERY BYPASS GRAFT      CORONARY STENT PLACEMENT      X17 stents        Home Meds:  Medications Prior to Admission   Medication Sig Dispense Refill Last Dose    amLODIPine (NORVASC) 5 MG tablet Take 1 tablet by mouth Daily As Needed (Instructed to not take medication if BP <110/70).   Past Month    aspirin 81 MG tablet Take 1 tablet by mouth Daily.   5/5/2024    atenolol (TENORMIN) 50 MG tablet Take 1 tablet by mouth 2 (Two) Times a Day.   5/5/2024    atorvastatin (LIPITOR) 40 MG tablet Take 1 tablet by mouth Every Night.   5/4/2024    baclofen (LIORESAL) 10 MG tablet Take 1 tablet by mouth every night at bedtime.   5/4/2024    clopidogrel (PLAVIX) 75 MG tablet Take 1 tablet by mouth Daily.   5/5/2024    hydroCHLOROthiazide 25 MG tablet Take 1 tablet by mouth Daily As Needed (swelling).   5/4/2024     isosorbide mononitrate (IMDUR) 60 MG 24 hr tablet Take 1 tablet by mouth 2 (Two) Times a Day.   5/5/2024    Linzess 145 MCG capsule capsule Take 1 capsule by mouth Every Other Day.   5/4/2024    lisinopril (PRINIVIL,ZESTRIL) 40 MG tablet Take 1 tablet by mouth 2 (Two) Times a Day.   5/5/2024    loratadine (CLARITIN) 10 MG tablet Take 1 tablet by mouth Daily.   5/5/2024    meclizine (ANTIVERT) 12.5 MG tablet Take 1 tablet by mouth Daily.   5/5/2024    Multiple Vitamins-Minerals (MULTIVITAMIN ADULT EXTRA C PO) Take 1 tablet by mouth Daily.   5/5/2024    nitroglycerin (NITROSTAT) 0.4 MG SL tablet Place 1 tablet under the tongue Every 5 (Five) Minutes As Needed for Chest Pain. Take no more than 3 doses in 15 minutes. 25 tablet 3 Past Month    Omega-3 Fatty Acids (FISH OIL) 1000 MG capsule capsule Take 1 capsule by mouth Daily.   5/5/2024    pantoprazole (PROTONIX) 40 MG EC tablet Take 1 tablet by mouth 2 (Two) Times a Day.   5/5/2024    potassium chloride 10 MEQ CR tablet Take 2 tablets by mouth Daily.   5/5/2024    ranolazine (RANEXA) 1000 MG 12 hr tablet Take 1 tablet by mouth Every 12 (Twelve) Hours. 60 tablet 11 5/5/2024    saccharomyces boulardii (FLORASTOR) 250 MG capsule Take 1 capsule by mouth Every Night.   5/4/2024    sucralfate (CARAFATE) 1 g tablet Take 1 tablet by mouth every night at bedtime.   5/5/2024    triamcinolone (KENALOG) 0.1 % cream Apply 1 Application topically to the appropriate area as directed 2 (Two) Times a Day As Needed for Rash.       amiodarone (PACERONE) 200 MG tablet Take 1 tablet by mouth Daily. (Patient taking differently: Take 1 tablet by mouth Every Other Day.) 30 tablet 0 5/3/2024       Allergies:  Allergies   Allergen Reactions    Pletal [Cilostazol] Myalgia     .       Social History:   Social History     Socioeconomic History    Marital status:    Tobacco Use    Smoking status: Former     Types: Cigarettes    Smokeless tobacco: Never   Vaping Use    Vaping status: Never  "Used   Substance and Sexual Activity    Alcohol use: Not Currently    Drug use: Never    Sexual activity: Defer       Family History:  Family History   Problem Relation Age of Onset    COPD Mother     Heart attack Father     Heart failure Father     COPD Brother        Physical Exam:  /69   Pulse 78   Temp 98.7 °F (37.1 °C) (Oral)   Resp 16   Ht 180.3 cm (71\")   Wt 87.4 kg (192 lb 10.9 oz)   SpO2 96%   BMI 26.87 kg/m²  Body mass index is 26.87 kg/m². 96% 87.4 kg (192 lb 10.9 oz)    Constitutional: Middle aged male pt in bed, No acute respiratory distress, no accessory muscle use  Head: - NCAT  Eyes: No pallor, Anicteric conjunctiva, EOMI.  ENMT:  Mallampati 3, no oral thrush. Dry MM.   NECK: Trachea midline, No thyromegaly, no palpable cervical lymphadenopathy, no JVD  Heart: RRR, no murmur. No pedal edema   Lungs: MELISSA +, No wheezes/ crackles heard    Abdomen: Soft. No tenderness, guarding or rigidity. No palpable masses  Extremities: Extremities warm and well perfused. No cyanosis/ clubbing  Neuro: Conscious, answers appropriately, no gross focal neuro deficits  Psych: Mood and affect appropriate    PPE recommended per [facility] infectious disease Isolation protocol for the current clinical scenario(as mentioned below) was followed.      LABS:  COVID19   Date Value Ref Range Status   03/03/2023 Not Detected Not Detected - Ref. Range Final       Lab Results   Component Value Date    CALCIUM 8.7 05/06/2024     Results from last 7 days   Lab Units 05/06/24  1745 05/06/24  0412 05/05/24  0736   SODIUM mmol/L  --  135* 135*   POTASSIUM mmol/L  --  4.4 4.4   CHLORIDE mmol/L  --  101 99   CO2 mmol/L  --  26.5 25.9   BUN mg/dL  --  14 10   CREATININE mg/dL  --  0.98 0.86   GLUCOSE mg/dL  --  86 174*   CALCIUM mg/dL  --  8.7 9.3   WBC 10*3/mm3 12.68* 9.12 11.71*   HEMOGLOBIN g/dL 11.5* 11.4* 12.6*   PLATELETS 10*3/mm3 281 239 272   ALT (SGPT) U/L  --   --  14   AST (SGOT) U/L  --   --  12     Lab Results "   Component Value Date    TROPONINI <0.010 02/27/2023    TROPONINT 14 05/06/2024     Results from last 7 days   Lab Units 05/06/24  0412 05/05/24  1300 05/05/24  1051   HSTROP T ng/L 14 11 12                             Lab Results   Component Value Date    TSH 4.120 04/05/2024     Estimated Creatinine Clearance: 81.8 mL/min (by C-G formula based on SCr of 0.98 mg/dL).  Results from last 7 days   Lab Units 05/05/24  1709   NITRITE UA  Negative   WBC UA /HPF 0-2   BACTERIA UA /HPF None Seen   SQUAM EPITHEL UA /HPF 0-2        Imaging: I personally visualized the images of scans/x-rays performed within last 3 days.      Assessment:  Unstable angina  Coronary artery disease status post CABG and PCI  Shock, post cardiac cath  AVNRT  Hypertension  Hyperlipidemia  Chronic back pain    Recommendations:  Shock, post cardiac cath  Unclear if patient sustained a vagal response after having cardiac intervention, no evidence of hypervolemia on exam.  Patient appears pink and well-perfused.  Norepinephrine was initially required to maintain MAP greater than 65, this was able to be quickly weaned off overnight.    Unstable angina  Coronary artery disease status post CABG and PCI  Hypertension  Hyperlipidemia  Managed by cardiology.    AVNRT  Managed by EP cardiology.    Chronic back pain  Patient has a history of fractures in his low back-previously has been evaluated for surgery and deemed not appropriate.  He is on a compounded medication at home that includes 11 different medications.  Continues to complain of back spasms, does take baclofen at home.  He is narcotic naïve, and just received a Lortab for his back pain.  If patient continues to have back spasms and back pain with this on board and remains hemodynamically stable, may consider adding antibaclofen to help with back pain.  Also suspect the back pain will improve when she desirudin patient is able to sit up and not have to lie flat.      Patient was placed in face  mask upon entering room and kept mask on throughout our encounter. I wore full protective equipment throughout this patient encounter including a face mask, gown and gloves. Hand hygiene was performed before donning protective equipment and after removal when leaving the room.    Yane Ramirez, APRN  5/7/2024  06:15 EDT      Much of this encounter note is an electronic transcription/translation of spoken language to printed text using Dragon Software.

## 2024-05-07 NOTE — PERIOPERATIVE NURSING NOTE
Patient arrived from [facility] with TR band to right radial wrist. Slight oozing noted and CCL staff stated they had removed 2 cc of air prior to transfer.  CCL RN reinserted 4 cc of air with cessation of oozing.  Right radial site is now clean and soft with no bleeding noted.  Will attempt band removal again in approximately 90 minutes.

## 2024-05-08 LAB
ANION GAP SERPL CALCULATED.3IONS-SCNC: 9 MMOL/L (ref 5–15)
BASOPHILS # BLD AUTO: 0.02 10*3/MM3 (ref 0–0.2)
BASOPHILS NFR BLD AUTO: 0.3 % (ref 0–1.5)
BUN SERPL-MCNC: 21 MG/DL (ref 8–23)
BUN/CREAT SERPL: 19.8 (ref 7–25)
CALCIUM SPEC-SCNC: 8.4 MG/DL (ref 8.6–10.5)
CHLORIDE SERPL-SCNC: 101 MMOL/L (ref 98–107)
CO2 SERPL-SCNC: 24 MMOL/L (ref 22–29)
CREAT SERPL-MCNC: 1.06 MG/DL (ref 0.76–1.27)
DEPRECATED RDW RBC AUTO: 46.3 FL (ref 37–54)
EGFRCR SERPLBLD CKD-EPI 2021: 73.6 ML/MIN/1.73
EOSINOPHIL # BLD AUTO: 0.03 10*3/MM3 (ref 0–0.4)
EOSINOPHIL NFR BLD AUTO: 0.4 % (ref 0.3–6.2)
ERYTHROCYTE [DISTWIDTH] IN BLOOD BY AUTOMATED COUNT: 13 % (ref 12.3–15.4)
GLUCOSE SERPL-MCNC: 138 MG/DL (ref 65–99)
HCT VFR BLD AUTO: 32.8 % (ref 37.5–51)
HGB BLD-MCNC: 10.7 G/DL (ref 13–17.7)
IMM GRANULOCYTES # BLD AUTO: 0.02 10*3/MM3 (ref 0–0.05)
IMM GRANULOCYTES NFR BLD AUTO: 0.3 % (ref 0–0.5)
LYMPHOCYTES # BLD AUTO: 0.9 10*3/MM3 (ref 0.7–3.1)
LYMPHOCYTES NFR BLD AUTO: 13.3 % (ref 19.6–45.3)
MCH RBC QN AUTO: 31.7 PG (ref 26.6–33)
MCHC RBC AUTO-ENTMCNC: 32.6 G/DL (ref 31.5–35.7)
MCV RBC AUTO: 97 FL (ref 79–97)
MONOCYTES # BLD AUTO: 0.83 10*3/MM3 (ref 0.1–0.9)
MONOCYTES NFR BLD AUTO: 12.2 % (ref 5–12)
NEUTROPHILS NFR BLD AUTO: 4.99 10*3/MM3 (ref 1.7–7)
NEUTROPHILS NFR BLD AUTO: 73.5 % (ref 42.7–76)
NRBC BLD AUTO-RTO: 0 /100 WBC (ref 0–0.2)
PLATELET # BLD AUTO: 158 10*3/MM3 (ref 140–450)
PMV BLD AUTO: 10.1 FL (ref 6–12)
POTASSIUM SERPL-SCNC: 4.6 MMOL/L (ref 3.5–5.2)
QT INTERVAL: 391 MS
QTC INTERVAL: 428 MS
RBC # BLD AUTO: 3.38 10*6/MM3 (ref 4.14–5.8)
SODIUM SERPL-SCNC: 134 MMOL/L (ref 136–145)
WBC NRBC COR # BLD AUTO: 6.79 10*3/MM3 (ref 3.4–10.8)

## 2024-05-08 PROCEDURE — 93005 ELECTROCARDIOGRAM TRACING: CPT | Performed by: INTERNAL MEDICINE

## 2024-05-08 PROCEDURE — 80048 BASIC METABOLIC PNL TOTAL CA: CPT | Performed by: INTERNAL MEDICINE

## 2024-05-08 PROCEDURE — 93010 ELECTROCARDIOGRAM REPORT: CPT | Performed by: INTERNAL MEDICINE

## 2024-05-08 PROCEDURE — 99233 SBSQ HOSP IP/OBS HIGH 50: CPT | Performed by: INTERNAL MEDICINE

## 2024-05-08 PROCEDURE — 85025 COMPLETE CBC W/AUTO DIFF WBC: CPT | Performed by: INTERNAL MEDICINE

## 2024-05-08 RX ORDER — HYDROCHLOROTHIAZIDE 25 MG/1
25 TABLET ORAL
Status: DISCONTINUED | OUTPATIENT
Start: 2024-05-08 | End: 2024-05-09 | Stop reason: HOSPADM

## 2024-05-08 RX ADMIN — PANTOPRAZOLE SODIUM 40 MG: 40 TABLET, DELAYED RELEASE ORAL at 23:15

## 2024-05-08 RX ADMIN — ASPIRIN 81 MG: 81 TABLET, COATED ORAL at 08:45

## 2024-05-08 RX ADMIN — Medication 10 ML: at 10:00

## 2024-05-08 RX ADMIN — CLOPIDOGREL BISULFATE 75 MG: 75 TABLET, FILM COATED ORAL at 08:45

## 2024-05-08 RX ADMIN — MECLIZINE HYDROCHLORIDE 12.5 MG: 25 TABLET ORAL at 08:45

## 2024-05-08 RX ADMIN — Medication 10 ML: at 23:24

## 2024-05-08 RX ADMIN — POTASSIUM CHLORIDE 20 MEQ: 750 TABLET, EXTENDED RELEASE ORAL at 10:07

## 2024-05-08 RX ADMIN — RANOLAZINE 1000 MG: 500 TABLET, FILM COATED, EXTENDED RELEASE ORAL at 04:15

## 2024-05-08 RX ADMIN — ATORVASTATIN CALCIUM 40 MG: 20 TABLET, FILM COATED ORAL at 23:15

## 2024-05-08 RX ADMIN — ATENOLOL 50 MG: 50 TABLET ORAL at 08:45

## 2024-05-08 RX ADMIN — TEMAZEPAM 15 MG: 15 CAPSULE ORAL at 23:15

## 2024-05-08 RX ADMIN — SUCRALFATE 1 G: 1 TABLET ORAL at 23:15

## 2024-05-08 RX ADMIN — PANTOPRAZOLE SODIUM 40 MG: 40 TABLET, DELAYED RELEASE ORAL at 08:45

## 2024-05-08 RX ADMIN — HYDROCHLOROTHIAZIDE 25 MG: 25 TABLET ORAL at 13:40

## 2024-05-08 NOTE — PLAN OF CARE
Goal Outcome Evaluation:     Pt remains in CICU on room air. PRN Norco given due to chronic lower back pain. Vital signs remain stable. Wife updated at bedside. Will continue to monitor.

## 2024-05-08 NOTE — PROGRESS NOTES
"  Intensive Care Unit Daily Progress Note.   [facility] CARDIAC INTENSIVE CARE  5/8/2024    Patient Name:  Slade Martinez  MRN:  [id number]  YOB: 1950  Age: 73 y.o.  Sex: male         Reason for Admission / Chief Complaint:  Chest pain    Hospital Course:   73-year-old male with a history of coronary disease status post CABG who presented with chest pain and taken for left heart catheterization with left main stenting.    Interval History:  No events overnight  Denies any chest pain  Denies any shortness of breath or cough  No nausea or vomiting  States that he is overall feeling better    Physical Exam:  /64   Pulse 74   Temp 99.2 °F (37.3 °C) (Oral)   Resp 18   Ht 180.3 cm (71\")   Wt 90.3 kg (199 lb)   SpO2 95%   BMI 27.75 kg/m²   Body mass index is 27.75 kg/m².    Intake/Output    Intake/Output Summary (Last 24 hours) at 5/8/2024 1147  Last data filed at 5/8/2024 0800  Gross per 24 hour   Intake 360 ml   Output 75 ml   Net 285 ml     General: Alert, nontoxic, NAD  HEENT: NC/AT, EOMI, MMM  Neck: Supple, trachea midline  Cardiac: RRR, no murmur, gallops, rubs  Pulmonary: Clear to auscultation bilaterally, no adventitious breath sounds, normal respiratory effort  GI: Soft, non-tender, non-distended, normal bowel sounds  Extremities: Warm, well perfused, no LE edema  Skin: no visible rash  Neuro: CN II - XII grossly intact  Psychiatry: Normal mood and affect    Data Review:  Notable Labs:  Results from last 7 days   Lab Units 05/08/24  0923 05/07/24  0601 05/06/24  1745 05/06/24  0412 05/05/24  0736   WBC 10*3/mm3 6.79 8.59 12.68* 9.12 11.71*   HEMOGLOBIN g/dL 10.7* 11.4* 11.5* 11.4* 12.6*   PLATELETS 10*3/mm3 158 220 281 239 272     Results from last 7 days   Lab Units 05/08/24  0923 05/07/24  0601 05/06/24  0412 05/05/24  0736   SODIUM mmol/L 134* 134* 135* 135*   POTASSIUM mmol/L 4.6 4.8 4.4 4.4   CHLORIDE mmol/L 101 106 101 99   CO2 mmol/L 24.0 20.0* 26.5 25.9   BUN " mg/dL 21 16 14 10   CREATININE mg/dL 1.06 0.94 0.98 0.86   GLUCOSE mg/dL 138* 124* 86 174*   CALCIUM mg/dL 8.4* 8.6 8.7 9.3   Estimated Creatinine Clearance: 78.1 mL/min (by C-G formula based on SCr of 1.06 mg/dL).    Results from last 7 days   Lab Units 05/08/24  0923 05/07/24  0601 05/06/24  1745 05/06/24  0412 05/05/24  0736   AST (SGOT) U/L  --   --   --   --  12   ALT (SGPT) U/L  --   --   --   --  14   PLATELETS 10*3/mm3 158 220 281   < > 272    < > = values in this interval not displayed.             Imaging:  Reviewed chest images personally from past 3 days    ASSESSMENT  /  PLAN:    Unstable angina  Coronary artery disease status post CABG and PCI  Status post PCI to LAD.  Shock, post cardiac cath  AVNRT  Hypertension  Hyperlipidemia  Chronic back pain    -Patient presented with chest pain and taken for PCI with stent placed in the LAD.  -Improved symptoms, no chest pain, hemodynamically stable  -No longer requiring vasopressor support  -Appreciate cardiology recommendations  -Continue antiplatelets with aspirin and Plavix    All issues new to me today.  Prior hospital course, labs and imaging reviewed.    GI prophylaxis: Pantoprazole  DVT prophylaxis: SCDs  Grimes catheter: No  Bowel regimen: Ordered  Diet: Cardiac    Discharge: Transfer to floor.  Anticipate discharge in the next 24 to 48 hours.    Onel Quezada MD  [facility]  Pulmonary and Critical Care Medicine, Interventional Pulmonology    Parts of this note may be an electronic transcription/translation of spoken language to printed text using the Dragon dictation system.

## 2024-05-08 NOTE — CONSULTS
Met with patient and family to discuss the benefits of cardiac rehab. Provided phase II information along with the contact information for cardiac rehab  at [facility] . Requested for referral to be sent.

## 2024-05-08 NOTE — PROGRESS NOTES
"Slade Martinze  1950 73 y.o.  [id number]      Patient Care Team:  Triny Hannon MD as PCP - General (Internal Medicine)  James Hubbard MD as Consulting Physician (Cardiology)    CC: Prior history of cath and stent, history of slow AVNRT, unstable angina status post difficult complex PCI yesterday complicated by perforation of the coronary artery    Interval History: He is doing better just was really tired yesterday    Objective   Vital Signs  Temp:  [97.8 °F (36.6 °C)-99.2 °F (37.3 °C)] 99.2 °F (37.3 °C)  Heart Rate:  [63-80] 74  Resp:  [16-18] 18  BP: ()/(60-71) 142/64    Intake/Output Summary (Last 24 hours) at 5/8/2024 1044  Last data filed at 5/8/2024 0800  Gross per 24 hour   Intake 360 ml   Output 75 ml   Net 285 ml     Flowsheet Rows      Flowsheet Row First Filed Value   Admission Height 180.3 cm (71\") Documented at 05/05/2024 0745   Admission Weight 90.7 kg (199 lb 15.3 oz) Documented at 05/05/2024 0745            Physical Exam:   General Appearance:    Alert,oriented, in no acute distress   Lungs:     Clear to auscultation,BS are equal    Heart:    Normal S1 and S2, RRR without murmur, gallop or rub   HEENT:    Sclerae are clear, no JVD or adenopathy   Abdomen:     Normal bowel sounds, soft nontender, nondistended, no HSM   Extremities:   Moves all extremities well, no edema, no cyanosis, no             Redness, no rash     Medication Review:      aspirin, 81 mg, Oral, Daily  atenolol, 50 mg, Oral, BID  atorvastatin, 40 mg, Oral, Nightly  baclofen, 10 mg, Oral, Nightly  clopidogrel, 75 mg, Oral, Daily  meclizine, 12.5 mg, Oral, Daily  pantoprazole, 40 mg, Oral, BID  potassium chloride, 20 mEq, Oral, Daily  saccharomyces boulardii, 250 mg, Oral, Nightly  sodium chloride, 10 mL, Intravenous, Q12H  sucralfate, 1 g, Oral, Nightly               I reviewed the patient's new clinical results.  I personally viewed and interpreted the patient's EKG/Telemetry data    Assessment/Plan  Active " Hospital Problems    Diagnosis  POA    **Chest pain [R07.9]  Yes      Resolved Hospital Problems   No resolved problems to display.     He had a difficult and complex PCI there is been a little bit of a troponin rise but it is also in the setting of a perforation of the coronary and the vessels actually look pretty good and his perfusion to his heart is pretty good he is get a vein graft to his OM1 he is got stenting down his LAD and has had a prior stent to his RCA I went back off some of his medicines and like him to move out of the unit move around and if he is okay probably try and discharge him tomorrow    Negro Locke MD  05/08/24  10:44 EDT

## 2024-05-09 ENCOUNTER — READMISSION MANAGEMENT (OUTPATIENT)
Dept: CALL CENTER | Facility: HOSPITAL | Age: 74
End: 2024-05-09
Payer: MEDICARE

## 2024-05-09 VITALS
BODY MASS INDEX: 27.86 KG/M2 | TEMPERATURE: 97.8 F | DIASTOLIC BLOOD PRESSURE: 59 MMHG | SYSTOLIC BLOOD PRESSURE: 130 MMHG | OXYGEN SATURATION: 100 % | HEART RATE: 70 BPM | HEIGHT: 71 IN | WEIGHT: 199 LBS | RESPIRATION RATE: 18 BRPM

## 2024-05-09 PROCEDURE — 99238 HOSP IP/OBS DSCHRG MGMT 30/<: CPT | Performed by: NURSE PRACTITIONER

## 2024-05-09 RX ADMIN — Medication 10 ML: at 09:23

## 2024-05-09 RX ADMIN — CLOPIDOGREL BISULFATE 75 MG: 75 TABLET, FILM COATED ORAL at 09:22

## 2024-05-09 RX ADMIN — POTASSIUM CHLORIDE 20 MEQ: 750 TABLET, EXTENDED RELEASE ORAL at 09:22

## 2024-05-09 RX ADMIN — MECLIZINE HYDROCHLORIDE 12.5 MG: 25 TABLET ORAL at 09:22

## 2024-05-09 RX ADMIN — PANTOPRAZOLE SODIUM 40 MG: 40 TABLET, DELAYED RELEASE ORAL at 09:22

## 2024-05-09 RX ADMIN — ASPIRIN 81 MG: 81 TABLET, COATED ORAL at 09:22

## 2024-05-09 RX ADMIN — ATENOLOL 50 MG: 50 TABLET ORAL at 09:22

## 2024-05-09 NOTE — PLAN OF CARE
Goal Outcome Evaluation:  Plan of Care Reviewed With: patient        Progress: improving  Outcome Evaluation: Pt s/p LHC with balloon angiolplasty & PCI to Left Main, graft master stent to mid LAD. Pt has right radial & groin site C/D/I. Pt does have gait unstability related to past CVA with associated tremors & weakness to Left side, staff assistance with ambulation. Spouse at bedside to assist with ADLs. NSR with 1st degree AVB on monitor, other VSS. No acute concerns at this time. Plans for discharge on today. Plan of care ongoing

## 2024-05-09 NOTE — DISCHARGE SUMMARY
Hospital Discharge    Patient Name: Slade Martinez  Age/Sex: 73 y.o. male  : 1950  MRN: [id number]    Encounter Provider: SHERRILL Roe  Referring Provider: Omega Gallo MD  Place of Service: [facility] CARDIOLOGY  Patient Care Team:  Triny Hannon MD as PCP - General (Internal Medicine)  James Hubbard MD as Consulting Physician (Cardiology)         Date of Discharge:  2024   Date of Admit: 2024    Discharge Condition: Stable  Discharge Diagnosis:    Chest pain      Hospital Course:   Slade Martinez is a 73 y.o. male with known coronary artery disease (history of CABG in ).  He is formerly followed by Dr. Hubbard.  In early April, he presented with palpitations and chest pressure.  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    Since that time he has done terribly.  On , he presented with abdominal and chest discomfort along with dizziness.  EP was asked to evaluate and provide an opinion on continuing the amiodarone.  Ultimately, the amiodarone was discontinued.  He was advised to follow-up in 6 weeks to discuss possible ablation.  His troponins remained negative but the chest pain persisted.  Ultimately, he was taken for cardiac catheterization.  On , this demonstrated a severely calcified 90% proximal left main, severely calcified 70% proximal mid LAD, 90% in-stent restenosis of the mid segment, and a chronic total occlusion of the in-stent ostial circumflex.  He underwent complex PCI of the left main to proximal LAD with PCI of the mid LAD.  This was complicated by balloon rupture and perforation in the proximal to mid LAD.  Fortunately he did well.  This morning he is resting comfortably.  He is stable and ready for discharge.  He will follow-up with me in 1 week and with Dr. Locke in 1 month.    Objective:  Temp:  [97.8 °F (36.6 °C)-99.8 °F (37.7 °C)] 97.8 °F (36.6 °C)  Heart Rate:  [63-81]  70  Resp:  [18] 18  BP: (130-164)/(59-80) 130/59    Intake/Output Summary (Last 24 hours) at 5/9/2024 1159  Last data filed at 5/9/2024 0900  Gross per 24 hour   Intake 480 ml   Output 300 ml   Net 180 ml     Body mass index is 27.75 kg/m².      05/07/24  0500 05/07/24  0706 05/07/24  0934   Weight: 87.4 kg (192 lb 10.9 oz) 90.3 kg (199 lb) 90.3 kg (199 lb)     Weight change:     Physical Exam:  Constitutional: Well appearing, well developed, no acute distress   HENT: Oropharynx clear and membrane moist  Eyes: Normal conjunctiva, no sclera icterus.  Neck: Supple, no carotid bruit bilaterally.  Cardiovascular: Regular rate and rhythm, No Murmur, No bilateral lower extremity edema.  Pulmonary: Normal respiratory effort, normal lung sounds, no wheezing.  Abdominal: Soft, nontender, no hepatosplenomegaly, liver is non-pulsatile.  Neurological: Alert and orient x 3.   Skin: Warm, dry, no ecchymosis, no rash. Radial site well healed without erythema or edema. Proximal and distal pulses palpable. Good capillary refill.  Right groin site C, D, I.    Psych: Appropriate mood and affect. Normal judgment and insight.    Procedures Performed  Procedure(s):  Left Heart Cath  Coronary angiography  Percutaneous Coronary Intervention  Intravascular Ultrasound  Stent TC coronary  Intravascular Lithotripsy  Saphenous Vein Graft     Interventionalist: Gurwinder Hurd M.D., F.A.C.C.     Procedure performed:  1.  Coronary angiography  2.  Graft angiography  3.  PCI of the left main to mid LAD  4.  IVUS of left main and LAD.     Indication:  Unstable angina     Referring: Chucky     Procedure report:  Informed consent was obtained and the patient was brought to the cardiac catheterization lab for coronary angiography and graft angiography.  The right wrist was prepped and draped in a sterile fashion.  Lidocaine was infused in the subcutaneous tissue for anesthesia.  Access was obtained to the right radial artery with a 20-gauge  radial artery needle.  A 5/6 Israeli 11 cm slender sheath was then advanced into the right radial artery without difficulty.  A 5 Israeli JL 3 catheter was used for left coronary angiography.  A 5 Israeli JR5 catheter was used for right coronary angiography.  A 4 Israeli JR4 catheter was used for SVG to OM and SVG to diagonal angiography.    At case completion the radial sheath was removed and a TR band was placed over the right radial artery access.  The right common femoral artery and vein were left in place for monitoring.        Procedure findings:  Left main: Large-caliber vessel with severe calcification and an ulcerated 95% ostial proximal stenosis.  Bifurcates in LAD and circumflex.  LAD: Medium caliber vessel with previously placed stent in the midsegment.  Diffuse, severely calcified 70% proximal to mid vessel stenosis.  There is a discrete 90% in-stent restenosis.  Left circumflex: Previously stented in the ostial proximal segment.  Chronic total occlusion at the ostium.  Medium caliber OM1 branch gives flow from the SVG to OM1 graft.  RCA: Large-caliber, dominant vessel gives rise to a small caliber PDA, small caliber RPL 1 and medium caliber RPL 2 branch.  Previously placed stent in the mid RCA with a diffuse 40% stenosis.  SVG-OM1: Normal  SVG-diagonal: Normal     Percutaneous intervention report:  Unfractionated heparin was used for anticoagulation from therapeutic by ACT.  A 6 Israeli XB 3.0 guide catheter was used to engage the left main.  Following this a 3.0 x 20 mm trek balloon was used to predilate the Left main and very proximal LAD.  This was unable to be advanced to the mid LAD.  Following this a 3.5 x 12 mm noncompliant trek balloon was again used to predilate the left main.  This was predilated to 20 mahsa.  A 3.5 x 18 mm Xience SkyPoint drug-eluting stent was deployed across the ostial left main to very proximal LAD at 14 mahsa.  This was then postdilated to high-pressure with a 4.0 x 12 mm  noncompliant trek balloon to 24 mahsa.     Following this I was able to advance a guide liner to the proximal LAD and subsequently dilate the proximal to mid LAD with a 3.0 x 12 mm trek balloon and a 3.5 x 12 mm noncompliant trek balloon to 20 mahsa.  There was residual waist in the balloon and as such a 3.5 mm shockwave balloon was used to perform balloon lithotripsy to the proximal to mid LAD.  A 4.0 x 12 mm noncompliant trek balloon was then used to try to dilate the LAD once again to 20 mahsa.  There was balloon rupture at that time and subsequent angiography showed a perforation in the proximal to mid LAD.  Prolonged balloon inflation was then performed with a 4.0 x 20 mm trek balloon.  There showed improvement in the perforation.  Patient maintain a normotensive state during this time.  Prolonged balloon inflation was once again performed, however secondary to residual leak the decision was made at that point in time to cover the perforation with a Graftmaster.  A 4.0 x 19 mm Graftmaster was deployed at 14 mahsa.  This was postdilated once again to 20 mahsa with a 4.0 mm noncompliant trek balloon.  No residual leak noted time.  The mid LAD stenosis was then covered with a 3.5 x 23 mm Xience SkyPoint drug-eluting stent.  The stent was postdilated with a 4.0 mm noncompliant trek balloon to 16 mahsa.     Patient was noted to be mildly hypotensive requiring Levophed at that time.  The 4 mm noncompliant trek balloon was then pulled back and used to post dilate the left main stent once again to 20 mahsa.  IVUS was then performed.  No evidence of distal edge dissection.  There was a large calcium chunk in the left main with reasonable stent expansion.  Unfortunately the ostium of the left main looked to be uncovered and as such I placed a 4.5 x 12 mm Xience drake point drug-eluting stent of the ostial to proximal left main and flared the ostium of the stent balloon.     After we finished the patient was noted to be significantly  hypotensive and we prepped the groin.  The right common femoral vein and artery were accessed with ultrasound guidance and a micropuncture needle.  A 4 Sinhala sheath was advanced into the right common femoral artery.  A 6 Sinhala sheath was advanced into the right common femoral vein to allow for central access.  SVG to OM, SVG to diagonal, and the left main were revisualized with angiography and were all patent.     Hemodynamics:   AO: 74/52/45        PCI CORONARY SEGMENT: Proximal left main  PRE-STENOSIS: 95  POST-STENOSIS: 0%  LESION TYPE: c  TOD FLOW PRE/POST: 2/3  CULPRIT LESION: Yes     PCI CORONARY SEGMENT: LAD  PRE-STENOSIS: 90  POST-STENOSIS: 0%  LESION TYPE: c  TOD FLOW PRE/POST: 2/3  CULPRIT LESION: Yes     Estimated blood loss: Minimal  Complications: None     Conclusions:   1. Left main: Ulcerated, severely calcified 95% proximal stenosis.  2. LAD: Severely calcified 70% proximal mid vessel stenosis.  Discrete 90% in-stent restenosis mid segment.  3. LCX: Chronic total occlusion in-stent at ostium.  4. RCA: Diffuse 40% mid vessel in-stent restenosis.  5.  SVG-diagonal: Normal  6.  SVG-OM 1: Normal  7.  PCI of the ostial left main to proximal LAD stenosis with overlapping 4.5 x 12 mm and 3.5 x 18 mm Xience drake point drug-eluting stent.  Postdilated proximal to mid mid left main to proximal LAD with 4 mm noncompliant trek balloon to high pressure.  8.  Shockwave balloon lithotripsy with a 3.5 mm shockwave balloon followed by high-pressure noncompliant balloon balloon angioplasty to mid LAD resulting in proximal to mid LAD perforation.  Prolonged balloon inflation with 4 mm compliant balloon.  Residual leak covered with 4.0 x 19 mm Graftmaster covered stent.  No residual leak  9.  PCI of the mid LAD with a 3.5 x 23 mm Xience drake point drug-eluting stent, postdilated to high-pressure with a 4 mm NC trek balloon in the proximal to mid stent segment     Recommendations: Close monitoring CICU.  If he does  well we will remove the femoral sheaths tomorrow.     Limited echocardiogram 5/7/2023    Limited study.    Left ventricular wall thickness is consistent with mild concentric hypertrophy.    LV grossly normal in size and function.  Regional wall motion abnormalities likely especially of the lateral wall, not dedicatedly evaluated with contrast images.    No significant pericardial fluid noted.  Significant pericardial thickening with possible fibrinous strand; consider pericardial hematoma/resolving hemopericardium.    RV grossly normal in size and function.    No significant dynamic RV collapse, abnormal septal bounce, or mitral/tricuspid inflow variation to suggest hemodynamically significant tamponade/constriction.    Consults:  Consults       Date and Time Order Name Status Description    5/6/2024  5:54 PM Inpatient Pulmonology Consult Completed     5/5/2024  1:45 PM Cardiac Electrophysiologist Inpatient Consult      5/5/2024 10:06 AM Inpatient Cardiology Consult      5/5/2024  9:16 AM Cardiology (on-call MD unless specified)              Pertinent Test Results:  Results from last 7 days   Lab Units 05/08/24  0923 05/07/24  0601 05/06/24  0412 05/05/24  0736   SODIUM mmol/L 134* 134* 135* 135*   POTASSIUM mmol/L 4.6 4.8 4.4 4.4   CHLORIDE mmol/L 101 106 101 99   CO2 mmol/L 24.0 20.0* 26.5 25.9   BUN mg/dL 21 16 14 10   CREATININE mg/dL 1.06 0.94 0.98 0.86   GLUCOSE mg/dL 138* 124* 86 174*   CALCIUM mg/dL 8.4* 8.6 8.7 9.3   AST (SGOT) U/L  --   --   --  12   ALT (SGPT) U/L  --   --   --  14     Results from last 7 days   Lab Units 05/07/24  1356 05/07/24  1150 05/07/24  0601 05/06/24  0412 05/05/24  1300 05/05/24  1051 05/05/24  0736   HSTROP T ng/L 412* 346* 251* 14 11 12 11     Results from last 7 days   Lab Units 05/08/24  0923 05/07/24  0601 05/06/24  1745 05/06/24  0412 05/05/24  0736   WBC 10*3/mm3 6.79 8.59 12.68* 9.12 11.71*   HEMOGLOBIN g/dL 10.7* 11.4* 11.5* 11.4* 12.6*   HEMATOCRIT % 32.8* 34.0* 36.3*  "34.9* 38.1   PLATELETS 10*3/mm3 158 220 281 239 272                   Invalid input(s): \"LDLCALC\"            Discharge Medications     Discharge Medications        Continue These Medications        Instructions Start Date   aspirin 81 MG tablet   81 mg, Oral, Daily      atenolol 50 MG tablet  Commonly known as: TENORMIN   50 mg, Oral, 2 Times Daily      atorvastatin 40 MG tablet  Commonly known as: LIPITOR   40 mg, Oral, Nightly      baclofen 10 MG tablet  Commonly known as: LIORESAL   10 mg, Oral, Every Night at Bedtime      clopidogrel 75 MG tablet  Commonly known as: PLAVIX   75 mg, Oral, Daily      hydroCHLOROthiazide 25 MG tablet   25 mg, Oral, Daily PRN      Linzess 145 MCG capsule capsule  Generic drug: linaclotide   1 capsule, Oral, Every Other Day      lisinopril 40 MG tablet  Commonly known as: PRINIVIL,ZESTRIL   40 mg, Oral, 2 Times Daily      loratadine 10 MG tablet  Commonly known as: CLARITIN   10 mg, Oral, Daily      meclizine 12.5 MG tablet  Commonly known as: ANTIVERT   12.5 mg, Oral, Daily      MULTIVITAMIN ADULT EXTRA C PO   1 tablet, Oral, Daily      nitroglycerin 0.4 MG SL tablet  Commonly known as: NITROSTAT   0.4 mg, Sublingual, Every 5 Minutes PRN, Take no more than 3 doses in 15 minutes.       pantoprazole 40 MG EC tablet  Commonly known as: PROTONIX   40 mg, Oral, 2 Times Daily      potassium chloride 10 MEQ CR tablet   20 mEq, Oral, Daily      saccharomyces boulardii 250 MG capsule  Commonly known as: FLORASTOR   250 mg, Oral, Nightly      sucralfate 1 g tablet  Commonly known as: CARAFATE   1 g, Oral, Every Night at Bedtime      triamcinolone 0.1 % cream  Commonly known as: KENALOG   1 Application, Topical, 2 Times Daily PRN             Stop These Medications      amiodarone 200 MG tablet  Commonly known as: PACERONE     amLODIPine 5 MG tablet  Commonly known as: NORVASC     fish oil 1000 MG capsule capsule     isosorbide mononitrate 60 MG 24 hr tablet  Commonly known as: IMDUR   "   ranolazine 1000 MG 12 hr tablet  Commonly known as: RANEXA              Discharge Diet:    Dietary Orders (From admission, onward)       Start     Ordered    05/06/24 1753  Diet: Cardiac, Diabetic; Healthy Heart (2-3 Na+); Consistent Carbohydrate; Fluid Consistency: Thin (IDDSI 0)  Diet Effective Now        References:    Diet Order [facility]   Question Answer Comment   Diets: Cardiac    Diets: Diabetic    Cardiac Diet: Healthy Heart (2-3 Na+)    Diabetic Diet: Consistent Carbohydrate    Fluid Consistency: Thin (IDDSI 0)        05/06/24 1754                    Activity at Discharge:       Discharge disposition: home     Discharge Instructions and Follow ups:  Future Appointments   Date Time Provider Department Center   6/4/2024 10:30 AM Monica Montanez APRN [facility]   6/24/2024  2:30 PM Paulino Interiano APRN [facility] CD [id number] None   9/11/2024  3:30 PM NI OP VAS RM 2 BH NI OVKR NI   9/11/2024  3:45 PM Junie Alcala Jr., MD [facility] NI     Additional Instructions for the Follow-ups that You Need to Schedule       Ambulatory Referral to Cardiac Rehab   As directed             Follow-up Information       Paulino Interiano APRN Follow up in 8 week(s).    Specialties: Nurse Practitioner, Cardiology  Contact information:  [address]  [place] [address]  [phone]               [facility] .    Specialty: Cardiac Rehabilitation  Contact information:  [address] [place] [address] [phone]             Triny Hannon MD .    Specialty: Internal Medicine  Contact information:  [address] [address]  [phone]               Negro Locke MD Follow up.    Specialty: Cardiology  Contact information:  [address]  [place] [address]  [phone]               Nelida Saha APRN .    Specialties: Nurse Practitioner, Cardiology  Contact information:  [address]  [facility]  [address]  [phone]                             Test Results Pending at Discharge:      SHERRILL Roe  05/09/24  11:59 EDT    Time: Discharge < 30 min

## 2024-05-10 ENCOUNTER — APPOINTMENT (OUTPATIENT)
Dept: GENERAL RADIOLOGY | Facility: HOSPITAL | Age: 74
End: 2024-05-10
Payer: MEDICARE

## 2024-05-10 ENCOUNTER — HOSPITAL ENCOUNTER (OUTPATIENT)
Facility: HOSPITAL | Age: 74
Setting detail: OBSERVATION
Discharge: HOME | End: 2024-05-13
Attending: EMERGENCY MEDICINE
Payer: MEDICARE

## 2024-05-10 ENCOUNTER — READMISSION MANAGEMENT (OUTPATIENT)
Dept: CALL CENTER | Facility: HOSPITAL | Age: 74
End: 2024-05-10
Payer: MEDICARE

## 2024-05-10 DIAGNOSIS — R07.9 CHEST PAIN, UNSPECIFIED TYPE: ICD-10-CM

## 2024-05-10 DIAGNOSIS — I10 HYPERTENSION, UNCONTROLLED: ICD-10-CM

## 2024-05-10 DIAGNOSIS — B02.9 HERPES ZOSTER WITHOUT COMPLICATION: Primary | ICD-10-CM

## 2024-05-10 LAB
ALBUMIN SERPL-MCNC: 3.4 G/DL (ref 3.5–5.2)
ALBUMIN/GLOB SERPL: 1.1 G/DL
ALP SERPL-CCNC: 73 U/L (ref 39–117)
ALT SERPL W P-5'-P-CCNC: 19 U/L (ref 1–41)
ANION GAP SERPL CALCULATED.3IONS-SCNC: 12 MMOL/L (ref 5–15)
AST SERPL-CCNC: 21 U/L (ref 1–40)
BASOPHILS # BLD MANUAL: 0.06 10*3/MM3 (ref 0–0.2)
BASOPHILS NFR BLD MANUAL: 1 % (ref 0–1.5)
BILIRUB SERPL-MCNC: 0.8 MG/DL (ref 0–1.2)
BUN SERPL-MCNC: 8 MG/DL (ref 8–23)
BUN/CREAT SERPL: 9.4 (ref 7–25)
CALCIUM SPEC-SCNC: 8.4 MG/DL (ref 8.6–10.5)
CHLORIDE SERPL-SCNC: 99 MMOL/L (ref 98–107)
CO2 SERPL-SCNC: 22 MMOL/L (ref 22–29)
CREAT SERPL-MCNC: 0.85 MG/DL (ref 0.76–1.27)
DEPRECATED RDW RBC AUTO: 43.6 FL (ref 37–54)
EGFRCR SERPLBLD CKD-EPI 2021: 91.2 ML/MIN/1.73
EOSINOPHIL # BLD MANUAL: 0.23 10*3/MM3 (ref 0–0.4)
EOSINOPHIL NFR BLD MANUAL: 4 % (ref 0.3–6.2)
ERYTHROCYTE [DISTWIDTH] IN BLOOD BY AUTOMATED COUNT: 13.1 % (ref 12.3–15.4)
GLOBULIN UR ELPH-MCNC: 3 GM/DL
GLUCOSE SERPL-MCNC: 115 MG/DL (ref 65–99)
HCT VFR BLD AUTO: 33.7 % (ref 37.5–51)
HGB BLD-MCNC: 11.6 G/DL (ref 13–17.7)
HOLD SPECIMEN: NORMAL
HOLD SPECIMEN: NORMAL
LYMPHOCYTES # BLD MANUAL: 1.15 10*3/MM3 (ref 0.7–3.1)
LYMPHOCYTES NFR BLD MANUAL: 15 % (ref 5–12)
MCH RBC QN AUTO: 32 PG (ref 26.6–33)
MCHC RBC AUTO-ENTMCNC: 34.4 G/DL (ref 31.5–35.7)
MCV RBC AUTO: 92.8 FL (ref 79–97)
MONOCYTES # BLD: 0.86 10*3/MM3 (ref 0.1–0.9)
NEUTROPHILS # BLD AUTO: 3.46 10*3/MM3 (ref 1.7–7)
NEUTROPHILS NFR BLD MANUAL: 60 % (ref 42.7–76)
NRBC BLD AUTO-RTO: 0 /100 WBC (ref 0–0.2)
PLAT MORPH BLD: NORMAL
PLATELET # BLD AUTO: 164 10*3/MM3 (ref 140–450)
PMV BLD AUTO: 10.2 FL (ref 6–12)
POLYCHROMASIA BLD QL SMEAR: ABNORMAL
POTASSIUM SERPL-SCNC: 3.8 MMOL/L (ref 3.5–5.2)
PROT SERPL-MCNC: 6.4 G/DL (ref 6–8.5)
QT INTERVAL: 404 MS
QTC INTERVAL: 433 MS
RBC # BLD AUTO: 3.63 10*6/MM3 (ref 4.14–5.8)
SMUDGE CELLS BLD QL SMEAR: ABNORMAL
SODIUM SERPL-SCNC: 133 MMOL/L (ref 136–145)
TROPONIN T SERPL HS-MCNC: 619 NG/L
TROPONIN T SERPL HS-MCNC: 643 NG/L
VARIANT LYMPHS NFR BLD MANUAL: 20 % (ref 19.6–45.3)
WBC NRBC COR # BLD AUTO: 5.76 10*3/MM3 (ref 3.4–10.8)
WHOLE BLOOD HOLD COAG: NORMAL
WHOLE BLOOD HOLD SPECIMEN: NORMAL

## 2024-05-10 PROCEDURE — 93005 ELECTROCARDIOGRAM TRACING: CPT

## 2024-05-10 PROCEDURE — 80053 COMPREHEN METABOLIC PANEL: CPT | Performed by: EMERGENCY MEDICINE

## 2024-05-10 PROCEDURE — G0378 HOSPITAL OBSERVATION PER HR: HCPCS

## 2024-05-10 PROCEDURE — 71045 X-RAY EXAM CHEST 1 VIEW: CPT

## 2024-05-10 PROCEDURE — 85025 COMPLETE CBC W/AUTO DIFF WBC: CPT | Performed by: EMERGENCY MEDICINE

## 2024-05-10 PROCEDURE — 36415 COLL VENOUS BLD VENIPUNCTURE: CPT

## 2024-05-10 PROCEDURE — 84484 ASSAY OF TROPONIN QUANT: CPT | Performed by: EMERGENCY MEDICINE

## 2024-05-10 PROCEDURE — 85007 BL SMEAR W/DIFF WBC COUNT: CPT | Performed by: EMERGENCY MEDICINE

## 2024-05-10 PROCEDURE — 99285 EMERGENCY DEPT VISIT HI MDM: CPT

## 2024-05-10 PROCEDURE — 93010 ELECTROCARDIOGRAM REPORT: CPT | Performed by: INTERNAL MEDICINE

## 2024-05-10 PROCEDURE — 93005 ELECTROCARDIOGRAM TRACING: CPT | Performed by: EMERGENCY MEDICINE

## 2024-05-10 RX ORDER — VALACYCLOVIR HYDROCHLORIDE 500 MG/1
1000 TABLET, FILM COATED ORAL EVERY 8 HOURS SCHEDULED
Status: DISCONTINUED | OUTPATIENT
Start: 2024-05-11 | End: 2024-05-11

## 2024-05-10 RX ORDER — CHOLECALCIFEROL (VITAMIN D3) 125 MCG
5 CAPSULE ORAL NIGHTLY PRN
Status: DISCONTINUED | OUTPATIENT
Start: 2024-05-10 | End: 2024-05-13 | Stop reason: HOSPADM

## 2024-05-10 RX ORDER — ONDANSETRON 2 MG/ML
4 INJECTION INTRAMUSCULAR; INTRAVENOUS EVERY 6 HOURS PRN
Status: DISCONTINUED | OUTPATIENT
Start: 2024-05-10 | End: 2024-05-13 | Stop reason: HOSPADM

## 2024-05-10 RX ORDER — ONDANSETRON 4 MG/1
4 TABLET, ORALLY DISINTEGRATING ORAL EVERY 6 HOURS PRN
Status: DISCONTINUED | OUTPATIENT
Start: 2024-05-10 | End: 2024-05-13 | Stop reason: HOSPADM

## 2024-05-10 RX ORDER — ASPIRIN 325 MG
325 TABLET ORAL ONCE
Status: COMPLETED | OUTPATIENT
Start: 2024-05-10 | End: 2024-05-10

## 2024-05-10 RX ORDER — ACETAMINOPHEN 325 MG/1
650 TABLET ORAL EVERY 4 HOURS PRN
Status: DISCONTINUED | OUTPATIENT
Start: 2024-05-10 | End: 2024-05-13 | Stop reason: HOSPADM

## 2024-05-10 RX ORDER — SUCRALFATE 1 G/1
1 TABLET ORAL NIGHTLY
Status: DISCONTINUED | OUTPATIENT
Start: 2024-05-10 | End: 2024-05-13 | Stop reason: HOSPADM

## 2024-05-10 RX ORDER — SODIUM CHLORIDE 9 MG/ML
40 INJECTION, SOLUTION INTRAVENOUS AS NEEDED
Status: DISCONTINUED | OUTPATIENT
Start: 2024-05-10 | End: 2024-05-13 | Stop reason: HOSPADM

## 2024-05-10 RX ORDER — MECLIZINE HYDROCHLORIDE 25 MG/1
12.5 TABLET ORAL DAILY
Status: DISCONTINUED | OUTPATIENT
Start: 2024-05-11 | End: 2024-05-13 | Stop reason: HOSPADM

## 2024-05-10 RX ORDER — ASPIRIN 81 MG/1
81 TABLET ORAL DAILY
Status: DISCONTINUED | OUTPATIENT
Start: 2024-05-11 | End: 2024-05-13 | Stop reason: HOSPADM

## 2024-05-10 RX ORDER — CLOPIDOGREL BISULFATE 75 MG/1
75 TABLET ORAL DAILY
Status: DISCONTINUED | OUTPATIENT
Start: 2024-05-11 | End: 2024-05-13 | Stop reason: HOSPADM

## 2024-05-10 RX ORDER — HYDROCODONE BITARTRATE AND ACETAMINOPHEN 5; 325 MG/1; MG/1
1 TABLET ORAL EVERY 6 HOURS PRN
Status: DISCONTINUED | OUTPATIENT
Start: 2024-05-10 | End: 2024-05-11

## 2024-05-10 RX ORDER — HYDROCHLOROTHIAZIDE 25 MG/1
25 TABLET ORAL DAILY PRN
Status: DISCONTINUED | OUTPATIENT
Start: 2024-05-10 | End: 2024-05-13 | Stop reason: HOSPADM

## 2024-05-10 RX ORDER — ATENOLOL 50 MG/1
50 TABLET ORAL 2 TIMES DAILY
Status: DISCONTINUED | OUTPATIENT
Start: 2024-05-11 | End: 2024-05-13 | Stop reason: HOSPADM

## 2024-05-10 RX ORDER — POTASSIUM CHLORIDE 750 MG/1
20 TABLET, FILM COATED, EXTENDED RELEASE ORAL DAILY
Status: DISCONTINUED | OUTPATIENT
Start: 2024-05-11 | End: 2024-05-13 | Stop reason: HOSPADM

## 2024-05-10 RX ORDER — NITROGLYCERIN 0.4 MG/1
0.4 TABLET SUBLINGUAL
Status: DISCONTINUED | OUTPATIENT
Start: 2024-05-10 | End: 2024-05-13 | Stop reason: HOSPADM

## 2024-05-10 RX ORDER — BACLOFEN 10 MG/1
10 TABLET ORAL NIGHTLY
Status: DISCONTINUED | OUTPATIENT
Start: 2024-05-10 | End: 2024-05-13 | Stop reason: HOSPADM

## 2024-05-10 RX ORDER — SODIUM CHLORIDE 0.9 % (FLUSH) 0.9 %
10 SYRINGE (ML) INJECTION AS NEEDED
Status: DISCONTINUED | OUTPATIENT
Start: 2024-05-10 | End: 2024-05-13 | Stop reason: HOSPADM

## 2024-05-10 RX ORDER — VALACYCLOVIR HYDROCHLORIDE 500 MG/1
1000 TABLET, FILM COATED ORAL ONCE
Status: COMPLETED | OUTPATIENT
Start: 2024-05-10 | End: 2024-05-10

## 2024-05-10 RX ORDER — LISINOPRIL 20 MG/1
40 TABLET ORAL 2 TIMES DAILY
Status: DISCONTINUED | OUTPATIENT
Start: 2024-05-10 | End: 2024-05-13 | Stop reason: HOSPADM

## 2024-05-10 RX ORDER — SODIUM CHLORIDE 0.9 % (FLUSH) 0.9 %
10 SYRINGE (ML) INJECTION EVERY 12 HOURS SCHEDULED
Status: DISCONTINUED | OUTPATIENT
Start: 2024-05-10 | End: 2024-05-13 | Stop reason: HOSPADM

## 2024-05-10 RX ORDER — PANTOPRAZOLE SODIUM 40 MG/1
40 TABLET, DELAYED RELEASE ORAL 2 TIMES DAILY
Status: DISCONTINUED | OUTPATIENT
Start: 2024-05-10 | End: 2024-05-13 | Stop reason: HOSPADM

## 2024-05-10 RX ORDER — SACCHAROMYCES BOULARDII 250 MG
250 CAPSULE ORAL NIGHTLY
Status: DISCONTINUED | OUTPATIENT
Start: 2024-05-11 | End: 2024-05-13 | Stop reason: HOSPADM

## 2024-05-10 RX ORDER — ATORVASTATIN CALCIUM 20 MG/1
40 TABLET, FILM COATED ORAL NIGHTLY
Status: DISCONTINUED | OUTPATIENT
Start: 2024-05-10 | End: 2024-05-13 | Stop reason: HOSPADM

## 2024-05-10 RX ADMIN — HYDROCODONE BITARTRATE AND ACETAMINOPHEN 1 TABLET: 5; 325 TABLET ORAL at 23:41

## 2024-05-10 RX ADMIN — NITROGLYCERIN 1 INCH: 20 OINTMENT TOPICAL at 18:48

## 2024-05-10 RX ADMIN — ATORVASTATIN CALCIUM 40 MG: 20 TABLET, FILM COATED ORAL at 23:41

## 2024-05-10 RX ADMIN — BACLOFEN 10 MG: 10 TABLET ORAL at 23:42

## 2024-05-10 RX ADMIN — ASPIRIN 325 MG: 325 TABLET ORAL at 18:09

## 2024-05-10 RX ADMIN — Medication 10 ML: at 23:47

## 2024-05-10 RX ADMIN — SUCRALFATE 1 G: 1 TABLET ORAL at 23:42

## 2024-05-10 RX ADMIN — VALACYCLOVIR HYDROCHLORIDE 1000 MG: 500 TABLET, FILM COATED ORAL at 18:43

## 2024-05-10 RX ADMIN — LISINOPRIL 40 MG: 20 TABLET ORAL at 23:41

## 2024-05-10 RX ADMIN — HYDROCODONE BITARTRATE AND ACETAMINOPHEN 1 TABLET: 5; 325 TABLET ORAL at 18:09

## 2024-05-10 RX ADMIN — PANTOPRAZOLE SODIUM 40 MG: 40 TABLET, DELAYED RELEASE ORAL at 23:41

## 2024-05-10 NOTE — ED TRIAGE NOTES
Pt reports discharged yesterday after admission for chest pain, back today with chest/back pain, also reports a rash to chest  Cardiac cath on 5/7: this demonstrated a severely calcified 90% proximal left main, severely calcified 70% proximal mid LAD, 90% in-stent restenosis of the mid segment, and a chronic total occlusion of the in-stent ostial circumflex. He underwent complex PCI of the left main to proximal LAD with PCI of the mid LAD. This was complicated by balloon rupture and perforation in the proximal to mid LAD

## 2024-05-10 NOTE — ED PROVIDER NOTES
EMERGENCY DEPARTMENT ENCOUNTER  Room Number:  124/1  PCP: Triny Hannon MD  Independent Historians: Patient and Family -patient's wife      HPI:  Chief Complaint: had concerns including Chest Pain.  And rash    A complete HPI/ROS/PMH/PSH/SH/FH are unobtainable due to: None    Chronic or social conditions impacting patient care (Social Determinants of Health): None      Context: Slade Martinez is a 73 y.o. male with a medical history of hypertension, dyslipidemia and CAD who presents to the ED c/o acute chest pain and new onset rash around the right-sided chest wall.  Patient was just recently discharged from the hospital after recent cardiac cath and PCI procedure.  Today he returns for new onset pain across the right-sided chest and right-sided back that is intensifying throughout the day.  He also developed a new rash in this same area across the right-sided chest wall that is worrisome.  Patient denies any fevers but has had some chills and some generalized malaise with this pain.  Denies any shortness of breath at this time.      Review of prior external notes (non-ED) -and- Review of prior external test results outside of this encounter: I independently reviewed the most recent cardiology service discharge summary from yesterday, May 9, 2024 at 8:43 AM.    Prescription drug monitoring program review: JENIFER reviewed by Matthew Marcos MD, Kevan Hernandez MD   N/A and JENIFER query complete and reviewed. Patient receives regular prescriptions for controlled substances.    PAST MEDICAL HISTORY  Active Ambulatory Problems     Diagnosis Date Noted    Dyslipidemia 11/04/2019    Essential hypertension 11/04/2019    Coronary atherosclerosis 11/04/2019    S/P CABG (coronary artery bypass graft) 04/20/2020    Peripheral artery disease 02/25/2021    History of left-sided carotid endarterectomy 02/25/2021    Fever of unknown origin 03/03/2023    Closed traumatic nondisplaced fracture of two ribs of right side  with routine healing 03/04/2023    Altered mental status 09/21/2023    Abdominal pain 09/21/2023    Chronic mesenteric ischemia 09/21/2023    Chest pain 04/04/2024     Resolved Ambulatory Problems     Diagnosis Date Noted    S/P CABG (coronary artery bypass graft) 04/20/2020    COVID-19 virus infection 02/25/2021     Past Medical History:   Diagnosis Date    Constipation     Injury of back     Peripheral edema     Stroke 2011    Vertigo          PAST SURGICAL HISTORY  Past Surgical History:   Procedure Laterality Date    CARDIAC CATHETERIZATION N/A 5/6/2024    Procedure: Left Heart Cath;  Surgeon: Gurwinder Hurd MD;  Location:  [facility];  Service: Cardiovascular;  Laterality: N/A;    CARDIAC CATHETERIZATION N/A 5/6/2024    Procedure: Coronary angiography;  Surgeon: Gurwinder Hurd MD;  Location:  [facility];  Service: Cardiovascular;  Laterality: N/A;    CARDIAC CATHETERIZATION N/A 5/6/2024    Procedure: Percutaneous Coronary Intervention;  Surgeon: Gurwinder Hurd MD;  Location:  [facility];  Service: Cardiovascular;  Laterality: N/A;    CARDIAC CATHETERIZATION N/A 5/6/2024    Procedure: Stent TC coronary;  Surgeon: Gurwinder Hurd MD;  Location:  [facility];  Service: Cardiovascular;  Laterality: N/A;    CARDIAC CATHETERIZATION  5/6/2024    Procedure: Saphenous Vein Graft;  Surgeon: Gurwinder Hurd MD;  Location:  [facility];  Service: Cardiovascular;;    CAROTID ENDARTERECTOMY  2000    Left    CHOLECYSTECTOMY      CORONARY ARTERY BYPASS GRAFT      CORONARY STENT PLACEMENT      X17 stents    INTERVENTIONAL RADIOLOGY PROCEDURE N/A 5/6/2024    Procedure: Intravascular Ultrasound;  Surgeon: Gurwinder Hurd MD;  Location:  [facility];  Service: Cardiovascular;  Laterality: N/A;         FAMILY HISTORY  Family History   Problem Relation Age of Onset    COPD Mother      Heart attack Father     Heart failure Father     COPD Brother          SOCIAL HISTORY  Social History     Socioeconomic History    Marital status:    Tobacco Use    Smoking status: Former     Types: Cigarettes    Smokeless tobacco: Never   Vaping Use    Vaping status: Never Used   Substance and Sexual Activity    Alcohol use: Not Currently    Drug use: Never    Sexual activity: Defer         ALLERGIES  Pletal [cilostazol]      REVIEW OF SYSTEMS  Review of Systems  Included in HPI  All systems reviewed and negative except for those discussed in HPI.      PHYSICAL EXAM    I have reviewed the triage vital signs and nursing notes.    ED Triage Vitals   Temp Heart Rate Resp BP SpO2   05/10/24 1724 05/10/24 1724 05/10/24 1724 05/10/24 1735 05/10/24 1724   99.8 °F (37.7 °C) 73 18 (!) 191/88 96 %      Temp src Heart Rate Source Patient Position BP Location FiO2 (%)   -- -- -- -- --              Physical Exam  GENERAL: alert, no acute distress appears uncomfortable  SKIN: Warm, dry.  There is a erythematous macular rash in a dermatomal pattern along the right thoracic soft tissues of the chest and back.  There are no clear vesicles at this time.  HENT: Normocephalic, atraumatic, moist mucous membranes  EYES: no scleral icterus, normal conjunctiva  CV: regular rhythm, regular rate  RESPIRATORY: normal effort, lungs clear bilaterally, no stridor  ABDOMEN: soft, nondistended, nontender  MUSCULOSKELETAL: no deformity, no asymmetry to the extremities  NEURO: alert, moves all extremities, follows commands      LAB RESULTS  Recent Results (from the past 24 hour(s))   ECG 12 Lead ED Triage Standing Order; Chest Pain    Collection Time: 05/10/24  5:27 PM   Result Value Ref Range    QT Interval 404 ms    QTC Interval 433 ms   Comprehensive Metabolic Panel    Collection Time: 05/10/24  5:33 PM    Specimen: Blood   Result Value Ref Range    Glucose 115 (H) 65 - 99 mg/dL    BUN 8 8 - 23 mg/dL    Creatinine 0.85 0.76 - 1.27 mg/dL     Sodium 133 (L) 136 - 145 mmol/L    Potassium 3.8 3.5 - 5.2 mmol/L    Chloride 99 98 - 107 mmol/L    CO2 22.0 22.0 - 29.0 mmol/L    Calcium 8.4 (L) 8.6 - 10.5 mg/dL    Total Protein 6.4 6.0 - 8.5 g/dL    Albumin 3.4 (L) 3.5 - 5.2 g/dL    ALT (SGPT) 19 1 - 41 U/L    AST (SGOT) 21 1 - 40 U/L    Alkaline Phosphatase 73 39 - 117 U/L    Total Bilirubin 0.8 0.0 - 1.2 mg/dL    Globulin 3.0 gm/dL    A/G Ratio 1.1 g/dL    BUN/Creatinine Ratio 9.4 7.0 - 25.0    Anion Gap 12.0 5.0 - 15.0 mmol/L    eGFR 91.2 >60.0 mL/min/1.73   High Sensitivity Troponin T    Collection Time: 05/10/24  5:33 PM    Specimen: Blood   Result Value Ref Range    HS Troponin T 643 (C) <22 ng/L   Green Top (Gel)    Collection Time: 05/10/24  5:33 PM   Result Value Ref Range    Extra Tube Hold for add-ons.    Lavender Top    Collection Time: 05/10/24  5:33 PM   Result Value Ref Range    Extra Tube hold for add-on    Gold Top - SST    Collection Time: 05/10/24  5:33 PM   Result Value Ref Range    Extra Tube Hold for add-ons.    Light Blue Top    Collection Time: 05/10/24  5:33 PM   Result Value Ref Range    Extra Tube Hold for add-ons.    CBC Auto Differential    Collection Time: 05/10/24  5:33 PM    Specimen: Blood   Result Value Ref Range    WBC 5.76 3.40 - 10.80 10*3/mm3    RBC 3.63 (L) 4.14 - 5.80 10*6/mm3    Hemoglobin 11.6 (L) 13.0 - 17.7 g/dL    Hematocrit 33.7 (L) 37.5 - 51.0 %    MCV 92.8 79.0 - 97.0 fL    MCH 32.0 26.6 - 33.0 pg    MCHC 34.4 31.5 - 35.7 g/dL    RDW 13.1 12.3 - 15.4 %    RDW-SD 43.6 37.0 - 54.0 fl    MPV 10.2 6.0 - 12.0 fL    Platelets 164 140 - 450 10*3/mm3    nRBC 0.0 0.0 - 0.2 /100 WBC   Manual Differential    Collection Time: 05/10/24  5:33 PM    Specimen: Blood   Result Value Ref Range    Neutrophil % 60.0 42.7 - 76.0 %    Lymphocyte % 20.0 19.6 - 45.3 %    Monocyte % 15.0 (H) 5.0 - 12.0 %    Eosinophil % 4.0 0.3 - 6.2 %    Basophil % 1.0 0.0 - 1.5 %    Neutrophils Absolute 3.46 1.70 - 7.00 10*3/mm3    Lymphocytes Absolute  1.15 0.70 - 3.10 10*3/mm3    Monocytes Absolute 0.86 0.10 - 0.90 10*3/mm3    Eosinophils Absolute 0.23 0.00 - 0.40 10*3/mm3    Basophils Absolute 0.06 0.00 - 0.20 10*3/mm3    Polychromasia Slight/1+ None Seen    Smudge Cells Slight/1+ None Seen    Platelet Morphology Normal Normal   Single High Sensitivity Troponin T    Collection Time: 05/10/24  7:40 PM    Specimen: Blood   Result Value Ref Range    HS Troponin T 619 (C) <22 ng/L         RADIOLOGY  XR Chest 1 View    Result Date: 5/10/2024  PORTABLE RADIOGRAPHIC VIEW OF THE CHEST  CLINICAL HISTORY: Chest pain  FINDINGS:  Sternal wires, coronary stent, and a loop recorder are identified. The cardiomediastinal silhouette is enlarged but stable in size. Atherosclerotic calcifications are appreciated within the aortic arch. The lungs are clear of acute infiltrates. Atherosclerotic calcifications are incidentally appreciated within the common carotids.       No active disease in the chest.  This report was finalized on 5/10/2024 6:14 PM by Dr. Beltran Tripathi M.D on Workstation: [id number]         MEDICATIONS GIVEN IN ER  Medications   sodium chloride 0.9 % flush 10 mL (has no administration in time range)   HYDROcodone-acetaminophen (NORCO) 5-325 MG per tablet 1 tablet (1 tablet Oral Given 5/10/24 1809)   nitroglycerin (NITROSTAT) ointment 1 inch (1 inch Topical Given 5/10/24 1848)   sodium chloride 0.9 % flush 10 mL (has no administration in time range)   sodium chloride 0.9 % flush 10 mL (has no administration in time range)   sodium chloride 0.9 % infusion 40 mL (has no administration in time range)   ondansetron ODT (ZOFRAN-ODT) disintegrating tablet 4 mg (has no administration in time range)     Or   ondansetron (ZOFRAN) injection 4 mg (has no administration in time range)   nitroglycerin (NITROSTAT) SL tablet 0.4 mg (has no administration in time range)   acetaminophen (TYLENOL) tablet 650 mg (has no administration in time range)   melatonin tablet 5 mg (has no  administration in time range)   aspirin EC tablet 81 mg (has no administration in time range)   atenolol (TENORMIN) tablet 50 mg (has no administration in time range)   atorvastatin (LIPITOR) tablet 40 mg (has no administration in time range)   baclofen (LIORESAL) tablet 10 mg (has no administration in time range)   clopidogrel (PLAVIX) tablet 75 mg (has no administration in time range)   hydroCHLOROthiazide tablet 25 mg (has no administration in time range)   lisinopril (PRINIVIL,ZESTRIL) tablet 40 mg (has no administration in time range)   meclizine (ANTIVERT) tablet 12.5 mg (has no administration in time range)   pantoprazole (PROTONIX) EC tablet 40 mg (has no administration in time range)   potassium chloride (K-DUR,KLOR-CON) ER tablet 20 mEq (has no administration in time range)   saccharomyces boulardii (FLORASTOR) capsule 250 mg (has no administration in time range)   sucralfate (CARAFATE) tablet 1 g (has no administration in time range)   aspirin tablet 325 mg (325 mg Oral Given 5/10/24 1809)   valACYclovir (VALTREX) tablet 1,000 mg (1,000 mg Oral Given 5/10/24 1843)         ORDERS PLACED DURING THIS VISIT:  Orders Placed This Encounter   Procedures    XR Chest 1 View    [place] Draw    Comprehensive Metabolic Panel    High Sensitivity Troponin T    CBC Auto Differential    Manual Differential    Single High Sensitivity Troponin T    CBC (No Diff)    Basic Metabolic Panel    High Sensitivity Troponin T    Diet: Cardiac; Healthy Heart (2-3 Na+); Fluid Consistency: Thin (IDDSI 0)    NPO Diet NPO Type: Sips with Meds    Undress & Gown    Intake & Output    Weigh Patient    Oral Care    Place Sequential Compression Device    Maintain Sequential Compression Device    Maintain IV Access    Telemetry - Place Orders & Notify Provider of Results When Patient Experiences Acute Chest Pain, Dysrhythmia or Respiratory Distress    May Be Off Telemetry for Tests    Vital Signs    Continuous Pulse Oximetry    Up With  Assistance    Notify Provider (With Default Parameters)    Code Status and Medical Interventions:    Cardiology (on-call MD unless specified)    Inpatient Cardiology Consult    Oxygen Therapy- Nasal Cannula; Titrate 1-6 LPM Per SpO2; 90 - 95%    ECG 12 Lead ED Triage Standing Order; Chest Pain    ECG 12 Lead ED Triage Standing Order; Chest Pain    ECG 12 Lead Chest Pain    Telemetry Scan    Insert Peripheral IV    Insert Peripheral IV    Initiate ED Observation Status    CBC & Differential    Green Top (Gel)    Lavender Top    Gold Top - SST    Light Blue Top         OUTPATIENT MEDICATION MANAGEMENT:  Current Facility-Administered Medications Ordered in Epic   Medication Dose Route Frequency Provider Last Rate Last Admin    acetaminophen (TYLENOL) tablet 650 mg  650 mg Oral Q4H PRN Arabella Chandra PA-C        [START ON 5/11/2024] aspirin EC tablet 81 mg  81 mg Oral Daily Arabella Chandra PA-C        atenolol (TENORMIN) tablet 50 mg  50 mg Oral BID Arabella Chandra PA-C        atorvastatin (LIPITOR) tablet 40 mg  40 mg Oral Nightly Arabella Chandra PA-C        baclofen (LIORESAL) tablet 10 mg  10 mg Oral Nightly Arabella Chandra PA-C        [START ON 5/11/2024] clopidogrel (PLAVIX) tablet 75 mg  75 mg Oral Daily Arabella Chandra PA-C        hydroCHLOROthiazide tablet 25 mg  25 mg Oral Daily PRN Arabella Chandra PA-C        HYDROcodone-acetaminophen (NORCO) 5-325 MG per tablet 1 tablet  1 tablet Oral Q6H PRN Arabella Chandra PA-C   1 tablet at 05/10/24 1809    lisinopril (PRINIVIL,ZESTRIL) tablet 40 mg  40 mg Oral BID Arabella Chandra PA-C        [START ON 5/11/2024] meclizine (ANTIVERT) tablet 12.5 mg  12.5 mg Oral Daily Arabella Chandra PA-JAGDEEP        melatonin tablet 5 mg  5 mg Oral Nightly PRN Arabella Chandra PA-C        nitroglycerin (NITROSTAT) ointment 1 inch  1 inch Topical Q6H Arabella Chandra PA-C   1 inch at 05/10/24 1848    nitroglycerin  (NITROSTAT) SL tablet 0.4 mg  0.4 mg Sublingual Q5 Min PRN Corazon, Arabella R, PA-C        ondansetron ODT (ZOFRAN-ODT) disintegrating tablet 4 mg  4 mg Oral Q6H PRN Corazon, Arabella R, PA-C        Or    ondansetron (ZOFRAN) injection 4 mg  4 mg Intravenous Q6H PRN Corazon, Arabella R, PA-C        pantoprazole (PROTONIX) EC tablet 40 mg  40 mg Oral BID Corazon, Arabella R, PA-C        [START ON 5/11/2024] potassium chloride (K-DUR,KLOR-CON) ER tablet 20 mEq  20 mEq Oral Daily Corazon, Arabella R, PA-C        saccharomyces boulardii (FLORASTOR) capsule 250 mg  250 mg Oral Nightly Corazon, Arabella R, PA-C        sodium chloride 0.9 % flush 10 mL  10 mL Intravenous PRN Corazon, Arabella R, PA-C        sodium chloride 0.9 % flush 10 mL  10 mL Intravenous Q12H Corazon, Arabella R, PA-C        sodium chloride 0.9 % flush 10 mL  10 mL Intravenous PRN Corazon, Arabella R, PA-C        sodium chloride 0.9 % infusion 40 mL  40 mL Intravenous PRN Corazon, Arabella R, PA-C        sucralfate (CARAFATE) tablet 1 g  1 g Oral Nightly Corazon, Arabella R, PA-C         No current [facility]-ordered outpatient medications on file.         PROCEDURES  Procedures            PROGRESS, DATA ANALYSIS, CONSULTS, AND MEDICAL DECISION MAKING  All labs have been independently interpreted by me.  All radiology studies have been reviewed by me. All EKG's have been independently viewed and interpreted by me.  Discussion below represents my analysis of pertinent findings related to patient's condition, differential diagnosis, treatment plan and final disposition.    Differential diagnosis includes but is not limited to acute MI, pulmonary embolism, aortic dissection, herpes zoster.    Clinical Scores: HEART Score: 7                   ED Course as of 05/10/24 2301   Fri May 10, 2024   7309 I independently interpreted the Chest X-ray and my findings are: No Pneumothorax, No Effusion, No Infiltrate   [EVETTE]   1818 EKG         EKG time/Interp  time: 1727/1738  Rhythm/Rate: Sinus rhythm, 69 bpm  P waves and SC: Present, 204 ms  QRS, axis: 99 ms, normal axis  ST and T waves: there are T wave inversions in the lateral leads.  The morphology of QRS complex in lead V2 is somewhat changed in comparison to previous exam on file from May 8, 2024.  However I do not see any ST elevation pattern in contiguous leads at this time.  Independently interpreted by me contemporaneously with treatment   [EVETTE]   1832 I discussed with Dr. Torre from [place] cardiology about this patient.  He agrees to consult.  He recommends that we recheck the troponin in 2 hours.  He recommends that we lower the patient's blood pressure as well so I am ordering some nitroglycerin paste. [EVETTE]   1834 Clinically, the diagnosis for this patient seems to be acute herpes zoster in the right chest wall.  I think this is probably been precipitated by his recent stress from his eventful cardiac care over the past week.  I have started treatment with valacyclovir at this time.  Continue to monitor carefully on telemetry. [EVETTE]   2003 BP: 173/75  Blood pressure is improving with nitroglycerin. [EVETTE]   2047 HS Troponin T(!!): 619  Second troponin is trending downward.  Patient is resting more comfortably at this time. [EVETTE]   2048 I discussed with Charley Chandra in the observation unit.  She agrees to accept the patient for further management and supportive care tonight. [EVETTE]      ED Course User Index  [EVETTE] Kevan Hernandez MD             AS OF 23:01 EDT VITALS:    BP - 141/65  HR - 68  TEMP - 99 °F (37.2 °C) (Oral)  O2 SATS - 97%    COMPLEXITY OF CARE  The patient requires admission.      DIAGNOSIS  Final diagnoses:   Herpes zoster without complication   Chest pain, unspecified type   Hypertension, uncontrolled         DISPOSITION  ED Disposition       ED Disposition   Intended Admit    Condition   --    Comment   --                Please note that portions of this document were completed with a  voice recognition program.    Note Disclaimer: At [facility], we believe that sharing information builds trust and better relationships. You are receiving this note because you recently visited [facility]. It is possible you will see health information before a provider has talked with you about it. This kind of information can be easy to misunderstand. To help you fully understand what it means for your health, we urge you to discuss this note with your provider.         Kevan Hernandez MD  05/10/24 7033

## 2024-05-10 NOTE — ED NOTES
Pt stated a rash across the chest appeared yesterday. Rash is only present on the right upper portion of the chest. Pt states rash is painful.

## 2024-05-11 LAB
ANION GAP SERPL CALCULATED.3IONS-SCNC: 11.1 MMOL/L (ref 5–15)
BACTERIA UR QL AUTO: ABNORMAL /HPF
BILIRUB UR QL STRIP: NEGATIVE
BUN SERPL-MCNC: 8 MG/DL (ref 8–23)
BUN/CREAT SERPL: 10 (ref 7–25)
CALCIUM SPEC-SCNC: 8.6 MG/DL (ref 8.6–10.5)
CHLORIDE SERPL-SCNC: 97 MMOL/L (ref 98–107)
CLARITY UR: CLEAR
CO2 SERPL-SCNC: 22.9 MMOL/L (ref 22–29)
COLOR UR: ABNORMAL
CREAT SERPL-MCNC: 0.8 MG/DL (ref 0.76–1.27)
DEPRECATED RDW RBC AUTO: 44.8 FL (ref 37–54)
EGFRCR SERPLBLD CKD-EPI 2021: 92.9 ML/MIN/1.73
ERYTHROCYTE [DISTWIDTH] IN BLOOD BY AUTOMATED COUNT: 13 % (ref 12.3–15.4)
GEN 5 2HR TROPONIN T REFLEX: 699 NG/L
GLUCOSE SERPL-MCNC: 102 MG/DL (ref 65–99)
GLUCOSE UR STRIP-MCNC: NEGATIVE MG/DL
HCT VFR BLD AUTO: 33.6 % (ref 37.5–51)
HGB BLD-MCNC: 11.4 G/DL (ref 13–17.7)
HGB UR QL STRIP.AUTO: NEGATIVE
HYALINE CASTS UR QL AUTO: ABNORMAL /LPF
KETONES UR QL STRIP: NEGATIVE
LEUKOCYTE ESTERASE UR QL STRIP.AUTO: ABNORMAL
MCH RBC QN AUTO: 31.8 PG (ref 26.6–33)
MCHC RBC AUTO-ENTMCNC: 33.9 G/DL (ref 31.5–35.7)
MCV RBC AUTO: 93.9 FL (ref 79–97)
NITRITE UR QL STRIP: NEGATIVE
PH UR STRIP.AUTO: 5.5 [PH] (ref 5–8)
PLATELET # BLD AUTO: 161 10*3/MM3 (ref 140–450)
PMV BLD AUTO: 10.4 FL (ref 6–12)
POTASSIUM SERPL-SCNC: 3.5 MMOL/L (ref 3.5–5.2)
PROT UR QL STRIP: ABNORMAL
QT INTERVAL: 408 MS
QTC INTERVAL: 408 MS
RBC # BLD AUTO: 3.58 10*6/MM3 (ref 4.14–5.8)
RBC # UR STRIP: ABNORMAL /HPF
REF LAB TEST METHOD: ABNORMAL
SODIUM SERPL-SCNC: 131 MMOL/L (ref 136–145)
SP GR UR STRIP: 1.02 (ref 1–1.03)
SQUAMOUS #/AREA URNS HPF: ABNORMAL /HPF
TROPONIN T DELTA: -58 NG/L
TROPONIN T SERPL HS-MCNC: 757 NG/L
UROBILINOGEN UR QL STRIP: ABNORMAL
WBC # UR STRIP: ABNORMAL /HPF
WBC NRBC COR # BLD AUTO: 5.59 10*3/MM3 (ref 3.4–10.8)

## 2024-05-11 PROCEDURE — 84484 ASSAY OF TROPONIN QUANT: CPT | Performed by: STUDENT IN AN ORGANIZED HEALTH CARE EDUCATION/TRAINING PROGRAM

## 2024-05-11 PROCEDURE — 85027 COMPLETE CBC AUTOMATED: CPT | Performed by: STUDENT IN AN ORGANIZED HEALTH CARE EDUCATION/TRAINING PROGRAM

## 2024-05-11 PROCEDURE — 25010000002 MORPHINE PER 10 MG: Performed by: STUDENT IN AN ORGANIZED HEALTH CARE EDUCATION/TRAINING PROGRAM

## 2024-05-11 PROCEDURE — 25010000002 ACYCLOVIR PER 5 MG: Performed by: NURSE PRACTITIONER

## 2024-05-11 PROCEDURE — 87040 BLOOD CULTURE FOR BACTERIA: CPT | Performed by: NURSE PRACTITIONER

## 2024-05-11 PROCEDURE — 93010 ELECTROCARDIOGRAM REPORT: CPT | Performed by: INTERNAL MEDICINE

## 2024-05-11 PROCEDURE — 25810000003 SODIUM CHLORIDE 0.9 % SOLUTION 250 ML FLEX CONT: Performed by: NURSE PRACTITIONER

## 2024-05-11 PROCEDURE — 93005 ELECTROCARDIOGRAM TRACING: CPT | Performed by: STUDENT IN AN ORGANIZED HEALTH CARE EDUCATION/TRAINING PROGRAM

## 2024-05-11 PROCEDURE — G0378 HOSPITAL OBSERVATION PER HR: HCPCS

## 2024-05-11 PROCEDURE — 80048 BASIC METABOLIC PNL TOTAL CA: CPT | Performed by: STUDENT IN AN ORGANIZED HEALTH CARE EDUCATION/TRAINING PROGRAM

## 2024-05-11 PROCEDURE — 81001 URINALYSIS AUTO W/SCOPE: CPT | Performed by: NURSE PRACTITIONER

## 2024-05-11 PROCEDURE — 96365 THER/PROPH/DIAG IV INF INIT: CPT

## 2024-05-11 PROCEDURE — 96375 TX/PRO/DX INJ NEW DRUG ADDON: CPT

## 2024-05-11 PROCEDURE — 99214 OFFICE O/P EST MOD 30 MIN: CPT | Performed by: INTERNAL MEDICINE

## 2024-05-11 RX ORDER — MORPHINE SULFATE 2 MG/ML
2 INJECTION, SOLUTION INTRAMUSCULAR; INTRAVENOUS ONCE
Status: COMPLETED | OUTPATIENT
Start: 2024-05-11 | End: 2024-05-11

## 2024-05-11 RX ORDER — AMLODIPINE BESYLATE 5 MG/1
5 TABLET ORAL
Status: DISCONTINUED | OUTPATIENT
Start: 2024-05-11 | End: 2024-05-13 | Stop reason: HOSPADM

## 2024-05-11 RX ORDER — HYDROCODONE BITARTRATE AND ACETAMINOPHEN 5; 325 MG/1; MG/1
1 TABLET ORAL EVERY 4 HOURS PRN
Status: DISCONTINUED | OUTPATIENT
Start: 2024-05-11 | End: 2024-05-13 | Stop reason: HOSPADM

## 2024-05-11 RX ADMIN — HYDROCODONE BITARTRATE AND ACETAMINOPHEN 1 TABLET: 5; 325 TABLET ORAL at 11:26

## 2024-05-11 RX ADMIN — BACLOFEN 10 MG: 10 TABLET ORAL at 20:20

## 2024-05-11 RX ADMIN — PANTOPRAZOLE SODIUM 40 MG: 40 TABLET, DELAYED RELEASE ORAL at 20:20

## 2024-05-11 RX ADMIN — VALACYCLOVIR HYDROCHLORIDE 1000 MG: 500 TABLET, FILM COATED ORAL at 05:35

## 2024-05-11 RX ADMIN — MECLIZINE HYDROCHLORIDE 12.5 MG: 25 TABLET ORAL at 09:33

## 2024-05-11 RX ADMIN — ATENOLOL 50 MG: 50 TABLET ORAL at 20:20

## 2024-05-11 RX ADMIN — ASPIRIN 81 MG: 81 TABLET, COATED ORAL at 09:33

## 2024-05-11 RX ADMIN — Medication 250 MG: at 20:30

## 2024-05-11 RX ADMIN — MORPHINE SULFATE 2 MG: 2 INJECTION, SOLUTION INTRAMUSCULAR; INTRAVENOUS at 02:49

## 2024-05-11 RX ADMIN — ATORVASTATIN CALCIUM 40 MG: 20 TABLET, FILM COATED ORAL at 20:20

## 2024-05-11 RX ADMIN — LISINOPRIL 40 MG: 20 TABLET ORAL at 09:33

## 2024-05-11 RX ADMIN — LISINOPRIL 40 MG: 20 TABLET ORAL at 20:20

## 2024-05-11 RX ADMIN — ACYCLOVIR SODIUM 870 MG: 50 INJECTION, SOLUTION INTRAVENOUS at 22:27

## 2024-05-11 RX ADMIN — SUCRALFATE 1 G: 1 TABLET ORAL at 20:20

## 2024-05-11 RX ADMIN — VALACYCLOVIR HYDROCHLORIDE 1000 MG: 500 TABLET, FILM COATED ORAL at 14:08

## 2024-05-11 RX ADMIN — HYDROCODONE BITARTRATE AND ACETAMINOPHEN 1 TABLET: 5; 325 TABLET ORAL at 17:23

## 2024-05-11 RX ADMIN — PANTOPRAZOLE SODIUM 40 MG: 40 TABLET, DELAYED RELEASE ORAL at 09:33

## 2024-05-11 RX ADMIN — Medication 10 ML: at 20:31

## 2024-05-11 RX ADMIN — Medication 10 ML: at 08:04

## 2024-05-11 RX ADMIN — POTASSIUM CHLORIDE 20 MEQ: 750 TABLET, EXTENDED RELEASE ORAL at 09:33

## 2024-05-11 RX ADMIN — HYDROCODONE BITARTRATE AND ACETAMINOPHEN 1 TABLET: 5; 325 TABLET ORAL at 22:38

## 2024-05-11 RX ADMIN — ATENOLOL 50 MG: 50 TABLET ORAL at 02:13

## 2024-05-11 RX ADMIN — AMLODIPINE BESYLATE 5 MG: 5 TABLET ORAL at 16:18

## 2024-05-11 RX ADMIN — ATENOLOL 50 MG: 50 TABLET ORAL at 14:07

## 2024-05-11 RX ADMIN — CLOPIDOGREL BISULFATE 75 MG: 75 TABLET, FILM COATED ORAL at 12:26

## 2024-05-11 RX ADMIN — ACETAMINOPHEN 650 MG: 325 TABLET, FILM COATED ORAL at 08:00

## 2024-05-11 NOTE — PROGRESS NOTES
ED OBSERVATION PROGRESS/DISCHARGE SUMMARY    Date of Admission: 5/10/2024   LOS: 0 days   PCP: Triny Hannon MD    Final Diagnosis:     Subjective : Still endorsing right-sided chest pain.  Denies any left-sided chest pain today.    Hospital Outcome:     Slade Martinez is a 73 y.o. male who admitted the observation unit for further evaluation of chest pain.  Of note patient recently underwent cardiac catheterization and PCI procedure that was complicated by balloon rupture perforation in the proximal to mid LAD.  Patient appears to have a herpes zoster rash to the right chest in the area of reported chest pain.  In the ER, troponins elevated to 643 with a repeat of 619.  EKG notes sinus rhythm, there is note of some T wave inversions in the lateral leads, no acute ischemia noted.  Chest x-ray shows no active disease.  ER provider spoke with Dr. Torre with cardiology recommending Nitropaste and blood pressure control and will follow in consultation.  Patient was started on valacyclovir.     5/10  Cardiology has evaluated the patient and recommends observing the patient for an additional night.  Will obtain a limited echocardiogram.  Repeat troponin in the morning.  If continue to trend downward he can possibly discharge home tomorrow.    ROS:  General: no fevers, chills  Respiratory: no cough, dyspnea  Cardiovascular: no chest pain, palpitations  Abdomen: No abdominal pain, nausea, vomiting, or diarrhea  Neurologic: No focal weakness    Objective   Physical Exam:  I have reviewed the vital signs.  Temp:  [98.1 °F (36.7 °C)-100.6 °F (38.1 °C)] 98.1 °F (36.7 °C)  Heart Rate:  [64-74] 64  Resp:  [18-19] 19  BP: (141-191)/(59-88) 143/76  General Appearance:    Alert, cooperative, no distress  Head:    Normocephalic, atraumatic  Eyes:    Sclerae anicteric  Neck:   Supple, no mass  Lungs: Clear to auscultation bilaterally, respirations unlabored  Heart: Regular rate and rhythm, S1 and S2 normal, no murmur, rub or  gallop  Abdomen:  Soft, non-tender, bowel sounds active, nondistended  Extremities: No clubbing, cyanosis, or edema to lower extremities  Pulses:  2+ and symmetric in distal lower extremities  Skin: No rashes   Neurologic: Oriented x3, Normal strength to extremities    Results Review:    I have reviewed the labs, radiology results and diagnostic studies.    Results from last 7 days   Lab Units 05/11/24  0454   WBC 10*3/mm3 5.59   HEMOGLOBIN g/dL 11.4*   HEMATOCRIT % 33.6*   PLATELETS 10*3/mm3 161     Results from last 7 days   Lab Units 05/11/24  0454 05/10/24  1733 05/08/24  0923 05/06/24  0412 05/05/24  0736   SODIUM mmol/L 131* 133* 134*   < > 135*   POTASSIUM mmol/L 3.5 3.8 4.6   < > 4.4   CHLORIDE mmol/L 97* 99 101   < > 99   CO2 mmol/L 22.9 22.0 24.0   < > 25.9   BUN mg/dL 8 8 21   < > 10   CREATININE mg/dL 0.80 0.85 1.06   < > 0.86   CALCIUM mg/dL 8.6 8.4* 8.4*   < > 9.3   BILIRUBIN mg/dL  --  0.8  --   --  0.6   ALK PHOS U/L  --  73  --   --  83   ALT (SGPT) U/L  --  19  --   --  14   AST (SGOT) U/L  --  21  --   --  12   GLUCOSE mg/dL 102* 115* 138*   < > 174*    < > = values in this interval not displayed.     Imaging Results (Last 24 Hours)       Procedure Component Value Units Date/Time    XR Chest 1 View [[id number]] Collected: 05/10/24 1752     Updated: 05/10/24 1817    Narrative:      PORTABLE RADIOGRAPHIC VIEW OF THE CHEST     CLINICAL HISTORY: Chest pain     FINDINGS:     Sternal wires, coronary stent, and a loop recorder are identified. The  cardiomediastinal silhouette is enlarged but stable in size.  Atherosclerotic calcifications are appreciated within the aortic arch.  The lungs are clear of acute infiltrates. Atherosclerotic calcifications  are incidentally appreciated within the common carotids.       Impression:         No active disease in the chest.     This report was finalized on 5/10/2024 6:14 PM by Dr. Beltran Tripathi M.D  on Workstation: [id number]               I have reviewed the  medications.  ---------------------------------------------------------------------------------------------  Assessment & Plan   Assessment/Problem List    Chest pain      Plan:    Non-STEMI  Chest pain  CAD with recent PCI  -High-sensitivity troponins 643, 619, 757, 699  -EKG shows nonspecific repolarization abnormalities, unchanged from prior  -Chest x-ray negative acute  -Continue aspirin, Plavix and statin  -Telemetry monitoring  -Audiology consulted  -They have recommended a limited echocardiogram and repeating his troponin in the morning  -If trending downward he can be discharged    Herpes zoster  -Continue valacyclovir  -Likely source of right-sided chest pain     Hypertension  -Continue atenolol and lisinopril  -Cardiology has added amlodipine to his regimen  -Monitor vital signs every 4 hours    AVNRT  -He has been in sinus rhythm  -Continue telemetry monitoring  -Continue atenolol  -Amiodarone recently stopped due to side effects     DVT prophylaxis:  Mechanical DVT prophylaxis orders are present.     Disposition: He will remain in the observation unit for an additional night.  Repeat labs in the morning.     Follow-up after Discharge: Cardiology    This note will serve as a progress note    Abby Villegas, APRN 05/11/24 15:10 EDT    I have worn appropriate PPE during this patient encounter, sanitized my hands both with entering and exiting patient's room.    52 minutes has been spent by [facility] providers in the care of this patient while under observation status

## 2024-05-11 NOTE — PLAN OF CARE
Goal Outcome Evaluation:      Pt. Admitted to observation from the ED with burning and itching on chest. Pt. Given [place] for pain and the Morphine. Trops 643, 619, and now 757. Recently had stents placed. Cardio consulted.Has tremors at baseline.Valacyclovir given. Pt. Aware of treatment plan and is agreeable.

## 2024-05-11 NOTE — PLAN OF CARE
"Goal Outcome Evaluation:  Plan of Care Reviewed With: patient        Progress: no change  Outcome Evaluation: Patient is alert and oriented times 4. On room air. Blood pressure is elevated, providers aware. Up ad cecilio with activity. Cardiology following. Troponins drawn, providers aware. Rash to right side of flank and left buttock. Patient complained of pain, pain medication given. Patient stated that, \"Pain is better.\" Plan of care ongoing.                               "

## 2024-05-11 NOTE — PROGRESS NOTES
MD Attestation Note    I supervised care provided by the midlevel provider.    The NADIYA and I have discussed this patient's history, physical exam, and treatment plan. I have reviewed the documentation and personally had a face to face interaction with the patient  I affirm the documentation and agree with the treatment and plan.       My personal findings are:        Subjective:  Patient doing better overnight, chest pain is improved with nitroglycerin.  Had a fever however he thinks it is improving.  No new complaints.        Objective:      PHYSICAL EXAM  Vitals:    05/11/24 0213 05/11/24 0444 05/11/24 0750 05/11/24 0926   BP: 165/75 171/63 146/68 143/73   BP Location:  Right arm Right arm Right arm   Patient Position:  Lying Lying Lying   Pulse: 72 68 70 67   Resp:  18 18    Temp:  99.5 °F (37.5 °C) (!) 100.6 °F (38.1 °C) 99.7 °F (37.6 °C)   TempSrc:  Oral Oral Oral   SpO2:  95% 94%    Weight:       Height:             GENERAL: no acute distress  HENT: nares patent  EYES: no scleral icterus  CV: regular rhythm, normal rate, erythematous vesicular rash in dermatomal distribution over the right thoracic wall  RESPIRATORY: normal effort  ABDOMEN: soft  MUSCULOSKELETAL: no deformity, cath site to right wrist and right groin without erythema or warmth  NEURO: alert, moves all extremities, follows commands  PSYCH:  calm, cooperative  SKIN: warm, dry    Vital signs and nursing notes reviewed.      Assessment/ Plan:  Patient presented to the emergency department with chest pain, recent complicated hospital stay with multiple catheterizations.  Patient doing better right now.  Labs showing elevated troponins, largely flat/downtrending.  Unclear etiology of fever, possibly related to zoster versus occult bacteremia.  Will send blood cultures.  Plan for cardiology evaluation today.

## 2024-05-11 NOTE — H&P
[facility]   HISTORY AND PHYSICAL    Patient Name: Slade Martinez  : 1950  MRN: [id number]  Primary Care Physician:  Triny Hannon MD  Date of admission: 5/10/2024    Subjective   Subjective     Chief Complaint:   Chief Complaint   Patient presents with    Chest Pain         HPI:    Slade Martinez is a 73 y.o. male with a history of coronary artery disease status post CABG  who recently underwent cardiac catheterization on 2024 and underwent complex PCI of the left main to proximal LAD with PCI of the mid LAD that was complicated by balloon rupture perforation in the proximal to mid LAD.  Patient also has a history of hypertension and dyslipidemia.  Patient presents to the ER today with chest pain and a rash.  Patient states he was discharged home yesterday and was feeling well other than being tired from not getting good sleep in the hospital.  He states he woke up this morning and had some discomfort on the right side of the chest radiated to the back.  He states it progressively worsened throughout the day and he developed a rash in the same area around dinnertime.  He denies any shortness of breath.  Denies lightheadedness dizziness.  Denies palpitations.  Denies abdominal pain and nausea.  Denies fevers.  Denies lower extremity swelling.  He states he has had shingles previously that was brought on when he was in the hospital with cardiac issues in the past.  He denies tobacco and regular alcohol use.    Review of Systems   All systems were reviewed and negative except for: what is mentioned above in the HPI.    Personal History     Past Medical History:   Diagnosis Date    Constipation     Coronary atherosclerosis 2019    H/O CABG SVG to first diagonal branch and to the LAD as well as SVG to the lateral marginal branch of the circumflex complicated by right sided subcortical infarct with left sided hemiparesis    Dyslipidemia 2019    Essential hypertension 2019     Injury of back     fractured vertebra 2/26/23    Peripheral edema     Stroke 2011    Vertigo        Past Surgical History:   Procedure Laterality Date    CARDIAC CATHETERIZATION N/A 5/6/2024    Procedure: Left Heart Cath;  Surgeon: Gurwinder Hurd MD;  Location:  [facility];  Service: Cardiovascular;  Laterality: N/A;    CARDIAC CATHETERIZATION N/A 5/6/2024    Procedure: Coronary angiography;  Surgeon: Gurwinder Hurd MD;  Location:  [facility];  Service: Cardiovascular;  Laterality: N/A;    CARDIAC CATHETERIZATION N/A 5/6/2024    Procedure: Percutaneous Coronary Intervention;  Surgeon: Gurwinder Hurd MD;  Location:  [facility];  Service: Cardiovascular;  Laterality: N/A;    CARDIAC CATHETERIZATION N/A 5/6/2024    Procedure: Stent TC coronary;  Surgeon: Gurwinder Hurd MD;  Location:  [facility];  Service: Cardiovascular;  Laterality: N/A;    CARDIAC CATHETERIZATION  5/6/2024    Procedure: Saphenous Vein Graft;  Surgeon: Gurwinder Hurd MD;  Location:  [facility];  Service: Cardiovascular;;    CAROTID ENDARTERECTOMY  2000    Left    CHOLECYSTECTOMY      CORONARY ARTERY BYPASS GRAFT      CORONARY STENT PLACEMENT      X17 stents    INTERVENTIONAL RADIOLOGY PROCEDURE N/A 5/6/2024    Procedure: Intravascular Ultrasound;  Surgeon: Gurwinder Hurd MD;  Location:  [facility];  Service: Cardiovascular;  Laterality: N/A;       Family History: family history includes COPD in his brother and mother; Heart attack in his father; Heart failure in his father. Otherwise pertinent FHx was reviewed and not pertinent to current issue.    Social History:  reports that he has quit smoking. His smoking use included cigarettes. He has never used smokeless tobacco. He reports that he does not currently use alcohol. He reports that he does not use drugs.    Home Medications:  Multiple  Vitamins-Minerals, aspirin, atenolol, atorvastatin, baclofen, clopidogrel, hydroCHLOROthiazide, linaclotide, lisinopril, loratadine, meclizine, nitroglycerin, pantoprazole, potassium chloride, saccharomyces boulardii, sucralfate, and triamcinolone    Allergies:  Allergies   Allergen Reactions    Pletal [Cilostazol] Myalgia     .       Objective   Objective     Vitals:   Temp:  [99.8 °F (37.7 °C)] 99.8 °F (37.7 °C)  Heart Rate:  [68-74] 71  Resp:  [18] 18  BP: (146-191)/(59-88) 157/67  Physical Exam    Constitutional: 73-year-old male in no acute distress on room air   Eyes: PERRLA, sclerae anicteric, no conjunctival injection   HENT: NCAT, mucous membranes moist   Neck: Supple, no thyromegaly, no lymphadenopathy, trachea midline   Respiratory: Clear to auscultation bilaterally, nonlabored respirations    Cardiovascular: RRR, no murmurs, rubs, or gallops, palpable pedal pulses bilaterally   Gastrointestinal: Positive bowel sounds, soft, nontender, nondistended   Musculoskeletal: No bilateral ankle edema, no clubbing or cyanosis to extremities   Psychiatric: Appropriate affect, cooperative   Neurologic: Oriented x 3, strength symmetric in all extremities, Cranial Nerves grossly intact to confrontation, speech clear   Skin: Macular rash around the T2/T3 dermatome but does not cross the midline from the right chest extends to the back, no vesicles noted, tenderness to touch    Result Review    Result Review:  I have personally reviewed the results from the time of this admission to 5/10/2024 21:19 EDT and agree with these findings:  [x]  Laboratory list / accordion  []  Microbiology  [x]  Radiology  [x]  EKG/Telemetry   []  Cardiology/Vascular   []  Pathology  [x]  Old records  []  Other:  Most notable findings include:     XR Chest 1 View    Result Date: 5/10/2024  PORTABLE RADIOGRAPHIC VIEW OF THE CHEST  CLINICAL HISTORY: Chest pain  FINDINGS:  Sternal wires, coronary stent, and a loop recorder are identified. The  cardiomediastinal silhouette is enlarged but stable in size. Atherosclerotic calcifications are appreciated within the aortic arch. The lungs are clear of acute infiltrates. Atherosclerotic calcifications are incidentally appreciated within the common carotids.       No active disease in the chest.  This report was finalized on 5/10/2024 6:14 PM by Dr. Beltran Tripathi M.D on Workstation: [id number]              Assessment & Plan   Assessment / Plan     Brief Patient Summary:  Slade Martinez is a 73 y.o. male who admitted the observation unit for further evaluation of chest pain.  Of note patient recently underwent cardiac catheterization and PCI procedure that was complicated by balloon rupture perforation in the proximal to mid LAD.  Patient appears to have a herpes zoster rash to the right chest in the area of reported chest pain.  In the ER, troponins elevated to 643 with a repeat of 619.  EKG notes sinus rhythm, there is note of some T wave inversions in the lateral leads, no acute ischemia noted.  Chest x-ray shows no active disease.  ER provider spoke with Dr. Torre with cardiology recommending Nitropaste and blood pressure control and will follow in consultation.  Patient was started on valacyclovir.    Active Hospital Problems:  Active Hospital Problems    Diagnosis     **Chest pain      Plan:     Chest pain  Herpes zoster  Coronary artery disease  -High-sensitivity troponins 643, 619.  This is elevated from last troponin t that was 412 on 5/7/2024.  Continue to trend troponin  -EKG no acute ischemia.  Repeat in a.m.  -Chest x-ray negative acute  -Continue valacyclovir  -Cardiology consulted  -Continue aspirin, Plavix and statin  -Telemetry monitoring  -N.p.o. after midnight    Hypertension  -Continue atenolol and lisinopril  -Monitor vital signs every 4 hours    DVT prophylaxis:  Mechanical DVT prophylaxis orders are present.        CODE STATUS:    Code Status (Patient has no pulse and is not breathing):  CPR (Attempt to Resuscitate)  Medical Interventions (Patient has pulse or is breathing): Full Support    Admission Status:  I believe this patient meets observation status.    76 minutes have been spent by [facility] providers in the care of this patient while under observation status.      Appropriate PPE worn during patient encounter.  Hand hygeine performed before and after seeing the patient.      Electronically signed by Arabella Chandra PA-C, 05/10/24, 9:19 PM EDT.

## 2024-05-11 NOTE — ED NOTES
Nursing report ED to floor  Slade Martinez  73 y.o.  male    HPI :  HPI (Adult)  Stated Reason for Visit: cp  History Obtained From: patient    Chief Complaint  Chief Complaint   Patient presents with    Chest Pain       Admitting doctor:   Matthew Marcos MD    Admitting diagnosis:   The primary encounter diagnosis was Herpes zoster without complication. A diagnosis of Chest pain, unspecified type was also pertinent to this visit.    Code status:   Current Code Status       Date Active Code Status Order ID Comments User Context       Prior            Allergies:   Pletal [cilostazol]    Isolation:   No active isolations    Intake and Output  No intake or output data in the 24 hours ending 05/10/24 2106    Weight:       05/10/24  1724   Weight: 90.3 kg (199 lb 1.2 oz)       Most recent vitals:   Vitals:    05/10/24 1848 05/10/24 1935 05/10/24 2031 05/10/24 2101   BP: (!) 191/81 173/75 146/59 157/67   BP Location:  Right arm     Patient Position:  Lying     Pulse: 68 72 74 71   Resp:  18     Temp:       SpO2:  96% 92% 97%   Weight:       Height:           Active LDAs/IV Access:   Lines, Drains & Airways       Active LDAs       Name Placement date Placement time Site Days    Peripheral IV 05/10/24 1734 Left Antecubital 05/10/24  1734  Antecubital  less than 1                    Labs (abnormal labs have a star):   Labs Reviewed   COMPREHENSIVE METABOLIC PANEL - Abnormal; Notable for the following components:       Result Value    Glucose 115 (*)     Sodium 133 (*)     Calcium 8.4 (*)     Albumin 3.4 (*)     All other components within normal limits    Narrative:     GFR Normal >60  Chronic Kidney Disease <60  Kidney Failure <15    The GFR formula is only valid for adults with stable renal function between ages 18 and 70.   TROPONIN - Abnormal; Notable for the following components:    HS Troponin T 643 (*)     All other components within normal limits    Narrative:     High Sensitive Troponin T Reference Range:  <14.0  ng/L- Negative Female for AMI  <22.0 ng/L- Negative Male for AMI  >=14 - Abnormal Female indicating possible myocardial injury.  >=22 - Abnormal Male indicating possible myocardial injury.   Clinicians would have to utilize clinical acumen, EKG, Troponin, and serial changes to determine if it is an Acute Myocardial Infarction or myocardial injury due to an underlying chronic condition.        CBC WITH AUTO DIFFERENTIAL - Abnormal; Notable for the following components:    RBC 3.63 (*)     Hemoglobin 11.6 (*)     Hematocrit 33.7 (*)     All other components within normal limits   MANUAL DIFFERENTIAL - Abnormal; Notable for the following components:    Monocyte % 15.0 (*)     All other components within normal limits   SINGLE HS TROPONIN T - Abnormal; Notable for the following components:    HS Troponin T 619 (*)     All other components within normal limits    Narrative:     High Sensitive Troponin T Reference Range:  <14.0 ng/L- Negative Female for AMI  <22.0 ng/L- Negative Male for AMI  >=14 - Abnormal Female indicating possible myocardial injury.  >=22 - Abnormal Male indicating possible myocardial injury.   Clinicians would have to utilize clinical acumen, EKG, Troponin, and serial changes to determine if it is an Acute Myocardial Infarction or myocardial injury due to an underlying chronic condition.        RAINBOW DRAW    Narrative:     The following orders were created for panel order [place] Draw.  Procedure                               Abnormality         Status                     ---------                               -----------         ------                     Green Top (Gel)[[id number]]                                  Final result               Lavender Top[[id number]]                                     Final result               Gold Top - SST[[id number]]                                   Final result               Light Blue Top[[id number]]                                   Final result                  Please view results for these tests on the individual orders.   HIGH SENSITIVITIY TROPONIN T 2HR   CBC AND DIFFERENTIAL    Narrative:     The following orders were created for panel order CBC & Differential.  Procedure                               Abnormality         Status                     ---------                               -----------         ------                     CBC Auto Differential[[id number]]        Abnormal            Final result                 Please view results for these tests on the individual orders.   GREEN TOP   LAVENDER TOP   GOLD TOP - SST   LIGHT BLUE TOP       EKG:   ECG 12 Lead ED Triage Standing Order; Chest Pain   Final Result   HEART RATE= 69  bpm   RR Interval= 870  ms   MD Interval= 204  ms   P Horizontal Axis= 244  deg   P Front Axis= -40  deg   QRSD Interval= 99  ms   QT Interval= 404  ms   QTcB= 433  ms   QRS Axis= 42  deg   T Wave Axis= 110  deg   - ABNORMAL ECG -   Sinus rhythm   Repol abnrm suggests ischemia, anterolateral   No change from previous tracing   Electronically Signed By: Gurwinder Hurd ([facility]) 10-May-2024 18:01:59   Date and Time of Study: 2024-05-10 17:27:34          Meds given in ED:   Medications   sodium chloride 0.9 % flush 10 mL (has no administration in time range)   HYDROcodone-acetaminophen (NORCO) 5-325 MG per tablet 1 tablet (1 tablet Oral Given 5/10/24 1809)   nitroglycerin (NITROSTAT) ointment 1 inch (1 inch Topical Given 5/10/24 1848)   aspirin tablet 325 mg (325 mg Oral Given 5/10/24 1809)   valACYclovir (VALTREX) tablet 1,000 mg (1,000 mg Oral Given 5/10/24 1843)       Imaging results:  XR Chest 1 View    Result Date: 5/10/2024   No active disease in the chest.  This report was finalized on 5/10/2024 6:14 PM by Dr. Beltran Tripathi M.D on Workstation: [id number]       Ambulatory status:   -     Social issues:   Social History     Socioeconomic History    Marital status:    Tobacco Use    Smoking status: Former     Types:  Cigarettes    Smokeless tobacco: Never   Vaping Use    Vaping status: Never Used   Substance and Sexual Activity    Alcohol use: Not Currently    Drug use: Never    Sexual activity: Defer       Peripheral Neurovascular  Peripheral Neurovascular (Adult)  Peripheral Neurovascular WDL: WDL    Neuro Cognitive  Neuro Cognitive (Adult)  Cognitive/Neuro/Behavioral WDL: WDL    Learning  Learning Assessment (Adult)  Learning Readiness and Ability: no barriers identified    Respiratory  Respiratory WDL  Respiratory WDL: WDL    Abdominal Pain       Pain Assessments  Pain (Adult)  (0-10) Pain Rating: Rest: 2  (0-10) Pain Rating: Activity: 2  Pain Location: chest  Pain Side/Orientation: medial  Pain Onset/Duration: CP started this morning  Pain Description: intermittent  Pain Radiation to: back  Response to Pain Interventions: interventions effective per patient  Pain Assessment Additional Detail  Pain Onset/Duration: CP started this morning    NIH Stroke Scale       Rita Martínez RN  05/10/24 21:06 EDT

## 2024-05-11 NOTE — CONSULTS
Patient Name: Slade Martinez  Age/Sex: 73 y.o. male  : 1950  MRN: [id number]    Date of Admission: 5/10/2024  Date of Encounter Visit: 24  Encounter Provider: Monika Cruz RN  Place of Service: [facility] CARDIOLOGY      Referring Provider: Kevan Hernandez MD  Patient Care Team:  Triny Hannon MD as PCP - General (Internal Medicine)  James Hubbard MD as Consulting Physician (Cardiology)    Subjective:     Admitted/Consulted for: Chest Pain     Chief Complaint: Chest Pain     History of Present Illness:  Slade Martinez is a 73 y.o. male, newly established with Dr. Locke, with a past medical history notable for coronary artery disease s/p CABG and PCI, hypertension and hyperlipidemia who was just discharged on May 9th after presenting with chest discomfort.  He underwent cardiac catheterization by me on May 7 which revealed a severely calcified left main with a proximal stenosis of 95% and severely calcified LAD with 70% proximal to mid vessel stenosis and discrete 90% in-stent restenosis at the mid segment.  We did a complex PCI of the ostial left main to proximal LAD stenosis with overlapping TC and PCI of the mid LAD.  This was complicate by a balloon rupture which was covered with Graftmaster and subsequent TC to the mid LAD.   He recovered well and was discharged on aspirin, atenolol, atorvastatin and clopidogrel.    He presented back to the ED on May 10th with complaints of right sided chest and back pain with a rash that has developed in the same area believed to be Shingles.  EKG shows sinus rhythm with no acute ischemic changes.  Patient was noted to be hypertensive upon arrival and subsequently had chest pain in the setting of that.  He was 191/88.  He has had no chest pain overnight.  Blood pressure is better controlled.  Cardiac biomarkers show that his troponin was elevated at 757, however this was uptrending after his PCI on  and  was 412.  Currently downtrending with a repeat troponin of 699.  He has been started on valacyclovir.    Previous testing:     ECHO 4-5-24    Left ventricular systolic function is normal. Left ventricular ejection fraction appears to be 56 - 60%.    Left ventricular wall thickness is consistent with hypertrophy. Sigmoid-shaped ventricular septum is present.    Left ventricular diastolic function was indeterminate.    Normal right ventricular cavity size and systolic function noted.    The left atrial cavity is mildly dilated.    The aortic valve leaflets are mildly to moderately calcified    Mild mitral valve regurgitation is present.    Mild to moderate tricuspid valve regurgitation is present.    Calculated right ventricular systolic pressure from tricuspid regurgitation is 33 mmHg.    There is no evidence of pericardial effusion.    Past Medical History:  Past Medical History:   Diagnosis Date    Constipation     Coronary atherosclerosis 11/04/2019    H/O CABG SVG to first diagonal branch and to the LAD as well as SVG to the lateral marginal branch of the circumflex complicated by right sided subcortical infarct with left sided hemiparesis    Dyslipidemia 11/04/2019    Essential hypertension 11/04/2019    Injury of back     fractured vertebra 2/26/23    Peripheral edema     Stroke 2011    Vertigo        Past Surgical History:   Procedure Laterality Date    CARDIAC CATHETERIZATION N/A 5/6/2024    Procedure: Left Heart Cath;  Surgeon: Gurwinder Hurd MD;  Location: [facility] INVASIVE LOCATION;  Service: Cardiovascular;  Laterality: N/A;    CARDIAC CATHETERIZATION N/A 5/6/2024    Procedure: Coronary angiography;  Surgeon: Gurwinder Hurd MD;  Location: [facility] INVASIVE LOCATION;  Service: Cardiovascular;  Laterality: N/A;    CARDIAC CATHETERIZATION N/A 5/6/2024    Procedure: Percutaneous Coronary Intervention;  Surgeon: Gurwinder Hurd MD;  Location: [facility] INVASIVE LOCATION;   Service: Cardiovascular;  Laterality: N/A;    CARDIAC CATHETERIZATION N/A 5/6/2024    Procedure: Stent TC coronary;  Surgeon: Gurwinder Hurd MD;  Location:  [facility] CATH INVASIVE LOCATION;  Service: Cardiovascular;  Laterality: N/A;    CARDIAC CATHETERIZATION  5/6/2024    Procedure: Saphenous Vein Graft;  Surgeon: Gurwinder Hurd MD;  Location:  [facility] CATH INVASIVE LOCATION;  Service: Cardiovascular;;    CAROTID ENDARTERECTOMY  2000    Left    CHOLECYSTECTOMY      CORONARY ARTERY BYPASS GRAFT      CORONARY STENT PLACEMENT      X17 stents    INTERVENTIONAL RADIOLOGY PROCEDURE N/A 5/6/2024    Procedure: Intravascular Ultrasound;  Surgeon: Gurwinder Hurd MD;  Location:  [facility] CATH INVASIVE LOCATION;  Service: Cardiovascular;  Laterality: N/A;       Home Medications:   Medications Prior to Admission   Medication Sig Dispense Refill Last Dose    aspirin 81 MG tablet Take 1 tablet by mouth Daily.   5/10/2024    atenolol (TENORMIN) 50 MG tablet Take 1 tablet by mouth 2 (Two) Times a Day.   5/10/2024 at 0800    atorvastatin (LIPITOR) 40 MG tablet Take 1 tablet by mouth Every Night.   5/9/2024    baclofen (LIORESAL) 10 MG tablet Take 1 tablet by mouth every night at bedtime.   5/9/2024    clopidogrel (PLAVIX) 75 MG tablet Take 1 tablet by mouth Daily.   5/10/2024    hydroCHLOROthiazide 25 MG tablet Take 1 tablet by mouth Daily As Needed (swelling).   5/9/2024    Linzess 145 MCG capsule capsule Take 1 capsule by mouth Every Other Day.   5/10/2024    lisinopril (PRINIVIL,ZESTRIL) 40 MG tablet Take 1 tablet by mouth 2 (Two) Times a Day.   5/10/2024 at 0800    loratadine (CLARITIN) 10 MG tablet Take 1 tablet by mouth Daily.   5/10/2024    meclizine (ANTIVERT) 12.5 MG tablet Take 1 tablet by mouth Daily.   5/10/2024    Multiple Vitamins-Minerals (MULTIVITAMIN ADULT EXTRA C PO) Take 1 tablet by mouth Daily.   5/10/2024    nitroglycerin (NITROSTAT) 0.4 MG SL tablet Place 1 tablet under the tongue Every 5  (Five) Minutes As Needed for Chest Pain. Take no more than 3 doses in 15 minutes. 25 tablet 3 5/10/2024    pantoprazole (PROTONIX) 40 MG EC tablet Take 1 tablet by mouth 2 (Two) Times a Day.   5/10/2024 at 0800    potassium chloride 10 MEQ CR tablet Take 2 tablets by mouth Daily.   5/10/2024 at 0800    saccharomyces boulardii (FLORASTOR) 250 MG capsule Take 1 capsule by mouth Every Night.   5/10/2024    sucralfate (CARAFATE) 1 g tablet Take 1 tablet by mouth every night at bedtime.   5/9/2024    triamcinolone (KENALOG) 0.1 % cream Apply 1 Application topically to the appropriate area as directed 2 (Two) Times a Day As Needed for Rash.   5/10/2024       Allergies:  Allergies   Allergen Reactions    Pletal [Cilostazol] Myalgia     .       Past Social History:  Social History     Socioeconomic History    Marital status:    Tobacco Use    Smoking status: Former     Types: Cigarettes    Smokeless tobacco: Never   Vaping Use    Vaping status: Never Used   Substance and Sexual Activity    Alcohol use: Not Currently    Drug use: Never    Sexual activity: Defer        Past Family History:  Family History   Problem Relation Age of Onset    COPD Mother     Heart attack Father     Heart failure Father     COPD Brother          Review of Systems:  All systems reviewed. Pertinent positives identified in HPI. All other systems are negative.       Objective:     Objective:  Temp:  [98.2 °F (36.8 °C)-100.6 °F (38.1 °C)] 99.7 °F (37.6 °C)  Heart Rate:  [67-74] 67  Resp:  [18] 18  BP: (141-191)/(59-88) 143/73  No intake or output data in the 24 hours ending 05/11/24 1208  Body mass index is 26.76 kg/m².      05/10/24  1724 05/10/24  2124   Weight: 90.3 kg (199 lb 1.2 oz) 87 kg (191 lb 14.4 oz)           Physical Exam:   Temp:  [98.1 °F (36.7 °C)-100.6 °F (38.1 °C)] 98.1 °F (36.7 °C)  Heart Rate:  [64-74] 64  Resp:  [18-19] 19  BP: (141-191)/(59-88) 143/76  No intake or output data in the 24 hours ending 05/11/24 3450  Flowsheet  "Rows      Flowsheet Row First Filed Value   Admission Height 180.3 cm (71\") Documented at 05/10/2024 1724   Admission Weight 90.3 kg (199 lb 1.2 oz) Documented at 05/10/2024 1724            General Appearance:    Alert, cooperative, in no acute distress   Head:    Normocephalic, without obvious abnormality, atraumatic       Neck/Lymph   No adenopathy, supple, no thyromegaly, no carotid bruit, no    JVD   Lungs:     Clear to auscultation bilaterally, no wheezes, rales, or     rhonchi    Cardiac:    Normal rate, regular rhythm, no murmur, no rub, no gallop   Chest Wall:  Sternotomy   GI:     Normal bowel sounds, soft, nontender, nondistended,            no rebound tenderness   Extremities:   No cyanosis, clubbing, or edema   Circulatory/Peripheral Vascular :   Pulses palpable and equal bilaterally   Integumentary: Rash on chest and back consistent with shingles.       Neurologic:   Cranial nerves 2 - 12 grossly intact, sensation intact              Lab Review:     Results from last 7 days   Lab Units 05/11/24  0454 05/10/24  1733 05/08/24  0923   SODIUM mmol/L 131* 133* 134*   POTASSIUM mmol/L 3.5 3.8 4.6   CHLORIDE mmol/L 97* 99 101   CO2 mmol/L 22.9 22.0 24.0   BUN mg/dL 8 8 21   CREATININE mg/dL 0.80 0.85 1.06   GLUCOSE mg/dL 102* 115* 138*   CALCIUM mg/dL 8.6 8.4* 8.4*       Results from last 7 days   Lab Units 05/11/24  0454 05/10/24  1940 05/10/24  1733   HSTROP T ng/L 757* 619* 643*     Results from last 7 days   Lab Units 05/11/24  0454   WBC 10*3/mm3 5.59   HEMOGLOBIN g/dL 11.4*   HEMATOCRIT % 33.6*   PLATELETS 10*3/mm3 161                                   Imaging:    Imaging Results (Most Recent)       Procedure Component Value Units Date/Time    XR Chest 1 View [[id number]] Collected: 05/10/24 1752     Updated: 05/10/24 1817    Narrative:      PORTABLE RADIOGRAPHIC VIEW OF THE CHEST     CLINICAL HISTORY: Chest pain     FINDINGS:     Sternal wires, coronary stent, and a loop recorder are identified. " The  cardiomediastinal silhouette is enlarged but stable in size.  Atherosclerotic calcifications are appreciated within the aortic arch.  The lungs are clear of acute infiltrates. Atherosclerotic calcifications  are incidentally appreciated within the common carotids.       Impression:         No active disease in the chest.     This report was finalized on 5/10/2024 6:14 PM by Dr. Beltran Tripathi M.D  on Workstation: [id number]               Results for orders placed during the hospital encounter of 24    Adult Transthoracic Echo Limited W/ Cont if Necessary Per Protocol    Interpretation Summary    Limited study.    Left ventricular ejection fraction appears to be 51 - 55% from limited views, though may be better assessed with contrast.    Left ventricular wall thickness is consistent with mild concentric hypertrophy.    The following left ventricular wall segments are hypokinetic: basal anterolateral, mid anterolateral, apical lateral and apex hypokinetic.    There is a moderate (1-2cm) pericardial effusion which may be loculated and more prominent inferiorly.  There appears to be possible fibrinous material in the pericardial sac, correlate clinically regarding hemopericardium.    RV appears grossly normal in size and function from very limited acoustic windows.  No obvious RA/RV collapse to suggest tamponade, correlate clinically.    STAT echo in cath lab to R/O pericardial effusion.      EK24      BASELINE:   24                      I personally viewed and interpreted the patient's EKG/Telemetry data.    Assessment:   Assessment & Plan   1.  Non-ST elevation MI  2.  Coronary disease with recent PCI to the LAD complicated by perforation of the LAD and Graftmaster.  3.  AVNRT: Currently sinus  4.  Shingles  5.  Fever    -Overall I think he looks good from a cardiac standpoint.  -Nonspecific repolarization abnormalities on EKG are not new.  No change from prior.  - Plan on repeat troponin  again in the morning to ensure continued downtrend.  - Limited echo.  If this looks unchanged and troponin continues to downtrend he can go at that time  -Amlodipine 5 mg daily added for hypertension.    Thank you for allowing me to participate in the care of Slade Martinez. Feel free to contact me directly with any further questions or concerns.    Monika Cruz RN  [facility]  05/11/24  12:08 EDT

## 2024-05-12 ENCOUNTER — APPOINTMENT (OUTPATIENT)
Dept: CARDIOLOGY | Facility: HOSPITAL | Age: 74
End: 2024-05-12
Payer: MEDICARE

## 2024-05-12 PROBLEM — B02.9 SHINGLES: Status: ACTIVE | Noted: 2024-05-12

## 2024-05-12 LAB
ANION GAP SERPL CALCULATED.3IONS-SCNC: 10.8 MMOL/L (ref 5–15)
BH CV ECHO MEAS - MR MAX PG: 85.2 MMHG
BH CV ECHO MEAS - MR MAX VEL: 461.5 CM/SEC
BH CV ECHO MEAS - MV A DUR: 0.11 SEC
BH CV ECHO MEAS - MV A MAX VEL: 99.8 CM/SEC
BH CV ECHO MEAS - MV DEC SLOPE: 345.4 CM/SEC2
BH CV ECHO MEAS - MV DEC TIME: 0.18 SEC
BH CV ECHO MEAS - MV E MAX VEL: 127 CM/SEC
BH CV ECHO MEAS - MV E/A: 1.27
BH CV ECHO MEAS - MV MAX PG: 5.1 MMHG
BH CV ECHO MEAS - MV MEAN PG: 2.45 MMHG
BH CV ECHO MEAS - MV P1/2T: 105 MSEC
BH CV ECHO MEAS - MV V2 VTI: 40.1 CM
BH CV ECHO MEAS - MVA(P1/2T): 2.1 CM2
BH CV ECHO MEAS - RAP SYSTOLE: 15 MMHG
BH CV ECHO MEAS - RVSP: 50 MMHG
BH CV ECHO MEAS - TR MAX PG: 34.7 MMHG
BH CV ECHO MEAS - TR MAX VEL: 294.5 CM/SEC
BUN SERPL-MCNC: 9 MG/DL (ref 8–23)
BUN/CREAT SERPL: 11.5 (ref 7–25)
CALCIUM SPEC-SCNC: 8.4 MG/DL (ref 8.6–10.5)
CHLORIDE SERPL-SCNC: 96 MMOL/L (ref 98–107)
CO2 SERPL-SCNC: 22.2 MMOL/L (ref 22–29)
CREAT SERPL-MCNC: 0.78 MG/DL (ref 0.76–1.27)
DEPRECATED RDW RBC AUTO: 43.7 FL (ref 37–54)
EGFRCR SERPLBLD CKD-EPI 2021: 93.6 ML/MIN/1.73
ERYTHROCYTE [DISTWIDTH] IN BLOOD BY AUTOMATED COUNT: 13.1 % (ref 12.3–15.4)
GLUCOSE SERPL-MCNC: 94 MG/DL (ref 65–99)
HCT VFR BLD AUTO: 31.7 % (ref 37.5–51)
HGB BLD-MCNC: 10.7 G/DL (ref 13–17.7)
MCH RBC QN AUTO: 31.3 PG (ref 26.6–33)
MCHC RBC AUTO-ENTMCNC: 33.8 G/DL (ref 31.5–35.7)
MCV RBC AUTO: 92.7 FL (ref 79–97)
PLATELET # BLD AUTO: 162 10*3/MM3 (ref 140–450)
PMV BLD AUTO: 10.4 FL (ref 6–12)
POTASSIUM SERPL-SCNC: 3.8 MMOL/L (ref 3.5–5.2)
RBC # BLD AUTO: 3.42 10*6/MM3 (ref 4.14–5.8)
SODIUM SERPL-SCNC: 129 MMOL/L (ref 136–145)
TROPONIN T SERPL HS-MCNC: 707 NG/L
WBC NRBC COR # BLD AUTO: 5.79 10*3/MM3 (ref 3.4–10.8)

## 2024-05-12 PROCEDURE — 84484 ASSAY OF TROPONIN QUANT: CPT | Performed by: NURSE PRACTITIONER

## 2024-05-12 PROCEDURE — 80048 BASIC METABOLIC PNL TOTAL CA: CPT | Performed by: NURSE PRACTITIONER

## 2024-05-12 PROCEDURE — 93321 DOPPLER ECHO F-UP/LMTD STD: CPT

## 2024-05-12 PROCEDURE — 96366 THER/PROPH/DIAG IV INF ADDON: CPT

## 2024-05-12 PROCEDURE — 93308 TTE F-UP OR LMTD: CPT

## 2024-05-12 PROCEDURE — 99223 1ST HOSP IP/OBS HIGH 75: CPT | Performed by: INTERNAL MEDICINE

## 2024-05-12 PROCEDURE — G0378 HOSPITAL OBSERVATION PER HR: HCPCS

## 2024-05-12 PROCEDURE — 93325 DOPPLER ECHO COLOR FLOW MAPG: CPT | Performed by: INTERNAL MEDICINE

## 2024-05-12 PROCEDURE — 93308 TTE F-UP OR LMTD: CPT | Performed by: INTERNAL MEDICINE

## 2024-05-12 PROCEDURE — 93321 DOPPLER ECHO F-UP/LMTD STD: CPT | Performed by: INTERNAL MEDICINE

## 2024-05-12 PROCEDURE — 85027 COMPLETE CBC AUTOMATED: CPT | Performed by: NURSE PRACTITIONER

## 2024-05-12 PROCEDURE — 93325 DOPPLER ECHO COLOR FLOW MAPG: CPT

## 2024-05-12 PROCEDURE — 25810000003 SODIUM CHLORIDE 0.9 % SOLUTION 250 ML FLEX CONT: Performed by: NURSE PRACTITIONER

## 2024-05-12 PROCEDURE — 25010000002 ACYCLOVIR PER 5 MG: Performed by: NURSE PRACTITIONER

## 2024-05-12 RX ADMIN — MECLIZINE HYDROCHLORIDE 12.5 MG: 25 TABLET ORAL at 09:13

## 2024-05-12 RX ADMIN — ATORVASTATIN CALCIUM 40 MG: 20 TABLET, FILM COATED ORAL at 23:32

## 2024-05-12 RX ADMIN — ACYCLOVIR SODIUM 870 MG: 50 INJECTION, SOLUTION INTRAVENOUS at 05:44

## 2024-05-12 RX ADMIN — ATENOLOL 50 MG: 50 TABLET ORAL at 23:33

## 2024-05-12 RX ADMIN — POTASSIUM CHLORIDE 20 MEQ: 750 TABLET, EXTENDED RELEASE ORAL at 09:13

## 2024-05-12 RX ADMIN — Medication 10 ML: at 23:51

## 2024-05-12 RX ADMIN — Medication 10 ML: at 09:14

## 2024-05-12 RX ADMIN — ACYCLOVIR SODIUM 870 MG: 50 INJECTION, SOLUTION INTRAVENOUS at 23:50

## 2024-05-12 RX ADMIN — ASPIRIN 81 MG: 81 TABLET, COATED ORAL at 10:50

## 2024-05-12 RX ADMIN — ACYCLOVIR SODIUM 870 MG: 50 INJECTION, SOLUTION INTRAVENOUS at 14:25

## 2024-05-12 RX ADMIN — AMLODIPINE BESYLATE 5 MG: 5 TABLET ORAL at 11:40

## 2024-05-12 RX ADMIN — HYDROCODONE BITARTRATE AND ACETAMINOPHEN 1 TABLET: 5; 325 TABLET ORAL at 10:49

## 2024-05-12 RX ADMIN — HYDROCODONE BITARTRATE AND ACETAMINOPHEN 1 TABLET: 5; 325 TABLET ORAL at 17:03

## 2024-05-12 RX ADMIN — BACLOFEN 10 MG: 10 TABLET ORAL at 23:34

## 2024-05-12 RX ADMIN — ATENOLOL 50 MG: 50 TABLET ORAL at 09:12

## 2024-05-12 RX ADMIN — PANTOPRAZOLE SODIUM 40 MG: 40 TABLET, DELAYED RELEASE ORAL at 23:29

## 2024-05-12 RX ADMIN — LISINOPRIL 40 MG: 20 TABLET ORAL at 09:12

## 2024-05-12 RX ADMIN — SUCRALFATE 1 G: 1 TABLET ORAL at 23:31

## 2024-05-12 RX ADMIN — LISINOPRIL 40 MG: 20 TABLET ORAL at 23:33

## 2024-05-12 RX ADMIN — HYDROCODONE BITARTRATE AND ACETAMINOPHEN 1 TABLET: 5; 325 TABLET ORAL at 05:42

## 2024-05-12 RX ADMIN — Medication 250 MG: at 23:29

## 2024-05-12 RX ADMIN — PANTOPRAZOLE SODIUM 40 MG: 40 TABLET, DELAYED RELEASE ORAL at 09:13

## 2024-05-12 RX ADMIN — HYDROCODONE BITARTRATE AND ACETAMINOPHEN 1 TABLET: 5; 325 TABLET ORAL at 22:07

## 2024-05-12 RX ADMIN — ACETAMINOPHEN 650 MG: 325 TABLET, FILM COATED ORAL at 04:15

## 2024-05-12 RX ADMIN — CLOPIDOGREL BISULFATE 75 MG: 75 TABLET, FILM COATED ORAL at 09:12

## 2024-05-12 NOTE — PLAN OF CARE
Goal Outcome Evaluation:      Pt. Reports improvement of pain but irritation and burning from rash still present on right side of chest and left buttock. Temp 101.1, Tylenol given. Norco given for pain. Acyclovir given. Trops 757, 699, and morning trop pending. Ad cecilio with wife in room. Echo planned for this morning. Cardio is following. Pt. Is aware of treatment plan and is agreeable.

## 2024-05-12 NOTE — PROGRESS NOTES
ED OBSERVATION PROGRESS/DISCHARGE SUMMARY    Date of Admission: 5/10/2024   LOS: 0 days   PCP: Triny Hannon MD    Final Diagnosis      Subjective : Pain is significantly improved after receiving IV medication.  Denies chest pain, dyspnea this morning.  He did have a slight fever this morning.    Hospital Outcome:   Slade Martinez is a 73 y.o. male who admitted the observation unit for further evaluation of chest pain.  Of note patient recently underwent cardiac catheterization and PCI procedure that was complicated by balloon rupture perforation in the proximal to mid LAD.  Patient appears to have a herpes zoster rash to the right chest in the area of reported chest pain.  In the ER, troponins elevated to 643 with a repeat of 619.  EKG notes sinus rhythm, there is note of some T wave inversions in the lateral leads, no acute ischemia noted.  Chest x-ray shows no active disease.  ER provider spoke with Dr. Torre with cardiology recommending Nitropaste and blood pressure control and will follow in consultation.  Patient was started on valacyclovir.      5/11  Cardiology has evaluated the patient and recommends observing the patient for an additional night.  Will obtain a limited echocardiogram.  Repeat troponin in the morning.  If continue to trend downward he can possibly discharge home tomorrow.    5/12  Troponin 707 this morning.  Awaiting limited echo.  He developed a rash to his left back and there is concern for disseminated shingles.  He has been transitioned to IV acyclovir.  Infectious disease consulted.  They recommend 2 more IV doses and will consider transitioning to oral Valtrex tomorrow.  If blood cultures remain negative and he continues to improve he can likely discharge tomorrow.    ROS:  General: no fevers, chills  Respiratory: no cough, dyspnea  Cardiovascular: no chest pain, palpitations  Abdomen: No abdominal pain, nausea, vomiting, or diarrhea  Neurologic: No focal  weakness    Objective   Physical Exam:  I have reviewed the vital signs.  Temp:  [97.7 °F (36.5 °C)-101.1 °F (38.4 °C)] 98.4 °F (36.9 °C)  Heart Rate:  [60-85] 73  Resp:  [16-19] 19  BP: (130-176)/() 151/69  General Appearance:    Alert, cooperative, no distress  Head:    Normocephalic, atraumatic  Eyes:    Sclerae anicteric  Neck:   Supple, no mass  Lungs: Clear to auscultation bilaterally, respirations unlabored  Heart: Regular rate and rhythm, S1 and S2 normal, no murmur, rub or gallop  Abdomen:  Soft, non-tender, bowel sounds active, nondistended  Extremities: No clubbing, cyanosis, or edema to lower extremities  Pulses:  2+ and symmetric in distal lower extremities  Skin: No rashes   Neurologic: Oriented x3, Normal strength to extremities    Results Review:    I have reviewed the labs, radiology results and diagnostic studies.    Results from last 7 days   Lab Units 05/12/24  0438   WBC 10*3/mm3 5.79   HEMOGLOBIN g/dL 10.7*   HEMATOCRIT % 31.7*   PLATELETS 10*3/mm3 162     Results from last 7 days   Lab Units 05/12/24  0438 05/11/24  0454 05/10/24  1733   SODIUM mmol/L 129* 131* 133*   POTASSIUM mmol/L 3.8 3.5 3.8   CHLORIDE mmol/L 96* 97* 99   CO2 mmol/L 22.2 22.9 22.0   BUN mg/dL 9 8 8   CREATININE mg/dL 0.78 0.80 0.85   CALCIUM mg/dL 8.4* 8.6 8.4*   BILIRUBIN mg/dL  --   --  0.8   ALK PHOS U/L  --   --  73   ALT (SGPT) U/L  --   --  19   AST (SGOT) U/L  --   --  21   GLUCOSE mg/dL 94 102* 115*     Imaging Results (Last 24 Hours)       ** No results found for the last 24 hours. **            I have reviewed the medications.  ---------------------------------------------------------------------------------------------  Assessment & Plan   Assessment/Problem List    Chest pain    Shingles    Plan:  Non-STEMI  Chest pain  CAD with recent PCI  -High-sensitivity troponins 643, 619, 757, 699  -EKG shows nonspecific repolarization abnormalities, unchanged from prior  -Chest x-ray negative acute  -Continue  aspirin, Plavix and statin  -Telemetry monitoring  -Cardiology consulted  -Limited echocardiogram is pending     Herpes zoster  -Transitioned to IV acyclovir this morning, symptoms much improved  -Infectious disease consulted due to concern for dissemination  -They advised continuing IV acyclovir today    Fever  -Chest x-ray shows no acute disease  -Blood cultures negative thus far  -Acetaminophen as needed     Hypertension  -Continue atenolol and lisinopril  -Cardiology has added amlodipine to his regimen  -Monitor vital signs every 4 hours     AVNRT  -He has been in sinus rhythm  -Continue telemetry monitoring  -Continue atenolol  -Amiodarone recently stopped due to side effects    Disposition: Will remain in observation unit for an additional night.  Possibly discharge home tomorrow on oral antivirals.    Follow-up after Discharge: Cardiology    This note will serve as a progress note    Abby Villegas, APRN 05/12/24 16:29 EDT    I have worn appropriate PPE during this patient encounter, sanitized my hands both with entering and exiting patient's room.    52 minutes has been spent by [facility] providers in the care of this patient while under observation status

## 2024-05-12 NOTE — PLAN OF CARE
Goal Outcome Evaluation:  Plan of Care Reviewed With: patient        Progress: improving  Outcome Evaluation: Patient is alert and oriented times 4. On room air. Up ad cecilio, spouse at bedside with patient. Patient is in isolation. ID folllowing. Rash still present. Cardiology following. Patient complained of pain, pain medication given. Patient verbalized that it helped. On IV antiviral medication. Plan of care ongoing.

## 2024-05-12 NOTE — PROGRESS NOTES
Troponin stable.  Zoster being treated.  - Will follow-up limited echo prior to discharge.  No additional ischemic workup.

## 2024-05-12 NOTE — CONSULTS
[facility] Hospital Medicine Services  CONSULT NOTE      Patient Name: Slade Martinez  : 1950  MRN: [id number]    Primary Care Physician: Triny Hannon MD  Provider requesting consultation: Kevan Hernandez MD    I received a consult for hospital medicine for this patient in the observation unit.  Patient had herpes zoster infection and infectious disease consult team does not feel this quite qualifies as disseminated.  Initially was to take over care for IV acyclovir however ultimately infectious disease feels patient can discharge on  on Valtrex if stable.  We have final discharge recommendation for this infectious issue observation unit ultimately has decided to keep the patient in discharge early tomorrow.  I have requested that Abby the nurse practitioner with observation unit let me know if the plan changes and we will be more than glad to take this patient over at any point.  I will be available if needed please do not hesitate to call    Robb Gilman MD  24

## 2024-05-12 NOTE — CONSULTS
Referring Provider: Kevan Hernandez MD  [address] [place] [address]  Reason for Consultation: Concern for disseminated zoster    Subjective   History of present illness: This is a 73-year-old male with coronary artery disease status post CABG hypertension hyperlipidemia and stroke who was admitted on May 10 with concerns for disseminated zoster.  The patient was last admitted to [facility] from May 5 through May 9 with chest pain.  He underwent cardiac catheterization and stent placement.  The day after discharge the patient developed a rash on his chest.  The rash is located on the right side of his chest and is painful.  Initially he was started on oral Valtrex but when he was found to have a lesion on his left buttock and there was concern for disseminated zoster and he was empirically started on IV acyclovir.  Currently the patient states he is much improved.  His pain is decreased    Past Medical History:   Diagnosis Date    Coronary atherosclerosis 11/04/2019    H/O CABG SVG to first diagonal branch and to the LAD as well as SVG to the lateral marginal branch of the circumflex complicated by right sided subcortical infarct with left sided hemiparesis    Dyslipidemia 11/04/2019    Essential hypertension 11/04/2019    Injury of back     fractured vertebra 2/26/23    Peripheral edema     Stroke 2011    Vertigo        Past Surgical History:   Procedure Laterality Date    CAROTID ENDARTERECTOMY  2000    Left    CHOLECYSTECTOMY      CORONARY ARTERY BYPASS GRAFT          reports that he has quit smoking. His smoking use included cigarettes. He has never used smokeless tobacco. He reports that he does not currently use alcohol. He reports that he does not use drugs.    family history includes COPD in his brother and mother; Heart attack in his father; Heart failure in his father.    Allergies   Allergen Reactions    Pletal [Cilostazol] Myalgia     .        Medication:  Antibiotics:  Acyclovir 10 mg/kg IV every 8 hours    Please refer to the medical record for a full medication list    Review of Systems  Pertinent items are noted in HPI, all other systems reviewed and negative    Objective   Vital Signs   Temp:  [97.9 °F (36.6 °C)-101.1 °F (38.4 °C)] 97.9 °F (36.6 °C)  Heart Rate:  [60-85] 61  Resp:  [16-19] 18  BP: (138-176)/() 138/100    Physical Exam:   General: In no acute distress  HEENT: Normocephalic, atraumatic,  no scleral icterus.   Neck: Supple, trachea is midline  Cardiovascular: Normal rate, regular rhythm, normal S1 and S2, no murmurs, rubs, or gallops   Respiratory: Lungs are clear to auscultation bilaterally, no wheezing  GI: Abdomen is soft, nontender, nondistended, positive bowel sounds bilaterally,  Musculoskeletal:  no edema, tenderness or deformity  Skin: Lesions over the right chest wrapping around to his back not crossing the midline, smaller area on his buttock u  Extremities: No E/C/C  Neurological: Alert and oriented, moving all 4 extremities  Psychiatric: Normal mood and affect     Results Review:   I reviewed the patient's new clinical results.  I reviewed the patient's new imaging results and agree with the interpretation.    Lab Results   Component Value Date    WBC 5.79 05/12/2024    HGB 10.7 (L) 05/12/2024    HCT 31.7 (L) 05/12/2024    MCV 92.7 05/12/2024     05/12/2024       Lab Results   Component Value Date    GLUCOSE 94 05/12/2024    BUN 9 05/12/2024    CREATININE 0.78 05/12/2024    EGFRIFNONA 74 02/11/2021    EGFRIFAFRI >60 02/28/2023    BCR 11.5 05/12/2024    CO2 22.2 05/12/2024    CALCIUM 8.4 (L) 05/12/2024    ALBUMIN 3.4 (L) 05/10/2024    LABIL2 1.1 02/27/2023    AST 21 05/10/2024    ALT 19 05/10/2024       Microbiology:  5/11 BCx P x 2    Radiology:  Admission chest x-ray without pleural effusions or acute infiltrates    Assessment & Plan   Fever  Zoster with concerns for dissemination  Recent cardiac stent  placement    In order to classified something as disseminated zoster there needs to be 15-20 extra dermatomal lesions.  He does have buttock involvement but given how confluent the erythema is his heart to say how many lesions are there.  I certainly feel like it is reasonable to continue with IV acyclovir for another 2 doses and then transitioning tomorrow to Valtrex 1 g p.o. 3 times daily for 7 days.    As long as the patient is clinically improved and his blood cultures remain negative to date no objection to discharge tomorrow    I discussed the patient's findings and my recommendations with patient, family, and nursing staff

## 2024-05-13 VITALS
TEMPERATURE: 98.2 F | BODY MASS INDEX: 26.74 KG/M2 | RESPIRATION RATE: 18 BRPM | SYSTOLIC BLOOD PRESSURE: 134 MMHG | OXYGEN SATURATION: 97 % | HEART RATE: 64 BPM | WEIGHT: 191 LBS | HEIGHT: 71 IN | DIASTOLIC BLOOD PRESSURE: 61 MMHG

## 2024-05-13 LAB
ANION GAP SERPL CALCULATED.3IONS-SCNC: 9.3 MMOL/L (ref 5–15)
BUN SERPL-MCNC: 7 MG/DL (ref 8–23)
BUN/CREAT SERPL: 9 (ref 7–25)
CALCIUM SPEC-SCNC: 8.5 MG/DL (ref 8.6–10.5)
CHLORIDE SERPL-SCNC: 100 MMOL/L (ref 98–107)
CO2 SERPL-SCNC: 22.7 MMOL/L (ref 22–29)
CREAT SERPL-MCNC: 0.78 MG/DL (ref 0.76–1.27)
DEPRECATED RDW RBC AUTO: 48.2 FL (ref 37–54)
EGFRCR SERPLBLD CKD-EPI 2021: 93.6 ML/MIN/1.73
ERYTHROCYTE [DISTWIDTH] IN BLOOD BY AUTOMATED COUNT: 13.6 % (ref 12.3–15.4)
GLUCOSE SERPL-MCNC: 86 MG/DL (ref 65–99)
HCT VFR BLD AUTO: 30.3 % (ref 37.5–51)
HGB BLD-MCNC: 9.9 G/DL (ref 13–17.7)
MCH RBC QN AUTO: 31.3 PG (ref 26.6–33)
MCHC RBC AUTO-ENTMCNC: 32.7 G/DL (ref 31.5–35.7)
MCV RBC AUTO: 95.9 FL (ref 79–97)
PLATELET # BLD AUTO: 161 10*3/MM3 (ref 140–450)
PMV BLD AUTO: 10.5 FL (ref 6–12)
POTASSIUM SERPL-SCNC: 3.8 MMOL/L (ref 3.5–5.2)
RBC # BLD AUTO: 3.16 10*6/MM3 (ref 4.14–5.8)
SODIUM SERPL-SCNC: 132 MMOL/L (ref 136–145)
WBC NRBC COR # BLD AUTO: 6.54 10*3/MM3 (ref 3.4–10.8)

## 2024-05-13 PROCEDURE — 25810000003 SODIUM CHLORIDE 0.9 % SOLUTION 250 ML FLEX CONT: Performed by: NURSE PRACTITIONER

## 2024-05-13 PROCEDURE — 25810000003 SODIUM CHLORIDE 0.9 % SOLUTION: Performed by: PHYSICIAN ASSISTANT

## 2024-05-13 PROCEDURE — 80048 BASIC METABOLIC PNL TOTAL CA: CPT | Performed by: NURSE PRACTITIONER

## 2024-05-13 PROCEDURE — 99214 OFFICE O/P EST MOD 30 MIN: CPT | Performed by: NURSE PRACTITIONER

## 2024-05-13 PROCEDURE — G0378 HOSPITAL OBSERVATION PER HR: HCPCS

## 2024-05-13 PROCEDURE — 85027 COMPLETE CBC AUTOMATED: CPT | Performed by: NURSE PRACTITIONER

## 2024-05-13 PROCEDURE — 25010000002 ACYCLOVIR PER 5 MG: Performed by: NURSE PRACTITIONER

## 2024-05-13 RX ORDER — VALACYCLOVIR HYDROCHLORIDE 1 G/1
1000 TABLET, FILM COATED ORAL 3 TIMES DAILY
Qty: 21 TABLET | Refills: 0 | Status: SHIPPED | OUTPATIENT
Start: 2024-05-13 | End: 2024-05-20

## 2024-05-13 RX ORDER — LIDOCAINE 4 G/G
2 PATCH TOPICAL
Status: DISCONTINUED | OUTPATIENT
Start: 2024-05-13 | End: 2024-05-13 | Stop reason: HOSPADM

## 2024-05-13 RX ORDER — HYDROCODONE BITARTRATE AND ACETAMINOPHEN 5; 325 MG/1; MG/1
1 TABLET ORAL EVERY 4 HOURS PRN
Qty: 12 TABLET | Refills: 0 | Status: SHIPPED | OUTPATIENT
Start: 2024-05-13

## 2024-05-13 RX ORDER — LIDOCAINE 4 G/G
2 PATCH TOPICAL
Qty: 30 EACH | Refills: 0 | Status: SHIPPED | OUTPATIENT
Start: 2024-05-14

## 2024-05-13 RX ORDER — AMLODIPINE BESYLATE 5 MG/1
5 TABLET ORAL
Qty: 30 TABLET | Refills: 3 | Status: SHIPPED | OUTPATIENT
Start: 2024-05-14

## 2024-05-13 RX ADMIN — AMLODIPINE BESYLATE 5 MG: 5 TABLET ORAL at 08:26

## 2024-05-13 RX ADMIN — PANTOPRAZOLE SODIUM 40 MG: 40 TABLET, DELAYED RELEASE ORAL at 08:25

## 2024-05-13 RX ADMIN — CLOPIDOGREL BISULFATE 75 MG: 75 TABLET, FILM COATED ORAL at 08:25

## 2024-05-13 RX ADMIN — LIDOCAINE 2 PATCH: 4 PATCH TOPICAL at 08:26

## 2024-05-13 RX ADMIN — LISINOPRIL 40 MG: 20 TABLET ORAL at 08:26

## 2024-05-13 RX ADMIN — ACYCLOVIR SODIUM 870 MG: 50 INJECTION, SOLUTION INTRAVENOUS at 05:32

## 2024-05-13 RX ADMIN — Medication 10 ML: at 08:36

## 2024-05-13 RX ADMIN — ASPIRIN 81 MG: 81 TABLET, COATED ORAL at 08:25

## 2024-05-13 RX ADMIN — POTASSIUM CHLORIDE 20 MEQ: 750 TABLET, EXTENDED RELEASE ORAL at 08:25

## 2024-05-13 RX ADMIN — SODIUM CHLORIDE 500 ML: 9 INJECTION, SOLUTION INTRAVENOUS at 01:10

## 2024-05-13 RX ADMIN — ATENOLOL 50 MG: 50 TABLET ORAL at 08:26

## 2024-05-13 RX ADMIN — MECLIZINE HYDROCHLORIDE 12.5 MG: 25 TABLET ORAL at 08:25

## 2024-05-13 NOTE — PROGRESS NOTES
MD Attestation Note    SHARED VISIT: This visit was performed by BOTH a physician and an APC. The substantive portion of the medical decision making was performed by this attesting physician who made or approved the management plan and takes responsibility for patient management. All studies in the APC note (if performed) were independently interpreted by me.       My personal findings are:        Subjective:  Patient was admitted for evaluation of chest pain, rash, fever.  He was noted to have rash consistent with herpes zoster on his right chest wall, however he also had undergone recent cardiac stent placement complicated by LAD rupture requiring Graftmaster.  He reports feeling better today.  He states that the right chest pain is associated with distribution of the rash and is alleviated by hydrocodone.        Objective:  GENERAL: Awake, alert, in no acute distress.   HENT:  Normocephalic, atraumatic. Nares patent.   EYES: Pupils equal, reactive. Extra-ocular movements normal.   RESPIRATORY: normal effort.   CARDIOVASCULAR: Regular rhythm, normal rate. No chest wall tenderness.   ABDOMEN:  Nontender. Abdomen nondistended.   NEUROLOGIC: Cranial nerves II-XII normal. Motor strength 5/5 in extremities.   EXTREMITIES: No pedal edema. 2+ pedal pulses bilaterally. No deformity.           Assessment/ Plan:    Herpes zoster  ID recommended a total of 3 doses of IV acyclovir followed by Valtrex 1 g p.o. 3 times daily for 1 week.  He will be given a short course of Norco as needed for pain.    CAD status post recent PCI  Troponins have been elevated but flat.  EKG without acute ischemic changes.  Patient evaluated by cardiology who feels patient is appropriate for discharge home.  They will see him in the office this Thursday.

## 2024-05-13 NOTE — PLAN OF CARE
Goal Outcome Evaluation:   Patient is alert and oriented x 4, given pain medication as needed, refused his nitroglycerin as he verbalized that he does not have any chest pain.

## 2024-05-13 NOTE — PROGRESS NOTES
LOS: 0 days   Patient Care Team:  Triny Hannon MD as PCP - General (Internal Medicine)  James Hubbard MD as Consulting Physician (Cardiology)      Chief Complaint: Follow up   chest pain, CAD       Interval History: Feeling better. Ready to go home.       Objective   Vital Signs  Temp:  [97.7 °F (36.5 °C)-98.8 °F (37.1 °C)] 98.2 °F (36.8 °C)  Heart Rate:  [60-81] 64  Resp:  [17-19] 18  BP: (130-172)/() 134/61    Intake/Output Summary (Last 24 hours) at 5/13/2024 0907  Last data filed at 5/12/2024 1136  Gross per 24 hour   Intake --   Output 350 ml   Net -350 ml         Physical Exam:  Constitutional: Well appearing, well developed, no acute distress   HENT: Oropharynx clear and membrane moist  Eyes: Normal conjunctiva, no sclera icterus.  Neck: Supple, no carotid bruit bilaterally.  Cardiovascular: Regular rate and rhythm, No Murmur, No bilateral lower extremity edema.  Pulmonary: Normal respiratory effort, normal lung sounds, no wheezing.  Abdominal: Soft, nontender, no hepatosplenomegaly, liver is non-pulsatile.  Neurological: Alert and orient x 3.   Skin: Warm, dry, no ecchymosis, no rash.  Psych: Appropriate mood and affect. Normal judgment and insight.      Results Review:      Results from last 7 days   Lab Units 05/13/24 0523 05/12/24 0438 05/11/24  0454   SODIUM mmol/L 132* 129* 131*   POTASSIUM mmol/L 3.8 3.8 3.5   CHLORIDE mmol/L 100 96* 97*   CO2 mmol/L 22.7 22.2 22.9   BUN mg/dL 7* 9 8   CREATININE mg/dL 0.78 0.78 0.80   GLUCOSE mg/dL 86 94 102*   CALCIUM mg/dL 8.5* 8.4* 8.6     Results from last 7 days   Lab Units 05/12/24  0438 05/11/24  1117 05/11/24  0454   HSTROP T ng/L 707* 699* 757*     Results from last 7 days   Lab Units 05/13/24  0523 05/12/24  0438 05/11/24  0454   WBC 10*3/mm3 6.54 5.79 5.59   HEMOGLOBIN g/dL 9.9* 10.7* 11.4*   HEMATOCRIT % 30.3* 31.7* 33.6*   PLATELETS 10*3/mm3 161 162 161                       I reviewed the patient's new clinical results.  I personally  viewed and interpreted the patient's EKG/Telemetry data        Medication Review:   acyclovir, 10 mg/kg, Intravenous, Q8H  amLODIPine, 5 mg, Oral, Q24H  aspirin, 81 mg, Oral, Daily  atenolol, 50 mg, Oral, BID  atorvastatin, 40 mg, Oral, Nightly  baclofen, 10 mg, Oral, Nightly  clopidogrel, 75 mg, Oral, Daily  Lidocaine, 2 patch, Transdermal, Q24H  lisinopril, 40 mg, Oral, BID  meclizine, 12.5 mg, Oral, Daily  nitroglycerin, 1 inch, Topical, Q6H  pantoprazole, 40 mg, Oral, BID  potassium chloride, 20 mEq, Oral, Daily  saccharomyces boulardii, 250 mg, Oral, Nightly  sodium chloride, 10 mL, Intravenous, Q12H  sucralfate, 1 g, Oral, Nightly             Assessment & Plan       Chest pain    Shingles      1.  Shingles.  On acyclovir.  2.  Coronary artery disease.  Recent PCI of the LAD complicated by perforation and Graftmaster stenting.  Continue DAPT with aspirin and clopidogrel along with high intensity statin therapy.  His troponins are elevated but stable.  Consistent with recent MI.  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  4.  Hypertension.  Stable.    Nelida Saha, SHERRILL  05/13/24  09:07 EDT

## 2024-05-13 NOTE — PLAN OF CARE
Goal Outcome Evaluation:   Patient was discharged from the unit to a private vehicle. IV was removed and personal belongings were taken by the patient. Discharge and follow up information was provided to the patient with positive feedback returned.

## 2024-05-13 NOTE — PROGRESS NOTES
ED OBSERVATION PROGRESS/DISCHARGE SUMMARY    Date of Admission: 5/10/2024   LOS: 0 days   PCP: Triny Hannon MD    Final Diagnosis shingles, chest pain      Subjective     Hospital Outcome:     Slade Martinez is a 73 y.o. male who admitted the observation unit for further evaluation of chest pain.  Of note patient recently underwent cardiac catheterization and PCI procedure that was complicated by balloon rupture perforation in the proximal to mid LAD.  Patient appears to have a herpes zoster rash to the right chest in the area of reported chest pain.  In the ER, troponins elevated to 643 with a repeat of 619.  EKG notes sinus rhythm, there is note of some T wave inversions in the lateral leads, no acute ischemia noted.  Chest x-ray shows no active disease.  ER provider spoke with Dr. Torre with cardiology recommending Nitropaste and blood pressure control and will follow in consultation.  Patient was started on valacyclovir.      5/11  Cardiology has evaluated the patient and recommends observing the patient for an additional night.  Will obtain a limited echocardiogram.  Repeat troponin in the morning.  If continue to trend downward he can possibly discharge home tomorrow.     5/12  Troponin 707 this morning.  Awaiting limited echo.  He developed a rash to his left back and there is concern for disseminated shingles.  He has been transitioned to IV acyclovir.  Infectious disease consulted.  They recommend 2 more IV doses and will consider transitioning to oral Valtrex tomorrow.  If blood cultures remain negative and he continues to improve he can likely discharge tomorrow.    5/13: Patient reports he is feeling well this morning.  No chest pain.  Patient was seen and evaluated by APRN with cardiology service this morning who recommends discharge home and follow-up later this week.  Case discussed with Dr. Rosa who recommends patient being discharged home on Valtrex 1 g 3 times daily for 7 days.  Patient  and family agreeable to plan    ROS:  General: no fevers, chills  Respiratory: no cough, dyspnea  Cardiovascular: no chest pain, palpitations  Abdomen: No abdominal pain, nausea, vomiting, or diarrhea  Neurologic: No focal weakness    Objective   Physical Exam:  I have reviewed the vital signs.  Temp:  [97.7 °F (36.5 °C)-98.8 °F (37.1 °C)] 98.2 °F (36.8 °C)  Heart Rate:  [60-81] 67  Resp:  [17-19] 17  BP: (130-172)/() 143/69  General Appearance:    Alert, cooperative, no distress  Head:    Normocephalic, atraumatic  Eyes:    Sclerae anicteric  Neck:   Supple, no mass  Lungs: Clear to auscultation bilaterally, respirations unlabored  Heart: Regular rate and rhythm, S1 and S2 normal, no murmur, rub or gallop  Abdomen:  Soft, non-tender, bowel sounds active, Boo obesity present  Extremities: No clubbing, cyanosis, or edema to lower extremities  Pulses:  2+ and symmetric in distal lower extremities  Skin: Varicella rash to patient's right anterior mid chest and right mid thoracic back with mild surrounding erythema  Neurologic: Oriented x3, Normal strength to extremities    Results Review:    I have reviewed the labs, radiology results and diagnostic studies.    Results from last 7 days   Lab Units 05/13/24  0523   WBC 10*3/mm3 6.54   HEMOGLOBIN g/dL 9.9*   HEMATOCRIT % 30.3*   PLATELETS 10*3/mm3 161     Results from last 7 days   Lab Units 05/13/24  0523 05/12/24  0438 05/11/24  0454 05/10/24  1733   SODIUM mmol/L 132* 129* 131* 133*   POTASSIUM mmol/L 3.8 3.8 3.5 3.8   CHLORIDE mmol/L 100 96* 97* 99   CO2 mmol/L 22.7 22.2 22.9 22.0   BUN mg/dL 7* 9 8 8   CREATININE mg/dL 0.78 0.78 0.80 0.85   CALCIUM mg/dL 8.5* 8.4* 8.6 8.4*   BILIRUBIN mg/dL  --   --   --  0.8   ALK PHOS U/L  --   --   --  73   ALT (SGPT) U/L  --   --   --  19   AST (SGOT) U/L  --   --   --  21   GLUCOSE mg/dL 86 94 102* 115*     Imaging Results (Last 24 Hours)       ** No results found for the last 24 hours. **            I have reviewed  the medications.  ---------------------------------------------------------------------------------------------  Assessment & Plan   Assessment/Problem List    Chest pain    Shingles      Plan:    Chest pain  CAD with recent PCI  -High-sensitivity troponins 643, 619, 757, 699  -EKG shows nonspecific repolarization abnormalities, unchanged from prior  -Chest x-ray negative acute  -Continue aspirin, Plavix and statin  -Telemetry monitoring overnight uneventful  -Cardiology consulted cleared for discharge home  -Limited echocardiogram reassuring     Herpes zoster  -Transitioned to IV acyclovir this morning, symptoms much improved  -Infectious disease consulted, cleared for discharge home with Valtrex 1 g 3 times daily for 7 days     Fever  -Chest x-ray shows no acute disease  -Blood cultures negative at 24-hour     Hypertension  -Continue atenolol and lisinopril     AVNRT  -He has been in sinus rhythm  -Continue atenolol  -Amiodarone recently stopped due to side effects     Disposition: Home    Follow-up after Discharge: PCP and cardiology    This note will serve as a discharge summary    KOLTON Coronado 05/13/24 06:35 EDT    I have worn appropriate PPE during this patient encounter, sanitized my hands both with entering and exiting patient's room.      37 minutes has been spent by [facility] providers in the care of this patient while under observation status

## 2024-05-16 ENCOUNTER — OFFICE VISIT (OUTPATIENT)
Dept: CARDIOLOGY | Facility: CLINIC | Age: 74
End: 2024-05-16
Payer: MEDICARE

## 2024-05-16 VITALS
BODY MASS INDEX: 26.6 KG/M2 | DIASTOLIC BLOOD PRESSURE: 68 MMHG | SYSTOLIC BLOOD PRESSURE: 128 MMHG | HEIGHT: 71 IN | HEART RATE: 74 BPM | WEIGHT: 190 LBS

## 2024-05-16 DIAGNOSIS — E78.5 DYSLIPIDEMIA: Chronic | ICD-10-CM

## 2024-05-16 DIAGNOSIS — I10 ESSENTIAL HYPERTENSION: Chronic | ICD-10-CM

## 2024-05-16 DIAGNOSIS — I47.19 AVNRT (AV NODAL RE-ENTRY TACHYCARDIA): ICD-10-CM

## 2024-05-16 DIAGNOSIS — I25.10 ATHEROSCLEROSIS OF NATIVE CORONARY ARTERY OF NATIVE HEART WITHOUT ANGINA PECTORIS: Primary | Chronic | ICD-10-CM

## 2024-05-16 LAB
BACTERIA SPEC AEROBE CULT: NORMAL
BACTERIA SPEC AEROBE CULT: NORMAL

## 2024-05-16 RX ORDER — GABAPENTIN 100 MG/1
100 CAPSULE ORAL 3 TIMES DAILY
COMMUNITY
Start: 2024-05-14

## 2024-05-16 NOTE — PROGRESS NOTES
Date of Office Visit: 2024  Encounter Provider: SHERRILL Roe  Place of Service: [facility] CARDIOLOGY  Patient Name: Slade Martinez  :1950    Chief Complaint   Patient presents with    Coronary Artery Disease   :     HPI: Slade Martinez is a 73 y.o. male patient with known coronary artery disease (history of CABG in ).  He was formerly followed by Dr. Hubbard.  In early April, he presented with palpitations and chest pressure.  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     On , he presented with abdominal and chest discomfort along with dizziness.  EP was asked to evaluate and provide an opinion on continuing the amiodarone.  Ultimately, the amiodarone was discontinued.  He was advised to follow-up in 6 weeks to discuss possible ablation.  His troponins remained negative but the chest pain persisted.  Ultimately, he was taken for cardiac catheterization.  On , this demonstrated a severely calcified 90% proximal left main, severely calcified 70% proximal mid LAD, 90% in-stent restenosis of the mid segment, and a chronic total occlusion of the in-stent ostial circumflex.  He underwent complex PCI of the left main to proximal LAD with PCI of the mid LAD.  This was complicated by balloon rupture and perforation in the proximal to mid LAD.  Fortunately he did well on , he was discharged.    The following day he presented back to the ED with recurrent chest pain and a right-sided chest wall rash.  He was hypertensive on arrival and his troponins remained quite elevated.  Ultimately he was diagnosed with shingles and started on acyclovir.  The troponin continued to downtrend.  He underwent limited echocardiogram which was stable.  On , he was discharged.  He is here today for follow-up.    From a cardiac standpoint, he is doing well.  He denies any chest pain, shortness of breath, palpitations, edema,  dizziness, syncope, bleeding difficulties or melena.  Shingles are still quite painful and aggravating.  He has been started on gabapentin by his primary care doctor.    Past Medical History:   Diagnosis Date    Constipation     Coronary atherosclerosis 11/04/2019    H/O CABG SVG to first diagonal branch and to the LAD as well as SVG to the lateral marginal branch of the circumflex complicated by right sided subcortical infarct with left sided hemiparesis    Dyslipidemia 11/04/2019    Essential hypertension 11/04/2019    Injury of back     fractured vertebra 2/26/23    Peripheral edema     Stroke 2011    Vertigo        Past Surgical History:   Procedure Laterality Date    CARDIAC CATHETERIZATION N/A 5/6/2024    Procedure: Left Heart Cath;  Surgeon: Gurwinder Hurd MD;  [facility];  Service: Cardiovascular;  Laterality: N/A;    CARDIAC CATHETERIZATION N/A 5/6/2024    Procedure: Coronary angiography;  Surgeon: Gurwinder Hurd MD;  [facility];  Service: Cardiovascular;  Laterality: N/A;    CARDIAC CATHETERIZATION N/A 5/6/2024    Procedure: Percutaneous Coronary Intervention;  Surgeon: Gurwinder Hurd MD;  [facility];  Service: Cardiovascular;  Laterality: N/A;    CARDIAC CATHETERIZATION N/A 5/6/2024    Procedure: Stent TC coronary;  Surgeon: Gurwinder Hurd MD;  [facility];  Service: Cardiovascular;  Laterality: N/A;    CARDIAC CATHETERIZATION  5/6/2024    Procedure: Saphenous Vein Graft;  Surgeon: Gurwinder Hurd MD;  [facility];  Service: Cardiovascular;;    CAROTID ENDARTERECTOMY  2000    Left    CHOLECYSTECTOMY      CORONARY ARTERY BYPASS GRAFT      CORONARY STENT PLACEMENT      X17 stents    INTERVENTIONAL RADIOLOGY PROCEDURE N/A 5/6/2024    Procedure: Intravascular Ultrasound;  Surgeon: Gurwinder Hurd MD;  Location: [facility] CATH  INVASIVE LOCATION;  Service: Cardiovascular;  Laterality: N/A;       Social History     Socioeconomic History    Marital status:    Tobacco Use    Smoking status: Former     Types: Cigarettes    Smokeless tobacco: Never   Vaping Use    Vaping status: Never Used   Substance and Sexual Activity    Alcohol use: Not Currently    Drug use: Never    Sexual activity: Defer       Family History   Problem Relation Age of Onset    COPD Mother     Heart attack Father     Heart failure Father     COPD Brother        Review of Systems   Constitutional: Negative.   Cardiovascular: Negative.  Negative for chest pain, dyspnea on exertion, leg swelling, orthopnea, paroxysmal nocturnal dyspnea and syncope.   Respiratory: Negative.     Hematologic/Lymphatic: Negative for bleeding problem.   Musculoskeletal:  Negative for falls.   Gastrointestinal:  Negative for melena.   Neurological:  Negative for dizziness and light-headedness.       Allergies   Allergen Reactions    Pletal [Cilostazol] Myalgia     .         Current Outpatient Medications:     amLODIPine (NORVASC) 5 MG tablet, Take 1 tablet by mouth Daily., Disp: 30 tablet, Rfl: 3    aspirin 81 MG tablet, Take 1 tablet by mouth Daily., Disp: , Rfl:     atenolol (TENORMIN) 50 MG tablet, Take 1 tablet by mouth 2 (Two) Times a Day., Disp: , Rfl:     atorvastatin (LIPITOR) 40 MG tablet, Take 1 tablet by mouth Every Night., Disp: , Rfl:     baclofen (LIORESAL) 10 MG tablet, Take 1 tablet by mouth every night at bedtime., Disp: , Rfl:     clopidogrel (PLAVIX) 75 MG tablet, Take 1 tablet by mouth Daily., Disp: , Rfl:     gabapentin (NEURONTIN) 100 MG capsule, Take 1 capsule by mouth 3 (Three) Times a Day., Disp: , Rfl:     hydroCHLOROthiazide 25 MG tablet, Take 1 tablet by mouth Daily As Needed (swelling)., Disp: , Rfl:     HYDROcodone-acetaminophen (NORCO) 5-325 MG per tablet, Take 1 tablet by mouth Every 4 (Four) Hours As Needed for Moderate Pain., Disp: 12 tablet, Rfl: 0     "Lidocaine 4 %, Place 2 patches on the skin as directed by provider Daily. Remove & Discard patch within 12 hours or as directed by MD, Disp: 30 each, Rfl: 0    Linzess 145 MCG capsule capsule, Take 1 capsule by mouth Every Other Day., Disp: , Rfl:     lisinopril (PRINIVIL,ZESTRIL) 40 MG tablet, Take 1 tablet by mouth 2 (Two) Times a Day., Disp: , Rfl:     loratadine (CLARITIN) 10 MG tablet, Take 1 tablet by mouth Daily., Disp: , Rfl:     meclizine (ANTIVERT) 12.5 MG tablet, Take 1 tablet by mouth Daily., Disp: , Rfl:     Multiple Vitamins-Minerals (MULTIVITAMIN ADULT EXTRA C PO), Take 1 tablet by mouth Daily., Disp: , Rfl:     nitroglycerin (NITROSTAT) 0.4 MG SL tablet, Place 1 tablet under the tongue Every 5 (Five) Minutes As Needed for Chest Pain. Take no more than 3 doses in 15 minutes., Disp: 25 tablet, Rfl: 3    pantoprazole (PROTONIX) 40 MG EC tablet, Take 1 tablet by mouth 2 (Two) Times a Day., Disp: , Rfl:     potassium chloride 10 MEQ CR tablet, Take 2 tablets by mouth Daily., Disp: , Rfl:     saccharomyces boulardii (FLORASTOR) 250 MG capsule, Take 1 capsule by mouth Every Night., Disp: , Rfl:     sucralfate (CARAFATE) 1 g tablet, Take 1 tablet by mouth every night at bedtime., Disp: , Rfl:     triamcinolone (KENALOG) 0.1 % cream, Apply 1 Application topically to the appropriate area as directed 2 (Two) Times a Day As Needed for Rash., Disp: , Rfl:     valACYclovir (Valtrex) 1000 MG tablet, Take 1 tablet by mouth 3 (Three) Times a Day for 7 days., Disp: 21 tablet, Rfl: 0      Objective:     Vitals:    05/16/24 0951   BP: 128/68   Pulse: 74   Weight: 86.2 kg (190 lb)   Height: 180.3 cm (71\")     Body mass index is 26.5 kg/m².    PHYSICAL EXAM:    Neck:      Vascular: No JVD.   Pulmonary:      Effort: Pulmonary effort is normal.      Breath sounds: Normal breath sounds.   Cardiovascular:      Normal rate. Regular rhythm.      Murmurs: There is no murmur.      No gallop.  No click. No rub.   Pulses:     " Intact distal pulses.   Skin:     Comments: Radial site and groin sites well healed without erythema or edema. Proximal and distal pulses palpable. Good capillary refill.             ECG 12 Lead    Date/Time: 5/16/2024 9:58 AM  Performed by: Nelida Saha APRN    Authorized by: Nelida Saha APRN  Comparison: compared with previous ECG from 5/8/2024  Similar to previous ECG  Rhythm: sinus rhythm  Rate: normal  BPM: 74  T inversion: I, aVL, V5, V6 and V4            Assessment:       Diagnosis Plan   1. Atherosclerosis of native coronary artery of native heart without angina pectoris  ECG 12 Lead      2. Essential hypertension        3. Dyslipidemia        4. AVNRT (AV vinayak re-entry tachycardia)          Orders Placed This Encounter   Procedures    ECG 12 Lead     This order was created via procedure documentation     Order Specific Question:   Release to patient     Answer:   Routine Release [[id number]]          Plan:       1.  Coronary artery disease.  He denies any symptoms of angina.  DAPT with aspirin and clopidogrel along with high intensity statin therapy.      2.  Hypertension.  His blood pressure looks great.  Continue amlodipine, HCTZ, and lisinopril.      3.  Hyperlipidemia.  Continue atorvastatin.      4.  AVNRT.  No longer on amiodarone.  Maintaining sinus rhythm.  He has an appointment with the EP next month.      I think he is doing well.  I am not making any changes, and he will follow-up with Dr. Locke on 7/3.       As always, it has been a pleasure to participate in your patient's care.      Sincerely,         SHERRILL Saleem

## 2024-05-20 ENCOUNTER — HOSPITAL ENCOUNTER (EMERGENCY)
Facility: HOSPITAL | Age: 74
Discharge: HOME | End: 2024-05-20
Attending: EMERGENCY MEDICINE | Admitting: EMERGENCY MEDICINE
Payer: MEDICARE

## 2024-05-20 VITALS
TEMPERATURE: 97.7 F | WEIGHT: 190 LBS | HEART RATE: 58 BPM | BODY MASS INDEX: 26.6 KG/M2 | DIASTOLIC BLOOD PRESSURE: 90 MMHG | SYSTOLIC BLOOD PRESSURE: 172 MMHG | HEIGHT: 71 IN | OXYGEN SATURATION: 97 % | RESPIRATION RATE: 18 BRPM

## 2024-05-20 DIAGNOSIS — R33.8 ACUTE RETENTION OF URINE: Primary | ICD-10-CM

## 2024-05-20 LAB
BILIRUB UR QL STRIP: NEGATIVE
CLARITY UR: CLEAR
COLOR UR: YELLOW
GLUCOSE UR STRIP-MCNC: NEGATIVE MG/DL
HGB UR QL STRIP.AUTO: NEGATIVE
KETONES UR QL STRIP: NEGATIVE
LEUKOCYTE ESTERASE UR QL STRIP.AUTO: NEGATIVE
NITRITE UR QL STRIP: NEGATIVE
PH UR STRIP.AUTO: 6 [PH] (ref 5–8)
PROT UR QL STRIP: NEGATIVE
SP GR UR STRIP: 1.01 (ref 1–1.03)
UROBILINOGEN UR QL STRIP: NORMAL

## 2024-05-20 PROCEDURE — 51798 US URINE CAPACITY MEASURE: CPT

## 2024-05-20 PROCEDURE — 81003 URINALYSIS AUTO W/O SCOPE: CPT

## 2024-05-20 PROCEDURE — 99283 EMERGENCY DEPT VISIT LOW MDM: CPT

## 2024-05-20 PROCEDURE — 51702 INSERT TEMP BLADDER CATH: CPT

## 2024-05-20 RX ORDER — TAMSULOSIN HYDROCHLORIDE 0.4 MG/1
1 CAPSULE ORAL DAILY
Qty: 30 CAPSULE | Refills: 0 | Status: SHIPPED | OUTPATIENT
Start: 2024-05-20

## 2024-05-20 NOTE — ED NOTES
Pt c/o having trouble urinating x1 week. Pt reports today he is having bladder pain. Pt reports he has not been able to fully empty bladder

## 2024-05-20 NOTE — ED PROVIDER NOTES
" EMERGENCY DEPARTMENT ENCOUNTER    Room Number:  SIMON/SIMON  PCP: Triny Hannon MD  Independent Historians: Patient and Family    HPI:  Chief Complaint: had concerns including Difficulty Urinating.      A complete HPI/ROS/PMH/PSH/SH/FH are unobtainable due to: None    Chronic or social conditions impacting patient care (Social Determinants of Health): None      Context: Slade Martinez is a 73 y.o. male with a medical history of artery disease, hypertension, peripheral arterial disease who presents to the ED c/o acute difficulty with urination for the past several days since being home but it seems like is getting worse.  Patient has discomfort with urination along with a sensation that he is not able to empty his bladder completely.  Patient's recent past medical history has been complicated by hospitalization for his coronary artery disease with a postsurgical arterial injury.  Thereafter he developed right upper chest wall and right upper back shingles about 10 days ago and then started to also have left groin and left buttock rash consistent with shingles thereafter.  Patient was hospitalized for 4 days here for IV antiviral.  Patient was discharged on Valtrex and Neurontin and has stopped taking any narcotic pain medication but continues to have pain and discomfort.  He has never had any issues with urine retention in the past.  His wife was concerned that he might have shingles \"inside\" causing him discomfort with urination since he did have lesions on his scrotum, penis, and groin and buttocks (she admits now that those lesions seem to look a little bit better).  Patient denies any fevers, hematuria, vomiting.        Review of prior external notes (non-ED) -and- Review of prior external test results outside of this encounter: Patient with a complicated recent past medical history including admission for chest pain and underwent a very problematic and difficult heart cath.  He had stenting of his left main " and ended up having perforation of the LAD and postprocedure hypotension and admission to the ICU on pressors.  Patient recovered quickly despite the complexity of the procedure.    Prescription drug monitoring program review:     N/A    PAST MEDICAL HISTORY  Active Ambulatory Problems     Diagnosis Date Noted    Dyslipidemia 11/04/2019    Essential hypertension 11/04/2019    Coronary atherosclerosis 11/04/2019    S/P CABG (coronary artery bypass graft) 04/20/2020    Peripheral artery disease 02/25/2021    History of left-sided carotid endarterectomy 02/25/2021    Fever of unknown origin 03/03/2023    Closed traumatic nondisplaced fracture of two ribs of right side with routine healing 03/04/2023    Altered mental status 09/21/2023    Abdominal pain 09/21/2023    Chronic mesenteric ischemia 09/21/2023    Chest pain 04/04/2024    Shingles 05/12/2024    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     Resolved Ambulatory Problems     Diagnosis Date Noted    S/P CABG (coronary artery bypass graft) 04/20/2020    COVID-19 virus infection 02/25/2021     Past Medical History:   Diagnosis Date    Constipation     Injury of back     Peripheral edema     Stroke 2011    Vertigo          PAST SURGICAL HISTORY  Past Surgical History:   Procedure Laterality Date    CARDIAC CATHETERIZATION N/A 5/6/2024    Procedure: Left Heart Cath;  Surgeon: Gurwinder Hurd MD;  Location:  [facility] C[facility];  Service: Cardiovascular;  Laterality: N/A;    CARDIAC CATHETERIZATION N/A 5/6/2024    Procedure: Coronary angiography;  Surgeon: Gurwinder Hurd MD;  [facility];  Service: Cardiovascular;  Laterality: N/A;    CARDIAC CATHETERIZATION N/A 5/6/2024    Procedure: Percutaneous Coronary Intervention;  Surgeon: Gurwinder Hurd MD;  Location:  [facility] C[facility]VASIVE [facility];  Service: Cardiovascular;  Laterality: N/A;    CARDIAC CATHETERIZATION N/A 5/6/2024    Procedure: Stent TC  coronary;  Surgeon: Gurwinder Hurd MD;  Location:  [facility] C[facility];  Service: Cardiovascular;  Laterality: N/A;    CARDIAC CATHETERIZATION  5/6/2024    Procedure: Saphenous Vein Graft;  Surgeon: Gurwinder Hurd MD;  Location:  [facility] C[facility]AS[facility];  Service: Cardiovascular;;    CAROTID ENDARTERECTOMY  2000    Left    CHOLECYSTECTOMY      CORONARY ARTERY BYPASS GRAFT      CORONARY STENT PLACEMENT      X17 stents    INTERVENTIONAL RADIOLOGY PROCEDURE N/A 5/6/2024    Procedure: Intravascular Ultrasound;  Surgeon: Gurwinder Hurd MD;  Location:  [facility];  Service: Cardiovascular;  Laterality: N/A;         FAMILY HISTORY  Family History   Problem Relation Age of Onset    COPD Mother     Heart attack Father     Heart failure Father     COPD Brother          SOCIAL HISTORY  Social History     Socioeconomic History    Marital status:    Tobacco Use    Smoking status: Former     Types: Cigarettes    Smokeless tobacco: Never   Vaping Use    Vaping status: Never Used   Substance and Sexual Activity    Alcohol use: Not Currently    Drug use: Never    Sexual activity: Defer         ALLERGIES  Pletal [cilostazol]        REVIEW OF SYSTEMS  Review of Systems  Included in HPI  All systems reviewed and negative except for those discussed in HPI.      PHYSICAL EXAM    I have reviewed the triage vital signs and nursing notes.    ED Triage Vitals   Temp Heart Rate Resp BP SpO2   05/20/24 0843 05/20/24 0843 05/20/24 0843 05/20/24 0846 05/20/24 0843   97.7 °F (36.5 °C) 77 18 129/68 92 %      Temp src Heart Rate Source Patient Position BP Location FiO2 (%)   05/20/24 0843 05/20/24 0843 05/20/24 0846 05/20/24 0846 --   Tympanic Monitor Standing Right arm        Physical Exam  GENERAL: Pleasant cooperative well-appearing male, alert, no acute distress  SKIN: Warm, dry, patient's right chest wall and right back with crusted confluent erythematous rash consistent with  evolution of zoster, left buttock tenderness to palpation including in the gluteal cleft with mild scattered erythema but no vesicles, no crusting, no induration, left groin/penis/scrotum with some pain sensitivity to light touch with no skin lesions appreciated  HENT: Normocephalic, atraumatic  RESPIRATORY: Relaxed breathing  ABDOMEN: soft, nontender, nondistended  NEURO: alert, moves all extremities, follows commands                                                   LAB RESULTS  Recent Results (from the past 24 hour(s))   Urinalysis With Microscopic If Indicated (No Culture) - Urine, Clean Catch    Collection Time: 05/20/24  9:24 AM    Specimen: Urine, Clean Catch   Result Value Ref Range    Color, UA Yellow Yellow, Straw    Appearance, UA Clear Clear    pH, UA 6.0 5.0 - 8.0    Specific Gravity, UA 1.010 1.005 - 1.030    Glucose, UA Negative Negative    Ketones, UA Negative Negative    Bilirubin, UA Negative Negative    Blood, UA Negative Negative    Protein, UA Negative Negative    Leuk Esterase, UA Negative Negative    Nitrite, UA Negative Negative    Urobilinogen, UA 1.0 E.U./dL 0.2 - 1.0 E.U./dL         RADIOLOGY  No Radiology Exams Resulted Within Past 24 Hours      MEDICATIONS GIVEN IN ER  Medications - No data to display      ORDERS PLACED DURING THIS VISIT:  Orders Placed This Encounter   Procedures    Urinalysis With Microscopic If Indicated (No Culture) - Urine, Clean Catch    Bladder scan    Insert Indwelling Urinary Catheter         OUTPATIENT MEDICATION MANAGEMENT:  No current Epic-ordered facility-administered medications on file.     Current Outpatient Medications Ordered in Epic   Medication Sig Dispense Refill    amLODIPine (NORVASC) 5 MG tablet Take 1 tablet by mouth Daily. 30 tablet 3    aspirin 81 MG tablet Take 1 tablet by mouth Daily.      atenolol (TENORMIN) 50 MG tablet Take 1 tablet by mouth 2 (Two) Times a Day.      atorvastatin (LIPITOR) 40 MG tablet Take 1 tablet by mouth Every Night.       baclofen (LIORESAL) 10 MG tablet Take 1 tablet by mouth every night at bedtime.      clopidogrel (PLAVIX) 75 MG tablet Take 1 tablet by mouth Daily.      gabapentin (NEURONTIN) 100 MG capsule Take 1 capsule by mouth 3 (Three) Times a Day.      hydroCHLOROthiazide 25 MG tablet Take 1 tablet by mouth Daily As Needed (swelling).      HYDROcodone-acetaminophen (NORCO) 5-325 MG per tablet Take 1 tablet by mouth Every 4 (Four) Hours As Needed for Moderate Pain. 12 tablet 0    Lidocaine 4 % Place 2 patches on the skin as directed by provider Daily. Remove & Discard patch within 12 hours or as directed by MD 30 each 0    Linzess 145 MCG capsule capsule Take 1 capsule by mouth Every Other Day.      lisinopril (PRINIVIL,ZESTRIL) 40 MG tablet Take 1 tablet by mouth 2 (Two) Times a Day.      loratadine (CLARITIN) 10 MG tablet Take 1 tablet by mouth Daily.      meclizine (ANTIVERT) 12.5 MG tablet Take 1 tablet by mouth Daily.      Multiple Vitamins-Minerals (MULTIVITAMIN ADULT EXTRA C PO) Take 1 tablet by mouth Daily.      nitroglycerin (NITROSTAT) 0.4 MG SL tablet Place 1 tablet under the tongue Every 5 (Five) Minutes As Needed for Chest Pain. Take no more than 3 doses in 15 minutes. 25 tablet 3    pantoprazole (PROTONIX) 40 MG EC tablet Take 1 tablet by mouth 2 (Two) Times a Day.      potassium chloride 10 MEQ CR tablet Take 2 tablets by mouth Daily.      saccharomyces boulardii (FLORASTOR) 250 MG capsule Take 1 capsule by mouth Every Night.      sucralfate (CARAFATE) 1 g tablet Take 1 tablet by mouth every night at bedtime.      tamsulosin (FLOMAX) 0.4 MG capsule 24 hr capsule Take 1 capsule by mouth Daily. 30 capsule 0    triamcinolone (KENALOG) 0.1 % cream Apply 1 Application topically to the appropriate area as directed 2 (Two) Times a Day As Needed for Rash.      valACYclovir (Valtrex) 1000 MG tablet Take 1 tablet by mouth 3 (Three) Times a Day for 7 days. 21 tablet 0         PROCEDURES  Procedures            PROGRESS,  DATA ANALYSIS, CONSULTS, AND MEDICAL DECISION MAKING  All labs have been independently interpreted by me.  All radiology studies have been reviewed by me. All EKG's have been independently viewed and interpreted by me.  Discussion below represents my analysis of pertinent findings related to patient's condition, differential diagnosis, treatment plan and final disposition.    Differential diagnosis includes but is not limited to   Patient with dysuria and sensation that he is not emptying his bladder.  Possibilities include urine retention, urinary tract infection, ureteral stone or bladder stone, bladder mass, among other possibilities.  Plan for urinalysis to assess for blood or signs of infection.  Will have patient void and then measure a postvoid residual if bladder scan appears to have retained urine.  Patient is on several new medications.  Patient's wife concerned about some genital lesions related to shingles but patient's rash does not appear consistent with shingles.  He has no vesicular lesions and has only mild erythema with no penile inflammation.              cc so Grimes catheter placed with immediate 500 cc of urine output.    I reviewed patient's medications including recent narcotic, gabapentin, and Valtrex which all in combination can definitely cause urine retention.  Patient with no active penile or scrotal lesions currently despite history of them being present last week.    I discussed with patient and family initiation of Flomax and follow-up with urology for voiding trial.  They are amenable to plan.        AS OF 19:08 EDT VITALS:    BP - 172/90  HR - 58  TEMP - 97.7 °F (36.5 °C) (Tympanic)  O2 SATS - 97%      COMPLEXITY OF CARE  Admission was considered but after careful review of the patient's presentation, physical examination, diagnostic results, and response to treatment the patient may be safely discharged with outpatient follow-up.    DIAGNOSIS  Final diagnoses:   Acute  retention of urine         DISPOSITION  ED Disposition       ED Disposition   Discharge    Condition   Stable    Comment   --                Please note that portions of this document were completed with a voice recognition program.    Note Disclaimer: At [facility], we believe that sharing information builds trust and better relationships. You are receiving this note because you recently visited [facility]. It is possible you will see health information before a provider has talked with you about it. This kind of information can be easy to misunderstand. To help you fully understand what it means for your health, we urge you to discuss this note with your provider.         Dina Iraheta MD  05/20/24 0991

## 2024-05-20 NOTE — DISCHARGE INSTRUCTIONS
Rest at home avoiding any particularly strenuous activity today.     Eat small, frequent meals and drink plenty of fluids.       Monitor for any signs of recurrent symptoms or worsening and see your primary doctor to discuss your ER visit while returning to the ER if any concerns as we discussed.

## 2024-05-22 ENCOUNTER — TELEPHONE (OUTPATIENT)
Dept: CARDIOLOGY | Facility: CLINIC | Age: 74
End: 2024-05-22
Payer: MEDICARE

## 2024-05-22 NOTE — TELEPHONE ENCOUNTER
Pt and his wife called concerned over hypotension and CP today.  Pt was hospitalized 2 days ago with urinary retention- an indwelling FC was placed & pt prescribed flomax.  He was instructed by PCP to take it at night due to risk for hypotension.  He took it last night, and woke up around 03:30/4:00 this morning with chest tightness.  His BP was 70/50, .  Pt says the chest tightness woke him from his sleep.  He felt palpitations and has since been having a constant pressure, sharp pain of 4.5/10 in the middle of his chest.  He denies arm pain, jaw pain, or nausea.  He endorses feeling lightheaded today with movement, fatigue, somewhat SOA at rest, and some occasional diaphoresis with rest.  As the morning progressed, he took all of his morning medicines today (med list is current).    He's been making a point to drink plenty of water today.  HR has gotten to the lowest of 86 today but it mostly staying in the 110s.  Last BP was 14:00 was 83/58, .  His baseline is 120-130s/80-90s, HR 60-70s.    Do you have any recommendations for this patient?    Thank you,    Ela ROSADO RN  [facility] Triage  05/22/24  14:24 EDT

## 2024-05-31 ENCOUNTER — TELEPHONE (OUTPATIENT)
Facility: HOSPITAL | Age: 74
End: 2024-05-31

## 2024-05-31 DIAGNOSIS — I82.491 DEEP VEIN THROMBOSIS (DVT) OF OTHER VEIN OF RIGHT LOWER EXTREMITY, UNSPECIFIED CHRONICITY: Primary | ICD-10-CM

## 2024-05-31 NOTE — TELEPHONE ENCOUNTER
Patients wife called saying that Slade that his Right leg has been swelling from the knee down to the ankle just on the right leg. She wants him come see Dr starks to get it looked at. She said he had a recent Doppler done [facility] less than a month ago that was negative for a Blood Clot. Her number is [phone]

## 2024-06-03 ENCOUNTER — TELEPHONE (OUTPATIENT)
Dept: CARDIOLOGY | Facility: CLINIC | Age: 74
End: 2024-06-03

## 2024-06-03 NOTE — TELEPHONE ENCOUNTER
Caller: Slade Martinez    Relationship to patient: Self    Best call back number:  [phone]    Patient is needing: PATIENT REQUESTING A LETTER WRITTEN TO EMPLOYER STATING HE IS OK TO RETURN TO WORK WITH NO RESTRICTIONS AND FAX THAT [phone]

## 2024-06-03 NOTE — TELEPHONE ENCOUNTER
Patient calling needing a note to return to work. States he has been off for about a month  Said he works for the [employer] doesn't do anything hard. Walks around and picks up trash.

## 2024-06-07 PROBLEM — I65.29 CAROTID ARTERY STENOSIS: Status: ACTIVE | Noted: 2024-06-07

## 2024-06-19 ENCOUNTER — HOSPITAL ENCOUNTER (INPATIENT)
Facility: HOSPITAL | Age: 74
LOS: 2 days | Discharge: HOME | DRG: 872 | End: 2024-06-21
Attending: EMERGENCY MEDICINE | Admitting: HOSPITALIST
Payer: MEDICARE

## 2024-06-19 ENCOUNTER — APPOINTMENT (OUTPATIENT)
Dept: GENERAL RADIOLOGY | Facility: HOSPITAL | Age: 74
DRG: 872 | End: 2024-06-19
Payer: MEDICARE

## 2024-06-19 DIAGNOSIS — A41.9 SEPSIS, DUE TO UNSPECIFIED ORGANISM, UNSPECIFIED WHETHER ACUTE ORGAN DYSFUNCTION PRESENT: ICD-10-CM

## 2024-06-19 DIAGNOSIS — R73.9 HYPERGLYCEMIA: ICD-10-CM

## 2024-06-19 DIAGNOSIS — N39.0 ACUTE UTI: Primary | ICD-10-CM

## 2024-06-19 LAB
ALBUMIN SERPL-MCNC: 3.8 G/DL (ref 3.5–5.2)
ALBUMIN/GLOB SERPL: 1.3 G/DL
ALP SERPL-CCNC: 87 U/L (ref 39–117)
ALT SERPL W P-5'-P-CCNC: 22 U/L (ref 1–41)
ANION GAP SERPL CALCULATED.3IONS-SCNC: 10.2 MMOL/L (ref 5–15)
ANION GAP SERPL CALCULATED.3IONS-SCNC: 11.7 MMOL/L (ref 5–15)
AST SERPL-CCNC: 12 U/L (ref 1–40)
B PARAPERT DNA SPEC QL NAA+PROBE: NOT DETECTED
B PERT DNA SPEC QL NAA+PROBE: NOT DETECTED
BACTERIA UR QL AUTO: ABNORMAL /HPF
BASOPHILS # BLD AUTO: 0.04 10*3/MM3 (ref 0–0.2)
BASOPHILS NFR BLD AUTO: 0.2 % (ref 0–1.5)
BILIRUB SERPL-MCNC: 1.2 MG/DL (ref 0–1.2)
BILIRUB UR QL STRIP: NEGATIVE
BUN SERPL-MCNC: 14 MG/DL (ref 8–23)
BUN SERPL-MCNC: 17 MG/DL (ref 8–23)
BUN/CREAT SERPL: 14.3 (ref 7–25)
BUN/CREAT SERPL: 17 (ref 7–25)
C PNEUM DNA NPH QL NAA+NON-PROBE: NOT DETECTED
CALCIUM SPEC-SCNC: 8.5 MG/DL (ref 8.6–10.5)
CALCIUM SPEC-SCNC: 9.1 MG/DL (ref 8.6–10.5)
CHLORIDE SERPL-SCNC: 94 MMOL/L (ref 98–107)
CHLORIDE SERPL-SCNC: 97 MMOL/L (ref 98–107)
CLARITY UR: ABNORMAL
CO2 SERPL-SCNC: 22.3 MMOL/L (ref 22–29)
CO2 SERPL-SCNC: 26.8 MMOL/L (ref 22–29)
COLOR UR: ABNORMAL
CREAT SERPL-MCNC: 0.98 MG/DL (ref 0.76–1.27)
CREAT SERPL-MCNC: 1 MG/DL (ref 0.76–1.27)
D-LACTATE SERPL-SCNC: 1.4 MMOL/L (ref 0.5–2)
D-LACTATE SERPL-SCNC: 2.2 MMOL/L (ref 0.5–2)
DEPRECATED RDW RBC AUTO: 48.1 FL (ref 37–54)
DEPRECATED RDW RBC AUTO: 49.9 FL (ref 37–54)
EGFRCR SERPLBLD CKD-EPI 2021: 79 ML/MIN/1.73
EGFRCR SERPLBLD CKD-EPI 2021: 80.9 ML/MIN/1.73
EOSINOPHIL # BLD AUTO: 0.03 10*3/MM3 (ref 0–0.4)
EOSINOPHIL NFR BLD AUTO: 0.1 % (ref 0.3–6.2)
ERYTHROCYTE [DISTWIDTH] IN BLOOD BY AUTOMATED COUNT: 14.2 % (ref 12.3–15.4)
ERYTHROCYTE [DISTWIDTH] IN BLOOD BY AUTOMATED COUNT: 14.7 % (ref 12.3–15.4)
FLUAV SUBTYP SPEC NAA+PROBE: NOT DETECTED
FLUBV RNA ISLT QL NAA+PROBE: NOT DETECTED
GLOBULIN UR ELPH-MCNC: 3 GM/DL
GLUCOSE SERPL-MCNC: 123 MG/DL (ref 65–99)
GLUCOSE SERPL-MCNC: 129 MG/DL (ref 65–99)
GLUCOSE UR STRIP-MCNC: NEGATIVE MG/DL
HADV DNA SPEC NAA+PROBE: NOT DETECTED
HCOV 229E RNA SPEC QL NAA+PROBE: NOT DETECTED
HCOV HKU1 RNA SPEC QL NAA+PROBE: NOT DETECTED
HCOV NL63 RNA SPEC QL NAA+PROBE: NOT DETECTED
HCOV OC43 RNA SPEC QL NAA+PROBE: NOT DETECTED
HCT VFR BLD AUTO: 36.5 % (ref 37.5–51)
HCT VFR BLD AUTO: 38.9 % (ref 37.5–51)
HGB BLD-MCNC: 11.9 G/DL (ref 13–17.7)
HGB BLD-MCNC: 12.8 G/DL (ref 13–17.7)
HGB UR QL STRIP.AUTO: ABNORMAL
HMPV RNA NPH QL NAA+NON-PROBE: NOT DETECTED
HPIV1 RNA ISLT QL NAA+PROBE: NOT DETECTED
HPIV2 RNA SPEC QL NAA+PROBE: NOT DETECTED
HPIV3 RNA NPH QL NAA+PROBE: NOT DETECTED
HPIV4 P GENE NPH QL NAA+PROBE: NOT DETECTED
HYALINE CASTS UR QL AUTO: ABNORMAL /LPF
IMM GRANULOCYTES # BLD AUTO: 0.14 10*3/MM3 (ref 0–0.05)
IMM GRANULOCYTES NFR BLD AUTO: 0.6 % (ref 0–0.5)
KETONES UR QL STRIP: ABNORMAL
LEUKOCYTE ESTERASE UR QL STRIP.AUTO: ABNORMAL
LYMPHOCYTES # BLD AUTO: 2.12 10*3/MM3 (ref 0.7–3.1)
LYMPHOCYTES NFR BLD AUTO: 9.3 % (ref 19.6–45.3)
M PNEUMO IGG SER IA-ACNC: NOT DETECTED
MCH RBC QN AUTO: 30.4 PG (ref 26.6–33)
MCH RBC QN AUTO: 30.8 PG (ref 26.6–33)
MCHC RBC AUTO-ENTMCNC: 32.6 G/DL (ref 31.5–35.7)
MCHC RBC AUTO-ENTMCNC: 32.9 G/DL (ref 31.5–35.7)
MCV RBC AUTO: 93.4 FL (ref 79–97)
MCV RBC AUTO: 93.7 FL (ref 79–97)
MONOCYTES # BLD AUTO: 4.26 10*3/MM3 (ref 0.1–0.9)
MONOCYTES NFR BLD AUTO: 18.7 % (ref 5–12)
NEUTROPHILS NFR BLD AUTO: 16.22 10*3/MM3 (ref 1.7–7)
NEUTROPHILS NFR BLD AUTO: 71.1 % (ref 42.7–76)
NITRITE UR QL STRIP: NEGATIVE
NRBC BLD AUTO-RTO: 0 /100 WBC (ref 0–0.2)
PH UR STRIP.AUTO: 8.5 [PH] (ref 5–8)
PLATELET # BLD AUTO: 263 10*3/MM3 (ref 140–450)
PLATELET # BLD AUTO: 313 10*3/MM3 (ref 140–450)
PMV BLD AUTO: 8.9 FL (ref 6–12)
PMV BLD AUTO: 9 FL (ref 6–12)
POTASSIUM SERPL-SCNC: 4.4 MMOL/L (ref 3.5–5.2)
POTASSIUM SERPL-SCNC: 4.6 MMOL/L (ref 3.5–5.2)
PROCALCITONIN SERPL-MCNC: 0.68 NG/ML (ref 0–0.25)
PROT SERPL-MCNC: 6.8 G/DL (ref 6–8.5)
PROT UR QL STRIP: ABNORMAL
QT INTERVAL: 356 MS
QTC INTERVAL: 409 MS
RBC # BLD AUTO: 3.91 10*6/MM3 (ref 4.14–5.8)
RBC # BLD AUTO: 4.15 10*6/MM3 (ref 4.14–5.8)
RBC # UR STRIP: ABNORMAL /HPF
REF LAB TEST METHOD: ABNORMAL
RHINOVIRUS RNA SPEC NAA+PROBE: NOT DETECTED
RSV RNA NPH QL NAA+NON-PROBE: NOT DETECTED
SARS-COV-2 RNA NPH QL NAA+NON-PROBE: NOT DETECTED
SODIUM SERPL-SCNC: 131 MMOL/L (ref 136–145)
SODIUM SERPL-SCNC: 131 MMOL/L (ref 136–145)
SP GR UR STRIP: 1.01 (ref 1–1.03)
SQUAMOUS #/AREA URNS HPF: ABNORMAL /HPF
UROBILINOGEN UR QL STRIP: ABNORMAL
WBC # UR STRIP: ABNORMAL /HPF
WBC NRBC COR # BLD AUTO: 22.81 10*3/MM3 (ref 3.4–10.8)
WBC NRBC COR # BLD AUTO: 24.74 10*3/MM3 (ref 3.4–10.8)

## 2024-06-19 PROCEDURE — 87086 URINE CULTURE/COLONY COUNT: CPT | Performed by: EMERGENCY MEDICINE

## 2024-06-19 PROCEDURE — 85025 COMPLETE CBC W/AUTO DIFF WBC: CPT | Performed by: EMERGENCY MEDICINE

## 2024-06-19 PROCEDURE — 85027 COMPLETE CBC AUTOMATED: CPT | Performed by: NURSE PRACTITIONER

## 2024-06-19 PROCEDURE — 81001 URINALYSIS AUTO W/SCOPE: CPT | Performed by: EMERGENCY MEDICINE

## 2024-06-19 PROCEDURE — 99222 1ST HOSP IP/OBS MODERATE 55: CPT | Performed by: NURSE PRACTITIONER

## 2024-06-19 PROCEDURE — 93005 ELECTROCARDIOGRAM TRACING: CPT | Performed by: EMERGENCY MEDICINE

## 2024-06-19 PROCEDURE — 0202U NFCT DS 22 TRGT SARS-COV-2: CPT | Performed by: EMERGENCY MEDICINE

## 2024-06-19 PROCEDURE — 36415 COLL VENOUS BLD VENIPUNCTURE: CPT | Performed by: EMERGENCY MEDICINE

## 2024-06-19 PROCEDURE — 25810000003 SODIUM CHLORIDE 0.9 % SOLUTION: Performed by: NURSE PRACTITIONER

## 2024-06-19 PROCEDURE — 25010000002 CEFTRIAXONE PER 250 MG: Performed by: EMERGENCY MEDICINE

## 2024-06-19 PROCEDURE — 84145 PROCALCITONIN (PCT): CPT | Performed by: EMERGENCY MEDICINE

## 2024-06-19 PROCEDURE — 87077 CULTURE AEROBIC IDENTIFY: CPT | Performed by: EMERGENCY MEDICINE

## 2024-06-19 PROCEDURE — 99285 EMERGENCY DEPT VISIT HI MDM: CPT

## 2024-06-19 PROCEDURE — 87040 BLOOD CULTURE FOR BACTERIA: CPT | Performed by: EMERGENCY MEDICINE

## 2024-06-19 PROCEDURE — 25810000003 LACTATED RINGERS PER 1000 ML: Performed by: HOSPITALIST

## 2024-06-19 PROCEDURE — 71045 X-RAY EXAM CHEST 1 VIEW: CPT

## 2024-06-19 PROCEDURE — 87186 SC STD MICRODIL/AGAR DIL: CPT | Performed by: EMERGENCY MEDICINE

## 2024-06-19 PROCEDURE — 80053 COMPREHEN METABOLIC PANEL: CPT | Performed by: EMERGENCY MEDICINE

## 2024-06-19 PROCEDURE — 93010 ELECTROCARDIOGRAM REPORT: CPT | Performed by: INTERNAL MEDICINE

## 2024-06-19 PROCEDURE — 83605 ASSAY OF LACTIC ACID: CPT | Performed by: EMERGENCY MEDICINE

## 2024-06-19 RX ORDER — SUCRALFATE 1 G/1
1 TABLET ORAL NIGHTLY
Status: DISCONTINUED | OUTPATIENT
Start: 2024-06-19 | End: 2024-06-21 | Stop reason: HOSPADM

## 2024-06-19 RX ORDER — SODIUM CHLORIDE 9 MG/ML
40 INJECTION, SOLUTION INTRAVENOUS AS NEEDED
Status: DISCONTINUED | OUTPATIENT
Start: 2024-06-19 | End: 2024-06-21 | Stop reason: HOSPADM

## 2024-06-19 RX ORDER — ATORVASTATIN CALCIUM 20 MG/1
40 TABLET, FILM COATED ORAL NIGHTLY
Status: DISCONTINUED | OUTPATIENT
Start: 2024-06-19 | End: 2024-06-21 | Stop reason: HOSPADM

## 2024-06-19 RX ORDER — ASPIRIN 81 MG/1
81 TABLET ORAL DAILY
Status: DISCONTINUED | OUTPATIENT
Start: 2024-06-19 | End: 2024-06-21 | Stop reason: HOSPADM

## 2024-06-19 RX ORDER — PREGABALIN 75 MG/1
150 CAPSULE ORAL 3 TIMES DAILY
Status: DISCONTINUED | OUTPATIENT
Start: 2024-06-19 | End: 2024-06-21 | Stop reason: HOSPADM

## 2024-06-19 RX ORDER — SODIUM CHLORIDE, SODIUM LACTATE, POTASSIUM CHLORIDE, CALCIUM CHLORIDE 600; 310; 30; 20 MG/100ML; MG/100ML; MG/100ML; MG/100ML
100 INJECTION, SOLUTION INTRAVENOUS CONTINUOUS
Status: DISCONTINUED | OUTPATIENT
Start: 2024-06-19 | End: 2024-06-20

## 2024-06-19 RX ORDER — PANTOPRAZOLE SODIUM 40 MG/1
40 TABLET, DELAYED RELEASE ORAL 2 TIMES DAILY
Status: DISCONTINUED | OUTPATIENT
Start: 2024-06-19 | End: 2024-06-21 | Stop reason: HOSPADM

## 2024-06-19 RX ORDER — SODIUM CHLORIDE 0.9 % (FLUSH) 0.9 %
10 SYRINGE (ML) INJECTION EVERY 12 HOURS SCHEDULED
Status: DISCONTINUED | OUTPATIENT
Start: 2024-06-19 | End: 2024-06-21 | Stop reason: HOSPADM

## 2024-06-19 RX ORDER — SODIUM CHLORIDE 0.9 % (FLUSH) 0.9 %
10 SYRINGE (ML) INJECTION AS NEEDED
Status: DISCONTINUED | OUTPATIENT
Start: 2024-06-19 | End: 2024-06-21 | Stop reason: HOSPADM

## 2024-06-19 RX ORDER — METHENAMINE, SODIUM PHOSPHATE, MONOBASIC, ANHYDROUS, PHENYL SALICYLATE, METHYLENE BLUE AND HYOSCYAMINE SULFATE 118; 40.8; 36; 10; .12 MG/1; MG/1; MG/1; MG/1; MG/1
1 CAPSULE ORAL DAILY
Status: ON HOLD | COMMUNITY
End: 2024-07-01

## 2024-06-19 RX ORDER — ATENOLOL 50 MG/1
50 TABLET ORAL 2 TIMES DAILY
Status: DISCONTINUED | OUTPATIENT
Start: 2024-06-19 | End: 2024-06-21 | Stop reason: HOSPADM

## 2024-06-19 RX ORDER — HYDROCODONE BITARTRATE AND ACETAMINOPHEN 7.5; 325 MG/1; MG/1
1 TABLET ORAL EVERY 8 HOURS PRN
Status: DISCONTINUED | OUTPATIENT
Start: 2024-06-19 | End: 2024-06-21 | Stop reason: HOSPADM

## 2024-06-19 RX ORDER — POLYETHYLENE GLYCOL 3350 17 G/17G
17 POWDER, FOR SOLUTION ORAL DAILY PRN
Status: DISCONTINUED | OUTPATIENT
Start: 2024-06-19 | End: 2024-06-21 | Stop reason: HOSPADM

## 2024-06-19 RX ORDER — BISACODYL 10 MG
10 SUPPOSITORY, RECTAL RECTAL DAILY PRN
Status: DISCONTINUED | OUTPATIENT
Start: 2024-06-19 | End: 2024-06-21 | Stop reason: HOSPADM

## 2024-06-19 RX ORDER — CALCIUM CARBONATE 500 MG/1
2 TABLET, CHEWABLE ORAL 2 TIMES DAILY PRN
Status: DISCONTINUED | OUTPATIENT
Start: 2024-06-19 | End: 2024-06-21 | Stop reason: HOSPADM

## 2024-06-19 RX ORDER — ONDANSETRON 4 MG/1
4 TABLET, ORALLY DISINTEGRATING ORAL EVERY 6 HOURS PRN
Status: DISCONTINUED | OUTPATIENT
Start: 2024-06-19 | End: 2024-06-21 | Stop reason: HOSPADM

## 2024-06-19 RX ORDER — ACETAMINOPHEN 500 MG
1000 TABLET ORAL ONCE
Status: COMPLETED | OUTPATIENT
Start: 2024-06-19 | End: 2024-06-19

## 2024-06-19 RX ORDER — LISINOPRIL 40 MG/1
40 TABLET ORAL 2 TIMES DAILY
Status: DISCONTINUED | OUTPATIENT
Start: 2024-06-19 | End: 2024-06-21 | Stop reason: HOSPADM

## 2024-06-19 RX ORDER — ONDANSETRON 2 MG/ML
4 INJECTION INTRAMUSCULAR; INTRAVENOUS EVERY 6 HOURS PRN
Status: DISCONTINUED | OUTPATIENT
Start: 2024-06-19 | End: 2024-06-21 | Stop reason: HOSPADM

## 2024-06-19 RX ORDER — LUBIPROSTONE 8 UG/1
8 CAPSULE ORAL 2 TIMES DAILY
Status: DISCONTINUED | OUTPATIENT
Start: 2024-06-19 | End: 2024-06-21 | Stop reason: HOSPADM

## 2024-06-19 RX ORDER — CLOPIDOGREL BISULFATE 75 MG/1
75 TABLET ORAL DAILY
Status: DISCONTINUED | OUTPATIENT
Start: 2024-06-19 | End: 2024-06-21 | Stop reason: HOSPADM

## 2024-06-19 RX ORDER — BISACODYL 5 MG/1
5 TABLET, DELAYED RELEASE ORAL DAILY PRN
Status: DISCONTINUED | OUTPATIENT
Start: 2024-06-19 | End: 2024-06-21 | Stop reason: HOSPADM

## 2024-06-19 RX ORDER — AMLODIPINE BESYLATE 5 MG/1
5 TABLET ORAL
Status: DISCONTINUED | OUTPATIENT
Start: 2024-06-19 | End: 2024-06-21 | Stop reason: HOSPADM

## 2024-06-19 RX ORDER — HYDROCODONE BITARTRATE AND ACETAMINOPHEN 7.5; 325 MG/1; MG/1
1 TABLET ORAL EVERY 8 HOURS PRN
Status: ON HOLD | COMMUNITY
End: 2024-07-01

## 2024-06-19 RX ORDER — SODIUM CHLORIDE 9 MG/ML
100 INJECTION, SOLUTION INTRAVENOUS CONTINUOUS
Status: DISCONTINUED | OUTPATIENT
Start: 2024-06-19 | End: 2024-06-19

## 2024-06-19 RX ORDER — PREGABALIN 100 MG/1
150 CAPSULE ORAL 3 TIMES DAILY
Status: ON HOLD | COMMUNITY

## 2024-06-19 RX ORDER — AMOXICILLIN 250 MG
2 CAPSULE ORAL 2 TIMES DAILY PRN
Status: DISCONTINUED | OUTPATIENT
Start: 2024-06-19 | End: 2024-06-21 | Stop reason: HOSPADM

## 2024-06-19 RX ORDER — NITROGLYCERIN 0.4 MG/1
0.4 TABLET SUBLINGUAL
Status: DISCONTINUED | OUTPATIENT
Start: 2024-06-19 | End: 2024-06-21 | Stop reason: HOSPADM

## 2024-06-19 RX ADMIN — PANTOPRAZOLE SODIUM 40 MG: 40 TABLET, DELAYED RELEASE ORAL at 20:25

## 2024-06-19 RX ADMIN — HYDROCODONE BITARTRATE AND ACETAMINOPHEN 1 TABLET: 7.5; 325 TABLET ORAL at 20:33

## 2024-06-19 RX ADMIN — ATENOLOL 50 MG: 50 TABLET ORAL at 20:33

## 2024-06-19 RX ADMIN — PANTOPRAZOLE SODIUM 40 MG: 40 TABLET, DELAYED RELEASE ORAL at 12:28

## 2024-06-19 RX ADMIN — CLOPIDOGREL BISULFATE 75 MG: 75 TABLET, FILM COATED ORAL at 12:27

## 2024-06-19 RX ADMIN — CEFTRIAXONE 2000 MG: 2 INJECTION, POWDER, FOR SOLUTION INTRAMUSCULAR; INTRAVENOUS at 04:48

## 2024-06-19 RX ADMIN — LISINOPRIL 40 MG: 40 TABLET ORAL at 12:26

## 2024-06-19 RX ADMIN — PREGABALIN 150 MG: 75 CAPSULE ORAL at 20:26

## 2024-06-19 RX ADMIN — ATORVASTATIN CALCIUM 40 MG: 20 TABLET, FILM COATED ORAL at 20:25

## 2024-06-19 RX ADMIN — ASPIRIN 81 MG: 81 TABLET, COATED ORAL at 12:26

## 2024-06-19 RX ADMIN — Medication 10 ML: at 08:23

## 2024-06-19 RX ADMIN — HYDROCODONE BITARTRATE AND ACETAMINOPHEN 1 TABLET: 7.5; 325 TABLET ORAL at 12:31

## 2024-06-19 RX ADMIN — SODIUM CHLORIDE 100 ML/HR: 9 INJECTION, SOLUTION INTRAVENOUS at 08:20

## 2024-06-19 RX ADMIN — ACETAMINOPHEN 1000 MG: 500 TABLET ORAL at 03:49

## 2024-06-19 RX ADMIN — PREGABALIN 150 MG: 75 CAPSULE ORAL at 17:21

## 2024-06-19 RX ADMIN — Medication 10 ML: at 08:24

## 2024-06-19 RX ADMIN — LISINOPRIL 40 MG: 40 TABLET ORAL at 20:25

## 2024-06-19 RX ADMIN — LUBIPROSTONE 8 MCG: 8 CAPSULE, GELATIN COATED ORAL at 20:25

## 2024-06-19 RX ADMIN — ATENOLOL 50 MG: 50 TABLET ORAL at 12:27

## 2024-06-19 RX ADMIN — SUCRALFATE 1 G: 1 TABLET ORAL at 20:24

## 2024-06-19 RX ADMIN — LUBIPROSTONE 8 MCG: 8 CAPSULE, GELATIN COATED ORAL at 12:27

## 2024-06-19 RX ADMIN — SODIUM CHLORIDE, POTASSIUM CHLORIDE, SODIUM LACTATE AND CALCIUM CHLORIDE 100 ML/HR: 600; 310; 30; 20 INJECTION, SOLUTION INTRAVENOUS at 12:25

## 2024-06-19 RX ADMIN — AMLODIPINE BESYLATE 5 MG: 5 TABLET ORAL at 12:27

## 2024-06-19 RX ADMIN — Medication 10 ML: at 20:26

## 2024-06-19 NOTE — CONSULTS
[facility]      Patient Identification:  NAME:  Slade Martinez  Age:  73 y.o.   Sex:  male   :  1950   MRN:  [id number]     Chief complaint: Worsening tremors    History of present illness:      Slade Martinez is a 73 y.o. male with a history of BPH with LUTS, urinary retention, and epididymo-orchitis who presented to the ER on 24 with complaints of worsening tremors x 1 day. Patient has a prior history of a prior stroke. Pt diagnosed and admitted to the hospital with acute UTI and sepsis. Patient was recently treated for a UTI with Macrobid but still with reports of dysuria and frequency. Urology was consulted for evaluation and treatment of urinary retention.  Pt reports fever and chills on admission. Denies nausea or vomiting. Patient currently sees Dr. Ferreira with [facility]. Was seen on 24 for a cystoscopy which showed bladder trabeculation but no visible prostatic enlargement. Wife reports they were advised to discontinue Flomax by cardiology because patient developed an arrhythmia. Was given Uribel. Patient recently diagnosed with shingles taking Lyrica and pain medication for management. Bladder scan results showed between 212 cc and 269 cc in bladder. Patient has been able to spontaneously urinate but small volumes each time. Recently required indwelling catheter for urinary retention but was able to pass voiding trial after cystoscopy.     In hospital:  -Febrile 100.6, currently 98.3, VSS, good UOP  -WBC - 24.74  -Creat - 1.0  -UA - Large LE, negative nitrites, TNTC WBC, 4+ bacteria  -UCx - In pocess  -Blood culture - In process    Asked to see    Past medical history:  Past Medical History:   Diagnosis Date    Anxiety     Atherosclerosis of native arteries of extremities with intermittent claudication, bilateral legs 2020    PVD    Bilateral carotid artery stenosis 2020    CAD (coronary artery disease)     unspecified    Carotid artery disease      Cholelithiasis     Constipation     COPD (chronic obstructive pulmonary disease)     Coronary atherosclerosis 11/04/2019    H/O CABG SVG to first diagonal branch and to the LAD as well as SVG to the lateral marginal branch of the circumflex complicated by right sided subcortical infarct with left sided hemiparesis    Degenerative disc disease, lumbar 11/24/2021    Dyslipidemia 11/04/2019    Essential hypertension 11/04/2019    GERD (gastroesophageal reflux disease)     Hyperlipidemia     Hypertension     Injury of back     fractured vertebra 2/26/23    Ischemic heart disease     Peripheral edema     PVD (peripheral vascular disease) with claudication     latanya legs    Stroke 2011    Unilateral osteoarthritis of hip 11/24/2021    Vertigo        Past surgical history:  Past Surgical History:   Procedure Laterality Date    ANGIOPLASTY      x2    CARDIAC CATHETERIZATION N/A 05/06/2024    Procedure: Left Heart Cath;  Surgeon: Gurwinder Hurd MD;  Location:  [facility];  Service: Cardiovascular;  Laterality: N/A;    CARDIAC CATHETERIZATION N/A 05/06/2024    Procedure: Coronary angiography;  Surgeon: Gurwinder Hurd MD;  Location: [facility] CATH [facility] LOC[facility];  Service: Cardiovascular;  Laterality: N/A;    CARDIAC CATHETERIZATION N/A 05/06/2024    Procedure: Percutaneous Coronary Intervention;  Surgeon: Gurwinder Hurd MD;  Location:  [facility];  Service: Cardiovascular;  Laterality: N/A;    CARDIAC CATHETERIZATION N/A 05/06/2024    Procedure: Stent TC coronary;  Surgeon: Gurwinder Hurd MD;  Location: [facility] CATH [facility]VASIVE LOC[facility];  Service: Cardiovascular;  Laterality: N/A;    CARDIAC CATHETERIZATION  05/06/2024    Procedure: Saphenous Vein Graft;  Surgeon: Gurwinder Hurd MD;  Location: [facility] CATH [facility] LOC[facility];  Service: Cardiovascular;;    CAROTID ENDARTERECTOMY  2000    Left    CAROTID ENDARTERECTOMY Left 06/26/2001    CHOLECYSTECTOMY       COLONOSCOPY  2010    COLONOSCOPY  2012    CORONARY ARTERY BYPASS GRAFT  08/2011    CORONARY STENT PLACEMENT      X17 stents    CORONARY STENT PLACEMENT      x5    INTERVENTIONAL RADIOLOGY PROCEDURE N/A 05/06/2024    Procedure: Intravascular Ultrasound;  Surgeon: Gurwinder Hurd MD;  Location: [facility] INVASIVE LOCATION;  Service: Cardiovascular;  Laterality: N/A;       Allergies:  Pletal [cilostazol]    Home medications:  Medications Prior to Admission   Medication Sig Dispense Refill Last Dose    amLODIPine (NORVASC) 5 MG tablet Take 1 tablet by mouth Daily. 30 tablet 3 6/18/2024    aspirin 81 MG tablet Take 1 tablet by mouth Daily.   6/18/2024    atenolol (TENORMIN) 50 MG tablet Take 1 tablet by mouth 2 (Two) Times a Day.   6/18/2024    atorvastatin (LIPITOR) 40 MG tablet Take 1 tablet by mouth Every Night.   6/18/2024    Cholecalciferol (Vitamin D) 50 MCG (2000 UT) tablet Take 1 tablet by mouth Daily.   6/18/2024    clopidogrel (PLAVIX) 75 MG tablet Take 1 tablet by mouth Daily.   6/18/2024    Cyanocobalamin (VITAMIN B12 PO) Take  by mouth Take As Directed.   6/18/2024    HYDROcodone-acetaminophen (NORCO) 7.5-325 MG per tablet Take 1 tablet by mouth Every 8 (Eight) Hours As Needed for Moderate Pain.   6/18/2024    lisinopril (PRINIVIL,ZESTRIL) 40 MG tablet Take 1 tablet by mouth 2 (Two) Times a Day.   6/18/2024    loratadine (CLARITIN) 10 MG tablet Take 1 tablet by mouth Daily.   6/18/2024    meclizine (ANTIVERT) 12.5 MG tablet Take 1 tablet by mouth Daily.   6/18/2024    Meth-Hyo-M Bl-Na Phos-Ph Sal (Uro-MP) 118 MG capsule Take 1 capsule by mouth Daily.   6/18/2024 at 1100    Multiple Vitamins-Minerals (MULTIVITAMIN ADULT EXTRA C PO) Take 1 tablet by mouth Daily.   6/18/2024    pantoprazole (PROTONIX) 40 MG EC tablet Take 1 tablet by mouth 2 (Two) Times a Day.   6/18/2024    Potassium Chloride Arpita ER (KLOR-CON M20 PO) Take 20 mEq by mouth Take As Directed.   6/18/2024    pregabalin (LYRICA) 100 MG  capsule Take 150 mg by mouth 3 (Three) Times a Day.   6/18/2024    saccharomyces boulardii (FLORASTOR) 250 MG capsule Take 1 capsule by mouth Every Night.   6/18/2024    sucralfate (CARAFATE) 1 g tablet Take 1 tablet by mouth every night at bedtime.   6/18/2024    triamcinolone (KENALOG) 0.1 % cream Apply 1 Application topically to the appropriate area as directed 2 (Two) Times a Day As Needed for Rash.   Past Month    hydroCHLOROthiazide 25 MG tablet Take 1 tablet by mouth Daily As Needed (swelling).   6/3/2024    Linzess 145 MCG capsule capsule Take 1 capsule by mouth Every Other Day.   6/17/2024    nitroglycerin (NITROSTAT) 0.4 MG SL tablet Place 1 tablet under the tongue Every 5 (Five) Minutes As Needed for Chest Pain. Take no more than 3 doses in 15 minutes. 25 tablet 3 Unknown    potassium chloride 10 MEQ CR tablet Take 2 tablets by mouth Daily.           Hospital medications:  amLODIPine, 5 mg, Oral, Q24H  aspirin, 81 mg, Oral, Daily  atenolol, 50 mg, Oral, BID  atorvastatin, 40 mg, Oral, Nightly  [START ON 6/20/2024] cefTRIAXone, 2,000 mg, Intravenous, Q24H  clopidogrel, 75 mg, Oral, Daily  lisinopril, 40 mg, Oral, BID  lubiprostone, 8 mcg, Oral, BID  pantoprazole, 40 mg, Oral, BID  pregabalin, 150 mg, Oral, TID  sodium chloride, 10 mL, Intravenous, Q12H  sucralfate, 1 g, Oral, Nightly      lactated ringers, 100 mL/hr        senna-docusate sodium **AND** polyethylene glycol **AND** bisacodyl **AND** bisacodyl    calcium carbonate    HYDROcodone-acetaminophen    nitroglycerin    ondansetron ODT **OR** ondansetron    sodium chloride    sodium chloride    Family history:  Family History   Problem Relation Age of Onset    COPD Mother     Heart attack Father     Heart failure Father     COPD Brother        Social history:  Social History     Tobacco Use    Smoking status: Former     Types: Cigarettes    Smokeless tobacco: Never    Tobacco comments:     Patient used to smoke 3 packs per day and quit 22 years ago.    Vaping Use    Vaping status: Never Used   Substance Use Topics    Alcohol use: Not Currently     Comment: Patient is non drinker.    Drug use: Never     Comment: Drug Abuse: no drug abuse       Review of systems:      12 point negative except as in HPI    Objective:  TMax 24 hours:   Temp (24hrs), Av.6 °F (38.1 °C), Min:100.3 °F (37.9 °C), Max:101 °F (38.3 °C)      Vitals Ranges:   Temp:  [100.3 °F (37.9 °C)-101 °F (38.3 °C)] 100.6 °F (38.1 °C)  Heart Rate:  [80-97] 91  Resp:  [16-18] 16  BP: (136-152)/(67-71) 136/70    Intake/Output Last 3 shifts:  I/O last 3 completed shifts:  In: 100 [IV Piggyback:100]  Out: -      Physical Exam:    General Appearance:    Alert, NAD   Back:     No CVA tenderness   Lungs:     Respirations unlabored, no wheezing   Abdomen:     Soft, NDNT, no masses, no guarding   :    Bladder non-palpable, non-tender   Neuro/Psych:   Orientation intact, mood/affect pleasant       Results review:   I reviewed the patient's new clinical results.    Data review:  Lab Results (last 24 hours)       Procedure Component Value Units Date/Time    Basic Metabolic Panel [[id number]]  (Abnormal) Collected: 24 101    Specimen: Blood Updated: 24 1051     Glucose 129 mg/dL      BUN 17 mg/dL      Creatinine 1.00 mg/dL      Sodium 131 mmol/L      Potassium 4.4 mmol/L      Chloride 97 mmol/L      CO2 22.3 mmol/L      Calcium 8.5 mg/dL      BUN/Creatinine Ratio 17.0     Anion Gap 11.7 mmol/L      eGFR 79.0 mL/min/1.73     Narrative:      GFR Normal >60  Chronic Kidney Disease <60  Kidney Failure <15    The GFR formula is only valid for adults with stable renal function between ages 18 and 70.    STAT Lactic Acid, Reflex [[id number]]  (Normal) Collected: 24 1014    Specimen: Blood Updated: 24 1049     Lactate 1.4 mmol/L     CBC (No Diff) [[id number]]  (Abnormal) Collected: 24 101    Specimen: Blood Updated: 24 1028     WBC 24.74 10*3/mm3      RBC 3.91 10*6/mm3       Hemoglobin 11.9 g/dL      Hematocrit 36.5 %      MCV 93.4 fL      MCH 30.4 pg      MCHC 32.6 g/dL      RDW 14.7 %      RDW-SD 49.9 fl      MPV 9.0 fL      Platelets 263 10*3/mm3     Urine Culture - Urine, Urine, Clean Catch [[id number]] Collected: 06/19/24 0329    Specimen: Urine, Clean Catch Updated: 06/19/24 0714    Respiratory Panel PCR w/COVID-19(SARS-CoV-2) [facility] In-House, NP Swab in UTM/VTM, 2 HR TAT - Swab, Nasopharynx [[id number]]  (Normal) Collected: 06/19/24 0350    Specimen: Swab from Nasopharynx Updated: 06/19/24 0506     ADENOVIRUS, PCR Not Detected     Coronavirus 229E Not Detected     Coronavirus HKU1 Not Detected     Coronavirus NL63 Not Detected     Coronavirus OC43 Not Detected     COVID19 Not Detected     Human Metapneumovirus Not Detected     Human Rhinovirus/Enterovirus Not Detected     Influenza A PCR Not Detected     Influenza B PCR Not Detected     Parainfluenza Virus 1 Not Detected     Parainfluenza Virus 2 Not Detected     Parainfluenza Virus 3 Not Detected     Parainfluenza Virus 4 Not Detected     RSV, PCR Not Detected     Bordetella pertussis pcr Not Detected     Bordetella parapertussis PCR Not Detected     Chlamydophila pneumoniae PCR Not Detected     Mycoplasma pneumo by PCR Not Detected    Narrative:      In the setting of a positive respiratory panel with a viral infection PLUS a negative procalcitonin without other underlying concern for bacterial infection, consider observing off antibiotics or discontinuation of antibiotics and continue supportive care. If the respiratory panel is positive for atypical bacterial infection (Bordetella pertussis, Chlamydophila pneumoniae, or Mycoplasma pneumoniae), consider antibiotic de-escalation to target atypical bacterial infection.    Lactic Acid, Plasma [[id number]]  (Abnormal) Collected: 06/19/24 0400    Specimen: Blood Updated: 06/19/24 0441     Lactate 2.2 mmol/L     Procalcitonin [[id number]]  (Abnormal) Collected: 06/19/24  "0400    Specimen: Blood Updated: 06/19/24 0439     Procalcitonin 0.68 ng/mL     Narrative:      As a Marker for Sepsis (Non-Neonates):    1. <0.5 ng/mL represents a low risk of severe sepsis and/or septic shock.  2. >2 ng/mL represents a high risk of severe sepsis and/or septic shock.    As a Marker for Lower Respiratory Tract Infections that require antibiotic therapy:    PCT on Admission    Antibiotic Therapy       6-12 Hrs later    >0.5                Strongly Recommended  >0.25 - <0.5        Recommended   0.1 - 0.25          Discouraged              Remeasure/reassess PCT  <0.1                Strongly Discouraged     Remeasure/reassess PCT    As 28 day mortality risk marker: \"Change in Procalcitonin Result\" (>80% or <=80%) if Day 0 (or Day 1) and Day 4 values are available. Refer to http://www.paraBebes.compct-calculator.com    Change in PCT <=80%  A decrease of PCT levels below or equal to 80% defines a positive change in PCT test result representing a higher risk for 28-day all-cause mortality of patients diagnosed with severe sepsis for septic shock.    Change in PCT >80%  A decrease of PCT levels of more than 80% defines a negative change in PCT result representing a lower risk for 28-day all-cause mortality of patients diagnosed with severe sepsis or septic shock.       Comprehensive Metabolic Panel [[id number]]  (Abnormal) Collected: 06/19/24 0400    Specimen: Blood Updated: 06/19/24 0434     Glucose 123 mg/dL      BUN 14 mg/dL      Creatinine 0.98 mg/dL      Sodium 131 mmol/L      Potassium 4.6 mmol/L      Chloride 94 mmol/L      CO2 26.8 mmol/L      Calcium 9.1 mg/dL      Total Protein 6.8 g/dL      Albumin 3.8 g/dL      ALT (SGPT) 22 U/L      AST (SGOT) 12 U/L      Alkaline Phosphatase 87 U/L      Total Bilirubin 1.2 mg/dL      Globulin 3.0 gm/dL      A/G Ratio 1.3 g/dL      BUN/Creatinine Ratio 14.3     Anion Gap 10.2 mmol/L      eGFR 80.9 mL/min/1.73     Narrative:      GFR Normal >60  Chronic Kidney Disease " <60  Kidney Failure <15    The GFR formula is only valid for adults with stable renal function between ages 18 and 70.    Blood Culture - Blood, Arm, Right [[id number]] Collected: 06/19/24 0416    Specimen: Blood from Arm, Right Updated: 06/19/24 0422    CBC & Differential [[id number]]  (Abnormal) Collected: 06/19/24 0400    Specimen: Blood Updated: 06/19/24 0419    Narrative:      The following orders were created for panel order CBC & Differential.  Procedure                               Abnormality         Status                     ---------                               -----------         ------                     CBC Auto Differential[[id number]]        Abnormal            Final result                 Please view results for these tests on the individual orders.    CBC Auto Differential [[id number]]  (Abnormal) Collected: 06/19/24 0400    Specimen: Blood Updated: 06/19/24 0419     WBC 22.81 10*3/mm3      RBC 4.15 10*6/mm3      Hemoglobin 12.8 g/dL      Hematocrit 38.9 %      MCV 93.7 fL      MCH 30.8 pg      MCHC 32.9 g/dL      RDW 14.2 %      RDW-SD 48.1 fl      MPV 8.9 fL      Platelets 313 10*3/mm3      Neutrophil % 71.1 %      Lymphocyte % 9.3 %      Monocyte % 18.7 %      Eosinophil % 0.1 %      Basophil % 0.2 %      Immature Grans % 0.6 %      Neutrophils, Absolute 16.22 10*3/mm3      Lymphocytes, Absolute 2.12 10*3/mm3      Monocytes, Absolute 4.26 10*3/mm3      Eosinophils, Absolute 0.03 10*3/mm3      Basophils, Absolute 0.04 10*3/mm3      Immature Grans, Absolute 0.14 10*3/mm3      nRBC 0.0 /100 WBC     Blood Culture - Blood, Arm, Left [[id number]] Collected: 06/19/24 0401    Specimen: Blood from Arm, Left Updated: 06/19/24 0407    Urinalysis With Microscopic If Indicated (No Culture) - Urine, Clean Catch [[id number]]  (Abnormal) Collected: 06/19/24 0329    Specimen: Urine, Clean Catch Updated: 06/19/24 0357     Color, UA Dark Yellow     Appearance, UA Turbid     pH, UA 8.5     Specific Gravity, UA 1.015      Glucose, UA Negative     Ketones, UA Trace     Bilirubin, UA Negative     Blood, UA Trace     Protein, UA 30 mg/dL (1+)     Leuk Esterase, UA Large (3+)     Nitrite, UA Negative     Urobilinogen, UA 1.0 E.U./dL    Urinalysis, Microscopic Only - Urine, Clean Catch [[id number]]  (Abnormal) Collected: 06/19/24 0329    Specimen: Urine, Clean Catch Updated: 06/19/24 0355     RBC, UA 0-2 /HPF      WBC, UA Too Numerous to Count /HPF      Bacteria, UA 4+ /HPF      Squamous Epithelial Cells, UA 0-2 /HPF      Hyaline Casts, UA 0-2 /LPF      Methodology Automated Microscopy             Imaging:  Imaging Results (Last 24 Hours)       Procedure Component Value Units Date/Time    XR Chest 1 View [[id number]] Collected: 06/19/24 0556     Updated: 06/19/24 0556    Narrative:        Patient: TOIS GUZMAN  Time Out: 05:56  Exam(s): XR CXR 1 VIEW     EXAM:    XR Chest, 1 View    CLINICAL HISTORY:       Fever    TECHNIQUE:    Frontal view of the chest.    COMPARISON:    Chest radiograph 5 10 2024    FINDINGS:    Lungs:  Bilateral airspace opacities may represent pulmonary edema   and or atypical infection.    Pleural space:  Small bilateral pleural effusions.  No pneumothorax.    Heart:  Cardiomegaly.    Mediastinum:  Unremarkable.  Normal mediastinal contour.    Bones joints:  Degenerative changes of the spine are noted. No acute   fracture.    IMPRESSION:       1.  Bilateral airspace opacities may represent pulmonary edema and or   atypical infection.  2.  Cardiomegaly.  3.  Small bilateral pleural effusions.      Impression:          Electronically signed by Leila Cronin MD on 06-19-24 at 0556             Assessment:     Urinary tract infection  Urinary retention    Plan:   - No acute urologic surgical intervention recommended.  - Urine and blood cultures pending. Continue IV Rocephin-recommend tailoring antibiotic selection based on results of culture data  - Continue bladder scans  - Patient and wife would prefer insertion of  an indwelling deng catheter vs intermittent catheterization if patient having elevated bladder scan volumes. If bladder volumes consistently < 400 cc and with no complaints, no intervention needed  - Keep previously scheduled follow-up with Dr. Ferreira on 6/27/24  - Urology will sign-off. Call for any questions, concerns or clinical change    Plan reviewed and discussed with Dr. Gallo

## 2024-06-19 NOTE — ED NOTES
Pt to ED c/o progressively worsening tremors that started last night. Per pt wife, pt has had tremors d/t a previous stroke and after getting four stents in May but last night they have been getting worse. Pt has had a fever but denies cough, nausea, vomiting, or diarrhea. No cp or SOA either. Pt actively has shingles right now.

## 2024-06-19 NOTE — H&P
Patient Identification:  Name: Slade Martinez  Age: 73 y.o.  Sex: male  :  1950  MRN: [id number]         Primary Care Physician: Triny Hannon MD    Chief Complaint   Patient presents with    Shaking    Herpes Zoster    Chest Pain     Subjective   Patient is a 73 y.o. male was in the hospital early May and underwent PCI. He has had problems with urinary retention since. He was recently treated for UTI just completed antibiotics (Macrobid) and came in for fevers and chills that started last night. He has had decreased appetite. No chest pain. He is also had shingles and the rash is healing however he has had some problems with neuropathic pain and Lyrica was recently increased by pain management. In the ED he was given ceftriaxone and IVF. He is feeling much improved. So far urination this morning has improved.    Past Medical History:   Diagnosis Date    Anxiety     Atherosclerosis of native arteries of extremities with intermittent claudication, bilateral legs 2020    PVD    Bilateral carotid artery stenosis 2020    CAD (coronary artery disease)     unspecified    Carotid artery disease     Cholelithiasis     Constipation     COPD (chronic obstructive pulmonary disease)     Coronary atherosclerosis 2019    H/O CABG SVG to first diagonal branch and to the LAD as well as SVG to the lateral marginal branch of the circumflex complicated by right sided subcortical infarct with left sided hemiparesis    Degenerative disc disease, lumbar 2021    Dyslipidemia 2019    Essential hypertension 2019    GERD (gastroesophageal reflux disease)     Hyperlipidemia     Hypertension     Injury of back     fractured vertebra 23    Ischemic heart disease     Peripheral edema     PVD (peripheral vascular disease) with claudication     latanya legs    Stroke     Unilateral osteoarthritis of hip 2021    Vertigo      Past Surgical History:   Procedure Laterality Date     ANGIOPLASTY      x2    CARDIAC CATHETERIZATION N/A 05/06/2024    Procedure: Left Heart Cath;  Surgeon: Gurwinder Hurd MD;  [facility];  Service: Cardiovascular;  Laterality: N/A;    CARDIAC CATHETERIZATION N/A 05/06/2024    Procedure: Coronary angiography;  Surgeon: Gurwinder Hurd MD;  Location:  [facility];  Service: Cardiovascular;  Laterality: N/A;    CARDIAC CATHETERIZATION N/A 05/06/2024    Procedure: Percutaneous Coronary Intervention;  Surgeon: Gurwinder Hurd MD;  [facility];  Service: Cardiovascular;  Laterality: N/A;    CARDIAC CATHETERIZATION N/A 05/06/2024    Procedure: Stent TC coronary;  Surgeon: Gurwinder Hurd MD;  [facility];  Service: Cardiovascular;  Laterality: N/A;    CARDIAC CATHETERIZATION  05/06/2024    Procedure: Saphenous Vein Graft;  Surgeon: Gurwinder Hurd MD;  [facility];  Service: Cardiovascular;;    CAROTID ENDARTERECTOMY  2000    Left    CAROTID ENDARTERECTOMY Left 06/26/2001    CHOLECYSTECTOMY      COLONOSCOPY  2010    COLONOSCOPY  2012    CORONARY ARTERY BYPASS GRAFT  08/2011    CORONARY STENT PLACEMENT      X17 stents    CORONARY STENT PLACEMENT      x5    INTERVENTIONAL RADIOLOGY PROCEDURE N/A 05/06/2024    Procedure: Intravascular Ultrasound;  Surgeon: Gurwinder Hurd MD;  [facility];  Service: Cardiovascular;  Laterality: N/A;     Family History   Problem Relation Age of Onset    COPD Mother     Heart attack Father     Heart failure Father     COPD Brother      Social History     Tobacco Use    Smoking status: Former     Types: Cigarettes    Smokeless tobacco: Never    Tobacco comments:     Patient used to smoke 3 packs per day and quit 22 years ago.   Vaping Use    Vaping status: Never Used   Substance Use Topics    Alcohol use: Not Currently     Comment: Patient is non  drinker.    Drug use: Never     Comment: Drug Abuse: no drug abuse     Objective      Vital Signs  Temp:  [100.3 °F (37.9 °C)-101 °F (38.3 °C)] 100.6 °F (38.1 °C)  Heart Rate:  [80-97] 91  Resp:  [16-18] 16  BP: (136-152)/(67-71) 136/70  Body mass index is 26.5 kg/m².    Physical Exam  Constitutional:       General: He is not in acute distress.     Appearance: He is not toxic-appearing.   HENT:      Head: Normocephalic and atraumatic.   Cardiovascular:      Rate and Rhythm: Normal rate and regular rhythm.   Pulmonary:      Effort: Pulmonary effort is normal. No respiratory distress.      Breath sounds: Normal breath sounds. No wheezing or rhonchi.   Abdominal:      General: Bowel sounds are normal.      Palpations: Abdomen is soft.      Tenderness: There is no abdominal tenderness. There is no guarding or rebound.   Musculoskeletal:         General: No swelling.      Right lower leg: No edema.      Left lower leg: No edema.   Skin:     General: Skin is warm and dry.      Findings: Rash (right upper chest dermatomal distribution healing) present.   Neurological:      General: No focal deficit present.      Mental Status: He is alert and oriented to person, place, and time.   Psychiatric:         Mood and Affect: Mood normal.         Behavior: Behavior normal.     Echo from April 2024:      Lab Results   Component Value Date    ANIONGAP 11.7 06/19/2024     Estimated Creatinine Clearance: 79 mL/min (by C-G formula based on SCr of 1 mg/dL).    Assessment & Plan   Active Hospital Problems    Diagnosis  POA    **Sepsis [A41.9]  Unknown    Acute UTI [N39.0]  Yes    Shingles [B02.9]  Yes    Peripheral artery disease [I73.9]  Yes    S/P CABG (coronary artery bypass graft) [Z95.1]  Not Applicable    Coronary atherosclerosis [I25.10]  Yes    Essential hypertension [I10]  Yes    Dyslipidemia [E78.5]  Yes      Resolved Hospital Problems   No resolved problems to display.     73 y.o. male presented with fevers and chills. Had PCI  last month and has problems with urinary retention and shingles since    UTI  Sepsis  Urinary retention  Procalcitonin, WBC, and lactate elevated.   Continue fluids and IV antibiotics  He is feeling improved this morning  Await cultures (blood and urine in process)  Chest x-ray shows edema or atypical infection. Antibiotics above would cover pneumonia. Respiratory panel is negative. He is currently supine without shortness of breath will monitor closely on IVF  Will consult his urologist    CAD h/o CABG  Recent PCI  Continue medications and inform his cardiologist he is here.    Shingles  Improving rash  Has been seeing outpatient pain management and has received steroid injections  Lyrica recently increased outpatient will continue. Patient believes that is helping    Peripheral arterial disease  Hypertension control acceptable acutely  Hyperlipidemia statin    Discussed with patient, family, and nursing staff.    John Isaac MD  [facility]  06/19/24

## 2024-06-19 NOTE — CONSULTS
[facility]        Patient Name: Slade Martinez  Age/Sex: 73 y.o. male  : 1950  MRN: [id number]    Date of Admission: 2024  Date of Encounter Visit: 24  Encounter Provider: SHERRILL Gonzalez  Referring Provider: Ed Jaramillo MD  Place of Service: [facility] CARDIOLOGY  Patient Care Team:  Triny Hannon MD as PCP - General (Internal Medicine)  James Hubbard MD as Consulting Physician (Cardiology)    Subjective:     Chief Complaint: Fever and chills    Reason for consult: Recent PCI    History of Present Illness:  Slade Martinez is a 73 y.o. male who follows Dr. Locke in our office. Past medical history includes coronary artery disease (history of CABG in ).  He was formerly followed by Dr. Hubbard.  In early 2023, he presented with palpitations and chest pressure.  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     On 23, he presented with abdominal and chest discomfort along with dizziness.  EP was asked to evaluate and provide an opinion on continuing the amiodarone.  Ultimately, the amiodarone was discontinued.  He was advised to follow-up in 6 weeks to discuss possible ablation.  His troponins remained negative but the chest pain persisted.  Ultimately, he was taken for cardiac catheterization on . Cardiac cath demonstrated a severely calcified 90% proximal left main, severely calcified 70% proximal mid LAD, 90% in-stent restenosis of the mid segment, and a chronic total occlusion of the in-stent ostial circumflex.  He underwent complex PCI of the left main to proximal LAD with PCI of the mid LAD.  This was complicated by balloon rupture and perforation in the proximal to mid LAD.  Fortunately, he did well and was discharged on .     The following day he presented back to the ED with recurrent chest pain and a right-sided chest wall rash.  He was hypertensive on  arrival and his troponins remained quite elevated.  Ultimately he was diagnosed with shingles and started on acyclovir.  The troponin continued to downtrend.  He underwent limited echocardiogram which was stable.  On 5/13, he was discharged.      He presented to ER 6/19/24 with complaints of fever and chills that began yesterday. He also continued to complain of burning, constant chest pain at the site of his shingles. He takes Lyrica and Norco for this with some relief. He also complained of dysuria despite recent completion of Macrobid. WBC, Lactate and procalcitonin elevated on admit. Urology has been consulted. Patient was admitted with sepsis.  We have been asked to be seen for cardiac management post PCI.     At time of my exam, he is resting comfortably in bed with his wife at bedside. He continues to complain of shingles pain, but states this is considerably different from his chest pain prior to his recent PCI. He denies SOA, edema, palpitations, dizziness, or syncope. Fever and chills have improved. Blood and urine cultures pending. He is receiving IVF per sepsis protocol.    Prior Cardiac Testing:  Stress test 9/17/20  Findings consistent with an abnormal ECG stress test.  Diaphragmatic attenuation artifact is present.  Rotating images demonstrate dilatation of both the left and right ventricular cavities.  Tomographic images were obtained in short axis, vertical long and horizontal long axis views.  On stress images, there are diminished counts noted in the inferior wall and to a lesser degree in the anterior wall  On rest images, the inferior and anterior wall defects are fixed.  There are no areas of significant reversibility.  SSS is 1 and SDS 1  SPECT gated studies demonstrate normal wall motion with ejection fraction of 62%    2.  Cardiac Cath 5/6/24  Left main: Large-caliber vessel with severe calcification and an ulcerated 95% ostial proximal stenosis.  Bifurcates in LAD and circumflex.  LAD:  Medium caliber vessel with previously placed stent in the midsegment.  Diffuse, severely calcified 70% proximal to mid vessel stenosis.  There is a discrete 90% in-stent restenosis.  Left circumflex: Previously stented in the ostial proximal segment.  Chronic total occlusion at the ostium.  Medium caliber OM1 branch gives flow from the SVG to OM1 graft.  RCA: Large-caliber, dominant vessel gives rise to a small caliber PDA, small caliber RPL 1 and medium caliber RPL 2 branch.  Previously placed stent in the mid RCA with a diffuse 40% stenosis.  SVG-OM1: Normal  SVG-diagonal: Normal    Past Medical History:  Past Medical History:   Diagnosis Date    Anxiety     Atherosclerosis of native arteries of extremities with intermittent claudication, bilateral legs 03/24/2020    PVD    Bilateral carotid artery stenosis 03/24/2020    CAD (coronary artery disease)     unspecified    Carotid artery disease     Cholelithiasis     Constipation     COPD (chronic obstructive pulmonary disease)     Coronary atherosclerosis 11/04/2019    H/O CABG SVG to first diagonal branch and to the LAD as well as SVG to the lateral marginal branch of the circumflex complicated by right sided subcortical infarct with left sided hemiparesis    Degenerative disc disease, lumbar 11/24/2021    Dyslipidemia 11/04/2019    Essential hypertension 11/04/2019    GERD (gastroesophageal reflux disease)     Hyperlipidemia     Hypertension     Injury of back     fractured vertebra 2/26/23    Ischemic heart disease     Peripheral edema     PVD (peripheral vascular disease) with claudication     latanya legs    Stroke 2011    Unilateral osteoarthritis of hip 11/24/2021    Vertigo        Past Surgical History:   Procedure Laterality Date    ANGIOPLASTY      x2    CARDIAC CATHETERIZATION N/A 05/06/2024    Procedure: Left Heart Cath;  Surgeon: Gurwinder Hurd MD;  Location: [facility] CATH INVASIVE LOC[facility];  Service: Cardiovascular;  Laterality: N/A;    CARDIAC  CATHETERIZATION N/A 05/06/2024    Procedure: Coronary angiography;  Surgeon: Gurwinder Hurd MD;  [facility];  Service: Cardiovascular;  Laterality: N/A;    CARDIAC CATHETERIZATION N/A 05/06/2024    Procedure: Percutaneous Coronary Intervention;  Surgeon: Gurwinder Hurd MD;  [facility];  Service: Cardiovascular;  Laterality: N/A;    CARDIAC CATHETERIZATION N/A 05/06/2024    Procedure: Stent TC coronary;  Surgeon: Gurwinder Hurd MD;  [facility];  Service: Cardiovascular;  Laterality: N/A;    CARDIAC CATHETERIZATION  05/06/2024    Procedure: Saphenous Vein Graft;  Surgeon: Gurwinder Hurd MD;  [facility];  Service: Cardiovascular;;    CAROTID ENDARTERECTOMY  2000    Left    CAROTID ENDARTERECTOMY Left 06/26/2001    CHOLECYSTECTOMY      COLONOSCOPY  2010    COLONOSCOPY  2012    CORONARY ARTERY BYPASS GRAFT  08/2011    CORONARY STENT PLACEMENT      X17 stents    CORONARY STENT PLACEMENT      x5    INTERVENTIONAL RADIOLOGY PROCEDURE N/A 05/06/2024    Procedure: Intravascular Ultrasound;  Surgeon: Gurwinder Hurd MD;  [facility];  Service: Cardiovascular;  Laterality: N/A;       Home Medications:   Medications Prior to Admission   Medication Sig Dispense Refill Last Dose    amLODIPine (NORVASC) 5 MG tablet Take 1 tablet by mouth Daily. 30 tablet 3 6/18/2024    aspirin 81 MG tablet Take 1 tablet by mouth Daily.   6/18/2024    atenolol (TENORMIN) 50 MG tablet Take 1 tablet by mouth 2 (Two) Times a Day.   6/18/2024    atorvastatin (LIPITOR) 40 MG tablet Take 1 tablet by mouth Every Night.   6/18/2024    Cholecalciferol (Vitamin D) 50 MCG (2000 UT) tablet Take 1 tablet by mouth Daily.   6/18/2024    clopidogrel (PLAVIX) 75 MG tablet Take 1 tablet by mouth Daily.   6/18/2024    Cyanocobalamin (VITAMIN B12 PO) Take  by mouth Take As Directed.    6/18/2024    HYDROcodone-acetaminophen (NORCO) 7.5-325 MG per tablet Take 1 tablet by mouth Every 8 (Eight) Hours As Needed for Moderate Pain.   6/18/2024    lisinopril (PRINIVIL,ZESTRIL) 40 MG tablet Take 1 tablet by mouth 2 (Two) Times a Day.   6/18/2024    loratadine (CLARITIN) 10 MG tablet Take 1 tablet by mouth Daily.   6/18/2024    meclizine (ANTIVERT) 12.5 MG tablet Take 1 tablet by mouth Daily.   6/18/2024    Meth-Hyo-M Bl-Na Phos-Ph Sal (Uro-MP) 118 MG capsule Take 1 capsule by mouth Daily.   6/18/2024 at 1100    Multiple Vitamins-Minerals (MULTIVITAMIN ADULT EXTRA C PO) Take 1 tablet by mouth Daily.   6/18/2024    pantoprazole (PROTONIX) 40 MG EC tablet Take 1 tablet by mouth 2 (Two) Times a Day.   6/18/2024    Potassium Chloride Arpita ER (KLOR-CON M20 PO) Take 20 mEq by mouth Take As Directed.   6/18/2024    pregabalin (LYRICA) 100 MG capsule Take 150 mg by mouth 3 (Three) Times a Day.   6/18/2024    saccharomyces boulardii (FLORASTOR) 250 MG capsule Take 1 capsule by mouth Every Night.   6/18/2024    sucralfate (CARAFATE) 1 g tablet Take 1 tablet by mouth every night at bedtime.   6/18/2024    triamcinolone (KENALOG) 0.1 % cream Apply 1 Application topically to the appropriate area as directed 2 (Two) Times a Day As Needed for Rash.   Past Month    hydroCHLOROthiazide 25 MG tablet Take 1 tablet by mouth Daily As Needed (swelling).   6/3/2024    Linzess 145 MCG capsule capsule Take 1 capsule by mouth Every Other Day.   6/17/2024    nitroglycerin (NITROSTAT) 0.4 MG SL tablet Place 1 tablet under the tongue Every 5 (Five) Minutes As Needed for Chest Pain. Take no more than 3 doses in 15 minutes. 25 tablet 3 Unknown    potassium chloride 10 MEQ CR tablet Take 2 tablets by mouth Daily.          Allergies:  Allergies   Allergen Reactions    Pletal [Cilostazol] Myalgia     .       Past Social History:  Social History     Socioeconomic History    Marital status:    Tobacco Use    Smoking status: Former      Types: Cigarettes    Smokeless tobacco: Never    Tobacco comments:     Patient used to smoke 3 packs per day and quit 22 years ago.   Vaping Use    Vaping status: Never Used   Substance and Sexual Activity    Alcohol use: Not Currently     Comment: Patient is non drinker.    Drug use: Never     Comment: Drug Abuse: no drug abuse    Sexual activity: Defer       Past Family History:  Family History   Problem Relation Age of Onset    COPD Mother     Heart attack Father     Heart failure Father     COPD Brother        Review of Systems   Constitutional: Positive for chills, fever and malaise/fatigue.   Cardiovascular:  Positive for chest pain. Negative for claudication, dyspnea on exertion, leg swelling, near-syncope, orthopnea, palpitations, paroxysmal nocturnal dyspnea and syncope.        Right chest and flank pain d/t shingles   Respiratory:  Negative for shortness of breath.    Neurological:  Negative for brief paralysis, dizziness, headaches and light-headedness.   All other systems reviewed and are negative.        Objective:   Temp:  [98.3 °F (36.8 °C)-101 °F (38.3 °C)] 98.3 °F (36.8 °C)  Heart Rate:  [68-97] 68  Resp:  [16-18] 18  BP: (136-152)/(67-76) 144/76     Intake/Output Summary (Last 24 hours) at 6/19/2024 1507  Last data filed at 6/19/2024 1319  Gross per 24 hour   Intake 100 ml   Output 850 ml   Net -750 ml     Body mass index is 26.5 kg/m².      06/19/24  0323   Weight: 86.2 kg (190 lb)     Weight change:     Vitals reviewed.   Constitutional:       General: Not in acute distress.     Appearance: Well-developed and not in distress. Not diaphoretic.   HENT:      Head: Normocephalic.   Pulmonary:      Effort: Pulmonary effort is normal. No respiratory distress.      Breath sounds: Normal breath sounds. No wheezing. No rhonchi. No rales.   Cardiovascular:      Normal rate. Regular rhythm.      Murmurs: There is no murmur.   Pulses:     Radial: 2+ bilaterally.  Edema:     Peripheral edema absent.   Skin:    "  General: Skin is warm and dry. There is no cyanosis.      Findings: No rash.   Neurological:      Mental Status: Alert and oriented to person, place, and time.   Psychiatric:         Behavior: Behavior normal. Behavior is cooperative.         Thought Content: Thought content normal.         Judgment: Judgment normal.           Lab Review:   Results from last 7 days   Lab Units 06/19/24  1014 06/19/24  0400   SODIUM mmol/L 131* 131*   POTASSIUM mmol/L 4.4 4.6   CHLORIDE mmol/L 97* 94*   CO2 mmol/L 22.3 26.8   BUN mg/dL 17 14   CREATININE mg/dL 1.00 0.98   GLUCOSE mg/dL 129* 123*   CALCIUM mg/dL 8.5* 9.1   AST (SGOT) U/L  --  12   ALT (SGPT) U/L  --  22         Results from last 7 days   Lab Units 06/19/24  1014 06/19/24  0400   WBC 10*3/mm3 24.74* 22.81*   HEMOGLOBIN g/dL 11.9* 12.8*   HEMATOCRIT % 36.5* 38.9   PLATELETS 10*3/mm3 263 313                   Invalid input(s): \"LDLCALC\"            Echo EF Estimated  Results for orders placed during the hospital encounter of 05/10/24    Adult Transthoracic Echo Limited W/ Cont if Necessary Per Protocol    Interpretation Summary    Limited echo to assess pericardial fluid.  Appears to be c/w organized blood surronding heart with no hemodynamic compromise.  I directly c/w prior images and appeared not significantly different      EKG:   I personally viewed and interpreted the patient's EKG    Imaging:  Imaging Results (Most Recent)       Procedure Component Value Units Date/Time    XR Chest 1 View [[id number]] Collected: 06/19/24 0556     Updated: 06/19/24 0556    Narrative:        Patient: OTIS GUZMAN  Time Out: 05:56  Exam(s): XR CXR 1 VIEW     EXAM:    XR Chest, 1 View    CLINICAL HISTORY:       Fever    TECHNIQUE:    Frontal view of the chest.    COMPARISON:    Chest radiograph 5 10 2024    FINDINGS:    Lungs:  Bilateral airspace opacities may represent pulmonary edema   and or atypical infection.    Pleural space:  Small bilateral pleural effusions.  No " pneumothorax.    Heart:  Cardiomegaly.    Mediastinum:  Unremarkable.  Normal mediastinal contour.    Bones joints:  Degenerative changes of the spine are noted. No acute   fracture.    IMPRESSION:       1.  Bilateral airspace opacities may represent pulmonary edema and or   atypical infection.  2.  Cardiomegaly.  3.  Small bilateral pleural effusions.      Impression:          Electronically signed by Leila Cronin MD on 06-19-24 at 0556                Assessment:     Active Hospital Problems    Diagnosis  POA    **Sepsis [A41.9]  Unknown    Acute UTI [N39.0]  Yes    Shingles [B02.9]  Yes    Peripheral artery disease [I73.9]  Yes    S/P CABG (coronary artery bypass graft) [Z95.1]  Not Applicable    Coronary atherosclerosis [I25.10]  Yes    Essential hypertension [I10]  Yes    Dyslipidemia [E78.5]  Yes      Resolved Hospital Problems   No resolved problems to display.          Plan:     Sepsis and Acute UTI: On IV antibiotics and IVF. Urology consult pending  Coronary artery disease: s/p CABG and recent PCI. On DAPT. Also on atorvastatin.  Shingles: Rash dry, but still with considerable neuropathic pain. On Lyrica and Norco  Hypertension: Reasonably well controlled on current regimen of amlodipine, atenolol, and lisinopril.  Dyslipidemia: On statin    Thank you for allowing me to participate in the care of Slade Martinez. Feel free to contact me directly with any further questions or concerns.         SHERRILL Gonzalez  [facility]   [facility]  [address] [place] [address]  Office: [phone]    06/19/24  15:07 EDT      EMR Dragon/Transcription disclaimer:   Parts of this note may be an electronic transcription/translation of spoken language to printed text using the Dragon dictation system

## 2024-06-19 NOTE — ED PROVIDER NOTES
EMERGENCY DEPARTMENT ENCOUNTER  Room Number:  12/12  Date of encounter:  6/19/2024  PCP: Triny Hannon MD  Patient Care Team:  Triny Hannon MD as PCP - General (Internal Medicine)  James Hubbard MD as Consulting Physician (Cardiology)     HPI:  Context: Slade Martinez is a 73 y.o. male who presents to the ED c/o chief complaint of fever with shakes and chills.  He had patient was diagnosed with shingles 6 weeks ago, off antivirals, no longer performing new lesions, lesions slowly healing.  Patient has continued to have constant chest pain at the site of the zosters outbreak, pain is constant, radiates around his ribs but no radiation to neck or extremities, is not exertional, no associated shortness of breath nausea vomiting or diaphoresis.  Patient is currently on Lyrica for the pain.  Family reports that patient began having fevers with shakes and chills tonight.  Patient just completed course of Macrobid for urinary tract infection, continues to have dysuria as well as urinary frequency.  No runny nose congestion sore throat or cough, no vomiting or diarrhea.  Patient has history of tremors and tremors increased tonight with fever.    MEDICAL HISTORY REVIEW  Reviewed in EPIC    PAST MEDICAL HISTORY  Active Ambulatory Problems     Diagnosis Date Noted    Dyslipidemia 11/04/2019    Essential hypertension 11/04/2019    Coronary atherosclerosis 11/04/2019    S/P CABG (coronary artery bypass graft) 04/20/2020    Peripheral artery disease 02/25/2021    History of left-sided carotid endarterectomy 02/25/2021    Fever of unknown origin 03/03/2023    Closed traumatic nondisplaced fracture of two ribs of right side with routine healing 03/04/2023    Altered mental status 09/21/2023    Abdominal pain 09/21/2023    Chronic mesenteric ischemia 09/21/2023    Chest pain 04/04/2024    Shingles 05/12/2024    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    Carotid artery stenosis 06/07/2024     Resolved  Ambulatory Problems     Diagnosis Date Noted    S/P CABG (coronary artery bypass graft) 04/20/2020    COVID-19 virus infection 02/25/2021     Past Medical History:   Diagnosis Date    Anxiety     Atherosclerosis of native arteries of extremities with intermittent claudication, bilateral legs 03/24/2020    Bilateral carotid artery stenosis 03/24/2020    CAD (coronary artery disease)     Carotid artery disease     Cholelithiasis     Constipation     COPD (chronic obstructive pulmonary disease)     Degenerative disc disease, lumbar 11/24/2021    GERD (gastroesophageal reflux disease)     Hyperlipidemia     Hypertension     Injury of back     Ischemic heart disease     Peripheral edema     PVD (peripheral vascular disease) with claudication     Stroke 2011    Unilateral osteoarthritis of hip 11/24/2021    Vertigo        PAST SURGICAL HISTORY  Past Surgical History:   Procedure Laterality Date    ANGIOPLASTY      x2    CARDIAC CATHETERIZATION N/A 05/06/2024    Procedure: Left Heart Cath;  Surgeon: Gurwinder Hurd MD;  Location:  [facility];  Service: Cardiovascular;  Laterality: N/A;    CARDIAC CATHETERIZATION N/A 05/06/2024    Procedure: Coronary angiography;  Surgeon: Gurwinder Hurd MD;  Location:  [facility];  Service: Cardiovascular;  Laterality: N/A;    CARDIAC CATHETERIZATION N/A 05/06/2024    Procedure: Percutaneous Coronary Intervention;  Surgeon: Gurwinder Hurd MD;  [facility];  Service: Cardiovascular;  Laterality: N/A;    CARDIAC CATHETERIZATION N/A 05/06/2024    Procedure: Stent TC coronary;  Surgeon: Gurwinder Hurd MD;  [facility];  Service: Cardiovascular;  Laterality: N/A;    CARDIAC CATHETERIZATION  05/06/2024    Procedure: Saphenous Vein Graft;  Surgeon: Gurwinder Hurd MD;  [facility];  Service: Cardiovascular;;    CAROTID ENDARTERECTOMY  2000     Left    CAROTID ENDARTERECTOMY Left 06/26/2001    CHOLECYSTECTOMY      COLONOSCOPY  2010    COLONOSCOPY  2012    CORONARY ARTERY BYPASS GRAFT  08/2011    CORONARY STENT PLACEMENT      X17 stents    CORONARY STENT PLACEMENT      x5    INTERVENTIONAL RADIOLOGY PROCEDURE N/A 05/06/2024    Procedure: Intravascular Ultrasound;  Surgeon: Gurwinder Hurd MD;  Location: [facility] INVAS[facility] LOC[facility];  Service: Cardiovascular;  Laterality: N/A;       FAMILY HISTORY  Family History   Problem Relation Age of Onset    COPD Mother     Heart attack Father     Heart failure Father     COPD Brother        SOCIAL HISTORY  Social History     Socioeconomic History    Marital status:    Tobacco Use    Smoking status: Former     Types: Cigarettes    Smokeless tobacco: Never    Tobacco comments:     Patient used to smoke 3 packs per day and quit 22 years ago.   Vaping Use    Vaping status: Never Used   Substance and Sexual Activity    Alcohol use: Not Currently     Comment: Patient is non drinker.    Drug use: Never     Comment: Drug Abuse: no drug abuse    Sexual activity: Defer       ALLERGIES  Pletal [cilostazol]    The patient's allergies have been reviewed    REVIEW OF SYSTEMS  All systems reviewed and negative except for those discussed in HPI.     PHYSICAL EXAM  I have reviewed the triage vital signs and nursing notes.  ED Triage Vitals [06/19/24 0323]   Temp Heart Rate Resp BP SpO2   (!) 101 °F (38.3 °C) 97 18 136/71 98 %      Temp src Heart Rate Source Patient Position BP Location FiO2 (%)   -- -- -- -- --       General: No acute distress.  HENT: NCAT, PERRL, Nares patent.  Eyes: no scleral icterus.  Neck: trachea midline, no ROM limitations.  CV: regular rhythm, regular rate.  Respiratory: normal effort, CTAB.  Abdomen: soft, nondistended, NTTP, no rebound tenderness, no guarding or rigidity.  Musculoskeletal: no deformity.  Neuro: alert, moves all extremities, follows commands.  Skin: warm, dry.  Multiple  healing lesions in dermatomal pattern on right inferior chest with no active lesions.    LAB RESULTS  Recent Results (from the past 24 hour(s))   Urinalysis With Microscopic If Indicated (No Culture) - Urine, Clean Catch    Collection Time: 06/19/24  3:29 AM    Specimen: Urine, Clean Catch   Result Value Ref Range    Color, UA Dark Yellow (A) Yellow, Straw    Appearance, UA Turbid (A) Clear    pH, UA 8.5 (H) 5.0 - 8.0    Specific Gravity, UA 1.015 1.005 - 1.030    Glucose, UA Negative Negative    Ketones, UA Trace (A) Negative    Bilirubin, UA Negative Negative    Blood, UA Trace (A) Negative    Protein, UA 30 mg/dL (1+) (A) Negative    Leuk Esterase, UA Large (3+) (A) Negative    Nitrite, UA Negative Negative    Urobilinogen, UA 1.0 E.U./dL 0.2 - 1.0 E.U./dL   Urinalysis, Microscopic Only - Urine, Clean Catch    Collection Time: 06/19/24  3:29 AM    Specimen: Urine, Clean Catch   Result Value Ref Range    RBC, UA 0-2 None Seen, 0-2 /HPF    WBC, UA Too Numerous to Count (A) None Seen, 0-2 /HPF    Bacteria, UA 4+ (A) None Seen /HPF    Squamous Epithelial Cells, UA 0-2 None Seen, 0-2 /HPF    Hyaline Casts, UA 0-2 None Seen /LPF    Methodology Automated Microscopy    ECG 12 Lead Chest Pain    Collection Time: 06/19/24  3:32 AM   Result Value Ref Range    QT Interval 356 ms    QTC Interval 409 ms   Respiratory Panel PCR w/COVID-19(SARS-CoV-2) [facility] In-House, NP Swab in [facility], 2 HR TAT - Swab, Nasopharynx    Collection Time: 06/19/24  3:50 AM    Specimen: Nasopharynx; Swab   Result Value Ref Range    ADENOVIRUS, PCR Not Detected Not Detected    Coronavirus 229E Not Detected Not Detected    Coronavirus HKU1 Not Detected Not Detected    Coronavirus NL63 Not Detected Not Detected    Coronavirus OC43 Not Detected Not Detected    COVID19 Not Detected Not Detected - Ref. Range    Human Metapneumovirus Not Detected Not Detected    Human Rhinovirus/Enterovirus Not Detected Not Detected    Influenza A PCR Not  Detected Not Detected    Influenza B PCR Not Detected Not Detected    Parainfluenza Virus 1 Not Detected Not Detected    Parainfluenza Virus 2 Not Detected Not Detected    Parainfluenza Virus 3 Not Detected Not Detected    Parainfluenza Virus 4 Not Detected Not Detected    RSV, PCR Not Detected Not Detected    Bordetella pertussis pcr Not Detected Not Detected    Bordetella parapertussis PCR Not Detected Not Detected    Chlamydophila pneumoniae PCR Not Detected Not Detected    Mycoplasma pneumo by PCR Not Detected Not Detected   Comprehensive Metabolic Panel    Collection Time: 06/19/24  4:00 AM    Specimen: Blood   Result Value Ref Range    Glucose 123 (H) 65 - 99 mg/dL    BUN 14 8 - 23 mg/dL    Creatinine 0.98 0.76 - 1.27 mg/dL    Sodium 131 (L) 136 - 145 mmol/L    Potassium 4.6 3.5 - 5.2 mmol/L    Chloride 94 (L) 98 - 107 mmol/L    CO2 26.8 22.0 - 29.0 mmol/L    Calcium 9.1 8.6 - 10.5 mg/dL    Total Protein 6.8 6.0 - 8.5 g/dL    Albumin 3.8 3.5 - 5.2 g/dL    ALT (SGPT) 22 1 - 41 U/L    AST (SGOT) 12 1 - 40 U/L    Alkaline Phosphatase 87 39 - 117 U/L    Total Bilirubin 1.2 0.0 - 1.2 mg/dL    Globulin 3.0 gm/dL    A/G Ratio 1.3 g/dL    BUN/Creatinine Ratio 14.3 7.0 - 25.0    Anion Gap 10.2 5.0 - 15.0 mmol/L    eGFR 80.9 >60.0 mL/min/1.73   Procalcitonin    Collection Time: 06/19/24  4:00 AM    Specimen: Blood   Result Value Ref Range    Procalcitonin 0.68 (H) 0.00 - 0.25 ng/mL   Lactic Acid, Plasma    Collection Time: 06/19/24  4:00 AM    Specimen: Blood   Result Value Ref Range    Lactate 2.2 (C) 0.5 - 2.0 mmol/L   CBC Auto Differential    Collection Time: 06/19/24  4:00 AM    Specimen: Blood   Result Value Ref Range    WBC 22.81 (H) 3.40 - 10.80 10*3/mm3    RBC 4.15 4.14 - 5.80 10*6/mm3    Hemoglobin 12.8 (L) 13.0 - 17.7 g/dL    Hematocrit 38.9 37.5 - 51.0 %    MCV 93.7 79.0 - 97.0 fL    MCH 30.8 26.6 - 33.0 pg    MCHC 32.9 31.5 - 35.7 g/dL    RDW 14.2 12.3 - 15.4 %    RDW-SD 48.1 37.0 - 54.0 fl    MPV 8.9 6.0 -  12.0 fL    Platelets 313 140 - 450 10*3/mm3    Neutrophil % 71.1 42.7 - 76.0 %    Lymphocyte % 9.3 (L) 19.6 - 45.3 %    Monocyte % 18.7 (H) 5.0 - 12.0 %    Eosinophil % 0.1 (L) 0.3 - 6.2 %    Basophil % 0.2 0.0 - 1.5 %    Immature Grans % 0.6 (H) 0.0 - 0.5 %    Neutrophils, Absolute 16.22 (H) 1.70 - 7.00 10*3/mm3    Lymphocytes, Absolute 2.12 0.70 - 3.10 10*3/mm3    Monocytes, Absolute 4.26 (H) 0.10 - 0.90 10*3/mm3    Eosinophils, Absolute 0.03 0.00 - 0.40 10*3/mm3    Basophils, Absolute 0.04 0.00 - 0.20 10*3/mm3    Immature Grans, Absolute 0.14 (H) 0.00 - 0.05 10*3/mm3    nRBC 0.0 0.0 - 0.2 /100 WBC       I ordered the above labs and reviewed the results.    RADIOLOGY  No Radiology Exams Resulted Within Past 24 Hours    I ordered the above noted radiological studies. I reviewed the images and results. I agree with the radiologist interpretation.    PROCEDURES  Procedures    MEDICATIONS GIVEN IN ER  Medications   acetaminophen (TYLENOL) tablet 1,000 mg (1,000 mg Oral Given 6/19/24 0349)   cefTRIAXone (ROCEPHIN) 2,000 mg in sodium chloride 0.9 % 100 mL MBP (2,000 mg Intravenous New Bag 6/19/24 0448)       PROGRESS, DATA ANALYSIS, CONSULTS, AND MEDICAL DECISION MAKING  A complete history and physical exam have been performed.  All available laboratory and imaging results have been reviewed by myself prior to disposition.    MDM    After the initial H&P, I discussed pertinent information from history and physical exam with patient/family.  Discussed differential diagnosis.  Discussed plan for ED evaluation/workup/treatment.  All questions answered.  Patient/family is agreeable with plan.  ED Course as of 06/19/24 0520   Wed Jun 19, 2024   4621 Review of prior external notes (non-ED) -and- Review of prior external test results outside of this encounter:   I reviewed patient's discharge summary from May patient admitted for chest pain, had recently underwent cardiac cath with complication by balloon rupture perforation  in the proximal to mid LAD.  Patient was diagnosed with herpes zoster.  Patient was seen by cardiology and cleared for discharge, seen by infectious disease who recommended discharge on Valtrex. [JG]   0335 EKG independently viewed and contemporaneously interpreted by ED physician. Time: 3:32 AM.  Rate 79.  Interpretation: Normal sinus rhythm, normal axis, normal QRS, no ST elevation, slight ST depression with T wave inversion and lead II, aVF, V4 through V6, T wave inversion leads I and aVL. [JG]   0336 When compared with prior EKG on 5/11/2024, no acute changes are present.  Diffuse ST depressions and T wave inversions are unchanged from prior. [JG]   0434 Patient is septic with urinary tract infection.  Treating with ceftriaxone.  Blood cultures lactic acid sent.  Sending urine for culture. [JG]   0441 Lactic acid 2.2, no hypotension, no indication for sepsis fluid bolus. [JG]   0453 Patient reassessed.  Discussed ED findings, differential diagnosis, and the need for admission for evaluation/treatment.  They are agreeable to admission and all questions were answered.     [JG]   0518 Phone call with Karina, NADIYA with [facility].  Discussed the patient, relevant history, exam, diagnostics, ED findings/progress, and concerns. They agree to admit the patient to telemetry observation under Dr Forde. Care assumed by the admitting physician at this time.     [JG]      ED Course User Index  [JG] Ed Jaramillo MD       AS OF 05:20 EDT VITALS:    BP - 150/70  HR - 86  TEMP - 100.3 °F (37.9 °C) (Oral)  O2 SATS - 93%    DIAGNOSIS  Final diagnoses:   Acute UTI   Sepsis, due to unspecified organism, unspecified whether acute organ dysfunction present   Hyperglycemia         DISPOSITION  ADMISSION    Discussed treatment plan and reason for admission with pt/family and admitting physician.  Pt/family voiced understanding of the plan for admission for further testing/treatment as needed.        Ed Jaramillo MD  06/19/24  8507

## 2024-06-19 NOTE — ED NOTES
"Nursing report ED to floor  Slade Martinez  73 y.o.  male    HPI :  HPI (Adult)  Stated Reason for Visit: generalized shaking, shingles  History Obtained From: family, patient    Chief Complaint  Chief Complaint   Patient presents with    Shaking    Herpes Zoster    Chest Pain       Admitting doctor:   Olivier Forde MD    Admitting diagnosis:   The primary encounter diagnosis was Acute UTI. Diagnoses of Sepsis, due to unspecified organism, unspecified whether acute organ dysfunction present and Hyperglycemia were also pertinent to this visit.    Code status:   Current Code Status       Date Active Code Status Order ID Comments User Context       6/19/2024 0529 CPR (Attempt to Resuscitate) [id number]  Karina Quiles APRN ED        Question Answer    Code Status (Patient has no pulse and is not breathing) CPR (Attempt to Resuscitate)    Medical Interventions (Patient has pulse or is breathing) Full Support                    Allergies:   Pletal [cilostazol]    Isolation:   No active isolations    Intake and Output    Intake/Output Summary (Last 24 hours) at 6/19/2024 0533  Last data filed at 6/19/2024 0523  Gross per 24 hour   Intake 100 ml   Output --   Net 100 ml       Weight:       06/19/24  0323   Weight: 86.2 kg (190 lb)       Most recent vitals:   Vitals:    06/19/24 0323 06/19/24 0337 06/19/24 0456   BP: 136/71 150/70    Pulse: 97 80 86   Resp: 18     Temp: (!) 101 °F (38.3 °C)  100.3 °F (37.9 °C)   TempSrc:   Oral   SpO2: 98% 97% 93%   Weight: 86.2 kg (190 lb)     Height: 180.3 cm (71\")         Active LDAs/IV Access:   Lines, Drains & Airways       Active LDAs       Name Placement date Placement time Site Days    Peripheral IV 06/19/24 0401 Right Antecubital 06/19/24  0401  Antecubital  less than 1                    Labs (abnormal labs have a star):   Labs Reviewed   COMPREHENSIVE METABOLIC PANEL - Abnormal; Notable for the following components:       Result Value    Glucose 123 (*)     Sodium 131 " "(*)     Chloride 94 (*)     All other components within normal limits    Narrative:     GFR Normal >60  Chronic Kidney Disease <60  Kidney Failure <15    The GFR formula is only valid for adults with stable renal function between ages 18 and 70.   URINALYSIS W/ MICROSCOPIC IF INDICATED (NO CULTURE) - Abnormal; Notable for the following components:    Color, UA Dark Yellow (*)     Appearance, UA Turbid (*)     pH, UA 8.5 (*)     Ketones, UA Trace (*)     Blood, UA Trace (*)     Protein, UA 30 mg/dL (1+) (*)     Leuk Esterase, UA Large (3+) (*)     All other components within normal limits   PROCALCITONIN - Abnormal; Notable for the following components:    Procalcitonin 0.68 (*)     All other components within normal limits    Narrative:     As a Marker for Sepsis (Non-Neonates):    1. <0.5 ng/mL represents a low risk of severe sepsis and/or septic shock.  2. >2 ng/mL represents a high risk of severe sepsis and/or septic shock.    As a Marker for Lower Respiratory Tract Infections that require antibiotic therapy:    PCT on Admission    Antibiotic Therapy       6-12 Hrs later    >0.5                Strongly Recommended  >0.25 - <0.5        Recommended   0.1 - 0.25          Discouraged              Remeasure/reassess PCT  <0.1                Strongly Discouraged     Remeasure/reassess PCT    As 28 day mortality risk marker: \"Change in Procalcitonin Result\" (>80% or <=80%) if Day 0 (or Day 1) and Day 4 values are available. Refer to http://www.[employer]-pct-calculator.com    Change in PCT <=80%  A decrease of PCT levels below or equal to 80% defines a positive change in PCT test result representing a higher risk for 28-day all-cause mortality of patients diagnosed with severe sepsis for septic shock.    Change in PCT >80%  A decrease of PCT levels of more than 80% defines a negative change in PCT result representing a lower risk for 28-day all-cause mortality of patients diagnosed with severe sepsis or septic shock.    "   LACTIC ACID, PLASMA - Abnormal; Notable for the following components:    Lactate 2.2 (*)     All other components within normal limits   CBC WITH AUTO DIFFERENTIAL - Abnormal; Notable for the following components:    WBC 22.81 (*)     Hemoglobin 12.8 (*)     Lymphocyte % 9.3 (*)     Monocyte % 18.7 (*)     Eosinophil % 0.1 (*)     Immature Grans % 0.6 (*)     Neutrophils, Absolute 16.22 (*)     Monocytes, Absolute 4.26 (*)     Immature Grans, Absolute 0.14 (*)     All other components within normal limits   URINALYSIS, MICROSCOPIC ONLY - Abnormal; Notable for the following components:    WBC, UA Too Numerous to Count (*)     Bacteria, UA 4+ (*)     All other components within normal limits   RESPIRATORY PANEL PCR W/ COVID-19 (SARS-COV-2), NP SWAB IN UTM/VTP, 2 HR TAT - Normal    Narrative:     In the setting of a positive respiratory panel with a viral infection PLUS a negative procalcitonin without other underlying concern for bacterial infection, consider observing off antibiotics or discontinuation of antibiotics and continue supportive care. If the respiratory panel is positive for atypical bacterial infection (Bordetella pertussis, Chlamydophila pneumoniae, or Mycoplasma pneumoniae), consider antibiotic de-escalation to target atypical bacterial infection.   BLOOD CULTURE   BLOOD CULTURE   URINALYSIS W/ CULTURE IF INDICATED   LACTIC ACID, REFLEX   BASIC METABOLIC PANEL   CBC (NO DIFF)   CBC AND DIFFERENTIAL    Narrative:     The following orders were created for panel order CBC & Differential.  Procedure                               Abnormality         Status                     ---------                               -----------         ------                     CBC Auto Differential[[id number]]        Abnormal            Final result                 Please view results for these tests on the individual orders.       EKG:   ECG 12 Lead Chest Pain   Preliminary Result   HEART RATE= 79  bpm   RR Interval= 759   ms   MN Interval= 188  ms   P Horizontal Axis= -65  deg   P Front Axis= -21  deg   QRSD Interval= 85  ms   QT Interval= 356  ms   QTcB= 409  ms   QRS Axis= 35  deg   T Wave Axis= 173  deg   - ABNORMAL ECG -   Sinus rhythm   Probable left atrial enlargement   RSR' in V1 or V2, probably normal variant   Repol abnrm suggests ischemia, diffuse leads   Borderline ST elevation, anterior leads   Electronically Signed By:    Date and Time of Study: 2024-06-19 03:32:42          Meds given in ED:   Medications   sodium chloride 0.9 % flush 10 mL (has no administration in time range)   sodium chloride 0.9 % flush 10 mL (has no administration in time range)   sodium chloride 0.9 % infusion 40 mL (has no administration in time range)   nitroglycerin (NITROSTAT) SL tablet 0.4 mg (has no administration in time range)   sodium chloride 0.9 % infusion (has no administration in time range)   sennosides-docusate (PERICOLACE) 8.6-50 MG per tablet 2 tablet (has no administration in time range)     And   polyethylene glycol (MIRALAX) packet 17 g (has no administration in time range)     And   bisacodyl (DULCOLAX) EC tablet 5 mg (has no administration in time range)     And   bisacodyl (DULCOLAX) suppository 10 mg (has no administration in time range)   ondansetron ODT (ZOFRAN-ODT) disintegrating tablet 4 mg (has no administration in time range)     Or   ondansetron (ZOFRAN) injection 4 mg (has no administration in time range)   calcium carbonate (TUMS) chewable tablet 500 mg (200 mg elemental) (has no administration in time range)   cefTRIAXone (ROCEPHIN) 2,000 mg in sodium chloride 0.9 % 100 mL MBP (has no administration in time range)   acetaminophen (TYLENOL) tablet 1,000 mg (1,000 mg Oral Given 6/19/24 0877)   cefTRIAXone (ROCEPHIN) 2,000 mg in sodium chloride 0.9 % 100 mL MBP (0 mg Intravenous Stopped 6/19/24 8414)       Imaging results:  No radiology results for the last day    Ambulatory status:   - bed rest    Social issues:    Social History     Socioeconomic History    Marital status:    Tobacco Use    Smoking status: Former     Types: Cigarettes    Smokeless tobacco: Never    Tobacco comments:     Patient used to smoke 3 packs per day and quit 22 years ago.   Vaping Use    Vaping status: Never Used   Substance and Sexual Activity    Alcohol use: Not Currently     Comment: Patient is non drinker.    Drug use: Never     Comment: Drug Abuse: no drug abuse    Sexual activity: Defer       Peripheral Neurovascular  Peripheral Neurovascular (Adult)  Peripheral Neurovascular WDL: WDL    Neuro Cognitive  Neuro Cognitive (Adult)  Cognitive/Neuro/Behavioral WDL: WDL, level of consciousness, mood/behavior, orientation, speech  Level of Consciousness: Alert  Orientation: oriented x 4  Speech: clear  Mood/Behavior: cooperative, calm, behavior appropriate to situation    Learning  Learning Assessment (Adult)  Learning Readiness and Ability: no barriers identified  Education Provided  Person Taught: patient, spouse  Teaching Method: verbal instruction  Teaching Focus: symptom/problem overview, diagnostic test, risk factors/triggers, medication administration  Education Outcome Evaluation: able to teach back, eager to learn, acceptance expressed, verbalizes understanding    Respiratory  Respiratory (Adult)  Airway WDL: WDL  Respiratory WDL  Respiratory WDL: WDL, rhythm/pattern  Rhythm/Pattern, Respiratory: no shortness of breath reported, depth regular, pattern regular, unlabored    Abdominal Pain       Pain Assessments  Pain (Adult)  (0-10) Pain Rating: Rest: 5  (0-10) Pain Rating: Activity: 5  Patient requested Medication Prescribed for Lower Pain Scale: No  Response to Pain Interventions: nonverbal indicators absent/decreased    NIH Stroke Scale       Yue Mcgrath RN  06/19/24 05:33 EDT

## 2024-06-20 PROBLEM — R33.8 ACUTE URINARY RETENTION: Status: ACTIVE | Noted: 2024-06-20

## 2024-06-20 PROCEDURE — 97162 PT EVAL MOD COMPLEX 30 MIN: CPT

## 2024-06-20 PROCEDURE — 99232 SBSQ HOSP IP/OBS MODERATE 35: CPT | Performed by: NURSE PRACTITIONER

## 2024-06-20 PROCEDURE — 97530 THERAPEUTIC ACTIVITIES: CPT

## 2024-06-20 PROCEDURE — 25010000002 CEFTRIAXONE PER 250 MG: Performed by: NURSE PRACTITIONER

## 2024-06-20 PROCEDURE — 25810000003 LACTATED RINGERS PER 1000 ML: Performed by: HOSPITALIST

## 2024-06-20 RX ORDER — TAMSULOSIN HYDROCHLORIDE 0.4 MG/1
0.4 CAPSULE ORAL DAILY
Status: DISCONTINUED | OUTPATIENT
Start: 2024-06-20 | End: 2024-06-20

## 2024-06-20 RX ADMIN — Medication 10 ML: at 20:24

## 2024-06-20 RX ADMIN — ASPIRIN 81 MG: 81 TABLET, COATED ORAL at 08:57

## 2024-06-20 RX ADMIN — PANTOPRAZOLE SODIUM 40 MG: 40 TABLET, DELAYED RELEASE ORAL at 20:23

## 2024-06-20 RX ADMIN — HYDROCODONE BITARTRATE AND ACETAMINOPHEN 1 TABLET: 7.5; 325 TABLET ORAL at 20:23

## 2024-06-20 RX ADMIN — PREGABALIN 150 MG: 75 CAPSULE ORAL at 20:23

## 2024-06-20 RX ADMIN — PREGABALIN 150 MG: 75 CAPSULE ORAL at 11:34

## 2024-06-20 RX ADMIN — HYDROCODONE BITARTRATE AND ACETAMINOPHEN 1 TABLET: 7.5; 325 TABLET ORAL at 11:34

## 2024-06-20 RX ADMIN — ATENOLOL 50 MG: 50 TABLET ORAL at 20:26

## 2024-06-20 RX ADMIN — ATENOLOL 50 MG: 50 TABLET ORAL at 08:57

## 2024-06-20 RX ADMIN — CLOPIDOGREL BISULFATE 75 MG: 75 TABLET, FILM COATED ORAL at 08:57

## 2024-06-20 RX ADMIN — SODIUM CHLORIDE, POTASSIUM CHLORIDE, SODIUM LACTATE AND CALCIUM CHLORIDE 100 ML/HR: 600; 310; 30; 20 INJECTION, SOLUTION INTRAVENOUS at 00:16

## 2024-06-20 RX ADMIN — LISINOPRIL 40 MG: 40 TABLET ORAL at 08:57

## 2024-06-20 RX ADMIN — LISINOPRIL 40 MG: 40 TABLET ORAL at 20:23

## 2024-06-20 RX ADMIN — PANTOPRAZOLE SODIUM 40 MG: 40 TABLET, DELAYED RELEASE ORAL at 08:57

## 2024-06-20 RX ADMIN — AMLODIPINE BESYLATE 5 MG: 5 TABLET ORAL at 08:57

## 2024-06-20 RX ADMIN — LUBIPROSTONE 8 MCG: 8 CAPSULE, GELATIN COATED ORAL at 08:57

## 2024-06-20 RX ADMIN — ATORVASTATIN CALCIUM 40 MG: 20 TABLET, FILM COATED ORAL at 20:22

## 2024-06-20 RX ADMIN — SUCRALFATE 1 G: 1 TABLET ORAL at 20:23

## 2024-06-20 RX ADMIN — PREGABALIN 150 MG: 75 CAPSULE ORAL at 16:46

## 2024-06-20 RX ADMIN — HYDROCODONE BITARTRATE AND ACETAMINOPHEN 1 TABLET: 7.5; 325 TABLET ORAL at 04:51

## 2024-06-20 RX ADMIN — CEFTRIAXONE 2000 MG: 2 INJECTION, POWDER, FOR SOLUTION INTRAMUSCULAR; INTRAVENOUS at 04:51

## 2024-06-20 RX ADMIN — Medication 10 ML: at 08:57

## 2024-06-20 RX ADMIN — LUBIPROSTONE 8 MCG: 8 CAPSULE, GELATIN COATED ORAL at 20:23

## 2024-06-20 NOTE — PLAN OF CARE
Goal Outcome Evaluation:            Outcome Evaluation: pts vital signs is stable. room air. nsr on the monitor. assist x1. a/o x4. hes calm and cooperative. care ongoing.

## 2024-06-20 NOTE — PROGRESS NOTES
Name: Slade Martinez ADMIT: 2024   : 1950  PCP: Triyn Hannon MD    MRN: [id number] LOS: 1 days   AGE/SEX: 73 y.o. male  ROOM: [facility]     Subjective   Subjective   Resting in bed.  Reporting pain where his shingles site was and asking for his Lyrica as it is already late.  Wife at bedside.  He denies any chest pain or trouble breathing.  Denies any nausea, vomiting or abdominal pain.  He states that his burning with urination has improved and he has been voiding freely.  His last bowel movement was yesterday.  He has been eating and drinking okay.     Objective   Objective   Vital Signs  Temp:  [98.2 °F (36.8 °C)-99 °F (37.2 °C)] 99 °F (37.2 °C)  Heart Rate:  [68-83] 75  Resp:  [18] 18  BP: (120-144)/(59-76) 120/59  SpO2:  [98 %-99 %] 98 %  on   ;   Device (Oxygen Therapy): room air  Body mass index is 26.5 kg/m².    Physical Exam  Vitals and nursing note reviewed.   Constitutional:       Appearance: He is ill-appearing. He is not toxic-appearing.   Cardiovascular:      Rate and Rhythm: Normal rate and regular rhythm.      Pulses: Normal pulses.   Pulmonary:      Effort: Pulmonary effort is normal. No respiratory distress.      Breath sounds: Normal breath sounds.   Abdominal:      General: Bowel sounds are normal. There is no distension.      Palpations: Abdomen is soft.      Tenderness: There is no abdominal tenderness.   Musculoskeletal:         General: No swelling. Normal range of motion.   Skin:     General: Skin is warm and dry.      Findings: Rash (dried under right axilla extending midline to upper back.) present. No bruising.   Neurological:      General: No focal deficit present.      Mental Status: He is alert and oriented to person, place, and time.      Sensory: No sensory deficit.      Motor: Weakness present.      Coordination: Coordination normal.   Psychiatric:         Mood and Affect: Mood normal.         Behavior: Behavior normal.       Results Review:       I reviewed  "the patient's new clinical results.  Results from last 7 days   Lab Units 06/19/24  1014 06/19/24  0400   WBC 10*3/mm3 24.74* 22.81*   HEMOGLOBIN g/dL 11.9* 12.8*   PLATELETS 10*3/mm3 263 313     Results from last 7 days   Lab Units 06/19/24  1014 06/19/24  0400   SODIUM mmol/L 131* 131*   POTASSIUM mmol/L 4.4 4.6   CHLORIDE mmol/L 97* 94*   CO2 mmol/L 22.3 26.8   BUN mg/dL 17 14   CREATININE mg/dL 1.00 0.98   GLUCOSE mg/dL 129* 123*   Estimated Creatinine Clearance: 79 mL/min (by C-G formula based on SCr of 1 mg/dL).  Results from last 7 days   Lab Units 06/19/24  0400   ALBUMIN g/dL 3.8   BILIRUBIN mg/dL 1.2   ALK PHOS U/L 87   AST (SGOT) U/L 12   ALT (SGPT) U/L 22     Results from last 7 days   Lab Units 06/19/24  1014 06/19/24  0400   CALCIUM mg/dL 8.5* 9.1   ALBUMIN g/dL  --  3.8     Results from last 7 days   Lab Units 06/19/24  1014 06/19/24  0400   PROCALCITONIN ng/mL  --  0.68*   LACTATE mmol/L 1.4 2.2*     No results found for: \"HGBA1C\", \"POCGLU\"    amLODIPine, 5 mg, Oral, Q24H  aspirin, 81 mg, Oral, Daily  atenolol, 50 mg, Oral, BID  atorvastatin, 40 mg, Oral, Nightly  cefTRIAXone, 2,000 mg, Intravenous, Q24H  clopidogrel, 75 mg, Oral, Daily  lisinopril, 40 mg, Oral, BID  lubiprostone, 8 mcg, Oral, BID  pantoprazole, 40 mg, Oral, BID  pregabalin, 150 mg, Oral, TID  sodium chloride, 10 mL, Intravenous, Q12H  sucralfate, 1 g, Oral, Nightly       Diet: Regular/House; Fluid Consistency: Thin (IDDSI 0)       Assessment/Plan     Active Hospital Problems    Diagnosis  POA    **Acute UTI [N39.0]  Yes    Acute urinary retention [R33.8]  Yes    Sepsis [A41.9]  Yes    Shingles [B02.9]  Yes    Peripheral artery disease [I73.9]  Yes    S/P CABG (coronary artery bypass graft) [Z95.1]  Not Applicable    Coronary atherosclerosis [I25.10]  Yes    Essential hypertension [I10]  Yes    Dyslipidemia [E78.5]  Yes      Resolved Hospital Problems   No resolved problems to display.     Mr. Martinez is a 73 y.o. male who presented to " the hospital with complaints of fever and chills.  He was at this facility at the beginning of May where he underwent left heart catheterization and found to have 90% proximal left main and when she underwent PCI.  Discharged and readmitted shortly thereafter for chest pain and rash where he was found to have shingles.  Thereafter he was in the emergency room for urinary retention and discharged with tamsulosin.  He apparently had had a recent UTI and treated with Macrobid as well.  He was found to have signs of sepsis on admission as well as urinary tract infection prompting further admission.    Acute UTI  Acute urinary retention  Sepsis  -Has been afebrile for 24 hours.  Blood cultures negative growth to date.  Lactic normalized.  -CXR on admission with edema versus atypical infection.  RVP negative.  Clinically does not appear to have pneumonia, but is on antibiotics that would cover for this.  -WBCs a little higher today.  Will continue to trend.  -Urine culture growing greater than 100,000 gram-negative bacilli.  Awaiting final culture and sensitivities.  -Continue Rocephin in the meantime as he clinically appears to be improving.  -Has been voiding.  Urology briefly evaluated and recommends outpatient follow-up as scheduled 6/27.   -Add back Flomax. Monitor PVRs.    CAD with recent stenting  History of CABG  -Cardiology followed.  -Status post PCI of left main to mid LAD in May.  Continue DAPT with aspirin and clopidogrel along with statin.  -Cardiac wise is felt to be stable.    Hypertension  -BP stable on Norvasc, lisinopril and atenolol.    Shingles  -Improving.  Status post acyclovir.  -Continue Lyrica which was recently increased for neuropathic pain.   -Continue Norco as needed.    PAD  -Aspirin and statin as above.    Hyperlipidemia  -As above.     Discussed with patient, his wife at bedside as well as nurse.    VTE Prophylaxis - SCDs  Code Status - Full code  Disposition - Anticipate discharge  possibly home tomorrow if improving and WBC improved.      SHERRILL Bang  [facility]  06/20/24  12:31 EDT

## 2024-06-20 NOTE — PLAN OF CARE
Goal Outcome Evaluation:  Plan of Care Reviewed With: patient        Progress: no change  Outcome Evaluation: Patient is a 74 y/o male admitted to [facility] for shingles. His PMH includes a CVA. He currenly lives with his wife and has 1 ALESSIA. Prior to admission he was indep with all mobility. Today he completed bed mobility indep and ambualted 200' SBA w/o an AD. His gait was steady with no LOB. There are no identified needs for continued acute PT. PT will sign off.      Anticipated Discharge Disposition (PT): home

## 2024-06-20 NOTE — CASE MANAGEMENT/SOCIAL WORK
Discharge Planning Assessment  [facility]     Patient Name: Slade Martinez  MRN: [id number]  Today's Date: 6/20/2024    Admit Date: 6/19/2024    Plan: Home with spouse.   Discharge Needs Assessment       Row Name 06/20/24 0958       Living Environment    People in Home spouse    Current Living Arrangements home    Family Caregiver if Needed spouse    Quality of Family Relationships involved;helpful;supportive    Able to Return to Prior Arrangements yes       Transition Planning    Patient/Family Anticipates Transition to home;home with family    Patient/Family Anticipated Services at Transition none    Transportation Anticipated car, drives self;family or friend will provide       Discharge Needs Assessment    Readmission Within the Last 30 Days no previous admission in last 30 days    Equipment Currently Used at Home none    Concerns to be Addressed no discharge needs identified;denies needs/concerns at this time    Anticipated Changes Related to Illness none    Equipment Needed After Discharge none                   Discharge Plan       Row Name 06/20/24 0959       Plan    Plan Home with spouse.    Patient/Family in Agreement with Plan yes    Plan Comments CCP met with pt and spouse Tricia at the bedside, introduced self and role of CCP. Pt gave CCP permission to speak in front of Tricia. Face sheet information and pharmacy verified. Pt lives in a single-story home with Tricia. Pt still drives, IADL's and denies DME at home. Pt denies having a living will. Pt is enrolled in meds to beds and states sometimes he has trouble affording medications. Pt denies HH or SNF history. DC plan is to return home, spouse to transport. Tammy SINGLETON/CCP                  Continued Care and Services - Admitted Since 6/19/2024    No active coordination exists for this encounter.       Expected Discharge Date and Time       Expected Discharge Date Expected Discharge Time    Jun 21, 2024            Demographic Summary    No  documentation.                  Functional Status       Row Name 06/20/24 0958       Functional Status    Usual Activity Tolerance excellent    Current Activity Tolerance good       Assessment of Health Literacy    How often do you have someone help you read hospital materials? Never    How often do you have problems learning about your medical condition because of difficulty understanding written information? Never    How often do you have a problem understanding what is told to you about your medical condition? Never    How confident are you filling out medical forms by yourself? Extremely    Health Literacy Excellent       Functional Status, IADL    Medications independent    Meal Preparation independent    Housekeeping independent    Laundry independent    Shopping independent                               Lesly Tobar RN

## 2024-06-20 NOTE — PROGRESS NOTES
LOS: 1 day   Patient Care Team:  Tirny Hannon MD as PCP - General (Internal Medicine)  James Hubbard MD as Consulting Physician (Cardiology)      Chief Complaint: Follow-up CAD, recent PCI, sepsis       Interval History: Feeling better. No cardiac complaints.       Objective   Vital Signs  Temp:  [98.2 °F (36.8 °C)-99 °F (37.2 °C)] 99 °F (37.2 °C)  Heart Rate:  [68-83] 75  Resp:  [18] 18  BP: (120-144)/(59-76) 120/59    Intake/Output Summary (Last 24 hours) at 6/20/2024 1010  Last data filed at 6/20/2024 0459  Gross per 24 hour   Intake 360 ml   Output 2850 ml   Net -2490 ml         Physical Exam:  Constitutional: Well appearing, well developed, no acute distress   HENT: Oropharynx clear and membrane moist  Eyes: Normal conjunctiva, no sclera icterus.  Neck: Supple, no carotid bruit bilaterally.  Cardiovascular: Regular rate and rhythm, No Murmur, No bilateral lower extremity edema.  Pulmonary: Normal respiratory effort, normal lung sounds, no wheezing.  Abdominal: Soft, nontender, no hepatosplenomegaly, liver is non-pulsatile.  Neurological: Alert and orient x 3.   Skin: Warm, dry, no ecchymosis, no rash.  Psych: Appropriate mood and affect. Normal judgment and insight.      Results Review:      Results from last 7 days   Lab Units 06/19/24  1014 06/19/24  0400   SODIUM mmol/L 131* 131*   POTASSIUM mmol/L 4.4 4.6   CHLORIDE mmol/L 97* 94*   CO2 mmol/L 22.3 26.8   BUN mg/dL 17 14   CREATININE mg/dL 1.00 0.98   GLUCOSE mg/dL 129* 123*   CALCIUM mg/dL 8.5* 9.1         Results from last 7 days   Lab Units 06/19/24  1014 06/19/24  0400   WBC 10*3/mm3 24.74* 22.81*   HEMOGLOBIN g/dL 11.9* 12.8*   HEMATOCRIT % 36.5* 38.9   PLATELETS 10*3/mm3 263 313                       I reviewed the patient's new clinical results.  I personally viewed and interpreted the patient's EKG/Telemetry data        Medication Review:   amLODIPine, 5 mg, Oral, Q24H  aspirin, 81 mg, Oral, Daily  atenolol, 50 mg, Oral,  BID  atorvastatin, 40 mg, Oral, Nightly  cefTRIAXone, 2,000 mg, Intravenous, Q24H  clopidogrel, 75 mg, Oral, Daily  lisinopril, 40 mg, Oral, BID  lubiprostone, 8 mcg, Oral, BID  pantoprazole, 40 mg, Oral, BID  pregabalin, 150 mg, Oral, TID  sodium chloride, 10 mL, Intravenous, Q12H  sucralfate, 1 g, Oral, Nightly             Assessment & Plan       Sepsis    Dyslipidemia    Essential hypertension    Coronary atherosclerosis    S/P CABG (coronary artery bypass graft)    Peripheral artery disease    Shingles    Acute UTI    1.  Sepsis. Suspected  source.  Blood cultures pending.  2.  Coronary artery disease.  Status post PCI of the left main to mid LAD in May.  Remains on DAPT with aspirin and clopidogrel along with high intensity statin therapy.  3.  Recent shingles infection.  With significant neuropathic pain.  On Lyrica and Norco.  4.  Hypertension.  Stable.  5.  Urinary retention.  Recommendations per urology.    -His cardiac status is stable.  We will sign off.  Please do not hesitate to call with any questions or concerns.    SHERRILL Roe  06/20/24  10:10 EDT

## 2024-06-20 NOTE — THERAPY EVALUATION
Patient Name: Slade Martinez  : 1950    MRN: [id number]                              Today's Date: 2024       Admit Date: 2024    Visit Dx:     ICD-10-CM ICD-9-CM   1. Acute UTI  N39.0 599.0   2. Sepsis, due to unspecified organism, unspecified whether acute organ dysfunction present  A41.9 038.9     995.91   3. Hyperglycemia  R73.9 790.29     Patient Active Problem List   Diagnosis    Dyslipidemia    Essential hypertension    Coronary atherosclerosis    S/P CABG (coronary artery bypass graft)    Peripheral artery disease    History of left-sided carotid endarterectomy    Fever of unknown origin    Closed traumatic nondisplaced fracture of two ribs of right side with routine healing    Altered mental status    Abdominal pain    Chronic mesenteric ischemia    Chest pain    Shingles    AVNRT (AV vinayak re-entry tachycardia)    Carotid artery stenosis    Acute UTI    Sepsis     Past Medical History:   Diagnosis Date    Anxiety     Atherosclerosis of native arteries of extremities with intermittent claudication, bilateral legs 2020    PVD    Bilateral carotid artery stenosis 2020    CAD (coronary artery disease)     unspecified    Carotid artery disease     Cholelithiasis     Constipation     COPD (chronic obstructive pulmonary disease)     Coronary atherosclerosis 2019    H/O CABG SVG to first diagonal branch and to the LAD as well as SVG to the lateral marginal branch of the circumflex complicated by right sided subcortical infarct with left sided hemiparesis    Degenerative disc disease, lumbar 2021    Dyslipidemia 2019    Essential hypertension 2019    GERD (gastroesophageal reflux disease)     Hyperlipidemia     Hypertension     Injury of back     fractured vertebra 23    Ischemic heart disease     Peripheral edema     PVD (peripheral vascular disease) with claudication     latanya legs    Stroke     Unilateral osteoarthritis of hip 2021    Vertigo       Past Surgical History:   Procedure Laterality Date    ANGIOPLASTY      x2    CARDIAC CATHETERIZATION N/A 05/06/2024    Procedure: Left Heart Cath;  Surgeon: Gurwinder Hurd MD;  Location:  [facility];  Service: Cardiovascular;  Laterality: N/A;    CARDIAC CATHETERIZATION N/A 05/06/2024    Procedure: Coronary angiography;  Surgeon: Gurwinder Hurd MD;  Location:  [facility];  Service: Cardiovascular;  Laterality: N/A;    CARDIAC CATHETERIZATION N/A 05/06/2024    Procedure: Percutaneous Coronary Intervention;  Surgeon: Gurwinder Hurd MD;  Location:  [facility];  Service: Cardiovascular;  Laterality: N/A;    CARDIAC CATHETERIZATION N/A 05/06/2024    Procedure: Stent TC coronary;  Surgeon: Gurwinder Hurd MD;  Location:  [facility];  Service: Cardiovascular;  Laterality: N/A;    CARDIAC CATHETERIZATION  05/06/2024    Procedure: Saphenous Vein Graft;  Surgeon: Gurwinder Hrud MD;  Location:  [facility];  Service: Cardiovascular;;    CAROTID ENDARTERECTOMY  2000    Left    CAROTID ENDARTERECTOMY Left 06/26/2001    CHOLECYSTECTOMY      COLONOSCOPY  2010    COLONOSCOPY  2012    CORONARY ARTERY BYPASS GRAFT  08/2011    CORONARY STENT PLACEMENT      X17 stents    CORONARY STENT PLACEMENT      x5    INTERVENTIONAL RADIOLOGY PROCEDURE N/A 05/06/2024    Procedure: Intravascular Ultrasound;  Surgeon: Gurwinder Hurd MD;  Location:  [facility];  Service: Cardiovascular;  Laterality: N/A;      General Information       Row Name 06/20/24 1054          Physical Therapy Time and Intention    Document Type evaluation  -LW     Mode of Treatment individual therapy;physical therapy  -LW       Row Name 06/20/24 1055          General Information    Patient Profile Reviewed yes  -LW     Prior Level of Function independent:  -LW     Existing Precautions/Restrictions fall  -LW     Barriers  to Rehab none identified  -LW       Row Name 06/20/24 1054          Living Environment    People in Home spouse  -LW       Row Name 06/20/24 1054          Home Main Entrance    Number of Stairs, Main Entrance one  -LW       Row Name 06/20/24 1054          Stairs Within Home, Primary    Number of Stairs, Within Home, Primary none  -LW       Row Name 06/20/24 1054          Cognition    Orientation Status (Cognition) oriented x 4  -LW               User Key  (r) = Recorded By, (t) = Taken By, (c) = Cosigned By      Initials Name Provider Type    Coby Olivier PT Physical Therapist                   Mobility       Row Name 06/20/24 1055          Bed Mobility    Bed Mobility supine-sit;sit-supine  -LW     Supine-Sit [place] (Bed Mobility) independent  -LW     Sit-Supine [place] (Bed Mobility) independent  -LW       Row Name 06/20/24 1055          Sit-Stand Transfer    Sit-Stand [place] (Transfers) standby assist  -LW     Assistive Device (Sit-Stand Transfers) other (see comments)  no AD  -LW       Row Name 06/20/24 1055          Gait/Stairs (Locomotion)    [place] Level (Gait) standby assist  -LW     Assistive Device (Gait) other (see comments)  no AD  -LW     Patient was able to Ambulate yes  -LW     Distance in Feet (Gait) 200  -LW     Deviations/Abnormal Patterns (Gait) base of support, wide;gait speed decreased  -LW     Comment, (Gait/Stairs) no unsteadiness or LOB  -LW               User Key  (r) = Recorded By, (t) = Taken By, (c) = Cosigned By      Initials Name Provider Type    Coby Olivier PT Physical Therapist                   Obj/Interventions       Row Name 06/20/24 1056          Range of Motion Comprehensive    General Range of Motion no range of motion deficits identified  -LW       Row Name 06/20/24 1056          Strength Comprehensive (MMT)    General Manual Muscle Testing (MMT) Assessment no strength deficits identified  -       Row Name 06/20/24 1056          Balance     Balance Assessment sitting static balance;sitting dynamic balance;standing static balance;standing dynamic balance  -LW     Static Sitting Balance independent  -LW     Dynamic Sitting Balance independent  -LW     Position, Sitting Balance sitting edge of bed  -LW     Static Standing Balance standby assist  -LW     Dynamic Standing Balance standby assist  -LW     Position/Device Used, Standing Balance unsupported  -LW               User Key  (r) = Recorded By, (t) = Taken By, (c) = Cosigned By      Initials Name Provider Type    Coby Olivier, PT Physical Therapist                   Goals/Plan    No documentation.                  Clinical Impression       Row Name 06/20/24 1056          Pain    Pretreatment Pain Rating 0/10 - no pain  -LW     Posttreatment Pain Rating 0/10 - no pain  -LW       Row Name 06/20/24 1056          Plan of Care Review    Plan of Care Reviewed With patient  -LW     Progress no change  -LW     Outcome Evaluation Patient is a 72 y/o male admitted to [facility] for shingles. His PMH includes a CVA. He currenly lives with his wife and has 1 ALESSIA. Prior to admission he was indep with all mobility. Today he completed bed mobility indep and ambualted 200' SBA w/o an AD. His gait was steady with no LOB. There are no identified needs for continued acute PT. PT will sign off.  -       Row Name 06/20/24 1056          Therapy Assessment/Plan (PT)    Criteria for Skilled Interventions Met (PT) no;no problems identified which require skilled intervention  -     Therapy Frequency (PT) evaluation only  -       Row Name 06/20/24 1056          Positioning and Restraints    Pre-Treatment Position in bed  -LW     Post Treatment Position bed  -LW     In Bed supine;call light within reach;encouraged to call for assist;with family/caregiver;notified nsg  no alarm on entry  -LW               User Key  (r) = Recorded By, (t) = Taken By, (c) = Cosigned By      Initials Name Provider Type    Coby Olivier, PT  Physical Therapist                   Outcome Measures       Row Name 06/20/24 1059 06/20/24 0019       How much help from another person do you currently need...    Turning from your back to your side while in flat bed without using bedrails? 4  -LW 3  -EA    Moving from lying on back to sitting on the side of a flat bed without bedrails? 4  -LW 3  -EA    Moving to and from a bed to a chair (including a wheelchair)? 4  -LW 3  -EA    Standing up from a chair using your arms (e.g., wheelchair, bedside chair)? 4  -LW 3  -EA    Climbing 3-5 steps with a railing? 4  -LW 2  -EA    To walk in hospital room? 4  -LW 2  -EA    AM-PAC 6 Clicks Score (PT) 24  -LW 16  -EA    Highest Level of Mobility Goal 8 --> Walked 250 feet or more  -LW 5 --> Static standing  -EA      Row Name 06/20/24 1059          Functional Assessment    Outcome Measure Options AM-PAC 6 Clicks Basic Mobility (PT)  -               User Key  (r) = Recorded By, (t) = Taken By, (c) = Cosigned By      Initials Name Provider Type    LW Coby Bustillo, PT Physical Therapist    Lexi Higgins RN Registered Nurse                                 Physical Therapy Education       Title: PT OT SLP Therapies (In Progress)       Topic: Physical Therapy (In Progress)       Point: Mobility training (Done)       Learning Progress Summary             Patient Acceptance, E, VU by  at 6/20/2024 1059                         Point: Home exercise program (Not Started)       Learner Progress:  Not documented in this visit.              Point: Body mechanics (Done)       Learning Progress Summary             Patient Acceptance, E, VU by  at 6/20/2024 1059                         Point: Precautions (Done)       Learning Progress Summary             Patient Acceptance, E, VU by  at 6/20/2024 1059                                         User Key       Initials Effective Dates Name Provider Type Discipline     05/08/23 -  Coby Bustillo, PT Physical Therapist PT                   PT Recommendation and Plan     Plan of Care Reviewed With: patient  Progress: no change  Outcome Evaluation: Patient is a 72 y/o male admitted to [facility] for shingles. His PMH includes a CVA. He currenly lives with his wife and has 1 ALESSIA. Prior to admission he was indep with all mobility. Today he completed bed mobility indep and ambualted 200' SBA w/o an AD. His gait was steady with no LOB. There are no identified needs for continued acute PT. PT will sign off.     Time Calculation:         PT Charges       Row Name 06/20/24 1059             Time Calculation    Start Time 0820  -LW      Stop Time 0836  -LW      Time Calculation (min) 16 min  -LW      PT Received On 06/20/24  -LW         Time Calculation- PT    Total Timed Code Minutes- PT 10 minute(s)  -LW         Timed Charges    43764 - PT Therapeutic Activity Minutes 10  -LW         Total Minutes    Timed Charges Total Minutes 10  -LW       Total Minutes 10  -LW                User Key  (r) = Recorded By, (t) = Taken By, (c) = Cosigned By      Initials Name Provider Type    LW Coby Bustillo, PT Physical Therapist                  Therapy Charges for Today       Code Description Service Date Service Provider Modifiers Qty    13248512282 HC PT THERAPEUTIC ACT EA 15 MIN 6/20/2024 Coby Bustillo, PT GP 1    51874004666 HC PT EVAL MOD COMPLEXITY 2 6/20/2024 Coby Bustillo, PT GP 1            PT G-Codes  Outcome Measure Options: AM-PAC 6 Clicks Basic Mobility (PT)  AM-PAC 6 Clicks Score (PT): 24  PT Discharge Summary  Anticipated Discharge Disposition (PT): home    Coby Bustillo PT  6/20/2024

## 2024-06-21 ENCOUNTER — READMISSION MANAGEMENT (OUTPATIENT)
Dept: CALL CENTER | Facility: HOSPITAL | Age: 74
End: 2024-06-21
Payer: MEDICARE

## 2024-06-21 VITALS
RESPIRATION RATE: 18 BRPM | SYSTOLIC BLOOD PRESSURE: 147 MMHG | DIASTOLIC BLOOD PRESSURE: 72 MMHG | OXYGEN SATURATION: 100 % | WEIGHT: 190 LBS | HEIGHT: 71 IN | BODY MASS INDEX: 26.6 KG/M2 | TEMPERATURE: 98.1 F | HEART RATE: 79 BPM

## 2024-06-21 PROBLEM — A41.9 SEPSIS: Status: RESOLVED | Noted: 2024-06-19 | Resolved: 2024-06-21

## 2024-06-21 LAB
ANION GAP SERPL CALCULATED.3IONS-SCNC: 10 MMOL/L (ref 5–15)
BACTERIA SPEC AEROBE CULT: ABNORMAL
BUN SERPL-MCNC: 13 MG/DL (ref 8–23)
BUN/CREAT SERPL: 18.1 (ref 7–25)
CALCIUM SPEC-SCNC: 8.3 MG/DL (ref 8.6–10.5)
CHLORIDE SERPL-SCNC: 103 MMOL/L (ref 98–107)
CO2 SERPL-SCNC: 22 MMOL/L (ref 22–29)
CREAT SERPL-MCNC: 0.72 MG/DL (ref 0.76–1.27)
DEPRECATED RDW RBC AUTO: 49.6 FL (ref 37–54)
EGFRCR SERPLBLD CKD-EPI 2021: 95.9 ML/MIN/1.73
ERYTHROCYTE [DISTWIDTH] IN BLOOD BY AUTOMATED COUNT: 14.3 % (ref 12.3–15.4)
GLUCOSE SERPL-MCNC: 110 MG/DL (ref 65–99)
HCT VFR BLD AUTO: 33.8 % (ref 37.5–51)
HGB BLD-MCNC: 11.1 G/DL (ref 13–17.7)
MCH RBC QN AUTO: 31.4 PG (ref 26.6–33)
MCHC RBC AUTO-ENTMCNC: 32.8 G/DL (ref 31.5–35.7)
MCV RBC AUTO: 95.5 FL (ref 79–97)
PLATELET # BLD AUTO: 226 10*3/MM3 (ref 140–450)
PMV BLD AUTO: 9.2 FL (ref 6–12)
POTASSIUM SERPL-SCNC: 4.1 MMOL/L (ref 3.5–5.2)
RBC # BLD AUTO: 3.54 10*6/MM3 (ref 4.14–5.8)
SODIUM SERPL-SCNC: 135 MMOL/L (ref 136–145)
WBC NRBC COR # BLD AUTO: 10.67 10*3/MM3 (ref 3.4–10.8)

## 2024-06-21 PROCEDURE — 80048 BASIC METABOLIC PNL TOTAL CA: CPT | Performed by: NURSE PRACTITIONER

## 2024-06-21 PROCEDURE — 25010000002 CEFTRIAXONE PER 250 MG: Performed by: NURSE PRACTITIONER

## 2024-06-21 PROCEDURE — 85027 COMPLETE CBC AUTOMATED: CPT | Performed by: NURSE PRACTITIONER

## 2024-06-21 RX ORDER — AMOXICILLIN AND CLAVULANATE POTASSIUM 875; 125 MG/1; MG/1
1 TABLET, FILM COATED ORAL 2 TIMES DAILY
Qty: 8 TABLET | Refills: 0 | Status: SHIPPED | OUTPATIENT
Start: 2024-06-21 | End: 2024-06-25

## 2024-06-21 RX ADMIN — LISINOPRIL 40 MG: 40 TABLET ORAL at 08:43

## 2024-06-21 RX ADMIN — AMLODIPINE BESYLATE 5 MG: 5 TABLET ORAL at 08:43

## 2024-06-21 RX ADMIN — ASPIRIN 81 MG: 81 TABLET, COATED ORAL at 08:43

## 2024-06-21 RX ADMIN — Medication 10 ML: at 08:43

## 2024-06-21 RX ADMIN — CLOPIDOGREL BISULFATE 75 MG: 75 TABLET, FILM COATED ORAL at 08:43

## 2024-06-21 RX ADMIN — CEFTRIAXONE 2000 MG: 2 INJECTION, POWDER, FOR SOLUTION INTRAMUSCULAR; INTRAVENOUS at 04:03

## 2024-06-21 RX ADMIN — PANTOPRAZOLE SODIUM 40 MG: 40 TABLET, DELAYED RELEASE ORAL at 08:43

## 2024-06-21 RX ADMIN — HYDROCODONE BITARTRATE AND ACETAMINOPHEN 1 TABLET: 7.5; 325 TABLET ORAL at 06:49

## 2024-06-21 RX ADMIN — PREGABALIN 150 MG: 75 CAPSULE ORAL at 08:43

## 2024-06-21 RX ADMIN — LUBIPROSTONE 8 MCG: 8 CAPSULE, GELATIN COATED ORAL at 08:43

## 2024-06-21 RX ADMIN — ATENOLOL 50 MG: 50 TABLET ORAL at 08:43

## 2024-06-21 NOTE — PLAN OF CARE
Goal Outcome Evaluation:            Outcome Evaluation: vss. on room air. sinus rhythm on the monitor. prn norco given. no significant changes during the shift. care ongoing.

## 2024-06-21 NOTE — DISCHARGE SUMMARY
[facility]    Date of Admission: 6/19/2024  Date of Discharge:  6/21/2024    PCP: Triny Hannon MD    Discharge Diagnosis:   Active Hospital Problems    Diagnosis  POA    **Acute UTI [N39.0]  Yes    Acute urinary retention [R33.8]  Yes    Shingles [B02.9]  Yes    Peripheral artery disease [I73.9]  Yes    S/P CABG (coronary artery bypass graft) [Z95.1]  Not Applicable    Coronary atherosclerosis [I25.10]  Yes    Essential hypertension [I10]  Yes    Dyslipidemia [E78.5]  Yes      Resolved Hospital Problems    Diagnosis Date Resolved POA    Sepsis [A41.9] 06/21/2024 Yes     Procedures Performed  none     Consults       Date and Time Order Name Status Description    6/19/2024 11:16 AM Inpatient Urology Consult Completed     6/19/2024 11:16 AM Inpatient Cardiology Consult Completed     6/19/2024  4:36 AM [facility] (on-call MD unless specified) Details            Hospital Course  Please see history and physical for details. Patient is a 73 y.o. male who presented to the hospital with complaints of fever and chills.  He was at this facility at the beginning of May where he underwent left heart catheterization and found to have 90% proximal left main and when she underwent PCI.  Discharged and readmitted shortly thereafter for chest pain and rash where he was found to have shingles.  Thereafter he was in the emergency room for urinary retention and discharged with tamsulosin.  He apparently had had a recent UTI and treated with Macrobid as well.  He was found to have signs of sepsis on admission as well as urinary tract infection prompting further admission.    He was started empirically on Rocephin and blood cultures were monitored.  These have been negative growth today and lactic normalized with fluids.  Chest x-ray on admission showed edema versus atypical infection, but RVP was negative and he clinically did not appear to have pneumonia.  However, he was on  antibiotics covering his urinary tract that would also cover any lung etiology.  His white blood cells were high in the 20 range, but have normalized today.  His urine culture is growing greater than 100,000 of Klebsiella.  He will be discharged on another 4 days of oral antibiotic with amoxicillin plus clavulanate given his susceptibilities.  He did have recent issues with his heart was seen by cardiology as he had recent PCI of his left main to mid LAD in May.  He was continued on aspirin and clopidogrel along with his statin and has not had any chest pain or trouble breathing this admission.  Cardiology did follow and felt he was stable from their standpoint.  He has been voiding during this hospital stay and urology did briefly see and recommended continued outpatient follow-up 6/27.  He has been intolerant to Flomax secondary to palpitations.  His main issue other than his infection has been ongoing pain related to shingles.  He is already completed a course of acyclovir and has been established with his primary care doctor and pain management for post herpetic neuralgia.  He has been recently switched to a higher dosing of Lyrica and has been continued on [place] as needed.  He recently did have some localized injections to the area as well.  He has an appointment on Monday and should continue close outpatient follow-up for pain management of his shingles.   On the day of discharge, he denies any chest pain, dyspnea, cough, fever or chills.  He denies any nausea, vomiting or abdominal pain.  His only complaint is his shingles rash.  He has been voiding well and denies any trouble with his bowels or eating.  He is medically stable at this time and should follow-up with his PCP, pain management, urology and cardiology as advised.    I discussed the patient's findings and my recommendations with patient, family, and Dr. Isaac and pharmacist .    Condition on Discharge: Improved.     Temp:  [98.2 °F (36.8 °C)-98.6  °F (37 °C)] 98.4 °F (36.9 °C)  Heart Rate:  [76] 76  Resp:  [18] 18  BP: (110-153)/(62-71) 134/62  Body mass index is 26.5 kg/m².    Physical Exam  Vitals and nursing note reviewed.   Constitutional:       Appearance: He is not toxic-appearing.   Cardiovascular:      Rate and Rhythm: Normal rate and regular rhythm.      Pulses: Normal pulses.   Pulmonary:      Effort: Pulmonary effort is normal. No respiratory distress.      Breath sounds: Normal breath sounds.   Abdominal:      General: Bowel sounds are normal. There is no distension.      Palpations: Abdomen is soft.      Tenderness: There is no abdominal tenderness.   Musculoskeletal:         General: No swelling. Normal range of motion.   Skin:     General: Skin is warm and dry.      Findings: Rash (dried under right axilla extending midline to upper back.) present. No bruising.   Neurological:      General: No focal deficit present.      Mental Status: He is alert and oriented to person, place, and time.      Sensory: No sensory deficit.      Motor: Weakness present.      Coordination: Coordination normal.   Psychiatric:         Mood and Affect: Mood normal.         Behavior: Behavior normal.        Discharge Medications        New Medications        Instructions Start Date   amoxicillin-clavulanate 875-125 MG per tablet  Commonly known as: AUGMENTIN   1 tablet, Oral, 2 Times Daily             Continue These Medications        Instructions Start Date   amLODIPine 5 MG tablet  Commonly known as: NORVASC   5 mg, Oral, Every 24 Hours Scheduled      aspirin 81 MG tablet   81 mg, Oral, Daily      atenolol 50 MG tablet  Commonly known as: TENORMIN   50 mg, Oral, 2 Times Daily      atorvastatin 40 MG tablet  Commonly known as: LIPITOR   40 mg, Oral, Nightly      clopidogrel 75 MG tablet  Commonly known as: PLAVIX   75 mg, Oral, Daily      hydroCHLOROthiazide 25 MG tablet   25 mg, Oral, Daily PRN      HYDROcodone-acetaminophen 7.5-325 MG per tablet  Commonly known as:  NORCO   1 tablet, Oral, Every 8 Hours PRN      KLOR-CON M20 PO   20 mEq, Oral, Take As Directed      Linzess 145 MCG capsule capsule  Generic drug: linaclotide   1 capsule, Oral, Every Other Day      lisinopril 40 MG tablet  Commonly known as: PRINIVIL,ZESTRIL   40 mg, Oral, 2 Times Daily      loratadine 10 MG tablet  Commonly known as: CLARITIN   10 mg, Oral, Daily      meclizine 12.5 MG tablet  Commonly known as: ANTIVERT   12.5 mg, Oral, Daily      MULTIVITAMIN ADULT EXTRA C PO   1 tablet, Oral, Daily      nitroglycerin 0.4 MG SL tablet  Commonly known as: NITROSTAT   0.4 mg, Sublingual, Every 5 Minutes PRN, Take no more than 3 doses in 15 minutes.       pantoprazole 40 MG EC tablet  Commonly known as: PROTONIX   40 mg, Oral, 2 Times Daily      pregabalin 100 MG capsule  Commonly known as: LYRICA   150 mg, Oral, 3 Times Daily      saccharomyces boulardii 250 MG capsule  Commonly known as: FLORASTOR   250 mg, Oral, Nightly      sucralfate 1 g tablet  Commonly known as: CARAFATE   1 g, Oral, Every Night at Bedtime      triamcinolone 0.1 % cream  Commonly known as: KENALOG   1 Application, Topical, 2 Times Daily PRN      Uro- MG capsule   1 capsule, Oral, Daily      VITAMIN B12 PO   Oral, Take As Directed      Vitamin D 50 MCG (2000 UT) tablet   2,000 Units, Oral, Daily              Diet Instructions       Diet: Regular/House Diet, Cardiac Diets; Healthy Heart (2-3 Na+); Regular (IDDSI 7); Thin (IDDSI 0)      Discharge Diet:  Regular/House Diet  Cardiac Diets       Cardiac Diet: Healthy Heart (2-3 Na+)    Texture: Regular (IDDSI 7)    Fluid Consistency: Thin (IDDSI 0)           Activity Instructions       Activity as Tolerated             Additional Instructions for the Follow-ups that You Need to Schedule       Discharge Follow-up with PCP   As directed       Currently Documented PCP:    Triny Hannon MD    PCP Phone Number:    [phone]     Follow Up Details: see next week for ongoing management  of Shingles pain and follow up UTI        Discharge Follow-up with Specified Provider: Cardiology as scheduled   As directed      To: Cardiology as scheduled        Discharge Follow-up with Specified Provider: Urology as scheduled   As directed      To: Urology as scheduled               Follow-up Information       Triny Hannon MD .    Specialty: Internal Medicine  Why: see next week for ongoing management of Shingles pain and follow up UTI  Contact information:  [address] [place] [id number] [phone]                           Test Results Pending at Discharge  Pending Labs       Order Current Status    Blood Culture - Blood, Arm, Left Preliminary result    Blood Culture - Blood, Arm, Right Preliminary result           Marycarmen Buchanan, APRN  06/21/24  13:55 EDT    Discharge time spent greater than 30 minutes.

## 2024-06-21 NOTE — PLAN OF CARE
Goal Outcome Evaluation:      AVS completed and discharge instructions provided to patient. PIV x1 removed. Patient to let us know when his wife is ready with transportation.

## 2024-06-22 NOTE — OUTREACH NOTE
Prep Survey      Flowsheet Row Responses   [facility] facility patient discharged from? [facility]   Is LACE score < 7 ? No   Eligibility Readm Mgmt   Discharge diagnosis Acute UTI   Does the patient have one of the following disease processes/diagnoses(primary or secondary)? Other   Does the patient have Home health ordered? No   Is there a DME ordered? No   Prep survey completed? Yes            EILEEN AVALOS - Registered Nurse

## 2024-06-22 NOTE — CASE MANAGEMENT/SOCIAL WORK
Case Management Discharge Note      Final Note: home         Selected Continued Care - Discharged on 6/21/2024 Admission date: 6/19/2024 - Discharge disposition: Home or Self Care      Destination    No services have been selected for the patient.                Durable Medical Equipment    No services have been selected for the patient.                Dialysis/Infusion    No services have been selected for the patient.                Home Medical Care    No services have been selected for the patient.                Therapy    No services have been selected for the patient.                Community Resources    No services have been selected for the patient.                Community & DME    No services have been selected for the patient.                         Final Discharge Disposition Code: 01 - home or self-care

## 2024-06-24 LAB
BACTERIA SPEC AEROBE CULT: NORMAL
BACTERIA SPEC AEROBE CULT: NORMAL

## 2024-06-25 ENCOUNTER — READMISSION MANAGEMENT (OUTPATIENT)
Dept: CALL CENTER | Facility: HOSPITAL | Age: 74
End: 2024-06-25
Payer: MEDICARE

## 2024-06-25 NOTE — OUTREACH NOTE
Medical Week 1 Survey      Flowsheet Row Responses   [facility] [place] patient discharged from? [place]   Does the patient have one of the following disease processes/diagnoses(primary or secondary)? Other   Week 1 attempt successful? No   Unsuccessful attempts Attempt 1            Gema Ambrocio Licensed Nurse

## 2024-07-01 ENCOUNTER — READMISSION MANAGEMENT (OUTPATIENT)
Dept: CALL CENTER | Facility: HOSPITAL | Age: 74
End: 2024-07-01
Payer: MEDICARE

## 2024-07-01 PROBLEM — R10.32 LEFT LOWER QUADRANT ABDOMINAL PAIN: Status: ACTIVE | Noted: 2024-07-01

## 2024-07-01 PROBLEM — R19.7 DIARRHEA: Status: ACTIVE | Noted: 2024-07-01

## 2024-07-01 PROBLEM — D72.829 LEUKOCYTOSIS: Status: ACTIVE | Noted: 2024-07-01

## 2024-07-01 PROBLEM — E87.1 HYPONATREMIA: Status: ACTIVE | Noted: 2024-07-01

## 2024-07-01 NOTE — H&P
Internal medicine history and physical  INTERNAL MEDICINE   [facility]       Patient Identification:  Name: Slade Martinez  Age: 73 y.o.  Sex: male  :  1950  MRN: [id number]                   Primary Care Physician: Triny Hannon MD                               Date of admission:2024    Chief Complaint: Diarrhea since Friday, discomfort in the chest today.    History of Present Illness:   Patient and patient's wife are the source of information and they are all over the place in terms of describing his symptoms and reason for which he came to the hospital.  Basically according to the wife patient has been not doing very well for the last 3 months and in that process has had issues with abdominal pain, chest pain and has had left heart catheterization and PCI performed in May 2024.  Patient has since then has significant decrease in appetite in fact was hospitalized for UTI on 2024 through 2024.  His urine culture was positive for Klebsiella pneumonia for which patient was switched from Rocephin to Augmentin which he finished on Friday.  According to the patient and his wife the chest pain has never left him but was not an issue until over the weekend and in fact today he was having significant chest pain in the middle of the chest while sitting in the recliner.  He was also having diarrhea and abdominal pain since Friday.  Patient is haphazard historian and jumps from 1 symptom to the other symptoms without completing their description of symptoms in terms of exacerbating or relieving factors as well as onset and duration of symptoms.  In the emergency room patient was evaluated and found to have urinalysis unremarkable his CBC did show WBC count of 18,000 creatinine of 0.75 sodium of 129 lactic acid level of 2.5.  When he left the hospital on 2024 he was noted to have white blood cell count of 10.6 and sodium of 135.  He did have white blood cell count 24,000  when he came to the hospital on 6/19/2022 when improved on Rocephin.  Patient has mention also complaining of abdominal pain for which CT scan of the abdomen pelvis was performed and it did not show any acute intra-abdominal or pelvic process to explain his leukocytosis and abdominal pain.  Patient was noted to have chronic diverticulosis and atherosclerotic changes in the superior mesenteric artery.  Patient does carry the diagnosis of chronic mesenteric ischemia.  Patient denies any hematemesis or melena.      Past Medical History:  Past Medical History:   Diagnosis Date    Anxiety     Atherosclerosis of native arteries of extremities with intermittent claudication, bilateral legs 03/24/2020    PVD    Bilateral carotid artery stenosis 03/24/2020    CAD (coronary artery disease)     unspecified    Carotid artery disease     Cholelithiasis     Constipation     COPD (chronic obstructive pulmonary disease)     Coronary atherosclerosis 11/04/2019    H/O CABG SVG to first diagonal branch and to the LAD as well as SVG to the lateral marginal branch of the circumflex complicated by right sided subcortical infarct with left sided hemiparesis    Degenerative disc disease, lumbar 11/24/2021    Dyslipidemia 11/04/2019    Essential hypertension 11/04/2019    GERD (gastroesophageal reflux disease)     Hyperlipidemia     Hypertension     Injury of back     fractured vertebra 2/26/23    Ischemic heart disease     Peripheral edema     PVD (peripheral vascular disease) with claudication     latanya legs    Stroke 2011    Unilateral osteoarthritis of hip 11/24/2021    Vertigo      Past Surgical History:  Past Surgical History:   Procedure Laterality Date    ANGIOPLASTY      x2    CARDIAC CATHETERIZATION N/A 05/06/2024    Procedure: Left Heart Cath;  Surgeon: Gurwinder Hurd MD;  Location: [facility] CATH INVASIVE LOCATION;  Service: Cardiovascular;  Laterality: N/A;    CARDIAC CATHETERIZATION N/A 05/06/2024    Procedure: Coronary  angiography;  Surgeon: Gurwinder Hurd MD;  Location:  [facility] CATH INVASIVE LOCATION;  Service: Cardiovascular;  Laterality: N/A;    CARDIAC CATHETERIZATION N/A 05/06/2024    Procedure: Percutaneous Coronary Intervention;  Surgeon: Gurwinder Hurd MD;  Location:  [facility] CATH INVASIVE LOCATION;  Service: Cardiovascular;  Laterality: N/A;    CARDIAC CATHETERIZATION N/A 05/06/2024    Procedure: Stent TC coronary;  Surgeon: Gurwinder Hurd MD;  Location:  [facility] CATH INVASIVE LOCATION;  Service: Cardiovascular;  Laterality: N/A;    CARDIAC CATHETERIZATION  05/06/2024    Procedure: Saphenous Vein Graft;  Surgeon: Gurwinder Hurd MD;  Location:  [facility] CATH INVASIVE LOCATION;  Service: Cardiovascular;;    CAROTID ENDARTERECTOMY  2000    Left    CAROTID ENDARTERECTOMY Left 06/26/2001    CHOLECYSTECTOMY      COLONOSCOPY  2010    COLONOSCOPY  2012    CORONARY ARTERY BYPASS GRAFT  08/2011    CORONARY STENT PLACEMENT      X17 stents    CORONARY STENT PLACEMENT      x5    INTERVENTIONAL RADIOLOGY PROCEDURE N/A 05/06/2024    Procedure: Intravascular Ultrasound;  Surgeon: Gurwinder Hurd MD;  [facility];  Service: Cardiovascular;  Laterality: N/A;      Home Meds:  (Not in a hospital admission)    Current Meds:     Current Facility-Administered Medications:     aspirin tablet 325 mg, 325 mg, Oral, Once, Emergency, Triage Protocol, MD    sodium chloride 0.9 % flush 10 mL, 10 mL, Intravenous, PRN, Emergency, Triage Protocol, MD    [COMPLETED] Insert Peripheral IV, , , Once **AND** sodium chloride 0.9 % flush 10 mL, 10 mL, Intravenous, PRN, Ricardo Baird MD    Current Outpatient Medications:     atenolol (TENORMIN) 50 MG tablet, Take 1 tablet by mouth 2 (Two) Times a Day., Disp: , Rfl:     atorvastatin (LIPITOR) 40 MG tablet, Take 1 tablet by mouth Every Night., Disp: , Rfl:     amLODIPine (NORVASC) 5 MG tablet, Take 1 tablet by mouth Daily., Disp: 30 tablet, Rfl: 3     aspirin 81 MG tablet, Take 1 tablet by mouth Daily., Disp: , Rfl:     Cholecalciferol (Vitamin D) 50 MCG (2000 UT) tablet, Take 1 tablet by mouth Daily., Disp: , Rfl:     clopidogrel (PLAVIX) 75 MG tablet, Take 1 tablet by mouth Daily., Disp: , Rfl:     Cyanocobalamin (VITAMIN B12 PO), Take  by mouth Take As Directed., Disp: , Rfl:     hydroCHLOROthiazide 25 MG tablet, Take 1 tablet by mouth Daily As Needed (swelling)., Disp: , Rfl:     HYDROcodone-acetaminophen (NORCO) 7.5-325 MG per tablet, Take 1 tablet by mouth Every 8 (Eight) Hours As Needed for Moderate Pain., Disp: , Rfl:     Linzess 145 MCG capsule capsule, Take 1 capsule by mouth Every Other Day., Disp: , Rfl:     lisinopril (PRINIVIL,ZESTRIL) 40 MG tablet, Take 1 tablet by mouth 2 (Two) Times a Day., Disp: , Rfl:     loratadine (CLARITIN) 10 MG tablet, Take 1 tablet by mouth Daily., Disp: , Rfl:     meclizine (ANTIVERT) 12.5 MG tablet, Take 1 tablet by mouth Daily., Disp: , Rfl:     Meth-Hyo-M Bl-Na Phos-Ph Sal (Uro-MP) 118 MG capsule, Take 1 capsule by mouth Daily., Disp: , Rfl:     Multiple Vitamins-Minerals (MULTIVITAMIN ADULT EXTRA C PO), Take 1 tablet by mouth Daily., Disp: , Rfl:     nitroglycerin (NITROSTAT) 0.4 MG SL tablet, Place 1 tablet under the tongue Every 5 (Five) Minutes As Needed for Chest Pain. Take no more than 3 doses in 15 minutes., Disp: 25 tablet, Rfl: 3    pantoprazole (PROTONIX) 40 MG EC tablet, Take 1 tablet by mouth 2 (Two) Times a Day., Disp: , Rfl:     Potassium Chloride [place] ER (KLOR-CON M20 PO), Take 20 mEq by mouth Take As Directed., Disp: , Rfl:     pregabalin (LYRICA) 100 MG capsule, Take 150 mg by mouth 3 (Three) Times a Day., Disp: , Rfl:     saccharomyces boulardii (FLORASTOR) 250 MG capsule, Take 1 capsule by mouth Every Night., Disp: , Rfl:     sucralfate (CARAFATE) 1 g tablet, Take 1 tablet by mouth every night at bedtime., Disp: , Rfl:     triamcinolone (KENALOG) 0.1 % cream, Apply 1 Application topically to the  "appropriate area as directed 2 (Two) Times a Day As Needed for Rash., Disp: , Rfl:   Allergies:  Allergies   Allergen Reactions    Pletal [Cilostazol] Myalgia     .    Tamsulosin Palpitations     Social History:   Social History     Tobacco Use    Smoking status: Former     Types: Cigarettes    Smokeless tobacco: Never    Tobacco comments:     Patient used to smoke 3 packs per day and quit 22 years ago.   Substance Use Topics    Alcohol use: Not Currently     Comment: Patient is non drinker.      Family History:  Family History   Problem Relation Age of Onset    COPD Mother     Heart attack Father     Heart failure Father     COPD Brother           Review of Systems  See history of present illness and past medical history.   As described in history presenting illness  Vitals:   /81   Pulse 85   Temp 97.5 °F (36.4 °C)   Resp 18   Ht 180.3 cm (71\")   Wt 81.6 kg (180 lb)   SpO2 98%   BMI 25.10 kg/m²   I/O: No intake or output data in the 24 hours ending 07/01/24 1629  Exam:  Patient is examined using the personal protective equipment as per guidelines from infection control for this particular patient as enacted.  Hand washing was performed before and after patient interaction.  General Appearance:  Alert cooperative nontoxic-appearing male   Head:    Normocephalic, without obvious abnormality, atraumatic   Eyes:    PERRL, conjunctiva/corneas clear, EOM's intact, both eyes   Ears:    Normal external ear canals, both ears   Nose:   Nares normal, septum midline, mucosa normal, no drainage    or sinus tenderness   Throat:   Lips, tongue, gums normal; oral mucosa pink and moist   Neck: Supple no adenopathy   Back:     Symmetric, no curvature, ROM normal, no CVA tenderness   Lungs:     Clear to auscultation bilaterally, respirations unlabored   Chest Wall:  No chest wall tenderness noted    Heart:  S1-S2 regular   Abdomen:   Soft generalized tenderness no guarding rigidity or rebound   Extremities:   " Extremities normal, atraumatic, no cyanosis or edema   Pulses:   Pulses palpable in all extremities; symmetric all extremities   Skin: No rash noted   Neurologic: Alert and oriented x 3       Data Review:      I reviewed the patient's new clinical results.  Results from last 7 days   Lab Units 07/01/24  1413   WBC 10*3/mm3 18.77*   HEMOGLOBIN g/dL 13.1   PLATELETS 10*3/mm3 332     Results from last 7 days   Lab Units 07/01/24  1413   SODIUM mmol/L 129*   POTASSIUM mmol/L 4.9   CHLORIDE mmol/L 91*   CO2 mmol/L 25.8   BUN mg/dL 11   CREATININE mg/dL 0.75*   CALCIUM mg/dL 9.6   GLUCOSE mg/dL 137*     XR Chest 1 View    Result Date: 7/1/2024  No focal pulmonary consolidation. Borderline heart size. Follow-up as clinical indications persist.  This report was finalized on 7/1/2024 3:05 PM by Dr. Fredrick Love M.D on Workstation: [id number]      CT Abdomen Pelvis Without Contrast    Result Date: 7/1/2024   1. No acute findings identified in the abdomen or pelvis. 2. Colonic diverticulosis. 3. Bulky calcific atherosclerotic disease seen in the proximal SMA with suspected high-grade stenosis or vessel occlusion, appears similar to prior. Finding can be correlated for chronic mesenteric ischemia/postprandial abdominal pain.  This report was finalized on 7/1/2024 2:56 PM by Dr. Denilson Warner M.D on Workstation: [id number]      XR Chest 1 View    Result Date: 6/19/2024  Electronically signed by Leila Cronin MD on 06-19-24 at 0556   Microbiology Results (last 10 days)       ** No results found for the last 240 hours. **          Brief Urine Lab Results  (Last result in the past 365 days)        Color   Clarity   Blood   Leuk Est   Nitrite   Protein   CREAT   Urine HCG        07/01/24 1442 Dark Yellow   Clear   Negative   Negative   Negative   30 mg/dL (1+)                     Assessment:  Active Hospital Problems    Diagnosis  POA    **Left lower quadrant abdominal pain [R10.32]  Yes    Diarrhea [R19.7]  Unknown     Leukocytosis [D72.829]  Unknown    Hyponatremia [E87.1]  Unknown    Chest pain [R07.9]  Yes    Chronic mesenteric ischemia [K55.1]  Yes    S/P CABG (coronary artery bypass graft) [Z95.1]  Not Applicable    Essential hypertension [I10]  Yes       Medical decision making/CARE plan: See admitting orders  Abdominal pain with diarrhea and leukocytosis in the setting of recent use of antibiotic therapy for urinary tract infection with documented Klebsiella pneumonia in the urine and completing a 10-day course of Augmentin-plan is to admit the patient provided with IV fluids check stool for C. difficile toxin assay and monitor his clinical course.  Avoid broad-spectrum antibiotic therapy.  Follow-up on blood culture results and lactic acid level.  Dehydration with hyponatremia-continue with IV fluids, check orthostatics and urine and serum osmolality urine electrolytes.  Chest pain in the setting of recent PCI and prior history of coronary artery bypass grafting-check serial troponin and get cardiology consultation as per patient's request.  History of chronic mesenteric ischemia with recurrent abdominal pain-continue his current regimen including aspirin statins and beta-blockers as well as Plavix.  Monitor his lactic acid level for any persistent lactic acidosis or metabolic acidosis.  Hypertension-currently his blood pressure is significantly elevated-continue antihypertensive regimen and avoid hypotensive episodes.      Geetha Gramajo MD   7/1/2024  16:29 EDT    Parts of this note may be an electronic transcription/translation of spoken language to printed text using the Dragon dictation system.

## 2024-07-01 NOTE — CONSULTS
Date of Hospital Visit: 24  Encounter Provider: Negro Locke MD  Place of Service: [facility]  Patient Name: Slade Martinez  :1950  [id number]  Referral Provider: Geetha Gramajo MD    Chief complaint: Abdominal pain, loose stools    History of Present Illness: 73-year-old gentleman he has had a rough couple of months here.  He came in in May 2024 with chest pain that seemed atypical we evaluated him his stress test was not that unusual but he continued to have symptoms we ended up taking him to the Cath Lab and surprisingly to me he had 6 terrible left main disease and mid LAD disease his bypass was opened to the left his RCA was really free of disease he underwent complex intervention and stenting of his left main and LAD this was complicated by perforation of his LAD that had to be sealed but the end result was good his LV function has been pretty well-preserved.  Shortly after that PCI his shingles rash became manifest so the symptoms may have just been from shingles but he still had critical left main disease.  Following that he had issues with his bladder outlet obstruction and he was recently here in the hospital 2 weeks ago potentially with urosepsis he has had multiple bouts with antibiotics now for the last 3 days he has had diffuse abdominal discomfort loose stools no fever some chills no other localizing symptoms no cough no rash no joint pain he has lost about 20 pounds in the last couple of months.  No cough no headaches.  He has not had blood in his stools his blood pressure was low when he was septic but now and now is been high he did have an episode of chest pain which was unusual for him he says he cannot went through to his back no shortness of breath no other symptoms      Past Medical History:   Diagnosis Date    Anxiety     Atherosclerosis of native arteries of extremities with intermittent claudication, bilateral legs 2020    PVD     Bilateral carotid artery stenosis 03/24/2020    CAD (coronary artery disease)     unspecified    Carotid artery disease     Cholelithiasis     Constipation     COPD (chronic obstructive pulmonary disease)     Coronary atherosclerosis 11/04/2019    H/O CABG SVG to first diagonal branch and to the LAD as well as SVG to the lateral marginal branch of the circumflex complicated by right sided subcortical infarct with left sided hemiparesis    Degenerative disc disease, lumbar 11/24/2021    Dyslipidemia 11/04/2019    Essential hypertension 11/04/2019    GERD (gastroesophageal reflux disease)     Hyperlipidemia     Hypertension     Injury of back     fractured vertebra 2/26/23    Ischemic heart disease     Peripheral edema     PVD (peripheral vascular disease) with claudication     latanya legs    Stroke 2011    Unilateral osteoarthritis of hip 11/24/2021    Vertigo        Past Surgical History:   Procedure Laterality Date    ANGIOPLASTY      x2    CARDIAC CATHETERIZATION N/A 05/06/2024    Procedure: Left Heart Cath;  Surgeon: Gurwinder Hurd MD;  Location: [facility] CATH INVASIVE LOCATION;  Service: Cardiovascular;  Laterality: N/A;    CARDIAC CATHETERIZATION N/A 05/06/2024    Procedure: Coronary angiography;  Surgeon: Gurwinder Hurd MD;  Location: [facility] CATH INVASIVE LOCATION;  Service: Cardiovascular;  Laterality: N/A;    CARDIAC CATHETERIZATION N/A 05/06/2024    Procedure: Percutaneous Coronary Intervention;  Surgeon: Gurwinder Hurd MD;  Location: [facility] CATH INVASIVE LOCATION;  Service: Cardiovascular;  Laterality: N/A;    CARDIAC CATHETERIZATION N/A 05/06/2024    Procedure: Stent TC coronary;  Surgeon: Gurwinder Hurd MD;  Location: [facility] CATH INVASIVE LOCATION;  Service: Cardiovascular;  Laterality: N/A;    CARDIAC CATHETERIZATION  05/06/2024    Procedure: Saphenous Vein Graft;  Surgeon: Gurwinder Hurd MD;  Location: [facility] CATH INVASIVE LOCATION;  Service: Cardiovascular;;     CAROTID ENDARTERECTOMY  2000    Left    CAROTID ENDARTERECTOMY Left 06/26/2001    CHOLECYSTECTOMY      COLONOSCOPY  2010    COLONOSCOPY  2012    CORONARY ARTERY BYPASS GRAFT  08/2011    CORONARY STENT PLACEMENT      X17 stents    CORONARY STENT PLACEMENT      x5    INTERVENTIONAL RADIOLOGY PROCEDURE N/A 05/06/2024    Procedure: Intravascular Ultrasound;  Surgeon: Gurwinder Hurd MD;  Location: [facility] CATH INVASIVE LOCATION;  Service: Cardiovascular;  Laterality: N/A;       Medications Prior to Admission   Medication Sig Dispense Refill Last Dose    atenolol (TENORMIN) 50 MG tablet Take 1 tablet by mouth 2 (Two) Times a Day.       atorvastatin (LIPITOR) 40 MG tablet Take 1 tablet by mouth Every Night.       amLODIPine (NORVASC) 5 MG tablet Take 1 tablet by mouth Daily. 30 tablet 3     aspirin 81 MG tablet Take 1 tablet by mouth Daily.       Cholecalciferol (Vitamin D) 50 MCG (2000 UT) tablet Take 1 tablet by mouth Daily.       clopidogrel (PLAVIX) 75 MG tablet Take 1 tablet by mouth Daily.       Cyanocobalamin (VITAMIN B12 PO) Take  by mouth Take As Directed.       hydroCHLOROthiazide 25 MG tablet Take 1 tablet by mouth Daily As Needed (swelling).       HYDROcodone-acetaminophen (NORCO) 7.5-325 MG per tablet Take 1 tablet by mouth Every 8 (Eight) Hours As Needed for Moderate Pain.       Linzess 145 MCG capsule capsule Take 1 capsule by mouth Every Other Day.       lisinopril (PRINIVIL,ZESTRIL) 40 MG tablet Take 1 tablet by mouth 2 (Two) Times a Day.       loratadine (CLARITIN) 10 MG tablet Take 1 tablet by mouth Daily.       meclizine (ANTIVERT) 12.5 MG tablet Take 1 tablet by mouth Daily.       Meth-Hyo-M Bl-Na Phos-Ph Sal (Uro-MP) 118 MG capsule Take 1 capsule by mouth Daily.       Multiple Vitamins-Minerals (MULTIVITAMIN ADULT EXTRA C PO) Take 1 tablet by mouth Daily.       nitroglycerin (NITROSTAT) 0.4 MG SL tablet Place 1 tablet under the tongue Every 5 (Five) Minutes As Needed for Chest Pain. Take no  more than 3 doses in 15 minutes. 25 tablet 3     pantoprazole (PROTONIX) 40 MG EC tablet Take 1 tablet by mouth 2 (Two) Times a Day.       Potassium Chloride Arpita ER (KLOR-CON M20 PO) Take 20 mEq by mouth Take As Directed.       pregabalin (LYRICA) 100 MG capsule Take 150 mg by mouth 3 (Three) Times a Day.       saccharomyces boulardii (FLORASTOR) 250 MG capsule Take 1 capsule by mouth Every Night.       sucralfate (CARAFATE) 1 g tablet Take 1 tablet by mouth every night at bedtime.       triamcinolone (KENALOG) 0.1 % cream Apply 1 Application topically to the appropriate area as directed 2 (Two) Times a Day As Needed for Rash.          Current Meds  Scheduled Meds:amLODIPine, 5 mg, Oral, Q24H  aspirin, 325 mg, Oral, Once  atenolol, 50 mg, Oral, BID  atorvastatin, 40 mg, Oral, Nightly  cetirizine, 10 mg, Oral, Daily  cholecalciferol, 2,000 Units, Oral, Daily  clopidogrel, 75 mg, Oral, Daily  lisinopril, 40 mg, Oral, Q24H  meclizine, 12.5 mg, Oral, Daily  pantoprazole, 40 mg, Oral, BID  pregabalin, 150 mg, Oral, TID  saccharomyces boulardii, 250 mg, Oral, Nightly  sodium chloride, 10 mL, Intravenous, Q12H  vitamin B-12, 1,000 mcg, Oral, Daily      Continuous Infusions:sodium chloride, 100 mL/hr      PRN Meds:.  acetaminophen **OR** acetaminophen **OR** acetaminophen    Calcium Replacement - Follow Nurse / BPA Driven Protocol    hydroCHLOROthiazide    HYDROcodone-acetaminophen    Magnesium Standard Dose Replacement - Follow Nurse / BPA Driven Protocol    nitroglycerin    ondansetron    Phosphorus Replacement - Follow Nurse / BPA Driven Protocol    Potassium Replacement - Follow Nurse / BPA Driven Protocol    sodium chloride    [COMPLETED] Insert Peripheral IV **AND** sodium chloride    sodium chloride    sodium chloride    Allergies as of 07/01/2024 - Reviewed 07/01/2024   Allergen Reaction Noted    Pletal [cilostazol] Myalgia 11/04/2019    Tamsulosin Palpitations 06/20/2024       Social History     Socioeconomic  History    Marital status:    Tobacco Use    Smoking status: Former     Types: Cigarettes    Smokeless tobacco: Never    Tobacco comments:     Patient used to smoke 3 packs per day and quit 22 years ago.   Vaping Use    Vaping status: Never Used   Substance and Sexual Activity    Alcohol use: Not Currently     Comment: Patient is non drinker.    Drug use: Never     Comment: Drug Abuse: no drug abuse    Sexual activity: Defer       Family History   Problem Relation Age of Onset    COPD Mother     Heart attack Father     Heart failure Father     COPD Brother        REVIEW OF SYSTEMS:   ROS was performed and is negative except as outlined in HPI     REVIEW OF SYSTEMS:   CONSTITUTIONAL: No weight loss, fever, chills, weakness or fatigue.   HEENT: Eyes: No visual loss, blurred vision, double vision or yellow sclerae. Ears, Nose, Throat: No hearing loss, sneezing, congestion, runny nose or sore throat.   SKIN: No rash or itching.     RESPIRATORY: No shortness of breath, hemoptysis, cough or sputum.   GASTROINTESTINAL: No anorexia, nausea, vomiting or diarrhea. No abdominal pain, bright red blood per rectum or melena.  GENITOURINARY: No burning on urination, hematuria or increased frequency.  NEUROLOGICAL: No headache, dizziness, syncope, paralysis, ataxia, numbness or tingling in the extremities. No change in bowel or bladder control.   MUSCULOSKELETAL: No muscle, back pain, joint pain or stiffness.   HEMATOLOGIC: No anemia, bleeding or bruising.   LYMPHATICS: No enlarged nodes. No history of splenectomy.   PSYCHIATRIC: No history of depression, anxiety, hallucinations.   ENDOCRINOLOGIC: No reports of sweating, cold or heat intolerance. No polyuria or polydipsia.       Objective:   Temp:  [97.5 °F (36.4 °C)-98.6 °F (37 °C)] 98.6 °F (37 °C)  Heart Rate:  [79-92] 91  Resp:  [18-19] 18  BP: (146-202)/(72-95) 164/72  Body mass index is 25.09 kg/m².  Flowsheet Rows      Flowsheet Row First Filed Value   Admission Height  "180.3 cm (71\") Documented at 07/01/2024 1354   Admission Weight 81.6 kg (180 lb) Documented at 07/01/2024 1354          Vitals:    07/01/24 1730   BP:    Pulse: 91   Resp:    Temp:    SpO2: 100%       Head:    Normocephalic, without obvious abnormality, atraumatic   Eyes:            Lids and lashes normal, conjunctivae and sclerae normal, no   icterus, no pallor   Ears:    Ears appear intact with no abnormalities noted   Throat:   No oral lesions, dentition good   Neck:   No adenopathy, supple, trachea midline, no thyromegaly, no   carotid bruit, no JVD   Lungs:     Breath sounds are equal and clear to auscultation    Heart:    Normal S1 and S2, RRR, No M/G/R   Abdomen:     Normal bowel sounds, no masses, no organomegaly, soft        non-tender, non-distended, no guarding   Extremities:   Moves all extremities well, no edema, no cyanosis, no redness   Pulses:   Pulses palpable and equal bilaterally.    Skin:  Psychiatric:   No bleeding, bruising or rash    Awake, alert and oriented x 3, normal mood and affect             I personally viewed and interpreted the patient's EKG/Telemetry data    Assessment:  Active Hospital Problems    Diagnosis  POA    **Left lower quadrant abdominal pain [R10.32]  Yes    Diarrhea [R19.7]  Unknown    Leukocytosis [D72.829]  Unknown    Hyponatremia [E87.1]  Unknown    Chest pain [R07.9]  Yes    Chronic mesenteric ischemia [K55.1]  Yes    S/P CABG (coronary artery bypass graft) [Z95.1]  Not Applicable    Essential hypertension [I10]  Yes      Resolved Hospital Problems   No resolved problems to display.       Plan: I think his cardiac status is stable he has some small vessel disease and his blood pressure was high when he came in and his troponins are okay his EKG is abnormal but unchanged.  I would keep focused on the real problem which is abdominal pain increased white count diarrhea although he is not having multiple stools a day he certainly is at high risk for C. difficile.  We " will follow along with him for now    Negro Locke MD  07/01/24  17:49 EDT.

## 2024-07-01 NOTE — ED NOTES
Nursing report ED to floor  Slade Martinez  73 y.o.  male    HPI :  HPI (Adult)  Stated Reason for Visit: Cp and abd pain +diarrhea    Chief Complaint  Chief Complaint   Patient presents with    Chest Pain    Abdominal Pain       Admitting doctor:   Geetha Gramajo MD    Admitting diagnosis:   The primary encounter diagnosis was Left lower quadrant abdominal pain. Diagnoses of Chest pain, unspecified type, Leukocytosis, unspecified type, Lactic acidosis, Hyponatremia, and Hypertensive urgency were also pertinent to this visit.    Code status:   Current Code Status       Date Active Code Status Order ID Comments User Context       Prior            Allergies:   Pletal [cilostazol] and Tamsulosin    Isolation:   No active isolations    Intake and Output  No intake or output data in the 24 hours ending 07/01/24 1614    Weight:       07/01/24  1354   Weight: 81.6 kg (180 lb)       Most recent vitals:   Vitals:    07/01/24 1445 07/01/24 1509 07/01/24 1549 07/01/24 1557   BP: (!) 187/88 (!) 189/89 (!) 198/93 167/81   BP Location:       Patient Position:       Pulse: 82 84 83 85   Resp:  18  18   Temp:       SpO2: 99% 98%  98%   Weight:       Height:           Active LDAs/IV Access:   Lines, Drains & Airways       Active LDAs       Name Placement date Placement time Site Days    Peripheral IV 07/01/24 1412 Right Antecubital 07/01/24  1412  Antecubital  less than 1                    Labs (abnormal labs have a star):   Labs Reviewed   COMPREHENSIVE METABOLIC PANEL - Abnormal; Notable for the following components:       Result Value    Glucose 137 (*)     Creatinine 0.75 (*)     Sodium 129 (*)     Chloride 91 (*)     All other components within normal limits    Narrative:     GFR Normal >60  Chronic Kidney Disease <60  Kidney Failure <15    The GFR formula is only valid for adults with stable renal function between ages 18 and 70.   CBC WITH AUTO DIFFERENTIAL - Abnormal; Notable for the following components:    WBC 18.77 (*)      Lymphocyte % 12.6 (*)     Immature Grans % 0.6 (*)     Neutrophils, Absolute 14.01 (*)     Monocytes, Absolute 2.12 (*)     Immature Grans, Absolute 0.11 (*)     All other components within normal limits   URINALYSIS W/ MICROSCOPIC IF INDICATED (NO CULTURE) - Abnormal; Notable for the following components:    Color, UA Dark Yellow (*)     Protein, UA 30 mg/dL (1+) (*)     All other components within normal limits   LACTIC ACID, PLASMA - Abnormal; Notable for the following components:    Lactate 2.5 (*)     All other components within normal limits   TROPONIN - Normal    Narrative:     High Sensitive Troponin T Reference Range:  <14.0 ng/L- Negative Female for AMI  <22.0 ng/L- Negative Male for AMI  >=14 - Abnormal Female indicating possible myocardial injury.  >=22 - Abnormal Male indicating possible myocardial injury.   Clinicians would have to utilize clinical acumen, EKG, Troponin, and serial changes to determine if it is an Acute Myocardial Infarction or myocardial injury due to an underlying chronic condition.        PROTIME-INR - Normal   LIPASE - Normal   BNP (IN-HOUSE) - Normal    Narrative:     This assay is used as an aid in the diagnosis of individuals suspected of having heart failure. It can be used as an aid in the diagnosis of acute decompensated heart failure (ADHF) in patients presenting with signs and symptoms of ADHF to the emergency department (ED). In addition, NT-proBNP of <300 pg/mL indicates ADHF is not likely.    Age Range Result Interpretation  NT-proBNP Concentration (pg/mL:      <50             Positive            >450                   Gray                 300-450                    Negative             <300    50-75           Positive            >900                  Gray                300-900                  Negative            <300      >75             Positive            >1800                  Gray                300-1800                  Negative            <300   MAGNESIUM -  "Normal   PROCALCITONIN - Normal    Narrative:     As a Marker for Sepsis (Non-Neonates):    1. <0.5 ng/mL represents a low risk of severe sepsis and/or septic shock.  2. >2 ng/mL represents a high risk of severe sepsis and/or septic shock.    As a Marker for Lower Respiratory Tract Infections that require antibiotic therapy:    PCT on Admission    Antibiotic Therapy       6-12 Hrs later    >0.5                Strongly Recommended  >0.25 - <0.5        Recommended   0.1 - 0.25          Discouraged              Remeasure/reassess PCT  <0.1                Strongly Discouraged     Remeasure/reassess PCT    As 28 day mortality risk marker: \"Change in Procalcitonin Result\" (>80% or <=80%) if Day 0 (or Day 1) and Day 4 values are available. Refer to http://www.[employer] [email or web address] [place]-pct-calculator.com    Change in PCT <=80%  A decrease of PCT levels below or equal to 80% defines a positive change in PCT test result representing a higher risk for 28-day all-cause mortality of patients diagnosed with severe sepsis for septic shock.    Change in PCT >80%  A decrease of PCT levels of more than 80% defines a negative change in PCT result representing a lower risk for 28-day all-cause mortality of patients diagnosed with severe sepsis or septic shock.      BLOOD CULTURE   BLOOD CULTURE   RAINBOW DRAW    Narrative:     The following orders were created for panel order [place] Draw.  Procedure                               Abnormality         Status                     ---------                               -----------         ------                     Green Top (Gel)[[id number]]                                  Final result               Lavender Top[[id number]]                                     Final result               Gold Top - SST[[id number]]                                   Final result               Light Blue Top[[id number]]                                   Final result                 Please view results for these tests on the " individual orders.   URINALYSIS, MICROSCOPIC ONLY   LACTIC ACID, REFLEX   HIGH SENSITIVITIY TROPONIN T 2HR   CBC AND DIFFERENTIAL    Narrative:     The following orders were created for panel order CBC & Differential.  Procedure                               Abnormality         Status                     ---------                               -----------         ------                     CBC Auto Differential[[id number]]        Abnormal            Final result                 Please view results for these tests on the individual orders.   GREEN TOP   LAVENDER TOP   GOLD TOP - SST   LIGHT BLUE TOP       EKG:   ECG 12 Lead ED Triage Standing Order; Chest Pain   Final Result   HEART RATE=85  bpm   RR Skdjvgwj=308  ms   SD Nnbioiyd=036  ms   P Horizontal Axis=40  deg   P Front Axis=20  deg   QRSD Interval=94  ms   QT Pirwlrpd=836  ms   FIxW=487  ms   QRS Axis=42  deg   T Wave Axis=214  deg   - ABNORMAL ECG -   Sinus rhythm   Prolonged SD interval   Probable  left atrial enlargement   RSR' in V1 or V2, probably normal variant   Repol abnrm suggests ischemia, diffuse leads   No change from previous tracing   Electronically Signed By: Apurva Viveros ([facility]) 2024-07-01 15:20:06   Date and Time of Study:2024-07-01 14:00:24      Telemetry Scan   Final Result          Meds given in ED:   Medications   sodium chloride 0.9 % flush 10 mL (has no administration in time range)   aspirin tablet 325 mg (325 mg Oral Not Given 7/1/24 1409)   sodium chloride 0.9 % flush 10 mL (has no administration in time range)   sodium chloride 0.9 % bolus 1,000 mL (1,000 mL Intravenous New Bag 7/1/24 1553)   piperacillin-tazobactam (ZOSYN) 3.375 g IVPB in 100 mL NS MBP (CD) (3.375 g Intravenous New Bag 7/1/24 1553)   labetalol (NORMODYNE,TRANDATE) injection 10 mg (10 mg Intravenous Given 7/1/24 1549)       Imaging results:  XR Chest 1 View    Result Date: 7/1/2024  No focal pulmonary consolidation. Borderline heart size. Follow-up as clinical  indications persist.  This report was finalized on 7/1/2024 3:05 PM by Dr. Fredrick Love M.D on Workstation: [id number]      CT Abdomen Pelvis Without Contrast    Result Date: 7/1/2024   1. No acute findings identified in the abdomen or pelvis. 2. Colonic diverticulosis. 3. Bulky calcific atherosclerotic disease seen in the proximal SMA with suspected high-grade stenosis or vessel occlusion, appears similar to prior. Finding can be correlated for chronic mesenteric ischemia/postprandial abdominal pain.  This report was finalized on 7/1/2024 2:56 PM by Dr. Denilson Warner M.D on Workstation: [id number]       Ambulatory status:   - ad cecilio.     Social issues:   Social History     Socioeconomic History    Marital status:    Tobacco Use    Smoking status: Former     Types: Cigarettes    Smokeless tobacco: Never    Tobacco comments:     Patient used to smoke 3 packs per day and quit 22 years ago.   Vaping Use    Vaping status: Never Used   Substance and Sexual Activity    Alcohol use: Not Currently     Comment: Patient is non drinker.    Drug use: Never     Comment: Drug Abuse: no drug abuse    Sexual activity: Defer       Peripheral Neurovascular  Peripheral Neurovascular (Adult)  Peripheral Neurovascular WDL: WDL    Neuro Cognitive  Neuro Cognitive (Adult)  Cognitive/Neuro/Behavioral WDL: WDL    Learning  Learning Assessment (Adult)  Learning Readiness and Ability: no barriers identified    Respiratory  Respiratory WDL  Respiratory WDL: WDL  Breath Sounds  Breath Sounds: All Fields    Abdominal Pain       Pain Assessments  Pain (Adult)  (0-10) Pain Rating: Rest: 4  Pain Location: chest  Pain Side/Orientation: left  Pain Onset/Duration: this morning  Pain Description: intermittent, throbbing  Pain Assessment Additional Detail  Pain Onset/Duration: this morning    NIH Stroke Scale       Pilar Tsang RN  07/01/24 16:14 EDT

## 2024-07-01 NOTE — NURSING NOTE
Pt accidentally removed IV during position change; IV fluids stopped due to loss of IV access. RN attempted peripheral IV access; however, was unsuccessful. RN contact IV team for assistance to continue IV treatment at 1848.

## 2024-07-01 NOTE — ED NOTES
Pt to ER via [facility] from home for abd pain and chest discomfort x Friday    Pt has also had intermittent diarrhea- long term abx use as well

## 2024-07-01 NOTE — ED PROVIDER NOTES
EMERGENCY DEPARTMENT ENCOUNTER    Room Number:  27/27  Date seen:  7/1/2024  PCP: Triny Hannon MD  Historian: Patient      HPI:  Chief Complaint: Aminal pain and chest pain  Context: Slade Martinez is a 73 y.o. male who presents to the ED c/o left lower quadrant abdominal pain since Friday with some diarrhea.  The patient was recent diagnosed with UTI and sepsis and has now completed outpatient Augmentin.  They state that recently it also had diverticulitis.  Today the patient noticed some chest tightness at approximately 5/10.  He states it is nearly resolved at this point.  Of note is the patient did have cardiac stents placed in May of this year.  Patient states he had his aspirin and Plavix already today.  Patient is also had shingles and continues to have pain.      PAST MEDICAL HISTORY  Active Ambulatory Problems     Diagnosis Date Noted    Dyslipidemia 11/04/2019    Essential hypertension 11/04/2019    Coronary atherosclerosis 11/04/2019    S/P CABG (coronary artery bypass graft) 04/20/2020    Peripheral artery disease 02/25/2021    History of left-sided carotid endarterectomy 02/25/2021    Fever of unknown origin 03/03/2023    Closed traumatic nondisplaced fracture of two ribs of right side with routine healing 03/04/2023    Altered mental status 09/21/2023    Abdominal pain 09/21/2023    Chronic mesenteric ischemia 09/21/2023    Chest pain 04/04/2024    Shingles 05/12/2024    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    Carotid artery stenosis 06/07/2024    Acute UTI 06/19/2024    Acute urinary retention 06/20/2024     Resolved Ambulatory Problems     Diagnosis Date Noted    S/P CABG (coronary artery bypass graft) 04/20/2020    COVID-19 virus infection 02/25/2021    Sepsis 06/19/2024     Past Medical History:   Diagnosis Date    Anxiety     Atherosclerosis of native arteries of extremities with intermittent claudication, bilateral legs 03/24/2020    Bilateral carotid artery stenosis  03/24/2020    CAD (coronary artery disease)     Carotid artery disease     Cholelithiasis     Constipation     COPD (chronic obstructive pulmonary disease)     Degenerative disc disease, lumbar 11/24/2021    GERD (gastroesophageal reflux disease)     Hyperlipidemia     Hypertension     Injury of back     Ischemic heart disease     Peripheral edema     PVD (peripheral vascular disease) with claudication     Stroke 2011    Unilateral osteoarthritis of hip 11/24/2021    Vertigo          REVIEW OF SYSTEMS  All systems reviewed and negative except for those discussed in HPI.       PAST SURGICAL HISTORY  Past Surgical History:   Procedure Laterality Date    ANGIOPLASTY      x2    CARDIAC CATHETERIZATION N/A 05/06/2024    Procedure: Left Heart Cath;  Surgeon: Gurwinder Hurd MD;  Location:  [facility];  Service: Cardiovascular;  Laterality: N/A;    CARDIAC CATHETERIZATION N/A 05/06/2024    Procedure: Coronary angiography;  Surgeon: Gurwinder Hurd MD;  Location:  [facility];  Service: Cardiovascular;  Laterality: N/A;    CARDIAC CATHETERIZATION N/A 05/06/2024    Procedure: Percutaneous Coronary Intervention;  Surgeon: Gurwinder Hurd MD;  Location:  [facility];  Service: Cardiovascular;  Laterality: N/A;    CARDIAC CATHETERIZATION N/A 05/06/2024    Procedure: Stent TC coronary;  Surgeon: Gurwinder Hurd MD;  Location:  [facility];  Service: Cardiovascular;  Laterality: N/A;    CARDIAC CATHETERIZATION  05/06/2024    Procedure: Saphenous Vein Graft;  Surgeon: Gurwinder Hurd MD;  Location:  [facility];  Service: Cardiovascular;;    CAROTID ENDARTERECTOMY  2000    Left    CAROTID ENDARTERECTOMY Left 06/26/2001    CHOLECYSTECTOMY      COLONOSCOPY  2010    COLONOSCOPY  2012    CORONARY ARTERY BYPASS GRAFT  08/2011    CORONARY STENT PLACEMENT      X17 stents    CORONARY STENT PLACEMENT      x5     INTERVENTIONAL RADIOLOGY PROCEDURE N/A 05/06/2024    Procedure: Intravascular Ultrasound;  Surgeon: Gurwinder Hurd MD;  Location: [facility] INVASIVE LOCATION;  Service: Cardiovascular;  Laterality: N/A;         FAMILY HISTORY  Family History   Problem Relation Age of Onset    COPD Mother     Heart attack Father     Heart failure Father     COPD Brother          SOCIAL HISTORY  Social History     Socioeconomic History    Marital status:    Tobacco Use    Smoking status: Former     Types: Cigarettes    Smokeless tobacco: Never    Tobacco comments:     Patient used to smoke 3 packs per day and quit 22 years ago.   Vaping Use    Vaping status: Never Used   Substance and Sexual Activity    Alcohol use: Not Currently     Comment: Patient is non drinker.    Drug use: Never     Comment: Drug Abuse: no drug abuse    Sexual activity: Defer         ALLERGIES  Pletal [cilostazol] and Tamsulosin      PHYSICAL EXAM  ED Triage Vitals [07/01/24 1354]   Temp Heart Rate Resp BP SpO2   97.5 °F (36.4 °C) 89 19 -- 98 %      Temp src Heart Rate Source Patient Position BP Location FiO2 (%)   -- -- -- -- --       Physical Exam      GENERAL: 73-year-old male in no acute distress  HENT: NCAT: nares patent: Neck supple  EYES: no scleral icterus  CV: regular rhythm, normal rate  RESPIRATORY: normal effort  ABDOMEN: soft, mild left lower quadrant tenderness without guarding or rebound  MUSCULOSKELETAL: no deformity  NEURO: alert with nonfocal neuro exam  PSYCH:  calm, cooperative  SKIN: warm, dry    Vital signs and nursing notes reviewed.      LAB RESULTS  Recent Results (from the past 24 hour(s))   ECG 12 Lead ED Triage Standing Order; Chest Pain    Collection Time: 07/01/24  2:00 PM   Result Value Ref Range    QT Interval 363 ms    QTC Interval 432 ms   Comprehensive Metabolic Panel    Collection Time: 07/01/24  2:13 PM    Specimen: Blood   Result Value Ref Range    Glucose 137 (H) 65 - 99 mg/dL    BUN 11 8 - 23 mg/dL     Creatinine 0.75 (L) 0.76 - 1.27 mg/dL    Sodium 129 (L) 136 - 145 mmol/L    Potassium 4.9 3.5 - 5.2 mmol/L    Chloride 91 (L) 98 - 107 mmol/L    CO2 25.8 22.0 - 29.0 mmol/L    Calcium 9.6 8.6 - 10.5 mg/dL    Total Protein 7.3 6.0 - 8.5 g/dL    Albumin 3.8 3.5 - 5.2 g/dL    ALT (SGPT) 22 1 - 41 U/L    AST (SGOT) 16 1 - 40 U/L    Alkaline Phosphatase 114 39 - 117 U/L    Total Bilirubin 0.6 0.0 - 1.2 mg/dL    Globulin 3.5 gm/dL    A/G Ratio 1.1 g/dL    BUN/Creatinine Ratio 14.7 7.0 - 25.0    Anion Gap 12.2 5.0 - 15.0 mmol/L    eGFR 94.7 >60.0 mL/min/1.73   High Sensitivity Troponin T    Collection Time: 07/01/24  2:13 PM    Specimen: Blood   Result Value Ref Range    HS Troponin T 20 <22 ng/L   Green Top (Gel)    Collection Time: 07/01/24  2:13 PM   Result Value Ref Range    Extra Tube Hold for add-ons.    Lavender Top    Collection Time: 07/01/24  2:13 PM   Result Value Ref Range    Extra Tube hold for add-on    Gold Top - SST    Collection Time: 07/01/24  2:13 PM   Result Value Ref Range    Extra Tube Hold for add-ons.    Light Blue Top    Collection Time: 07/01/24  2:13 PM   Result Value Ref Range    Extra Tube Hold for add-ons.    CBC Auto Differential    Collection Time: 07/01/24  2:13 PM    Specimen: Blood   Result Value Ref Range    WBC 18.77 (H) 3.40 - 10.80 10*3/mm3    RBC 4.31 4.14 - 5.80 10*6/mm3    Hemoglobin 13.1 13.0 - 17.7 g/dL    Hematocrit 39.4 37.5 - 51.0 %    MCV 91.4 79.0 - 97.0 fL    MCH 30.4 26.6 - 33.0 pg    MCHC 33.2 31.5 - 35.7 g/dL    RDW 14.5 12.3 - 15.4 %    RDW-SD 48.0 37.0 - 54.0 fl    MPV 9.6 6.0 - 12.0 fL    Platelets 332 140 - 450 10*3/mm3    Neutrophil % 74.6 42.7 - 76.0 %    Lymphocyte % 12.6 (L) 19.6 - 45.3 %    Monocyte % 11.3 5.0 - 12.0 %    Eosinophil % 0.6 0.3 - 6.2 %    Basophil % 0.3 0.0 - 1.5 %    Immature Grans % 0.6 (H) 0.0 - 0.5 %    Neutrophils, Absolute 14.01 (H) 1.70 - 7.00 10*3/mm3    Lymphocytes, Absolute 2.37 0.70 - 3.10 10*3/mm3    Monocytes, Absolute 2.12 (H) 0.10 -  0.90 10*3/mm3    Eosinophils, Absolute 0.11 0.00 - 0.40 10*3/mm3    Basophils, Absolute 0.05 0.00 - 0.20 10*3/mm3    Immature Grans, Absolute 0.11 (H) 0.00 - 0.05 10*3/mm3    nRBC 0.0 0.0 - 0.2 /100 WBC   Protime-INR    Collection Time: 07/01/24  2:13 PM    Specimen: Blood   Result Value Ref Range    Protime 13.9 11.7 - 14.2 Seconds    INR 1.05 0.90 - 1.10   Lipase    Collection Time: 07/01/24  2:13 PM    Specimen: Blood   Result Value Ref Range    Lipase 18 13 - 60 U/L   BNP    Collection Time: 07/01/24  2:13 PM    Specimen: Blood   Result Value Ref Range    proBNP 734.0 0.0 - 900.0 pg/mL   Magnesium    Collection Time: 07/01/24  2:13 PM    Specimen: Blood   Result Value Ref Range    Magnesium 2.3 1.6 - 2.4 mg/dL   Lactic Acid, Plasma    Collection Time: 07/01/24  2:13 PM    Specimen: Blood   Result Value Ref Range    Lactate 2.5 (C) 0.5 - 2.0 mmol/L   Procalcitonin    Collection Time: 07/01/24  2:13 PM    Specimen: Blood   Result Value Ref Range    Procalcitonin 0.12 0.00 - 0.25 ng/mL   Urinalysis With Microscopic If Indicated (No Culture) - Urine, Clean Catch    Collection Time: 07/01/24  2:42 PM    Specimen: Urine, Clean Catch   Result Value Ref Range    Color, UA Dark Yellow (A) Yellow, Straw    Appearance, UA Clear Clear    pH, UA 6.0 5.0 - 8.0    Specific Gravity, UA 1.023 1.005 - 1.030    Glucose, UA Negative Negative    Ketones, UA Negative Negative    Bilirubin, UA Negative Negative    Blood, UA Negative Negative    Protein, UA 30 mg/dL (1+) (A) Negative    Leuk Esterase, UA Negative Negative    Nitrite, UA Negative Negative    Urobilinogen, UA 1.0 E.U./dL 0.2 - 1.0 E.U./dL   Urinalysis, Microscopic Only - Urine, Clean Catch    Collection Time: 07/01/24  2:42 PM    Specimen: Urine, Clean Catch   Result Value Ref Range    RBC, UA 0-2 None Seen, 0-2 /HPF    WBC, UA 0-2 None Seen, 0-2 /HPF    Bacteria, UA None Seen None Seen /HPF    Squamous Epithelial Cells, UA 0-2 None Seen, 0-2 /HPF    Hyaline Casts, UA  13-20 None Seen /LPF    Methodology Automated Microscopy        Ordered the above labs and reviewed the results.        RADIOLOGY  XR Chest 1 View    Result Date: 7/1/2024  XR CHEST 1 VW-  HISTORY: Male who is 73 years-old, chest pain  TECHNIQUE: Frontal view of the chest  COMPARISON: 6/19/2024  FINDINGS:. The heart size is borderline. Cardiac loop recorder is apparent. Sternotomy wires are present. Aorta is calcified. Pulmonary vasculature is unremarkable. No focal pulmonary consolidation, pleural effusion, or pneumothorax. Old granulomatous disease is present. No acute osseous process.      No focal pulmonary consolidation. Borderline heart size. Follow-up as clinical indications persist.  This report was finalized on 7/1/2024 3:05 PM by Dr. Fredrick Love M.D on Workstation: [id number]      CT Abdomen Pelvis Without Contrast    Result Date: 7/1/2024  CT ABDOMEN PELVIS WO CONTRAST-  INDICATION: Left lower quadrant pain and diarrhea  COMPARISON: CT abdomen pelvis September 21, 2023 and CTA chest May 5, 2024  TECHNIQUE: Routine CT abdomen/pelvis without IV contrast. Coronal and sagittal reformats. Radiation dose reduction techniques were utilized, including automated exposure control and exposure modulation based on body size.  FINDINGS:  Lung bases: Solid pulmonary nodule in the lingula and anterior basilar left lower lobe appears stable, measuring 5 mm. Coronary artery atherosclerotic calcification. Small amount of pericardial fluid or thickening.  ABDOMEN: Normal liver. Cholecystectomy. No biliary ductal dilatation. Spleen is normal in size. Mild pancreatic lipomatosis. No pancreatic ductal dilatation or mass seen. Small left adrenal nodule, series 2, axial mage 38, measures 1 cm, unchanged, suspect adenoma. No urolithiasis or hydronephrosis.  Pelvis: Prostate is normal in size. Mild bladder distention. No bladder calculus.  Bowel: No obstruction. Colonic diverticulosis. Normal appendix.  Abdominal wall: Rectus  diastases. Tiny fat-containing umbilical hernia.  Retroperitoneum: No lymphadenopathy.  Vasculature: No abdominal aortic aneurysm. Severe aortoiliac atherosclerotic calcification. Bulky calcific atherosclerotic disease in the proximal SMA with suspected high-grade stenoses versus occlusion. Bulky calcific atherosclerotic disease in the right common iliac artery with suspected high-grade stenosis. Bulky calcific atherosclerotic disease in the right superficial femoral artery with suspected high-grade stenoses. Bulky calcific atherosclerotic disease in the left common femoral artery and left superficial femoral artery with suspected high-grade stenosis or occlusion.  Osseous structures: Median sternotomy. Old superior endplate compression deformity at L3 with approximately 60% collapse.       1. No acute findings identified in the abdomen or pelvis. 2. Colonic diverticulosis. 3. Bulky calcific atherosclerotic disease seen in the proximal SMA with suspected high-grade stenosis or vessel occlusion, appears similar to prior. Finding can be correlated for chronic mesenteric ischemia/postprandial abdominal pain.  This report was finalized on 7/1/2024 2:56 PM by Dr. Denilson Warner M.D on Workstation: [id number]       Ordered the above noted radiological studies. Reviewed by me in PACS.            PROCEDURES  Procedures          MEDICATIONS GIVEN IN ER  Medications   sodium chloride 0.9 % flush 10 mL (has no administration in time range)   aspirin tablet 325 mg (325 mg Oral Not Given 7/1/24 1409)   sodium chloride 0.9 % flush 10 mL (has no administration in time range)   sodium chloride 0.9 % bolus 1,000 mL (1,000 mL Intravenous New Bag 7/1/24 1553)   piperacillin-tazobactam (ZOSYN) 3.375 g IVPB in 100 mL NS MBP (CD) (3.375 g Intravenous New Bag 7/1/24 1553)   labetalol (NORMODYNE,TRANDATE) injection 10 mg (10 mg Intravenous Given 7/1/24 1549)             MEDICAL DECISION MAKING, PROGRESS, and CONSULTS    All labs have  been independently reviewed by me.  All radiology studies have been reviewed by me and I have also reviewed the radiology report.   EKG's independently viewed and interpreted by me.  Discussion below represents my analysis of pertinent findings related to patient's condition, differential diagnosis, treatment plan and final disposition.      Additional sources:  - Discussed/ obtained information from independent historians: The patient's wife is here who states he has had some loose stools but no pratibha diarrhea.    - External (non-ED) record review: I reviewed the patient's admission to the hospital from 6/19 through 6/21/2024 for his urinary retention, UTI, shingles and coronary artery disease status post CABG.  It appears the patient's been having significant pain from his shingles.    - Chronic or social conditions impacting care: Patient was at home with his wife    - Shared decision making: After shared decision-making discussion between myself, the patient and his wife we agree he needs to be admitted to the hospital for further evaluation and care      Orders placed during this visit:  Orders Placed This Encounter   Procedures    Blood Culture - Blood,    Blood Culture - Blood,    XR Chest 1 View    CT Abdomen Pelvis Without Contrast    [place] Draw    Comprehensive Metabolic Panel    High Sensitivity Troponin T    CBC Auto Differential    Protime-INR    Lipase    Urinalysis With Microscopic If Indicated (No Culture) - Urine, Clean Catch    BNP    Magnesium    Lactic Acid, Plasma    Procalcitonin    STAT Lactic Acid, Reflex    High Sensitivity Troponin T 2Hr    Urinalysis, Microscopic Only - Urine, Clean Catch    NPO Diet NPO Type: Strict NPO    Undress & Gown    Monitor Blood Pressure    Continuous Pulse Oximetry    LHA (on-call MD unless specified) Details    LCG (on-call MD unless specified)    Oxygen Therapy- Nasal Cannula; Titrate 1-6 LPM Per SpO2; 90 - 95%    ECG 12 Lead ED Triage Standing Order; Chest  Pain    ECG 12 Lead ED Triage Standing Order; Chest Pain    Telemetry Scan    Insert Peripheral IV    Insert Peripheral IV    Initiate Observation Status    CBC & Differential    Green Top (Gel)    Lavender Top    Gold Top - SST    Light Blue Top         Differential diagnosis:  My differential diagnosis includes but is not limited to gastritis, pancreatitis, cholecystitis, appendicitis, diverticulitis, urinary tract infection, kidney stone, or bowel obstruction.        Independent interpretation of labs, radiology studies, and discussions with consultants:  ED Course as of 07/01/24 1603   Mon Jul 01, 2024   1409 Obtain EKG, chest x-ray, labs and abdominal CT scan for further evaluation. [GP]   1410 EKG    EKG time: 1400  Rhythm/Rate: Normal sinus rhythm at 85  Diffuse ST segment depression without ST elevation  RSR' in V1    Similar compared to prior on 6/19/2024    Interpreted Contemporaneously by me.  Independently viewed by me     [GP]   1436 Patient's white count is 18 but it was 10 prior to that but in the 20s prior to that [GP]   1443 My independent interpretation the patient's chest x-ray is mild bibasilar atelectasis but no infiltrates or CHF [GP]   1503 Patient's abdominal CT scan was read as negative acute. [GP]   1523 The patient's urinalysis is unremarkable. [GP]   1528 On repeat examination the patient denies chest pain.  Currently his abdomen is soft and nontender.  His initial troponin was 20 and we will check a second.  He does have an elevated white count and lactic acid but a normal procalcitonin.  His chest x-ray, abdominal scan and urinalysis are unremarkable.  At this point I will consult cardiology and the hospitalist for admission. [GP]   1529 With the patient's elevated white count and lactic acidosis I will give him IV fluids and IV antibiotics after his cultures. [GP]   1529 Patient's sodium is 129 and is normally in the 130s.  He is can receive IV normal saline. [GP]   1538 The patient's  blood pressures remained elevated at 180s/90s and thus I will give him IV labetalol. [GP]   160 I discussed the case with Dr. Gramajo from [facility].  He is aware of the patient's leukocytosis, left lower quadrant pain and workup thus far.  He is also aware of the patient's chest pain, abnormal EKG and normal initial troponin.  I have consulted cardiology.  He will admit the patient to a telemetry bed for further evaluation and care. [GP]      ED Course User Index  [GP] Ricardo Baird MD               DIAGNOSIS  Final diagnoses:   Left lower quadrant abdominal pain   Chest pain, unspecified type   Leukocytosis, unspecified type   Lactic acidosis   Hyponatremia   Hypertensive urgency         DISPOSITION  ADMISSION    Discussed treatment plan and reason for admission with pt/family and admitting physician.  Pt/family voiced understanding of the plan for admission for further testing/treatment as needed.            Latest Documented Vital Signs:  As of 16:03 EDT  BP- (!) 198/93 HR- 83 Temp- 97.5 °F (36.4 °C) O2 sat- 98%--      --------------------  Please note that portions of this were completed with a voice recognition program.       Note Disclaimer: At [facility], we believe that sharing information builds trust and better relationships. You are receiving this note because you are receiving care at [facility] or recently visited. It is possible you will see health information before a provider has talked with you about it. This kind of information can be easy to misunderstand. To help you fully understand what it means for your health, we urge you to discuss this note with your provider.             Ricardo Baird MD  07/01/24 2338

## 2024-07-02 NOTE — CONSULTS
Nutrition Services    Patient Name:  Slade Martinez  YOB: 1950  MRN: [id number]  Admit Date:  7/1/2024    Assessment Date:  07/02/24    Summary: Nurse admission screen     73yoM admitted for left lower quadrant abdominal pain. Hx of CAD, COPD, HTN, GERD, PVD, stroke, chronic diverticulosis, chronic mesenteric ischemia. S/p PCI and heart cath in May 2024. Pt reports poor appetite since May. Wife in the room encouraging pt to eat. Had 50% PO intake for lunch. Reports diarrhea since Friday. Noted 9# (6%) wt loss x 2 mo (not significant). Pt with only mild muscle and fat wasting per NFPE. Does not meet criteria for malnutrition. Agreeable to nutrition supplement to help w/ PO intake. Currently on a fluid restriction of 1500mL/d. IVF dc'd.     Labs: Na 127, BUN 7, Cr 0.75, ca 8.4  Meds: Vit D3, B12, protonix    Plan/recs:  Ensure compact BID (L/D)  Encourage intakes    RD to follow  CLINICAL NUTRITION ASSESSMENT      Reason for Assessment Nurse Admission Screen     Diagnosis/Problem   Left lower quadrant abdominal pain   Medical/Surgical History Past Medical History:   Diagnosis Date    Anxiety     Atherosclerosis of native arteries of extremities with intermittent claudication, bilateral legs 03/24/2020    PVD    Bilateral carotid artery stenosis 03/24/2020    CAD (coronary artery disease)     unspecified    Carotid artery disease     Cholelithiasis     Constipation     COPD (chronic obstructive pulmonary disease)     Coronary atherosclerosis 11/04/2019    H/O CABG SVG to first diagonal branch and to the LAD as well as SVG to the lateral marginal branch of the circumflex complicated by right sided subcortical infarct with left sided hemiparesis    Degenerative disc disease, lumbar 11/24/2021    Dyslipidemia 11/04/2019    Essential hypertension 11/04/2019    GERD (gastroesophageal reflux disease)     Hyperlipidemia     Hypertension     Injury of back     fractured vertebra 2/26/23    Ischemic heart  "disease     Peripheral edema     PVD (peripheral vascular disease) with claudication     latanya legs    Stroke 2011    Unilateral osteoarthritis of hip 11/24/2021    Vertigo        Past Surgical History:   Procedure Laterality Date    ANGIOPLASTY      x2    CARDIAC CATHETERIZATION N/A 05/06/2024    Procedure: Left Heart Cath;  Surgeon: Gurwinder Hurd MD;  [facility];  Service: Cardiovascular;  Laterality: N/A;    CARDIAC CATHETERIZATION N/A 05/06/2024    Procedure: Coronary angiography;  Surgeon: Gurwinder Hurd MD;  [facility];  Service: Cardiovascular;  Laterality: N/A;    CARDIAC CATHETERIZATION N/A 05/06/2024    Procedure: Percutaneous Coronary Intervention;  Surgeon: Gurwinder Hurd MD;  [facility];  Service: Cardiovascular;  Laterality: N/A;    CARDIAC CATHETERIZATION N/A 05/06/2024    Procedure: Stent TC coronary;  Surgeon: Gurwinder Hurd MD;  [facility];  Service: Cardiovascular;  Laterality: N/A;    CARDIAC CATHETERIZATION  05/06/2024    Procedure: Saphenous Vein Graft;  Surgeon: Gurwinder Hurd MD;  [facility];  Service: Cardiovascular;;    CAROTID ENDARTERECTOMY  2000    Left    CAROTID ENDARTERECTOMY Left 06/26/2001    CHOLECYSTECTOMY      COLONOSCOPY  2010    COLONOSCOPY  2012    CORONARY ARTERY BYPASS GRAFT  08/2011    CORONARY STENT PLACEMENT      X17 stents    CORONARY STENT PLACEMENT      x5    INTERVENTIONAL RADIOLOGY PROCEDURE N/A 05/06/2024    Procedure: Intravascular Ultrasound;  Surgeon: Gurwinder Hurd MD;  [facility];  Service: Cardiovascular;  Laterality: N/A;        Anthropometrics        Current Height  Current Weight  BMI kg/m2 Height: 180.4 cm (71.02\")  Weight: 81.6 kg (180 lb) (07/01/24 1354)  Body mass index is 25.09 kg/m².   Adjusted BMI (if applicable)    BMI Category " Overweight (25 - 29.9)   Ideal Body Weight (IBW) 172#   Usual Body Weight (UBW) 199-200#   Weight Trend Loss   Weight History Wt Readings from Last 30 Encounters:   07/01/24 1354 81.6 kg (180 lb)   06/19/24 0323 86.2 kg (190 lb)   05/20/24 0843 86.2 kg (190 lb)   05/16/24 0951 86.2 kg (190 lb)   05/12/24 1535 86.6 kg (191 lb)   05/10/24 2124 87 kg (191 lb 14.4 oz)   05/10/24 1724 90.3 kg (199 lb 1.2 oz)   05/07/24 0934 90.3 kg (199 lb)   05/07/24 0706 90.3 kg (199 lb)   05/07/24 0500 87.4 kg (192 lb 10.9 oz)   05/05/24 1121 88.9 kg (196 lb)   05/05/24 0745 90.7 kg (199 lb 15.3 oz)   04/05/24 1234 90.7 kg (200 lb)   04/04/24 1147 90.7 kg (200 lb)   09/22/23 0419 92.7 kg (204 lb 5.9 oz)   09/21/23 1017 94.4 kg (208 lb 1.6 oz)   07/30/23 1000 90.7 kg (200 lb)   03/04/23 0044 98 kg (216 lb)   03/03/23 1945 98 kg (216 lb)   09/29/22 0827 101 kg (222 lb)   03/24/22 0832 106 kg (233 lb)   11/10/21 1129 103 kg (226 lb)   07/01/21 0810 106 kg (233 lb)   02/25/21 0933 102 kg (224 lb)   09/17/20 0818 102 kg (224 lb)   07/23/20 1417 102 kg (224 lb)   11/07/19 0841 100 kg (221 lb 8 oz)        Estimated Requirements         Weight used  81.6 kg    Calories  2,040 - 2448 kcal (25-30 kcal/kg)    Protein  81-98 g (1.0 - 1.2 gm/kg)    Fluid   (1 mL/kcal)     Labs       Pertinent Labs    Results from last 7 days   Lab Units 07/02/24  0432 07/01/24  1413   SODIUM mmol/L 127* 129*   POTASSIUM mmol/L 4.6 4.9   CHLORIDE mmol/L 95* 91*   CO2 mmol/L 22.2 25.8   BUN mg/dL 7* 11   CREATININE mg/dL 0.75* 0.75*   CALCIUM mg/dL 8.4* 9.6   BILIRUBIN mg/dL 0.7 0.6   ALK PHOS U/L 88 114   ALT (SGPT) U/L 16 22   AST (SGOT) U/L 22 16   GLUCOSE mg/dL 99 137*     Results from last 7 days   Lab Units 07/02/24  0432 07/01/24  1413   MAGNESIUM mg/dL  --  2.3   HEMOGLOBIN g/dL 11.2* 13.1   HEMATOCRIT % 33.8* 39.4   WBC 10*3/mm3 14.68* 18.77*   ALBUMIN g/dL 2.9* 3.8     Results from last 7 days   Lab Units 07/02/24  0432 07/01/24  1413   INR   --  1.05    PLATELETS 10*3/mm3 267 332     COVID19   Date Value Ref Range Status   06/19/2024 Not Detected Not Detected - Ref. Range Final     Lab Results   Component Value Date    HGBA1C 5.8 (H) 02/28/2023          Medications           Scheduled Medications amLODIPine, 5 mg, Oral, Q24H  aspirin, 325 mg, Oral, Once  atenolol, 50 mg, Oral, BID  atorvastatin, 40 mg, Oral, Nightly  cetirizine, 10 mg, Oral, Daily  cholecalciferol, 2,000 Units, Oral, Daily  clopidogrel, 75 mg, Oral, Daily  lisinopril, 40 mg, Oral, Q24H  meclizine, 12.5 mg, Oral, Daily  pantoprazole, 40 mg, Oral, BID  pregabalin, 150 mg, Oral, TID  saccharomyces boulardii, 250 mg, Oral, Nightly  sodium chloride, 10 mL, Intravenous, Q12H  vitamin B-12, 1,000 mcg, Oral, Daily       Infusions     PRN Medications   acetaminophen **OR** acetaminophen **OR** acetaminophen    Calcium Replacement - Follow Nurse / BPA Driven Protocol    hydroCHLOROthiazide    HYDROcodone-acetaminophen    Magnesium Standard Dose Replacement - Follow Nurse / BPA Driven Protocol    nitroglycerin    ondansetron    Phosphorus Replacement - Follow Nurse / BPA Driven Protocol    Potassium Replacement - Follow Nurse / BPA Driven Protocol    sodium chloride    [COMPLETED] Insert Peripheral IV **AND** sodium chloride    sodium chloride    sodium chloride     Physical Findings          General Findings alert, oriented   Oral/Mouth Cavity WDL, dental appliance   Edema  not assessed   Gastrointestinal diarrhea, last bowel movement: 7/2   Skin  skin intact   Tubes/Drains/Lines none   NFPE No clinical signs of muscle wasting or fat loss     Current Nutrition Orders & Evaluation of Intake       Oral Nutrition     Food Allergies NKFA   Current PO Diet Diet: Cardiac, Fluid Restriction (240 mL/tray); Healthy Heart (2-3 Na+); 1500 mL/day; Fluid Consistency: Thin (IDDSI 0)   Supplement n/a   PO Evaluation     % PO Intake 50% x 1 meal    Factors Affecting Intake: diarrhea, weakness   --  PES STATEMENT /  NUTRITION DIAGNOSIS      Nutrition Dx Problem  Problem: Inadequate Oral Intake  Etiology: Medical Diagnosis - left lower quadrant abdominal pain    Signs/Symptoms: Report of Minimal PO Intake and Unintended Weight Change     NUTRITION INTERVENTION / PLAN OF CARE      Intervention Goal(s) Establish goals of care, Meet estimated needs, Disease management/therapy, Establish PO intake, Increase intake, Accepts oral nutrition supplement, No significant weight loss, and PO intake goal %: 75         RD Intervention/Action Encourage intake, Continue to monitor, Care plan reviewed, and Recommend/order: ensure   --      Prescription/Orders:       PO Diet       Supplements Ensure compact BID      Enteral Nutrition       Parenteral Nutrition    New Prescription Ordered? Yes   --      Monitor/Evaluation Per protocol   Discharge Plan/Needs Pending clinical course   --    RD to follow per protocol.      Electronically signed by:  Crystal Rutherford RD  07/02/24 13:03 EDT

## 2024-07-02 NOTE — CONSULTS
[facility]  Initial Inpatient Consult Note    Referring Provider: Dr. Forde    Reason for Consultation: LLQ pain and diarrhea    Subjective     History of present illness:    73 y.o. male w/ PMHx of COPD, HTN, chronic constipation, HLD, carotid stenosis s/p endarterectomy, CAD, hx of CABG, recent cardiac cath for chest pain 05/2024 s/p stent placement which was complicated by perforation of LAD; recent shingles, urosepsis, presents to the ED w/ LLQ pain and diarrhea. Wife at bedside who provides most of the history, although patient awake and contributes some. She reports pt has been having intermittent, post prandial LLQ for several months. He was treated for presumed diverticulitis approx 4 months ago, however he had a negative CT scan at the time. This seemed to help temporarily and now the pain is back again. Describes the pain as burning-like at times, associated chronic constipation typically, and bloating and gas. He takes linzess every otherday for chronic constipation. He stopped taking linzess last week when he started having loose stools. He is currently having 4-5 loose BMs per day, no hematochezia. Unsure what seems to trigger this. Recalls last colonoscopy approx 10-12 years ago. Reports hx of large polyps that were resected. Unfortunately he had developed what seems to be post polypectomy bleed. Repeat colonoscopy has been deferred by his GI due to various cardiac issues in the last decade.     Past Medical History:  Past Medical History:   Diagnosis Date    Anxiety     Atherosclerosis of native arteries of extremities with intermittent claudication, bilateral legs 03/24/2020    PVD    Bilateral carotid artery stenosis 03/24/2020    CAD (coronary artery disease)     unspecified    Carotid artery disease     Cholelithiasis     Constipation     COPD (chronic obstructive pulmonary disease)     Coronary atherosclerosis 11/04/2019    H/O CABG SVG to first diagonal branch and to  the LAD as well as SVG to the lateral marginal branch of the circumflex complicated by right sided subcortical infarct with left sided hemiparesis    Degenerative disc disease, lumbar 11/24/2021    Dyslipidemia 11/04/2019    Essential hypertension 11/04/2019    GERD (gastroesophageal reflux disease)     Hyperlipidemia     Hypertension     Injury of back     fractured vertebra 2/26/23    Ischemic heart disease     Peripheral edema     PVD (peripheral vascular disease) with claudication     latanya legs    Stroke 2011    Unilateral osteoarthritis of hip 11/24/2021    Vertigo      Past Surgical History:  Past Surgical History:   Procedure Laterality Date    ANGIOPLASTY      x2    CARDIAC CATHETERIZATION N/A 05/06/2024    Procedure: Left Heart Cath;  Surgeon: Gurwinder Hurd MD;  [facility];  Service: Cardiovascular;  Laterality: N/A;    CARDIAC CATHETERIZATION N/A 05/06/2024    Procedure: Coronary angiography;  Surgeon: Gurwinder Hurd MD;  [facility];  Service: Cardiovascular;  Laterality: N/A;    CARDIAC CATHETERIZATION N/A 05/06/2024    Procedure: Percutaneous Coronary Intervention;  Surgeon: Gurwinder Hurd MD;  [facility];  Service: Cardiovascular;  Laterality: N/A;    CARDIAC CATHETERIZATION N/A 05/06/2024    Procedure: Stent TC coronary;  Surgeon: Gurwinder Hurd MD;  [facility];  Service: Cardiovascular;  Laterality: N/A;    CARDIAC CATHETERIZATION  05/06/2024    Procedure: Saphenous Vein Graft;  Surgeon: Gurwinder Hurd MD;  [facility];  Service: Cardiovascular;;    CAROTID ENDARTERECTOMY  2000    Left    CAROTID ENDARTERECTOMY Left 06/26/2001    CHOLECYSTECTOMY      COLONOSCOPY  2010    COLONOSCOPY  2012    CORONARY ARTERY BYPASS GRAFT  08/2011    CORONARY STENT PLACEMENT      X17 stents    CORONARY STENT PLACEMENT      x5     INTERVENTIONAL RADIOLOGY PROCEDURE N/A 05/06/2024    Procedure: Intravascular Ultrasound;  Surgeon: Gurwinder Hurd MD;  Location: [facility] INVASIVE LOCATION;  Service: Cardiovascular;  Laterality: N/A;      Social History:   Social History     Tobacco Use    Smoking status: Former     Types: Cigarettes    Smokeless tobacco: Never    Tobacco comments:     Patient used to smoke 3 packs per day and quit 22 years ago.   Substance Use Topics    Alcohol use: Not Currently     Comment: Patient is non drinker.      Family History:  Family History   Problem Relation Age of Onset    COPD Mother     Heart attack Father     Heart failure Father     COPD Brother        Home Meds:  Medications Prior to Admission   Medication Sig Dispense Refill Last Dose    amLODIPine (NORVASC) 5 MG tablet Take 1 tablet by mouth Daily. 30 tablet 3 Past Week    aspirin 81 MG tablet Take 1 tablet by mouth Daily.   7/1/2024    atenolol (TENORMIN) 50 MG tablet Take 1 tablet by mouth 2 (Two) Times a Day.   7/1/2024    atorvastatin (LIPITOR) 40 MG tablet Take 1 tablet by mouth Every Night.   6/30/2024    clopidogrel (PLAVIX) 75 MG tablet Take 1 tablet by mouth Daily.   7/1/2024    hydroCHLOROthiazide 25 MG tablet Take 1 tablet by mouth Daily As Needed (swelling).   Past Week    Linzess 145 MCG capsule capsule Take 1 capsule by mouth Every Other Day.   Past Week    lisinopril (PRINIVIL,ZESTRIL) 40 MG tablet Take 1 tablet by mouth 2 (Two) Times a Day.   7/1/2024    loratadine (CLARITIN) 10 MG tablet Take 1 tablet by mouth Daily.   7/1/2024    meclizine (ANTIVERT) 12.5 MG tablet Take 1 tablet by mouth Daily.   7/1/2024    Multiple Vitamins-Minerals (MULTIVITAMIN ADULT EXTRA C PO) Take 1 tablet by mouth 2 (Two) Times a Day.   7/1/2024    pantoprazole (PROTONIX) 40 MG EC tablet Take 1 tablet by mouth 2 (Two) Times a Day.   7/1/2024    Potassium Chloride Arpita ER (KLOR-CON M20 PO) Take 20 mEq by mouth Take As Directed.   6/30/2024    pregabalin  (LYRICA) 100 MG capsule Take 150 mg by mouth 3 (Three) Times a Day.   7/1/2024    saccharomyces boulardii (FLORASTOR) 250 MG capsule Take 1 capsule by mouth Every Night.   6/30/2024    sucralfate (CARAFATE) 1 g tablet Take 1 tablet by mouth every night at bedtime.   6/30/2024    nitroglycerin (NITROSTAT) 0.4 MG SL tablet Place 1 tablet under the tongue Every 5 (Five) Minutes As Needed for Chest Pain. Take no more than 3 doses in 15 minutes. 25 tablet 3 More than a month     Current Meds:   amLODIPine, 5 mg, Oral, Q24H  aspirin, 325 mg, Oral, Once  atenolol, 50 mg, Oral, BID  atorvastatin, 40 mg, Oral, Nightly  cetirizine, 10 mg, Oral, Daily  cholecalciferol, 2,000 Units, Oral, Daily  clopidogrel, 75 mg, Oral, Daily  lisinopril, 40 mg, Oral, Q24H  meclizine, 12.5 mg, Oral, Daily  pantoprazole, 40 mg, Oral, BID  pregabalin, 150 mg, Oral, TID  saccharomyces boulardii, 250 mg, Oral, Nightly  sodium chloride, 10 mL, Intravenous, Q12H  vitamin B-12, 1,000 mcg, Oral, Daily      Allergies:  Allergies   Allergen Reactions    Pletal [Cilostazol] Myalgia     .    Tamsulosin Palpitations       Objective     Vital Signs  Temp:  [97.5 °F (36.4 °C)-99.9 °F (37.7 °C)] 99.5 °F (37.5 °C)  Heart Rate:  [] 88  Resp:  [18-19] 18  BP: (101-202)/(55-95) 140/58  Physical Exam:  General Appearance:     Alert, cooperative, in no acute distress   Abdomen:     Mild distention; mild TTP suprapubic; soft otherwise, BS+   Rectal:     Deferred       Results Review:   I reviewed the patient's new clinical results.    Results from last 7 days   Lab Units 07/02/24  0432 07/01/24  1413   WBC 10*3/mm3 14.68* 18.77*   HEMOGLOBIN g/dL 11.2* 13.1   HEMATOCRIT % 33.8* 39.4   PLATELETS 10*3/mm3 267 332     Results from last 7 days   Lab Units 07/02/24  0432 07/01/24  1413   SODIUM mmol/L 127* 129*   POTASSIUM mmol/L 4.6 4.9   CHLORIDE mmol/L 95* 91*   CO2 mmol/L 22.2 25.8   BUN mg/dL 7* 11   CREATININE mg/dL 0.75* 0.75*   CALCIUM mg/dL 8.4* 9.6    BILIRUBIN mg/dL 0.7 0.6   ALK PHOS U/L 88 114   ALT (SGPT) U/L 16 22   AST (SGOT) U/L 22 16   GLUCOSE mg/dL 99 137*     Results from last 7 days   Lab Units 07/01/24  1413   INR  1.05     Lab Results   Lab Value Date/Time    LIPASE 18 07/01/2024 1413    LIPASE 11 (L) 05/05/2024 1300    LIPASE 21 12/17/2022 1156       Radiology:  XR Chest 1 View   Final Result   No focal pulmonary consolidation. Borderline heart size.   Follow-up as clinical indications persist.       This report was finalized on 7/1/2024 3:05 PM by Dr. Fredrick Love M.D on Workstation: [id number]          CT Abdomen Pelvis Without Contrast   Final Result       1. No acute findings identified in the abdomen or pelvis.   2. Colonic diverticulosis.   3. Bulky calcific atherosclerotic disease seen in the proximal SMA with   suspected high-grade stenosis or vessel occlusion, appears similar to   prior. Finding can be correlated for chronic mesenteric   ischemia/postprandial abdominal pain.       This report was finalized on 7/1/2024 2:56 PM by Dr. Denilson Warner M.D   on Workstation: [id number]                Assessment:   LLQ pain x at least 4 months   Diarrhea x 1 week   CAD- s/p stent placement 05/2024, complicated by perforation of LAD; on plavix and ASA;   High grade stenosis vs vessel occlusion of SMA- chronic mesenteric ischemia/post prandial abd pain.     Plan:   C.diff negative, GI path PCR pending. Will f/u  His chronic intermittent abd pain may due to stenosis of the SMA as suspected on CT scan. Will consult vascular surgery for their input regarding this.   Diet as tolerated meanwhile  Given recent cardiac stent placement and his other cardiac hx, would hold off colonoscopy unless absolutely necessary.     I discussed the patients findings and my recommendations with patient and family.         Carmita Cortes PA-C  [facility], [place], operated by [facility]  [address] [place] [address]  Office: [phone]

## 2024-07-02 NOTE — CONSULTS
Patient Name: Slade Martinez Account #: [id number]    MRN: [id number] Admission Date: 7/1/2024      Consulting Service: Vascular Surgery Date of Evaluation: July 2, 2024    Requesting Provider: Yunier Buchanan MD    CHIEF COMPLAINT: Abdominal pain and diarrhea  HPI: Slade Martinez is a 73 y.o. male is being seen for a consultation and evaluation/management of known superior mesenteric artery occlusion with new onset 4 days of diarrhea and left lower quadrant pain.  Patient has had some weight loss over the last 4 months between 4 coronary stents being placed in multiple send episodes of shingles and sepsis.  It does not appear that any of his weight loss has been related to abdominal pain after eating.    PAST MEDICAL HISTORY:   Past Medical History:   Diagnosis Date    Anxiety     Atherosclerosis of native arteries of extremities with intermittent claudication, bilateral legs 03/24/2020    PVD    Bilateral carotid artery stenosis 03/24/2020    CAD (coronary artery disease)     unspecified    Carotid artery disease     Cholelithiasis     Constipation     COPD (chronic obstructive pulmonary disease)     Coronary atherosclerosis 11/04/2019    H/O CABG SVG to first diagonal branch and to the LAD as well as SVG to the lateral marginal branch of the circumflex complicated by right sided subcortical infarct with left sided hemiparesis    Degenerative disc disease, lumbar 11/24/2021    Dyslipidemia 11/04/2019    Essential hypertension 11/04/2019    GERD (gastroesophageal reflux disease)     Hyperlipidemia     Hypertension     Injury of back     fractured vertebra 2/26/23    Ischemic heart disease     Peripheral edema     PVD (peripheral vascular disease) with claudication     latanya legs    Stroke 2011    Unilateral osteoarthritis of hip 11/24/2021    Vertigo       PAST SURGICAL HISTORY:   Past Surgical History:   Procedure Laterality Date    ANGIOPLASTY      x2    CARDIAC CATHETERIZATION N/A 05/06/2024    Procedure: Left Heart  Cath;  Surgeon: Gurwinder Hurd MD;  Location:  [facility];  Service: Cardiovascular;  Laterality: N/A;    CARDIAC CATHETERIZATION N/A 05/06/2024    Procedure: Coronary angiography;  Surgeon: Gurwinder Hurd MD;  Location:  [facility];  Service: Cardiovascular;  Laterality: N/A;    CARDIAC CATHETERIZATION N/A 05/06/2024    Procedure: Percutaneous Coronary Intervention;  Surgeon: Gurwinder Hurd MD;  [facility];  Service: Cardiovascular;  Laterality: N/A;    CARDIAC CATHETERIZATION N/A 05/06/2024    Procedure: Stent TC coronary;  Surgeon: Gurwinder Hurd MD;  Location:  [facility];  Service: Cardiovascular;  Laterality: N/A;    CARDIAC CATHETERIZATION  05/06/2024    Procedure: Saphenous Vein Graft;  Surgeon: Gurwinder Hurd MD;  Location:  [facility];  Service: Cardiovascular;;    CAROTID ENDARTERECTOMY  2000    Left    CAROTID ENDARTERECTOMY Left 06/26/2001    CHOLECYSTECTOMY      COLONOSCOPY  2010    COLONOSCOPY  2012    CORONARY ARTERY BYPASS GRAFT  08/2011    CORONARY STENT PLACEMENT      X17 stents    CORONARY STENT PLACEMENT      x5    INTERVENTIONAL RADIOLOGY PROCEDURE N/A 05/06/2024    Procedure: Intravascular Ultrasound;  Surgeon: Gurwinder Hurd MD;  Location:  [facility];  Service: Cardiovascular;  Laterality: N/A;      FAMILY HISTORY:   Family History   Problem Relation Age of Onset    COPD Mother     Heart attack Father     Heart failure Father     COPD Brother       SOCIAL HISTORY:   Social History     Tobacco Use    Smoking status: Former     Types: Cigarettes    Smokeless tobacco: Never    Tobacco comments:     Patient used to smoke 3 packs per day and quit 22 years ago.   Vaping Use    Vaping status: Never Used   Substance Use Topics    Alcohol use: Not Currently     Comment: Patient is non drinker.    Drug use: Never     Comment: Drug  Abuse: no drug abuse      MEDICATIONS:   No current facility-administered medications on file prior to encounter.     Current Outpatient Medications on File Prior to Encounter   Medication Sig Dispense Refill    amLODIPine (NORVASC) 5 MG tablet Take 1 tablet by mouth Daily. 30 tablet 3    aspirin 81 MG tablet Take 1 tablet by mouth Daily.      atenolol (TENORMIN) 50 MG tablet Take 1 tablet by mouth 2 (Two) Times a Day.      atorvastatin (LIPITOR) 40 MG tablet Take 1 tablet by mouth Every Night.      clopidogrel (PLAVIX) 75 MG tablet Take 1 tablet by mouth Daily.      hydroCHLOROthiazide 25 MG tablet Take 1 tablet by mouth Daily As Needed (swelling).      Linzess 145 MCG capsule capsule Take 1 capsule by mouth Every Other Day.      lisinopril (PRINIVIL,ZESTRIL) 40 MG tablet Take 1 tablet by mouth 2 (Two) Times a Day.      loratadine (CLARITIN) 10 MG tablet Take 1 tablet by mouth Daily.      meclizine (ANTIVERT) 12.5 MG tablet Take 1 tablet by mouth Daily.      Multiple Vitamins-Minerals (MULTIVITAMIN ADULT EXTRA C PO) Take 1 tablet by mouth 2 (Two) Times a Day.      pantoprazole (PROTONIX) 40 MG EC tablet Take 1 tablet by mouth 2 (Two) Times a Day.      Potassium Chloride Arpita ER (KLOR-CON M20 PO) Take 20 mEq by mouth Take As Directed.      pregabalin (LYRICA) 100 MG capsule Take 150 mg by mouth 3 (Three) Times a Day.      saccharomyces boulardii (FLORASTOR) 250 MG capsule Take 1 capsule by mouth Every Night.      sucralfate (CARAFATE) 1 g tablet Take 1 tablet by mouth every night at bedtime.      nitroglycerin (NITROSTAT) 0.4 MG SL tablet Place 1 tablet under the tongue Every 5 (Five) Minutes As Needed for Chest Pain. Take no more than 3 doses in 15 minutes. 25 tablet 3             ALLERGIES: Pletal [cilostazol] and Tamsulosin   COMPLETE REVIEW OF SYSTEMS:     ENT ROS: negative  Cardiovascular ROS: no chest pain or dyspnea on exertion  Respiratory ROS: no cough, shortness of breath, or wheezing  Gastrointestinal  "ROS: positive for -left lower quadrant abdominal pain and diarrhea  Neurological ROS: no TIA or stroke symptoms  Genito-Urinary ROS: no dysuria, trouble voiding, or hematuria  Musculoskeletal ROS: negative  Dermatological ROS: negative  Psychological ROS: negative      PHYSICAL EXAM:   Patient Vitals for the past 24 hrs:   BP Temp Temp src Pulse Resp SpO2 Height   07/02/24 1111 140/58 99.5 °F (37.5 °C) -- 88 -- 99 % --   07/02/24 0737 133/65 99.9 °F (37.7 °C) -- 100 -- 96 % --   07/02/24 0401 135/68 99.9 °F (37.7 °C) Oral 89 18 96 % --   07/01/24 2328 101/55 99.7 °F (37.6 °C) Oral 88 18 99 % --   07/01/24 2114 139/60 -- -- 85 -- -- --   07/01/24 2019 160/73 -- -- 96 -- -- --   07/01/24 1923 174/71 98.6 °F (37 °C) Oral 94 18 99 % --   07/01/24 1730 -- -- -- 91 -- 100 % --   07/01/24 1720 164/72 -- -- -- -- -- --   07/01/24 1717 162/75 98.6 °F (37 °C) Oral 92 18 -- 180.4 cm (71.02\")   07/01/24 1625 146/82 -- -- 79 18 100 % --        General appearance: oriented to person, place, and time, ill-appearing, and chronically ill appearing.  Neurological exam reveals alert, oriented, normal speech, no focal findings or movement disorder noted.  ENT exam reveals - ENT exam normal, no neck nodes or sinus tenderness.  CVS exam: normal rate, regular rhythm, normal S1, S2, no murmurs, rubs, clicks or gallops.  Chest: clear to auscultation, no wheezes, rales or rhonchi, symmetric air entry.  Abdominal exam: tenderness noted in the left lower quadrant although there is no rebound or guarding.  The abdomen is overall soft t and benign overall.    Examination of the feet reveals warm, good capillary refill.  Mild swelling bilaterally        LABS:      Results Review:       I reviewed the patient's new clinical results.  Specifically his white count which is elevated but decreasing in his normal creatinine.  Results from last 7 days   Lab Units 07/02/24  0432 07/01/24  1413   WBC 10*3/mm3 14.68* 18.77*   HEMOGLOBIN g/dL 11.2* 13.1 "   PLATELETS 10*3/mm3 267 332     Results from last 7 days   Lab Units 07/02/24  0432 07/01/24  1413   SODIUM mmol/L 127* 129*   POTASSIUM mmol/L 4.6 4.9   CHLORIDE mmol/L 95* 91*   CO2 mmol/L 22.2 25.8   BUN mg/dL 7* 11   CREATININE mg/dL 0.75* 0.75*   GLUCOSE mg/dL 99 137*   Estimated Creatinine Clearance: 99.7 mL/min (A) (by C-G formula based on SCr of 0.75 mg/dL (L)).  Results from last 7 days   Lab Units 07/02/24  0432 07/01/24  1413   CALCIUM mg/dL 8.4* 9.6   ALBUMIN g/dL 2.9* 3.8   MAGNESIUM mg/dL  --  2.3     Results from last 7 days   Lab Units 07/01/24  1413   PROTIME Seconds 13.9   INR  1.05       The following radiologic or non-invasive studies have been reviewed by me: CT scan without contrast reviewed with images reviewed    Active Hospital Problems    Diagnosis  POA    **Left lower quadrant abdominal pain [R10.32]  Yes    Diarrhea [R19.7]  Unknown    Leukocytosis [D72.829]  Unknown    Hyponatremia [E87.1]  Unknown    Chest pain [R07.9]  Yes    Chronic mesenteric ischemia [K55.1]  Yes    S/P CABG (coronary artery bypass graft) [Z95.1]  Not Applicable    Essential hypertension [I10]  Yes      Resolved Hospital Problems   No resolved problems to display.         ASSESSMENT/PLAN: 73 y.o. male with chronically occluded superior mesenteric artery well-known for his vasculopathies with carotid and lower extremity occlusive disease as well.  At this juncture I do not believe his current symptoms have any relation to his chronic SMA occlusion which once again has not changed by studies since our last consultation in September 2023.  I discussed the situation with the patient and his wife.  There is no percutaneous options for intervention and I do not believe he would survive an open mesenteric bypass.  His symptoms do not seem to relate as he has had no chronic abdominal pain or no chronic diarrhea and his weight loss is explained by his multiple episodes of unrelated illness.  Would recommend continued  complete GI workup to look for infectious source of diarrhea which seems to be the most likely cause based on his history.  We will sign off.  Patient is already scheduled to follow-up with Dr. Junie Alcala in August or September and is comfortable with that.      I discussed the plan with the patient and his wife and they are  agreeable to the plan of care at this point. Thank you for this consult.     Ayush Waters MD   07/02/24

## 2024-07-02 NOTE — OUTREACH NOTE
Medical Week 1 Survey      Flowsheet Row Responses   [facility] patient discharged from? [place]   Does the patient have one of the following disease processes/diagnoses(primary or secondary)? Other   Week 1 attempt successful? No   Unsuccessful attempts Attempt 2   Revoke Readmitted            EILEEN AVALOS - Registered Nurse

## 2024-07-02 NOTE — PLAN OF CARE
Problem: Adult Inpatient Plan of Care  Goal: Plan of Care Review  Outcome: Ongoing, Progressing  Goal: Patient-Specific Goal (Individualized)  Outcome: Ongoing, Progressing  Goal: Absence of Hospital-Acquired Illness or Injury  Outcome: Ongoing, Progressing  Intervention: Identify and Manage Fall Risk  Recent Flowsheet Documentation  Taken 7/2/2024 1400 by Alondra Regan RN  Safety Promotion/Fall Prevention:   safety round/check completed   activity supervised  Taken 7/2/2024 1200 by Alondra Regan RN  Safety Promotion/Fall Prevention: safety round/check completed  Taken 7/2/2024 1000 by Alondra Regan RN  Safety Promotion/Fall Prevention:   activity supervised   safety round/check completed  Taken 7/2/2024 0840 by Alondra Regan RN  Safety Promotion/Fall Prevention:   safety round/check completed   activity supervised  Intervention: Prevent Skin Injury  Recent Flowsheet Documentation  Taken 7/2/2024 1400 by Alondra Regan RN  Body Position: position changed independently  Taken 7/2/2024 1200 by Alondra Regan RN  Body Position: position changed independently  Taken 7/2/2024 1000 by Alondra Regan RN  Body Position: position changed independently  Taken 7/2/2024 0840 by Alondra Regan RN  Body Position: position changed independently  Skin Protection:   adhesive use limited   tubing/devices free from skin contact  Intervention: Prevent and Manage VTE (Venous Thromboembolism) Risk  Recent Flowsheet Documentation  Taken 7/2/2024 1400 by Alondra Regan RN  Activity Management: activity encouraged  Taken 7/2/2024 1200 by Alondra Regan RN  Activity Management: activity encouraged  Taken 7/2/2024 1000 by Alondra Regan RN  Activity Management: activity encouraged  Taken 7/2/2024 0840 by Alondra Regan RN  Activity Management: activity encouraged  VTE Prevention/Management: sequential compression devices on  Range of Motion: active ROM (range of motion) encouraged  Intervention: Prevent Infection  Recent Flowsheet  Documentation  Taken 7/2/2024 1400 by Alondra Regan RN  Infection Prevention:   environmental surveillance performed   hand hygiene promoted   rest/sleep promoted  Taken 7/2/2024 1200 by Alondra Regan RN  Infection Prevention:   environmental surveillance performed   hand hygiene promoted   single patient room provided  Taken 7/2/2024 1000 by Alondra Regan RN  Infection Prevention:   environmental surveillance performed   hand hygiene promoted   single patient room provided  Taken 7/2/2024 0840 by Alondra Regan RN  Infection Prevention:   environmental surveillance performed   hand hygiene promoted   single patient room provided  Goal: Optimal Comfort and Wellbeing  Outcome: Ongoing, Progressing  Intervention: Provide Person-Centered Care  Recent Flowsheet Documentation  Taken 7/2/2024 0840 by Alondra Regan RN  Trust Relationship/Rapport: care explained  Goal: Readiness for Transition of Care  Outcome: Ongoing, Progressing     Problem: Pain Acute  Goal: Acceptable Pain Control and Functional Ability  Outcome: Ongoing, Progressing  Intervention: Prevent or Manage Pain  Recent Flowsheet Documentation  Taken 7/2/2024 0840 by Alondra Regan RN  Sensory Stimulation Regulation: lighting decreased  Bowel Elimination Promotion: adequate fluid intake promoted  Medication Review/Management: medications reviewed  Intervention: Optimize Psychosocial Wellbeing  Recent Flowsheet Documentation  Taken 7/2/2024 0840 by Alondra Regan RN  Diversional Activities: television     Problem: Hypertension Comorbidity  Goal: Blood Pressure in Desired Range  Outcome: Ongoing, Progressing  Intervention: Maintain Blood Pressure Management  Recent Flowsheet Documentation  Taken 7/2/2024 0840 by Alondra Regan RN  Syncope Management: head lowered  Medication Review/Management: medications reviewed     Problem: Skin Injury Risk Increased  Goal: Skin Health and Integrity  Outcome: Ongoing, Progressing  Intervention: Optimize Skin Protection  Recent  Flowsheet Documentation  Taken 7/2/2024 1400 by Alondra Regan RN  Head of Bed (HOB) Positioning: HOB elevated  Taken 7/2/2024 1200 by Alondra Regan RN  Head of Bed (HOB) Positioning: HOB elevated  Taken 7/2/2024 1000 by Alondra Regan RN  Head of Bed (HOB) Positioning: HOB elevated  Taken 7/2/2024 0840 by Alondra Regan RN  Pressure Reduction Techniques:   frequent weight shift encouraged   weight shift assistance provided  Head of Bed (HOB) Positioning: HOB elevated  Pressure Reduction Devices: pressure-redistributing mattress utilized  Skin Protection:   adhesive use limited   tubing/devices free from skin contact     Problem: Adjustment to Illness (Sepsis/Septic Shock)  Goal: Optimal Coping  Outcome: Ongoing, Progressing  Intervention: Optimize Psychosocial Adjustment to Illness  Recent Flowsheet Documentation  Taken 7/2/2024 0840 by Alondra Regan RN  Family/Support System Care: support provided     Problem: Bleeding (Sepsis/Septic Shock)  Goal: Absence of Bleeding  Outcome: Ongoing, Progressing     Problem: Glycemic Control Impaired (Sepsis/Septic Shock)  Goal: Blood Glucose Level Within Desired Range  Outcome: Ongoing, Progressing     Problem: Infection Progression (Sepsis/Septic Shock)  Goal: Absence of Infection Signs and Symptoms  Outcome: Ongoing, Progressing  Intervention: Initiate Sepsis Management  Recent Flowsheet Documentation  Taken 7/2/2024 1400 by Alondra Regan RN  Infection Prevention:   environmental surveillance performed   hand hygiene promoted   rest/sleep promoted  Isolation Precautions:   contact   spore  Taken 7/2/2024 1200 by Alondra Regan RN  Infection Prevention:   environmental surveillance performed   hand hygiene promoted   single patient room provided  Isolation Precautions:   contact   spore  Taken 7/2/2024 1000 by Alondra Regan RN  Infection Prevention:   environmental surveillance performed   hand hygiene promoted   single patient room provided  Isolation Precautions:   contact    spore  Taken 7/2/2024 0840 by Alondra Regan RN  Infection Prevention:   environmental surveillance performed   hand hygiene promoted   single patient room provided  Isolation Precautions:   contact   spore  Intervention: Promote Recovery  Recent Flowsheet Documentation  Taken 7/2/2024 1400 by Alondra Regan RN  Activity Management: activity encouraged  Taken 7/2/2024 1200 by Alondra Regan RN  Activity Management: activity encouraged  Taken 7/2/2024 1000 by Alondra Regan RN  Activity Management: activity encouraged  Taken 7/2/2024 0840 by Alondra Regan RN  Activity Management: activity encouraged     Problem: Nutrition Impaired (Sepsis/Septic Shock)  Goal: Optimal Nutrition Intake  Outcome: Ongoing, Progressing     Problem: Fall Injury Risk  Goal: Absence of Fall and Fall-Related Injury  Outcome: Ongoing, Progressing  Intervention: Identify and Manage Contributors  Recent Flowsheet Documentation  Taken 7/2/2024 0840 by Alondra Regan RN  Medication Review/Management: medications reviewed  Intervention: Promote Injury-Free Environment  Recent Flowsheet Documentation  Taken 7/2/2024 1400 by Alondra Regan RN  Safety Promotion/Fall Prevention:   safety round/check completed   activity supervised  Taken 7/2/2024 1200 by Alondra Regan RN  Safety Promotion/Fall Prevention: safety round/check completed  Taken 7/2/2024 1000 by Alondra Regan RN  Safety Promotion/Fall Prevention:   activity supervised   safety round/check completed  Taken 7/2/2024 0840 by Alondra Regan RN  Safety Promotion/Fall Prevention:   safety round/check completed   activity supervised   Goal Outcome Evaluation:

## 2024-07-02 NOTE — PROGRESS NOTES
"    Name: Slade Martinez ADMIT: 2024   : 1950  PCP: Triny Hannon MD    MRN: [id number] LOS: 0 days   AGE/SEX: 73 y.o. male  ROOM: [facility]     Subjective   Subjective   Resting in bed.  Wife at bedside.  He states he has been having left lower quadrant abdominal pain and diarrhea for the last 4 to 5 days.  He states he feels like the area has been \"burnt out\" from antibiotics.  He was recently here for a UTI and discharged on Augmentin which he states he started having more diarrhea.  This was changed to Omnicef by an outpatient provider.  He feels like he has had antibiotics at this point.  He denies any chest pain today although did have a little chest discomfort yesterday.  He was seen by the cardiologist as he did have recent stenting in May.  He denies any trouble breathing, cough, chills.  He has had some low-grade fevers and continues to complain of right-sided shingle related pain.  He denies any urgency, frequency or dysuria.  He states he does have chronic constipation, but has not taken his Linzess in 4 to 5 days.  He is followed by [facility] typically.  He has lost about 20 pounds in the last couple months secondary to illness and poor oral intake.      Objective   Objective   Vital Signs  Temp:  [97.5 °F (36.4 °C)-99.9 °F (37.7 °C)] 99.9 °F (37.7 °C)  Heart Rate:  [] 100  Resp:  [18-19] 18  BP: (101-202)/(55-95) 133/65  SpO2:  [96 %-100 %] 96 %  on   ;   Device (Oxygen Therapy): room air  Body mass index is 25.09 kg/m².    Physical Exam  Vitals and nursing note reviewed.   Constitutional:       Appearance: He is ill-appearing.   Cardiovascular:      Rate and Rhythm: Normal rate and regular rhythm.      Pulses: Normal pulses.   Pulmonary:      Effort: Pulmonary effort is normal. No respiratory distress.      Breath sounds: Normal breath sounds.   Abdominal:      General: Bowel sounds are normal. There is no distension.      Palpations: Abdomen is soft.      Tenderness: There is " "abdominal tenderness.   Musculoskeletal:         General: No swelling. Normal range of motion.   Skin:     General: Skin is warm and dry.      Findings: No bruising.   Neurological:      General: No focal deficit present.      Mental Status: He is alert and oriented to person, place, and time.      Sensory: No sensory deficit.      Motor: Weakness present.      Coordination: Coordination normal.   Psychiatric:         Mood and Affect: Mood normal.         Behavior: Behavior normal.     Results Review:       I reviewed the patient's new clinical results.  Results from last 7 days   Lab Units 07/02/24  0432 07/01/24  1413   WBC 10*3/mm3 14.68* 18.77*   HEMOGLOBIN g/dL 11.2* 13.1   PLATELETS 10*3/mm3 267 332     Results from last 7 days   Lab Units 07/02/24  0432 07/01/24  1413   SODIUM mmol/L 127* 129*   POTASSIUM mmol/L 4.6 4.9   CHLORIDE mmol/L 95* 91*   CO2 mmol/L 22.2 25.8   BUN mg/dL 7* 11   CREATININE mg/dL 0.75* 0.75*   GLUCOSE mg/dL 99 137*   Estimated Creatinine Clearance: 99.7 mL/min (A) (by C-G formula based on SCr of 0.75 mg/dL (L)).  Results from last 7 days   Lab Units 07/02/24  0432 07/01/24  1413   ALBUMIN g/dL 2.9* 3.8   BILIRUBIN mg/dL 0.7 0.6   ALK PHOS U/L 88 114   AST (SGOT) U/L 22 16   ALT (SGPT) U/L 16 22     Results from last 7 days   Lab Units 07/02/24  0432 07/01/24  1413   CALCIUM mg/dL 8.4* 9.6   ALBUMIN g/dL 2.9* 3.8   MAGNESIUM mg/dL  --  2.3     Results from last 7 days   Lab Units 07/02/24  0432 07/02/24  0120 07/01/24  2222 07/01/24  1912 07/01/24  1413   PROCALCITONIN ng/mL  --   --   --   --  0.12   LACTATE mmol/L 2.7* 2.5* 2.2* 2.8* 2.5*     No results found for: \"HGBA1C\", \"POCGLU\"    amLODIPine, 5 mg, Oral, Q24H  aspirin, 325 mg, Oral, Once  atenolol, 50 mg, Oral, BID  atorvastatin, 40 mg, Oral, Nightly  cetirizine, 10 mg, Oral, Daily  cholecalciferol, 2,000 Units, Oral, Daily  clopidogrel, 75 mg, Oral, Daily  lisinopril, 40 mg, Oral, Q24H  meclizine, 12.5 mg, Oral, " Daily  pantoprazole, 40 mg, Oral, BID  pregabalin, 150 mg, Oral, TID  saccharomyces boulardii, 250 mg, Oral, Nightly  sodium chloride, 10 mL, Intravenous, Q12H  vitamin B-12, 1,000 mcg, Oral, Daily      sodium chloride, 100 mL/hr, Last Rate: 100 mL/hr (07/02/24 0739)    Diet: Cardiac; Healthy Heart (2-3 Na+); Fluid Consistency: Thin (IDDSI 0)       Assessment/Plan     Active Hospital Problems    Diagnosis  POA    **Left lower quadrant abdominal pain [R10.32]  Yes    Diarrhea [R19.7]  Unknown    Leukocytosis [D72.829]  Unknown    Hyponatremia [E87.1]  Unknown    Chest pain [R07.9]  Yes    Chronic mesenteric ischemia [K55.1]  Yes    S/P CABG (coronary artery bypass graft) [Z95.1]  Not Applicable    Essential hypertension [I10]  Yes      Resolved Hospital Problems   No resolved problems to display.     Mr. Martinez is a 73 y.o. male who presented to the hospital with complaints of diarrhea and abdominal pain for the last several days.  He has had a recent complicated medical history over the last few months.  He was admitted here in May where he underwent left heart catheterization and found to have 90% proximal left main and underwent PCI.  He was discharged and readmitted shortly thereafter with chest pain and found to have a rash with new diagnosis of shingles.  He was again admitted after that for urinary retention and treated for UTI on his most recent admission here when he was discharged 6/21.  He was discharged with Augmentin for urine culture growing Klebsiella.  He started to have increasing diarrhea and abdominal discomfort and was apparently switched from Augmentin to Omnicef without relief.  CT A/P was obtained without any acute findings in the abdomen or pelvis, but did reveal bulky calcific atherosclerotic disease in the proximal SMA with suspected high-grade stenosis or vessel acute lesions similar to prior.  This was concerning for chronic mesenteric ischemia.  Chest x-ray was negative for any acute  findings.  He did have some chest discomfort and cardiology was consulted.    Left lower quadrant abdominal pain  Diarrhea with leukocytosis  Chronic mesenteric ischemia  -Initial concerns for C. difficile infection given recent antibiotics.  This is returned negative.  -Will add GI PCR to rule out viral etiology given low-grade fevers.  -Leukocytosis down-trending. Antibiotics on hold at present. Patient has been on numerous antibiotics lately without improvement- continue to monitor off empiric therapy for now.  -Does have some persistent lactic acidosis, but downtrending on recent check.   -Consulted gastroenterology given persistence of symptoms-they have recommended vascular surgery consult given chronic intermittent abdominal pain felt to be possibly related to stenosis of SMA.  -Blood cultures currently pending.    Hyponatremia  -Sodium 129 on admission.  Has been given multiple boluses of fluids given his associated lactic acidosis and now down to 127.  This favors less likely hypovolemia as source of his low sodium.  Will ask nephrology to evaluate.    Chest pain  CAD with history of CABG  Hypertension   -Cardiology following.  -Not felt to have cardiac etiology to chest discomfort on admission.  -Plans to obtain repeat echocardiogram given persistent lactic acidosis.   -Continue DAPT.   -Continue atenolol, Norvasc, lisinopril, statin.     Discussed with patient and wife at bedside.    VTE Prophylaxis - SCDs  Code Status - Full code  Disposition - anticipate discharge TBD.      SHERRILL Bang  [facility]  07/02/24  11:10 EDT

## 2024-07-02 NOTE — PROGRESS NOTES
Discharge Planning Assessment  [facility]     Patient Name: Slade Martinez  MRN: [id number]  Today's Date: 7/2/2024    Admit Date: 7/1/2024    Plan: Return home with spouse   Discharge Needs Assessment    No documentation.                  Discharge Plan       Row Name 07/02/24 6324       Plan    Plan Return home with spouse    Plan Comments Patient will return home with spouse, denies any discharge needs. Eden SINGLETON                  Continued Care and Services - Admitted Since 7/1/2024    No active coordination exists for this encounter.          Demographic Summary    No documentation.                  Functional Status    No documentation.                  Psychosocial    No documentation.                  Abuse/Neglect    No documentation.                  Legal    No documentation.                  Substance Abuse    No documentation.                  Patient Forms    No documentation.                     Chelsey Cox RN

## 2024-07-02 NOTE — CONSULTS
[facility] Consult Note      Patient Name: Slade Martinez  : 1950  MRN: [id number]  Primary Care Physician:  Triny Hannon MD  Referring Physician: Geetha Gramajo MD  Date of admission: 2024    Subjective     Reason for Consult: Acute on chronic hyponatremia    HPI:   Slade Martinez is a 73 y.o. male with chronic hyponatremia (sodium low 130s), bilateral carotid artery stenosis s/p endarterectomy, CAD, CABG with stenting (24 stents total), stroke, HTN, and COPD, admitted yesterday for chest tightness/pain and LLQ abdominal pain.  Diarrhea started on , home Linzess stopped.  Patient has been having on and off chest pain since May, had heart cath with complicated PCI done (noted to have clotted prior stents with perforated LAD).  From  to , patient was hospitalized for acute UTI, urinary retention, shingles, and sepsis, was discharged home with p.o. Augmentin, finished the course on .    On admission, sodium 129, patient was bolused with 2.5 L total saline, currently on IVF at 100 cc/h, sodium today down to 127. Chest x-ray, CT abdomen negative for acute findings.  Patient reports has been having poor p.o. intake, drinks at least 70 to 80 ounces of fluids a day; BP last week was low 70/50s and 90/70s, home lisinopril, HCTZ were stopped.     Nephrology consultation requested for the management    Review of Systems:   14 point review of systems is otherwise negative except for mentioned above on HPI    Personal History     Past Medical History:   Diagnosis Date    Anxiety     Atherosclerosis of native arteries of extremities with intermittent claudication, bilateral legs 2020    PVD    Bilateral carotid artery stenosis 2020    CAD (coronary artery disease)     unspecified    Carotid artery disease     Cholelithiasis     Constipation     COPD (chronic obstructive pulmonary disease)     Coronary atherosclerosis 2019    H/O CABG SVG to first  diagonal branch and to the LAD as well as SVG to the lateral marginal branch of the circumflex complicated by right sided subcortical infarct with left sided hemiparesis    Degenerative disc disease, lumbar 11/24/2021    Dyslipidemia 11/04/2019    Essential hypertension 11/04/2019    GERD (gastroesophageal reflux disease)     Hyperlipidemia     Hypertension     Injury of back     fractured vertebra 2/26/23    Ischemic heart disease     Peripheral edema     PVD (peripheral vascular disease) with claudication     latanya legs    Stroke 2011    Unilateral osteoarthritis of hip 11/24/2021    Vertigo        Past Surgical History:   Procedure Laterality Date    ANGIOPLASTY      x2    CARDIAC CATHETERIZATION N/A 05/06/2024    Procedure: Left Heart Cath;  Surgeon: Gurwinder Hurd MD;  Location:  [facility] C[facility] INVASIVE [facility];  Service: Cardiovascular;  Laterality: N/A;    CARDIAC CATHETERIZATION N/A 05/06/2024    Procedure: Coronary angiography;  Surgeon: Gurwinder Hurd MD;  Location:  [facility];  Service: Cardiovascular;  Laterality: N/A;    CARDIAC CATHETERIZATION N/A 05/06/2024    Procedure: Percutaneous Coronary Intervention;  Surgeon: Gurwinder Hurd MD;  [facility];  Service: Cardiovascular;  Laterality: N/A;    CARDIAC CATHETERIZATION N/A 05/06/2024    Procedure: Stent TC coronary;  Surgeon: Gurwinder Hurd MD;  [facility];  Service: Cardiovascular;  Laterality: N/A;    CARDIAC CATHETERIZATION  05/06/2024    Procedure: Saphenous Vein Graft;  Surgeon: Gurwinder Hurd MD;  [facility];  Service: Cardiovascular;;    CAROTID ENDARTERECTOMY  2000    Left    CAROTID ENDARTERECTOMY Left 06/26/2001    CHOLECYSTECTOMY      COLONOSCOPY  2010    COLONOSCOPY  2012    CORONARY ARTERY BYPASS GRAFT  08/2011    CORONARY STENT PLACEMENT      X17 stents    CORONARY STENT PLACEMENT      x5     INTERVENTIONAL RADIOLOGY PROCEDURE N/A 05/06/2024    Procedure: Intravascular Ultrasound;  Surgeon: Gurwinder Hurd MD;  Location: [facility] INVASIVE LOCATION;  Service: Cardiovascular;  Laterality: N/A;       Family History: family history includes COPD in his brother and mother; Heart attack in his father; Heart failure in his father.    Social History:  reports that he has quit smoking. His smoking use included cigarettes. He has never used smokeless tobacco. He reports that he does not currently use alcohol. He reports that he does not use drugs.    Home Medications:  Prior to Admission medications    Medication Sig Start Date End Date Taking? Authorizing Provider   amLODIPine (NORVASC) 5 MG tablet Take 1 tablet by mouth Daily. 5/14/24  Yes Adrianna Plata APRN   aspirin 81 MG tablet Take 1 tablet by mouth Daily.   Yes Rodrick Birmingham MD   atenolol (TENORMIN) 50 MG tablet Take 1 tablet by mouth 2 (Two) Times a Day.   Yes Rodrick Birmingham MD   atorvastatin (LIPITOR) 40 MG tablet Take 1 tablet by mouth Every Night.   Yes Rodrick Birmingham MD   clopidogrel (PLAVIX) 75 MG tablet Take 1 tablet by mouth Daily.   Yes Rodrick Birmingham MD   hydroCHLOROthiazide 25 MG tablet Take 1 tablet by mouth Daily As Needed (swelling).   Yes Rodrick Birmingham MD   Linzess 145 MCG capsule capsule Take 1 capsule by mouth Every Other Day. 11/10/23  Yes Rodrick Birmingham MD   lisinopril (PRINIVIL,ZESTRIL) 40 MG tablet Take 1 tablet by mouth 2 (Two) Times a Day.   Yes Rodrick Birmingham MD   loratadine (CLARITIN) 10 MG tablet Take 1 tablet by mouth Daily.   Yes Rodrick Birmingham MD   meclizine (ANTIVERT) 12.5 MG tablet Take 1 tablet by mouth Daily.   Yes Rodrick Birmingham MD   Multiple Vitamins-Minerals (MULTIVITAMIN ADULT EXTRA C PO) Take 1 tablet by mouth 2 (Two) Times a Day.   Yes Rodrick Birmingham MD   pantoprazole (PROTONIX) 40 MG EC tablet Take 1 tablet by mouth 2 (Two) Times a Day.    Yes Rodrick Birmingham MD   Potassium Chloride Arpita ER (KLOR-CON M20 PO) Take 20 mEq by mouth Take As Directed.   Yes Rodrick Birmingham MD   pregabalin (LYRICA) 100 MG capsule Take 150 mg by mouth 3 (Three) Times a Day.   Yes Rodrick Birmingham MD   saccharomyces boulardii (FLORASTOR) 250 MG capsule Take 1 capsule by mouth Every Night.   Yes Rodrick Birmingham MD   sucralfate (CARAFATE) 1 g tablet Take 1 tablet by mouth every night at bedtime.   Yes Rodrick Birmingham MD   nitroglycerin (NITROSTAT) 0.4 MG SL tablet Place 1 tablet under the tongue Every 5 (Five) Minutes As Needed for Chest Pain. Take no more than 3 doses in 15 minutes. 11/8/19   James Hubbard MD       Allergies:  Allergies   Allergen Reactions    Pletal [Cilostazol] Myalgia     .    Tamsulosin Palpitations       Objective     Vitals:   Temp:  [97.5 °F (36.4 °C)-99.9 °F (37.7 °C)] 99.5 °F (37.5 °C)  Heart Rate:  [] 88  Resp:  [18-19] 18  BP: (101-202)/(55-95) 140/58    Intake/Output Summary (Last 24 hours) at 7/2/2024 1207  Last data filed at 7/2/2024 0737  Gross per 24 hour   Intake 100 ml   Output 350 ml   Net -250 ml       Physical Exam:   Constitutional: Awake, alert, no acute distress, frail appearing.  HEENT: Sclera anicteric, no conjunctival injection  Neck: Supple, no JVD  Respiratory: Managed to auscultation bilaterally, nonlabored respiration on RA  Cardiovascular: RRR, no murmurs, no rubs or gallops, no carotid bruit  Gastrointestinal: Positive bowel sounds, abdomen is soft, nontender and nondistended  : No palpable bladder  Musculoskeletal: No edema, no clubbing or cyanosis  Psychiatric: Appropriate affect, cooperative  Neurologic: Oriented x3, moving all extremities, normal speech and mental status  Skin: Warm and dry       Scheduled Meds:     amLODIPine, 5 mg, Oral, Q24H  aspirin, 325 mg, Oral, Once  atenolol, 50 mg, Oral, BID  atorvastatin, 40 mg, Oral, Nightly  cetirizine, 10 mg, Oral,  Daily  cholecalciferol, 2,000 Units, Oral, Daily  clopidogrel, 75 mg, Oral, Daily  lisinopril, 40 mg, Oral, Q24H  meclizine, 12.5 mg, Oral, Daily  pantoprazole, 40 mg, Oral, BID  pregabalin, 150 mg, Oral, TID  saccharomyces boulardii, 250 mg, Oral, Nightly  sodium chloride, 10 mL, Intravenous, Q12H  vitamin B-12, 1,000 mcg, Oral, Daily      IV Meds:   sodium chloride, 100 mL/hr, Last Rate: 100 mL/hr (07/02/24 0739)        Results Reviewed:   I have personally reviewed the results from the time of this admission to 7/2/2024 12:07 EDT     Lab Results   Component Value Date    GLUCOSE 99 07/02/2024    CALCIUM 8.4 (L) 07/02/2024     (L) 07/02/2024    K 4.6 07/02/2024    CO2 22.2 07/02/2024    CL 95 (L) 07/02/2024    BUN 7 (L) 07/02/2024    CREATININE 0.75 (L) 07/02/2024    EGFRIFAFRI >60 02/28/2023    EGFRIFNONA 74 02/11/2021    BCR 9.3 07/02/2024    ANIONGAP 9.8 07/02/2024      Lab Results   Component Value Date    MG 2.3 07/01/2024    ALBUMIN 2.9 (L) 07/02/2024           Assessment / Plan       Left lower quadrant abdominal pain    Essential hypertension    S/P CABG (coronary artery bypass graft)    Chronic mesenteric ischemia    Chest pain    Diarrhea    Leukocytosis    Hyponatremia      ASSESSMENT:  Acute on chronic hyponatremia, baseline sodium low 130s, 129 on admission, down to 127 after IVF; multifactorial: SIADH (Urine osmolarity 515, urine sodium 41, uric acid 2.4, aligns with SIADH); poor p.o. solute intake, excessive p.o. free water intake, possible recent low BP stimulating ADH secretion while on HCTZ. Appears to be euvolemic on exam; renal function at baseline ; currently receiving IV NS at 100 cc/h  Chest pain s/p CABG, recent PCI with stenting on 5/24, on Plavix and baby aspirin at home,  HTN, initial /87, better controlled today  Diarrhea/LLQ abdominal pain/leukocytosis, CT abdomen/pelvis negative for acute findings, currently being evaluated for C. difficile  Chronic mesenteric ischemia      PLAN:  Stop IVF and order UREA 15 gr BID  + single dose of NaCL 2 gr x 1   Check uric acid  Initiate fluid restriction at 1500 cc/day  Continue to hold home HCTZ  Check bladder scan for completeness given recent urinary retention  Surveillance labs    Updated patient and wife at bedside    Thank you for involving us in the care of Slade Martinez.  Please feel free to call with any questions.    Scottie Hawley MD  07/02/24  12:07 EDT    [facility]  [phone]      Please note that portions of this note were completed with a voice recognition program.

## 2024-07-02 NOTE — PROGRESS NOTES
"Slade Martinez  1950 73 y.o.  [id number]      Patient Care Team:  Triny Hannon MD as PCP - General (Internal Medicine)  James Hubbard MD as Consulting Physician (Cardiology)    CC: History of coronary artery disease, history of diffuse peripheral vascular disease, recent urosepsis, recent shingles    Interval History: Still is having discomfort in his abdomen 8 no rebound      Objective   Vital Signs  Temp:  [98.6 °F (37 °C)-99.9 °F (37.7 °C)] 99.5 °F (37.5 °C)  Heart Rate:  [] 88  Resp:  [18] 18  BP: (101-202)/(55-95) 140/58    Intake/Output Summary (Last 24 hours) at 7/2/2024 1428  Last data filed at 7/2/2024 0737  Gross per 24 hour   Intake 100 ml   Output 350 ml   Net -250 ml     Flowsheet Rows      Flowsheet Row First Filed Value   Admission Height 180.3 cm (71\") Documented at 07/01/2024 1354   Admission Weight 81.6 kg (180 lb) Documented at 07/01/2024 1354            Physical Exam:   General Appearance:    Alert,oriented, in no acute distress   Lungs:     Clear to auscultation,BS are equal    Heart:    Normal S1 and S2, RRR without murmur, gallop or rub   HEENT:    Sclerae are clear, no JVD or adenopathy   Abdomen:     Normal bowel sounds, soft nontender, nondistended, no HSM   Extremities:   Moves all extremities well, no edema, no cyanosis, no             Redness, no rash     Medication Review:      amLODIPine, 5 mg, Oral, Q24H  aspirin, 325 mg, Oral, Once  atenolol, 50 mg, Oral, BID  atorvastatin, 40 mg, Oral, Nightly  cetirizine, 10 mg, Oral, Daily  cholecalciferol, 2,000 Units, Oral, Daily  clopidogrel, 75 mg, Oral, Daily  lisinopril, 40 mg, Oral, Q24H  meclizine, 12.5 mg, Oral, Daily  pantoprazole, 40 mg, Oral, BID  pregabalin, 150 mg, Oral, TID  saccharomyces boulardii, 250 mg, Oral, Nightly  sodium chloride, 10 mL, Intravenous, Q12H  Urea, 15 g, Oral, BID  vitamin B-12, 1,000 mcg, Oral, Daily             I reviewed the patient's new clinical results.  I personally viewed and " interpreted the patient's EKG/Telemetry data    Assessment/Plan  Active Hospital Problems    Diagnosis  POA    **Left lower quadrant abdominal pain [R10.32]  Yes    Diarrhea [R19.7]  Unknown    Leukocytosis [D72.829]  Unknown    Hyponatremia [E87.1]  Unknown    Chest pain [R07.9]  Yes    Chronic mesenteric ischemia [K55.1]  Yes    S/P CABG (coronary artery bypass graft) [Z95.1]  Not Applicable    Essential hypertension [I10]  Yes      Resolved Hospital Problems   No resolved problems to display.       He has not had further chest pain his troponins were fine his ECG is unchanged he has had recent coronary stenting that was complicated by perforation requiring a further stent to seal that.  His chest pain that he had yesterday seems a little atypical.  The main issue is he has persistent lactic acidosis of unclear etiology I am going to get an echo to make sure there is not been some change in his LV function but I am more concerned about intestinal ischemia for him he has terrible peripheral vascular disease and on his CAT scan that I mean there is diffuse terrible disease in his abdomen definitely we need to get the vascular surgeons on [facility] seen him.  Because his C. difficile came back negative    Negro Locke MD  07/02/24  14:28 EDT

## 2024-07-02 NOTE — PLAN OF CARE
Goal Outcome Evaluation:  Plan of Care Reviewed With: patient        Progress: improving  Outcome Evaluation: Lactate has been fluctuating through the shift. Administered NS bolus. Lactate is now 2.7. Patient is alert and oriented, room air and standby assist to the bathroom. Continuous NS is infusing at 100 ml/hr. Awaiting to acquire stool sample. No new complaints of abdominal pain.

## 2024-07-02 NOTE — SIGNIFICANT NOTE
07/02/24 1138   OTHER   Discipline physical therapist   Therapy Assessment/Plan (PT)   Criteria for Skilled Interventions Met (PT) no;no problems identified which require skilled intervention;patient/family refuse skilled intervention at this time  (PT consult received. Patient and spouse report no mobility concerns at this time and have been ambulating in the room. PT eval not warranted at this time. Will sign off.)

## 2024-07-03 NOTE — PROGRESS NOTES
[facility] ([facility]  Inpatient Progress Note    Reason for Follow Up: Left lower quadrant pain and diarrhea    Subjective     Interval History:   Feels a little better today.  No bowel movement since yesterday.  Less abdominal pain.  Tolerating a diet    Current Facility-Administered Medications:     acetaminophen (TYLENOL) tablet 650 mg, 650 mg, Oral, Q4H PRN, 650 mg at 07/01/24 1823 **OR** acetaminophen (TYLENOL) 160 MG/5ML oral solution 650 mg, 650 mg, Oral, Q4H PRN **OR** acetaminophen (TYLENOL) suppository 650 mg, 650 mg, Rectal, Q4H PRN, Geetha Gramajo MD    amLODIPine (NORVASC) tablet 5 mg, 5 mg, Oral, Q24H, Geetha Gramajo MD, 5 mg at 07/02/24 0837    aspirin tablet 325 mg, 325 mg, Oral, Once, Geetha Gramajo MD    atenolol (TENORMIN) tablet 50 mg, 50 mg, Oral, BID, Geetha Gramajo MD, 50 mg at 07/02/24 2127    atorvastatin (LIPITOR) tablet 40 mg, 40 mg, Oral, Nightly, Geetha Gramajo MD, 40 mg at 07/02/24 2127    Calcium Replacement - Follow Nurse / BPA Driven Protocol, , Does not apply, PRN, Geetha Gramajo MD    cetirizine (zyrTEC) tablet 10 mg, 10 mg, Oral, Daily, Geetha Gramajo MD, 10 mg at 07/02/24 0837    cholecalciferol (VITAMIN D3) tablet 2,000 Units, 2,000 Units, Oral, Daily, Geetha Gramajo MD, 2,000 Units at 07/02/24 0837    clopidogrel (PLAVIX) tablet 75 mg, 75 mg, Oral, Daily, Geetha Gramajo MD, 75 mg at 07/02/24 0837    hydroCHLOROthiazide tablet 25 mg, 25 mg, Oral, Daily PRN, Geetha Gramajo MD    HYDROcodone-acetaminophen (NORCO) 7.5-325 MG per tablet 1 tablet, 1 tablet, Oral, Q8H PRN, Geetha Gramajo MD    lisinopril (PRINIVIL,ZESTRIL) tablet 40 mg, 40 mg, Oral, Q24H, Geetha Gramajo MD, 40 mg at 07/02/24 0837    Magnesium Standard Dose Replacement - Follow Nurse / BPA Driven Protocol, , Does not apply, PRN, Geetha Gramajo MD    meclizine (ANTIVERT) tablet 12.5 mg, 12.5 mg, Oral, Daily, Geetha Gramajo MD, 12.5 mg at 07/02/24 0837    nitroglycerin (NITROSTAT) SL tablet 0.4 mg, 0.4 mg, Sublingual,  Q5 Min PRN, Geetha Gramajo MD    ondansetron (ZOFRAN) injection 4 mg, 4 mg, Intravenous, Q6H PRN, Geetha Gramajo MD    pantoprazole (PROTONIX) EC tablet 40 mg, 40 mg, Oral, BID, Geetha Gramajo MD, 40 mg at 07/02/24 2125    Phosphorus Replacement - Follow Nurse / BPA Driven Protocol, , Does not apply, PRN, Geetha Gramajo MD    Potassium Replacement - Follow Nurse / BPA Driven Protocol, , Does not apply, PRN, Geetha Gramajo MD    pregabalin (LYRICA) capsule 150 mg, 150 mg, Oral, TID, Geetha Gramajo MD, 150 mg at 07/02/24 2125    saccharomyces boulardii (FLORASTOR) capsule 250 mg, 250 mg, Oral, Nightly, Geetha Gramajo MD, 250 mg at 07/02/24 2128    sodium chloride 0.9 % flush 10 mL, 10 mL, Intravenous, PRN, Geetha Gramajo MD    [COMPLETED] Insert Peripheral IV, , , Once **AND** sodium chloride 0.9 % flush 10 mL, 10 mL, Intravenous, PRN, Geetha Gramajo MD    sodium chloride 0.9 % flush 10 mL, 10 mL, Intravenous, Q12H, Geetha Gramajo MD, 10 mL at 07/02/24 2147    sodium chloride 0.9 % flush 10 mL, 10 mL, Intravenous, PRN, Geetha Gramajo MD    sodium chloride 0.9 % infusion 40 mL, 40 mL, Intravenous, PRN, Geetha Gramajo MD    Urea (URE-NA) packet 15 g, 15 g, Oral, BID, Scottie Hawley MD, 15 g at 07/02/24 2130    vitamin B-12 (CYANOCOBALAMIN) tablet 1,000 mcg, 1,000 mcg, Oral, Daily, Geetha Gramajo MD, 1,000 mcg at 07/02/24 0837  Review of Systems:    Decreased abdominal pain, no fevers chills nausea or vomiting    Objective     Vital Signs  Temp:  [98.2 °F (36.8 °C)-99.5 °F (37.5 °C)] 98.8 °F (37.1 °C)  Heart Rate:  [71-92] 71  Resp:  [18] 18  BP: (133-172)/(58-73) 137/73  Body mass index is 25.09 kg/m².    Intake/Output Summary (Last 24 hours) at 7/3/2024 0759  Last data filed at 7/2/2024 2213  Gross per 24 hour   Intake 480 ml   Output --   Net 480 ml     No intake/output data recorded.     Physical Exam:   General: patient awake, alert and cooperative   Eyes: Normal lids and lashes, no scleral icterus   Neck: supple, normal  ROM   Skin: warm and dry, not jaundiced   Pulm:  regular and unlabored   Abdomen: soft, nontender, nondistended    Extremities: no rash or edema   Psychiatric: Normal mood and behavior; memory intact     Results Review:     I reviewed the patient's new clinical results.    Results from last 7 days   Lab Units 07/03/24  0554 07/02/24  0432 07/01/24  1413   WBC 10*3/mm3 10.67 14.68* 18.77*   HEMOGLOBIN g/dL 10.7* 11.2* 13.1   HEMATOCRIT % 32.6* 33.8* 39.4   PLATELETS 10*3/mm3 279 267 332     Results from last 7 days   Lab Units 07/03/24  0553 07/02/24  0432 07/01/24  1413   SODIUM mmol/L 132* 127* 129*   POTASSIUM mmol/L 4.1 4.6 4.9   CHLORIDE mmol/L 97* 95* 91*   CO2 mmol/L 24.4 22.2 25.8   BUN mg/dL 17 7* 11   CREATININE mg/dL 0.63* 0.75* 0.75*   CALCIUM mg/dL 8.9 8.4* 9.6   BILIRUBIN mg/dL  --  0.7 0.6   ALK PHOS U/L  --  88 114   ALT (SGPT) U/L  --  16 22   AST (SGOT) U/L  --  22 16   GLUCOSE mg/dL 94 99 137*     Results from last 7 days   Lab Units 07/01/24  1413   INR  1.05     Lab Results   Lab Value Date/Time    LIPASE 18 07/01/2024 1413    LIPASE 11 (L) 05/05/2024 1300    LIPASE 21 12/17/2022 1156       Radiology:  XR Chest 1 View   Final Result   No focal pulmonary consolidation. Borderline heart size.   Follow-up as clinical indications persist.       This report was finalized on 7/1/2024 3:05 PM by Dr. Fredrick Love M.D on Workstation: [id number]          CT Abdomen Pelvis Without Contrast   Final Result       1. No acute findings identified in the abdomen or pelvis.   2. Colonic diverticulosis.   3. Bulky calcific atherosclerotic disease seen in the proximal SMA with   suspected high-grade stenosis or vessel occlusion, appears similar to   prior. Finding can be correlated for chronic mesenteric   ischemia/postprandial abdominal pain.       This report was finalized on 7/1/2024 2:56 PM by Dr. Denilson Warner M.D   on Workstation: [id number]              Assessment & Plan     Active Hospital Problems     Diagnosis     **Left lower quadrant abdominal pain     Diarrhea     Leukocytosis     Hyponatremia     Chest pain     Chronic mesenteric ischemia     S/P CABG (coronary artery bypass graft)     Essential hypertension        Assessment:  LLQ pain x at least 4 months   Diarrhea x 1 week   CAD- s/p stent placement 05/2024, complicated by perforation of LAD; on plavix and ASA;   High grade stenosis vs vessel occlusion of SMA- chronic mesenteric ischemia/post prandial abd pain.      Plan:  C.diff negative, GI path PCR pending. Will f/u  Vascular consultation recommendations noted-chronic SMA occlusion which they do not feel is related to his abdominal pain.  Diet as tolerated   Clinically improving at this time-will continue to follow    I discussed the patients findings and my recommendations with patient and family.            Coby Restrepo M.D.  [facility]  [phone]

## 2024-07-03 NOTE — PLAN OF CARE
Goal Outcome Evaluation:         Patient admitted for LLQ abdominal pain, diarrhea. Patient A&O x4, Ad cecilio and able to make needs known. Patient on a fluid restriction of 1.5L daily, and cardiac diet. Patient to have Echo in the morning and GI consult. Patient on contact isolation for C.diff. Patient was negative for C.diff but awaiting GI panel.

## 2024-07-03 NOTE — PROGRESS NOTES
"Slade Martinez  1950 73 y.o.  [id number]      Patient Care Team:  Triny Hannon MD as PCP - General (Internal Medicine)  James Hubbard MD as Consulting Physician (Cardiology)    CC: history of coronary artery disease with a prior bypass and a recent complex PCI, normal LV function, had slow AVNRT.    Interval History: No chest pain no shortness of breath still having some abdominal discomfort      Objective   Vital Signs  Temp:  [98.2 °F (36.8 °C)-99.5 °F (37.5 °C)] 98.8 °F (37.1 °C)  Heart Rate:  [71-92] 71  Resp:  [18] 18  BP: (133-172)/(58-73) 137/73    Intake/Output Summary (Last 24 hours) at 7/3/2024 1011  Last data filed at 7/2/2024 2213  Gross per 24 hour   Intake 480 ml   Output --   Net 480 ml     Flowsheet Rows      Flowsheet Row First Filed Value   Admission Height 180.3 cm (71\") Documented at 07/01/2024 1354   Admission Weight 81.6 kg (180 lb) Documented at 07/01/2024 1354            Physical Exam:   General Appearance:    Alert,oriented, in no acute distress   Lungs:     Clear to auscultation,BS are equal    Heart:    Normal S1 and S2, RRR without murmur, gallop or rub   HEENT:    Sclerae are clear, no JVD or adenopathy   Abdomen:     Normal bowel sounds, soft nontender, nondistended, no HSM   Extremities:   Moves all extremities well, no edema, no cyanosis, no             Redness, no rash     Medication Review:      amLODIPine, 5 mg, Oral, Q24H  aspirin, 325 mg, Oral, Once  atenolol, 50 mg, Oral, BID  atorvastatin, 40 mg, Oral, Nightly  cetirizine, 10 mg, Oral, Daily  cholecalciferol, 2,000 Units, Oral, Daily  clopidogrel, 75 mg, Oral, Daily  lisinopril, 40 mg, Oral, Q24H  meclizine, 12.5 mg, Oral, Daily  pantoprazole, 40 mg, Oral, BID  pregabalin, 150 mg, Oral, TID  saccharomyces boulardii, 250 mg, Oral, Nightly  sodium chloride, 10 mL, Intravenous, Q12H  Urea, 15 g, Oral, BID  vitamin B-12, 1,000 mcg, Oral, Daily             I reviewed the patient's new clinical results.  I " personally viewed and interpreted the patient's EKG/Telemetry data    Assessment/Plan  Active Hospital Problems    Diagnosis  POA    **Left lower quadrant abdominal pain [R10.32]  Yes    Diarrhea [R19.7]  Unknown    Leukocytosis [D72.829]  Unknown    Hyponatremia [E87.1]  Unknown    Chest pain [R07.9]  Yes    Chronic mesenteric ischemia [K55.1]  Yes    S/P CABG (coronary artery bypass graft) [Z95.1]  Not Applicable    Essential hypertension [I10]  Yes      Resolved Hospital Problems   No resolved problems to display.       His cardiac status seems to be stable his blood pressure is pretty stable for him we are not can to do any further evaluation right now I think he is good he can follow-up with me as scheduled and we will sign off    Negro Locke MD  07/03/24  10:11 EDT

## 2024-07-03 NOTE — PROGRESS NOTES
[facility] Progress Note      Patient Name: Slade Martinez  : 1950  MRN: [id number]  Primary Care Physician:  Triny Hannon MD  Date of admission: 2024    Subjective     Interval History:   Follow-up hyponatremia, acute on chronic.  Urine output not all recorded.   Systolic blood pressure 121-174. Consultant notes reviewed.  No plan for cardiac or vascular intervention.  Feels like his face is getting puffy.  Discussed no more salt tablets and using the urea for now.  Adding diuretic today.  I am going to list hydrochlorothiazide as an allergy  Review of Systems:   As noted above    Objective     Vitals:   Temp:  [98.1 °F (36.7 °C)-99 °F (37.2 °C)] 98.2 °F (36.8 °C)  Heart Rate:  [65-92] 69  Resp:  [16-18] 16  BP: (121-174)/(58-80) 174/80    Intake/Output Summary (Last 24 hours) at 7/3/2024 1502  Last data filed at 2024 2213  Gross per 24 hour   Intake 480 ml   Output --   Net 480 ml       Physical Exam:    General Appearance: alert, oriented x 3, no acute distress   Skin: warm and dry  HEENT: oral mucosa normal, nonicteric sclera  Neck: supple, no JVD  Lungs: Clear to auscultation  Heart: RRR, normal S1 and S2  Abdomen: soft, nontender, nondistended. +bs  : no palpable bladder  Extremities: no edema.  Neuro: normal speech and mental status     Scheduled Meds:     amLODIPine, 5 mg, Oral, Q24H  aspirin, 325 mg, Oral, Once  atenolol, 50 mg, Oral, BID  atorvastatin, 40 mg, Oral, Nightly  cetirizine, 10 mg, Oral, Daily  cholecalciferol, 2,000 Units, Oral, Daily  clopidogrel, 75 mg, Oral, Daily  lisinopril, 40 mg, Oral, Q24H  meclizine, 12.5 mg, Oral, Daily  pantoprazole, 40 mg, Oral, BID  pregabalin, 150 mg, Oral, TID  saccharomyces boulardii, 250 mg, Oral, Nightly  sodium chloride, 10 mL, Intravenous, Q12H  Urea, 15 g, Oral, BID  vitamin B-12, 1,000 mcg, Oral, Daily      IV Meds:        Results Reviewed:   I have personally reviewed the results from the time of  this admission to 7/3/2024 15:02 EDT     Results from last 7 days   Lab Units 07/03/24  0553 07/02/24  0432 07/01/24  1413   SODIUM mmol/L 132* 127* 129*   POTASSIUM mmol/L 4.1 4.6 4.9   CHLORIDE mmol/L 97* 95* 91*   CO2 mmol/L 24.4 22.2 25.8   BUN mg/dL 17 7* 11   CREATININE mg/dL 0.63* 0.75* 0.75*   CALCIUM mg/dL 8.9 8.4* 9.6   BILIRUBIN mg/dL  --  0.7 0.6   ALK PHOS U/L  --  88 114   ALT (SGPT) U/L  --  16 22   AST (SGOT) U/L  --  22 16   GLUCOSE mg/dL 94 99 137*       Estimated Creatinine Clearance: 118.7 mL/min (A) (by C-G formula based on SCr of 0.63 mg/dL (L)).    Results from last 7 days   Lab Units 07/03/24  0553 07/01/24  1413   MAGNESIUM mg/dL 2.0 2.3   PHOSPHORUS mg/dL 3.4  --        Results from last 7 days   Lab Units 07/02/24  0432   URIC ACID mg/dL 2.4*       Results from last 7 days   Lab Units 07/03/24  0554 07/02/24  0432 07/01/24  1413   WBC 10*3/mm3 10.67 14.68* 18.77*   HEMOGLOBIN g/dL 10.7* 11.2* 13.1   PLATELETS 10*3/mm3 279 267 332       Results from last 7 days   Lab Units 07/01/24  1413   INR  1.05       Assessment / Plan     ASSESSMENT:  Acute on chronic hyponatremia.  Urine studies consistent with SIADH.  Low uric acid supportive.  Improved on urea.  Left lower quadrant pain, diarrhea.  Coronary artery disease, recent PCI.  4.  Elevated lactic acid level with normal anion gap and normal serum bicarbonate.    5.  Chronically occluded SMA.  6.  Hypertension.  Not optimally controlled on lisinopril and Norvasc.  PLAN:  Continue urea and p.o. fluid restriction.  2. List  hydrochlorothiazide as an allergy.  3.  Add torsemide 20 mg daily.  4.  Discussed with patient and wife.  5.  Asked RN for standing scale weight.  Thank you for involving us in the care of Slade Martinez.  Please feel free to call with any questions.    Zhane Vale MD  07/03/24  15:02 EDT    [facility]  [phone]    Please note that portions of this note were completed with a voice  recognition program.

## 2024-07-03 NOTE — PLAN OF CARE
Problem: Adult Inpatient Plan of Care  Goal: Plan of Care Review  7/3/2024 0240 by Emi Adair RN  Outcome: Ongoing, Progressing  7/3/2024 0236 by Emi Adair RN  Outcome: Ongoing, Progressing  Goal: Patient-Specific Goal (Individualized)  7/3/2024 0240 by Emi Adair RN  Outcome: Ongoing, Progressing  7/3/2024 0236 by Emi Adair RN  Outcome: Ongoing, Progressing  Goal: Absence of Hospital-Acquired Illness or Injury  7/3/2024 0240 by Emi Adair RN  Outcome: Ongoing, Progressing  7/3/2024 0236 by Emi Adair RN  Outcome: Ongoing, Progressing  Intervention: Identify and Manage Fall Risk  Recent Flowsheet Documentation  Taken 7/3/2024 0204 by Emi Adair RN  Safety Promotion/Fall Prevention:   safety round/check completed   room organization consistent   nonskid shoes/slippers when out of bed   clutter free environment maintained   lighting adjusted   activity supervised  Taken 7/3/2024 0006 by Emi Adair RN  Safety Promotion/Fall Prevention:   safety round/check completed   room organization consistent   nonskid shoes/slippers when out of bed   clutter free environment maintained   lighting adjusted   activity supervised  Taken 7/2/2024 2211 by Emi Adair RN  Safety Promotion/Fall Prevention:   safety round/check completed   room organization consistent   nonskid shoes/slippers when out of bed   clutter free environment maintained   lighting adjusted   activity supervised  Taken 7/2/2024 2020 by Emi Adair RN  Safety Promotion/Fall Prevention: activity supervised  Intervention: Prevent Skin Injury  Recent Flowsheet Documentation  Taken 7/3/2024 0204 by Emi Adair RN  Body Position: position changed independently  Taken 7/3/2024 0006 by Emi Adair RN  Body Position: position changed independently  Taken 7/2/2024 2211 by Emi Adair RN  Body Position: position changed independently  Taken 7/2/2024 2020 by Emi Adair RN  Body Position: position changed independently  Intervention:  Prevent and Manage VTE (Venous Thromboembolism) Risk  Recent Flowsheet Documentation  Taken 7/3/2024 0204 by Emi Adair, RN  Activity Management: up ad cecilio  Taken 7/3/2024 0006 by Emi Adiar RN  Activity Management:   up ad cecilio   back to bed  Taken 7/2/2024 2211 by Emi Adair RN  Activity Management: up ad cecilio  Taken 7/2/2024 2020 by Emi Adair RN  Activity Management: activity encouraged  Range of Motion: active ROM (range of motion) encouraged  Intervention: Prevent Infection  Recent Flowsheet Documentation  Taken 7/3/2024 0204 by Emi Adair RN  Infection Prevention:   single patient room provided   hand hygiene promoted  Taken 7/3/2024 0006 by Emi Adair RN  Infection Prevention:   single patient room provided   hand hygiene promoted  Taken 7/2/2024 2211 by Emi Adair RN  Infection Prevention:   single patient room provided   hand hygiene promoted  Goal: Optimal Comfort and Wellbeing  7/3/2024 0240 by Emi Adair RN  Outcome: Ongoing, Progressing  7/3/2024 0236 by Emi Adair RN  Outcome: Ongoing, Progressing  Intervention: Provide Person-Centered Care  Recent Flowsheet Documentation  Taken 7/2/2024 2020 by Emi Adair RN  Trust Relationship/Rapport: care explained  Goal: Readiness for Transition of Care  7/3/2024 0240 by Emi Adair RN  Outcome: Ongoing, Progressing  7/3/2024 0236 by Emi Adair RN  Outcome: Ongoing, Progressing   Goal Outcome Evaluation:

## 2024-07-03 NOTE — PROGRESS NOTES
Name: Slade Martinez ADMIT: 2024   : 1950  PCP: Triny Hannon MD    MRN: [id number] LOS: 1 days   AGE/SEX: 73 y.o. male  ROOM: [facility]     Subjective   Subjective   Resting in bed.  Wife not currently at bedside.  He states he is feeling a little bit better today.  Having only mild left lower quadrant pain.  States he is starting to get actually on the more constipated side.  Denies any nausea or vomiting.  Denies any chest pain or trouble breathing.  No fever or chills and continues to deny any urinary complaints.     Objective   Objective   Vital Signs  Temp:  [98.1 °F (36.7 °C)-99 °F (37.2 °C)] 98.1 °F (36.7 °C)  Heart Rate:  [65-92] 65  Resp:  [16-18] 16  BP: (121-172)/(58-73) 135/65  SpO2:  [97 %-100 %] 98 %  on   ;   Device (Oxygen Therapy): room air  Body mass index is 25.1 kg/m².    Physical Exam  Vitals and nursing note reviewed.   Constitutional:       Appearance: He is ill-appearing.   Cardiovascular:      Rate and Rhythm: Normal rate and regular rhythm.      Pulses: Normal pulses.   Pulmonary:      Effort: Pulmonary effort is normal. No respiratory distress.      Breath sounds: Normal breath sounds.   Abdominal:      General: Bowel sounds are normal. There is no distension.      Palpations: Abdomen is soft.      Tenderness: There is abdominal tenderness.   Musculoskeletal:         General: No swelling. Normal range of motion.   Skin:     General: Skin is warm and dry.      Findings: No bruising.   Neurological:      General: No focal deficit present.      Mental Status: He is alert and oriented to person, place, and time.      Sensory: No sensory deficit.      Motor: Weakness present.      Coordination: Coordination normal.   Psychiatric:         Mood and Affect: Mood normal.         Behavior: Behavior normal.     Results Review:       I reviewed the patient's new clinical results.  Results from last 7 days   Lab Units 24  0554 24  0432 24  1413   WBC 10*3/mm3  "10.67 14.68* 18.77*   HEMOGLOBIN g/dL 10.7* 11.2* 13.1   PLATELETS 10*3/mm3 279 267 332     Results from last 7 days   Lab Units 07/03/24  0553 07/02/24 0432 07/01/24  1413   SODIUM mmol/L 132* 127* 129*   POTASSIUM mmol/L 4.1 4.6 4.9   CHLORIDE mmol/L 97* 95* 91*   CO2 mmol/L 24.4 22.2 25.8   BUN mg/dL 17 7* 11   CREATININE mg/dL 0.63* 0.75* 0.75*   GLUCOSE mg/dL 94 99 137*   Estimated Creatinine Clearance: 118.7 mL/min (A) (by C-G formula based on SCr of 0.63 mg/dL (L)).  Results from last 7 days   Lab Units 07/03/24  0553 07/02/24 0432 07/01/24  1413   ALBUMIN g/dL 2.9* 2.9* 3.8   BILIRUBIN mg/dL  --  0.7 0.6   ALK PHOS U/L  --  88 114   AST (SGOT) U/L  --  22 16   ALT (SGPT) U/L  --  16 22     Results from last 7 days   Lab Units 07/03/24  0553 07/02/24 0432 07/01/24  1413   CALCIUM mg/dL 8.9 8.4* 9.6   ALBUMIN g/dL 2.9* 2.9* 3.8   MAGNESIUM mg/dL 2.0  --  2.3   PHOSPHORUS mg/dL 3.4  --   --      Results from last 7 days   Lab Units 07/02/24  1055 07/02/24  0432 07/02/24  0120 07/01/24  2222 07/01/24  1912 07/01/24  1413   PROCALCITONIN ng/mL  --   --   --   --   --  0.12   LACTATE mmol/L 2.3* 2.7* 2.5* 2.2*   < > 2.5*    < > = values in this interval not displayed.     No results found for: \"HGBA1C\", \"POCGLU\"    amLODIPine, 5 mg, Oral, Q24H  aspirin, 325 mg, Oral, Once  atenolol, 50 mg, Oral, BID  atorvastatin, 40 mg, Oral, Nightly  cetirizine, 10 mg, Oral, Daily  cholecalciferol, 2,000 Units, Oral, Daily  clopidogrel, 75 mg, Oral, Daily  lisinopril, 40 mg, Oral, Q24H  meclizine, 12.5 mg, Oral, Daily  pantoprazole, 40 mg, Oral, BID  pregabalin, 150 mg, Oral, TID  saccharomyces boulardii, 250 mg, Oral, Nightly  sodium chloride, 10 mL, Intravenous, Q12H  Urea, 15 g, Oral, BID  vitamin B-12, 1,000 mcg, Oral, Daily       Diet: Cardiac, Fluid Restriction (240 mL/tray); Healthy Heart (2-3 Na+); 1500 mL/day; Fluid Consistency: Thin (IDDSI 0)       Assessment/Plan     Active Hospital Problems    Diagnosis  POA    " **Left lower quadrant abdominal pain [R10.32]  Yes    Diarrhea [R19.7]  Unknown    Leukocytosis [D72.829]  Unknown    Hyponatremia [E87.1]  Unknown    Chest pain [R07.9]  Yes    Chronic mesenteric ischemia [K55.1]  Yes    S/P CABG (coronary artery bypass graft) [Z95.1]  Not Applicable    Essential hypertension [I10]  Yes      Resolved Hospital Problems   No resolved problems to display.     Mr. Martinez is a 73 y.o. male who presented to the hospital with complaints of diarrhea and abdominal pain for the last several days.  He has had a recent complicated medical history over the last few months.  He was admitted here in May where he underwent left heart catheterization and found to have 90% proximal left main and underwent PCI.  He was discharged and readmitted shortly thereafter with chest pain and found to have a rash with new diagnosis of shingles.  He was again admitted after that for urinary retention and treated for UTI on his most recent admission here when he was discharged 6/21.  He was discharged with Augmentin for urine culture growing Klebsiella.  He started to have increasing diarrhea and abdominal discomfort and was apparently switched from Augmentin to Omnicef without relief.  CT A/P was obtained without any acute findings in the abdomen or pelvis, but did reveal bulky calcific atherosclerotic disease in the proximal SMA with suspected high-grade stenosis or vessel acute lesions similar to prior.  This was concerning for chronic mesenteric ischemia.  Chest x-ray was negative for any acute findings.  He did have some chest discomfort and cardiology was consulted.     Left lower quadrant abdominal pain  Diarrhea with leukocytosis  Chronic mesenteric ischemia  -Initial concerns for C. difficile infection given recent antibiotics.  This returned negative.  -GI PCR obtained today.  This was negative.  -Leukocytosis resolved. Antibiotics on hold at present. Patient has been on numerous antibiotics lately  without improvement- continue to monitor off empiric therapy for now.  -Does have some persistent lactic acidosis, but downtrending on recent check.   -Consulted gastroenterology given persistence of symptoms-they have recommended vascular surgery consult given chronic intermittent abdominal pain felt to be possibly related to stenosis of SMA.  -Vascular surgery consulted and did not believe his current symptoms have any relation to chronic SMA occlusion that has been stable since last evaluation in September 2023.  -Blood cultures negative at 2 days.     Hyponatremia  -Neurology managing.  [place] to be consistent with SIADH and now on fluid restriction as well as urea.   -Sodium better today at 132.     Chest pain  CAD with history of CABG  Hypertension   -Cardiology followed-> signed off today  -Not felt to have cardiac etiology to chest discomfort on admission.  -Echocardiogram with EF 61%.  -Continue DAPT.   -Continue atenolol, Norvasc, lisinopril, statin.      Discussed with patient and Dr. Kiaser.     Overall he seems to be improving today.  Diarrhea has improved as well as abdominal pain.  White count normalized.  He is off antibiotics.  Vascular signed off as well as cardiology.  Nephrology and gastroenterology still following.  Anticipate possible dc tomorrow if he continues to improve.     VTE Prophylaxis - SCDs  Code Status - Full code  Disposition - anticipate discharge TBD.       SHERRILL Bang  [facility]  07/03/24  13:07 EDT

## 2024-07-04 NOTE — PROGRESS NOTES
Name: Slade Martinez ADMIT: 2024   : 1950  PCP: Triny Hannon MD    MRN: [id number] LOS: 2 days   AGE/SEX: 73 y.o. male  ROOM: [facility]     Subjective   Subjective   Resting in bed.  Wife at bedside.  Wife states he had a rough night.  He was apparently up from 9 to 3 AM.  He received his first dose of torsemide as well as a dose of Senokot as he felt like he was getting constipated.  He was up to the bathroom multiple times whether urinating or having a bowel movement.  He had apparently 3-4 formed stools.  This morning his left lower quadrant pain is worse and reports that is burning in nature.  Wife states he has not been on his Carafate.  Feels weak in general.  Denies any urinary complaints as well as chest pain or trouble breathing.     Objective   Objective   Vital Signs  Temp:  [98.1 °F (36.7 °C)-99.5 °F (37.5 °C)] 99.5 °F (37.5 °C)  Heart Rate:  [65-96] 94  Resp:  [16] 16  BP: (132-174)/(58-80) 149/68  SpO2:  [98 %-100 %] 99 %  on   ;   Device (Oxygen Therapy): room air  Body mass index is 25.71 kg/m².    Physical Exam  Vitals and nursing note reviewed.   Constitutional:       Appearance: He is ill-appearing.   Cardiovascular:      Rate and Rhythm: Normal rate and regular rhythm.      Pulses: Normal pulses.   Pulmonary:      Effort: Pulmonary effort is normal. No respiratory distress.      Breath sounds: Normal breath sounds.   Abdominal:      General: Bowel sounds are normal. There is no distension.      Palpations: Abdomen is soft.      Tenderness: There is abdominal tenderness.   Musculoskeletal:         General: No swelling. Normal range of motion.   Skin:     General: Skin is warm and dry.      Findings: No bruising.   Neurological:      General: No focal deficit present.      Mental Status: He is alert and oriented to person, place, and time.      Sensory: No sensory deficit.      Motor: Weakness present.      Coordination: Coordination normal.   Psychiatric:         Mood and  "Affect: Mood normal.         Behavior: Behavior normal.      Results Review:       I reviewed the patient's new clinical results.  Results from last 7 days   Lab Units 07/04/24  0542 07/03/24  0554 07/02/24 0432 07/01/24  1413   WBC 10*3/mm3 12.88* 10.67 14.68* 18.77*   HEMOGLOBIN g/dL 12.6* 10.7* 11.2* 13.1   PLATELETS 10*3/mm3 321 279 267 332     Results from last 7 days   Lab Units 07/04/24  0542 07/03/24  0553 07/02/24 0432 07/01/24  1413   SODIUM mmol/L 135* 132* 127* 129*   POTASSIUM mmol/L 3.9 4.1 4.6 4.9   CHLORIDE mmol/L 95* 97* 95* 91*   CO2 mmol/L 20.9* 24.4 22.2 25.8   BUN mg/dL 24* 17 7* 11   CREATININE mg/dL 0.97 0.63* 0.75* 0.75*   GLUCOSE mg/dL 146* 94 99 137*   Estimated Creatinine Clearance: 79 mL/min (by C-G formula based on SCr of 0.97 mg/dL).  Results from last 7 days   Lab Units 07/04/24  0542 07/03/24  0553 07/02/24 0432 07/01/24  1413   ALBUMIN g/dL 3.3* 2.9* 2.9* 3.8   BILIRUBIN mg/dL  --   --  0.7 0.6   ALK PHOS U/L  --   --  88 114   AST (SGOT) U/L  --   --  22 16   ALT (SGPT) U/L  --   --  16 22     Results from last 7 days   Lab Units 07/04/24  0542 07/03/24  0553 07/02/24 0432 07/01/24  1413   CALCIUM mg/dL 9.1 8.9 8.4* 9.6   ALBUMIN g/dL 3.3* 2.9* 2.9* 3.8   MAGNESIUM mg/dL  --  2.0  --  2.3   PHOSPHORUS mg/dL 4.6* 3.4  --   --      Results from last 7 days   Lab Units 07/02/24  1055 07/02/24 0432 07/02/24  0120 07/01/24 2222 07/01/24  1912 07/01/24  1413   PROCALCITONIN ng/mL  --   --   --   --   --  0.12   LACTATE mmol/L 2.3* 2.7* 2.5* 2.2*   < > 2.5*    < > = values in this interval not displayed.     No results found for: \"HGBA1C\", \"POCGLU\"    amLODIPine, 5 mg, Oral, Q24H  aspirin, 325 mg, Oral, Once  atenolol, 50 mg, Oral, BID  atorvastatin, 40 mg, Oral, Nightly  cetirizine, 10 mg, Oral, Daily  cholecalciferol, 2,000 Units, Oral, Daily  clopidogrel, 75 mg, Oral, Daily  lisinopril, 40 mg, Oral, Q24H  meclizine, 12.5 mg, Oral, Daily  pantoprazole, 40 mg, Oral, BID  pregabalin, " 150 mg, Oral, TID  saccharomyces boulardii, 250 mg, Oral, Nightly  senna, 2 tablet, Oral, Nightly  sodium chloride, 10 mL, Intravenous, Q12H  torsemide, 20 mg, Oral, Daily  Urea, 15 g, Oral, BID  vitamin B-12, 1,000 mcg, Oral, Daily       Diet: Cardiac, Fluid Restriction (240 mL/tray); Healthy Heart (2-3 Na+); 1500 mL/day; Fluid Consistency: Thin (IDDSI 0)       Assessment/Plan     Active Hospital Problems    Diagnosis  POA    **Left lower quadrant abdominal pain [R10.32]  Yes    Diarrhea [R19.7]  Unknown    Leukocytosis [D72.829]  Unknown    Hyponatremia [E87.1]  Unknown    Chest pain [R07.9]  Yes    Chronic mesenteric ischemia [K55.1]  Yes    S/P CABG (coronary artery bypass graft) [Z95.1]  Not Applicable    Essential hypertension [I10]  Yes      Resolved Hospital Problems   No resolved problems to display.     Mr. Martinez is a 73 y.o. male who presented to the hospital with complaints of diarrhea and abdominal pain for the last several days.  He has had a recent complicated medical history over the last few months.  He was admitted here in May where he underwent left heart catheterization and found to have 90% proximal left main and underwent PCI.  He was discharged and readmitted shortly thereafter with chest pain and found to have a rash with new diagnosis of shingles.  He was again admitted after that for urinary retention and treated for UTI on his most recent admission here when he was discharged 6/21.  He was discharged with Augmentin for urine culture growing Klebsiella.  He started to have increasing diarrhea and abdominal discomfort and was apparently switched from Augmentin to Omnicef without relief.  CT A/P was obtained without any acute findings in the abdomen or pelvis, but did reveal bulky calcific atherosclerotic disease in the proximal SMA with suspected high-grade stenosis or vessel acute lesions similar to prior.  This was concerning for chronic mesenteric ischemia.  Chest x-ray was negative for  any acute findings.  He did have some chest discomfort and cardiology was consulted.     Left lower quadrant abdominal pain  Diarrhea with leukocytosis  Chronic mesenteric ischemia  -Cdiff and GI PCR negative.  -Leukocytosis resolved yesterday but a little up today after the events overnight. Monitoring off abx.  -Does have some persistent lactic acidosis, but downtrending on most recent check.   -Consulted gastroenterology given persistence of symptoms-they have recommended vascular surgery consult given chronic intermittent abdominal pain felt to be possibly related to stenosis of SMA.  -Vascular surgery consulted and did not believe his current symptoms have any relation to chronic SMA occlusion that has been stable since last evaluation in September 2023.  -Blood cultures negative at 2 days.  -LLQ a little worse today after being up overnight. Apparently got torsemide and Senna at the same time which he thinks made things worse. Defer diuretics to nephrology. Will hold Senna further for now. It was ordered since he was asking for his Linzess. Monitor off laxatives. Will add back home Carafate but increase to TID for now with meals. Defer further recommendations to GI.     Hyponatremia  -Neurology managing.  [place] to be consistent with SIADH and now on fluid restriction as well as urea.   -Sodium better at 135.  -Plan is to stop HCTZ indefinitely. Torsemide added but defer to nephrology on changes.     Chest pain  CAD with history of CABG  Hypertension   -Cardiology followed-> signed off 7/3.   -Not felt to have cardiac etiology to chest discomfort on admission.  -Echocardiogram with EF 61%.  -Continue DAPT.   -Continue atenolol, Norvasc, lisinopril, statin.      Discussed with patient and wife. Updated Dr. Kaiser who will see tomorrow.      Seems a little worse today. Did not have a good night. LLQ worse again. Await GI and nephrology recs. If better tomorrow, could possibly discharge.     VTE Prophylaxis -  SCDs  Code Status - Full code  Disposition - anticipate discharge as above.    SHERRILL Bang  [facility]  07/04/24  11:21 EDT   DISPLAY PLAN FREE TEXT

## 2024-07-04 NOTE — PROGRESS NOTES
[facility]  Inpatient Progress Note    Reason for Follow Up: Left lower quadrant pain and diarrhea    Subjective     Interval History:   Patient reports he was feeling constipated yesterday and subsequently took a stool softener.  He reports as a result of this he was up all night having bowel movements-she does report that these were formed though and denies any further diarrhea.  He reports he was having some abdominal discomfort this morning but this has subsided since he ate breakfast.  He reports overall he feels like he is improving.    Current Facility-Administered Medications:     acetaminophen (TYLENOL) tablet 650 mg, 650 mg, Oral, Q4H PRN, 650 mg at 07/04/24 0922 **OR** acetaminophen (TYLENOL) 160 MG/5ML oral solution 650 mg, 650 mg, Oral, Q4H PRN **OR** acetaminophen (TYLENOL) suppository 650 mg, 650 mg, Rectal, Q4H PRN, Geetha Gramajo MD    amLODIPine (NORVASC) tablet 5 mg, 5 mg, Oral, Q24H, Geetha Gramajo MD, 5 mg at 07/04/24 0910    aspirin tablet 325 mg, 325 mg, Oral, Once, Geetha Gramajo MD    atenolol (TENORMIN) tablet 50 mg, 50 mg, Oral, BID, Geetha Gramajo MD, 50 mg at 07/04/24 0911    atorvastatin (LIPITOR) tablet 40 mg, 40 mg, Oral, Nightly, Geetha Gramajo MD, 40 mg at 07/03/24 2014    bisacodyl (DULCOLAX) EC tablet 10 mg, 10 mg, Oral, Daily PRN, Ayush Kaiser MD, 10 mg at 07/03/24 0904    cetirizine (zyrTEC) tablet 10 mg, 10 mg, Oral, Daily, Geetha Gramajo MD, 10 mg at 07/04/24 0911    cholecalciferol (VITAMIN D3) tablet 2,000 Units, 2,000 Units, Oral, Daily, Geetha Gramajo MD, 2,000 Units at 07/04/24 0911    clopidogrel (PLAVIX) tablet 75 mg, 75 mg, Oral, Daily, Geetha Gramajo MD, 75 mg at 07/04/24 0911    HYDROcodone-acetaminophen (NORCO) 7.5-325 MG per tablet 1 tablet, 1 tablet, Oral, Q8H PRN, Geetha Gramajo MD    lisinopril (PRINIVIL,ZESTRIL) tablet 40 mg, 40 mg, Oral, Q24H, Geetha Gramajo MD, 40 mg at 07/04/24 0911    meclizine (ANTIVERT) tablet 12.5 mg, 12.5  mg, Oral, Daily, Geetha Gramajo MD, 12.5 mg at 07/04/24 0910    nitroglycerin (NITROSTAT) SL tablet 0.4 mg, 0.4 mg, Sublingual, Q5 Min PRN, Geetha Gramajo MD    ondansetron (ZOFRAN) injection 4 mg, 4 mg, Intravenous, Q6H PRN, Geetha Gramajo MD    pantoprazole (PROTONIX) EC tablet 40 mg, 40 mg, Oral, BID, Geetha Gramajo MD, 40 mg at 07/04/24 0911    pregabalin (LYRICA) capsule 150 mg, 150 mg, Oral, TID, Geetha Gramajo MD, 150 mg at 07/03/24 2014    saccharomyces boulardii (FLORASTOR) capsule 250 mg, 250 mg, Oral, Nightly, Geetha Gramajo MD, 250 mg at 07/03/24 2014    senna (SENOKOT) tablet 2 tablet, 2 tablet, Oral, Nightly, Marycarmen Buchanan APRN, 2 tablet at 07/03/24 2014    sodium chloride 0.9 % flush 10 mL, 10 mL, Intravenous, PRN, Geetha Gramajo MD    [COMPLETED] Insert Peripheral IV, , , Once **AND** sodium chloride 0.9 % flush 10 mL, 10 mL, Intravenous, PRN, Geetha Gramajo MD    sodium chloride 0.9 % flush 10 mL, 10 mL, Intravenous, Q12H, Geetha Gramajo MD, 10 mL at 07/04/24 0916    sodium chloride 0.9 % flush 10 mL, 10 mL, Intravenous, PRN, Geetha Gramajo MD    sodium chloride 0.9 % infusion 40 mL, 40 mL, Intravenous, PRN, Geetha Gramajo MD    torsemide (DEMADEX) tablet 20 mg, 20 mg, Oral, Daily, Zhane Vale MD, 20 mg at 07/03/24 1804    Urea (URE-NA) packet 15 g, 15 g, Oral, BID, Scottie Hawley MD, 15 g at 07/04/24 0907    vitamin B-12 (CYANOCOBALAMIN) tablet 1,000 mcg, 1,000 mcg, Oral, Daily, Geetha Gramajo MD, 1,000 mcg at 07/04/24 0910      Objective     Vital Signs  Temp:  [98.1 °F (36.7 °C)-99.5 °F (37.5 °C)] 99.5 °F (37.5 °C)  Heart Rate:  [65-96] 94  Resp:  [16] 16  BP: (132-174)/(58-80) 149/68  Body mass index is 25.71 kg/m².    Intake/Output Summary (Last 24 hours) at 7/4/2024 1138  Last data filed at 7/4/2024 0910  Gross per 24 hour   Intake 240 ml   Output 500 ml   Net -260 ml     I/O this shift:  In: 60 [P.O.:60]  Out: -      Physical Exam:   General: patient awake, alert and cooperative   Eyes:  Normal lids and lashes, no scleral icterus   Skin: warm and dry, not jaundiced   Cardiovascular: regular rhythm and rate   Pulm: regular and unlabored   Abdomen: soft, nontender, nondistended; normal bowel sounds     Results Review:     I reviewed the patient's new clinical results.    Results from last 7 days   Lab Units 07/04/24  0542 07/03/24  0554 07/02/24  0432   WBC 10*3/mm3 12.88* 10.67 14.68*   HEMOGLOBIN g/dL 12.6* 10.7* 11.2*   HEMATOCRIT % 38.7 32.6* 33.8*   PLATELETS 10*3/mm3 321 279 267     Results from last 7 days   Lab Units 07/04/24  0542 07/03/24  0553 07/02/24  0432 07/01/24  1413   SODIUM mmol/L 135* 132* 127* 129*   POTASSIUM mmol/L 3.9 4.1 4.6 4.9   CHLORIDE mmol/L 95* 97* 95* 91*   CO2 mmol/L 20.9* 24.4 22.2 25.8   BUN mg/dL 24* 17 7* 11   CREATININE mg/dL 0.97 0.63* 0.75* 0.75*   CALCIUM mg/dL 9.1 8.9 8.4* 9.6   BILIRUBIN mg/dL  --   --  0.7 0.6   ALK PHOS U/L  --   --  88 114   ALT (SGPT) U/L  --   --  16 22   AST (SGOT) U/L  --   --  22 16   GLUCOSE mg/dL 146* 94 99 137*     Results from last 7 days   Lab Units 07/01/24  1413   INR  1.05     Lab Results   Lab Value Date/Time    LIPASE 18 07/01/2024 1413    LIPASE 11 (L) 05/05/2024 1300    LIPASE 21 12/17/2022 1156       Radiology:  XR Chest 1 View   Final Result   No focal pulmonary consolidation. Borderline heart size.   Follow-up as clinical indications persist.       This report was finalized on 7/1/2024 3:05 PM by Dr. Fredrick Love M.D on Workstation: [id number]          CT Abdomen Pelvis Without Contrast   Final Result       1. No acute findings identified in the abdomen or pelvis.   2. Colonic diverticulosis.   3. Bulky calcific atherosclerotic disease seen in the proximal SMA with   suspected high-grade stenosis or vessel occlusion, appears similar to   prior. Finding can be correlated for chronic mesenteric   ischemia/postprandial abdominal pain.       This report was finalized on 7/1/2024 2:56 PM by Dr. Denilson Warner M.D   on  Workstation: [id number]              Assessment & Plan     Active Hospital Problems    Diagnosis     **Left lower quadrant abdominal pain     Diarrhea     Leukocytosis     Hyponatremia     Chest pain     Chronic mesenteric ischemia     S/P CABG (coronary artery bypass graft)     Essential hypertension        Assessment:  LLQ pain x at least 4 months   Diarrhea x 1 week   CAD- s/p stent placement 05/2024, complicated by perforation of LAD; on plavix and ASA;   High grade stenosis vs vessel occlusion of SMA- chronic mesenteric ischemia/post prandial abd pain.    All problems are new to me today     Plan:  GI PCR and C. difficile negative  Vascular does not feel that chronic SMA occlusion is related to abdominal pain  Abdominal pain appears to be improving.  Recommend diet as tolerated  If abdominal pain resumes could trial Bentyl as needed    Patient and plan of care discussed with attending, Dr. Kaye Borjas PA-C

## 2024-07-04 NOTE — PROGRESS NOTES
[facility] Progress Note      Patient Name: Slade Martinez  : 1950  MRN: [id number]  Primary Care Physician:  Triny Hannon MD  Date of admission: 2024    Subjective     Interval History:   Follow-up hyponatremia, acute on chronic.  Urine output not all recorded.  Up all night urinating.  Very upset that he was given diuretic at 6 PM.  (Ordered at 4:15 PM.)  Says that he will not take anymore and will go back on his hydrochlorothiazide at home.  I explained that this was likely not safe given his hyponatremia.  Also took senna last night and his bowel started moving at 4 AM.  Not feel well today.  Not eating much.  Review of Systems:   As noted above    Objective     Vitals:   Temp:  [98.1 °F (36.7 °C)-99.5 °F (37.5 °C)] 99.5 °F (37.5 °C)  Heart Rate:  [69-96] 94  Resp:  [16] 16  BP: (132-174)/(58-80) 149/68    Intake/Output Summary (Last 24 hours) at 2024 1304  Last data filed at 2024 0910  Gross per 24 hour   Intake 240 ml   Output 500 ml   Net -260 ml       Physical Exam:    General Appearance: alert, oriented x 3, no acute distress   Skin: warm and dry  HEENT: oral mucosa normal, nonicteric sclera  Neck: supple, no JVD  Lungs: Clear to auscultation  Heart: RRR, normal S1 and S2  Abdomen: soft, nontender, nondistended. +bs  : no palpable bladder  Extremities: no edema.  Neuro: normal speech and mental status     Scheduled Meds:     amLODIPine, 5 mg, Oral, Q24H  aspirin, 325 mg, Oral, Once  atenolol, 50 mg, Oral, BID  atorvastatin, 40 mg, Oral, Nightly  cetirizine, 10 mg, Oral, Daily  cholecalciferol, 2,000 Units, Oral, Daily  clopidogrel, 75 mg, Oral, Daily  lisinopril, 40 mg, Oral, Q24H  meclizine, 12.5 mg, Oral, Daily  pantoprazole, 40 mg, Oral, BID  pregabalin, 150 mg, Oral, TID  saccharomyces boulardii, 250 mg, Oral, Nightly  sodium chloride, 10 mL, Intravenous, Q12H  sucralfate, 1 g, Oral, TID AC  torsemide, 20 mg, Oral, Daily  Urea, 15 g, Oral,  BID  vitamin B-12, 1,000 mcg, Oral, Daily      IV Meds:        Results Reviewed:   I have personally reviewed the results from the time of this admission to 7/4/2024 13:04 EDT     Results from last 7 days   Lab Units 07/04/24  0542 07/03/24  0553 07/02/24  0432 07/01/24  1413   SODIUM mmol/L 135* 132* 127* 129*   POTASSIUM mmol/L 3.9 4.1 4.6 4.9   CHLORIDE mmol/L 95* 97* 95* 91*   CO2 mmol/L 20.9* 24.4 22.2 25.8   BUN mg/dL 24* 17 7* 11   CREATININE mg/dL 0.97 0.63* 0.75* 0.75*   CALCIUM mg/dL 9.1 8.9 8.4* 9.6   BILIRUBIN mg/dL  --   --  0.7 0.6   ALK PHOS U/L  --   --  88 114   ALT (SGPT) U/L  --   --  16 22   AST (SGOT) U/L  --   --  22 16   GLUCOSE mg/dL 146* 94 99 137*       Estimated Creatinine Clearance: 79 mL/min (by C-G formula based on SCr of 0.97 mg/dL).    Results from last 7 days   Lab Units 07/04/24  0542 07/03/24  0553 07/01/24  1413   MAGNESIUM mg/dL  --  2.0 2.3   PHOSPHORUS mg/dL 4.6* 3.4  --        Results from last 7 days   Lab Units 07/02/24  0432   URIC ACID mg/dL 2.4*       Results from last 7 days   Lab Units 07/04/24  0542 07/03/24  0554 07/02/24  0432 07/01/24  1413   WBC 10*3/mm3 12.88* 10.67 14.68* 18.77*   HEMOGLOBIN g/dL 12.6* 10.7* 11.2* 13.1   PLATELETS 10*3/mm3 321 279 267 332       Results from last 7 days   Lab Units 07/01/24  1413   INR  1.05       Assessment / Plan     ASSESSMENT:  Acute on chronic hyponatremia.  Urine studies consistent with SIADH.  Low uric acid supportive.  Improved on urea.  Stop urea today.  Also stop torsemide as patient declines to take it.    Left lower quadrant pain, diarrhea.  Resolved.  Coronary artery disease, recent PCI.  4.  Elevated lactic acid level with normal anion gap and normal serum bicarbonate.    5.  Chronically occluded SMA.  6.  Hypertension.  Not optimally controlled on lisinopril and Norvasc.  Torsemide added yesterday.  Patient declined taking it today.  PLAN:  Stop urea for now.  Explained to patient and wife that I would not recommend  resuming hydrochlorothiazide on discharge.  3.  Discontinue torsemide for now  4.  Discussed with patient and wife.  5.  Check chemistries in the morning.  Thank you for involving us in the care of Sladeesperanza Martinez.  Please feel free to call with any questions.    Zhane Vale MD  07/04/24  13:04 EDT    [facility]  [phone]    Please note that portions of this note were completed with a voice recognition program.  Copied portions of the note reviewed and accurate as of 7/4/2024.

## 2024-07-05 NOTE — PROGRESS NOTES
Name: Slade Martinez ADMIT: 2024   : 1950  PCP: Triny Hannon MD    MRN: [id number] LOS: 3 days   AGE/SEX: 73 y.o. male  ROOM: [facility]     Subjective   Subjective   No events, not feeling well. Continued llq pain. Loose stool overnight       Objective   Objective   Vital Signs  Temp:  [97.2 °F (36.2 °C)-100 °F (37.8 °C)] 98.9 °F (37.2 °C)  Heart Rate:  [] 83  Resp:  [16-17] 16  BP: (112-147)/(54-84) 121/62  SpO2:  [97 %-100 %] 99 %  on   ;   Device (Oxygen Therapy): room air  Body mass index is 25.58 kg/m².  Physical Exam  Vitals and nursing note reviewed.   Constitutional:       Appearance: He is ill-appearing.   Cardiovascular:      Rate and Rhythm: Normal rate and regular rhythm.      Pulses: Normal pulses.   Pulmonary:      Effort: Pulmonary effort is normal. No respiratory distress.      Breath sounds: Normal breath sounds.   Abdominal:      General: There is no distension.      Palpations: Abdomen is soft.      Tenderness: There is abdominal tenderness (LLQ primarily).   Musculoskeletal:         General: No swelling. Normal range of motion.   Skin:     General: Skin is warm and dry.      Findings: No bruising.   Neurological:      General: No focal deficit present.      Mental Status: He is alert and oriented to person, place, and time.      Sensory: No sensory deficit.      Motor: Weakness present.      Coordination: Coordination normal.   Psychiatric:         Mood and Affect: Mood normal.         Behavior: Behavior normal.       Results Review     I reviewed the patient's new clinical results.  Results from last 7 days   Lab Units 24  0514 24  0542 24  0554 24  0432   WBC 10*3/mm3 18.87* 12.88* 10.67 14.68*   HEMOGLOBIN g/dL 12.9* 12.6* 10.7* 11.2*   PLATELETS 10*3/mm3 342 321 279 267     Results from last 7 days   Lab Units 24  0514 24  0542 24  0553 24  0432   SODIUM mmol/L 131* 135* 132* 127*   POTASSIUM mmol/L 3.7 3.9 4.1 4.6  "  CHLORIDE mmol/L 93* 95* 97* 95*   CO2 mmol/L 23.9 20.9* 24.4 22.2   BUN mg/dL 20 24* 17 7*   CREATININE mg/dL 1.03 0.97 0.63* 0.75*   GLUCOSE mg/dL 160* 146* 94 99   EGFR mL/min/1.73 76.2 81.9 99.8 94.7     Results from last 7 days   Lab Units 07/05/24 0514 07/04/24  0542 07/03/24  0553 07/02/24  0432 07/01/24  1413   ALBUMIN g/dL 3.1* 3.3* 2.9* 2.9* 3.8   BILIRUBIN mg/dL  --   --   --  0.7 0.6   ALK PHOS U/L  --   --   --  88 114   AST (SGOT) U/L  --   --   --  22 16   ALT (SGPT) U/L  --   --   --  16 22     Results from last 7 days   Lab Units 07/05/24 0514 07/04/24 0542 07/03/24  0553 07/02/24  0432 07/01/24  1413   CALCIUM mg/dL 9.1 9.1 8.9 8.4* 9.6   ALBUMIN g/dL 3.1* 3.3* 2.9* 2.9* 3.8   MAGNESIUM mg/dL  --   --  2.0  --  2.3   PHOSPHORUS mg/dL 4.3 4.6* 3.4  --   --      Results from last 7 days   Lab Units 07/02/24  1055 07/02/24  0432 07/02/24  0120 07/01/24  2222 07/01/24  1912 07/01/24  1413   PROCALCITONIN ng/mL  --   --   --   --   --  0.12   LACTATE mmol/L 2.3* 2.7* 2.5* 2.2*   < > 2.5*    < > = values in this interval not displayed.     No results found for: \"HGBA1C\", \"POCGLU\"    No radiology results for the last day    I have personally reviewed all medications:  Scheduled Medications  amLODIPine, 5 mg, Oral, Q24H  aspirin, 325 mg, Oral, Once  atenolol, 50 mg, Oral, BID  atorvastatin, 40 mg, Oral, Nightly  cetirizine, 10 mg, Oral, Daily  cholecalciferol, 2,000 Units, Oral, Daily  clopidogrel, 75 mg, Oral, Daily  lisinopril, 40 mg, Oral, Q24H  meclizine, 12.5 mg, Oral, Daily  Metamucil, 2 wafer, Oral, Daily  pantoprazole, 40 mg, Oral, BID  pregabalin, 150 mg, Oral, TID  saccharomyces boulardii, 250 mg, Oral, Nightly  sodium chloride, 10 mL, Intravenous, Q12H  sucralfate, 1 g, Oral, TID AC  vitamin B-12, 1,000 mcg, Oral, Daily    Infusions   Diet  Diet: Cardiac, Fluid Restriction (240 mL/tray); Healthy Heart (2-3 Na+); 1500 mL/day; Fluid Consistency: Thin (IDDSI 0)    I have personally " reviewed:  [x]  Laboratory   []  Microbiology   [x]  Radiology   []  EKG/Telemetry  []  Cardiology/Vascular   []  Pathology    []  Records       Assessment/Plan     Active Hospital Problems    Diagnosis  POA    **Left lower quadrant abdominal pain [R10.32]  Yes    Diarrhea [R19.7]  Unknown    Leukocytosis [D72.829]  Unknown    Hyponatremia [E87.1]  Unknown    Chest pain [R07.9]  Yes    Chronic mesenteric ischemia [K55.1]  Yes    S/P CABG (coronary artery bypass graft) [Z95.1]  Not Applicable    Essential hypertension [I10]  Yes      Resolved Hospital Problems   No resolved problems to display.       73 y.o. male with complicated recent history including CAD/PCI, recent UTI with multiple antibiotic courses, chronic SMA occlusion admitted with Left lower quadrant abdominal pain.    Extensive chart review and discussion with patient.  Still not entirely clear to me what is causing his issue.  Could represent pain from the chronic mesenteric ischemia but he has already been evaluated by vascular surgery who felt it was unchanged and he does not display any metabolic acidosis to suggest actual bowel ischemia at this point.  His infectious workup is negative.  Recent antibiotics could have depleted his bacterial josi and he is on Florastor.  Bentyl and Metamucil added by GI.  His WBC is up today but has been up and down over the course the last couple weeks.    For now we will see how he does with the change in medications.  Would hold off on any antibiotics unless we have a very clear indication.  If white count keeps going up might have to repeat CT with contrast.    Hyponatremia slightly worse today and will need to monitor trend.  Nephrology had stopped his urea and diuretics per his request.    CAD without any obvious ongoing issue.  Continue beta-blocker/Plavix      SCDs  Disposition: Home when symptomatically improved.  Hopefully the next couple of days      Ayush Kaiser MD  [facility]  07/05/24  15:40 EDT

## 2024-07-05 NOTE — PROGRESS NOTES
[facility]  Inpatient Progress Note    Reason for Followup:  abdominal pain     Subjective     Interval History:   WBC count up to 18.87. Patient feels like he is worse this morning.  He had multiple loose bowel movements after the stool softener couple days ago.  Patient continues to endorse abdominal pain primarily in the left lower quadrant.    Current Facility-Administered Medications:     acetaminophen (TYLENOL) tablet 650 mg, 650 mg, Oral, Q4H PRN, 650 mg at 07/05/24 0505 **OR** acetaminophen (TYLENOL) 160 MG/5ML oral solution 650 mg, 650 mg, Oral, Q4H PRN **OR** acetaminophen (TYLENOL) suppository 650 mg, 650 mg, Rectal, Q4H PRN, Geetha Gramajo MD    amLODIPine (NORVASC) tablet 5 mg, 5 mg, Oral, Q24H, Geetha Gramajo MD, 5 mg at 07/04/24 0910    aspirin tablet 325 mg, 325 mg, Oral, Once, Geetha Gramajo MD    atenolol (TENORMIN) tablet 50 mg, 50 mg, Oral, BID, Geetha Gramajo MD, 50 mg at 07/04/24 2028    atorvastatin (LIPITOR) tablet 40 mg, 40 mg, Oral, Nightly, Geetha Gramajo MD, 40 mg at 07/04/24 2028    bisacodyl (DULCOLAX) EC tablet 10 mg, 10 mg, Oral, Daily PRN, Ayush Kaiser MD, 10 mg at 07/03/24 0904    cetirizine (zyrTEC) tablet 10 mg, 10 mg, Oral, Daily, Geetha Gramajo MD, 10 mg at 07/04/24 0911    cholecalciferol (VITAMIN D3) tablet 2,000 Units, 2,000 Units, Oral, Daily, Geetha Gramajo MD, 2,000 Units at 07/04/24 0911    clopidogrel (PLAVIX) tablet 75 mg, 75 mg, Oral, Daily, Geetha Gramajo MD, 75 mg at 07/04/24 0911    dicyclomine (BENTYL) capsule 10 mg, 10 mg, Oral, Q6H PRN, Drew Fernandez MD, 10 mg at 07/04/24 2216    HYDROcodone-acetaminophen (NORCO) 7.5-325 MG per tablet 1 tablet, 1 tablet, Oral, Q8H PRN, Geetha Gramajo MD    lisinopril (PRINIVIL,ZESTRIL) tablet 40 mg, 40 mg, Oral, Q24H, Geetha Gramajo MD, 40 mg at 07/04/24 0911    meclizine (ANTIVERT) tablet 12.5 mg, 12.5 mg, Oral, Daily, Geetha Gramajo MD, 12.5 mg at 07/04/24 0910    nitroglycerin (NITROSTAT) SL  tablet 0.4 mg, 0.4 mg, Sublingual, Q5 Min PRN, Geetha Gramajo MD    ondansetron (ZOFRAN) injection 4 mg, 4 mg, Intravenous, Q6H PRN, Geetha Gramajo MD    pantoprazole (PROTONIX) EC tablet 40 mg, 40 mg, Oral, BID, Avila, MD Geetha, 40 mg at 07/04/24 2029    pregabalin (LYRICA) capsule 150 mg, 150 mg, Oral, TID, AvilaGeetha shepard MD, 150 mg at 07/04/24 2028    saccharomyces boulardii (FLORASTOR) capsule 250 mg, 250 mg, Oral, Nightly, Geetha Gramajo MD, 250 mg at 07/04/24 2028    sodium chloride 0.9 % flush 10 mL, 10 mL, Intravenous, PRN, Geetha Gramajo MD    [COMPLETED] Insert Peripheral IV, , , Once **AND** sodium chloride 0.9 % flush 10 mL, 10 mL, Intravenous, PRN, Geetha Gramajo MD    sodium chloride 0.9 % flush 10 mL, 10 mL, Intravenous, Q12H, AvilaGeetha shepard MD, 10 mL at 07/04/24 2028    sodium chloride 0.9 % flush 10 mL, 10 mL, Intravenous, PRN, Geetha Gramajo MD    sodium chloride 0.9 % infusion 40 mL, 40 mL, Intravenous, PRN, Geetha Gramajo MD    sucralfate (CARAFATE) tablet 1 g, 1 g, Oral, TID Chanel SNELL Leah M, APRN, 1 g at 07/05/24 0504    vitamin B-12 (CYANOCOBALAMIN) tablet 1,000 mcg, 1,000 mcg, Oral, Daily, Geetha Gramajo MD, 1,000 mcg at 07/04/24 0910    Objective     Vital Signs  Temp:  [97.2 °F (36.2 °C)-100 °F (37.8 °C)] 98.6 °F (37 °C)  Heart Rate:  [] 106  Resp:  [16-18] 16  BP: (112-155)/(54-84) 112/84  Body mass index is 25.58 kg/m².    Intake/Output Summary (Last 24 hours) at 7/5/2024 0814  Last data filed at 7/5/2024 0756  Gross per 24 hour   Intake 580 ml   Output 440 ml   Net 140 ml     I/O this shift:  In: 100 [P.O.:100]  Out: -      Physical Exam:   General: patient awake, alert and cooperative   Abdomen: soft, nontender, left periumbilical and lower quadrant tenderness; normal bowel sounds     Results Review:     I reviewed the patient's new clinical results.    Results from last 7 days   Lab Units 07/05/24  0514 07/04/24  0542 07/03/24  0554   WBC 10*3/mm3 18.87* 12.88* 10.67   HEMOGLOBIN g/dL  12.9* 12.6* 10.7*   HEMATOCRIT % 39.5 38.7 32.6*   PLATELETS 10*3/mm3 342 321 279     Results from last 7 days   Lab Units 07/05/24  0514 07/04/24  0542 07/03/24  0553 07/02/24  0432 07/01/24  1413   SODIUM mmol/L 131* 135* 132* 127* 129*   POTASSIUM mmol/L 3.7 3.9 4.1 4.6 4.9   CHLORIDE mmol/L 93* 95* 97* 95* 91*   CO2 mmol/L 23.9 20.9* 24.4 22.2 25.8   BUN mg/dL 20 24* 17 7* 11   CREATININE mg/dL 1.03 0.97 0.63* 0.75* 0.75*   CALCIUM mg/dL 9.1 9.1 8.9 8.4* 9.6   BILIRUBIN mg/dL  --   --   --  0.7 0.6   ALK PHOS U/L  --   --   --  88 114   ALT (SGPT) U/L  --   --   --  16 22   AST (SGOT) U/L  --   --   --  22 16   GLUCOSE mg/dL 160* 146* 94 99 137*     Results from last 7 days   Lab Units 07/01/24  1413   INR  1.05     Lab Results   Lab Value Date/Time    LIPASE 18 07/01/2024 1413    LIPASE 11 (L) 05/05/2024 1300    LIPASE 21 12/17/2022 1156       Radiology:  XR Chest 1 View   Final Result   No focal pulmonary consolidation. Borderline heart size.   Follow-up as clinical indications persist.       This report was finalized on 7/1/2024 3:05 PM by Dr. Fredrick Love M.D on Workstation: [id number]          CT Abdomen Pelvis Without Contrast   Final Result       1. No acute findings identified in the abdomen or pelvis.   2. Colonic diverticulosis.   3. Bulky calcific atherosclerotic disease seen in the proximal SMA with   suspected high-grade stenosis or vessel occlusion, appears similar to   prior. Finding can be correlated for chronic mesenteric   ischemia/postprandial abdominal pain.       This report was finalized on 7/1/2024 2:56 PM by Dr. Denilson Warner M.D   on Workstation: [id number]                Assessment & Plan   Assessment:   LLQ pain x at least 4 months   Diarrhea x 1 week   CAD- s/p stent placement 05/2024, complicated by perforation of LAD; on plavix and ASA;   High grade stenosis vs vessel occlusion of SMA- chronic mesenteric ischemia/post prandial abd pain.    Plan:   GI PCR and C. difficile  negative  Vascular does not feel that chronic SMA occlusion is related to abdominal pain  Encouraged more frequent use of bentyl  Possibly some component of overflow diarrhea as he had formed bowel movements yesterday - Add metamucil   Given recent cardiac stent placement and his other cardiac hx, would hold off colonoscopy unless absolutely necessary.     I discussed the patient's findings and my recommendations with patient.    Dictated utilizing Dragon dictation.        Estefany Cohn PA-C   [facility]  [address] [place] [address]  Office: [phone]

## 2024-07-05 NOTE — CONSULTS
Nutrition Services    Patient Name:  Slade Martinez  YOB: 1950  MRN: [id number]  Admit Date:  7/1/2024    Assessment Date:  07/05/24    Summary: consult unintentional weight loss     Pt eating poorly. Having multiple loose stools. He likes ensure and would like this 3 times/day. Will increase order.     Labs: Na 131L. K 3.7, BUN 20, Cr 1.03, glu 160  Meds: vit D3, metamucil, protonix, carafate, B12    Plan/recs:  Ensure compact TID  Encourage intakes    RD to follow  CLINICAL NUTRITION ASSESSMENT      Reason for Assessment Unintentional Weight Loss     Diagnosis/Problem   Left lower quadrant abdominal pain   Medical/Surgical History Past Medical History:   Diagnosis Date    Anxiety     Atherosclerosis of native arteries of extremities with intermittent claudication, bilateral legs 03/24/2020    PVD    Bilateral carotid artery stenosis 03/24/2020    CAD (coronary artery disease)     unspecified    Carotid artery disease     Cholelithiasis     Constipation     COPD (chronic obstructive pulmonary disease)     Coronary atherosclerosis 11/04/2019    H/O CABG SVG to first diagonal branch and to the LAD as well as SVG to the lateral marginal branch of the circumflex complicated by right sided subcortical infarct with left sided hemiparesis    Degenerative disc disease, lumbar 11/24/2021    Dyslipidemia 11/04/2019    Essential hypertension 11/04/2019    GERD (gastroesophageal reflux disease)     Hyperlipidemia     Hypertension     Injury of back     fractured vertebra 2/26/23    Ischemic heart disease     Peripheral edema     PVD (peripheral vascular disease) with claudication     latanya legs    Stroke 2011    Unilateral osteoarthritis of hip 11/24/2021    Vertigo        Past Surgical History:   Procedure Laterality Date    ANGIOPLASTY      x2    CARDIAC CATHETERIZATION N/A 05/06/2024    Procedure: Left Heart Cath;  Surgeon: Gurwinder Hurd MD;  Location: [facility] CATH INVASIVE LOCATION;  Service:  "Cardiovascular;  Laterality: N/A;    CARDIAC CATHETERIZATION N/A 05/06/2024    Procedure: Coronary angiography;  Surgeon: Gurwinder Hurd MD;  Location:  [facility] CATH INVASIVE LOCATION;  Service: Cardiovascular;  Laterality: N/A;    CARDIAC CATHETERIZATION N/A 05/06/2024    Procedure: Percutaneous Coronary Intervention;  Surgeon: Gurwinder Hurd MD;  Location:  [facility] CATH INVASIVE LOCATION;  Service: Cardiovascular;  Laterality: N/A;    CARDIAC CATHETERIZATION N/A 05/06/2024    Procedure: Stent TC coronary;  Surgeon: Gurwinder Hurd MD;  Location:  [facility] CATH INVASIVE LOCATION;  Service: Cardiovascular;  Laterality: N/A;    CARDIAC CATHETERIZATION  05/06/2024    Procedure: Saphenous Vein Graft;  Surgeon: Gurwinder Hurd MD;  Location:  [facility] CATH INVASIVE LOCATION;  Service: Cardiovascular;;    CAROTID ENDARTERECTOMY  2000    Left    CAROTID ENDARTERECTOMY Left 06/26/2001    CHOLECYSTECTOMY      COLONOSCOPY  2010    COLONOSCOPY  2012    CORONARY ARTERY BYPASS GRAFT  08/2011    CORONARY STENT PLACEMENT      X17 stents    CORONARY STENT PLACEMENT      x5    INTERVENTIONAL RADIOLOGY PROCEDURE N/A 05/06/2024    Procedure: Intravascular Ultrasound;  Surgeon: Gurwinder Hurd MD;  Location:  [facility] CATH INVASIVE LOCATION;  Service: Cardiovascular;  Laterality: N/A;        Anthropometrics        Current Height  Current Weight  BMI kg/m2 Height: 180.3 cm (71\")  Weight: 83.2 kg (183 lb 6.8 oz) (07/05/24 0628)  Body mass index is 25.58 kg/m².   Adjusted BMI (if applicable)    BMI Category Overweight (25 - 29.9)   Ideal Body Weight (IBW) 172#   Usual Body Weight (UBW) 199-200#   Weight Trend Loss   Weight History Wt Readings from Last 30 Encounters:   07/05/24 0628 83.2 kg (183 lb 6.8 oz)   07/04/24 0640 83.6 kg (184 lb 4.9 oz)   07/03/24 1534 82.5 kg (181 lb 12.8 oz)   07/03/24 1116 81.6 kg (180 lb)   07/01/24 1354 81.6 kg (180 lb)   06/19/24 0323 86.2 kg (190 lb)   05/20/24 0843 86.2 kg (190 " lb)   05/16/24 0951 86.2 kg (190 lb)   05/12/24 1535 86.6 kg (191 lb)   05/10/24 2124 87 kg (191 lb 14.4 oz)   05/10/24 1724 90.3 kg (199 lb 1.2 oz)   05/07/24 0934 90.3 kg (199 lb)   05/07/24 0706 90.3 kg (199 lb)   05/07/24 0500 87.4 kg (192 lb 10.9 oz)   05/05/24 1121 88.9 kg (196 lb)   05/05/24 0745 90.7 kg (199 lb 15.3 oz)   04/05/24 1234 90.7 kg (200 lb)   04/04/24 1147 90.7 kg (200 lb)   09/22/23 0419 92.7 kg (204 lb 5.9 oz)   09/21/23 1017 94.4 kg (208 lb 1.6 oz)   07/30/23 1000 90.7 kg (200 lb)   03/04/23 0044 98 kg (216 lb)   03/03/23 1945 98 kg (216 lb)   09/29/22 0827 101 kg (222 lb)   03/24/22 0832 106 kg (233 lb)   11/10/21 1129 103 kg (226 lb)   07/01/21 0810 106 kg (233 lb)   02/25/21 0933 102 kg (224 lb)   09/17/20 0818 102 kg (224 lb)   07/23/20 1417 102 kg (224 lb)   11/07/19 0841 100 kg (221 lb 8 oz)        Estimated Requirements         Weight used  81.6 kg    Calories  2,040 - 2448 kcal (25-30 kcal/kg)    Protein  81-98 g (1.0 - 1.2 gm/kg)    Fluid   (1 mL/kcal)     Labs       Pertinent Labs    Results from last 7 days   Lab Units 07/05/24  0514 07/04/24  0542 07/03/24  0553 07/02/24  0432 07/01/24  1413   SODIUM mmol/L 131* 135* 132* 127* 129*   POTASSIUM mmol/L 3.7 3.9 4.1 4.6 4.9   CHLORIDE mmol/L 93* 95* 97* 95* 91*   CO2 mmol/L 23.9 20.9* 24.4 22.2 25.8   BUN mg/dL 20 24* 17 7* 11   CREATININE mg/dL 1.03 0.97 0.63* 0.75* 0.75*   CALCIUM mg/dL 9.1 9.1 8.9 8.4* 9.6   BILIRUBIN mg/dL  --   --   --  0.7 0.6   ALK PHOS U/L  --   --   --  88 114   ALT (SGPT) U/L  --   --   --  16 22   AST (SGOT) U/L  --   --   --  22 16   GLUCOSE mg/dL 160* 146* 94 99 137*     Results from last 7 days   Lab Units 07/05/24  0514 07/03/24  0554 07/03/24  0553 07/02/24  0432 07/01/24  1413   MAGNESIUM mg/dL  --   --  2.0  --  2.3   PHOSPHORUS mg/dL 4.3   < > 3.4  --   --    HEMOGLOBIN g/dL 12.9*   < >  --    < > 13.1   HEMATOCRIT % 39.5   < >  --    < > 39.4   WBC 10*3/mm3 18.87*   < >  --    < > 18.77*   ALBUMIN  g/dL 3.1*   < > 2.9*   < > 3.8    < > = values in this interval not displayed.     Results from last 7 days   Lab Units 07/05/24  0514 07/04/24  0542 07/03/24  0554 07/02/24  0432 07/01/24  1413   INR   --   --   --   --  1.05   PLATELETS 10*3/mm3 342 321 279 267 332     COVID19   Date Value Ref Range Status   06/19/2024 Not Detected Not Detected - Ref. Range Final     Lab Results   Component Value Date    HGBA1C 5.8 (H) 02/28/2023          Medications           Scheduled Medications amLODIPine, 5 mg, Oral, Q24H  aspirin, 325 mg, Oral, Once  atenolol, 50 mg, Oral, BID  atorvastatin, 40 mg, Oral, Nightly  cetirizine, 10 mg, Oral, Daily  cholecalciferol, 2,000 Units, Oral, Daily  clopidogrel, 75 mg, Oral, Daily  lisinopril, 40 mg, Oral, Q24H  meclizine, 12.5 mg, Oral, Daily  Metamucil, 2 wafer, Oral, Daily  pantoprazole, 40 mg, Oral, BID  pregabalin, 150 mg, Oral, TID  saccharomyces boulardii, 250 mg, Oral, Nightly  sodium chloride, 10 mL, Intravenous, Q12H  sucralfate, 1 g, Oral, TID AC  vitamin B-12, 1,000 mcg, Oral, Daily       Infusions     PRN Medications   acetaminophen **OR** acetaminophen **OR** acetaminophen    bisacodyl    dicyclomine    HYDROcodone-acetaminophen    nitroglycerin    ondansetron    sodium chloride    [COMPLETED] Insert Peripheral IV **AND** sodium chloride    sodium chloride    sodium chloride     Physical Findings          General Findings alert, oriented   Oral/Mouth Cavity WDL, dental appliance   Edema  not assessed   Gastrointestinal diarrhea, last bowel movement: 7/2   Skin  skin intact   Tubes/Drains/Lines none   NFPE No clinical signs of muscle wasting or fat loss     Current Nutrition Orders & Evaluation of Intake       Oral Nutrition     Food Allergies NKFA   Current PO Diet Diet: Cardiac, Fluid Restriction (240 mL/tray); Healthy Heart (2-3 Na+); 1500 mL/day; Fluid Consistency: Thin (IDDSI 0)   Supplement Ensure Compact BID   PO Evaluation     % PO Intake 25%     Factors Affecting  Intake: diarrhea, weakness   --  PES STATEMENT / NUTRITION DIAGNOSIS      Nutrition Dx Problem  Problem: Inadequate Oral Intake  Etiology: Medical Diagnosis - left lower quadrant abdominal pain    Signs/Symptoms: Report of Minimal PO Intake and Unintended Weight Change     NUTRITION INTERVENTION / PLAN OF CARE      Intervention Goal(s) Establish goals of care, Meet estimated needs, Disease management/therapy, Establish PO intake, Increase intake, Accepts oral nutrition supplement, No significant weight loss, and PO intake goal %: 75         RD Intervention/Action Encourage intake, Continue to monitor, Care plan reviewed, and Recommend/order: ensure   --      Prescription/Orders:       PO Diet       Supplements Ensure compact TID      Enteral Nutrition       Parenteral Nutrition    New Prescription Ordered? Yes   --      Monitor/Evaluation Per protocol   Discharge Plan/Needs Pending clinical course   --    RD to follow per protocol.      Electronically signed by:  Georgia Cottrell RD  07/05/24 13:11 EDT

## 2024-07-05 NOTE — PROGRESS NOTES
[facility] Progress Note      Patient Name: Slade Martinez  : 1950  MRN: [id number]  Primary Care Physician:  Triny Hannon MD  Date of admission: 2024    Subjective     Interval History:   Follow-up hyponatremia, acute on chronic.      Last bout of diarrhea was 5:30 AM today; large-volume, he states  Continues to have abdominal pain; has very little appetite  Breathing is comfortable on room air    Review of Systems:   As noted above    Objective     Vitals:   Temp:  [97.2 °F (36.2 °C)-98.9 °F (37.2 °C)] 98.6 °F (37 °C)  Heart Rate:  [] 97  Resp:  [16-18] 18  BP: (112-147)/(54-84) 131/69    Intake/Output Summary (Last 24 hours) at 2024  Last data filed at 2024 1534  Gross per 24 hour   Intake 1180 ml   Output 565 ml   Net 615 ml       Physical Exam:    General Appearance: alert, oriented x 3, NAD, chronically ill  Skin: warm and dry  HEENT: oral mucosa normal, nonicteric sclera  Neck: supple, no JVD  Lungs: Clear to auscultation, not labored on room air lying flat  Heart: RR, tachycardic, normal S1 and S2  Abdomen: soft, + T, nondistended. +bs  : no palpable bladder  Extremities: no edema.  Neuro: normal speech and mental status     Scheduled Meds:     amLODIPine, 5 mg, Oral, Q24H  aspirin, 325 mg, Oral, Once  atenolol, 50 mg, Oral, BID  atorvastatin, 40 mg, Oral, Nightly  cetirizine, 10 mg, Oral, Daily  cholecalciferol, 2,000 Units, Oral, Daily  clopidogrel, 75 mg, Oral, Daily  lisinopril, 40 mg, Oral, Q24H  meclizine, 12.5 mg, Oral, Daily  Metamucil, 2 wafer, Oral, Daily  pantoprazole, 40 mg, Oral, BID  pregabalin, 150 mg, Oral, TID  saccharomyces boulardii, 250 mg, Oral, Nightly  sodium chloride, 10 mL, Intravenous, Q12H  sucralfate, 1 g, Oral, TID AC  vitamin B-12, 1,000 mcg, Oral, Daily      IV Meds:        Results Reviewed:   I have personally reviewed the results from the time of this admission to 2024 19:43 EDT     Results from last  7 days   Lab Units 07/05/24  0514 07/04/24  0542 07/03/24  0553 07/02/24  0432 07/01/24  1413   SODIUM mmol/L 131* 135* 132* 127* 129*   POTASSIUM mmol/L 3.7 3.9 4.1 4.6 4.9   CHLORIDE mmol/L 93* 95* 97* 95* 91*   CO2 mmol/L 23.9 20.9* 24.4 22.2 25.8   BUN mg/dL 20 24* 17 7* 11   CREATININE mg/dL 1.03 0.97 0.63* 0.75* 0.75*   CALCIUM mg/dL 9.1 9.1 8.9 8.4* 9.6   BILIRUBIN mg/dL  --   --   --  0.7 0.6   ALK PHOS U/L  --   --   --  88 114   ALT (SGPT) U/L  --   --   --  16 22   AST (SGOT) U/L  --   --   --  22 16   GLUCOSE mg/dL 160* 146* 94 99 137*       Estimated Creatinine Clearance: 74 mL/min (by C-G formula based on SCr of 1.03 mg/dL).    Results from last 7 days   Lab Units 07/05/24  0514 07/04/24  0542 07/03/24  0553 07/01/24  1413   MAGNESIUM mg/dL  --   --  2.0 2.3   PHOSPHORUS mg/dL 4.3 4.6* 3.4  --        Results from last 7 days   Lab Units 07/02/24  0432   URIC ACID mg/dL 2.4*       Results from last 7 days   Lab Units 07/05/24  0514 07/04/24  0542 07/03/24  0554 07/02/24  0432 07/01/24  1413   WBC 10*3/mm3 18.87* 12.88* 10.67 14.68* 18.77*   HEMOGLOBIN g/dL 12.9* 12.6* 10.7* 11.2* 13.1   PLATELETS 10*3/mm3 342 321 279 267 332       Results from last 7 days   Lab Units 07/01/24  1413   INR  1.05       Assessment / Plan     ASSESSMENT:  Acute on chronic hyponatremia.  Urine studies consistent with SIADH.  Low uric acid supportive.  Volume status stable  Left lower quadrant pain, diarrhea, persistent  Coronary artery disease, recent PCI  Leukocytosis  Chronically occluded SMA  Hypertension, controlled    PLAN:  Discussed with patient and wife why thiazide diuretic should not be used in the future; they are agreeable  Depending on labs tomorrow, will likely re-visit initiation of loop diuretic and salt tablets  Surveillance labs    Thank you for involving us in the care of Slade Martinez.  Please feel free to call with any questions.    Spencer Michelle MD  07/05/24  19:43 EDT    [facility]  [facility]  [phone]    Please note that portions of this note were completed with a voice recognition program.  Copied portions of the note reviewed and accurate as of 7/4/2024.

## 2024-07-05 NOTE — PLAN OF CARE
Patient was AO4, calm and cooperative. VSS, on room air. Patient reported to have continued constant burning pain on his abdomen mainly on his LLQ. Patient reported that it has improved but its still there. Tylenol and Bentyl given with relief. Patient has been refusing an option of Norco. Patient reported he had a total of 6 loose stools yesterday. Patient has been restricting his fluid intake per advise of the provider. Patient also reported that his has not been eating well. 1 bowel movement  noted this morning at 05:00, very foul smelling, loose, creamy light yellow to whitish in color. Patient appeared generally weak.    Goal Outcome Evaluation:  Plan of Care Reviewed With: patient        Progress: improving

## 2024-07-05 NOTE — CASE MANAGEMENT/SOCIAL WORK
Continued Stay Note  [facility]     Patient Name: Slade Martinez  MRN: [id number]  Today's Date: 7/5/2024    Admit Date: 7/1/2024    Plan: Rewturn home with spouse   Discharge Plan       Row Name 07/05/24 1427       Plan    Plan Rewturn home with spouse    Plan Comments S/W pt and his spouse Tricia at bedside.  They confirm plan is for pt to return home upon DC.  He has been ambulating to the bathroom without difficulty.  CCP will continue to follow and assist if needed. ............Rebeca HAMMOND/ CCP                   Discharge Codes    No documentation.                 Expected Discharge Date and Time       Expected Discharge Date Expected Discharge Time    Jul 4, 2024               Rebeca Robles RN

## 2024-07-06 NOTE — PROGRESS NOTES
[facility] Progress Note      Patient Name: Slade Martinez  : 1950  MRN: [id number]  Primary Care Physician:  Triny Hannon MD  Date of admission: 2024    Subjective     Interval History:   Follow-up hyponatremia, acute on chronic.      Continues to have diarrhea  Appetite is poor; slight abdominal pain as well  No shortness of breath on room air as he lies in fetal position    Review of Systems:   As noted above    Objective     Vitals:   Temp:  [98.8 °F (37.1 °C)-100 °F (37.8 °C)] 99.7 °F (37.6 °C)  Heart Rate:  [] 81  Resp:  [16-18] 18  BP: (117-136)/(54-73) 136/54    Intake/Output Summary (Last 24 hours) at 2024 190  Last data filed at 2024 1733  Gross per 24 hour   Intake 1078 ml   Output 770 ml   Net 308 ml       Physical Exam:    General Appearance: alert, oriented x 3, NAD, chronically ill  Skin: warm and dry  HEENT: oral mucosa normal, nonicteric sclera  Neck: supple, no JVD  Lungs: Clear to auscultation, not labored on room air lying flat  Heart: RR, tachycardic, normal S1 and S2  Abdomen: soft, + T, nondistended. +bs  : no palpable bladder  Extremities: no edema.  Neuro: normal speech and mental status     Scheduled Meds:     amLODIPine, 5 mg, Oral, Q24H  aspirin, 325 mg, Oral, Once  atenolol, 50 mg, Oral, BID  atorvastatin, 40 mg, Oral, Nightly  cetirizine, 10 mg, Oral, Daily  cholecalciferol, 2,000 Units, Oral, Daily  clopidogrel, 75 mg, Oral, Daily  lisinopril, 40 mg, Oral, Q24H  meclizine, 12.5 mg, Oral, Daily  Metamucil, 2 wafer, Oral, Daily  Pancrelipase (Lip-Prot-Amyl), 24,000 units of lipase, Oral, TID With Meals  pantoprazole, 40 mg, Oral, BID  pregabalin, 150 mg, Oral, TID  saccharomyces boulardii, 250 mg, Oral, Nightly  sodium chloride, 10 mL, Intravenous, Q12H  sucralfate, 1 g, Oral, TID AC  vitamin B-12, 1,000 mcg, Oral, Daily      IV Meds:        Results Reviewed:   I have personally reviewed the results from the time of  this admission to 7/6/2024 19:07 EDT     Results from last 7 days   Lab Units 07/06/24  0518 07/05/24  0514 07/04/24  0542 07/03/24  0553 07/02/24  0432 07/01/24  1413   SODIUM mmol/L 130* 131* 135*   < > 127* 129*   POTASSIUM mmol/L 4.0 3.7 3.9   < > 4.6 4.9   CHLORIDE mmol/L 92* 93* 95*   < > 95* 91*   CO2 mmol/L 27.0 23.9 20.9*   < > 22.2 25.8   BUN mg/dL 18 20 24*   < > 7* 11   CREATININE mg/dL 0.65* 1.03 0.97   < > 0.75* 0.75*   CALCIUM mg/dL 9.4 9.1 9.1   < > 8.4* 9.6   BILIRUBIN mg/dL  --   --   --   --  0.7 0.6   ALK PHOS U/L  --   --   --   --  88 114   ALT (SGPT) U/L  --   --   --   --  16 22   AST (SGOT) U/L  --   --   --   --  22 16   GLUCOSE mg/dL 131* 160* 146*   < > 99 137*    < > = values in this interval not displayed.       Estimated Creatinine Clearance: 115.8 mL/min (A) (by C-G formula based on SCr of 0.65 mg/dL (L)).    Results from last 7 days   Lab Units 07/06/24 0518 07/05/24  0514 07/04/24  0542 07/03/24  0553 07/03/24  0553 07/01/24  1413   MAGNESIUM mg/dL 1.8  --   --   --  2.0 2.3   PHOSPHORUS mg/dL 2.6 4.3 4.6*   < > 3.4  --     < > = values in this interval not displayed.       Results from last 7 days   Lab Units 07/02/24  0432   URIC ACID mg/dL 2.4*       Results from last 7 days   Lab Units 07/06/24  0518 07/05/24  0514 07/04/24  0542 07/03/24  0554 07/02/24  0432   WBC 10*3/mm3 16.78* 18.87* 12.88* 10.67 14.68*   HEMOGLOBIN g/dL 11.9* 12.9* 12.6* 10.7* 11.2*   PLATELETS 10*3/mm3 327 342 321 279 267       Results from last 7 days   Lab Units 07/01/24  1413   INR  1.05       Assessment / Plan     ASSESSMENT:  Acute on chronic hyponatremia.  Initial urine studies consistent with SIADH.  Low uric acid supportive.  Volume status now, though, likely low given persistent diarrhea and poor oral intake  Left lower quadrant pain, diarrhea, and fever  Coronary artery disease, recent PCI  Leukocytosis  Chronically occluded SMA  Hypertension, controlled    PLAN:  Stop fluid restriction and  begin normal saline at 100 mL/h given ongoing diarrhea and very poor appetite  Discussed with wife at bedside  Surveillance labs    Thank you for involving us in the care of Slade Martinez.  Please feel free to call with any questions.    Spencer Michelle MD  07/06/24  19:07 EDT    [facility]  [phone]    Please note that portions of this note were completed with a voice recognition program.  Copied portions of the note reviewed and accurate as of 7/4/2024.

## 2024-07-06 NOTE — PLAN OF CARE
Goal Outcome Evaluation:              Outcome Evaluation: AxO4, on room air,IV saline locked, standby assist,family at bedside.Temp elevated this AM, 100F, PRN tylenol given and went down to 98.8F, still with ongoing diarrhea and LLQ cramps, PRN bentyl given.Needs attended, will continue to monitor.Seen by GI, started creon, due for stool.

## 2024-07-06 NOTE — PLAN OF CARE
Goal Outcome Evaluation:              Outcome Evaluation: AxO4, on room air,IV saline locked, standby assist,family at bedside.Temp elevated this AM, 100F, PRN tylenol given and went down to 98.8F, still with ongoing diarrhea and LLQ cramps, PRN bentyl given.Needs attended, will continue to monitor.With PRN hemorrhoid cream with every BM as per family request.

## 2024-07-06 NOTE — PROGRESS NOTES
[facility], [place], operated by [facility]  Inpatient Progress Note    Reason for Follow Up:  diarrhea    Subjective     Interval History:   Pt still having diarrhea, reports white colored and greasy stools    Current Facility-Administered Medications:     acetaminophen (TYLENOL) tablet 650 mg, 650 mg, Oral, Q4H PRN, 650 mg at 07/06/24 1001 **OR** acetaminophen (TYLENOL) 160 MG/5ML oral solution 650 mg, 650 mg, Oral, Q4H PRN **OR** acetaminophen (TYLENOL) suppository 650 mg, 650 mg, Rectal, Q4H PRN, Geetha Gramajo MD    amLODIPine (NORVASC) tablet 5 mg, 5 mg, Oral, Q24H, Geetha Gramajo MD, 5 mg at 07/06/24 0832    aspirin tablet 325 mg, 325 mg, Oral, Once, Geetha Gramajo MD    atenolol (TENORMIN) tablet 50 mg, 50 mg, Oral, BID, Geetha Gramajo MD, 50 mg at 07/06/24 0832    atorvastatin (LIPITOR) tablet 40 mg, 40 mg, Oral, Nightly, Geetha Gramajo MD, 40 mg at 07/05/24 2119    bisacodyl (DULCOLAX) EC tablet 10 mg, 10 mg, Oral, Daily PRN, Ayush Kaiser MD, 10 mg at 07/03/24 0904    cetirizine (zyrTEC) tablet 10 mg, 10 mg, Oral, Daily, Geetha Gramajo MD, 10 mg at 07/06/24 0832    cholecalciferol (VITAMIN D3) tablet 2,000 Units, 2,000 Units, Oral, Daily, Geetha Gramajo MD, 2,000 Units at 07/06/24 0832    clopidogrel (PLAVIX) tablet 75 mg, 75 mg, Oral, Daily, Geetha Gramajo MD, 75 mg at 07/06/24 0832    dicyclomine (BENTYL) capsule 10 mg, 10 mg, Oral, Q6H PRN, Drew Fernandez MD, 10 mg at 07/06/24 0919    HYDROcodone-acetaminophen (NORCO) 7.5-325 MG per tablet 1 tablet, 1 tablet, Oral, Q8H PRN, Geetha Gramajo MD    lisinopril (PRINIVIL,ZESTRIL) tablet 40 mg, 40 mg, Oral, Q24H, Geetha Gramajo MD, 40 mg at 07/06/24 1001    meclizine (ANTIVERT) tablet 12.5 mg, 12.5 mg, Oral, Daily, Geetha Gramajo MD, 12.5 mg at 07/06/24 1001    Metamucil wafer 2 wafer, 2 wafer, Oral, Daily, Estefany Cohn PA-C, 2 wafer at 07/06/24 1116    nitroglycerin (NITROSTAT) SL tablet 0.4 mg, 0.4 mg, Sublingual, Q5 Min PRN, Geetha Gramajo MD     ondansetron (ZOFRAN) injection 4 mg, 4 mg, Intravenous, Q6H PRN, Geetha Gramajo MD    pancrelipase (Lip-Prot-Amyl) (CREON) capsule 24,000 units of lipase, 24,000 units of lipase, Oral, TID With Meals, Ancelmo Angel MD    pantoprazole (PROTONIX) EC tablet 40 mg, 40 mg, Oral, BID, Avila, MD Geetha, 40 mg at 07/06/24 0832    pregabalin (LYRICA) capsule 150 mg, 150 mg, Oral, TID, Avila, MD Geetha, 150 mg at 07/06/24 0832    saccharomyces boulardii (FLORASTOR) capsule 250 mg, 250 mg, Oral, Nightly, Avila, MD Geetha, 250 mg at 07/05/24 2120    sodium chloride 0.9 % flush 10 mL, 10 mL, Intravenous, PRN, Geetha Gramajo MD    [COMPLETED] Insert Peripheral IV, , , Once **AND** sodium chloride 0.9 % flush 10 mL, 10 mL, Intravenous, PRN, Geetha Gramajo MD    sodium chloride 0.9 % flush 10 mL, 10 mL, Intravenous, Q12H, AvilaGeetha shepard MD, 10 mL at 07/06/24 0833    sodium chloride 0.9 % flush 10 mL, 10 mL, Intravenous, PRN, Geetha Gramajo MD    sodium chloride 0.9 % infusion 40 mL, 40 mL, Intravenous, PRN, Geetha Gramajo MD    sucralfate (CARAFATE) tablet 1 g, 1 g, Oral, TID Chanel SNELL Leah M, APRN, 1 g at 07/06/24 0600    vitamin B-12 (CYANOCOBALAMIN) tablet 1,000 mcg, 1,000 mcg, Oral, Daily, Geetha Gramajo MD, 1,000 mcg at 07/06/24 0832  Review of Systems:    The following systems were reviewed and negative;  gastrointestinal    Objective     Vital Signs  Temp:  [98.6 °F (37 °C)-100 °F (37.8 °C)] 98.8 °F (37.1 °C)  Heart Rate:  [] 87  Resp:  [16-18] 18  BP: (117-131)/(67-73) 130/73  Body mass index is 25.24 kg/m².    Intake/Output Summary (Last 24 hours) at 7/6/2024 1201  Last data filed at 7/6/2024 1004  Gross per 24 hour   Intake 1080 ml   Output 570 ml   Net 510 ml     I/O this shift:  In: 360 [P.O.:360]  Out: 100 [Urine:100]     Physical Exam:   General: patient awake, alert and cooperative   Eyes: Normal lids and lashes, no scleral icterus   Neck: supple, normal ROM   Skin: warm and dry, not jaundiced   Cardiovascular:  regular rhythm and rate, no murmurs auscultated   Pulm: clear to auscultation bilaterally, regular and unlabored   Abdomen: soft, nontender, nondistended; normal bowel sounds   Extremities: no rash or edema   Psychiatric: Normal mood and behavior; memory intact     Results Review:     I reviewed the patient's new clinical results.    Results from last 7 days   Lab Units 07/06/24 0518 07/05/24  0514 07/04/24  0542   WBC 10*3/mm3 16.78* 18.87* 12.88*   HEMOGLOBIN g/dL 11.9* 12.9* 12.6*   HEMATOCRIT % 36.9* 39.5 38.7   PLATELETS 10*3/mm3 327 342 321     Results from last 7 days   Lab Units 07/06/24 0518 07/05/24  0514 07/04/24  0542 07/03/24  0553 07/02/24  0432 07/01/24  1413   SODIUM mmol/L 130* 131* 135*   < > 127* 129*   POTASSIUM mmol/L 4.0 3.7 3.9   < > 4.6 4.9   CHLORIDE mmol/L 92* 93* 95*   < > 95* 91*   CO2 mmol/L 27.0 23.9 20.9*   < > 22.2 25.8   BUN mg/dL 18 20 24*   < > 7* 11   CREATININE mg/dL 0.65* 1.03 0.97   < > 0.75* 0.75*   CALCIUM mg/dL 9.4 9.1 9.1   < > 8.4* 9.6   BILIRUBIN mg/dL  --   --   --   --  0.7 0.6   ALK PHOS U/L  --   --   --   --  88 114   ALT (SGPT) U/L  --   --   --   --  16 22   AST (SGOT) U/L  --   --   --   --  22 16   GLUCOSE mg/dL 131* 160* 146*   < > 99 137*    < > = values in this interval not displayed.     Results from last 7 days   Lab Units 07/01/24  1413   INR  1.05     Lab Results   Lab Value Date/Time    LIPASE 18 07/01/2024 1413    LIPASE 11 (L) 05/05/2024 1300    LIPASE 21 12/17/2022 1156       Radiology:  XR Chest 1 View   Final Result   No focal pulmonary consolidation. Borderline heart size.   Follow-up as clinical indications persist.       This report was finalized on 7/1/2024 3:05 PM by Dr. Fredrick Love M.D on Workstation: [id number]          CT Abdomen Pelvis Without Contrast   Final Result       1. No acute findings identified in the abdomen or pelvis.   2. Colonic diverticulosis.   3. Bulky calcific atherosclerotic disease seen in the proximal SMA with    suspected high-grade stenosis or vessel occlusion, appears similar to   prior. Finding can be correlated for chronic mesenteric   ischemia/postprandial abdominal pain.       This report was finalized on 7/1/2024 2:56 PM by Dr. Denilson Warner M.D   on Workstation: [id number]              Assessment & Plan     Active Hospital Problems    Diagnosis     **Left lower quadrant abdominal pain     Diarrhea     Leukocytosis     Hyponatremia     Chest pain     Chronic mesenteric ischemia     S/P CABG (coronary artery bypass graft)     Essential hypertension      Assessment:   LLQ pain x at least 4 months   Diarrhea x 1 week   CAD- s/p stent placement 05/2024, complicated by perforation of LAD; on plavix and ASA;   High grade stenosis vs vessel occlusion of SMA- chronic mesenteric ischemia/post prandial abd pain.     Plan:   GI PCR and C. difficile negative  Vascular does not feel that chronic SMA occlusion is related to abdominal pain  Encouraged more frequent use of bentyl  Possibly some component of overflow diarrhea as he had formed bowel movements yesterday - Add metamucil   Given recent cardiac stent placement and his other cardiac hx, would hold off colonoscopy unless absolutely necessary.     -Pt reporting greasy stools, fecal elastase ordered today, start creon, I reviewed his CT scan, has a uniformly decreased pancreas with atrophy, likely age related changes, suspect if does have pancreatic insufficiency 2/2 to smoking, would recommend outpatient EUS.     -Creon 2 capsules with meals and 1 capsule with snacks, may need to increase as tolerated. Trial to see if this helps with diarrhea.       I discussed the patients findings and my recommendations with patient.    Ancelmo Angel MD

## 2024-07-06 NOTE — PLAN OF CARE
Patient was AO4, calm and cooperative. VSS, on room air. Patient continued to complain of his LLQ abdomen which was soft and tender to touch. Tylenol and Bentyl given with relief. Patient reported he had a total of 3 bowel movement yesterday during the day, which were all loose. Patient also reported that one of his stool had a blood cloot. No bowel movement report overnight. Patient continued to appear weak with poor appetite. I have advised him to drink at least his ensure and maximize the allowed daily fluid restriction.     Goal Outcome Evaluation:  Plan of Care Reviewed With: patient        Progress: improving

## 2024-07-07 ENCOUNTER — ANESTHESIA (OUTPATIENT)
Dept: PERIOP | Facility: HOSPITAL | Age: 74
End: 2024-07-07
Payer: MEDICARE

## 2024-07-07 ENCOUNTER — ANESTHESIA EVENT (OUTPATIENT)
Dept: PERIOP | Facility: HOSPITAL | Age: 74
End: 2024-07-07
Payer: MEDICARE

## 2024-07-07 PROBLEM — K55.9 SMALL BOWEL ISCHEMIA: Status: ACTIVE | Noted: 2024-07-01

## 2024-07-07 PROCEDURE — 25010000002 MAGNESIUM SULFATE PER 500 MG OF MAGNESIUM: Performed by: ANESTHESIOLOGY

## 2024-07-07 PROCEDURE — 25010000002 DEXAMETHASONE PER 1 MG: Performed by: ANESTHESIOLOGY

## 2024-07-07 PROCEDURE — 25010000002 PHENYLEPHRINE 10 MG/ML SOLUTION: Performed by: ANESTHESIOLOGY

## 2024-07-07 PROCEDURE — 25010000002 SUGAMMADEX 200 MG/2ML SOLUTION: Performed by: ANESTHESIOLOGY

## 2024-07-07 PROCEDURE — 25010000002 PROPOFOL 200 MG/20ML EMULSION: Performed by: ANESTHESIOLOGY

## 2024-07-07 PROCEDURE — 25010000002 ONDANSETRON PER 1 MG: Performed by: INTERNAL MEDICINE

## 2024-07-07 RX ORDER — ROCURONIUM BROMIDE 10 MG/ML
INJECTION, SOLUTION INTRAVENOUS AS NEEDED
Status: DISCONTINUED | OUTPATIENT
Start: 2024-07-07 | End: 2024-07-07 | Stop reason: SURG

## 2024-07-07 RX ORDER — DEXAMETHASONE SODIUM PHOSPHATE 4 MG/ML
INJECTION, SOLUTION INTRA-ARTICULAR; INTRALESIONAL; INTRAMUSCULAR; INTRAVENOUS; SOFT TISSUE AS NEEDED
Status: DISCONTINUED | OUTPATIENT
Start: 2024-07-07 | End: 2024-07-07 | Stop reason: SURG

## 2024-07-07 RX ORDER — PHENYLEPHRINE HYDROCHLORIDE 10 MG/ML
INJECTION INTRAVENOUS AS NEEDED
Status: DISCONTINUED | OUTPATIENT
Start: 2024-07-07 | End: 2024-07-07 | Stop reason: SURG

## 2024-07-07 RX ORDER — MAGNESIUM SULFATE HEPTAHYDRATE 500 MG/ML
INJECTION, SOLUTION INTRAMUSCULAR; INTRAVENOUS AS NEEDED
Status: DISCONTINUED | OUTPATIENT
Start: 2024-07-07 | End: 2024-07-07 | Stop reason: SURG

## 2024-07-07 RX ORDER — PROPOFOL 10 MG/ML
INJECTION, EMULSION INTRAVENOUS AS NEEDED
Status: DISCONTINUED | OUTPATIENT
Start: 2024-07-07 | End: 2024-07-07 | Stop reason: SURG

## 2024-07-07 RX ORDER — LIDOCAINE HYDROCHLORIDE 20 MG/ML
INJECTION, SOLUTION INFILTRATION; PERINEURAL AS NEEDED
Status: DISCONTINUED | OUTPATIENT
Start: 2024-07-07 | End: 2024-07-07 | Stop reason: SURG

## 2024-07-07 RX ADMIN — MAGNESIUM SULFATE HEPTAHYDRATE 1 G: 500 INJECTION, SOLUTION INTRAMUSCULAR; INTRAVENOUS at 21:50

## 2024-07-07 RX ADMIN — PROPOFOL 80 MG: 10 INJECTION, EMULSION INTRAVENOUS at 21:16

## 2024-07-07 RX ADMIN — PHENYLEPHRINE HYDROCHLORIDE 200 MCG: 10 INJECTION INTRAVENOUS at 21:55

## 2024-07-07 RX ADMIN — PHENYLEPHRINE HYDROCHLORIDE 100 MCG: 10 INJECTION INTRAVENOUS at 21:25

## 2024-07-07 RX ADMIN — SUGAMMADEX 200 MG: 100 INJECTION, SOLUTION INTRAVENOUS at 22:22

## 2024-07-07 RX ADMIN — ROCURONIUM BROMIDE 50 MG: 10 INJECTION, SOLUTION INTRAVENOUS at 21:16

## 2024-07-07 RX ADMIN — DEXAMETHASONE SODIUM PHOSPHATE 6 MG: 4 INJECTION, SOLUTION INTRA-ARTICULAR; INTRALESIONAL; INTRAMUSCULAR; INTRAVENOUS; SOFT TISSUE at 21:45

## 2024-07-07 RX ADMIN — ONDANSETRON 4 MG: 2 INJECTION INTRAMUSCULAR; INTRAVENOUS at 21:44

## 2024-07-07 RX ADMIN — LIDOCAINE HYDROCHLORIDE 50 MG: 20 INJECTION, SOLUTION INFILTRATION; PERINEURAL at 21:16

## 2024-07-07 RX ADMIN — PHENYLEPHRINE HYDROCHLORIDE 100 MCG: 10 INJECTION INTRAVENOUS at 21:26

## 2024-07-07 RX ADMIN — MAGNESIUM SULFATE HEPTAHYDRATE 1 G: 500 INJECTION, SOLUTION INTRAMUSCULAR; INTRAVENOUS at 21:42

## 2024-07-07 NOTE — PLAN OF CARE
Goal Outcome Evaluation:              Outcome Evaluation: AxOx4, afrebrile as of the moment, had diarrhea throughout the night.Still complaining of LLQ pain radiating to back with score of 10 despite of pain pill, informed [facility] and ordered Morphine PRN and given, patient also complaining of burping and gas, ordered PRN simethicone.On IV fluids.Needs attended, will continue to monitor.Patient's pain still not that relieved after 2 doses morphine IV, SBP went up to 190, started on PRN labetolol but SBP went down by itself to 170's.For CT abd stat, followed up with CT. Having fever this late afternoon, PRN tylenol was given.Needs attended.Patient had stat CT and needed surgery, seen by Dr Meredith, consent signed. Started on IV ABT, blood cx was cancelled.Metoprolol PRN was given due to episodes of SVT, asymptomatic, EKG was obtained. Contacted IV team for g18 cannula.

## 2024-07-07 NOTE — PROGRESS NOTES
Name: Slade Martinez ADMIT: 2024   : 1950  PCP: Triny Hannon MD    MRN: [id number] LOS: 5 days   AGE/SEX: 73 y.o. male  ROOM: [facility]     Subjective   Subjective   Patient continued to have loose bowel movements last night.  Pain is more severe today.  Got some morphine with some response but still having significant pain.  When I saw around lunch he was having chills but no fever.  Currently has spiked a temp to 102.6.    Briefly in A-fib overnight per nursing       Objective   Objective   Vital Signs  Temp:  [98.6 °F (37 °C)-102.6 °F (39.2 °C)] 102.6 °F (39.2 °C)  Heart Rate:  [] 108  Resp:  [16-18] 18  BP: (135-198)/(58-80) 172/77  SpO2:  [98 %-100 %] 100 %  on   ;   Device (Oxygen Therapy): room air  Body mass index is 25.15 kg/m².  Physical Exam  Vitals reviewed.   Constitutional:       General: He is in acute distress.      Appearance: He is ill-appearing.   Eyes:      General: No scleral icterus.  Cardiovascular:      Rate and Rhythm: Regular rhythm. Tachycardia present.   Pulmonary:      Effort: Pulmonary effort is normal. No respiratory distress.      Comments: Decreased breath sounds  Abdominal:      General: There is no distension.      Palpations: Abdomen is soft.      Tenderness: There is abdominal tenderness (LLQ primarily). There is guarding.   Musculoskeletal:         General: No swelling.   Skin:     General: Skin is warm and dry.      Findings: No bruising.   Neurological:      Mental Status: He is alert and oriented to person, place, and time.   Psychiatric:      Comments: Alert and appropriate but somewhat withdrawn, grimacing in pain       Results Review     I reviewed the patient's new clinical results.  Results from last 7 days   Lab Units 24  1420 24  0518 24  0514 24  0542   WBC 10*3/mm3 17.29* 16.78* 18.87* 12.88*   HEMOGLOBIN g/dL 12.6* 11.9* 12.9* 12.6*   PLATELETS 10*3/mm3 347 327 342 321     Results from last 7 days   Lab Units  "07/07/24 0725 07/06/24  0518 07/05/24  0514 07/04/24  0542   SODIUM mmol/L 131* 130* 131* 135*   POTASSIUM mmol/L 4.0 4.0 3.7 3.9   CHLORIDE mmol/L 94* 92* 93* 95*   CO2 mmol/L 25.0 27.0 23.9 20.9*   BUN mg/dL 17 18 20 24*   CREATININE mg/dL 0.72* 0.65* 1.03 0.97   GLUCOSE mg/dL 145* 131* 160* 146*   EGFR mL/min/1.73 95.9 98.9 76.2 81.9     Results from last 7 days   Lab Units 07/07/24 0725 07/06/24 0518 07/05/24 0514 07/04/24  0542 07/03/24  0553 07/02/24  0432 07/01/24  1413   ALBUMIN g/dL 3.0* 3.0* 3.1* 3.3*   < > 2.9* 3.8   BILIRUBIN mg/dL  --   --   --   --   --  0.7 0.6   ALK PHOS U/L  --   --   --   --   --  88 114   AST (SGOT) U/L  --   --   --   --   --  22 16   ALT (SGPT) U/L  --   --   --   --   --  16 22    < > = values in this interval not displayed.     Results from last 7 days   Lab Units 07/07/24 0725 07/06/24 0518 07/05/24 0514 07/04/24  0542 07/03/24  0553 07/02/24  0432 07/01/24  1413   CALCIUM mg/dL 9.0 9.4 9.1 9.1 8.9   < > 9.6   ALBUMIN g/dL 3.0* 3.0* 3.1* 3.3* 2.9*   < > 3.8   MAGNESIUM mg/dL 2.0 1.8  --   --  2.0  --  2.3   PHOSPHORUS mg/dL 3.6 2.6 4.3 4.6* 3.4   < >  --     < > = values in this interval not displayed.     Results from last 7 days   Lab Units 07/07/24  1420 07/02/24  1055 07/02/24  0432 07/02/24  0120 07/01/24  1912 07/01/24  1413   PROCALCITONIN ng/mL  --   --   --   --   --  0.12   LACTATE mmol/L 3.3* 2.3* 2.7* 2.5*   < > 2.5*    < > = values in this interval not displayed.     No results found for: \"HGBA1C\", \"POCGLU\"    CT Abdomen Pelvis With Contrast    Result Date: 7/7/2024   1. Pneumatosis in a long segment of small bowel in the left lower abdomen and pelvis with adjacent portal venous gas in the mesentery and perienteric inflammatory changes. Findings consistent with ischemic bowel. Recommend urgent surgical evaluation. 2. Gas and fluid distended small bowel loops with relatively collapsed distal bowel, without a transition point identified. Could be ileus or " developing small bowel obstruction. 3. Bulky calcific atherosclerotic disease in the SMA with vessel occlusion followed by reconstitution, appears similar to prior. 4. Severe aortoiliac atherosclerotic disease with bilateral iliac and femoral arterial high-grade stenoses and suspected left CFA/SFA occlusion, unchanged.  Call Report: Dr. Kaiser was notified by telephone of the above findings on July 7, 2024 at 4:11 p.m.  This report was finalized on 7/7/2024 4:11 PM by Dr. Denilson Warner M.D on Workstation: [id number]       I have personally reviewed all medications:  Scheduled Medications  amLODIPine, 5 mg, Oral, Q24H  aspirin, 325 mg, Oral, Once  atenolol, 50 mg, Oral, BID  atorvastatin, 40 mg, Oral, Nightly  cetirizine, 10 mg, Oral, Daily  cholecalciferol, 2,000 Units, Oral, Daily  clopidogrel, 75 mg, Oral, Daily  lisinopril, 40 mg, Oral, Q24H  meclizine, 12.5 mg, Oral, Daily  Metamucil, 2 wafer, Oral, Daily  Pancrelipase (Lip-Prot-Amyl), 24,000 units of lipase, Oral, TID With Meals  pantoprazole, 40 mg, Oral, BID  piperacillin-tazobactam, 3.375 g, Intravenous, Once  piperacillin-tazobactam, 3.375 g, Intravenous, Q8H  pregabalin, 150 mg, Oral, TID  saccharomyces boulardii, 250 mg, Oral, Nightly  sodium chloride, 10 mL, Intravenous, Q12H  sucralfate, 1 g, Oral, TID AC  vitamin B-12, 1,000 mcg, Oral, Daily    Infusions  sodium chloride, 100 mL/hr, Last Rate: 100 mL/hr (07/06/24 2019)    Diet  NPO Diet NPO Type: Strict NPO    I have personally reviewed:  [x]  Laboratory   []  Microbiology   [x]  Radiology   []  EKG/Telemetry  []  Cardiology/Vascular   []  Pathology    []  Records       Assessment/Plan     Active Hospital Problems    Diagnosis  POA    **Left lower quadrant abdominal pain [R10.32]  Yes    Diarrhea [R19.7]  Unknown    Leukocytosis [D72.829]  Unknown    Hyponatremia [E87.1]  Unknown    Chest pain [R07.9]  Yes    Chronic mesenteric ischemia [K55.1]  Yes    S/P CABG (coronary artery bypass graft)  [Z95.1]  Not Applicable    Essential hypertension [I10]  Yes      Resolved Hospital Problems   No resolved problems to display.       73 y.o. male with complicated recent history including CAD/PCI, recent UTI with multiple antibiotic courses, chronic SMA occlusion admitted with Left lower quadrant abdominal pain.    Worsening LLQ abdominal pain/diarrhea:  - After above exam, ordered stat CT scan which shows pneumatosis and a long segment of small bowel in the left lower quadrant with adjacent portal venous gas in the mesentery consistent with ischemic bowel.  This had not previously been noted on CT scan from a few days ago.    Placed a stat consult for general surgery and discussed personally with Dr. Meredith who will come see the patient.  I also went back and discussed the findings with the patient and his wife.  He still looks about the same, laying in the fetal position, having chills.  Overall I think he has a pretty poor prognosis.  He has severe PVD, previously felt to be inoperable from a vascular standpoint.  He also does have CAD though had recent PCI approximately 2 months ago.  If not felt to be a candidate for surgical intervention, will need to encourage full comfort measures as he almost certainly will perforate at some point.    In the meantime started antibiotics with Zosyn.  Receiving IV fluid bolus due to lactic acidosis with reflex lab pending.  Ordered repeat blood cultures.  BP running high but would not get overly aggressive about correcting this.  This is likely due to pain      I also discussed the case with Dr. Angel.  Between 2 visits with patient as well as coordination of care with general surgery, consultants and nursing approximately 1 hour critical care so far today      Ayush Kaiser MD  [facility]  07/07/24  16:35 EDT

## 2024-07-07 NOTE — CONSULTS
ASSESSMENT/PLAN:    73-year-old gentleman admitted on 7/1/2024.  Main issue was diarrhea/loose bowel movements initially.  He has had abdominal pain but yesterday it worsened and today became severe.  He now has CT scan showing ischemia in the left lower abdomen of a long segment of small bowel.  On exam he has an acute surgical abdomen.  I discussed the situation with him and his wife.  They understand that without surgical intervention disease will progress and he will die.  They understand that approaching this with palliation only is an acceptable option.  The other option is emergency surgical intervention in the form of laparotomy and probable small bowel resection.  Risks were discussed, including but not limited to bleeding, infection, anastomotic leak resulting in peritonitis and need for reoperation, and short-bowel depending on length of the segment involved.  They understand that he is an extremely high risk person based on multiple severe medical comorbidities including severe coronary artery disease, severe peripheral vascular disease, and severe mesenteric vascular disease.  Understanding is high morbidity and mortality associated with surgical intervention, and given the alternative, he does wish to proceed with surgery emergently this evening.    CC:     Abdominal pain    HPI:    73-year-old gentleman presented to the hospital on 7/1/2024 with several months of intermittent abdominal pain as well as diarrhea.  Since his hospitalization, he has had marked worsening of his pain yesterday culminating with the most severe pain he has had today.  Says the pain is more in the lower abdomen, particularly left lower quadrant.  He was seen in consultation by vascular surgery secondary to occluded SMA and it was not felt that any percutaneous options for intervention existed.  Also was not felt that he would survive an open mesenteric bypass.  Relevant review of systems negative other than presenting  complaints.    RADIOLOGY:   CT abdomen pelvis 7/7/2024: Pneumatosis and a long segment of small bowel in the left lower abdomen and pelvis with adjacent portal venous gas in the mesentery and inflammatory changes consistent with ischemic bowel.  I reviewed the images and concur.  Severe aortoiliac atherosclerotic disease.  Bulky atherosclerotic disease and SMA with vessel occlusion followed by reconstitution.  CT abdomen pelvis 7/1/2024: No acute findings in the abdomen or pelvis.  Significant atherosclerotic disease.    LABS:    Lactate 3.3  WBC 17.3  Hemoglobin 12.6  Platelets 347  Potassium 4  GFR 96  Magnesium 2.0    SOCIAL HISTORY:   Denies current tobacco use  Denies alcohol use    FAMILY HISTORY:    Colorectal cancer: Negative    PREVIOUS SURGERY    Cardiac catheterization 5/7/2024: Severe multivessel disease, angioplasties and stents performed  Coronary artery bypass grafting 2011  Left carotid endarterectomy 2001  Left carotid endarterectomy 2000  Cholecystectomy    PAST MEDICAL HISTORY:    Hypertension  Hyperlipidemia  Coronary artery disease (of note, echocardiogram 7/3/2024 showed left ventricular ejection fraction 67%)   Gastroesophageal reflux disease  Peripheral vascular disease  Mesenteric ischemia    HOME MEDICATIONS:   Amlodipine  Baby aspirin  Atenolol  Atorvastatin  Clopidogrel  Hydrochlorothiazide  Linzess  Lisinopril  Loratadine  Meclizine  Pantoprazole  Lyrica  Florastor  Carafate  Nitroglycerin    ALLERGIES:   Hydrochlorothiazide-hyponatremia  Tamsulosin-palpitations    PHYSICAL EXAM:   Constitutional: No acute distress  Vital signs:   Weight: 180 pounds  Height: 71 inches  BMI: 25.2  /78    RR 18  T102.6  Respiratory: Normal nonlabored inspiratory effort  Cardiovascular: Regular rate, no jugular venous distention  Gastrointestinal: Mild distention, mild upper abdominal tenderness, severe lower abdominal tenderness, particular left lower quadrant, with involuntary guarding and  rebound consistent with acute surgical abdomen    SERA BOO M.D.

## 2024-07-07 NOTE — PROGRESS NOTES
Vascular Surgery    Consult received for chronic mesenteric ischemia.  Patient is known to my partner Dr. Alcala for lower extremity and carotid disease.  Was seen by my partner for Dr. Waters 7/2/2024 because of diarrhea and left lower quadrant abdominal pain, and then again before that September 2023 for the same problem.  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  We feel the patient does not have any percutaneous revascularization options, and Dr. Waters did not feel he was a candidate for open revascularization.  I have touched base with Dr. Angel.  May keep previously scheduled appointment with Dr. Alcala.

## 2024-07-07 NOTE — PROGRESS NOTES
[facility]  Inpatient Progress Note    Reason for Follow Up:  diarrhea    Subjective     Interval History:   Pt still having diarrhea, reports white colored and greasy stools    Current Facility-Administered Medications:     acetaminophen (TYLENOL) tablet 650 mg, 650 mg, Oral, Q4H PRN, 650 mg at 07/07/24 0446 **OR** acetaminophen (TYLENOL) 160 MG/5ML oral solution 650 mg, 650 mg, Oral, Q4H PRN **OR** acetaminophen (TYLENOL) suppository 650 mg, 650 mg, Rectal, Q4H PRN, Geetha Gramajo MD    amLODIPine (NORVASC) tablet 5 mg, 5 mg, Oral, Q24H, Geetha Gramajo MD, 5 mg at 07/07/24 0908    aspirin tablet 325 mg, 325 mg, Oral, Once, Geetha Gramajo MD    atenolol (TENORMIN) tablet 50 mg, 50 mg, Oral, BID, Geetha Gramajo MD, 50 mg at 07/07/24 0908    atorvastatin (LIPITOR) tablet 40 mg, 40 mg, Oral, Nightly, Geetha Gramajo MD, 40 mg at 07/06/24 2014    bisacodyl (DULCOLAX) EC tablet 10 mg, 10 mg, Oral, Daily PRN, Ayush Kaiser MD, 10 mg at 07/03/24 0904    cetirizine (zyrTEC) tablet 10 mg, 10 mg, Oral, Daily, Geetha Gramajo MD, 10 mg at 07/07/24 0908    cholecalciferol (VITAMIN D3) tablet 2,000 Units, 2,000 Units, Oral, Daily, Geetha Gramajo MD, 2,000 Units at 07/07/24 0908    clopidogrel (PLAVIX) tablet 75 mg, 75 mg, Oral, Daily, Geetha Gramajo MD, 75 mg at 07/07/24 0908    dicyclomine (BENTYL) capsule 10 mg, 10 mg, Oral, Q6H PRN, Drew Fernandez MD, 10 mg at 07/07/24 1208    HYDROcodone-acetaminophen (NORCO) 7.5-325 MG per tablet 1 tablet, 1 tablet, Oral, Q8H PRN, Geetha Gramajo MD, 1 tablet at 07/07/24 0617    lisinopril (PRINIVIL,ZESTRIL) tablet 40 mg, 40 mg, Oral, Q24H, Geetha Gramajo MD, 40 mg at 07/07/24 1109    meclizine (ANTIVERT) tablet 12.5 mg, 12.5 mg, Oral, Daily, Geetha Gramajo MD, 12.5 mg at 07/07/24 0908    Metamucil wafer 2 wafer, 2 wafer, Oral, Daily, Estefany Cohn PA-C, 2 wafer at 07/07/24 0908    morphine injection 2 mg, 2 mg, Intravenous, Q3H PRN, Awilda,  Ayush Crow MD, 2 mg at 07/07/24 1236    nitroglycerin (NITROSTAT) SL tablet 0.4 mg, 0.4 mg, Sublingual, Q5 Min PRN, Geetha Gramajo MD    ondansetron (ZOFRAN) injection 4 mg, 4 mg, Intravenous, Q6H PRN, Geetha Gramajo MD    pancrelipase (Lip-Prot-Amyl) (CREON) capsule 12,000 units of lipase, 12,000 units of lipase, Oral, TID PRN, Ancelmo Angel MD    pancrelipase (Lip-Prot-Amyl) (CREON) capsule 24,000 units of lipase, 24,000 units of lipase, Oral, TID With Meals, Ancelmo Angel MD, 24,000 units of lipase at 07/07/24 1240    pantoprazole (PROTONIX) EC tablet 40 mg, 40 mg, Oral, BID, AvilaGeetha shepard MD, 40 mg at 07/07/24 0908    phenylephrine-mineral oil-petrolatum (PREPARATION H) 0.25-14-74.9 % hemorhoidal ointment, , Rectal, TID PRN, Auysh Kaiser MD, 1 Application at 07/06/24 2022    pregabalin (LYRICA) capsule 150 mg, 150 mg, Oral, TID, Geetha Gramajo MD, 150 mg at 07/07/24 0908    saccharomyces boulardii (FLORASTOR) capsule 250 mg, 250 mg, Oral, Nightly, Geetha Gramajo MD, 250 mg at 07/06/24 2014    simethicone (MYLICON) chewable tablet 80 mg, 80 mg, Oral, 4x Daily PRN, Ayush Kaiser MD, 80 mg at 07/07/24 0945    sodium chloride 0.9 % flush 10 mL, 10 mL, Intravenous, PRN, Geetha Gramajo MD    [COMPLETED] Insert Peripheral IV, , , Once **AND** sodium chloride 0.9 % flush 10 mL, 10 mL, Intravenous, PRN, Geetha Gramajo MD    sodium chloride 0.9 % flush 10 mL, 10 mL, Intravenous, Q12H, Geetha Gramajo MD, 10 mL at 07/07/24 0909    sodium chloride 0.9 % flush 10 mL, 10 mL, Intravenous, PRN, Geetha Gramajo MD    sodium chloride 0.9 % infusion 40 mL, 40 mL, Intravenous, PRN, Geetha Gramajo MD    sodium chloride 0.9 % infusion, 100 mL/hr, Intravenous, Continuous, Spencer Michelle MD, Last Rate: 100 mL/hr at 07/06/24 2019, 100 mL/hr at 07/06/24 2019    sucralfate (CARAFATE) tablet 1 g, 1 g, Oral, TID AC, Marycarmen Buchanan, APRN, 1 g at 07/07/24 1109    vitamin B-12 (CYANOCOBALAMIN) tablet 1,000 mcg,  1,000 mcg, Oral, Daily, Geetha Gramajo MD, 1,000 mcg at 07/07/24 1208  Review of Systems:    The following systems were reviewed and negative;  gastrointestinal    Objective     Vital Signs  Temp:  [98.6 °F (37 °C)-99.7 °F (37.6 °C)] 98.8 °F (37.1 °C)  Heart Rate:  [] 79  Resp:  [16-18] 18  BP: (135-186)/(54-80) 186/73  Body mass index is 25.15 kg/m².    Intake/Output Summary (Last 24 hours) at 7/7/2024 1244  Last data filed at 7/7/2024 1112  Gross per 24 hour   Intake 360 ml   Output 630 ml   Net -270 ml     I/O this shift:  In: 240 [P.O.:240]  Out: 480 [Urine:480]     Physical Exam:   General: patient awake, alert and cooperative   Eyes: Normal lids and lashes, no scleral icterus   Neck: supple, normal ROM   Skin: warm and dry, not jaundiced   Cardiovascular: regular rhythm and rate, no murmurs auscultated   Pulm: clear to auscultation bilaterally, regular and unlabored   Abdomen: soft, nontender, nondistended; normal bowel sounds   Extremities: no rash or edema   Psychiatric: Normal mood and behavior; memory intact     Results Review:     I reviewed the patient's new clinical results.    Results from last 7 days   Lab Units 07/06/24  0518 07/05/24  0514 07/04/24  0542   WBC 10*3/mm3 16.78* 18.87* 12.88*   HEMOGLOBIN g/dL 11.9* 12.9* 12.6*   HEMATOCRIT % 36.9* 39.5 38.7   PLATELETS 10*3/mm3 327 342 321     Results from last 7 days   Lab Units 07/07/24  0725 07/06/24  0518 07/05/24  0514 07/03/24  0553 07/02/24  0432 07/01/24  1413   SODIUM mmol/L 131* 130* 131*   < > 127* 129*   POTASSIUM mmol/L 4.0 4.0 3.7   < > 4.6 4.9   CHLORIDE mmol/L 94* 92* 93*   < > 95* 91*   CO2 mmol/L 25.0 27.0 23.9   < > 22.2 25.8   BUN mg/dL 17 18 20   < > 7* 11   CREATININE mg/dL 0.72* 0.65* 1.03   < > 0.75* 0.75*   CALCIUM mg/dL 9.0 9.4 9.1   < > 8.4* 9.6   BILIRUBIN mg/dL  --   --   --   --  0.7 0.6   ALK PHOS U/L  --   --   --   --  88 114   ALT (SGPT) U/L  --   --   --   --  16 22   AST (SGOT) U/L  --   --   --   --  22 16    GLUCOSE mg/dL 145* 131* 160*   < > 99 137*    < > = values in this interval not displayed.     Results from last 7 days   Lab Units 07/01/24  1413   INR  1.05     Lab Results   Lab Value Date/Time    LIPASE 18 07/01/2024 1413    LIPASE 11 (L) 05/05/2024 1300    LIPASE 21 12/17/2022 1156       Radiology:  XR Chest 1 View   Final Result   No focal pulmonary consolidation. Borderline heart size.   Follow-up as clinical indications persist.       This report was finalized on 7/1/2024 3:05 PM by Dr. Fredrick Love M.D on Workstation: [id number]          CT Abdomen Pelvis Without Contrast   Final Result       1. No acute findings identified in the abdomen or pelvis.   2. Colonic diverticulosis.   3. Bulky calcific atherosclerotic disease seen in the proximal SMA with   suspected high-grade stenosis or vessel occlusion, appears similar to   prior. Finding can be correlated for chronic mesenteric   ischemia/postprandial abdominal pain.       This report was finalized on 7/1/2024 2:56 PM by Dr. Denilson Warner M.D   on Workstation: [id number]              Assessment & Plan     Active Hospital Problems    Diagnosis     **Left lower quadrant abdominal pain     Diarrhea     Leukocytosis     Hyponatremia     Chest pain     Chronic mesenteric ischemia     S/P CABG (coronary artery bypass graft)     Essential hypertension      Assessment:   LLQ pain x at least 4 months   Diarrhea x 1 week   CAD- s/p stent placement 05/2024, complicated by perforation of LAD; on plavix and ASA;   High grade stenosis vs vessel occlusion of SMA- chronic mesenteric ischemia/post prandial abd pain.     Plan:   GI PCR and C. difficile negative  Vascular does not feel that chronic SMA occlusion is related to abdominal pain  Encouraged more frequent use of bentyl  Possibly some component of overflow diarrhea as he had formed bowel movements yesterday - Add metamucil   Given recent cardiac stent placement and his other cardiac hx, would hold off  colonoscopy unless absolutely necessary.     -Pt reporting greasy stools, fecal elastase ordered today, start creon, I reviewed his CT scan, has a uniformly decreased pancreas with atrophy, likely age related changes, suspect if does have pancreatic insufficiency 2/2 to smoking, would recommend outpatient EUS.     -Creon 2 capsules with meals and 1 capsule with snacks, may need to increase as tolerated. Trial to see if this helps with diarrhea.     -Consult placed to vascular concern for chronic mesenteric ischemia.   -CT showing bowel ishchemia, surgery consulted/vascular seeing patient.       I discussed the patients findings and my recommendations with patient.    Ancelmo Angel MD

## 2024-07-07 NOTE — PROGRESS NOTES
Nephrology    Previous notes read: ischemic bowel by CT done earlier today  Patient is in the OR still   Lab data reviewed earlier; normal SCR and stable serum sodium at 131  Will reassess tomorrow    --Spencer Michelle MD

## 2024-07-07 NOTE — ANESTHESIA PREPROCEDURE EVALUATION
Anesthesia Evaluation     NPO Solid Status: Waived due to emergency  NPO Liquid Status: Waived due to emergency           Airway   Mallampati: II  TM distance: >3 FB  Neck ROM: full  Dental    (+) edentulous    Pulmonary - normal exam   (+) COPD,  Cardiovascular - normal exam    (+) hypertension, CAD, CABG >6 Months, cardiac stents within the past 12 months , PVD, hyperlipidemia,  carotid artery disease      Neuro/Psych  (+) CVA (no residual)  GI/Hepatic/Renal/Endo    (+) GERD    Musculoskeletal     Abdominal    Substance History      OB/GYN          Other                    Anesthesia Plan    ASA 3 - emergent     general     (I have reviewed the patient's history with the patient and the chart, including all pertinent laboratory results and imaging. I have explained the risks of anesthesia including but not limited to dental damage, sore throat, nausea, corneal abrasion, nerve injury, MI, stroke, and death.  )    Anesthetic plan, risks, benefits, and alternatives have been provided, discussed and informed consent has been obtained with: patient.    CODE STATUS:    Code Status (Patient has no pulse and is not breathing): CPR (Attempt to Resuscitate)  Medical Interventions (Patient has pulse or is breathing): Full Support

## 2024-07-07 NOTE — PLAN OF CARE
Problem: Adult Inpatient Plan of Care  Goal: Plan of Care Review  Outcome: Ongoing, Progressing     Problem: Adult Inpatient Plan of Care  Goal: Optimal Comfort and Wellbeing  Outcome: Ongoing, Progressing  Intervention: Provide Person-Centered Care  Recent Flowsheet Documentation  Taken 7/6/2024 2000 by Karen Elias RN  Trust Relationship/Rapport:   care explained   thoughts/feelings acknowledged     Problem: Pain Acute  Goal: Acceptable Pain Control and Functional Ability  Outcome: Ongoing, Progressing  Intervention: Optimize Psychosocial Wellbeing  Recent Flowsheet Documentation  Taken 7/6/2024 2000 by Karen Elias RN  Diversional Activities: television     Problem: Diarrhea  Goal: Fluid and Electrolyte Balance  Outcome: Ongoing, Progressing     Goal Outcome Evaluation:   Patient with continued diarrhea overnight, fluids initiated at beginning of shift, provider contacted with request for immodium, 1x dose given. Patient IV leaking, new IV was placed. Patient and wife verbalized understanding of plan of care, pt able to make all needs known.

## 2024-07-08 NOTE — PROGRESS NOTES
[facility] Progress Note      Patient Name: Slade Martinez  : 1950  MRN: [id number]  Primary Care Physician:  Triny Hannon MD  Date of admission: 2024    Subjective     Interval History:   Follow-up hyponatremia, acute on chronic.      The patient had small bowel resection, he has gangrenous enteritis involving approximately 20 inches of the ileum  He is feeling somewhat better, denies any chest pain or shortness of air, no nausea or vomiting, he has abdominal discomfort related to surgery.  No shortness of breath on room air as he lies in fetal position    Review of Systems:   As noted above    Objective     Vitals:   Temp:  [98.2 °F (36.8 °C)-102.6 °F (39.2 °C)] 98.5 °F (36.9 °C)  Heart Rate:  [] 93  Resp:  [16-24] 16  BP: (116-198)/(54-87) 141/67  Flow (L/min):  [1-4] 1    Intake/Output Summary (Last 24 hours) at 2024 1024  Last data filed at 2024 0614  Gross per 24 hour   Intake 130 ml   Output 1500 ml   Net -1370 ml       Physical Exam:    General Appearance: alert, awake, chronically ill, obese, no acute distress  Skin: warm and dry  HEENT: oral mucosa dry  Neck: No JVD  Lungs: Clear to auscultation, not labored on room air lying flat  Heart: RRR, no rub  Abdomen: Distended postoperatively, tender, did not hear any bowel sound  : no palpable bladder  Extremities: no edema.  Neuro: normal speech and mental status     Scheduled Meds:     amLODIPine, 5 mg, Oral, Q24H  aspirin, 325 mg, Oral, Once  atenolol, 50 mg, Oral, BID  atorvastatin, 40 mg, Oral, Nightly  cetirizine, 10 mg, Oral, Daily  meclizine, 12.5 mg, Oral, Daily  pantoprazole, 40 mg, Intravenous, Q AM  piperacillin-tazobactam, 3.375 g, Intravenous, Q8H  pregabalin, 150 mg, Oral, TID  sodium chloride, 10 mL, Intravenous, Q12H      IV Meds:   sodium chloride, 125 mL/hr, Last Rate: 125 mL/hr (24 0957)        Results Reviewed:   I have personally reviewed the results from the time of  this admission to 7/8/2024 10:24 EDT     Results from last 7 days   Lab Units 07/08/24  0522 07/07/24  0725 07/06/24  0518 07/03/24  0553 07/02/24  0432 07/01/24  1413   SODIUM mmol/L 131* 131* 130*   < > 127* 129*   POTASSIUM mmol/L 4.3 4.0 4.0   < > 4.6 4.9   CHLORIDE mmol/L 98 94* 92*   < > 95* 91*   CO2 mmol/L 22.3 25.0 27.0   < > 22.2 25.8   BUN mg/dL 13 17 18   < > 7* 11   CREATININE mg/dL 0.63* 0.72* 0.65*   < > 0.75* 0.75*   CALCIUM mg/dL 8.2* 9.0 9.4   < > 8.4* 9.6   BILIRUBIN mg/dL 0.9  --   --   --  0.7 0.6   ALK PHOS U/L 91  --   --   --  88 114   ALT (SGPT) U/L 20  --   --   --  16 22   AST (SGOT) U/L 19  --   --   --  22 16   GLUCOSE mg/dL 184* 145* 131*   < > 99 137*    < > = values in this interval not displayed.       Estimated Creatinine Clearance: 119 mL/min (A) (by C-G formula based on SCr of 0.63 mg/dL (L)).    Results from last 7 days   Lab Units 07/08/24  0522 07/07/24  0725 07/06/24  0518   MAGNESIUM mg/dL 2.3 2.0 1.8   PHOSPHORUS mg/dL 3.4 3.6 2.6       Results from last 7 days   Lab Units 07/07/24  0725 07/02/24  0432   URIC ACID mg/dL 4.4 2.4*       Results from last 7 days   Lab Units 07/08/24  0522 07/07/24  1420 07/06/24  0518 07/05/24  0514 07/04/24  0542   WBC 10*3/mm3 15.17* 17.29* 16.78* 18.87* 12.88*   HEMOGLOBIN g/dL 10.7* 12.6* 11.9* 12.9* 12.6*   PLATELETS 10*3/mm3 253 347 327 342 321       Results from last 7 days   Lab Units 07/01/24  1413   INR  1.05       Assessment / Plan     ASSESSMENT:  Acute on chronic hyponatremia.  Initial urine studies consistent with SIADH.  Low uric acid supportive.  Sodium today 131, creatinine 0.63  Gangrenous ileum status post bowel resection  Coronary artery disease, recent PCI  Leukocytosis, WBCs today 15.17  Chronically occluded SMA  Hypertension, controlled    PLAN:  Continue the same treatment including IV fluid  Surveillance labs      I reviewed the chart and other providers notes, reviewed labs.  I discussed the case with the patient and  his wife at the bedside  Copied text in this note has been reviewed and is accurate as of 07/08/24.       Thank you for involving us in the care of Slade Martinez.  Please feel free to call with any questions.    Mathew Peck MD  07/08/24  10:24 EDT    [facility]  [phone]    Please note that portions of this note were completed with a voice recognition program.  Copied portions of the note reviewed and accurate as of 7/4/2024.

## 2024-07-08 NOTE — OP NOTE
PREOPERATIVE DIAGNOSIS:  Ischemic enteritis    POSTOPERATIVE DIAGNOSIS (FINDINGS):  Gangrenous enteritis involving approximately 20 inches of ileum    PROCEDURE:  Open small bowel resection    SURGEON:  Mark Anthony Meredith MD    ASSISTANT:  Alejandro Conway PA-C was responsible for performing the following activities: suction, irrigation, suturing, closing, retraction, and placing dressing, and their skilled assistance was necessary for the success of this case.    ANESTHESIA:  General    EBL:  Minimal    SPECIMEN(S):  Section of ileum    DESCRIPTION:  In supine position under general anesthetic prepped and draped usual sterile manner.  Half percent Marcaine with epinephrine mixed with Exparel was infiltrated in the subcutaneous and ultimately in the preperitoneal plane.  Midline laparotomy incision was made and peritoneal cavity was entered.  Large wound protector was inserted.  Small bowel was inspected and gangrenous segment of ileum was identified measuring for a length of about 20 inches.  The remainder of the small bowel was viable.  No other gross abnormalities were noted.  The small bowel was divided proximal and distal to the site of gangrene with the RENUKA stapler.  The mesentery was divided with the Enseal device and the specimen was passed off.  End-to-end handsewn 2 layer anastomosis of outer interrupted 2-0 silk and running interlocking 3-0 chromic was performed.  Mesenteric defect was closed with interrupted 2-0 silk.  Good hemostasis was noted.  Bowel was returned to the abdomen appropriately oriented.  Fascia was closed with running 0 PDS.  Skin was closed with staples.  Sterile dressing applied.  Tolerated well.    Mark Anthony Meredith M.D.

## 2024-07-08 NOTE — PROGRESS NOTES
Gastroenterology   Inpatient Progress Note    Reason for Follow Up: Diarrhea    Subjective  Interval History:     POD #1 open small bowel resection with Dr. Meredith for gangrenous enteritis involving approximately 20 inches of ileum    Describes incisional abdominal pain following open small bowel resection.  Denies nausea, vomiting.  Confirms ability to pass flatulence.  No fever or chills.    Current Facility-Administered Medications:     acetaminophen (TYLENOL) tablet 650 mg, 650 mg, Oral, Q4H PRN, 650 mg at 07/07/24 1312 **OR** [DISCONTINUED] acetaminophen (TYLENOL) 160 MG/5ML oral solution 650 mg, 650 mg, Oral, Q4H PRN **OR** [DISCONTINUED] acetaminophen (TYLENOL) suppository 650 mg, 650 mg, Rectal, Q4H PRN, Geetha Gramajo MD    amLODIPine (NORVASC) tablet 5 mg, 5 mg, Oral, Q24H, Mark Anthony Meredith MD, 5 mg at 07/07/24 0908    aspirin tablet 325 mg, 325 mg, Oral, Once, Mark Anthony Meredith MD    atenolol (TENORMIN) tablet 50 mg, 50 mg, Oral, BID, Mark Anthony Meredith MD, 50 mg at 07/07/24 0908    atorvastatin (LIPITOR) tablet 40 mg, 40 mg, Oral, Nightly, Mark Anthony Meredith MD, 40 mg at 07/06/24 2014    cetirizine (zyrTEC) tablet 10 mg, 10 mg, Oral, Daily, Mark Anthony Meredith MD, 10 mg at 07/07/24 0908    HYDROmorphone (DILAUDID) injection 0.5 mg, 0.5 mg, Intravenous, Q2H PRN, Mark Anthony Meredith MD, 0.5 mg at 07/08/24 0417    HYDROmorphone (DILAUDID) injection 1 mg, 1 mg, Intravenous, Q2H PRN, Mark Anthony Meredith MD    labetalol (NORMODYNE,TRANDATE) injection 10 mg, 10 mg, Intravenous, Q2H PRN, Mark Anthoyn Meredith MD, 10 mg at 07/07/24 1705    meclizine (ANTIVERT) tablet 12.5 mg, 12.5 mg, Oral, Daily, Mark Anthony Meredith MD, 12.5 mg at 07/07/24 0908    nitroglycerin (NITROSTAT) SL tablet 0.4 mg, 0.4 mg, Sublingual, Q5 Min PRN, Mark Anthony Meredith MD    ondansetron (ZOFRAN) injection 4 mg, 4 mg, Intravenous, Q6H PRN, Mark Anthony Meredith MD, 4 mg at 07/07/24 2146    pantoprazole (PROTONIX) injection 40 mg, 40 mg, Intravenous, Q AM,  Mark Anthony Meredith MD, 40 mg at 07/08/24 0715    piperacillin-tazobactam (ZOSYN) 3.375 g IVPB in 100 mL NS MBP (CD), 3.375 g, Intravenous, Q8H, Mark Anthony Meredith MD, 3.375 g at 07/08/24 0047    pregabalin (LYRICA) capsule 150 mg, 150 mg, Oral, TID, Mark Anthony Meredith MD, 150 mg at 07/07/24 1628    sodium chloride 0.9 % flush 10 mL, 10 mL, Intravenous, PRN, Mark Anthony Meredith MD    [COMPLETED] Insert Peripheral IV, , , Once **AND** sodium chloride 0.9 % flush 10 mL, 10 mL, Intravenous, PRN, Mark Anthony Meredith MD    sodium chloride 0.9 % flush 10 mL, 10 mL, Intravenous, Q12H, Mark Anthony Meredith MD, 10 mL at 07/07/24 0909    sodium chloride 0.9 % flush 10 mL, 10 mL, Intravenous, PRN, Mark Anthony Meredith MD    sodium chloride 0.9 % infusion 40 mL, 40 mL, Intravenous, PRN, Mark Anthony Meredith MD    sodium chloride 0.9 % infusion, 125 mL/hr, Intravenous, Continuous, Mark Anthony Meredith MD, Last Rate: 125 mL/hr at 07/08/24 0048, 125 mL/hr at 07/08/24 0048  Review of Systems:               All systems were reviewed and negative except for:  Constitution:  positive for See HPI  Gastrointestinal: positive for  pain    Objective     Vital Signs  Temp:  [98.2 °F (36.8 °C)-102.6 °F (39.2 °C)] 98.5 °F (36.9 °C)  Heart Rate:  [] 90  Resp:  [16-24] 16  BP: (116-198)/(54-87) 132/55  Body mass index is 25.15 kg/m².                  General Appearance:  awake, alert, oriented, in no acute distress  Abdomen:  Soft,generalized incisional abdominal pain albeit improved since undergoing small bowel resection, normal bowel sounds; no bruits, organomegaly or masses.                Results Review:                I reviewed the patient's new clinical results.    Results from last 7 days   Lab Units 07/08/24  0522 07/07/24  1420 07/06/24  0518   WBC 10*3/mm3 15.17* 17.29* 16.78*   HEMOGLOBIN g/dL 10.7* 12.6* 11.9*   HEMATOCRIT % 32.8* 38.4 36.9*   PLATELETS 10*3/mm3 253 347 327     Results from last 7 days   Lab Units 07/08/24  0522 07/07/24  0725  07/06/24  0518 07/03/24  0553 07/02/24  0432 07/01/24  1413   SODIUM mmol/L 131* 131* 130*   < > 127* 129*   POTASSIUM mmol/L 4.3 4.0 4.0   < > 4.6 4.9   CHLORIDE mmol/L 98 94* 92*   < > 95* 91*   CO2 mmol/L 22.3 25.0 27.0   < > 22.2 25.8   BUN mg/dL 13 17 18   < > 7* 11   CREATININE mg/dL 0.63* 0.72* 0.65*   < > 0.75* 0.75*   CALCIUM mg/dL 8.2* 9.0 9.4   < > 8.4* 9.6   BILIRUBIN mg/dL 0.9  --   --   --  0.7 0.6   ALK PHOS U/L 91  --   --   --  88 114   ALT (SGPT) U/L 20  --   --   --  16 22   AST (SGOT) U/L 19  --   --   --  22 16   GLUCOSE mg/dL 184* 145* 131*   < > 99 137*    < > = values in this interval not displayed.     Results from last 7 days   Lab Units 07/01/24  1413   INR  1.05     Lab Results   Lab Value Date/Time    LIPASE 18 07/01/2024 1413    LIPASE 11 (L) 05/05/2024 1300    LIPASE 21 12/17/2022 1156       Radiology:  CT Abdomen Pelvis With Contrast   Final Result       1. Pneumatosis in a long segment of small bowel in the left lower   abdomen and pelvis with adjacent portal venous gas in the mesentery and   perienteric inflammatory changes. Findings consistent with ischemic   bowel. Recommend urgent surgical evaluation.   2. Gas and fluid distended small bowel loops with relatively collapsed   distal bowel, without a transition point identified. Could be ileus or   developing small bowel obstruction.   3. 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   4. Severe aortoiliac atherosclerotic disease with bilateral iliac and   femoral arterial high-grade stenoses and suspected left CFA/SFA   occlusion, unchanged.       Call Report: Dr. Kaiser was notified by telephone of the above   findings on July 7, 2024 at 4:11 p.m.       This report was finalized on 7/7/2024 4:11 PM by Dr. Denilson Warner M.D   on Workstation: [id number]          XR Chest 1 View   Final Result   No focal pulmonary consolidation. Borderline  heart size.   Follow-up as clinical indications persist.       This report was finalized on 7/1/2024 3:05 PM by Dr. Fredrick Love M.D on Workstation: [id number]          CT Abdomen Pelvis Without Contrast   Final Result       1. No acute findings identified in the abdomen or pelvis.   2. Colonic diverticulosis.   3. Bulky calcific atherosclerotic disease seen in the proximal SMA with   suspected high-grade stenosis or vessel occlusion, appears similar to   prior. Finding can be correlated for chronic mesenteric   ischemia/postprandial abdominal pain.       This report was finalized on 7/1/2024 2:56 PM by Dr. Denilson Warner M.D   on Workstation: [id number]              Assessment & Plan     Active Hospital Problems    Diagnosis     **Left lower quadrant abdominal pain     Diarrhea     Leukocytosis     Hyponatremia     Small bowel ischemia     Chest pain     Chronic mesenteric ischemia     S/P CABG (coronary artery bypass graft)     Essential hypertension        Assessment:  Left lower quadrant abdominal pain  Diarrhea  CAD-s/p stent placement on 5/2024, complicated by perforation of LAD; on Plavix and aspirin  High-grade stenosis versus vessel occlusion of SMA-chronic mesenteric ischemia/postprandial abdominal pain  All problems are new to me today    Plan:  7/7 CT with evidence of bowel ischemia now on postop day 1 from open small bowel resection for treatment of gangrenous ischemic enteritis involving approximately 20 inches of terminal ileum  CT also with evidence of uniformly decreased pancreas with atrophy would recommend outpatient EUS  Continue Creon 2 capsules with meals and 1 capsule with snacks may need to increase as tolerated  Continue supportive care per primary hospitalist team with analgesics and antiemetics.  Appreciate general surgery recommendations on diet advancement.    I discussed the patients findings and my recommendations with patient and family.          SHERRILL Quinteros  [facility]  [address] [place] [address]

## 2024-07-08 NOTE — PLAN OF CARE
Goal Outcome Evaluation:  Plan of Care Reviewed With: patient, spouse        Progress: improving        Outcome Evaluation: VSS. Patient recived  from PACU after emergent Small Bowel resection. Midline incision  intact with minimal drainage. NPO. Patient is confused but oriented to self and at time to situation/time. Follows commands. F/C remains in place. IVF currently infusing per order. Prn medicine given for pain providing therapeutic relief. Spouse at bedside. SR, ST.  Weaned to RA. All questions answered with verbalized understanding. Care on going.

## 2024-07-08 NOTE — ANESTHESIA POSTPROCEDURE EVALUATION
Patient: Slade Martinez    Procedure Summary       Date: 07/07/24 Room / Location: [facility] OR [address] [place] [address] MAIN OR    Anesthesia Start: 2111 Anesthesia Stop: 2236    Procedure: EXPLORATORY LAPAROTOMY, SMALL BOWEL RESECTION (Abdomen) Diagnosis:       Small bowel ischemia      (Small bowel ischemia [K55.9])    Surgeons: Mark Anthony Meredith MD Provider: Wilma Eddy MD    Anesthesia Type: general ASA Status: 3 - Emergent            Anesthesia Type: general    Vitals  No vitals data found for the desired time range.          Post Anesthesia Care and Evaluation    Patient location during evaluation: bedside  Patient participation: complete - patient participated  Level of consciousness: awake  Pain management: adequate    Airway patency: patent  Anesthetic complications: No anesthetic complications    Cardiovascular status: acceptable  Respiratory status: acceptable  Hydration status: acceptable

## 2024-07-08 NOTE — PLAN OF CARE
Goal Outcome Evaluation:  Plan of Care Reviewed With: patient        Progress: no change  Outcome Evaluation: vss. telemetry monitor, sr. confused. room air. q2 turn. deng catheter care. npo, ice chips, sips with meds. oral care. spouse at bedside. midline dressing c/d/i.

## 2024-07-08 NOTE — PROGRESS NOTES
General surgery    Postoperative day 1 status post expiratory laparotomy and small bowel resection    Overall doing well.  Having some hiccups.  Also passing gas.  States he is in some pain but his medicine is helping.    Afebrile, vital signs stable  On room air, no increased work of breathing  Abdomen soft, appropriately distended, dressing intact    Blood cell count 15 hemoglobin 10 platelets 253 creatinine 0.63    Plan:  Continue n.p.o. with sips of ice and water due to hiccups, will consider advancing tomorrow.  On IV fluids.  On Zosyn, white blood cell count 15.  Okay for DVT prophylaxis.    Geovanna Mcnamara MD

## 2024-07-08 NOTE — PROGRESS NOTES
Name: Slade Martinez ADMIT: 2024   : 1950  PCP: Triny Hannon MD    MRN: [id number] LOS: 6 days   AGE/SEX: 73 y.o. male  ROOM: [facility]     Subjective   Subjective   Doing OK and looks much better this morning.  He complains of being hungry       Objective   Objective   Vital Signs  Temp:  [98.2 °F (36.8 °C)-102.6 °F (39.2 °C)] 99.4 °F (37.4 °C)  Heart Rate:  [] 80  Resp:  [16-24] 16  BP: (116-198)/(54-87) 124/65  SpO2:  [95 %-100 %] 97 %  on  Flow (L/min):  [1-4] 1;   Device (Oxygen Therapy): room air  Body mass index is 25.15 kg/m².  Physical Exam  Vitals reviewed.   Constitutional:       General: He is not in acute distress.     Appearance: He is not ill-appearing.   Eyes:      General: No scleral icterus.  Cardiovascular:      Rate and Rhythm: Normal rate and regular rhythm.   Pulmonary:      Effort: Pulmonary effort is normal. No respiratory distress.      Comments: Decreased breath sounds  Abdominal:      General: There is no distension.      Palpations: Abdomen is soft.      Tenderness: There is abdominal tenderness (Generalized postop). There is no guarding.   Musculoskeletal:         General: No swelling.   Skin:     General: Skin is warm and dry.      Findings: No bruising.   Neurological:      Mental Status: He is alert and oriented to person, place, and time.   Psychiatric:         Mood and Affect: Mood normal.       Results Review     I reviewed the patient's new clinical results.  Results from last 7 days   Lab Units 24  0522 24  1420 24  0518 24  0514   WBC 10*3/mm3 15.17* 17.29* 16.78* 18.87*   HEMOGLOBIN g/dL 10.7* 12.6* 11.9* 12.9*   PLATELETS 10*3/mm3 253 347 327 342     Results from last 7 days   Lab Units 24  0522 24  0725 24  0518 24  0514   SODIUM mmol/L 131* 131* 130* 131*   POTASSIUM mmol/L 4.3 4.0 4.0 3.7   CHLORIDE mmol/L 98 94* 92* 93*   CO2 mmol/L 22.3 25.0 27.0 23.9   BUN mg/dL 13 17 18 20   CREATININE mg/dL  "0.63* 0.72* 0.65* 1.03   GLUCOSE mg/dL 184* 145* 131* 160*   EGFR mL/min/1.73 99.8 95.9 98.9 76.2     Results from last 7 days   Lab Units 07/08/24  0522 07/07/24  0725 07/06/24  0518 07/05/24  0514 07/03/24  0553 07/02/24  0432 07/01/24  1413   ALBUMIN g/dL 2.5* 3.0* 3.0* 3.1*   < > 2.9* 3.8   BILIRUBIN mg/dL 0.9  --   --   --   --  0.7 0.6   ALK PHOS U/L 91  --   --   --   --  88 114   AST (SGOT) U/L 19  --   --   --   --  22 16   ALT (SGPT) U/L 20  --   --   --   --  16 22    < > = values in this interval not displayed.     Results from last 7 days   Lab Units 07/08/24 0522 07/07/24 0725 07/06/24  0518 07/05/24  0514 07/04/24  0542 07/03/24  0553   CALCIUM mg/dL 8.2* 9.0 9.4 9.1   < > 8.9   ALBUMIN g/dL 2.5* 3.0* 3.0* 3.1*   < > 2.9*   MAGNESIUM mg/dL 2.3 2.0 1.8  --   --  2.0   PHOSPHORUS mg/dL 3.4 3.6 2.6 4.3   < > 3.4    < > = values in this interval not displayed.     Results from last 7 days   Lab Units 07/07/24  1420 07/02/24  1055 07/02/24  0432 07/02/24  0120 07/01/24  1912 07/01/24  1413   PROCALCITONIN ng/mL  --   --   --   --   --  0.12   LACTATE mmol/L 3.3* 2.3* 2.7* 2.5*   < > 2.5*    < > = values in this interval not displayed.     No results found for: \"HGBA1C\", \"POCGLU\"    CT Abdomen Pelvis With Contrast    Result Date: 7/7/2024   1. Pneumatosis in a long segment of small bowel in the left lower abdomen and pelvis with adjacent portal venous gas in the mesentery and perienteric inflammatory changes. Findings consistent with ischemic bowel. Recommend urgent surgical evaluation. 2. Gas and fluid distended small bowel loops with relatively collapsed distal bowel, without a transition point identified. Could be ileus or developing small bowel obstruction. 3. Bulky calcific atherosclerotic disease in the SMA with vessel occlusion followed by reconstitution, appears similar to prior. 4. Severe aortoiliac atherosclerotic disease with bilateral iliac and femoral arterial high-grade stenoses and " suspected left CFA/SFA occlusion, unchanged.  Call Report: Dr. Kaiser was notified by telephone of the above findings on July 7, 2024 at 4:11 p.m.  This report was finalized on 7/7/2024 4:11 PM by Dr. Denilson Warner M.D on Workstation: [id number]       I have personally reviewed all medications:  Scheduled Medications  amLODIPine, 5 mg, Oral, Q24H  aspirin, 325 mg, Oral, Once  atenolol, 50 mg, Oral, BID  atorvastatin, 40 mg, Oral, Nightly  cetirizine, 10 mg, Oral, Daily  meclizine, 12.5 mg, Oral, Daily  pantoprazole, 40 mg, Intravenous, Q AM  piperacillin-tazobactam, 3.375 g, Intravenous, Q8H  pregabalin, 150 mg, Oral, TID  sodium chloride, 10 mL, Intravenous, Q12H    Infusions  sodium chloride, 125 mL/hr, Last Rate: 125 mL/hr (07/08/24 0927)    Diet  NPO Diet NPO Type: Sips with Meds, Ice Chips    I have personally reviewed:  [x]  Laboratory   []  Microbiology   []  Radiology   []  EKG/Telemetry  []  Cardiology/Vascular   []  Pathology    []  Records       Assessment/Plan     Active Hospital Problems    Diagnosis  POA    **Left lower quadrant abdominal pain [R10.32]  Yes    Diarrhea [R19.7]  Unknown    Leukocytosis [D72.829]  Unknown    Hyponatremia [E87.1]  Unknown    Small bowel ischemia [K55.9]  Unknown    Chest pain [R07.9]  Yes    Chronic mesenteric ischemia [K55.1]  Yes    S/P CABG (coronary artery bypass graft) [Z95.1]  Not Applicable    Essential hypertension [I10]  Yes      Resolved Hospital Problems   No resolved problems to display.       73 y.o. male with complicated recent history including CAD/PCI, recent UTI with multiple antibiotic courses, chronic SMA occlusion admitted with Left lower quadrant abdominal pain.    Worsening LLQ abdominal pain/diarrhea, found to have long segment of pneumatosis and gangrenous bowel now status post laparotomy 7/7  -Looks great postop.  Continue routine management, pain and nausea are controlled.  Would defer any diet advancement to surgery.  Await return of  bowel function.  - Continue Zosyn for now.  No obvious perforation on imaging or operative report.  No longer meets septic criteria    Previously felt to have possible history of pancreatic insufficiency per GI notes.  Had started Creon which is now held as patient NPO.  Eventual outpatient EUS per GI notes    Hyponatremia remained stable.  Nephrology following    CAD with recent stent placement in May.  He will need Plavix to be resumed as soon as surgery feels appropriate.      SCDs  Disposition TBD pending progress.  Not for several days yet.  D/w family at bedside      Ayush Kaiser MD  [facility]  07/08/24  13:49 EDT

## 2024-07-09 NOTE — PLAN OF CARE
Problem: Adult Inpatient Plan of Care  Goal: Plan of Care Review  Outcome: Ongoing, Progressing  Goal: Patient-Specific Goal (Individualized)  Outcome: Ongoing, Progressing  Goal: Absence of Hospital-Acquired Illness or Injury  Outcome: Ongoing, Progressing  Intervention: Identify and Manage Fall Risk  Recent Flowsheet Documentation  Taken 7/9/2024 0405 by Brianne Jimenez RN  Safety Promotion/Fall Prevention:   assistive device/personal items within reach   activity supervised   clutter free environment maintained   fall prevention program maintained   nonskid shoes/slippers when out of bed   room organization consistent   safety round/check completed  Taken 7/9/2024 0220 by Brianne Jimenez, RN  Safety Promotion/Fall Prevention:   assistive device/personal items within reach   activity supervised   clutter free environment maintained   fall prevention program maintained   nonskid shoes/slippers when out of bed   room organization consistent   safety round/check completed  Taken 7/9/2024 0000 by Brianne Jimenez, RN  Safety Promotion/Fall Prevention:   activity supervised   assistive device/personal items within reach   clutter free environment maintained   nonskid shoes/slippers when out of bed   safety round/check completed   room organization consistent  Taken 7/8/2024 2205 by Brianne Jimenez, RN  Safety Promotion/Fall Prevention:   activity supervised   assistive device/personal items within reach   clutter free environment maintained   fall prevention program maintained   nonskid shoes/slippers when out of bed   room organization consistent   safety round/check completed  Taken 7/8/2024 2035 by Brianne Jimenez, RN  Safety Promotion/Fall Prevention:   activity supervised   assistive device/personal items within reach   clutter free environment maintained   fall prevention program maintained   nonskid shoes/slippers when out of bed   room organization consistent   safety round/check completed  Intervention:  Prevent Skin Injury  Recent Flowsheet Documentation  Taken 7/9/2024 0405 by Brianne Jimenez RN  Body Position: supine, legs elevated  Taken 7/9/2024 0220 by Brianne Jimenez RN  Body Position: supine, legs elevated  Skin Protection:   adhesive use limited   incontinence pads utilized   transparent dressing maintained   tubing/devices free from skin contact  Taken 7/9/2024 0000 by Brianne Jimenez RN  Body Position: supine, legs elevated  Taken 7/8/2024 2205 by Brianne Jimenez RN  Body Position: supine, legs elevated  Taken 7/8/2024 2035 by Brianne Jimenez RN  Body Position: supine, legs elevated  Skin Protection:   adhesive use limited   incontinence pads utilized   tubing/devices free from skin contact   transparent dressing maintained  Intervention: Prevent and Manage VTE (Venous Thromboembolism) Risk  Recent Flowsheet Documentation  Taken 7/9/2024 0405 by Brianne Jimenez RN  Activity Management: activity encouraged  Taken 7/9/2024 0220 by Brianne Jimenez RN  Activity Management: activity encouraged  VTE Prevention/Management:   bilateral   sequential compression devices on  Range of Motion: active ROM (range of motion) encouraged  Taken 7/9/2024 0000 by Brianne Jimenez RN  Activity Management: activity encouraged  Taken 7/8/2024 2205 by Brianne Jimenez RN  Activity Management: activity encouraged  Taken 7/8/2024 2035 by Brianne Jimenez RN  Activity Management: activity encouraged  VTE Prevention/Management:   bilateral   sequential compression devices on  Range of Motion: active ROM (range of motion) encouraged  Intervention: Prevent Infection  Recent Flowsheet Documentation  Taken 7/9/2024 0405 by Brianne Jimenez RN  Infection Prevention:   cohorting utilized   environmental surveillance performed   rest/sleep promoted   single patient room provided  Taken 7/9/2024 0220 by Brianne Jimenez RN  Infection Prevention:   cohorting utilized   environmental surveillance performed   rest/sleep promoted    single patient room provided  Taken 7/9/2024 0000 by Brianne Jimenez RN  Infection Prevention:   cohorting utilized   environmental surveillance performed   rest/sleep promoted   single patient room provided  Taken 7/8/2024 2205 by Brianne Jimenez RN  Infection Prevention:   cohorting utilized   environmental surveillance performed   hand hygiene promoted   rest/sleep promoted   single patient room provided  Taken 7/8/2024 2035 by Brianne Jimenez RN  Infection Prevention:   cohorting utilized   environmental surveillance performed   rest/sleep promoted   single patient room provided  Goal: Optimal Comfort and Wellbeing  Outcome: Ongoing, Progressing  Intervention: Monitor Pain and Promote Comfort  Recent Flowsheet Documentation  Taken 7/9/2024 0102 by Brianne Jimenez RN  Pain Management Interventions:   position adjusted   pillow support provided   see MAR   quiet environment facilitated  Taken 7/8/2024 2032 by Brianne Jimenez RN  Pain Management Interventions:   position adjusted   pillow support provided   see MAR   quiet environment facilitated  Intervention: Provide Person-Centered Care  Recent Flowsheet Documentation  Taken 7/9/2024 0220 by Brianne Jimenez RN  Trust Relationship/Rapport:   care explained   choices provided   emotional support provided   empathic listening provided   questions answered   reassurance provided   thoughts/feelings acknowledged  Taken 7/8/2024 2035 by Brianne Jimenez RN  Trust Relationship/Rapport:   care explained   choices provided   emotional support provided   empathic listening provided   questions answered   reassurance provided   thoughts/feelings acknowledged  Goal: Readiness for Transition of Care  Outcome: Ongoing, Progressing     Problem: Pain Acute  Goal: Acceptable Pain Control and Functional Ability  Outcome: Ongoing, Progressing  Intervention: Prevent or Manage Pain  Recent Flowsheet Documentation  Taken 7/9/2024 0405 by Brianne Jimenez RN  Medication  Review/Management: medications reviewed  Taken 7/9/2024 0220 by Brianne Jimenez RN  Sensory Stimulation Regulation:   care clustered   lighting decreased   quiet environment promoted  Sleep/Rest Enhancement:   consistent schedule promoted   awakenings minimized   regular sleep/rest pattern promoted   relaxation techniques promoted  Medication Review/Management: medications reviewed  Taken 7/9/2024 0000 by Brianne Jimenez RN  Medication Review/Management: medications reviewed  Taken 7/8/2024 2205 by Brianne Jimenez RN  Medication Review/Management: medications reviewed  Taken 7/8/2024 2035 by Brianne Jimenez RN  Sleep/Rest Enhancement:   awakenings minimized   consistent schedule promoted   relaxation techniques promoted   room darkened  Medication Review/Management: medications reviewed  Intervention: Develop Pain Management Plan  Recent Flowsheet Documentation  Taken 7/9/2024 0102 by Brianne Jimenez RN  Pain Management Interventions:   position adjusted   pillow support provided   see MAR   quiet environment facilitated  Taken 7/8/2024 2032 by Brianne Jimenez RN  Pain Management Interventions:   position adjusted   pillow support provided   see MAR   quiet environment facilitated  Intervention: Optimize Psychosocial Wellbeing  Recent Flowsheet Documentation  Taken 7/9/2024 0220 by Brianne Jimenez RN  Supportive Measures:   active listening utilized   counseling provided   decision-making supported  Diversional Activities: television  Taken 7/8/2024 2035 by Brianne Jimenez RN  Supportive Measures:   active listening utilized   counseling provided   decision-making supported  Diversional Activities: television     Problem: Hypertension Comorbidity  Goal: Blood Pressure in Desired Range  Outcome: Ongoing, Progressing  Intervention: Maintain Blood Pressure Management  Recent Flowsheet Documentation  Taken 7/9/2024 0405 by Brianne Jimenez RN  Medication Review/Management: medications reviewed  Taken 7/9/2024  0220 by Brianne Jimenez RN  Medication Review/Management: medications reviewed  Taken 7/9/2024 0000 by Brianne Jimenez RN  Medication Review/Management: medications reviewed  Taken 7/8/2024 2205 by Brianne Jimenez RN  Medication Review/Management: medications reviewed  Taken 7/8/2024 2035 by Brianne Jimenez RN  Medication Review/Management: medications reviewed     Problem: Skin Injury Risk Increased  Goal: Skin Health and Integrity  Outcome: Ongoing, Progressing  Intervention: Optimize Skin Protection  Recent Flowsheet Documentation  Taken 7/9/2024 0405 by Brianne Jimenez RN  Head of Bed (HOB) Positioning: HOB at 30 degrees  Taken 7/9/2024 0220 by Brianne Jimenez RN  Pressure Reduction Techniques:   frequent weight shift encouraged   weight shift assistance provided   heels elevated off bed   positioned off wounds  Head of Bed (HOB) Positioning: HOB at 30 degrees  Pressure Reduction Devices: alternating pressure pump (ADD)  Skin Protection:   adhesive use limited   incontinence pads utilized   transparent dressing maintained   tubing/devices free from skin contact  Taken 7/9/2024 0000 by Brianne Jimenez RN  Head of Bed (HOB) Positioning: HOB at 30 degrees  Taken 7/8/2024 2205 by Brianne Jimenez RN  Head of Bed (HOB) Positioning: HOB at 30 degrees  Taken 7/8/2024 2035 by Brianne Jimenez RN  Pressure Reduction Techniques:   frequent weight shift encouraged   weight shift assistance provided   heels elevated off bed   positioned off wounds  Head of Bed (HOB) Positioning: HOB at 20-30 degrees  Pressure Reduction Devices: alternating pressure pump (ADD)  Skin Protection:   adhesive use limited   incontinence pads utilized   tubing/devices free from skin contact   transparent dressing maintained     Problem: Adjustment to Illness (Sepsis/Septic Shock)  Goal: Optimal Coping  Outcome: Ongoing, Progressing  Intervention: Optimize Psychosocial Adjustment to Illness  Recent Flowsheet Documentation  Taken 7/9/2024  0220 by Brianne Jimenez RN  Supportive Measures:   active listening utilized   counseling provided   decision-making supported  Family/Support System Care:   self-care encouraged   support provided  Taken 7/8/2024 2035 by Brianne Jimenez RN  Supportive Measures:   active listening utilized   counseling provided   decision-making supported  Family/Support System Care:   involvement promoted   self-care encouraged   support provided     Problem: Bleeding (Sepsis/Septic Shock)  Goal: Absence of Bleeding  Outcome: Ongoing, Progressing  Intervention: Monitor and Manage Bleeding  Recent Flowsheet Documentation  Taken 7/9/2024 0405 by Brianne Jimenez RN  Bleeding Management: dressing monitored  Taken 7/9/2024 0220 by Brianne Jimenez RN  Bleeding Management: dressing monitored  Taken 7/9/2024 0000 by Brianne Jimenez RN  Bleeding Management: dressing monitored  Taken 7/8/2024 2205 by Brianne Jimenez RN  Bleeding Precautions: coagulation study results reviewed  Bleeding Management: dressing monitored  Taken 7/8/2024 2035 by Brianne Jimenez RN  Bleeding Precautions: blood pressure closely monitored  Bleeding Management: dressing monitored     Problem: Glycemic Control Impaired (Sepsis/Septic Shock)  Goal: Blood Glucose Level Within Desired Range  Outcome: Ongoing, Progressing     Problem: Infection Progression (Sepsis/Septic Shock)  Goal: Absence of Infection Signs and Symptoms  Outcome: Ongoing, Progressing  Intervention: Initiate Sepsis Management  Recent Flowsheet Documentation  Taken 7/9/2024 0405 by Brianne Jimenez RN  Infection Prevention:   cohorting utilized   environmental surveillance performed   rest/sleep promoted   single patient room provided  Taken 7/9/2024 0220 by Brianne Jimenez RN  Infection Prevention:   cohorting utilized   environmental surveillance performed   rest/sleep promoted   single patient room provided  Taken 7/9/2024 0000 by Brianne Jimenez RN  Infection Prevention:   cohorting  utilized   environmental surveillance performed   rest/sleep promoted   single patient room provided  Taken 7/8/2024 2205 by Brianne Jimenez RN  Infection Prevention:   cohorting utilized   environmental surveillance performed   hand hygiene promoted   rest/sleep promoted   single patient room provided  Taken 7/8/2024 2035 by Brianne Jimenez RN  Infection Prevention:   cohorting utilized   environmental surveillance performed   rest/sleep promoted   single patient room provided  Isolation Precautions: precautions maintained  Intervention: Promote Recovery  Recent Flowsheet Documentation  Taken 7/9/2024 0405 by Brianne Jimenez RN  Activity Management: activity encouraged  Taken 7/9/2024 0220 by Brianne Jimenez RN  Activity Management: activity encouraged  Sleep/Rest Enhancement:   consistent schedule promoted   awakenings minimized   regular sleep/rest pattern promoted   relaxation techniques promoted  Taken 7/9/2024 0000 by Brianne Jimenez RN  Activity Management: activity encouraged  Taken 7/8/2024 2205 by Brianne Jimenez RN  Activity Management: activity encouraged  Taken 7/8/2024 2035 by Brianne Jimenez RN  Activity Management: activity encouraged  Sleep/Rest Enhancement:   awakenings minimized   consistent schedule promoted   relaxation techniques promoted   room darkened     Problem: Nutrition Impaired (Sepsis/Septic Shock)  Goal: Optimal Nutrition Intake  Outcome: Ongoing, Progressing     Problem: Fall Injury Risk  Goal: Absence of Fall and Fall-Related Injury  Outcome: Ongoing, Progressing  Intervention: Identify and Manage Contributors  Recent Flowsheet Documentation  Taken 7/9/2024 0405 by Brianne Jimenez RN  Medication Review/Management: medications reviewed  Taken 7/9/2024 0220 by Brianne Jimenez RN  Medication Review/Management: medications reviewed  Taken 7/9/2024 0000 by Brianne Jimenez RN  Medication Review/Management: medications reviewed  Taken 7/8/2024 2205 by Brianne Jimenez  RN  Medication Review/Management: medications reviewed  Taken 7/8/2024 2035 by Brianne Jimenez RN  Medication Review/Management: medications reviewed  Intervention: Promote Injury-Free Environment  Recent Flowsheet Documentation  Taken 7/9/2024 0405 by Brianne Jimenez RN  Safety Promotion/Fall Prevention:   assistive device/personal items within reach   activity supervised   clutter free environment maintained   fall prevention program maintained   nonskid shoes/slippers when out of bed   room organization consistent   safety round/check completed  Taken 7/9/2024 0220 by Brianne Jimenez RN  Safety Promotion/Fall Prevention:   assistive device/personal items within reach   activity supervised   clutter free environment maintained   fall prevention program maintained   nonskid shoes/slippers when out of bed   room organization consistent   safety round/check completed  Taken 7/9/2024 0000 by Brianne Jimenez RN  Safety Promotion/Fall Prevention:   activity supervised   assistive device/personal items within reach   clutter free environment maintained   nonskid shoes/slippers when out of bed   safety round/check completed   room organization consistent  Taken 7/8/2024 2205 by Brianne Jimenez RN  Safety Promotion/Fall Prevention:   activity supervised   assistive device/personal items within reach   clutter free environment maintained   fall prevention program maintained   nonskid shoes/slippers when out of bed   room organization consistent   safety round/check completed  Taken 7/8/2024 2035 by Brianne Jimenez RN  Safety Promotion/Fall Prevention:   activity supervised   assistive device/personal items within reach   clutter free environment maintained   fall prevention program maintained   nonskid shoes/slippers when out of bed   room organization consistent   safety round/check completed     Problem: Diarrhea  Goal: Fluid and Electrolyte Balance  Outcome: Ongoing, Progressing  Intervention: Manage  Diarrhea  Recent Flowsheet Documentation  Taken 7/9/2024 0405 by Brianne Jimenez, RN  Medication Review/Management: medications reviewed  Taken 7/9/2024 0220 by Brianne Jimenez RN  Medication Review/Management: medications reviewed  Taken 7/9/2024 0000 by Brianne Jimenez, RN  Medication Review/Management: medications reviewed  Taken 7/8/2024 2205 by Brianne Jimenez, RN  Medication Review/Management: medications reviewed  Taken 7/8/2024 2035 by Brianne Jimenez, RN  Medication Review/Management: medications reviewed  Isolation Precautions: precautions maintained   Goal Outcome Evaluation:

## 2024-07-09 NOTE — PLAN OF CARE
Goal Outcome Evaluation:  Plan of Care Reviewed With: patient        Progress: no change  Outcome Evaluation: Monitor pain,labs,and vitals. VSS. IV fluids decreased to 50ml/hr. Pain controlled with PRN medications per orders. Chest xray ordered r/t crackles. F/C to BSD. Encouraged IS use and deep coughing. Will continue to monitor.

## 2024-07-09 NOTE — PROGRESS NOTES
Postoperative day 2 small bowel resection    Doing well but still has hiccups.    Has remained afebrile since surgery.  Vital signs stable.  CMP unremarkable  WBC 15.5  Hemoglobin 10.5    Abdomen soft, incision healing well    Expected postoperative ileus.  Will add short course of Reglan secondary to his hiccups.  Await bowel function.  Resume Plavix

## 2024-07-09 NOTE — PROGRESS NOTES
Name: Slade Martinez ADMIT: 2024   : 1950  PCP: Triny Hannon MD    MRN: [id number] LOS: 7 days   AGE/SEX: 73 y.o. male  ROOM: [facility]     Subjective   Subjective   Doing about the same.  Still having some hiccups.  Not passing gas yet       Objective   Objective   Vital Signs  Temp:  [98 °F (36.7 °C)-99.4 °F (37.4 °C)] 98 °F (36.7 °C)  Heart Rate:  [75-83] 83  Resp:  [16] 16  BP: (124-165)/(62-78) 165/78  SpO2:  [94 %-97 %] 94 %  on   ;   Device (Oxygen Therapy): room air  Body mass index is 27.61 kg/m².  Physical Exam  Vitals reviewed.   Constitutional:       General: He is not in acute distress.     Appearance: He is not ill-appearing.      Comments: A little drowsy but arousable   Eyes:      General: No scleral icterus.  Cardiovascular:      Rate and Rhythm: Normal rate and regular rhythm.   Pulmonary:      Effort: Pulmonary effort is normal. No respiratory distress.      Comments: Decreased breath sounds  Abdominal:      General: There is no distension.      Palpations: Abdomen is soft.      Tenderness: There is abdominal tenderness (Generalized postop). There is no guarding.   Musculoskeletal:         General: No swelling.   Skin:     General: Skin is warm and dry.      Findings: No bruising.   Psychiatric:         Mood and Affect: Mood normal.       Results Review     I reviewed the patient's new clinical results.  Results from last 7 days   Lab Units 24  0530 24  0522 24  1420 24  0518   WBC 10*3/mm3 15.50* 15.17* 17.29* 16.78*   HEMOGLOBIN g/dL 10.5* 10.7* 12.6* 11.9*   PLATELETS 10*3/mm3 244 253 347 327     Results from last 7 days   Lab Units 24  0530 24  0522 24  0725 24  0518   SODIUM mmol/L 136 131* 131* 130*   POTASSIUM mmol/L 4.0 4.3 4.0 4.0   CHLORIDE mmol/L 103 98 94* 92*   CO2 mmol/L 22.3 22.3 25.0 27.0   BUN mg/dL 23 13 17 18   CREATININE mg/dL 0.65* 0.63* 0.72* 0.65*   GLUCOSE mg/dL 131* 184* 145* 131*   EGFR mL/min/1.73  "98.9 99.8 95.9 98.9     Results from last 7 days   Lab Units 07/09/24  0530 07/08/24  0522 07/07/24  0725 07/06/24  0518   ALBUMIN g/dL 2.5* 2.5* 3.0* 3.0*   BILIRUBIN mg/dL 0.5 0.9  --   --    ALK PHOS U/L 97 91  --   --    AST (SGOT) U/L 25 19  --   --    ALT (SGPT) U/L 18 20  --   --      Results from last 7 days   Lab Units 07/09/24  0530 07/08/24  0522 07/07/24  0725 07/06/24  0518   CALCIUM mg/dL 8.7 8.2* 9.0 9.4   ALBUMIN g/dL 2.5* 2.5* 3.0* 3.0*   MAGNESIUM mg/dL 2.5* 2.3 2.0 1.8   PHOSPHORUS mg/dL 2.7 3.4 3.6 2.6     Results from last 7 days   Lab Units 07/07/24  1420   LACTATE mmol/L 3.3*     No results found for: \"HGBA1C\", \"POCGLU\"    CT Abdomen Pelvis With Contrast    Result Date: 7/7/2024   1. Pneumatosis in a long segment of small bowel in the left lower abdomen and pelvis with adjacent portal venous gas in the mesentery and perienteric inflammatory changes. Findings consistent with ischemic bowel. Recommend urgent surgical evaluation. 2. Gas and fluid distended small bowel loops with relatively collapsed distal bowel, without a transition point identified. Could be ileus or developing small bowel obstruction. 3. Bulky calcific atherosclerotic disease in the SMA with vessel occlusion followed by reconstitution, appears similar to prior. 4. Severe aortoiliac atherosclerotic disease with bilateral iliac and femoral arterial high-grade stenoses and suspected left CFA/SFA occlusion, unchanged.  Call Report: Dr. Kaiser was notified by telephone of the above findings on July 7, 2024 at 4:11 p.m.  This report was finalized on 7/7/2024 4:11 PM by Dr. Denilson Warner M.D on Workstation: [id number]       I have personally reviewed all medications:  Scheduled Medications  amLODIPine, 5 mg, Oral, Q24H  aspirin, 325 mg, Oral, Once  atenolol, 50 mg, Oral, BID  atorvastatin, 40 mg, Oral, Nightly  cetirizine, 10 mg, Oral, Daily  clopidogrel, 75 mg, Oral, Daily  meclizine, 12.5 mg, Oral, Daily  metoclopramide, " 10 mg, Intravenous, Q6H  pantoprazole, 40 mg, Intravenous, Q AM  piperacillin-tazobactam, 3.375 g, Intravenous, Q8H  pregabalin, 150 mg, Oral, TID  sodium chloride, 10 mL, Intravenous, Q12H    Infusions  sodium chloride, 100 mL/hr, Last Rate: 100 mL/hr (07/09/24 1035)    Diet  NPO Diet NPO Type: Sips with Meds, Ice Chips    I have personally reviewed:  [x]  Laboratory   []  Microbiology   []  Radiology   []  EKG/Telemetry  []  Cardiology/Vascular   []  Pathology    []  Records       Assessment/Plan     Active Hospital Problems    Diagnosis  POA    **Left lower quadrant abdominal pain [R10.32]  Yes    Diarrhea [R19.7]  Unknown    Leukocytosis [D72.829]  Unknown    Hyponatremia [E87.1]  Unknown    Small bowel ischemia [K55.9]  Unknown    Chest pain [R07.9]  Yes    Chronic mesenteric ischemia [K55.1]  Yes    S/P CABG (coronary artery bypass graft) [Z95.1]  Not Applicable    Essential hypertension [I10]  Yes      Resolved Hospital Problems   No resolved problems to display.       73 y.o. male with complicated recent history including CAD/PCI, recent UTI with multiple antibiotic courses, chronic SMA occlusion admitted with Left lower quadrant abdominal pain.    Worsening LLQ abdominal pain/diarrhea, found to have long segment of pneumatosis and gangrenous bowel now status post laparotomy 7/7  - Doing well postop.  Continue routine management, pain and nausea are controlled.  Would defer any diet advancement to surgery.  Await return of bowel function.  - Continue Zosyn for now.  No obvious perforation on imaging or operative report.  No longer meets septic criteria    Previously felt to have possible history of pancreatic insufficiency per GI notes.  Had started Creon which is now held as patient NPO.  Eventual outpatient EUS     Hyponatremia better.  Nephrology following    CAD with recent stent placement in May.  Plavix restarted, appreciate surgery addressing this.        SCDs  Disposition TBD pending progress.  Not  for several days yet.  D/w family at bedside again today      Ayush Kaiser MD  [facility]  07/09/24  11:03 EDT

## 2024-07-09 NOTE — CASE MANAGEMENT/SOCIAL WORK
Continued Stay Note  [facility]     Patient Name: Slade Martinez  MRN: [id number]  Today's Date: 7/9/2024    Admit Date: 7/1/2024    Plan: Home with family   Discharge Plan       Row Name 07/09/24 1648       Plan    Plan Home with family    Patient/Family in Agreement with Plan yes    Plan Comments Met with pt and wife in room. He confirmed his plan is to return home with his wife at discharge. He stated that he feels weaker today. We discussed HH but he wants to see how he feels tomorrow before making a decision. He denied any needs at this time. CCP following. UMA Peng RN                   Discharge Codes    No documentation.                 Expected Discharge Date and Time       Expected Discharge Date Expected Discharge Time    Jul 10, 2024               Matt Hubbard RN

## 2024-07-09 NOTE — SIGNIFICANT NOTE
Gastroenterology   Inpatient Significant Note    General surgery continues to follow patient and is currently managing postoperative ileus.  No further recommendations from GI standpoint . Once discharged from inpatient setting recommend close follow up with primary GI provider Kalyan Vu PA-C at [facility] to discuss timing of outpatient EUS evaluation to further assess CT evidence of pancreatic atrophy- per Dr. Angel.    GI will sign off for now.  As always, thank you for allowing us to participate in the care of your patient.  If we can be of further assistance please not hesitate to reach out to us.          SHERRILL Quinteros  [facility]  [address] [place] [address]

## 2024-07-09 NOTE — PROGRESS NOTES
[facility] Progress Note      Patient Name: Slade Martinez  : 1950  MRN: [id number]  Primary Care Physician:  Triny Hannon MD  Date of admission: 2024    Subjective     Interval History:   Follow-up hyponatremia, acute on chronic.      The patient is feeling better today, no chest pain or shortness of air, abdominal pain much improved, no nausea or vomiting, no dysuria or gross hematuria.    Review of Systems:   As noted above    Objective     Vitals:   Temp:  [98 °F (36.7 °C)-99.4 °F (37.4 °C)] 98 °F (36.7 °C)  Heart Rate:  [75-83] 83  Resp:  [16] 16  BP: (124-165)/(62-78) 165/78    Intake/Output Summary (Last 24 hours) at 2024 0955  Last data filed at 2024 0806  Gross per 24 hour   Intake 180 ml   Output 925 ml   Net -745 ml       Physical Exam:    General Appearance: alert, awake, chronically ill, obese, no acute distress  Skin: warm and dry  HEENT: oral mucosa dry  Neck: No JVD  Lungs: Clear to auscultation, not labored on room air lying flat  Heart: RRR, no rub  Abdomen: Distended postoperatively, tender, did not hear any bowel sound  : no palpable bladder  Extremities: no edema.  Neuro: normal speech and mental status     Scheduled Meds:     amLODIPine, 5 mg, Oral, Q24H  aspirin, 325 mg, Oral, Once  atenolol, 50 mg, Oral, BID  atorvastatin, 40 mg, Oral, Nightly  cetirizine, 10 mg, Oral, Daily  clopidogrel, 75 mg, Oral, Daily  meclizine, 12.5 mg, Oral, Daily  metoclopramide, 10 mg, Intravenous, Q6H  pantoprazole, 40 mg, Intravenous, Q AM  piperacillin-tazobactam, 3.375 g, Intravenous, Q8H  pregabalin, 150 mg, Oral, TID  sodium chloride, 10 mL, Intravenous, Q12H      IV Meds:   sodium chloride, 100 mL/hr, Last Rate: 125 mL/hr (24 0907)        Results Reviewed:   I have personally reviewed the results from the time of this admission to 2024 09:55 EDT     Results from last 7 days   Lab Units 24  0530 24  0522 24  0725    SODIUM mmol/L 136 131* 131*   POTASSIUM mmol/L 4.0 4.3 4.0   CHLORIDE mmol/L 103 98 94*   CO2 mmol/L 22.3 22.3 25.0   BUN mg/dL 23 13 17   CREATININE mg/dL 0.65* 0.63* 0.72*   CALCIUM mg/dL 8.7 8.2* 9.0   BILIRUBIN mg/dL 0.5 0.9  --    ALK PHOS U/L 97 91  --    ALT (SGPT) U/L 18 20  --    AST (SGOT) U/L 25 19  --    GLUCOSE mg/dL 131* 184* 145*       Estimated Creatinine Clearance: 126.6 mL/min (A) (by C-G formula based on SCr of 0.65 mg/dL (L)).    Results from last 7 days   Lab Units 07/09/24  0530 07/08/24  0522 07/07/24  0725   MAGNESIUM mg/dL 2.5* 2.3 2.0   PHOSPHORUS mg/dL 2.7 3.4 3.6       Results from last 7 days   Lab Units 07/09/24  0530 07/07/24  0725   URIC ACID mg/dL 2.7* 4.4       Results from last 7 days   Lab Units 07/09/24  0530 07/08/24  0522 07/07/24  1420 07/06/24  0518 07/05/24  0514   WBC 10*3/mm3 15.50* 15.17* 17.29* 16.78* 18.87*   HEMOGLOBIN g/dL 10.5* 10.7* 12.6* 11.9* 12.9*   PLATELETS 10*3/mm3 244 253 347 327 342               Assessment / Plan     ASSESSMENT:  Acute on chronic hyponatremia.  Initial urine studies consistent with SIADH.  Low uric acid supportive.  Sodium today 136 continue to improve with IV fluid that would be against SIADH, creatinine 0.65.  Gangrenous ileum status post bowel resection  Coronary artery disease, recent PCI  Leukocytosis, WBCs today 15.5  Chronically occluded SMA  Hypertension, controlled    PLAN:  Continue the same treatment for now with repositioning but discontinuation of IV fluid to the surgical service.  Surveillance labs      I reviewed the chart and other providers notes, reviewed labs.  I discussed the case with the patient and his wife at the bedside  Copied text in this note has been reviewed and is accurate as of 07/09/24.       Thank you for involving us in the care of Slade Anthony Michelle.  Please feel free to call with any questions.    Mathew Peck MD  07/09/24  09:55 EDT    [facility]  [phone]    Please  note that portions of this note were completed with a voice recognition program.  Copied portions of the note reviewed and accurate as of 7/4/2024.

## 2024-07-10 PROBLEM — K55.9 SMALL BOWEL ISCHEMIA: Status: RESOLVED | Noted: 2024-01-01 | Resolved: 2024-01-01

## 2024-07-10 PROBLEM — R19.7 DIARRHEA: Status: RESOLVED | Noted: 2024-01-01 | Resolved: 2024-01-01

## 2024-07-10 PROBLEM — I48.0 PAF (PAROXYSMAL ATRIAL FIBRILLATION): Status: ACTIVE | Noted: 2024-01-01

## 2024-07-10 PROBLEM — E87.1 HYPONATREMIA: Status: RESOLVED | Noted: 2024-01-01 | Resolved: 2024-01-01

## 2024-07-10 NOTE — PROGRESS NOTES
[facility] Progress Note      Patient Name: Slade Martinez  : 1950  MRN: [id number]  Primary Care Physician:  Triny Hannon MD  Date of admission: 2024    Subjective     Interval History:   Follow-up hyponatremia, acute on chronic.      The patient is feeling better today, no chest pain or shortness of air, abdominal pain much improved, no nausea or vomiting, no dysuria or gross hematuria.    Review of Systems:   As noted above    Objective     Vitals:   Temp:  [97.5 °F (36.4 °C)-98.4 °F (36.9 °C)] 98.4 °F (36.9 °C)  Heart Rate:  [] 83  Resp:  [16] 16  BP: ()/(48-86) 178/67  Flow (L/min):  [2] 2    Intake/Output Summary (Last 24 hours) at 7/10/2024 0853  Last data filed at 2024 2255  Gross per 24 hour   Intake 180 ml   Output 550 ml   Net -370 ml       Physical Exam:    General Appearance: alert, awake, chronically ill, obese, no acute distress  Skin: warm and dry  HEENT: oral mucosa dry  Neck: No JVD  Lungs: Clear to auscultation, not labored on room air lying flat  Heart: RRR, no rub  Abdomen: Distended postoperatively, tender, did not hear any bowel sound  : no palpable bladder  Extremities: no edema.  Neuro: normal speech and mental status     Scheduled Meds:     amLODIPine, 5 mg, Oral, Q24H  aspirin, 325 mg, Oral, Once  atenolol, 50 mg, Oral, BID  atorvastatin, 40 mg, Oral, Nightly  cetirizine, 10 mg, Oral, Daily  clopidogrel, 75 mg, Oral, Daily  meclizine, 12.5 mg, Oral, Daily  metoclopramide, 10 mg, Intravenous, Q6H  pantoprazole, 40 mg, Intravenous, Q AM  piperacillin-tazobactam, 3.375 g, Intravenous, Q8H  pregabalin, 150 mg, Oral, TID  sodium chloride, 10 mL, Intravenous, Q12H      IV Meds:   sodium chloride, 50 mL/hr, Last Rate: 50 mL/hr (24 4902)        Results Reviewed:   I have personally reviewed the results from the time of this admission to 7/10/2024 08:53 EDT     Results from last 7 days   Lab Units 07/10/24  0252  07/09/24  0530 07/08/24  0522   SODIUM mmol/L 139 136 131*   POTASSIUM mmol/L 3.7 4.0 4.3   CHLORIDE mmol/L 108* 103 98   CO2 mmol/L 20.0* 22.3 22.3   BUN mg/dL 26* 23 13   CREATININE mg/dL 0.65* 0.65* 0.63*   CALCIUM mg/dL 8.5* 8.7 8.2*   BILIRUBIN mg/dL  --  0.5 0.9   ALK PHOS U/L  --  97 91   ALT (SGPT) U/L  --  18 20   AST (SGOT) U/L  --  25 19   GLUCOSE mg/dL 113* 131* 184*       Estimated Creatinine Clearance: 123.7 mL/min (A) (by C-G formula based on SCr of 0.65 mg/dL (L)).    Results from last 7 days   Lab Units 07/10/24  0253 07/09/24  0530 07/08/24  0522   MAGNESIUM mg/dL 2.2 2.5* 2.3   PHOSPHORUS mg/dL 3.3 2.7 3.4       Results from last 7 days   Lab Units 07/10/24  0253 07/09/24  0530 07/07/24  0725   URIC ACID mg/dL 3.2* 2.7* 4.4       Results from last 7 days   Lab Units 07/10/24  0253 07/09/24  0530 07/08/24  0522 07/07/24  1420 07/06/24  0518   WBC 10*3/mm3 17.91* 15.50* 15.17* 17.29* 16.78*   HEMOGLOBIN g/dL 11.1* 10.5* 10.7* 12.6* 11.9*   PLATELETS 10*3/mm3 298 244 253 347 327               Assessment / Plan     ASSESSMENT:  Acute on chronic hyponatremia.  Initial urine studies consistent with SIADH.  Low uric acid supportive.  Sodium today 139, the creatinine is 0.65  Gangrenous ileum status post bowel resection  Coronary artery disease, recent PCI  Leukocytosis, WBCs today 17.9  Chronically occluded SMA  Hypertension, controlled    PLAN:  Nothing else to add to the current treatment  I will sign off and I will be happy to see again if needed      I reviewed the chart and other providers notes, reviewed labs.  I discussed the case with the patient and his wife at the bedside  Copied text in this note has been reviewed and is accurate as of 07/10/24.       Thank you for involving us in the care of Slade Martinez.  Please feel free to call with any questions.    Mathew Peck MD  07/10/24  08:53 EDT    [facility]  [phone]    Please note that portions of this  note were completed with a voice recognition program.  Copied portions of the note reviewed and accurate as of 7/4/2024.

## 2024-07-10 NOTE — PROGRESS NOTES
"    Name: Slade Martinez ADMIT: 2024   : 1950  PCP: Triny Hannon MD    MRN: [id number] LOS: 8 days   AGE/SEX: 73 y.o. male  ROOM: [facility]     Subjective   Subjective   A-fib/RVR/flutter overnight.  He has been nauseated and continues to clears his throat when I was in the room, spitting out large amounts of greenish bile versus sputum.  He feels like what he is spitting out is \"bubbling up\" from his stomach.       Objective   Objective   Vital Signs  Temp:  [97.5 °F (36.4 °C)-98.4 °F (36.9 °C)] 98.4 °F (36.9 °C)  Heart Rate:  [] 83  Resp:  [16] 16  BP: ()/(48-81) 178/67  SpO2:  [89 %-99 %] 97 %  on  Flow (L/min):  [2] 2;   Device (Oxygen Therapy): nasal cannula  Body mass index is 26.97 kg/m².  Physical Exam  Vitals reviewed.   Constitutional:       General: He is not in acute distress.     Appearance: He is not ill-appearing.      Comments: A little drowsy but arousable   Eyes:      General: No scleral icterus.  Cardiovascular:      Rate and Rhythm: Normal rate and regular rhythm.   Pulmonary:      Effort: Pulmonary effort is normal. No respiratory distress.      Breath sounds: Rhonchi present.      Comments: Decreased breath sounds  Abdominal:      General: There is no distension.      Palpations: Abdomen is soft.      Tenderness: There is abdominal tenderness (Generalized postop). There is no guarding.   Musculoskeletal:         General: No swelling.   Skin:     General: Skin is warm and dry.      Findings: No bruising.       Results Review     I reviewed the patient's new clinical results.  Results from last 7 days   Lab Units 07/10/24  0253 24  0530 24  0522 24  1420   WBC 10*3/mm3 17.91* 15.50* 15.17* 17.29*   HEMOGLOBIN g/dL 11.1* 10.5* 10.7* 12.6*   PLATELETS 10*3/mm3 298 244 253 347     Results from last 7 days   Lab Units 07/10/24  0253 24  0530 24  0522 24  0725   SODIUM mmol/L 139 136 131* 131*   POTASSIUM mmol/L 3.7 4.0 4.3 4.0 "   CHLORIDE mmol/L 108* 103 98 94*   CO2 mmol/L 20.0* 22.3 22.3 25.0   BUN mg/dL 26* 23 13 17   CREATININE mg/dL 0.65* 0.65* 0.63* 0.72*   GLUCOSE mg/dL 113* 131* 184* 145*   EGFR mL/min/1.73 98.9 98.9 99.8 95.9     Results from last 7 days   Lab Units 07/10/24  0253 07/09/24  0530 07/08/24  0522 07/07/24  0725   ALBUMIN g/dL 2.3* 2.5* 2.5* 3.0*   BILIRUBIN mg/dL  --  0.5 0.9  --    ALK PHOS U/L  --  97 91  --    AST (SGOT) U/L  --  25 19  --    ALT (SGPT) U/L  --  18 20  --      Results from last 7 days   Lab Units 07/10/24  0253 07/09/24  0530 07/08/24  0522 07/07/24  0725   CALCIUM mg/dL 8.5* 8.7 8.2* 9.0   ALBUMIN g/dL 2.3* 2.5* 2.5* 3.0*   MAGNESIUM mg/dL 2.2 2.5* 2.3 2.0   PHOSPHORUS mg/dL 3.3 2.7 3.4 3.6     Results from last 7 days   Lab Units 07/07/24  1420   LACTATE mmol/L 3.3*     Glucose   Date/Time Value Ref Range Status   07/10/2024 0042 122 70 - 130 mg/dL Final       XR Chest 1 View    Result Date: 7/10/2024  Intrathoracic findings are stable.  This report was finalized on 7/10/2024 1:17 AM by Dr. Shawnee Preston M.D on Workstation: [id number]      XR Chest 1 View    Result Date: 7/9/2024  New small left pleural effusion. New small retrocardiac left lower lobe opacity, atelectasis versus infiltrate. No pneumothorax. Stable normal size cardiac silhouette with postsurgical changes of CABG and cardiac loop recorder. No focal osseous abnormality.  This report was finalized on 7/9/2024 5:40 PM by Dr. Ayush Cota M.D on Workstation: [id number]       I have personally reviewed all medications:  Scheduled Medications  amLODIPine, 5 mg, Oral, Q24H  aspirin, 325 mg, Oral, Once  atenolol, 50 mg, Oral, BID  atorvastatin, 40 mg, Oral, Nightly  cetirizine, 10 mg, Oral, Daily  clopidogrel, 75 mg, Oral, Daily  furosemide, 40 mg, Intravenous, Once  meclizine, 12.5 mg, Oral, Daily  metoclopramide, 10 mg, Intravenous, Q6H  pantoprazole, 40 mg, Intravenous, Q AM  piperacillin-tazobactam, 3.375 g, Intravenous,  Q8H  pregabalin, 150 mg, Oral, TID  sodium chloride, 10 mL, Intravenous, Q12H    Infusions  amiodarone, 1 mg/min, Last Rate: 1 mg/min (07/10/24 1045)   Followed by  amiodarone, 0.5 mg/min  sodium chloride, 50 mL/hr, Last Rate: 50 mL/hr (07/09/24 2312)    Diet  Diet: Liquid; Clear Liquid; Fluid Consistency: Thin (IDDSI 0)    I have personally reviewed:  [x]  Laboratory   []  Microbiology   [x]  Radiology   []  EKG/Telemetry  []  Cardiology/Vascular   []  Pathology    []  Records       Assessment/Plan     Active Hospital Problems    Diagnosis  POA    **Left lower quadrant abdominal pain [R10.32]  Yes    PAF (paroxysmal atrial fibrillation) [I48.0]  No    Leukocytosis [D72.829]  Unknown    Chest pain [R07.9]  Yes    Chronic mesenteric ischemia [K55.1]  Yes    S/P CABG (coronary artery bypass graft) [Z95.1]  Not Applicable    Essential hypertension [I10]  Yes      Resolved Hospital Problems    Diagnosis Date Resolved POA    Diarrhea [R19.7] 07/10/2024 Unknown    Hyponatremia [E87.1] 07/10/2024 Unknown    Small bowel ischemia [K55.9] 07/10/2024 Unknown       73 y.o. male with complicated recent history including CAD/PCI, recent UTI with multiple antibiotic courses, chronic SMA occlusion admitted with Left lower quadrant abdominal pain.    LLQ abdominal pain/diarrhea, found to have long segment of pneumatosis and gangrenous bowel now status post laparotomy 7/7  - Doing ok postop other than lung exam changes as above.  What he is spitting out looks to me like stomach acid/reflux, probably related to his ileus.  Asked nurse to bring him some Zofran.  He is already on Reglan per surgery.  If he really starts to produce more could consider NG tube  - Continue Zosyn.  WBC up slightly  -Reviewed imaging.  Some vascular congestion, agree with Lasix and will stop any additional fluids although he is not really eating anything of note yet.  Monitor electrolytes closely  - Continue PPI, increase to twice daily    A-fib/RVR:  Appreciate cardiology assistance.  Starting amiodarone drip.  Currently NSR    Previously felt to have possible history of pancreatic insufficiency per GI notes.  Had started Creon which is now held as patient NPO.  Eventual outpatient EUS     Hyponatremia better.  Nephrology following    CAD with recent stent placement in May.  Plavix restarted      SCDs  Disposition TBD pending progress.  Not for several days yet.  D/w wife at bedside again today      Ayush Kaiser MD  [facility]  07/10/24  11:56 EDT

## 2024-07-10 NOTE — SIGNIFICANT NOTE
07/10/24 1412   OTHER   Discipline physical therapist   Rehab Time/Intention   Session Not Performed other (see comments)  (Per RN, PT to hold today due to unstable heart rate, PT will follow up tomorrow.)   Therapy Assessment/Plan (PT)   Criteria for Skilled Interventions Met (PT) yes   Recommendation   PT - Next Appointment 07/11/24

## 2024-07-10 NOTE — CONSULTS
Nutrition Services    Patient Name:  Slade Martinez  YOB: 1950  MRN: [id number]  Admit Date:  7/1/2024    Assessment Date:  07/10/24    Summary: FU NPO/clears x3 days. POD 3 small bowel resection    No nausea/vomiting. Surgery advancing diet- started on clears today. Noted pt spitting up large amounts of greenish bile. Pt is already on reglan. Will order boost breeze and continue to monitor for diet advancement.     Labs: Na 139, K 3.7, BUN 26H, Cr 0.65L, , Mg 2.2, PO4 3.3   Meds: amiodarone in D5W lipitor, reglan, IV protonix, zosyn    Plan/recs:  Boost breeze TID  ADAT    RD to follow  CLINICAL NUTRITION ASSESSMENT      Reason for Assessment NPO/Clear Liquid Diet Status x3 days     Diagnosis/Problem   Left lower quadrant abdominal pain   Medical/Surgical History Past Medical History:   Diagnosis Date    Anxiety     Atherosclerosis of native arteries of extremities with intermittent claudication, bilateral legs 03/24/2020    PVD    Bilateral carotid artery stenosis 03/24/2020    CAD (coronary artery disease)     unspecified    Carotid artery disease     Cholelithiasis     Constipation     COPD (chronic obstructive pulmonary disease)     Coronary atherosclerosis 11/04/2019    H/O CABG SVG to first diagonal branch and to the LAD as well as SVG to the lateral marginal branch of the circumflex complicated by right sided subcortical infarct with left sided hemiparesis    Degenerative disc disease, lumbar 11/24/2021    Dyslipidemia 11/04/2019    Essential hypertension 11/04/2019    GERD (gastroesophageal reflux disease)     Hyperlipidemia     Hypertension     Injury of back     fractured vertebra 2/26/23    Ischemic heart disease     Peripheral edema     PVD (peripheral vascular disease) with claudication     latanya legs    Stroke 2011    Unilateral osteoarthritis of hip 11/24/2021    Vertigo        Past Surgical History:   Procedure Laterality Date    ANGIOPLASTY      x2    CARDIAC CATHETERIZATION  "N/A 05/06/2024    Procedure: Left Heart Cath;  Surgeon: Gurwinder Hurd MD;  Location:  [facility];  Service: Cardiovascular;  Laterality: N/A;    CARDIAC CATHETERIZATION N/A 05/06/2024    Procedure: Coronary angiography;  Surgeon: Gurwinder Hurd MD;  Location:  [facility];  Service: Cardiovascular;  Laterality: N/A;    CARDIAC CATHETERIZATION N/A 05/06/2024    Procedure: Percutaneous Coronary Intervention;  Surgeon: Gurwinder Hurd MD;  Location:  [facility];  Service: Cardiovascular;  Laterality: N/A;    CARDIAC CATHETERIZATION N/A 05/06/2024    Procedure: Stent TC coronary;  Surgeon: Gurwinder Hurd MD;  Location:  [facility];  Service: Cardiovascular;  Laterality: N/A;    CARDIAC CATHETERIZATION  05/06/2024    Procedure: Saphenous Vein Graft;  Surgeon: Gurwinder Hurd MD;  [facility];  Service: Cardiovascular;;    CAROTID ENDARTERECTOMY  2000    Left    CAROTID ENDARTERECTOMY Left 06/26/2001    CHOLECYSTECTOMY      COLON RESECTION SMALL BOWEL N/A 7/7/2024    Procedure: EXPLORATORY LAPAROTOMY, SMALL BOWEL RESECTION;  Surgeon: Mark Anthony Meredith MD;  Location: [facility] MAIN OR;  Service: General;  Laterality: N/A;    COLONOSCOPY  2010    COLONOSCOPY  2012    CORONARY ARTERY BYPASS GRAFT  08/2011    CORONARY STENT PLACEMENT      X17 stents    CORONARY STENT PLACEMENT      x5    INTERVENTIONAL RADIOLOGY PROCEDURE N/A 05/06/2024    Procedure: Intravascular Ultrasound;  Surgeon: Gurwinder Hurd MD;  Location: [facility] CATH INVASIVE LOCATION;  Service: Cardiovascular;  Laterality: N/A;        Anthropometrics        Current Height  Current Weight  BMI kg/m2 Height: 180.3 cm (71\")  Weight: 87.7 kg (193 lb 5.5 oz) (07/10/24 0500)  Body mass index is 26.97 kg/m².   Adjusted BMI (if applicable)    BMI Category Overweight (25 - 29.9)   Ideal Body Weight (IBW) 172#   Usual Body Weight " (UBW) 199-200#   Weight Trend Loss   Weight History Wt Readings from Last 30 Encounters:   07/10/24 0500 87.7 kg (193 lb 5.5 oz)   07/09/24 0500 89.8 kg (197 lb 15.6 oz)   07/07/24 0650 81.8 kg (180 lb 5.4 oz)   07/06/24 0635 82.1 kg (181 lb)   07/05/24 0628 83.2 kg (183 lb 6.8 oz)   07/04/24 0640 83.6 kg (184 lb 4.9 oz)   07/03/24 1534 82.5 kg (181 lb 12.8 oz)   07/03/24 1116 81.6 kg (180 lb)   07/01/24 1354 81.6 kg (180 lb)   06/19/24 0323 86.2 kg (190 lb)   05/20/24 0843 86.2 kg (190 lb)   05/16/24 0951 86.2 kg (190 lb)   05/12/24 1535 86.6 kg (191 lb)   05/10/24 2124 87 kg (191 lb 14.4 oz)   05/10/24 1724 90.3 kg (199 lb 1.2 oz)   05/07/24 0934 90.3 kg (199 lb)   05/07/24 0706 90.3 kg (199 lb)   05/07/24 0500 87.4 kg (192 lb 10.9 oz)   05/05/24 1121 88.9 kg (196 lb)   05/05/24 0745 90.7 kg (199 lb 15.3 oz)   04/05/24 1234 90.7 kg (200 lb)   04/04/24 1147 90.7 kg (200 lb)   09/22/23 0419 92.7 kg (204 lb 5.9 oz)   09/21/23 1017 94.4 kg (208 lb 1.6 oz)   07/30/23 1000 90.7 kg (200 lb)   03/04/23 0044 98 kg (216 lb)   03/03/23 1945 98 kg (216 lb)   09/29/22 0827 101 kg (222 lb)   03/24/22 0832 106 kg (233 lb)   11/10/21 1129 103 kg (226 lb)   07/01/21 0810 106 kg (233 lb)   02/25/21 0933 102 kg (224 lb)   09/17/20 0818 102 kg (224 lb)   07/23/20 1417 102 kg (224 lb)   11/07/19 0841 100 kg (221 lb 8 oz)        Estimated Requirements         Weight used  81.6 kg    Calories  2,040 - 2448 kcal (25-30 kcal/kg)    Protein  81-98 g (1.0 - 1.2 gm/kg)    Fluid   (1 mL/kcal)     Labs       Pertinent Labs    Results from last 7 days   Lab Units 07/10/24  0253 07/09/24  0530 07/08/24  0522   SODIUM mmol/L 139 136 131*   POTASSIUM mmol/L 3.7 4.0 4.3   CHLORIDE mmol/L 108* 103 98   CO2 mmol/L 20.0* 22.3 22.3   BUN mg/dL 26* 23 13   CREATININE mg/dL 0.65* 0.65* 0.63*   CALCIUM mg/dL 8.5* 8.7 8.2*   BILIRUBIN mg/dL  --  0.5 0.9   ALK PHOS U/L  --  97 91   ALT (SGPT) U/L  --  18 20   AST (SGOT) U/L  --  25 19   GLUCOSE mg/dL 113*  131* 184*     Results from last 7 days   Lab Units 07/10/24  0253 07/09/24  0530 07/08/24  0522   MAGNESIUM mg/dL 2.2 2.5* 2.3   PHOSPHORUS mg/dL 3.3 2.7 3.4   HEMOGLOBIN g/dL 11.1* 10.5* 10.7*   HEMATOCRIT % 33.9* 31.6* 32.8*   WBC 10*3/mm3 17.91* 15.50* 15.17*   ALBUMIN g/dL 2.3* 2.5* 2.5*     Results from last 7 days   Lab Units 07/10/24  0253 07/09/24  0530 07/08/24  0522 07/07/24  1420 07/06/24  0518   PLATELETS 10*3/mm3 298 244 253 347 327     COVID19   Date Value Ref Range Status   06/19/2024 Not Detected Not Detected - Ref. Range Final     Lab Results   Component Value Date    HGBA1C 5.8 (H) 02/28/2023          Medications           Scheduled Medications amiodarone, 150 mg, Intravenous, Once  amLODIPine, 5 mg, Oral, Q24H  aspirin, 325 mg, Oral, Once  atenolol, 50 mg, Oral, BID  atorvastatin, 40 mg, Oral, Nightly  cetirizine, 10 mg, Oral, Daily  clopidogrel, 75 mg, Oral, Daily  meclizine, 12.5 mg, Oral, Daily  metoclopramide, 10 mg, Intravenous, Q6H  pantoprazole, 40 mg, Intravenous, BID AC  piperacillin-tazobactam, 3.375 g, Intravenous, Q8H  pregabalin, 150 mg, Oral, TID  sodium chloride, 10 mL, Intravenous, Q12H       Infusions amiodarone, 1 mg/min, Last Rate: 1 mg/min (07/10/24 1045)   Followed by  amiodarone, 0.5 mg/min       PRN Medications   acetaminophen **OR** [DISCONTINUED] acetaminophen **OR** [DISCONTINUED] acetaminophen    HYDROcodone-acetaminophen    HYDROmorphone    HYDROmorphone    ipratropium-albuterol    labetalol    nitroglycerin    ondansetron    sodium chloride    [COMPLETED] Insert Peripheral IV **AND** sodium chloride    sodium chloride    sodium chloride     Physical Findings          General Findings alert, oriented   Oral/Mouth Cavity WDL, dental appliance   Edema  not assessed   Gastrointestinal abdominal pain, diarrhea, hypoactive bowel sounds, last bowel movement: 7/7   Skin  skin intact   Tubes/Drains/Lines none   NFPE No clinical signs of muscle wasting or fat loss     Current  Nutrition Orders & Evaluation of Intake       Oral Nutrition     Food Allergies NKFA   Current PO Diet Diet: Liquid; Clear Liquid; Fluid Consistency: Thin (IDDSI 0)   Supplement n/a    PO Evaluation     % PO Intake Minimal     Factors Affecting Intake: diarrhea, weakness   --  PES STATEMENT / NUTRITION DIAGNOSIS      Nutrition Dx Problem  Problem: Inadequate Oral Intake  Etiology: Medical Diagnosis - left lower quadrant abdominal pain    Signs/Symptoms: Report of Minimal PO Intake and Unintended Weight Change     NUTRITION INTERVENTION / PLAN OF CARE      Intervention Goal(s) Reduce/improve symptoms, Meet estimated needs, Disease management/therapy, Establish PO intake, Increase intake, Accepts oral nutrition supplement, Maintain weight, No significant weight loss, and PO intake goal %: 75         RD Intervention/Action Await initiation/advancement of PO diet, Supplement provided, Encourage intake, Continue to monitor, and Recommend/order: boost breeze    --      Prescription/Orders:       PO Diet       Supplements Ensure compact TID      Enteral Nutrition       Parenteral Nutrition    New Prescription Ordered? Yes   --      Monitor/Evaluation Per protocol   Discharge Plan/Needs Pending clinical course   --    RD to follow per protocol.      Electronically signed by:  Georgia Cottrell RD  07/10/24 12:44 EDT

## 2024-07-10 NOTE — PROGRESS NOTES
Postoperative day 3 small bowel resection    No further hiccups.  No nausea or vomiting.    Went into a flutter but is back in sinus rhythm now.  Labs reviewed    Abdomen soft, incision healing well.    Will allow some clear liquids today  Needs to get out of bed, discussed with nursing staff  Sounds extremely wet, gave him 40 mg dose of Lasix IV  Continue Zosyn for now

## 2024-07-10 NOTE — PROGRESS NOTES
"Slade Martinez  1950 73 y.o.  [id number]      Patient Care Team:  Triny Hannon MD as PCP - General (Internal Medicine)  James Hubbard MD as Consulting Physician (Cardiology)    CC: history of coronary artery disease with a prior bypass and a recent complex PCI, normal LV function, had slow AVNRT.    Interval History: Events noted he had ischemic bowel and had to have an emergent laparotomy and then last night had either atrial flutter or A-fib with RVR    Objective   Vital Signs  Temp:  [97.5 °F (36.4 °C)-98.4 °F (36.9 °C)] 98.4 °F (36.9 °C)  Heart Rate:  [] 83  Resp:  [16] 16  BP: ()/(48-86) 178/67    Intake/Output Summary (Last 24 hours) at 7/10/2024 0959  Last data filed at 7/9/2024 2255  Gross per 24 hour   Intake 180 ml   Output 550 ml   Net -370 ml     Flowsheet Rows      Flowsheet Row First Filed Value   Admission Height 180.3 cm (71\") Documented at 07/01/2024 1354   Admission Weight 81.6 kg (180 lb) Documented at 07/01/2024 1354            Physical Exam:   General Appearance:    Alert,oriented, in no acute distress   Lungs:     Clear to auscultation,BS are equal    Heart:    Normal S1 and S2, RRR without murmur, gallop or rub   HEENT:    Sclerae are clear, no JVD or adenopathy   Abdomen:     Normal bowel sounds, soft nontender, nondistended, no HSM   Extremities:   Moves all extremities well, no edema, no cyanosis, no             Redness, no rash     Medication Review:      amLODIPine, 5 mg, Oral, Q24H  aspirin, 325 mg, Oral, Once  atenolol, 50 mg, Oral, BID  atorvastatin, 40 mg, Oral, Nightly  cetirizine, 10 mg, Oral, Daily  clopidogrel, 75 mg, Oral, Daily  meclizine, 12.5 mg, Oral, Daily  metoclopramide, 10 mg, Intravenous, Q6H  pantoprazole, 40 mg, Intravenous, Q AM  piperacillin-tazobactam, 3.375 g, Intravenous, Q8H  pregabalin, 150 mg, Oral, TID  sodium chloride, 10 mL, Intravenous, Q12H      sodium chloride, 50 mL/hr, Last Rate: 50 mL/hr (07/09/24 5674)          I reviewed " the patient's new clinical results.  I personally viewed and interpreted the patient's EKG/Telemetry data    Assessment/Plan  Active Hospital Problems    Diagnosis  POA    **Left lower quadrant abdominal pain [R10.32]  Yes    Diarrhea [R19.7]  Unknown    Leukocytosis [D72.829]  Unknown    Hyponatremia [E87.1]  Unknown    Small bowel ischemia [K55.9]  Unknown    Chest pain [R07.9]  Yes    Chronic mesenteric ischemia [K55.1]  Yes    S/P CABG (coronary artery bypass graft) [Z95.1]  Not Applicable    Essential hypertension [I10]  Yes      Resolved Hospital Problems   No resolved problems to display.     Well, obviously this has been a tough hospitalization for Mr. Martinez who now had to have a laparotomy.  He had postop atrial flutter with RVR I am going to start him on a little bit of amiodarone which he did not really like p.o. very well but we will give it to him IV and see if he can tolerate it here short-term while his risk for A-fib is high    Negro Locke MD  07/10/24  09:59 EDT

## 2024-07-10 NOTE — PLAN OF CARE
Problem: Adult Inpatient Plan of Care  Goal: Plan of Care Review  Outcome: Ongoing, Progressing  Goal: Patient-Specific Goal (Individualized)  Outcome: Ongoing, Progressing  Goal: Absence of Hospital-Acquired Illness or Injury  Outcome: Ongoing, Progressing  Intervention: Identify and Manage Fall Risk  Recent Flowsheet Documentation  Taken 7/10/2024 0600 by Brianne Jimenez RN  Safety Promotion/Fall Prevention:   activity supervised   assistive device/personal items within reach   clutter free environment maintained   fall prevention program maintained   nonskid shoes/slippers when out of bed   room organization consistent   safety round/check completed  Taken 7/10/2024 0423 by Brianne Jimenez, RN  Safety Promotion/Fall Prevention:   assistive device/personal items within reach   activity supervised   clutter free environment maintained   fall prevention program maintained   nonskid shoes/slippers when out of bed   room organization consistent   safety round/check completed  Taken 7/10/2024 0214 by Brianne Jimenez, RN  Safety Promotion/Fall Prevention:   activity supervised   assistive device/personal items within reach   clutter free environment maintained   fall prevention program maintained   nonskid shoes/slippers when out of bed   safety round/check completed   room organization consistent  Taken 7/10/2024 0000 by Brianne Jimenez, RN  Safety Promotion/Fall Prevention:   activity supervised   assistive device/personal items within reach   clutter free environment maintained   fall prevention program maintained   nonskid shoes/slippers when out of bed   safety round/check completed   room organization consistent  Taken 7/9/2024 2200 by Brianne Jimenez, RN  Safety Promotion/Fall Prevention:   assistive device/personal items within reach   activity supervised   clutter free environment maintained   fall prevention program maintained   nonskid shoes/slippers when out of bed   room organization consistent   safety  round/check completed  Taken 7/9/2024 2000 by Brianne Jimenez RN  Safety Promotion/Fall Prevention:   activity supervised   assistive device/personal items within reach   clutter free environment maintained   fall prevention program maintained   nonskid shoes/slippers when out of bed   safety round/check completed   room organization consistent  Intervention: Prevent Skin Injury  Recent Flowsheet Documentation  Taken 7/10/2024 0600 by Brianne Jimenez RN  Body Position: supine, legs elevated  Taken 7/10/2024 0423 by Brianne Jimenez RN  Body Position: supine, legs elevated  Taken 7/10/2024 0214 by Brianne Jimenez RN  Body Position: supine, legs elevated  Skin Protection:   adhesive use limited   incontinence pads utilized   tubing/devices free from skin contact   transparent dressing maintained  Taken 7/10/2024 0000 by Brianne Jimenez RN  Body Position: supine, legs elevated  Taken 7/9/2024 2200 by Brianne Jimenez RN  Body Position:   right   side-lying  Taken 7/9/2024 2045 by Brianne Jimenez RN  Skin Protection:   adhesive use limited   incontinence pads utilized   transparent dressing maintained   tubing/devices free from skin contact  Taken 7/9/2024 2000 by Brianne Jimenez RN  Body Position: supine, legs elevated  Intervention: Prevent and Manage VTE (Venous Thromboembolism) Risk  Recent Flowsheet Documentation  Taken 7/10/2024 0600 by Brianne Jimenez RN  Activity Management: activity encouraged  Taken 7/10/2024 0423 by Brianne Jimenez RN  Activity Management: activity encouraged  Taken 7/10/2024 0214 by Brianne Jimenez RN  Activity Management: activity encouraged  Range of Motion: active ROM (range of motion) encouraged  Taken 7/10/2024 0000 by Brianne Jimenez RN  Activity Management: activity encouraged  Taken 7/9/2024 2200 by Brianne Jimenez RN  Activity Management: activity encouraged  Taken 7/9/2024 2045 by Brianne Jimenez RN  VTE Prevention/Management:   bilateral   sequential compression  devices on  Range of Motion: active ROM (range of motion) encouraged  Taken 7/9/2024 2000 by Brianne Jimenez RN  Activity Management: activity encouraged  Intervention: Prevent Infection  Recent Flowsheet Documentation  Taken 7/10/2024 0600 by Brianne Jimenez RN  Infection Prevention:   single patient room provided   rest/sleep promoted   cohorting utilized   environmental surveillance performed  Taken 7/10/2024 0423 by Brianne Jimenez RN  Infection Prevention:   cohorting utilized   environmental surveillance performed   rest/sleep promoted   single patient room provided  Taken 7/10/2024 0214 by Brianne Jimenez RN  Infection Prevention:   cohorting utilized   environmental surveillance performed   single patient room provided   rest/sleep promoted  Taken 7/10/2024 0000 by Brianne Jimenez RN  Infection Prevention:   cohorting utilized   environmental surveillance performed   rest/sleep promoted   single patient room provided  Taken 7/9/2024 2200 by Brianne Jimenez RN  Infection Prevention:   cohorting utilized   environmental surveillance performed   rest/sleep promoted   single patient room provided  Taken 7/9/2024 2000 by Brianne Jimenez RN  Infection Prevention:   cohorting utilized   environmental surveillance performed   rest/sleep promoted   single patient room provided  Goal: Optimal Comfort and Wellbeing  Outcome: Ongoing, Progressing  Intervention: Monitor Pain and Promote Comfort  Recent Flowsheet Documentation  Taken 7/9/2024 2047 by Brianne Jimenez RN  Pain Management Interventions:   see MAR   quiet environment facilitated   pillow support provided   position adjusted  Intervention: Provide Person-Centered Care  Recent Flowsheet Documentation  Taken 7/10/2024 0214 by Brianne Jimenez RN  Trust Relationship/Rapport:   care explained   choices provided   emotional support provided   empathic listening provided   questions answered   reassurance provided   thoughts/feelings acknowledged  Taken  7/9/2024 2045 by Brianne Jimenez RN  Trust Relationship/Rapport:   care explained   choices provided   emotional support provided   empathic listening provided   questions answered   reassurance provided   thoughts/feelings acknowledged  Goal: Readiness for Transition of Care  Outcome: Ongoing, Progressing     Problem: Pain Acute  Goal: Acceptable Pain Control and Functional Ability  Outcome: Ongoing, Progressing  Intervention: Prevent or Manage Pain  Recent Flowsheet Documentation  Taken 7/10/2024 0600 by Brianne Jimenez RN  Medication Review/Management: medications reviewed  Taken 7/10/2024 0423 by Brianne Jimenez RN  Medication Review/Management: medications reviewed  Taken 7/10/2024 0214 by Brianne Jimenez RN  Sensory Stimulation Regulation:   care clustered   quiet environment promoted  Sleep/Rest Enhancement:   relaxation techniques promoted   regular sleep/rest pattern promoted   family presence promoted  Medication Review/Management: medications reviewed  Taken 7/10/2024 0000 by Brianne Jimenez RN  Medication Review/Management: medications reviewed  Taken 7/9/2024 2200 by Brianne Jimenez RN  Medication Review/Management: medications reviewed  Taken 7/9/2024 2045 by Brianne Jimenez RN  Sensory Stimulation Regulation:   care clustered   lighting decreased   quiet environment promoted  Sleep/Rest Enhancement:   awakenings minimized   consistent schedule promoted   family presence promoted   relaxation techniques promoted   room darkened  Taken 7/9/2024 2000 by Brianne Jimenez RN  Medication Review/Management: medications reviewed  Intervention: Develop Pain Management Plan  Recent Flowsheet Documentation  Taken 7/9/2024 2047 by Brianne Jimenez RN  Pain Management Interventions:   see MAR   quiet environment facilitated   pillow support provided   position adjusted  Intervention: Optimize Psychosocial Wellbeing  Recent Flowsheet Documentation  Taken 7/10/2024 0214 by Brianne Jimenez RN  Supportive  Measures:   active listening utilized   counseling provided   decision-making supported  Diversional Activities: television  Taken 7/9/2024 2045 by Brianne Jimenez RN  Supportive Measures:   active listening utilized   counseling provided   decision-making supported  Diversional Activities: television     Problem: Hypertension Comorbidity  Goal: Blood Pressure in Desired Range  Outcome: Ongoing, Progressing  Intervention: Maintain Blood Pressure Management  Recent Flowsheet Documentation  Taken 7/10/2024 0600 by Brianne Jimenez RN  Medication Review/Management: medications reviewed  Taken 7/10/2024 0423 by Brianne Jimenez RN  Medication Review/Management: medications reviewed  Taken 7/10/2024 0214 by Brianne Jimenez RN  Medication Review/Management: medications reviewed  Taken 7/10/2024 0000 by Brianne Jimenez RN  Medication Review/Management: medications reviewed  Taken 7/9/2024 2200 by Brianne Jimenez RN  Medication Review/Management: medications reviewed  Taken 7/9/2024 2000 by Brianne Jimenez RN  Medication Review/Management: medications reviewed     Problem: Skin Injury Risk Increased  Goal: Skin Health and Integrity  Outcome: Ongoing, Progressing  Intervention: Optimize Skin Protection  Recent Flowsheet Documentation  Taken 7/10/2024 0600 by Brianne Jimenez RN  Head of Bed (hospitals) Positioning: HOB at 30 degrees  Taken 7/10/2024 0423 by Brianne Jimenez RN  Head of Bed (hospitals) Positioning: HOB at 30 degrees  Taken 7/10/2024 0214 by Brianne Jimenez RN  Pressure Reduction Techniques:   frequent weight shift encouraged   weight shift assistance provided   heels elevated off bed   positioned off wounds  Head of Bed (HOB) Positioning: hospitals elevated  Pressure Reduction Devices: alternating pressure pump (ADD)  Skin Protection:   adhesive use limited   incontinence pads utilized   tubing/devices free from skin contact   transparent dressing maintained  Taken 7/10/2024 0000 by Brianne Jimenez RN  Head of Bed (hospitals)  Positioning: HOB at 30 degrees  Taken 7/9/2024 2200 by Brianne Jimenez RN  Head of Bed ([facility]) Positioning: HOB at 20-30 degrees  Taken 7/9/2024 2045 by Brianne Jimenez RN  Pressure Reduction Techniques:   frequent weight shift encouraged   heels elevated off bed   weight shift assistance provided   positioned off wounds  Pressure Reduction Devices: alternating pressure pump (ADD)  Skin Protection:   adhesive use limited   incontinence pads utilized   transparent dressing maintained   tubing/devices free from skin contact  Taken 7/9/2024 2000 by Brianne Jimenez RN  Head of Bed ([facility]) Positioning: HOB at 20-30 degrees     Problem: Adjustment to Illness (Sepsis/Septic Shock)  Goal: Optimal Coping  Outcome: Ongoing, Progressing  Intervention: Optimize Psychosocial Adjustment to Illness  Recent Flowsheet Documentation  Taken 7/10/2024 0214 by Brianne Jimenez RN  Supportive Measures:   active listening utilized   counseling provided   decision-making supported  Family/Support System Care:   self-care encouraged   support provided  Taken 7/9/2024 2045 by Brianne Jimenez RN  Supportive Measures:   active listening utilized   counseling provided   decision-making supported  Family/Support System Care:   self-care encouraged   support provided     Problem: Bleeding (Sepsis/Septic Shock)  Goal: Absence of Bleeding  Outcome: Ongoing, Progressing     Problem: Glycemic Control Impaired (Sepsis/Septic Shock)  Goal: Blood Glucose Level Within Desired Range  Outcome: Ongoing, Progressing     Problem: Infection Progression (Sepsis/Septic Shock)  Goal: Absence of Infection Signs and Symptoms  Outcome: Ongoing, Progressing  Intervention: Initiate Sepsis Management  Recent Flowsheet Documentation  Taken 7/10/2024 0600 by Brianne Jimenez RN  Infection Prevention:   single patient room provided   rest/sleep promoted   cohorting utilized   environmental surveillance performed  Taken 7/10/2024 0423 by Brianne Jimenez RN  Infection  Prevention:   cohorting utilized   environmental surveillance performed   rest/sleep promoted   single patient room provided  Taken 7/10/2024 0214 by Brianne Jimenez RN  Infection Prevention:   cohorting utilized   environmental surveillance performed   single patient room provided   rest/sleep promoted  Taken 7/10/2024 0000 by Brianne Jimenez RN  Infection Prevention:   cohorting utilized   environmental surveillance performed   rest/sleep promoted   single patient room provided  Taken 7/9/2024 2200 by Brianne Jimenez RN  Infection Prevention:   cohorting utilized   environmental surveillance performed   rest/sleep promoted   single patient room provided  Taken 7/9/2024 2000 by Brianne Jimenez RN  Infection Prevention:   cohorting utilized   environmental surveillance performed   rest/sleep promoted   single patient room provided  Intervention: Promote Recovery  Recent Flowsheet Documentation  Taken 7/10/2024 0600 by Brianne Jimenez RN  Activity Management: activity encouraged  Taken 7/10/2024 0423 by Brianne Jimenez RN  Activity Management: activity encouraged  Taken 7/10/2024 0214 by Brianne Jimenez RN  Activity Management: activity encouraged  Sleep/Rest Enhancement:   relaxation techniques promoted   regular sleep/rest pattern promoted   family presence promoted  Taken 7/10/2024 0000 by Brianne Jimenez RN  Activity Management: activity encouraged  Taken 7/9/2024 2200 by Brianne Jimenez RN  Activity Management: activity encouraged  Taken 7/9/2024 2045 by Brianne Jimenez RN  Sleep/Rest Enhancement:   awakenings minimized   consistent schedule promoted   family presence promoted   relaxation techniques promoted   room darkened  Taken 7/9/2024 2000 by Brianne Jimenez RN  Activity Management: activity encouraged     Problem: Nutrition Impaired (Sepsis/Septic Shock)  Goal: Optimal Nutrition Intake  Outcome: Ongoing, Progressing     Problem: Fall Injury Risk  Goal: Absence of Fall and Fall-Related  Injury  Outcome: Ongoing, Progressing  Intervention: Identify and Manage Contributors  Recent Flowsheet Documentation  Taken 7/10/2024 0600 by Brianne Jimenez RN  Medication Review/Management: medications reviewed  Taken 7/10/2024 0423 by Brianne Jimenez RN  Medication Review/Management: medications reviewed  Taken 7/10/2024 0214 by Brianne Jimenez RN  Medication Review/Management: medications reviewed  Taken 7/10/2024 0000 by Brianne Jimenez RN  Medication Review/Management: medications reviewed  Taken 7/9/2024 2200 by Brianne Jimenez RN  Medication Review/Management: medications reviewed  Taken 7/9/2024 2000 by Brianne Jimenez RN  Medication Review/Management: medications reviewed  Intervention: Promote Injury-Free Environment  Recent Flowsheet Documentation  Taken 7/10/2024 0600 by Brianne Jimenez RN  Safety Promotion/Fall Prevention:   activity supervised   assistive device/personal items within reach   clutter free environment maintained   fall prevention program maintained   nonskid shoes/slippers when out of bed   room organization consistent   safety round/check completed  Taken 7/10/2024 0423 by Brianne Jimenez RN  Safety Promotion/Fall Prevention:   assistive device/personal items within reach   activity supervised   clutter free environment maintained   fall prevention program maintained   nonskid shoes/slippers when out of bed   room organization consistent   safety round/check completed  Taken 7/10/2024 0214 by Brianne Jimenez RN  Safety Promotion/Fall Prevention:   activity supervised   assistive device/personal items within reach   clutter free environment maintained   fall prevention program maintained   nonskid shoes/slippers when out of bed   safety round/check completed   room organization consistent  Taken 7/10/2024 0000 by Brianne Jimenez RN  Safety Promotion/Fall Prevention:   activity supervised   assistive device/personal items within reach   clutter free environment maintained    fall prevention program maintained   nonskid shoes/slippers when out of bed   safety round/check completed   room organization consistent  Taken 7/9/2024 2200 by Brianne Jimenez RN  Safety Promotion/Fall Prevention:   assistive device/personal items within reach   activity supervised   clutter free environment maintained   fall prevention program maintained   nonskid shoes/slippers when out of bed   room organization consistent   safety round/check completed  Taken 7/9/2024 2000 by Brianne Jimenez RN  Safety Promotion/Fall Prevention:   activity supervised   assistive device/personal items within reach   clutter free environment maintained   fall prevention program maintained   nonskid shoes/slippers when out of bed   safety round/check completed   room organization consistent     Problem: Diarrhea  Goal: Fluid and Electrolyte Balance  Outcome: Ongoing, Progressing  Intervention: Manage Diarrhea  Recent Flowsheet Documentation  Taken 7/10/2024 0600 by Brianne Jimenez RN  Medication Review/Management: medications reviewed  Taken 7/10/2024 0423 by Brianne Jimenez RN  Medication Review/Management: medications reviewed  Taken 7/10/2024 0214 by Brianne Jimenez RN  Medication Review/Management: medications reviewed  Taken 7/10/2024 0000 by Brianne Jimenez RN  Medication Review/Management: medications reviewed  Taken 7/9/2024 2200 by Brianne Jimenez RN  Medication Review/Management: medications reviewed  Taken 7/9/2024 2000 by Brianne Jimenez RN  Medication Review/Management: medications reviewed   Goal Outcome Evaluation:

## 2024-07-10 NOTE — PLAN OF CARE
Problem: Adult Inpatient Plan of Care  Goal: Plan of Care Review  Outcome: Ongoing, Not Progressing  Flowsheets  Taken 7/10/2024 1809 by Stevie Rojas RN  Progress: no change\  Outcome Evaluation: Taken 7/9/2024 1610 by Ca Elizalde RN  Plan of Care Reviewed With: patient  Goal: Patient-Specific Goal (Individualized)  Outcome: Ongoing, Not Progressing  Goal: Absence of Hospital-Acquired Illness or Injury  Outcome: Ongoing, Not Progressing  Intervention: Identify and Manage Fall Risk  Recent Flowsheet Documentation  Taken 7/10/2024 1800 by Stevie Rojas RN  Safety Promotion/Fall Prevention:   activity supervised   assistive device/personal items within reach   clutter free environment maintained   fall prevention program maintained   nonskid shoes/slippers when out of bed   gait belt   room organization consistent   safety round/check completed  Taken 7/10/2024 1600 by Stevie Rojas RN  Safety Promotion/Fall Prevention:   activity supervised   assistive device/personal items within reach   clutter free environment maintained   fall prevention program maintained   nonskid shoes/slippers when out of bed   gait belt   room organization consistent   safety round/check completed  Taken 7/10/2024 1519 by Stevie Rojas RN  Safety Promotion/Fall Prevention:   activity supervised   assistive device/personal items within reach   clutter free environment maintained   fall prevention program maintained   nonskid shoes/slippers when out of bed   gait belt   room organization consistent   safety round/check completed  Taken 7/10/2024 1400 by Stevie Rojas RN  Safety Promotion/Fall Prevention:   activity supervised   assistive device/personal items within reach   clutter free environment maintained   fall prevention program maintained   nonskid shoes/slippers when out of bed   gait belt   room organization consistent   safety round/check completed  Taken 7/10/2024 1200 by Stevie Rojas RN  Safety  Promotion/Fall Prevention:   activity supervised   assistive device/personal items within reach   clutter free environment maintained   fall prevention program maintained   nonskid shoes/slippers when out of bed   gait belt   room organization consistent   safety round/check completed  Taken 7/10/2024 1037 by Stevie Rojas RN  Safety Promotion/Fall Prevention:   activity supervised   assistive device/personal items within reach   clutter free environment maintained   fall prevention program maintained   nonskid shoes/slippers when out of bed   gait belt   room organization consistent   safety round/check completed  Taken 7/10/2024 0909 by Stevie Rojas RN  Safety Promotion/Fall Prevention:   activity supervised   assistive device/personal items within reach   clutter free environment maintained   fall prevention program maintained   nonskid shoes/slippers when out of bed   gait belt   room organization consistent   safety round/check completed  Intervention: Prevent Skin Injury  Recent Flowsheet Documentation  Taken 7/10/2024 1800 by Stevie Rojas RN  Body Position: position changed independently  Taken 7/10/2024 1600 by Stevie Rojas RN  Body Position: position changed independently  Taken 7/10/2024 1519 by Stevie Rojas RN  Body Position: position changed independently  Taken 7/10/2024 1037 by Stevie Rojas RN  Body Position: supine  Taken 7/10/2024 0909 by Stevie Rojas RN  Body Position: supine  Intervention: Prevent and Manage VTE (Venous Thromboembolism) Risk  Recent Flowsheet Documentation  Taken 7/10/2024 1800 by Stevie Rojas RN  Activity Management: activity minimized  Taken 7/10/2024 1600 by Stevie Rojas RN  Activity Management: activity minimized  Taken 7/10/2024 1519 by Stevie Rojas RN  Activity Management: activity minimized  Taken 7/10/2024 0909 by Stevie Rojas RN  Activity Management: activity encouraged  Goal: Optimal Comfort and Wellbeing  Outcome: Ongoing, Not  Progressing  Goal: Readiness for Transition of Care  Outcome: Ongoing, Not Progressing     Problem: Pain Acute  Goal: Acceptable Pain Control and Functional Ability  Outcome: Ongoing, Not Progressing     Problem: Hypertension Comorbidity  Goal: Blood Pressure in Desired Range  Outcome: Ongoing, Not Progressing     Problem: Skin Injury Risk Increased  Goal: Skin Health and Integrity  Outcome: Ongoing, Not Progressing  Intervention: Optimize Skin Protection  Recent Flowsheet Documentation  Taken 7/10/2024 1800 by Stevie Rojas RN  Head of Bed (HOB) Positioning: HOB elevated  Taken 7/10/2024 1600 by Stevie Rojas RN  Head of Bed (HOB) Positioning: HOB elevated  Taken 7/10/2024 1519 by Stevie Rojas RN  Pressure Reduction Techniques:   frequent weight shift encouraged   weight shift assistance provided   heels elevated off bed  Head of Bed (HOB) Positioning: HOB elevated  Taken 7/10/2024 1037 by Stevie Rojas RN  Head of Bed (HOB) Positioning: HOB elevated  Taken 7/10/2024 0909 by Stevie Rojas RN  Head of Bed (HOB) Positioning: HOB elevated     Problem: Adjustment to Illness (Sepsis/Septic Shock)  Goal: Optimal Coping  Outcome: Ongoing, Not Progressing     Problem: Bleeding (Sepsis/Septic Shock)  Goal: Absence of Bleeding  Outcome: Ongoing, Not Progressing     Problem: Glycemic Control Impaired (Sepsis/Septic Shock)  Goal: Blood Glucose Level Within Desired Range  Outcome: Ongoing, Not Progressing     Problem: Infection Progression (Sepsis/Septic Shock)  Goal: Absence of Infection Signs and Symptoms  Outcome: Ongoing, Not Progressing  Intervention: Promote Recovery  Recent Flowsheet Documentation  Taken 7/10/2024 1800 by Stevie Rojas RN  Activity Management: activity minimized  Taken 7/10/2024 1600 by Stevie Rojas RN  Activity Management: activity minimized  Taken 7/10/2024 1519 by Stevie Rojas RN  Activity Management: activity minimized  Taken 7/10/2024 0909 by Stevie Rojas RN  Activity  Management: activity encouraged     Problem: Nutrition Impaired (Sepsis/Septic Shock)  Goal: Optimal Nutrition Intake  Outcome: Ongoing, Not Progressing     Problem: Fall Injury Risk  Goal: Absence of Fall and Fall-Related Injury  Outcome: Ongoing, Not Progressing  Intervention: Promote Injury-Free Environment  Recent Flowsheet Documentation  Taken 7/10/2024 1800 by Stevie Rojas RN  Safety Promotion/Fall Prevention:   activity supervised   assistive device/personal items within reach   clutter free environment maintained   fall prevention program maintained   nonskid shoes/slippers when out of bed   gait belt   room organization consistent   safety round/check completed  Taken 7/10/2024 1600 by Stevie Rojas RN  Safety Promotion/Fall Prevention:   activity supervised   assistive device/personal items within reach   clutter free environment maintained   fall prevention program maintained   nonskid shoes/slippers when out of bed   gait belt   room organization consistent   safety round/check completed  Taken 7/10/2024 1519 by Stevie Rojas RN  Safety Promotion/Fall Prevention:   activity supervised   assistive device/personal items within reach   clutter free environment maintained   fall prevention program maintained   nonskid shoes/slippers when out of bed   gait belt   room organization consistent   safety round/check completed  Taken 7/10/2024 1400 by Stevie Rojas RN  Safety Promotion/Fall Prevention:   activity supervised   assistive device/personal items within reach   clutter free environment maintained   fall prevention program maintained   nonskid shoes/slippers when out of bed   gait belt   room organization consistent   safety round/check completed  Taken 7/10/2024 1200 by Stevie Rojas RN  Safety Promotion/Fall Prevention:   activity supervised   assistive device/personal items within reach   clutter free environment maintained   fall prevention program maintained   nonskid shoes/slippers  when out of bed   gait belt   room organization consistent   safety round/check completed  Taken 7/10/2024 1037 by Stevie Rojas, RN  Safety Promotion/Fall Prevention:   activity supervised   assistive device/personal items within reach   clutter free environment maintained   fall prevention program maintained   nonskid shoes/slippers when out of bed   gait belt   room organization consistent   safety round/check completed  Taken 7/10/2024 0909 by Stevie Rojas, RN  Safety Promotion/Fall Prevention:   activity supervised   assistive device/personal items within reach   clutter free environment maintained   fall prevention program maintained   nonskid shoes/slippers when out of bed   gait belt   room organization consistent   safety round/check completed     Problem: Diarrhea  Goal: Fluid and Electrolyte Balance  Outcome: Ongoing, Not Progressing   Goal Outcome Evaluation:           Progress: no change    Outcome Evaluation: patient went into rapid afib at lunch, heart rate continues to fluctuate, additional meds ordered, unable to get into chair this shift due to heart rate, IV lasix given with good result,

## 2024-07-10 NOTE — CODE DOCUMENTATION
Patient Name:  Slade Martinez  YOB: 1950  MRN:  [id number]  Admit Date:  7/1/2024    Visit Diagnoses:     ICD-10-CM ICD-9-CM   1. Left lower quadrant abdominal pain  R10.32 789.04   2. Chest pain, unspecified type  R07.9 786.50   3. Leukocytosis, unspecified type  D72.829 288.60   4. Lactic acidosis  E87.20 276.2   5. Hyponatremia  E87.1 276.1   6. Hypertensive urgency  I16.0 401.9   7. Small bowel ischemia  K55.9 557.9       Reason For Rapid:   SVT  RN Communicated With:  Rapid team, primary RN, patient, patients family at bedside, cards consulted-Dr Torre    Rapid Outcome:  Remain on unit  Communication From Rapid Team:   Give a dose of dig per cards-wait 2 hours and give cardizem bolus if rate still high-call cards back if this is unsuccessful, monitor BP as well, call back if BP drops again or with any more concerns, BP improved some after dig-see vitals, pt not complaining of any symptoms at this time    Most Recent Vital Signs  Temp:  [97.5 °F (36.4 °C)-98.3 °F (36.8 °C)] 97.5 °F (36.4 °C)  Heart Rate:  [] 150  Resp:  [16] 16  BP: ()/(48-86) 79/58  SpO2:  [89 %-99 %] 94 %  on  Flow (L/min):  [2] 2;   Device (Oxygen Therapy): nasal cannula    Labs:      Glucose   Date/Time Value Ref Range Status   07/10/2024 0042 122 70 - 130 mg/dL Final     Site   Date Value Ref Range Status   07/10/2024 Right Radial  Final     Shar's Test   Date Value Ref Range Status   07/10/2024 Positive  Final     pH, Arterial   Date Value Ref Range Status   07/10/2024 7.436 7.350 - 7.450 pH units Final     pCO2, Arterial   Date Value Ref Range Status   07/10/2024 30.8 (L) 35.0 - 45.0 mm Hg Final     pO2, Arterial   Date Value Ref Range Status   07/10/2024 74.0 (L) 80.0 - 100.0 mm Hg Final     HCO3, Arterial   Date Value Ref Range Status   07/10/2024 20.8 (L) 22.0 - 28.0 mmol/L Final     Base Excess, Arterial   Date Value Ref Range Status   07/10/2024 -2.2 (L) 0.0 - 2.0 mmol/L Final     Comment:     Serial  "Number: [id number]:  [id number]     O2 Saturation, Arterial   Date Value Ref Range Status   07/10/2024 95.4 92.0 - 98.5 % Final     CO2 Content   Date Value Ref Range Status   07/10/2024 21.7 (L) 23 - 27 mmol/L Final     Barometric Pressure for Blood Gas   Date Value Ref Range Status   07/10/2024 740.9000 mmHg Final     Modality   Date Value Ref Range Status   07/10/2024 Cannula  Final     Results from last 7 days   Lab Units 07/09/24  0530 07/08/24  0522 07/07/24  1420 07/06/24  0518   WBC 10*3/mm3 15.50* 15.17* 17.29* 16.78*   HEMOGLOBIN g/dL 10.5* 10.7* 12.6* 11.9*   PLATELETS 10*3/mm3 244 253 347 327     Results from last 7 days   Lab Units 07/09/24  0530 07/08/24  0522 07/07/24  0725   SODIUM mmol/L 136 131* 131*   POTASSIUM mmol/L 4.0 4.3 4.0   CHLORIDE mmol/L 103 98 94*   CO2 mmol/L 22.3 22.3 25.0   BUN mg/dL 23 13 17   CREATININE mg/dL 0.65* 0.63* 0.72*   GLUCOSE mg/dL 131* 184* 145*   ALBUMIN g/dL 2.5* 2.5* 3.0*   BILIRUBIN mg/dL 0.5 0.9  --    ALK PHOS U/L 97 91  --    AST (SGOT) U/L 25 19  --    ALT (SGPT) U/L 18 20  --    Estimated Creatinine Clearance: 126.6 mL/min (A) (by C-G formula based on SCr of 0.65 mg/dL (L)).  Results from last 7 days   Lab Units 07/09/24  0530 07/07/24  0725   URIC ACID mg/dL 2.7* 4.4     Results from last 7 days   Lab Units 07/07/24  1420   LACTATE mmol/L 3.3*     Results from last 7 days   Lab Units 07/10/24  0054   PH, ARTERIAL pH units 7.436   PO2 ART mm Hg 74.0*   PCO2, ARTERIAL mm Hg 30.8*   HCO3 ART mmol/L 20.8*   O2 SATURATION ART % 95.4   MODALITY  Cannula   No results found for: \"STREPPNEUAG\", \"LEGANTIGENUR\"    Results from last 7 days   Lab Units 07/03/24  1301   ADENOVIRUS  Not Detected       NIH Stroke Scale:                                                         Please refer to full rapid documentation on summary page under Index / Code Timeline  "

## 2024-07-11 PROBLEM — K56.7 ILEUS, POSTOPERATIVE: Status: ACTIVE | Noted: 2024-01-01

## 2024-07-11 PROBLEM — J96.01 ACUTE RESPIRATORY FAILURE WITH HYPOXIA: Status: ACTIVE | Noted: 2024-01-01

## 2024-07-11 PROBLEM — K91.89 ILEUS, POSTOPERATIVE: Status: ACTIVE | Noted: 2024-01-01

## 2024-07-11 NOTE — PLAN OF CARE
Goal Outcome Evaluation:              Outcome Evaluation: Order received d/t concern for aspiration. Pt now NPO with NG to LWS with strict NPO orders. Please place new orders if swallow evaluation warranted once PO resumed.

## 2024-07-11 NOTE — PROGRESS NOTES
LOS: 9 days   Patient Care Team:  Triny Hannon MD as PCP - General (Internal Medicine)  James Hubbard MD as Consulting Physician (Cardiology)      Chief Complaint: Follow up CAD, atrial flutter       Interval History: He had a rough night.  He developed increased oxygen demands and abdominal distention.  NG tube was placed with removal of 1200 cc of green bile, Strict NPO. He did convert to sinus rhythm.     Objective   Vital Signs  Temp:  [97.6 °F (36.4 °C)-98.2 °F (36.8 °C)] 97.6 °F (36.4 °C)  Heart Rate:  [] 76  Resp:  [16-18] 18  BP: (101-159)/() 104/83    Intake/Output Summary (Last 24 hours) at 7/11/2024 0934  Last data filed at 7/11/2024 0800  Gross per 24 hour   Intake 1298.1 ml   Output 1400 ml   Net -101.9 ml         Physical Exam:  Constitutional: Well appearing, well developed, no acute distress   HENT: Oropharynx clear and membrane moist  Eyes: Normal conjunctiva, no sclera icterus.  Neck: Supple, no carotid bruit bilaterally.  Cardiovascular: Regular rate and rhythm, No Murmur, No bilateral lower extremity edema.  Pulmonary: Normal respiratory effort, normal lung sounds, no wheezing.  Abdominal: Soft, nontender, no hepatosplenomegaly, liver is non-pulsatile.  Neurological: Alert and orient x 3.   Skin: Warm, dry, no ecchymosis, no rash.  Psych: Appropriate mood and affect. Normal judgment and insight.      Results Review:      Results from last 7 days   Lab Units 07/11/24  0439 07/10/24  0253 07/09/24  0530   SODIUM mmol/L 137 139 136   POTASSIUM mmol/L 3.7 3.7 4.0   CHLORIDE mmol/L 103 108* 103   CO2 mmol/L 20.3* 20.0* 22.3   BUN mg/dL 24* 26* 23   CREATININE mg/dL 0.86 0.65* 0.65*   GLUCOSE mg/dL 135* 113* 131*   CALCIUM mg/dL 8.4* 8.5* 8.7     Results from last 7 days   Lab Units 07/10/24  0253 07/10/24  0051   HSTROP T ng/L 20 23*     Results from last 7 days   Lab Units 07/11/24  0439 07/10/24  0253 07/09/24  0530   WBC 10*3/mm3 23.17* 17.91* 15.50*   HEMOGLOBIN g/dL 12.8*  11.1* 10.5*   HEMATOCRIT % 39.0 33.9* 31.6*   PLATELETS 10*3/mm3 334 298 244             Results from last 7 days   Lab Units 07/10/24  0253   MAGNESIUM mg/dL 2.2           I reviewed the patient's new clinical results.  I personally viewed and interpreted the patient's EKG/Telemetry data        Medication Review:   amLODIPine, 5 mg, Oral, Q24H  aspirin, 325 mg, Oral, Once  atenolol, 50 mg, Oral, BID  atorvastatin, 40 mg, Oral, Nightly  cetirizine, 10 mg, Oral, Daily  clopidogrel, 75 mg, Oral, Daily  meclizine, 12.5 mg, Oral, Daily  pantoprazole, 40 mg, Intravenous, BID AC  piperacillin-tazobactam, 3.375 g, Intravenous, Q8H  pregabalin, 150 mg, Oral, TID  sodium chloride, 10 mL, Intravenous, Q12H        amiodarone, 0.5 mg/min        Assessment & Plan       Left lower quadrant abdominal pain    Essential hypertension    S/P CABG (coronary artery bypass graft)    Chronic mesenteric ischemia    Chest pain    Leukocytosis    PAF (paroxysmal atrial fibrillation)      Abdominal pain. Secondary to ischemic bowel. S/p bowel resection.  Atrial flutter. Converted to sinus with IV amiodarone. Amiodarone was discontinued yesterday.  Was planning to start him on oral today.  However, he is strict NPO.  Resume IV amio at 0.5 mg/min.  Coronary artery disease. S/p PCI of the left main to mid LAD in May. Remains on clopidogrel along with high intensity statin therapy. Denies angina.    Hypertension. Stable.       Nelida Saha, SHERRILL  07/11/24  09:34 EDT    I have seen him in conjunction with Nelida.  He is in sinus rhythm we will going to try and keep him on the amiodarone.  Overall count of a concerning turn of events with his ileus would not be surprised if he aspirated a little bit I spoken with the hospitalist about him everyone seems to be doing everything we can to try and get him better

## 2024-07-11 NOTE — PLAN OF CARE
Goal Outcome Evaluation:  Plan of Care Reviewed With: patient, spouse           Outcome Evaluation: Pt is a 72 y/o M admitted to [facility] with c/o LLQ abdominal pain with CT revealing ischemia in the left lower abdomen of a long segment of small bowel. Pt now POD 4 s/p laparotomy with small bowel resection. Pt received in bed and agreeable to PT eval. Pt reports he lives with his spouse with 0 ALESSIA and is normally (I) with mobility at baseline. Pt presents to PT with expected post-op discomfort, generalized weakness, decreased endurance, and impaired functional mobility. Pt completed supine to sit, STS txf, and took a few steps to chair c RW requiring min/mod A. Pt demo's a slow unsteady gait with mobility limited due to poor activity tolerance and first OOB activity since surgery. Noted mild confusion and slow processing throughout session. PT recommends SNF at D/C to address functional deficits.      Anticipated Discharge Disposition (PT): skilled nursing facility

## 2024-07-11 NOTE — CONSULTS
Patient Care Team:  Triny Hannon MD as PCP - General (Internal Medicine)  James Hubbard MD as Consulting Physician (Cardiology)      Subjective     Patient is a 73 y.o. male.  Asked to see regarding acute hypoxic respiratory failure patient apparently on room air and oxygenating well in the mid 90s on admission and has progressed to 6 L nasal cannula O2 oxygen in the mid to high 90s now.  Patient was admitted back on 7 1/24 with complaints of diarrhea and chest discomfort has a history of coronary artery disease and recent PCI and UTI he has chronic SMA occlusion.  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  She says that the shortness of breath developed just after he started having really significant abdominal pain nausea and vomiting she was here he said she was really worried when he was throwing up that he was aspirating at the time that he was throwing up and and swallowing and choking some and was coughing up a little bit gobs of this thick green bilious material.  He is breathing better now much better in fact he is not coughing much now his belly was quite distended then as well it is much less distended now still not normal but it was apparently very tight early this morning when all of this transpired.    Review of Systems:  Chest pain no lower extremity pain no fevers or chills he was a little sweaty this morning when all of this was going on.  Feels better today it was very uncomfortable this morning.  He is not eating or drinking now his get his NG tube and he is  thirsty      History  Past Medical History:   Diagnosis Date    Anxiety     Atherosclerosis of native arteries of extremities with intermittent claudication, bilateral legs 03/24/2020    PVD    Bilateral carotid artery stenosis 03/24/2020    CAD (coronary artery disease)     unspecified    Carotid artery disease     Cholelithiasis     Constipation     COPD (chronic obstructive pulmonary disease)     Coronary atherosclerosis 11/04/2019    H/O CABG SVG to first diagonal branch and to the LAD as well as SVG to the lateral marginal branch of the circumflex complicated by right sided subcortical infarct with left sided hemiparesis    Degenerative disc disease, lumbar 11/24/2021    Dyslipidemia 11/04/2019    Essential hypertension 11/04/2019    GERD (gastroesophageal reflux disease)     Hyperlipidemia     Hypertension     Injury of back     fractured vertebra 2/26/23    Ischemic heart disease     Peripheral edema     PVD (peripheral vascular disease) with claudication     latanya legs    Stroke 2011    Unilateral osteoarthritis of hip 11/24/2021    Vertigo      Past Surgical History:   Procedure Laterality Date    ANGIOPLASTY      x2    CARDIAC CATHETERIZATION N/A 05/06/2024    Procedure: Left Heart Cath;  Surgeon: Gurwinder Hurd MD;  Location: [facility] CATH INVASIVE LOCATION;  Service: Cardiovascular;  Laterality: N/A;    CARDIAC CATHETERIZATION N/A 05/06/2024    Procedure: Coronary angiography;  Surgeon: Gurwinder Hurd MD;  Location:  [facility] CATH INVASIVE LOCATION;  Service: Cardiovascular;  Laterality: N/A;    CARDIAC CATHETERIZATION N/A 05/06/2024    Procedure: Percutaneous Coronary Intervention;  Surgeon: Gurwinder Hurd MD;  [facility];  Service: Cardiovascular;  Laterality: N/A;    CARDIAC CATHETERIZATION N/A 05/06/2024    Procedure: Stent TC coronary;  Surgeon: Gurwinder Hurd MD;  Location: [facility] CATH INVASIVE LOCATION;  Service: Cardiovascular;  Laterality:  N/A;    CARDIAC CATHETERIZATION  05/06/2024    Procedure: Saphenous Vein Graft;  Surgeon: Gurwinder Hurd MD;  Location:  [facility] CATH INVASIVE LOCATION;  Service: Cardiovascular;;    CAROTID ENDARTERECTOMY  2000    Left    CAROTID ENDARTERECTOMY Left 06/26/2001    CHOLECYSTECTOMY      COLON RESECTION SMALL BOWEL N/A 7/7/2024    Procedure: EXPLORATORY LAPAROTOMY, SMALL BOWEL RESECTION;  Surgeon: aMrk Anthony Meredith MD;  Location:  [facility] MAIN OR;  Service: General;  Laterality: N/A;    COLONOSCOPY  2010    COLONOSCOPY  2012    CORONARY ARTERY BYPASS GRAFT  08/2011    CORONARY STENT PLACEMENT      X17 stents    CORONARY STENT PLACEMENT      x5    INTERVENTIONAL RADIOLOGY PROCEDURE N/A 05/06/2024    Procedure: Intravascular Ultrasound;  Surgeon: Gurwinder Hurd MD;  Location:  [facility] CATH INVASIVE LOCATION;  Service: Cardiovascular;  Laterality: N/A;     Social History     Socioeconomic History    Marital status:    Tobacco Use    Smoking status: Former     Types: Cigarettes    Smokeless tobacco: Never    Tobacco comments:     Patient used to smoke 3 packs per day and quit 22 years ago.   Vaping Use    Vaping status: Never Used   Substance and Sexual Activity    Alcohol use: Not Currently     Comment: Patient is non drinker.    Drug use: Never     Comment: Drug Abuse: no drug abuse    Sexual activity: Defer     Family History   Problem Relation Age of Onset    COPD Mother     Heart attack Father     Heart failure Father     COPD Brother     Malig Hyperthermia Neg Hx          Allergies:  Hydrochlorothiazide, Pletal [cilostazol], and Tamsulosin    Medications:  Prior to Admission medications    Medication Sig Start Date End Date Taking? Authorizing Provider   amLODIPine (NORVASC) 5 MG tablet Take 1 tablet by mouth Daily. 5/14/24  Yes Adrianna Plata APRN   aspirin 81 MG tablet Take 1 tablet by mouth Daily.   Yes Provider, MD Rodrick   atenolol (TENORMIN) 50 MG tablet Take 1 tablet by mouth 2 (Two) Times  a Day.   Yes Rodrick Birmingham MD   atorvastatin (LIPITOR) 40 MG tablet Take 1 tablet by mouth Every Night.   Yes Rodrick Birmingham MD   clopidogrel (PLAVIX) 75 MG tablet Take 1 tablet by mouth Daily.   Yes Rodrick Birmingham MD   hydroCHLOROthiazide 25 MG tablet Take 1 tablet by mouth Daily As Needed (swelling).   Yes Rodrick Birmingham MD   Linzess 145 MCG capsule capsule Take 1 capsule by mouth Every Other Day. 11/10/23  Yes Rodrick Birmingham MD   lisinopril (PRINIVIL,ZESTRIL) 40 MG tablet Take 1 tablet by mouth 2 (Two) Times a Day.   Yes Rodrick Birmingham MD   loratadine (CLARITIN) 10 MG tablet Take 1 tablet by mouth Daily.   Yes Rodrick Birmingham MD   meclizine (ANTIVERT) 12.5 MG tablet Take 1 tablet by mouth Daily.   Yes Rodrick Birmingham MD   Multiple Vitamins-Minerals (MULTIVITAMIN ADULT EXTRA C PO) Take 1 tablet by mouth 2 (Two) Times a Day.   Yes Rodrick Birmingham MD   pantoprazole (PROTONIX) 40 MG EC tablet Take 1 tablet by mouth 2 (Two) Times a Day.   Yes Rodrick Birmingham MD   Potassium Chloride Arpita ER (KLOR-CON M20 PO) Take 20 mEq by mouth Take As Directed.   Yes Rodrick Birmingham MD   pregabalin (LYRICA) 100 MG capsule Take 150 mg by mouth 3 (Three) Times a Day.   Yes Provider, Historical, MD   saccharomyces boulardii (FLORASTOR) 250 MG capsule Take 1 capsule by mouth Every Night.   Yes Rodrick Birmingham MD   sucralfate (CARAFATE) 1 g tablet Take 1 tablet by mouth every night at bedtime.   Yes Rodrick Birmingham MD   nitroglycerin (NITROSTAT) 0.4 MG SL tablet Place 1 tablet under the tongue Every 5 (Five) Minutes As Needed for Chest Pain. Take no more than 3 doses in 15 minutes. 11/8/19   James Hubbard MD     amLODIPine, 5 mg, Oral, Q24H  aspirin, 325 mg, Oral, Once  atenolol, 50 mg, Oral, BID  atorvastatin, 40 mg, Oral, Nightly  cetirizine, 10 mg, Oral, Daily  clopidogrel, 75 mg, Oral, Daily  meclizine, 12.5 mg, Oral, Daily  pantoprazole, 40 mg,  "Intravenous, BID AC  piperacillin-tazobactam, 3.375 g, Intravenous, Q8H  pregabalin, 150 mg, Oral, TID  sodium chloride, 10 mL, Intravenous, Q12H      amiodarone, 0.5 mg/min, Last Rate: 0.5 mg/min (07/11/24 0949)        Objective     Vital Signs  Vital Sign Min/Max for last 24 hours  Temp  Min: 97.6 °F (36.4 °C)  Max: 98.2 °F (36.8 °C)   BP  Min: 101/71  Max: 159/71   Pulse  Min: 75  Max: 152   Resp  Min: 16  Max: 18   SpO2  Min: 93 %  Max: 97 %   Flow (L/min)  Min: 2  Max: 6   Weight  Min: 89.4 kg (197 lb 1.5 oz)  Max: 89.4 kg (197 lb 1.5 oz)       Intake/Output Summary (Last 24 hours) at 7/11/2024 1249  Last data filed at 7/11/2024 0800  Gross per 24 hour   Intake 1298.1 ml   Output 1400 ml   Net -101.9 ml     I/O this shift:  In: -   Out: 200 [Emesis/NG output:200]  Last Weight and Admission Weight        07/11/24  0500   Weight: 89.4 kg (197 lb 1.5 oz)     Flowsheet Rows      Flowsheet Row First Filed Value   Admission Height 180.3 cm (71\") Documented at 07/01/2024 1354   Admission Weight 81.6 kg (180 lb) Documented at 07/01/2024 1354            Body mass index is 27.49 kg/m².           Physical Exam:  General Appearance: Well older white male he looks older than his stated age he is sitting up in a chair he has a nasogastric tube and it is clamped currently he has been weaned down to 2 L nasal cannula O2 and his oxygen saturations were anywhere between 93 and 97% while I was in the room  Eyes: Conjunctiva clear and anicteric  ENT: He has a nasogastric tube and mucous membranes are quite dry he still has some bilious staining to his teeth and tongue.  Neck: Trachea midline no visible jugular venous distention  Lungs: Bibasilar rales/crackles no percussible dullness symmetric nonlabored chest expansion  Cardiac: Regular rate rhythm no murmur  Abdomen: Soft no palpable hepatosplenomegaly midline incision says abdomen is close to flat and is not really loose like it is all fat I think there is still some level of " distention  : Not examined  Musculoskeletal: Mild thoracic kyphosis no palpable cords in the lower extremities no calf tenderness or pain with dorsiflexion of the feet  Skin: Warm and dry no jaundice no petechiae  Neuro: He is alert and cooperative he is not real talkative he follows commands and moves all extremities  Extremities/P Vascular: No clubbing no cyanosis he has no edema palpable radial and dorsalis pedis pulses  MSE: Pretty flat affect but again he is cooperative and following commands      Labs:  Results from last 7 days   Lab Units 07/11/24  0439 07/10/24  0253 07/09/24  0530 07/08/24  0522 07/07/24  0725 07/06/24  0518 07/05/24  0514   GLUCOSE mg/dL 135* 113* 131* 184* 145* 131* 160*   SODIUM mmol/L 137 139 136 131* 131* 130* 131*   POTASSIUM mmol/L 3.7 3.7 4.0 4.3 4.0 4.0 3.7   MAGNESIUM mg/dL  --  2.2 2.5* 2.3 2.0 1.8  --    CO2 mmol/L 20.3* 20.0* 22.3 22.3 25.0 27.0 23.9   CHLORIDE mmol/L 103 108* 103 98 94* 92* 93*   ANION GAP mmol/L 13.7 11.0 10.7 10.7 12.0 11.0 14.1   CREATININE mg/dL 0.86 0.65* 0.65* 0.63* 0.72* 0.65* 1.03   BUN mg/dL 24* 26* 23 13 17 18 20   BUN / CREAT RATIO  27.9* 40.0* 35.4* 20.6 23.6 27.7* 19.4   CALCIUM mg/dL 8.4* 8.5* 8.7 8.2* 9.0 9.4 9.1   ALK PHOS U/L 115  --  97 91  --   --   --    TOTAL PROTEIN g/dL 6.0  --  6.0 5.7*  --   --   --    ALT (SGPT) U/L 42*  --  18 20  --   --   --    AST (SGOT) U/L 46*  --  25 19  --   --   --    BILIRUBIN mg/dL 0.8  --  0.5 0.9  --   --   --    ALBUMIN g/dL 2.2* 2.3* 2.5* 2.5* 3.0* 3.0* 3.1*   GLOBULIN gm/dL 3.8  --  3.5 3.2  --   --   --      Estimated Creatinine Clearance: 95.3 mL/min (by C-G formula based on SCr of 0.86 mg/dL).      Results from last 7 days   Lab Units 07/11/24  0439 07/10/24  0253 07/09/24  0530 07/08/24  0522 07/07/24  1420 07/06/24  0518 07/05/24  0514   WBC 10*3/mm3 23.17* 17.91* 15.50* 15.17* 17.29* 16.78* 18.87*   RBC 10*6/mm3 4.26 3.67* 3.48* 3.58* 4.18 3.99* 4.27   HEMOGLOBIN g/dL 12.8* 11.1* 10.5* 10.7*  12.6* 11.9* 12.9*   HEMATOCRIT % 39.0 33.9* 31.6* 32.8* 38.4 36.9* 39.5   MCV fL 91.5 92.4 90.8 91.6 91.9 92.5 92.5   MCH pg 30.0 30.2 30.2 29.9 30.1 29.8 30.2   MCHC g/dL 32.8 32.7 33.2 32.6 32.8 32.2 32.7   RDW % 14.8 14.5 14.7 14.5 14.3 14.7 14.9   RDW-SD fl 49.2 48.6 48.5 48.9 48.0 50.6 50.5   MPV fL 10.3 10.3 9.9 9.8 9.6 9.8 9.7   PLATELETS 10*3/mm3 334 298 244 253 347 327 342   NEUTROPHIL % %  --   --  83.2*  --  77.0*  --   --    LYMPHOCYTE % %  --   --  7.2*  --  8.0*  --   --    MONOCYTES % %  --   --  8.2  --  14.2*  --   --    EOSINOPHIL % %  --   --  0.0*  --  0.1*  --   --    BASOPHIL % %  --   --  0.1  --  0.2  --   --    IMM GRAN % %  --   --  1.3*  --  0.5  --   --    NEUTROS ABS 10*3/mm3  --   --  12.89*  --  13.30*  --   --    LYMPHS ABS 10*3/mm3  --   --  1.12  --  1.39  --   --    MONOS ABS 10*3/mm3  --   --  1.27*  --  2.46*  --   --    EOS ABS 10*3/mm3  --   --  0.00  --  0.02  --   --    BASOS ABS 10*3/mm3  --   --  0.02  --  0.03  --   --    IMMATURE GRANS (ABS) 10*3/mm3  --   --  0.20*  --  0.09*  --   --    NRBC /100 WBC  --   --  0.0  --  0.0  --   --      Results from last 7 days   Lab Units 07/11/24  0128   PH, ARTERIAL pH units 7.402   PO2 ART mm Hg 62.9*   PCO2, ARTERIAL mm Hg 38.4   HCO3 ART mmol/L 23.9     Results from last 7 days   Lab Units 07/10/24  0253 07/10/24  0051   HSTROP T ng/L 20 23*             Results from last 7 days   Lab Units 07/07/24  1420   LACTATE mmol/L 3.3*         Microbiology Results (last 10 days)       Procedure Component Value - Date/Time    MRSA Screen, PCR (Inpatient) - Swab, Nares [[id number]]  (Normal) Collected: 07/11/24 0948    Lab Status: Final result Specimen: Swab from Nares Updated: 07/11/24 1136     MRSA PCR No MRSA Detected    Narrative:      The negative predictive value of this diagnostic test is high and should only be used to consider de-escalating anti-MRSA therapy. A positive result may indicate colonization with MRSA and must be correlated  clinically.    Gastrointestinal Panel, PCR - Stool, Per Rectum [[id number]]  (Normal) Collected: 07/03/24 1301    Lab Status: Final result Specimen: Stool from Per Rectum Updated: 07/03/24 1511     Campylobacter Not Detected     Plesiomonas shigelloides Not Detected     Salmonella Not Detected     Vibrio Not Detected     Vibrio cholerae Not Detected     Yersinia enterocolitica Not Detected     Enteroaggregative E. coli (EAEC) Not Detected     Enteropathogenic E. coli (EPEC) Not Detected     Enterotoxigenic E. coli (ETEC) lt/st Not Detected     Shiga-like toxin-producing E. coli (STEC) stx1/stx2 Not Detected     Shigella/Enteroinvasive E. coli (EIEC) Not Detected     Cryptosporidium Not Detected     Cyclospora cayetanensis Not Detected     Entamoeba histolytica Not Detected     Giardia lamblia Not Detected     Adenovirus F40/41 Not Detected     Astrovirus Not Detected     Norovirus GI/GII Not Detected     Rotavirus A Not Detected     Sapovirus (I, II, IV or V) Not Detected    Narrative:      If Aeromonas, Staphylococcus aureus or Bacillus cereus are suspected, please order [id number]: Stool Culture, Aeromonas, S aureus, B Cereus.    Clostridioides difficile Toxin - Stool, Per Rectum [[id number]]  (Normal) Collected: 07/02/24 0841    Lab Status: Final result Specimen: Stool from Per Rectum Updated: 07/02/24 1142    Narrative:      The following orders were created for panel order Clostridioides difficile Toxin - Stool, Per Rectum.  Procedure                               Abnormality         Status                     ---------                               -----------         ------                     Clostridioides difficile...[[id number]]  Normal              Final result                 Please view results for these tests on the individual orders.    Clostridioides difficile Toxin, PCR - Stool, Per Rectum [[id number]]  (Normal) Collected: 07/02/24 0841    Lab Status: Final result Specimen: Stool from Per Rectum Updated:  07/02/24 1142     Toxigenic C. difficile by PCR Negative    Narrative:      The result indicates the absence of toxigenic C. difficile from stool specimen.     Blood Culture - Blood, Arm, Left [[id number]]  (Normal) Collected: 07/01/24 1506    Lab Status: Final result Specimen: Blood from Arm, Left Updated: 07/06/24 1516     Blood Culture No growth at 5 days    Narrative:      Less than seven (7) mL's of blood was collected.  Insufficient quantity may yield false negative results.    Blood Culture - Blood, Hand, Right [[id number]]  (Normal) Collected: 07/01/24 1506    Lab Status: Final result Specimen: Blood from Hand, Right Updated: 07/06/24 1516     Blood Culture No growth at 5 days              Diagnostics:  XR Abdomen KUB    Result Date: 7/11/2024  KUB  HISTORY: Nasogastric tube placement  COMPARISON: July 11, 2024  FINDINGS: Nasogastric tube is probably positioned within the body of the stomach. Gaseous distention of the stomach may be improved. Dilated loops of small bowel are noted within the mid abdomen.    This report was finalized on 7/11/2024 3:33 AM by Dr. Shawnee Preston M.D on Workstation: [id number]      XR Abdomen KUB    Result Date: 7/11/2024  KUB  HISTORY: Nasogastric tube placement  COMPARISON: None available.  FINDINGS: Nasogastric tube appears to extend down the right mainstem bronchus. Removal is recommended. No free air is seen. The patient is noted to have distention of the stomach, as well as multiple dilated loops of small bowel. Appearance does suggest small bowel obstruction.      Nasogastric tube extends down the right mainstem bronchus.  FINDINGS were called to the patient's nurse at 2:48 a.m.  This report was finalized on 7/11/2024 2:48 AM by Dr. Shawnee Preston M.D on Workstation: [id number]      XR Chest 1 View    Result Date: 7/11/2024  SINGLE VIEW OF THE CHEST  HISTORY: Increased shortness of air  COMPARISON: July 10, 2024  FINDINGS: There is cardiomegaly. There are bilateral  alveolar and interstitial infiltrates. This is worsened when compared to prior exam. Some of this may be related to edema, but pneumonia is not excluded. Patient status post median sternotomy with coronary artery bypass grafting. There is stable bibasilar consolidation. There may be a small left pleural effusion. Cardiac loop recorder is noted. The patient has marked gaseous distention of bowel within the upper abdomen.        1. Increasing bilateral alveolar and interstitial infiltrates. 2. Increasing gaseous distention of bowel within the upper abdomen, likely gastric distention.  This report was finalized on 7/11/2024 1:26 AM by Dr. Shawnee Preston M.D on Workstation: [id number]      XR Chest 1 View    Result Date: 7/10/2024  SINGLE VIEW OF THE CHEST  HISTORY: Hypotension  COMPARISON: July 9, 2024  FINDINGS: Patient is status post median sternotomy with coronary artery bypass grafting. There is cardiomegaly. There is vascular congestion. Patient has a cardiac loop recorder. There is some gaseous distention of bowel within the upper abdomen. Bibasilar consolidation is noted, not significantly changed. There is a small left pleural effusion. No pneumothorax is seen. There is some potential free air beneath the right hemidiaphragm, likely related to recent surgery.      Intrathoracic findings are stable.  This report was finalized on 7/10/2024 1:17 AM by Dr. Shawnee Preston M.D on Workstation: [id number]      XR Chest 1 View    Result Date: 7/9/2024  XR CHEST 1 VW-  INDICATION: Change in lung sounds  COMPARISON: Chest radiographs dating back to 2/11/2021      New small left pleural effusion. New small retrocardiac left lower lobe opacity, atelectasis versus infiltrate. No pneumothorax. Stable normal size cardiac silhouette with postsurgical changes of CABG and cardiac loop recorder. No focal osseous abnormality.  This report was finalized on 7/9/2024 5:40 PM by Dr. Ayush Cota M.D on Workstation:  [id number]      CT Abdomen Pelvis With Contrast    Result Date: 7/7/2024  CT ABDOMEN PELVIS W CONTRAST-  INDICATION: Worsening abdominal pain  COMPARISON: CT abdomen pelvis July 1, 2024  TECHNIQUE: Routine CT abdomen/pelvis with IV contrast. Coronal and sagittal reformats. Radiation dose reduction techniques were utilized, including automated exposure control and exposure modulation based on body size.  FINDINGS:  Lung bases: Subsegmental atelectasis or scarring at the bases. Coronary artery atherosclerotic calcifications. Trace pericardial fluid.  ABDOMEN: Normal liver. Cholecystectomy. No biliary ductal dilatation. Spleen is normal in size. Mild to moderate pancreatic atrophy. No pancreatic ductal dilatation or mass seen. Subcentimeter adrenal nodules are too small to characterize. No solid-appearing renal mass or hydronephrosis.  Pelvis: Prostate is normal in size. Normal bladder. No bladder calculus.  Bowel: Colonic diverticulosis. Left colon and rectum are predominantly collapsed. Moderate gas seen in the transverse colon. Ascending colon is under distended. Normal appendix.  Gas and fluid distended jejunal loops with a loop in the left upper quadrant, series 2, axial mage 58, measuring 3.5 cm. Distal bowel is relatively collapsed. No clear transition point. Long length segment of pneumatosis seen in the left lower abdomen and pelvis, with adjacent portal venous gas seen in the small bowel mesentery, series 2, axial mage 98. No free intraperitoneal air. Small amount of mesenteric edema in the left lower quadrant with small amount of trapped fluid in the mesentery, series 2, axial mage 99. Perienteric edema/inflammatory changes present in the small bowel containing pneumatosis.  Abdominal wall: Rectus diastases.  Retroperitoneum: No lymphadenopathy.  Vasculature: Bulky calcific atherosclerotic disease in the proximal SMA with suspected vessel occlusion, with reconstitution following the bulky atherosclerotic  disease. Severe aortoiliac atherosclerotic calcification. No abdominal aortic aneurysm. Bulky atherosclerotic calcifications in the right common iliac artery with suspected high-grade stenosis. Bulky atherosclerotic calcification in the left common iliac artery with moderate stenosis. Bulky atherosclerotic calcification in the right SFA with suspected high-grade stenosis. Bulky atherosclerotic calcification in the proximal left external iliac artery with high-grade stenosis. Bulky atherosclerotic calcification in the left common femoral artery and superficial femoral artery with suspected vessel occlusion.  Osseous structures: No destructive osseous lesions. Median sternotomy. Old superior plate compression deformity at L3 with 60% collapse.       1. Pneumatosis in a long segment of small bowel in the left lower abdomen and pelvis with adjacent portal venous gas in the mesentery and perienteric inflammatory changes. Findings consistent with ischemic bowel. Recommend urgent surgical evaluation. 2. Gas and fluid distended small bowel loops with relatively collapsed distal bowel, without a transition point identified. Could be ileus or developing small bowel obstruction. 3. Bulky calcific atherosclerotic disease in the SMA with vessel occlusion followed by reconstitution, appears similar to prior. 4. Severe aortoiliac atherosclerotic disease with bilateral iliac and femoral arterial high-grade stenoses and suspected left CFA/SFA occlusion, unchanged.  Call Report: Dr. Kaiser was notified by telephone of the above findings on July 7, 2024 at 4:11 p.m.  This report was finalized on 7/7/2024 4:11 PM by Dr. Denilson Warner M.D on Workstation: [id number]      Adult Transthoracic Echo Limited W/ Cont if Necessary Per Protocol    Result Date: 7/3/2024    Left ventricular systolic function is normal. Calculated left ventricular EF = 67.1%   Left ventricular wall thickness is consistent with mild concentric hypertrophy.    Left ventricular diastolic function is consistent with (grade I) impaired relaxation.     XR Chest 1 View    Result Date: 7/1/2024  XR CHEST 1 VW-  HISTORY: Male who is 73 years-old, chest pain  TECHNIQUE: Frontal view of the chest  COMPARISON: 6/19/2024  FINDINGS:. The heart size is borderline. Cardiac loop recorder is apparent. Sternotomy wires are present. Aorta is calcified. Pulmonary vasculature is unremarkable. No focal pulmonary consolidation, pleural effusion, or pneumothorax. Old granulomatous disease is present. No acute osseous process.      No focal pulmonary consolidation. Borderline heart size. Follow-up as clinical indications persist.  This report was finalized on 7/1/2024 3:05 PM by Dr. Fredrick Love M.D on Workstation: [id number]      CT Abdomen Pelvis Without Contrast    Result Date: 7/1/2024  CT ABDOMEN PELVIS WO CONTRAST-  INDICATION: Left lower quadrant pain and diarrhea  COMPARISON: CT abdomen pelvis September 21, 2023 and CTA chest May 5, 2024  TECHNIQUE: Routine CT abdomen/pelvis without IV contrast. Coronal and sagittal reformats. Radiation dose reduction techniques were utilized, including automated exposure control and exposure modulation based on body size.  FINDINGS:  Lung bases: Solid pulmonary nodule in the lingula and anterior basilar left lower lobe appears stable, measuring 5 mm. Coronary artery atherosclerotic calcification. Small amount of pericardial fluid or thickening.  ABDOMEN: Normal liver. Cholecystectomy. No biliary ductal dilatation. Spleen is normal in size. Mild pancreatic lipomatosis. No pancreatic ductal dilatation or mass seen. Small left adrenal nodule, series 2, axial mage 38, measures 1 cm, unchanged, suspect adenoma. No urolithiasis or hydronephrosis.  Pelvis: Prostate is normal in size. Mild bladder distention. No bladder calculus.  Bowel: No obstruction. Colonic diverticulosis. Normal appendix.  Abdominal wall: Rectus diastases. Tiny fat-containing umbilical  hernia.  Retroperitoneum: No lymphadenopathy.  Vasculature: No abdominal aortic aneurysm. Severe aortoiliac atherosclerotic calcification. Bulky calcific atherosclerotic disease in the proximal SMA with suspected high-grade stenoses versus occlusion. Bulky calcific atherosclerotic disease in the right common iliac artery with suspected high-grade stenosis. Bulky calcific atherosclerotic disease in the right superficial femoral artery with suspected high-grade stenoses. Bulky calcific atherosclerotic disease in the left common femoral artery and left superficial femoral artery with suspected high-grade stenosis or occlusion.  Osseous structures: Median sternotomy. Old superior endplate compression deformity at L3 with approximately 60% collapse.       1. No acute findings identified in the abdomen or pelvis. 2. Colonic diverticulosis. 3. Bulky calcific atherosclerotic disease seen in the proximal SMA with suspected high-grade stenosis or vessel occlusion, appears similar to prior. Finding can be correlated for chronic mesenteric ischemia/postprandial abdominal pain.  This report was finalized on 7/1/2024 2:56 PM by Dr. Denilson Warner M.D on Workstation: [id number]      XR Chest 1 View    Result Date: 6/19/2024  Patient: OTIS GUZMAN  Time Out: 05:56 Exam(s): XR CXR 1 VIEW EXAM:   XR Chest, 1 View CLINICAL HISTORY:      Fever TECHNIQUE:   Frontal view of the chest. COMPARISON:   Chest radiograph 5 10 2024 FINDINGS:   Lungs:  Bilateral airspace opacities may represent pulmonary edema and or atypical infection.   Pleural space:  Small bilateral pleural effusions.  No pneumothorax.   Heart:  Cardiomegaly.   Mediastinum:  Unremarkable.  Normal mediastinal contour.   Bones joints:  Degenerative changes of the spine are noted. No acute fracture. IMPRESSION:     1.  Bilateral airspace opacities may represent pulmonary edema and or atypical infection. 2.  Cardiomegaly. 3.  Small bilateral pleural effusions.      Electronically signed by Leila Cronin MD on 06-19-24 at 0556    Results for orders placed during the hospital encounter of 07/01/24    Adult Transthoracic Echo Limited W/ Cont if Necessary Per Protocol    Interpretation Summary    Left ventricular systolic function is normal. Calculated left ventricular EF = 67.1%    Left ventricular wall thickness is consistent with mild concentric hypertrophy.    Left ventricular diastolic function is consistent with (grade I) impaired relaxation.      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  I am concerned that he aspirated with his emesis yesterday they said he was coughing and spitting up bilious material this morning.  He is already on Zosyn which is probably pretty good coverage this is likely just a aspiration pneumonitis.  This is likely the cause of his hypoxia.  He does seem to be improving we will wean O2 and see how he tolerates  Coronary disease with recent PCI  Paroxysmal A-fib with rapid rate now in sinus rhythm  Essential hypertension      Rodrigo Barnes Jr, MD  07/11/24  12:49 EDT    Time:

## 2024-07-11 NOTE — PLAN OF CARE
Goal Outcome Evaluation:  Plan of Care Reviewed With: patient, spouse        Progress: improving  Outcome Evaluation: vss. a&ox4 - can be confused at times. 2L O2 this afternoon - tolerating well. ambulates with x1 assist. up in chair majority of this shift. IV amidarone con't this day per order. NPO. NGT intact - clamped this afternoon - to check residual this evening. denies pain this shift. educated on bp monitoring. plan to dc home when appropriate. con't plan of care.

## 2024-07-11 NOTE — NURSING NOTE
NGT was clamped at 1430. Residual >100. Placed back onto low wall suction per order. 800ml removed at this time.

## 2024-07-11 NOTE — PLAN OF CARE
Problem: Adult Inpatient Plan of Care  Goal: Plan of Care Review  Outcome: Ongoing, Progressing   Goal Outcome Evaluation:         VSS ex/ in and out of NSR/Afib. Up to 6L NC HF, lung sounds wet, coarse crackles.  AxO x2, disoriented to place & situation. New L NG tube @ 60 cm, confirmed w/ KUB, to cont. LWS, bile/green outpt. Strict NPO. Amio iv infusing. IV zosyn & vanc antibx. Brief & incontinence wraps on. PO mucinex x1.PRN dilaudid x4. Wife @ bedside.

## 2024-07-11 NOTE — THERAPY EVALUATION
Patient Name: Slade Martinez  : 1950    MRN: [id number]                              Today's Date: 2024       Admit Date: 2024    Visit Dx:     ICD-10-CM ICD-9-CM   1. Left lower quadrant abdominal pain  R10.32 789.04   2. Chest pain, unspecified type  R07.9 786.50   3. Leukocytosis, unspecified type  D72.829 288.60   4. Lactic acidosis  E87.20 276.2   5. Hyponatremia  E87.1 276.1   6. Hypertensive urgency  I16.0 401.9   7. Small bowel ischemia  K55.9 557.9     Patient Active Problem List   Diagnosis    Dyslipidemia    Essential hypertension    Coronary atherosclerosis    S/P CABG (coronary artery bypass graft)    Peripheral artery disease    History of left-sided carotid endarterectomy    Fever of unknown origin    Closed traumatic nondisplaced fracture of two ribs of right side with routine healing    Altered mental status    Abdominal pain    Chronic mesenteric ischemia    Chest pain    Shingles    AVNRT (AV vinayak re-entry tachycardia)    Carotid artery stenosis    Acute UTI    Acute urinary retention    Left lower quadrant abdominal pain    Leukocytosis    PAF (paroxysmal atrial fibrillation)     Past Medical History:   Diagnosis Date    Anxiety     Atherosclerosis of native arteries of extremities with intermittent claudication, bilateral legs 2020    PVD    Bilateral carotid artery stenosis 2020    CAD (coronary artery disease)     unspecified    Carotid artery disease     Cholelithiasis     Constipation     COPD (chronic obstructive pulmonary disease)     Coronary atherosclerosis 2019    H/O CABG SVG to first diagonal branch and to the LAD as well as SVG to the lateral marginal branch of the circumflex complicated by right sided subcortical infarct with left sided hemiparesis    Degenerative disc disease, lumbar 2021    Dyslipidemia 2019    Essential hypertension 2019    GERD (gastroesophageal reflux disease)     Hyperlipidemia     Hypertension     Injury  of back     fractured vertebra 2/26/23    Ischemic heart disease     Peripheral edema     PVD (peripheral vascular disease) with claudication     latanya legs    Stroke 2011    Unilateral osteoarthritis of hip 11/24/2021    Vertigo      Past Surgical History:   Procedure Laterality Date    ANGIOPLASTY      x2    CARDIAC CATHETERIZATION N/A 05/06/2024    Procedure: Left Heart Cath;  Surgeon: Gurwinder Hurd MD;  Location:  [facility];  Service: Cardiovascular;  Laterality: N/A;    CARDIAC CATHETERIZATION N/A 05/06/2024    Procedure: Coronary angiography;  Surgeon: Gurwinder Hurd MD;  Location:  [facility];  Service: Cardiovascular;  Laterality: N/A;    CARDIAC CATHETERIZATION N/A 05/06/2024    Procedure: Percutaneous Coronary Intervention;  Surgeon: Gurwinder Hurd MD;  Location:  [facility];  Service: Cardiovascular;  Laterality: N/A;    CARDIAC CATHETERIZATION N/A 05/06/2024    Procedure: Stent TC coronary;  Surgeon: Gurwinder Hurd MD;  Location:  [facility];  Service: Cardiovascular;  Laterality: N/A;    CARDIAC CATHETERIZATION  05/06/2024    Procedure: Saphenous Vein Graft;  Surgeon: Gurwinder Hurd MD;  Location:  [facility];  Service: Cardiovascular;;    CAROTID ENDARTERECTOMY  2000    Left    CAROTID ENDARTERECTOMY Left 06/26/2001    CHOLECYSTECTOMY      COLON RESECTION SMALL BOWEL N/A 7/7/2024    Procedure: EXPLORATORY LAPAROTOMY, SMALL BOWEL RESECTION;  Surgeon: Mark Anthony Meredith MD;  Location: [facility] MAIN OR;  Service: General;  Laterality: N/A;    COLONOSCOPY  2010    COLONOSCOPY  2012    CORONARY ARTERY BYPASS GRAFT  08/2011    CORONARY STENT PLACEMENT      X17 stents    CORONARY STENT PLACEMENT      x5    INTERVENTIONAL RADIOLOGY PROCEDURE N/A 05/06/2024    Procedure: Intravascular Ultrasound;  Surgeon: Gurwinder Hurd MD;  [facility];  Service:  Cardiovascular;  Laterality: N/A;      General Information       Row Name 07/11/24 0908          Physical Therapy Time and Intention    Document Type evaluation  -CS     Mode of Treatment individual therapy;physical therapy  -CS       Row Name 07/11/24 0908          General Information    Patient Profile Reviewed yes  -CS     Prior Level of Function independent:;gait;transfer;bed mobility  -CS     Existing Precautions/Restrictions fall;oxygen therapy device and L/min;other (see comments)  NG tube  -CS     Barriers to Rehab none identified  -CS       Row Name 07/11/24 0908          Living Environment    People in Home spouse  -CS       Row Name 07/11/24 0908          Home Main Entrance    Number of Stairs, Main Entrance none  -CS       Row Name 07/11/24 0908          Stairs Within Home, Primary    Number of Stairs, Within Home, Primary none  -CS       Row Name 07/11/24 0908          Cognition    Orientation Status (Cognition) oriented to;person;verbal cues/prompts needed for orientation  mild confusion & slow processing  -CS       Row Name 07/11/24 0908          Safety Issues, Functional Mobility    Safety Issues Affecting Function (Mobility) insight into deficits/self-awareness;judgment;sequencing abilities;safety precaution awareness  -CS     Impairments Affecting Function (Mobility) balance;endurance/activity tolerance;pain;strength  -CS               User Key  (r) = Recorded By, (t) = Taken By, (c) = Cosigned By      Initials Name Provider Type    CS Cira Ruth PT Physical Therapist                   Mobility       Row Name 07/11/24 0909          Bed Mobility    Bed Mobility supine-sit  -CS     Supine-Sit [place] (Bed Mobility) moderate assist (50% patient effort);verbal cues;nonverbal cues (demo/gesture)  -CS     Assistive Device (Bed Mobility) bed rails;head of bed elevated  -CS     Comment, (Bed Mobility) cues for sequencing; UIC at end of session  -CS       Row Name 07/11/24 0909          Sit-Stand  Transfer    Sit-Stand [place] (Transfers) moderate assist (50% patient effort);verbal cues;nonverbal cues (demo/gesture)  -CS     Assistive Device (Sit-Stand Transfers) walker, front-wheeled  -CS     Comment, (Sit-Stand Transfer) cues for UE placement  -CS       Row Name 07/11/24 0909          Gait/Stairs (Locomotion)    [place] Level (Gait) minimum assist (75% patient effort);verbal cues  -CS     Assistive Device (Gait) walker, front-wheeled  -CS     Distance in Feet (Gait) 4  bed to chair  -CS     Deviations/Abnormal Patterns (Gait) david decreased;gait speed decreased;stride length decreased;weight shifting decreased  -CS     Bilateral Gait Deviations forward flexed posture;heel strike decreased  -CS     [place] Level (Stairs) not tested  -CS     Comment, (Gait/Stairs) slow unsteady gait; distance limited due to poor activity tolerance and first OOB activity in several days  -CS               User Key  (r) = Recorded By, (t) = Taken By, (c) = Cosigned By      Initials Name Provider Type    CS Cira Ruth PT Physical Therapist                   Obj/Interventions       Row Name 07/11/24 0911          Range of Motion Comprehensive    General Range of Motion bilateral lower extremity ROM WFL  -       Row Name 07/11/24 0911          Strength Comprehensive (MMT)    General Manual Muscle Testing (MMT) Assessment other (see comments)  -CS     Comment, General Manual Muscle Testing (MMT) Assessment generalized post-op weakness  -CS       Row Name 07/11/24 0911          Balance    Balance Assessment sitting static balance;sitting dynamic balance;standing static balance;standing dynamic balance  -     Static Sitting Balance standby assist  -CS     Dynamic Sitting Balance standby assist;contact guard  -CS     Position, Sitting Balance unsupported;sitting edge of bed  -CS     Static Standing Balance contact guard;minimal assist  -CS     Dynamic Standing Balance minimal assist  -CS     Position/Device  Used, Standing Balance supported;walker, front-wheeled  -CS               User Key  (r) = Recorded By, (t) = Taken By, (c) = Cosigned By      Initials Name Provider Type    CS Cira Ruth PT Physical Therapist                   Goals/Plan       Row Name 07/11/24 0918          Bed Mobility Goal 1 (PT)    Activity/Assistive Device (Bed Mobility Goal 1, PT) bed mobility activities, all  -CS     [place] Level/Cues Needed (Bed Mobility Goal 1, PT) contact guard required  -CS     Time Frame (Bed Mobility Goal 1, PT) 1 week  -CS       Row Name 07/11/24 0918          Transfer Goal 1 (PT)    Activity/Assistive Device (Transfer Goal 1, PT) sit-to-stand/stand-to-sit;bed-to-chair/chair-to-bed  -CS     [place] Level/Cues Needed (Transfer Goal 1, PT) contact guard required  -CS     Time Frame (Transfer Goal 1, PT) 1 week  -CS       Row Name 07/11/24 0918          Gait Training Goal 1 (PT)    Activity/Assistive Device (Gait Training Goal 1, PT) gait (walking locomotion);assistive device use  -CS     [place] Level (Gait Training Goal 1, PT) contact guard required  -CS     Distance (Gait Training Goal 1, PT) 25'  -CS     Time Frame (Gait Training Goal 1, PT) 1 week  -CS               User Key  (r) = Recorded By, (t) = Taken By, (c) = Cosigned By      Initials Name Provider Type    Cira Turner, PT Physical Therapist                   Clinical Impression       Row Name 07/11/24 0911          Pain    Pre/Posttreatment Pain Comment c/o pain but did not provide location or rating  -CS     Pain Intervention(s) Rest;Repositioned  -CS       Row Name 07/11/24 0911          Plan of Care Review    Plan of Care Reviewed With patient;spouse  -CS     Outcome Evaluation Pt is a 72 y/o M admitted to [facility] with c/o LLQ abdominal pain with CT revealing ischemia in the left lower abdomen of a long segment of small bowel. Pt now POD 4 s/p laparotomy with small bowel resection. Pt received in bed and agreeable to PT eval. Pt  reports he lives with his spouse with 0 ALESSIA and is normally (I) with mobility at baseline. Pt presents to PT with expected post-op discomfort, generalized weakness, decreased endurance, and impaired functional mobility. Pt completed supine to sit, STS txf, and took a few steps to chair c RW requiring min/mod A. Pt demo's a slow unsteady gait with mobility limited due to poor activity tolerance and first OOB activity since surgery. Noted mild confusion and slow processing throughout session. PT recommends SNF at D/C to address functional deficits.  -CS       Row Name 07/11/24 0911          Therapy Assessment/Plan (PT)    Rehab Potential (PT) good, to achieve stated therapy goals  -CS     Criteria for Skilled Interventions Met (PT) yes;meets criteria  -CS     Therapy Frequency (PT) 6 times/wk  -CS       Row Name 07/11/24 0911          Vital Signs    Pretreatment Heart Rate (beats/min) 78  -CS     Intratreatment Heart Rate (beats/min) 78  -CS     Posttreatment Heart Rate (beats/min) 73  -CS     Pre SpO2 (%) 97  -CS     O2 Delivery Pre Treatment hi-flow  -CS     O2 Delivery Intra Treatment hi-flow  -CS     Post SpO2 (%) 98  -CS     O2 Delivery Post Treatment hi-flow  -CS       Row Name 07/11/24 0911          Positioning and Restraints    Pre-Treatment Position in bed  -CS     Post Treatment Position chair  -CS     In Chair reclined;call light within reach;encouraged to call for assist;exit alarm on;heels elevated;with family/caregiver  -CS               User Key  (r) = Recorded By, (t) = Taken By, (c) = Cosigned By      Initials Name Provider Type    CS Cira Ruth, PT Physical Therapist                   Outcome Measures       Row Name 07/11/24 0919 07/11/24 0010       How much help from another person do you currently need...    Turning from your back to your side while in flat bed without using bedrails? 3  -CS 3  -LO    Moving from lying on back to sitting on the side of a flat bed without bedrails? 2  -CS 3   -LO    Moving to and from a bed to a chair (including a wheelchair)? 3  -CS 2  -LO    Standing up from a chair using your arms (e.g., wheelchair, bedside chair)? 3  -CS 2  -LO    Climbing 3-5 steps with a railing? 2  -CS 2  -LO    To walk in hospital room? 3  -CS 2  -LO    AM-PAC 6 Clicks Score (PT) 16  -CS 14  -LO    Highest Level of Mobility Goal 5 --> Static standing  -CS 4 --> Transfer to chair/commode  -LO      Row Name 07/11/24 0919          Functional Assessment    Outcome Measure Options AM-PAC 6 Clicks Basic Mobility (PT)  -CS               User Key  (r) = Recorded By, (t) = Taken By, (c) = Cosigned By      Initials Name Provider Type    Eloisa Rodriguez, RN Registered Nurse    Cira Turner, MIRTA Physical Therapist                                 Physical Therapy Education       Title: PT OT SLP Therapies (In Progress)       Topic: Physical Therapy (In Progress)       Point: Mobility training (In Progress)       Learning Progress Summary             Patient Acceptance, E,TB, NR by  at 7/11/2024 0919                         Point: Home exercise program (Not Started)       Learner Progress:  Not documented in this visit.              Point: Body mechanics (In Progress)       Learning Progress Summary             Patient Acceptance, E,TB, NR by  at 7/11/2024 0919                         Point: Precautions (In Progress)       Learning Progress Summary             Patient Acceptance, E,TB, NR by  at 7/11/2024 0919                                         User Key       Initials Effective Dates Name Provider Type Discipline     09/22/22 -  Cira Ruth, MIRTA Physical Therapist PT                  PT Recommendation and Plan     Plan of Care Reviewed With: patient, spouse  Outcome Evaluation: Pt is a 74 y/o M admitted to [facility] with c/o LLQ abdominal pain with CT revealing ischemia in the left lower abdomen of a long segment of small bowel. Pt now POD 4 s/p laparotomy with small bowel resection. Pt  received in bed and agreeable to PT eval. Pt reports he lives with his spouse with 0 ALESSIA and is normally (I) with mobility at baseline. Pt presents to PT with expected post-op discomfort, generalized weakness, decreased endurance, and impaired functional mobility. Pt completed supine to sit, STS txf, and took a few steps to chair c RW requiring min/mod A. Pt demo's a slow unsteady gait with mobility limited due to poor activity tolerance and first OOB activity since surgery. Noted mild confusion and slow processing throughout session. PT recommends SNF at D/C to address functional deficits.     Time Calculation:         PT Charges       Row Name 07/11/24 0919             Time Calculation    Start Time 0804  -CS      Stop Time 0830  -CS      Time Calculation (min) 26 min  -CS      PT Received On 07/11/24  -CS      PT - Next Appointment 07/12/24  -CS      PT Goal Re-Cert Due Date 07/18/24  -CS         Time Calculation- PT    Total Timed Code Minutes- PT 18 minute(s)  -CS         Timed Charges    01257 - PT Therapeutic Activity Minutes 18  -CS         Total Minutes    Timed Charges Total Minutes 18  -CS       Total Minutes 18  -CS                User Key  (r) = Recorded By, (t) = Taken By, (c) = Cosigned By      Initials Name Provider Type    CS Cira Ruth, PT Physical Therapist                  Therapy Charges for Today       Code Description Service Date Service Provider Modifiers Qty    17407680108  PT THERAPEUTIC ACT EA 15 MIN 7/11/2024 Cira Ruth, PT GP 1    08110920962 HC PT EVAL MOD COMPLEXITY 3 7/11/2024 Cira Ruth, PT GP 1            PT G-Codes  Outcome Measure Options: AM-PAC 6 Clicks Basic Mobility (PT)  AM-PAC 6 Clicks Score (PT): 16  PT Discharge Summary  Anticipated Discharge Disposition (PT): skilled nursing facility    Cira Ruth PT  7/11/2024

## 2024-07-11 NOTE — PROGRESS NOTES
Unfortunately required NG placement last night.  Feels better today.  Abdomen soft, incision healing well  Try clamping NG today as output has been minimal

## 2024-07-11 NOTE — PROGRESS NOTES
Name: Slade Martinez ADMIT: 2024   : 1950  PCP: Triny Hannon MD    MRN: [id number] LOS: 9 days   AGE/SEX: 73 y.o. male  ROOM: [facility]     Subjective   Subjective   Overnight events noted.  Continue to have episodic production of bilious phlegm/emesis and worsening O2 requirements.  Eventually required NG tube placement with copious return although this has leveled off considerably today.  Required up to 6 L of oxygen but has been weaned down to 2 L this afternoon.  Overall looks much better than he did yesterday when I saw him.  Still having hiccups, no flatus.  Sitting up in the chair since this morning          Objective   Objective   Vital Signs  Temp:  [97.4 °F (36.3 °C)-98 °F (36.7 °C)] 97.4 °F (36.3 °C)  Heart Rate:  [] 75  Resp:  [16-18] 18  BP: (104-159)/(59-93) 119/64  SpO2:  [93 %-97 %] 95 %  on  Flow (L/min):  [2-6] 2;   Device (Oxygen Therapy): high-flow nasal cannula;humidified  Body mass index is 27.49 kg/m².  Physical Exam  Vitals and nursing note reviewed.   Constitutional:       General: He is not in acute distress.     Appearance: He is not ill-appearing.   Eyes:      General: No scleral icterus.  Cardiovascular:      Rate and Rhythm: Normal rate and regular rhythm.   Pulmonary:      Effort: Pulmonary effort is normal. No respiratory distress.      Breath sounds: Rhonchi (Less than yesterday.  Right greater than left) present.      Comments: Decreased breath sounds  Abdominal:      General: There is no distension.      Palpations: Abdomen is soft.      Tenderness: There is abdominal tenderness (Generalized postop). There is no guarding.   Musculoskeletal:         General: No swelling.   Skin:     General: Skin is warm and dry.      Findings: No bruising.   Neurological:      Mental Status: He is alert and oriented to person, place, and time.       Results Review     I reviewed the patient's new clinical results.  Results from last 7 days   Lab Units 24  0431  07/10/24  0253 07/09/24  0530 07/08/24  0522   WBC 10*3/mm3 23.17* 17.91* 15.50* 15.17*   HEMOGLOBIN g/dL 12.8* 11.1* 10.5* 10.7*   PLATELETS 10*3/mm3 334 298 244 253     Results from last 7 days   Lab Units 07/11/24  0439 07/10/24  0253 07/09/24  0530 07/08/24  0522   SODIUM mmol/L 137 139 136 131*   POTASSIUM mmol/L 3.7 3.7 4.0 4.3   CHLORIDE mmol/L 103 108* 103 98   CO2 mmol/L 20.3* 20.0* 22.3 22.3   BUN mg/dL 24* 26* 23 13   CREATININE mg/dL 0.86 0.65* 0.65* 0.63*   GLUCOSE mg/dL 135* 113* 131* 184*   EGFR mL/min/1.73 90.9 98.9 98.9 99.8     Results from last 7 days   Lab Units 07/11/24  0439 07/10/24  0253 07/09/24  0530 07/08/24  0522   ALBUMIN g/dL 2.2* 2.3* 2.5* 2.5*   BILIRUBIN mg/dL 0.8  --  0.5 0.9   ALK PHOS U/L 115  --  97 91   AST (SGOT) U/L 46*  --  25 19   ALT (SGPT) U/L 42*  --  18 20     Results from last 7 days   Lab Units 07/11/24  0439 07/10/24  0253 07/09/24  0530 07/08/24  0522 07/07/24  0725   CALCIUM mg/dL 8.4* 8.5* 8.7 8.2* 9.0   ALBUMIN g/dL 2.2* 2.3* 2.5* 2.5* 3.0*   MAGNESIUM mg/dL  --  2.2 2.5* 2.3 2.0   PHOSPHORUS mg/dL  --  3.3 2.7 3.4 3.6     Results from last 7 days   Lab Units 07/07/24  1420   LACTATE mmol/L 3.3*     Glucose   Date/Time Value Ref Range Status   07/10/2024 0042 122 70 - 130 mg/dL Final       XR Abdomen KUB    Result Date: 7/11/2024  Nasogastric tube extends down the right mainstem bronchus.  FINDINGS were called to the patient's nurse at 2:48 a.m.  This report was finalized on 7/11/2024 2:48 AM by Dr. Shawnee Preston M.D on Workstation: [id number]      XR Chest 1 View    Result Date: 7/11/2024   1. Increasing bilateral alveolar and interstitial infiltrates. 2. Increasing gaseous distention of bowel within the upper abdomen, likely gastric distention.  This report was finalized on 7/11/2024 1:26 AM by Dr. Shawnee Preston M.D on Workstation: [id number]      XR Chest 1 View    Result Date: 7/10/2024  Intrathoracic findings are stable.  This report was finalized  on 7/10/2024 1:17 AM by Dr. Shawnee Preston M.D on Workstation: [id number]      XR Chest 1 View    Result Date: 7/9/2024  New small left pleural effusion. New small retrocardiac left lower lobe opacity, atelectasis versus infiltrate. No pneumothorax. Stable normal size cardiac silhouette with postsurgical changes of CABG and cardiac loop recorder. No focal osseous abnormality.  This report was finalized on 7/9/2024 5:40 PM by Dr. Ayush Cota M.D on Workstation: [id number]       I have personally reviewed all medications:  Scheduled Medications  amLODIPine, 5 mg, Oral, Q24H  aspirin, 325 mg, Oral, Once  atenolol, 50 mg, Oral, BID  atorvastatin, 40 mg, Oral, Nightly  cetirizine, 10 mg, Oral, Daily  clopidogrel, 75 mg, Oral, Daily  meclizine, 12.5 mg, Oral, Daily  pantoprazole, 40 mg, Intravenous, BID AC  piperacillin-tazobactam, 3.375 g, Intravenous, Q8H  pregabalin, 150 mg, Oral, TID  sodium chloride, 10 mL, Intravenous, Q12H    Infusions  amiodarone, 0.5 mg/min, Last Rate: 0.5 mg/min (07/11/24 0949)    Diet  NPO Diet NPO Type: Strict NPO    I have personally reviewed:  [x]  Laboratory   []  Microbiology   [x]  Radiology   []  EKG/Telemetry  []  Cardiology/Vascular   []  Pathology    []  Records       Assessment/Plan     Active Hospital Problems    Diagnosis  POA    **Left lower quadrant abdominal pain [R10.32]  Yes    PAF (paroxysmal atrial fibrillation) [I48.0]  No    Leukocytosis [D72.829]  Unknown    Chest pain [R07.9]  Yes    Chronic mesenteric ischemia [K55.1]  Yes    S/P CABG (coronary artery bypass graft) [Z95.1]  Not Applicable    Essential hypertension [I10]  Yes      Resolved Hospital Problems    Diagnosis Date Resolved POA    Diarrhea [R19.7] 07/10/2024 Unknown    Hyponatremia [E87.1] 07/10/2024 Unknown    Small bowel ischemia [K55.9] 07/10/2024 Unknown       73 y.o. male with complicated recent history including CAD/PCI, recent UTI with multiple antibiotic courses, chronic SMA occlusion admitted  with Left lower quadrant abdominal pain.    LLQ abdominal pain/diarrhea, found to have long segment of pneumatosis and gangrenous bowel now status post laparotomy 7/7  - Postoperative ileus persists.  NG tube placed due to poor gastric emptying with good results.  Currently planning to clamp per surgical orders.  - Completed Reglan.  Will not reorder unless emesis/poor emptying recurs   -Reviewed imaging.  Significant ileus.     - Continue PPI twice daily  - Creon on hold.  Eventual outpatient EUS per GI notes  - N.p.o.    Acute hypoxic respiratory failure is better this afternoon.  Likely related to aspiration pneumonitis.  He did receive some Lasix yesterday but really only started getting better once we placed the NG tube and he stopped continually spitting up bile.    A-fib/RVR: Appreciate cardiology assistance.  NSR now on amiodarone drip.  Personally discussed with Dr. Chucky MADERA with recent stent placement in May.  Plavix on board      SCDs  Disposition TBD pending progress.  Possibly early next week if he stabilizes  D/w wife at bedside again today.  Encouraged him to keep getting out of bed is much as possible      Ayush Kaiser MD  [facility]  07/11/24  15:17 EDT

## 2024-07-12 NOTE — PLAN OF CARE
Problem: Adult Inpatient Plan of Care  Goal: Plan of Care Review  Outcome: Ongoing, Progressing   Goal Outcome Evaluation:      VSS, in and out NSR/Afib, 2L NC. AxOx2, disoriented to place and situation. L NG @ 60 cm, to continuous LWS. Brief on. Amio gtt @ 0.5 mg/min. IV zosyn antibx. Q2 turn. NPO strict. Abd incision BRIAN, staples, CDI. No pain this shift per pt. Wife @ bedside.

## 2024-07-12 NOTE — PROGRESS NOTES
LOS: 10 days   Patient Care Team:  Triny Hannon MD as PCP - General (Internal Medicine)  James Hubbard MD as Consulting Physician (Cardiology)    Subjective     Following for acute hypoxic respiratory failure patient is resting in bed he says he feels better family says he is much better today he has been coughing quite a bit and getting up mucus.  Bowel movement he feels much better after that    Review of Systems:          Objective     Vital Signs  Vital Sign Min/Max for last 24 hours  Temp  Min: 97.3 °F (36.3 °C)  Max: 98.4 °F (36.9 °C)   BP  Min: 119/64  Max: 157/64   Pulse  Min: 69  Max: 75   Resp  Min: 16  Max: 18   SpO2  Min: 95 %  Max: 96 %   Flow (L/min)  Min: 2  Max: 2   Weight  Min: 88.9 kg (195 lb 15.8 oz)  Max: 88.9 kg (195 lb 15.8 oz)        Ventilator/Non-Invasive Ventilation Settings (From admission, onward)      None                         Body mass index is 27.33 kg/m².  I/O last 3 completed shifts:  In: 658.1 [P.O.:360; I.V.:198.1; IV Piggyback:100]  Out: 4350 [Emesis/NG output:4350]  No intake/output data recorded.        Physical Exam:  General Appearance: Well older white male he looks older bit on 2 L O2 he is awake and alert this morning very conversant looks like a different person on 2 L O2 sats were 95+ percent  Eyes: Conjunctiva clear and anicteric  ENT: He has a nasogastric tube and mucous membranes are moist no exudates no significant secretions  Neck: Trachea midline no visible jugular venous distention  Lungs: Bibasilar rales/crackles no percussible dullness symmetric nonlabored chest expansion  Cardiac: Regular rate rhythm no murmur  Abdomen: Soft nontender much less distended than yesterday no hepatosplenomegaly or masses incision healing well  : Not examined  Musculoskeletal: Mild thoracic kyphosis no palpable cords in the lower extremities no calf tenderness or pain with dorsiflexion of the feet  Skin: Warm and dry no jaundice no petechiae  Neuro: He is  alert and cooperative following commands moving all extremities  Extremities/P Vascular: No clubbing no cyanosis he has no edema palpable radial and dorsalis pedis pulses  MSE: Different person this morning he is conversant laughing talking a lot       Labs:  Results from last 7 days   Lab Units 07/12/24  0511 07/11/24  0439 07/10/24  0253 07/09/24  0530 07/08/24  0522 07/07/24  0725 07/06/24  0518   GLUCOSE mg/dL 124* 135* 113* 131* 184* 145* 131*   SODIUM mmol/L 142 137 139 136 131* 131* 130*   POTASSIUM mmol/L 3.6 3.7 3.7 4.0 4.3 4.0 4.0   MAGNESIUM mg/dL  --   --  2.2 2.5* 2.3 2.0 1.8   CO2 mmol/L 25.3 20.3* 20.0* 22.3 22.3 25.0 27.0   CHLORIDE mmol/L 105 103 108* 103 98 94* 92*   ANION GAP mmol/L 11.7 13.7 11.0 10.7 10.7 12.0 11.0   CREATININE mg/dL 0.67* 0.86 0.65* 0.65* 0.63* 0.72* 0.65*   BUN mg/dL 24* 24* 26* 23 13 17 18   BUN / CREAT RATIO  35.8* 27.9* 40.0* 35.4* 20.6 23.6 27.7*   CALCIUM mg/dL 8.4* 8.4* 8.5* 8.7 8.2* 9.0 9.4   ALK PHOS U/L 110 115  --  97 91  --   --    TOTAL PROTEIN g/dL 5.7* 6.0  --  6.0 5.7*  --   --    ALT (SGPT) U/L 41 42*  --  18 20  --   --    AST (SGOT) U/L 62* 46*  --  25 19  --   --    BILIRUBIN mg/dL 0.8 0.8  --  0.5 0.9  --   --    ALBUMIN g/dL 2.3* 2.2* 2.3* 2.5* 2.5* 3.0* 3.0*   GLOBULIN gm/dL 3.4 3.8  --  3.5 3.2  --   --      Estimated Creatinine Clearance: 121.6 mL/min (A) (by C-G formula based on SCr of 0.67 mg/dL (L)).      Results from last 7 days   Lab Units 07/12/24  0511 07/11/24  0439 07/10/24  0253 07/09/24  0530 07/08/24  0522 07/07/24  1420 07/06/24  0518   WBC 10*3/mm3 18.83* 23.17* 17.91* 15.50* 15.17* 17.29* 16.78*   RBC 10*6/mm3 3.83* 4.26 3.67* 3.48* 3.58* 4.18 3.99*   HEMOGLOBIN g/dL 11.2* 12.8* 11.1* 10.5* 10.7* 12.6* 11.9*   HEMATOCRIT % 34.4* 39.0 33.9* 31.6* 32.8* 38.4 36.9*   MCV fL 89.8 91.5 92.4 90.8 91.6 91.9 92.5   MCH pg 29.2 30.0 30.2 30.2 29.9 30.1 29.8   MCHC g/dL 32.6 32.8 32.7 33.2 32.6 32.8 32.2   RDW % 14.4 14.8 14.5 14.7 14.5 14.3 14.7    RDW-SD fl 47.2 49.2 48.6 48.5 48.9 48.0 50.6   MPV fL 10.4 10.3 10.3 9.9 9.8 9.6 9.8   PLATELETS 10*3/mm3 310 334 298 244 253 347 327   NEUTROPHIL % %  --   --   --  83.2*  --  77.0*  --    LYMPHOCYTE % %  --   --   --  7.2*  --  8.0*  --    MONOCYTES % %  --   --   --  8.2  --  14.2*  --    EOSINOPHIL % %  --   --   --  0.0*  --  0.1*  --    BASOPHIL % %  --   --   --  0.1  --  0.2  --    IMM GRAN % %  --   --   --  1.3*  --  0.5  --    NEUTROS ABS 10*3/mm3  --   --   --  12.89*  --  13.30*  --    LYMPHS ABS 10*3/mm3  --   --   --  1.12  --  1.39  --    MONOS ABS 10*3/mm3  --   --   --  1.27*  --  2.46*  --    EOS ABS 10*3/mm3  --   --   --  0.00  --  0.02  --    BASOS ABS 10*3/mm3  --   --   --  0.02  --  0.03  --    IMMATURE GRANS (ABS) 10*3/mm3  --   --   --  0.20*  --  0.09*  --    NRBC /100 WBC  --   --   --  0.0  --  0.0  --      Results from last 7 days   Lab Units 07/11/24  0128   PH, ARTERIAL pH units 7.402   PO2 ART mm Hg 62.9*   PCO2, ARTERIAL mm Hg 38.4   HCO3 ART mmol/L 23.9     Results from last 7 days   Lab Units 07/10/24  0253 07/10/24  0051   HSTROP T ng/L 20 23*             Results from last 7 days   Lab Units 07/07/24  1420   LACTATE mmol/L 3.3*         Microbiology Results (last 10 days)       Procedure Component Value - Date/Time    MRSA Screen, PCR (Inpatient) - Swab, Nares [[id number]]  (Normal) Collected: 07/11/24 0948    Lab Status: Final result Specimen: Swab from Nares Updated: 07/11/24 1136     MRSA PCR No MRSA Detected    Narrative:      The negative predictive value of this diagnostic test is high and should only be used to consider de-escalating anti-MRSA therapy. A positive result may indicate colonization with MRSA and must be correlated clinically.    Gastrointestinal Panel, PCR - Stool, Per Rectum [[id number]]  (Normal) Collected: 07/03/24 1301    Lab Status: Final result Specimen: Stool from Per Rectum Updated: 07/03/24 1511     Campylobacter Not Detected     Plesiomonas  shigelloides Not Detected     Salmonella Not Detected     Vibrio Not Detected     Vibrio cholerae Not Detected     Yersinia enterocolitica Not Detected     Enteroaggregative E. coli (EAEC) Not Detected     Enteropathogenic E. coli (EPEC) Not Detected     Enterotoxigenic E. coli (ETEC) lt/st Not Detected     Shiga-like toxin-producing E. coli (STEC) stx1/stx2 Not Detected     Shigella/Enteroinvasive E. coli (EIEC) Not Detected     Cryptosporidium Not Detected     Cyclospora cayetanensis Not Detected     Entamoeba histolytica Not Detected     Giardia lamblia Not Detected     Adenovirus F40/41 Not Detected     Astrovirus Not Detected     Norovirus GI/GII Not Detected     Rotavirus A Not Detected     Sapovirus (I, II, IV or V) Not Detected    Narrative:      If Aeromonas, Staphylococcus aureus or Bacillus cereus are suspected, please order [id number]: Stool Culture, Aeromonas, S aureus, B Cereus.                amLODIPine, 5 mg, Oral, Q24H  aspirin, 325 mg, Oral, Once  atenolol, 50 mg, Oral, BID  atorvastatin, 40 mg, Oral, Nightly  cetirizine, 10 mg, Oral, Daily  clopidogrel, 75 mg, Oral, Daily  meclizine, 12.5 mg, Oral, Daily  pantoprazole, 40 mg, Intravenous, BID AC  piperacillin-tazobactam, 3.375 g, Intravenous, Q8H  pregabalin, 150 mg, Oral, TID  sodium chloride, 10 mL, Intravenous, Q12H      amiodarone, 0.5 mg/min, Last Rate: 0.5 mg/min (07/11/24 2108)        Diagnostics:  XR Abdomen KUB    Result Date: 7/11/2024  KUB  HISTORY: Nasogastric tube placement  COMPARISON: July 11, 2024  FINDINGS: Nasogastric tube is probably positioned within the body of the stomach. Gaseous distention of the stomach may be improved. Dilated loops of small bowel are noted within the mid abdomen.    This report was finalized on 7/11/2024 3:33 AM by Dr. Shawnee Preston M.D on Workstation: [id number]      XR Abdomen KUB    Result Date: 7/11/2024  KUB  HISTORY: Nasogastric tube placement  COMPARISON: None available.  FINDINGS: Nasogastric  tube appears to extend down the right mainstem bronchus. Removal is recommended. No free air is seen. The patient is noted to have distention of the stomach, as well as multiple dilated loops of small bowel. Appearance does suggest small bowel obstruction.      Nasogastric tube extends down the right mainstem bronchus.  FINDINGS were called to the patient's nurse at 2:48 a.m.  This report was finalized on 7/11/2024 2:48 AM by Dr. Shawnee Preston M.D on Workstation: [id number]      XR Chest 1 View    Result Date: 7/11/2024  SINGLE VIEW OF THE CHEST  HISTORY: Increased shortness of air  COMPARISON: July 10, 2024  FINDINGS: There is cardiomegaly. There are bilateral alveolar and interstitial infiltrates. This is worsened when compared to prior exam. Some of this may be related to edema, but pneumonia is not excluded. Patient status post median sternotomy with coronary artery bypass grafting. There is stable bibasilar consolidation. There may be a small left pleural effusion. Cardiac loop recorder is noted. The patient has marked gaseous distention of bowel within the upper abdomen.        1. Increasing bilateral alveolar and interstitial infiltrates. 2. Increasing gaseous distention of bowel within the upper abdomen, likely gastric distention.  This report was finalized on 7/11/2024 1:26 AM by Dr. Shawnee Preston M.D on Workstation: [id number]      XR Chest 1 View    Result Date: 7/10/2024  SINGLE VIEW OF THE CHEST  HISTORY: Hypotension  COMPARISON: July 9, 2024  FINDINGS: Patient is status post median sternotomy with coronary artery bypass grafting. There is cardiomegaly. There is vascular congestion. Patient has a cardiac loop recorder. There is some gaseous distention of bowel within the upper abdomen. Bibasilar consolidation is noted, not significantly changed. There is a small left pleural effusion. No pneumothorax is seen. There is some potential free air beneath the right hemidiaphragm, likely related to  recent surgery.      Intrathoracic findings are stable.  This report was finalized on 7/10/2024 1:17 AM by Dr. Shawnee Preston M.D on Workstation: [id number]      XR Chest 1 View    Result Date: 7/9/2024  XR CHEST 1 VW-  INDICATION: Change in lung sounds  COMPARISON: Chest radiographs dating back to 2/11/2021      New small left pleural effusion. New small retrocardiac left lower lobe opacity, atelectasis versus infiltrate. No pneumothorax. Stable normal size cardiac silhouette with postsurgical changes of CABG and cardiac loop recorder. No focal osseous abnormality.  This report was finalized on 7/9/2024 5:40 PM by Dr. Ayush Cota M.D on Workstation: [id number]      CT Abdomen Pelvis With Contrast    Result Date: 7/7/2024  CT ABDOMEN PELVIS W CONTRAST-  INDICATION: Worsening abdominal pain  COMPARISON: CT abdomen pelvis July 1, 2024  TECHNIQUE: Routine CT abdomen/pelvis with IV contrast. Coronal and sagittal reformats. Radiation dose reduction techniques were utilized, including automated exposure control and exposure modulation based on body size.  FINDINGS:  Lung bases: Subsegmental atelectasis or scarring at the bases. Coronary artery atherosclerotic calcifications. Trace pericardial fluid.  ABDOMEN: Normal liver. Cholecystectomy. No biliary ductal dilatation. Spleen is normal in size. Mild to moderate pancreatic atrophy. No pancreatic ductal dilatation or mass seen. Subcentimeter adrenal nodules are too small to characterize. No solid-appearing renal mass or hydronephrosis.  Pelvis: Prostate is normal in size. Normal bladder. No bladder calculus.  Bowel: Colonic diverticulosis. Left colon and rectum are predominantly collapsed. Moderate gas seen in the transverse colon. Ascending colon is under distended. Normal appendix.  Gas and fluid distended jejunal loops with a loop in the left upper quadrant, series 2, axial mage 58, measuring 3.5 cm. Distal bowel is relatively collapsed. No clear transition  point. Long length segment of pneumatosis seen in the left lower abdomen and pelvis, with adjacent portal venous gas seen in the small bowel mesentery, series 2, axial mage 98. No free intraperitoneal air. Small amount of mesenteric edema in the left lower quadrant with small amount of trapped fluid in the mesentery, series 2, axial mage 99. Perienteric edema/inflammatory changes present in the small bowel containing pneumatosis.  Abdominal wall: Rectus diastases.  Retroperitoneum: No lymphadenopathy.  Vasculature: Bulky calcific atherosclerotic disease in the proximal SMA with suspected vessel occlusion, with reconstitution following the bulky atherosclerotic disease. Severe aortoiliac atherosclerotic calcification. No abdominal aortic aneurysm. Bulky atherosclerotic calcifications in the right common iliac artery with suspected high-grade stenosis. Bulky atherosclerotic calcification in the left common iliac artery with moderate stenosis. Bulky atherosclerotic calcification in the right SFA with suspected high-grade stenosis. Bulky atherosclerotic calcification in the proximal left external iliac artery with high-grade stenosis. Bulky atherosclerotic calcification in the left common femoral artery and superficial femoral artery with suspected vessel occlusion.  Osseous structures: No destructive osseous lesions. Median sternotomy. Old superior plate compression deformity at L3 with 60% collapse.       1. Pneumatosis in a long segment of small bowel in the left lower abdomen and pelvis with adjacent portal venous gas in the mesentery and perienteric inflammatory changes. Findings consistent with ischemic bowel. Recommend urgent surgical evaluation. 2. Gas and fluid distended small bowel loops with relatively collapsed distal bowel, without a transition point identified. Could be ileus or developing small bowel obstruction. 3. Bulky calcific atherosclerotic disease in the SMA with vessel occlusion followed by  reconstitution, appears similar to prior. 4. Severe aortoiliac atherosclerotic disease with bilateral iliac and femoral arterial high-grade stenoses and suspected left CFA/SFA occlusion, unchanged.  Call Report: Dr. Kaiser was notified by telephone of the above findings on July 7, 2024 at 4:11 p.m.  This report was finalized on 7/7/2024 4:11 PM by Dr. Denilson Warner M.D on Workstation: [id number]      Adult Transthoracic Echo Limited W/ Cont if Necessary Per Protocol    Result Date: 7/3/2024    Left ventricular systolic function is normal. Calculated left ventricular EF = 67.1%   Left ventricular wall thickness is consistent with mild concentric hypertrophy.   Left ventricular diastolic function is consistent with (grade I) impaired relaxation.     XR Chest 1 View    Result Date: 7/1/2024  XR CHEST 1 VW-  HISTORY: Male who is 73 years-old, chest pain  TECHNIQUE: Frontal view of the chest  COMPARISON: 6/19/2024  FINDINGS:. The heart size is borderline. Cardiac loop recorder is apparent. Sternotomy wires are present. Aorta is calcified. Pulmonary vasculature is unremarkable. No focal pulmonary consolidation, pleural effusion, or pneumothorax. Old granulomatous disease is present. No acute osseous process.      No focal pulmonary consolidation. Borderline heart size. Follow-up as clinical indications persist.  This report was finalized on 7/1/2024 3:05 PM by Dr. Fredrick Love M.D on Workstation: [id number]      CT Abdomen Pelvis Without Contrast    Result Date: 7/1/2024  CT ABDOMEN PELVIS WO CONTRAST-  INDICATION: Left lower quadrant pain and diarrhea  COMPARISON: CT abdomen pelvis September 21, 2023 and CTA chest May 5, 2024  TECHNIQUE: Routine CT abdomen/pelvis without IV contrast. Coronal and sagittal reformats. Radiation dose reduction techniques were utilized, including automated exposure control and exposure modulation based on body size.  FINDINGS:  Lung bases: Solid pulmonary nodule in the lingula and  anterior basilar left lower lobe appears stable, measuring 5 mm. Coronary artery atherosclerotic calcification. Small amount of pericardial fluid or thickening.  ABDOMEN: Normal liver. Cholecystectomy. No biliary ductal dilatation. Spleen is normal in size. Mild pancreatic lipomatosis. No pancreatic ductal dilatation or mass seen. Small left adrenal nodule, series 2, axial mage 38, measures 1 cm, unchanged, suspect adenoma. No urolithiasis or hydronephrosis.  Pelvis: Prostate is normal in size. Mild bladder distention. No bladder calculus.  Bowel: No obstruction. Colonic diverticulosis. Normal appendix.  Abdominal wall: Rectus diastases. Tiny fat-containing umbilical hernia.  Retroperitoneum: No lymphadenopathy.  Vasculature: No abdominal aortic aneurysm. Severe aortoiliac atherosclerotic calcification. Bulky calcific atherosclerotic disease in the proximal SMA with suspected high-grade stenoses versus occlusion. Bulky calcific atherosclerotic disease in the right common iliac artery with suspected high-grade stenosis. Bulky calcific atherosclerotic disease in the right superficial femoral artery with suspected high-grade stenoses. Bulky calcific atherosclerotic disease in the left common femoral artery and left superficial femoral artery with suspected high-grade stenosis or occlusion.  Osseous structures: Median sternotomy. Old superior endplate compression deformity at L3 with approximately 60% collapse.       1. No acute findings identified in the abdomen or pelvis. 2. Colonic diverticulosis. 3. Bulky calcific atherosclerotic disease seen in the proximal SMA with suspected high-grade stenosis or vessel occlusion, appears similar to prior. Finding can be correlated for chronic mesenteric ischemia/postprandial abdominal pain.  This report was finalized on 7/1/2024 2:56 PM by Dr. Denilson Warner M.D on Workstation: [id number]      XR Chest 1 View    Result Date: 6/19/2024  Patient: OTIS GUZMAN  Time Out: 05:56  Exam(s): XR CXR 1 VIEW EXAM:   XR Chest, 1 View CLINICAL HISTORY:      Fever TECHNIQUE:   Frontal view of the chest. COMPARISON:   Chest radiograph 5 10 2024 FINDINGS:   Lungs:  Bilateral airspace opacities may represent pulmonary edema and or atypical infection.   Pleural space:  Small bilateral pleural effusions.  No pneumothorax.   Heart:  Cardiomegaly.   Mediastinum:  Unremarkable.  Normal mediastinal contour.   Bones joints:  Degenerative changes of the spine are noted. No acute fracture. IMPRESSION:     1.  Bilateral airspace opacities may represent pulmonary edema and or atypical infection. 2.  Cardiomegaly. 3.  Small bilateral pleural effusions.     Electronically signed by Leila Cronin MD on 06-19-24 at 0556    Results for orders placed during the hospital encounter of 07/01/24    Adult Transthoracic Echo Limited W/ Cont if Necessary Per Protocol    Interpretation Summary    Left ventricular systolic function is normal. Calculated left ventricular EF = 67.1%    Left ventricular wall thickness is consistent with mild concentric hypertrophy.    Left ventricular diastolic function is consistent with (grade I) impaired relaxation.      Chest x-ray today the vague areas of infiltrate in the left base and right midlung have fluffed out more.    Active Hospital Problems    Diagnosis  POA    **Left lower quadrant abdominal pain [R10.32]  Yes    Acute respiratory failure with hypoxia [J96.01]  No    Ileus, postoperative [K91.89, K56.7]  No    PAF (paroxysmal atrial fibrillation) [I48.0]  No    Leukocytosis [D72.829]  Yes    Chest pain [R07.9]  Yes    Chronic mesenteric ischemia [K55.1]  Yes    S/P CABG (coronary artery bypass graft) [Z95.1]  Not Applicable    Essential hypertension [I10]  Yes      Resolved Hospital Problems    Diagnosis Date Resolved POA    Diarrhea [R19.7] 07/10/2024 Unknown    Hyponatremia [E87.1] 07/10/2024 Unknown    Small bowel ischemia [K55.9] 07/10/2024 Unknown         Assessment & Plan      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  I am concerned that he aspirated with his emesis yesterday they said he was coughing and spitting up bilious material this morning.  He is already on Zosyn which is probably pretty good coverage this is likely just a aspiration pneumonitis.  This is likely the cause of his hypoxia.  He does seem to be improving we will wean O2 and see how he tolerates  Aspiration pneumonitis already on Zosyn white count improved   Coronary disease with recent PCI  Paroxysmal A-fib with rapid rate now in sinus rhythm  Essential hypertension    Plan for disposition:    Rodrigo Barnes Jr, MD  07/12/24  08:41 EDT    Time:

## 2024-07-12 NOTE — THERAPY TREATMENT NOTE
Patient Name: Slade Martinez  : 1950    MRN: [id number]                              Today's Date: 2024       Admit Date: 2024    Visit Dx:     ICD-10-CM ICD-9-CM   1. Left lower quadrant abdominal pain  R10.32 789.04   2. Chest pain, unspecified type  R07.9 786.50   3. Leukocytosis, unspecified type  D72.829 288.60   4. Lactic acidosis  E87.20 276.2   5. Hyponatremia  E87.1 276.1   6. Hypertensive urgency  I16.0 401.9   7. Small bowel ischemia  K55.9 557.9     Patient Active Problem List   Diagnosis    Dyslipidemia    Essential hypertension    Coronary atherosclerosis    S/P CABG (coronary artery bypass graft)    Peripheral artery disease    History of left-sided carotid endarterectomy    Fever of unknown origin    Closed traumatic nondisplaced fracture of two ribs of right side with routine healing    Altered mental status    Abdominal pain    Chronic mesenteric ischemia    Chest pain    Shingles    AVNRT (AV vinayak re-entry tachycardia)    Carotid artery stenosis    Acute UTI    Acute urinary retention    Left lower quadrant abdominal pain    Leukocytosis    PAF (paroxysmal atrial fibrillation)    Acute respiratory failure with hypoxia    Ileus, postoperative     Past Medical History:   Diagnosis Date    Anxiety     Atherosclerosis of native arteries of extremities with intermittent claudication, bilateral legs 2020    PVD    Bilateral carotid artery stenosis 2020    CAD (coronary artery disease)     unspecified    Carotid artery disease     Cholelithiasis     Constipation     COPD (chronic obstructive pulmonary disease)     Coronary atherosclerosis 2019    H/O CABG SVG to first diagonal branch and to the LAD as well as SVG to the lateral marginal branch of the circumflex complicated by right sided subcortical infarct with left sided hemiparesis    Degenerative disc disease, lumbar 2021    Dyslipidemia 2019    Essential hypertension 2019    GERD  (gastroesophageal reflux disease)     Hyperlipidemia     Hypertension     Injury of back     fractured vertebra 2/26/23    Ischemic heart disease     Peripheral edema     PVD (peripheral vascular disease) with claudication     latanya legs    Stroke 2011    Unilateral osteoarthritis of hip 11/24/2021    Vertigo      Past Surgical History:   Procedure Laterality Date    ANGIOPLASTY      x2    CARDIAC CATHETERIZATION N/A 05/06/2024    Procedure: Left Heart Cath;  Surgeon: Gurwinder Hurd MD;  [facility];  Service: Cardiovascular;  Laterality: N/A;    CARDIAC CATHETERIZATION N/A 05/06/2024    Procedure: Coronary angiography;  Surgeon: Gurwinder Hurd MD;  Location:  [facility];  Service: Cardiovascular;  Laterality: N/A;    CARDIAC CATHETERIZATION N/A 05/06/2024    Procedure: Percutaneous Coronary Intervention;  Surgeon: Gurwinder Hurd MD;  Location:  [facility];  Service: Cardiovascular;  Laterality: N/A;    CARDIAC CATHETERIZATION N/A 05/06/2024    Procedure: Stent TC coronary;  Surgeon: Gurwinder Hurd MD;  Location:  [facility];  Service: Cardiovascular;  Laterality: N/A;    CARDIAC CATHETERIZATION  05/06/2024    Procedure: Saphenous Vein Graft;  Surgeon: Gurwinder Hurd MD;  [facility];  Service: Cardiovascular;;    CAROTID ENDARTERECTOMY  2000    Left    CAROTID ENDARTERECTOMY Left 06/26/2001    CHOLECYSTECTOMY      COLON RESECTION SMALL BOWEL N/A 7/7/2024    Procedure: EXPLORATORY LAPAROTOMY, SMALL BOWEL RESECTION;  Surgeon: Mark Anthony Meredith MD;  Location: [facility] MAIN OR;  Service: General;  Laterality: N/A;    COLONOSCOPY  2010    COLONOSCOPY  2012    CORONARY ARTERY BYPASS GRAFT  08/2011    CORONARY STENT PLACEMENT      X17 stents    CORONARY STENT PLACEMENT      x5    INTERVENTIONAL RADIOLOGY PROCEDURE N/A 05/06/2024    Procedure: Intravascular Ultrasound;  Surgeon:  Gurwinder Hurd MD;  [facility];  Service: Cardiovascular;  Laterality: N/A;      General Information       Row Name 07/12/24 1441          Physical Therapy Time and Intention    Document Type therapy note (daily note)  -CW     Mode of Treatment physical therapy;individual therapy  -       Row Name 07/12/24 1441          General Information    Patient Profile Reviewed yes  -CW     Existing Precautions/Restrictions fall;oxygen therapy device and L/min  NG tube to suction  -       Row Name 07/12/24 1441          Cognition    Orientation Status (Cognition) oriented to;person;place;verbal cues/prompts needed for orientation  -       Row Name 07/12/24 1441          Safety Issues, Functional Mobility    Safety Issues Affecting Function (Mobility) insight into deficits/self-awareness;awareness of need for assistance;judgment  -CW     Impairments Affecting Function (Mobility) balance;endurance/activity tolerance;pain;strength  -CW               User Key  (r) = Recorded By, (t) = Taken By, (c) = Cosigned By      Initials Name Provider Type    CW Linnette Singh, PT Physical Therapist                   Mobility       Row Name 07/12/24 1448          Bed Mobility    Bed Mobility sit-supine  -CW     Sit-Supine [place] (Bed Mobility) verbal cues;minimum assist (75% patient effort)  -CW     Assistive Device (Bed Mobility) head of bed elevated;bed rails  -CW     Comment, (Bed Mobility) UIC at arrival  -       Row Name 07/12/24 1448          Sit-Stand Transfer    Sit-Stand [place] (Transfers) minimum assist (75% patient effort);verbal cues;moderate assist (50% patient effort);1 person assist  -CW     Assistive Device (Sit-Stand Transfers) walker, front-wheeled  -     Comment, (Sit-Stand Transfer) STS x2  -       Row Name 07/12/24 1448          Gait/Stairs (Locomotion)    [place]owell Level (Gait) minimum assist (75% patient effort);verbal cues  -CW     Assistive Device  (Gait) walker, front-wheeled  -CW     Distance in Feet (Gait) 3  -CW     Deviations/Abnormal Patterns (Gait) david decreased;gait speed decreased;stride length decreased  -CW     Bilateral Gait Deviations forward flexed posture;heel strike decreased  -CW     Comment, (Gait/Stairs) Static standing for brief change and hygiene needs, fatigues quickly  -CW               User Key  (r) = Recorded By, (t) = Taken By, (c) = Cosigned By      Initials Name Provider Type    Linnette Santana PT Physical Therapist                   Obj/Interventions       Row Name 07/12/24 1449          Balance    Balance Assessment standing static balance;standing dynamic balance  -CW     Static Standing Balance minimal assist  -CW     Dynamic Standing Balance minimal assist  -CW     Position/Device Used, Standing Balance supported;walker, front-wheeled  -CW               User Key  (r) = Recorded By, (t) = Taken By, (c) = Cosigned By      Initials Name Provider Type    Linnette Santana PT Physical Therapist                   Goals/Plan    No documentation.                  Clinical Impression       Row Name 07/12/24 1449          Pain    Pretreatment Pain Rating 0/10 - no pain  -CW       Row Name 07/12/24 1449          Plan of Care Review    Plan of Care Reviewed With patient;family  -     Outcome Evaluation Pt participated with PT this PM. Pt up in chair, reports soiled brief and requested return to bed after session. Pt required min/mod A for STS and tolerated standing approx 90 seconds for brief change and hygiene needs. Pt able to take a few steps but fatigues quickly. He reports he feels unsteady on his feet and requested to sit back down. He declined further ambulation attempts. PT continues to recommend SNF at d/c. Rec up to chair with nsg staff over weekend.  -CW       Row Name 07/12/24 1449          Vital Signs    O2 Delivery Pre Treatment nasal cannula  3L  -CW     Intra SpO2 (%) 88  -CW     O2 Delivery Intra Treatment  nasal cannula  -CW     O2 Delivery Post Treatment nasal cannula  -CW       Row Name 07/12/24 1449          Positioning and Restraints    Pre-Treatment Position sitting in chair/recliner  -CW     Post Treatment Position bed  -CW     In Bed notified nsg;call light within reach;encouraged to call for assist;exit alarm on;with family/caregiver;side rails up x3;fowlers  -CW               User Key  (r) = Recorded By, (t) = Taken By, (c) = Cosigned By      Initials Name Provider Type    Linnette Santana PT Physical Therapist                   Outcome Measures       Row Name 07/12/24 1451          How much help from another person do you currently need...    Turning from your back to your side while in flat bed without using bedrails? 3  -CW     Moving from lying on back to sitting on the side of a flat bed without bedrails? 3  -CW     Moving to and from a bed to a chair (including a wheelchair)? 3  -CW     Standing up from a chair using your arms (e.g., wheelchair, bedside chair)? 3  -CW     Climbing 3-5 steps with a railing? 1  -CW     To walk in hospital room? 2  -CW     AM-PAC 6 Clicks Score (PT) 15  -CW     Highest Level of Mobility Goal 4 --> Transfer to chair/commode  -CW       Row Name 07/12/24 1451          Functional Assessment    Outcome Measure Options AM-PAC 6 Clicks Basic Mobility (PT)  -CW               User Key  (r) = Recorded By, (t) = Taken By, (c) = Cosigned By      Initials Name Provider Type    Linnette Santana PT Physical Therapist                                 Physical Therapy Education       Title: PT OT SLP Therapies (In Progress)       Topic: Physical Therapy (In Progress)       Point: Mobility training (In Progress)       Learning Progress Summary             Patient Acceptance, E,TB, NR by EVERARDO at 7/11/2024 0919                         Point: Home exercise program (Not Started)       Learner Progress:  Not documented in this visit.              Point: Body mechanics (In Progress)        Learning Progress Summary             Patient Acceptance, E,TB, NR by  at 7/11/2024 0919                         Point: Precautions (In Progress)       Learning Progress Summary             Patient Acceptance, E,TB, NR by  at 7/11/2024 0919                                         User Key       Initials Effective Dates Name Provider Type Discipline     09/22/22 -  Cira Ruth, PT Physical Therapist PT                  PT Recommendation and Plan     Plan of Care Reviewed With: patient, family  Outcome Evaluation: Pt participated with PT this PM. Pt up in chair, reports soiled brief and requested return to bed after session. Pt required min/mod A for STS and tolerated standing approx 90 seconds for brief change and hygiene needs. Pt able to take a few steps but fatigues quickly. He reports he feels unsteady on his feet and requested to sit back down. He declined further ambulation attempts. PT continues to recommend SNF at d/c. Rec up to chair with nsg staff over weekend.     Time Calculation:         PT Charges       Row Name 07/12/24 1451             Time Calculation    Start Time 1410  -CW      Stop Time 1428  -CW      Time Calculation (min) 18 min  -CW      PT Received On 07/12/24  -CW      PT - Next Appointment 07/13/24  -CW                User Key  (r) = Recorded By, (t) = Taken By, (c) = Cosigned By      Initials Name Provider Type    CW Linnette Singh PT Physical Therapist                  Therapy Charges for Today       Code Description Service Date Service Provider Modifiers Qty    [id number]  PT THERAPEUTIC ACT EA 15 MIN 7/12/2024 Linnette Singh PT GP 1            PT G-Codes  Outcome Measure Options: AM-PAC 6 Clicks Basic Mobility (PT)  AM-PAC 6 Clicks Score (PT): 15  PT Discharge Summary  Anticipated Discharge Disposition (PT): skilled nursing facility    Linnette Singh PT  7/12/2024

## 2024-07-12 NOTE — DISCHARGE PLACEMENT REQUEST
"Otis Martinez (73 y.o. Male)       Date of Birth   1950    Social Security Number       Address   [address] [address]    Home Phone   [phone]    MRN   [id number]       [facility]    Marital Status                               Admission Date   7/1/24    Admission Type   Emergency    Admitting Provider   Ayush Kaiser MD    Attending Provider   Ayush Kaiser MD    Department, Room/Bed   [address], E564/1       Discharge Date       Discharge Disposition       Discharge Destination                                 Attending Provider: Ayush Kaiser MD    Allergies: Hydrochlorothiazide, Pletal [Cilostazol], Tamsulosin    Isolation: None   Infection: None   Code Status: CPR    Ht: 180.3 cm (71\")   Wt: 88.9 kg (195 lb 15.8 oz)    Admission Cmt: None   Principal Problem: Left lower quadrant abdominal pain [R10.32]                   Active Insurance as of 7/1/2024       Primary Coverage       Payor Plan Insurance Group Employer/Plan Group    MEDICARE MEDICARE A & B        Payor Plan Address Payor Plan Phone Number Payor Plan Fax Number Effective Dates    [address] [phone]  10/1/2004 - None Entered    [place] [address]         Subscriber Name Subscriber Birth Date Member ID       OTIS MARTINEZ 1950 [id number]                     Emergency Contacts        (Rel.) Home Phone Work Phone Mobile Phone    Tricia Martinez (Spouse) [phone] -- [phone]    HERMELINDO TRAN (Daughter) -- -- [phone]              Emergency Contact Information       Name Relation Home Work Mobile    Tricia Martinez Spouse [phone]    HERMELINDO TRAN Daughter   [phone]          "

## 2024-07-12 NOTE — PLAN OF CARE
Goal Outcome Evaluation:  Plan of Care Reviewed With: patient, family           Outcome Evaluation: Pt participated with PT this PM. Pt up in chair, reports soiled brief and requested return to bed after session. Pt required min/mod A for STS and tolerated standing approx 90 seconds for brief change and hygiene needs. Pt able to take a few steps but fatigues quickly. He reports he feels unsteady on his feet and requested to sit back down. He declined further ambulation attempts. PT continues to recommend SNF at d/c. Rec up to chair with nsg staff over weekend.      Anticipated Discharge Disposition (PT): skilled nursing facility

## 2024-07-12 NOTE — CASE MANAGEMENT/SOCIAL WORK
Continued Stay Note  [facility]     Patient Name: Slade Martinez  MRN: [id number]  Today's Date: 7/12/2024    Admit Date: 7/1/2024    Plan: Home with family   Discharge Plan       Row Name 07/12/24 1600       Plan    Plan Home with family    Patient/Family in Agreement with Plan yes    Plan Comments Met with pt and wife in room. He confirmed the plan is for him to return home with his wife at discharge. His wife will provide transportation. He is still requiring oxygen at rest. Discussed this with him and his wife and they are hoping he can wean off the oxygen before discharge but if not, they would like to use [facility]. They deny any need for HH or rehab at this time. CCP following. UMA Peng RN                   Discharge Codes    No documentation.                 Expected Discharge Date and Time       Expected Discharge Date Expected Discharge Time    Jul 13, 2024               Matt Hubbard RN

## 2024-07-12 NOTE — PROGRESS NOTES
Name: Slade Martinez ADMIT: 2024   : 1950  PCP: Triny Hannon MD    MRN: [id number] LOS: 10 days   AGE/SEX: 73 y.o. male  ROOM: [facility]     Subjective   Subjective   Interval events reviewed.  Continues to have frequent hiccups.  NG tube with persistent output.  He did have bowel movements after suppository and feels better.  He is much more interactive today but is confused.     In talking with wife, it seems patient has previously been diagnosed with gastroparesis.  He at one point was on Reglan in the outpatient setting and then also took domperidone but has not been on anything recently.  He was followed at the time by the GI group at [facility].       Objective   Objective   Vital Signs  Temp:  [97.1 °F (36.2 °C)-98.4 °F (36.9 °C)] 97.1 °F (36.2 °C)  Heart Rate:  [69-86] 86  Resp:  [16-17] 17  BP: (132-178)/(59-69) 178/69  SpO2:  [92 %-96 %] 94 %  on  Flow (L/min):  [2] 2;   Device (Oxygen Therapy): humidified;nasal cannula  Body mass index is 27.33 kg/m².  Physical Exam  Vitals and nursing note reviewed.   Constitutional:       General: He is not in acute distress.     Appearance: He is not ill-appearing.   Eyes:      General: No scleral icterus.  Cardiovascular:      Rate and Rhythm: Normal rate and regular rhythm.   Pulmonary:      Effort: Pulmonary effort is normal. No respiratory distress.      Breath sounds: Rhonchi (Unchanged from yesterday.  Right greater than left) present.      Comments: Decreased breath sounds  Abdominal:      General: There is no distension.      Palpations: Abdomen is soft.      Tenderness: There is abdominal tenderness (Generalized postop). There is no guarding.      Comments: Hypoactive bowel sounds   Musculoskeletal:         General: No swelling.   Skin:     General: Skin is warm and dry.      Findings: No bruising.   Neurological:      Mental Status: He is alert. He is disoriented.       Results Review     I reviewed the patient's new clinical  results.  Results from last 7 days   Lab Units 07/12/24  0511 07/11/24  0439 07/10/24  0253 07/09/24  0530   WBC 10*3/mm3 18.83* 23.17* 17.91* 15.50*   HEMOGLOBIN g/dL 11.2* 12.8* 11.1* 10.5*   PLATELETS 10*3/mm3 310 334 298 244     Results from last 7 days   Lab Units 07/12/24  0511 07/11/24  0439 07/10/24  0253 07/09/24  0530   SODIUM mmol/L 142 137 139 136   POTASSIUM mmol/L 3.6 3.7 3.7 4.0   CHLORIDE mmol/L 105 103 108* 103   CO2 mmol/L 25.3 20.3* 20.0* 22.3   BUN mg/dL 24* 24* 26* 23   CREATININE mg/dL 0.67* 0.86 0.65* 0.65*   GLUCOSE mg/dL 124* 135* 113* 131*   EGFR mL/min/1.73 98.0 90.9 98.9 98.9     Results from last 7 days   Lab Units 07/12/24  0511 07/11/24  0439 07/10/24  0253 07/09/24  0530 07/08/24  0522   ALBUMIN g/dL 2.3* 2.2* 2.3* 2.5* 2.5*   BILIRUBIN mg/dL 0.8 0.8  --  0.5 0.9   ALK PHOS U/L 110 115  --  97 91   AST (SGOT) U/L 62* 46*  --  25 19   ALT (SGPT) U/L 41 42*  --  18 20     Results from last 7 days   Lab Units 07/12/24  0511 07/11/24  0439 07/10/24  0253 07/09/24  0530 07/08/24  0522 07/07/24  0725   CALCIUM mg/dL 8.4* 8.4* 8.5* 8.7 8.2* 9.0   ALBUMIN g/dL 2.3* 2.2* 2.3* 2.5* 2.5* 3.0*   MAGNESIUM mg/dL  --   --  2.2 2.5* 2.3 2.0   PHOSPHORUS mg/dL  --   --  3.3 2.7 3.4 3.6     Results from last 7 days   Lab Units 07/07/24  1420   LACTATE mmol/L 3.3*     Glucose   Date/Time Value Ref Range Status   07/10/2024 0042 122 70 - 130 mg/dL Final       XR Abdomen 2+ VW with Chest 1 VW    Result Date: 7/12/2024  1. Multiple segments of moderately severely dilated small bowel as described. These appear more severely dilated than on yesterday's study. 2. Bilateral pulmonary infiltrates right worse than left. On the right these infiltrates have progressed somewhat since yesterday while in the left base this finding may be slightly improved. 3. Moderate gaseous distention of the stomach      XR Abdomen KUB    Result Date: 7/11/2024  Nasogastric tube extends down the right mainstem bronchus.  FINDINGS  were called to the patient's nurse at 2:48 a.m.  This report was finalized on 7/11/2024 2:48 AM by Dr. Shawnee Preston M.D on Workstation: [id number]      XR Chest 1 View    Result Date: 7/11/2024   1. Increasing bilateral alveolar and interstitial infiltrates. 2. Increasing gaseous distention of bowel within the upper abdomen, likely gastric distention.  This report was finalized on 7/11/2024 1:26 AM by Dr. Shawnee Preston M.D on Workstation: [id number]       I have personally reviewed all medications:  Scheduled Medications  [Held by provider] amLODIPine, 5 mg, Oral, Q24H  aspirin, 325 mg, Oral, Once  [Held by provider] atenolol, 50 mg, Oral, BID  [Held by provider] atorvastatin, 40 mg, Oral, Nightly  [Held by provider] cetirizine, 10 mg, Oral, Daily  clopidogrel, 75 mg, Oral, Daily  metoprolol tartrate, 5 mg, Intravenous, Q8H  pantoprazole, 40 mg, Intravenous, BID AC  piperacillin-tazobactam, 3.375 g, Intravenous, Q8H  [Held by provider] pregabalin, 150 mg, Oral, TID  sodium chloride, 10 mL, Intravenous, Q12H    Infusions  amiodarone, 0.5 mg/min, Last Rate: 0.5 mg/min (07/12/24 0841)    Diet  NPO Diet NPO Type: Sips with Meds    I have personally reviewed:  [x]  Laboratory   []  Microbiology   [x]  Radiology   []  EKG/Telemetry  []  Cardiology/Vascular   []  Pathology    []  Records       Assessment/Plan     Active Hospital Problems    Diagnosis  POA    **Left lower quadrant abdominal pain [R10.32]  Yes    Acute respiratory failure with hypoxia [J96.01]  No    Ileus, postoperative [K91.89, K56.7]  No    PAF (paroxysmal atrial fibrillation) [I48.0]  No    Leukocytosis [D72.829]  Yes    Chest pain [R07.9]  Yes    Chronic mesenteric ischemia [K55.1]  Yes    S/P CABG (coronary artery bypass graft) [Z95.1]  Not Applicable    Essential hypertension [I10]  Yes      Resolved Hospital Problems    Diagnosis Date Resolved POA    Diarrhea [R19.7] 07/10/2024 Unknown    Hyponatremia [E87.1] 07/10/2024 Unknown    Small  bowel ischemia [K55.9] 07/10/2024 Unknown       73 y.o. male with complicated recent history including CAD/PCI, recent UTI with multiple antibiotic courses, chronic SMA occlusion admitted with Left lower quadrant abdominal pain.    LLQ abdominal pain/diarrhea, found to have long segment of pneumatosis and gangrenous bowel now status post laparotomy 7/7  - Postoperative ileus: NG tube placed due to poor gastric emptying.  Reviewed imaging and still has significant ileus on x-rays from this morning.  - Patient apparently does have some history of gastroparesis which may be making this worse although does have diffuse ileus.  Hold off on additional Reglan per my discussion today with Dr. Meredith.  - Continue PPI twice daily  - Creon on hold.  Eventual outpatient EUS per GI notes  - N.p.o. but would allow sips to take Plavix.  I reviewed his med list and put everything else on hold.  If unable to advance diet soon might have to consider TPN.  - Considered Thorazine for hiccups but contraindicated with amiodarone.    HTN uncontrolled.  Has not been getting his oral antihypertensives.  Will add scheduled Lopressor and monitor closely.    Acute hypoxic respiratory failure is better.  Likely related to aspiration pneumonitis.  Remains on Zosyn    A-fib/RVR: Appreciate cardiology assistance.  NSR now on amiodarone drip.  Personally discussed with Dr. Locke several times    CAD with recent stent placement in May.  Continue Plavix as above      SCDs  Disposition TBD pending progress.   D/w wife at bedside again today.  Encouraged him to keep getting out of bed is much as possible and use I-S      Ayush Kaiser MD  [facility]  07/12/24  15:30 EDT

## 2024-07-12 NOTE — PROGRESS NOTES
Postoperative day 5 small bowel resection for gangrenous small bowel (has severe coronary and peripheral vascular disease including occlusion of SMA)    Continuing to have fairly high nasogastric output.  No significant abdominal pain.    Remains afebrile with stable vital signs.    WBC 18.8 (down from 23.17 yesterday)  Hemoglobin 11.2  Albumin 2.3  Potassium 3.6  GFR 98    Abdomen is soft and essentially nontender.  Incision is healing well.    Postoperative ileus.  NG with reasonably high output.  No bowel function yet.  X-ray today shows probable persistent ileus.  Continue nasogastric decompression and await bowel function.  Dulcolax suppository ordered.  Bilateral pulmonary infiltrates on chest x-ray today, likely pneumonia.  On Zosyn.

## 2024-07-12 NOTE — PROGRESS NOTES
"Nutrition Services    Patient Name:  Slade Martinez  YOB: 1950  MRN: [id number]  Admit Date:  7/1/2024    Assessment Date:  07/12/24    Summary: FU NPO/clears x5 days. POD 5 small bowel resection  NG to LWS remains in place, output still high (3150 x 24 hrs) and has persistent ileus per MD notes. MD has ordered a dulcolax suppository to try to get his bowels moving and it looks like he has had one per I/O. Labs,  meds, skin reviewed.  Will cont to follow nutrition needs.    Plan/recs:  ADAT per Gen Surgery  SLP eval once cleared for po    RD to follow    CLINICAL NUTRITION ASSESSMENT      Reason for Assessment Follow-up Protocol     Diagnosis/Problem   Left lower quadrant abdominal pain, S/P bowel resection 7/7     Anthropometrics        Current Height  Current Weight  BMI kg/m2 Height: 180.3 cm (71\")  Weight: 88.9 kg (195 lb 15.8 oz) (07/12/24 0600)  Body mass index is 27.33 kg/m².   Adjusted BMI (if applicable)    BMI Category Overweight (25 - 29.9)   Ideal Body Weight (IBW) 172#   Usual Body Weight (UBW) 199-200#   Weight Trend Loss     Estimated Requirements         Weight used  81.6 kg    Calories  2,040 - 2448 kcal (25-30 kcal/kg)    Protein  81-98 g (1.0 - 1.2 gm/kg)    Fluid   (1 mL/kcal)     Labs       Pertinent Labs    Results from last 7 days   Lab Units 07/12/24  0511 07/11/24  0439 07/10/24  0253 07/09/24  0530   SODIUM mmol/L 142 137 139 136   POTASSIUM mmol/L 3.6 3.7 3.7 4.0   CHLORIDE mmol/L 105 103 108* 103   CO2 mmol/L 25.3 20.3* 20.0* 22.3   BUN mg/dL 24* 24* 26* 23   CREATININE mg/dL 0.67* 0.86 0.65* 0.65*   CALCIUM mg/dL 8.4* 8.4* 8.5* 8.7   BILIRUBIN mg/dL 0.8 0.8  --  0.5   ALK PHOS U/L 110 115  --  97   ALT (SGPT) U/L 41 42*  --  18   AST (SGOT) U/L 62* 46*  --  25   GLUCOSE mg/dL 124* 135* 113* 131*     Results from last 7 days   Lab Units 07/12/24  0511 07/11/24  0439 07/10/24  0253 07/09/24  0530 07/08/24  0522   MAGNESIUM mg/dL  --   --  2.2 2.5* 2.3   PHOSPHORUS mg/dL  " --   --  3.3 2.7 3.4   HEMOGLOBIN g/dL 11.2*   < > 11.1* 10.5* 10.7*   HEMATOCRIT % 34.4*   < > 33.9* 31.6* 32.8*   WBC 10*3/mm3 18.83*   < > 17.91* 15.50* 15.17*   ALBUMIN g/dL 2.3*   < > 2.3* 2.5* 2.5*    < > = values in this interval not displayed.     Results from last 7 days   Lab Units 07/12/24  0511 07/11/24  0439 07/10/24  0253 07/09/24  0530 07/08/24  0522   PLATELETS 10*3/mm3 310 334 298 244 253     COVID19   Date Value Ref Range Status   06/19/2024 Not Detected Not Detected - Ref. Range Final     Lab Results   Component Value Date    HGBA1C 5.8 (H) 02/28/2023          Medications           Scheduled Medications amLODIPine, 5 mg, Oral, Q24H  aspirin, 325 mg, Oral, Once  atenolol, 50 mg, Oral, BID  atorvastatin, 40 mg, Oral, Nightly  cetirizine, 10 mg, Oral, Daily  clopidogrel, 75 mg, Oral, Daily  meclizine, 12.5 mg, Oral, Daily  pantoprazole, 40 mg, Intravenous, BID AC  piperacillin-tazobactam, 3.375 g, Intravenous, Q8H  pregabalin, 150 mg, Oral, TID  sodium chloride, 10 mL, Intravenous, Q12H       Infusions amiodarone, 0.5 mg/min, Last Rate: 0.5 mg/min (07/12/24 0841)       PRN Medications   acetaminophen **OR** [DISCONTINUED] acetaminophen **OR** [DISCONTINUED] acetaminophen    HYDROcodone-acetaminophen    HYDROmorphone    ipratropium-albuterol    labetalol    nitroglycerin    ondansetron    sodium chloride    [COMPLETED] Insert Peripheral IV **AND** sodium chloride    sodium chloride    sodium chloride     Physical Findings          General Findings alert, oriented, on oxygen therapy   Oral/Mouth Cavity dental appliance, tooth or teeth missing   Edema  2+ (mild)   Gastrointestinal abdominal pain, hypoactive bowel sounds, last bowel movement: 7/7   Skin  surgical incision: abd   Tubes/Drains/Lines none, NG tube   NFPE No clinical signs of muscle wasting or fat loss     Current Nutrition Orders & Evaluation of Intake       Oral Nutrition     Food Allergies NKFA   Current PO Diet NPO Diet NPO Type: Strict  NPO   Supplement n/a    PO Evaluation     % PO Intake 0     Factors Affecting Intake: altered GI function, weakness   --  PES STATEMENT / NUTRITION DIAGNOSIS      Nutrition Dx Problem  Problem: Inadequate Oral Intake  Etiology: Medical Diagnosis - left lower quadrant abdominal pain    Signs/Symptoms: Report of Minimal PO Intake and Unintended Weight Change     NUTRITION INTERVENTION / PLAN OF CARE      Intervention Goal(s) Reduce/improve symptoms, Meet estimated needs, Disease management/therapy, Establish PO intake, Increase intake, Accepts oral nutrition supplement, Maintain weight, No significant weight loss, and PO intake goal %: 75         RD Intervention/Action Await initiation/advancement of PO diet, Continue to monitor, and Care plan reviewed   --      Prescription/Orders:       PO Diet       Supplements       Enteral Nutrition       Parenteral Nutrition    New Prescription Ordered? Continue same per protocol, No changes at this time   --      Monitor/Evaluation Per protocol   Discharge Plan/Needs Pending clinical course   --    RD to follow per protocol.      Electronically signed by:  Jennifer Ch RD  07/12/24 13:36 EDT

## 2024-07-12 NOTE — PROGRESS NOTES
LOS: 10 days   Patient Care Team:  Triny Hannon MD as PCP - General (Internal Medicine)  James Hubbard MD as Consulting Physician (Cardiology)      Chief Complaint: Follow up CAD, atrial flutter       Interval History: He had a bowel movement this morning and looks remarkably better    Objective   Vital Signs  Temp:  [97.3 °F (36.3 °C)-98.4 °F (36.9 °C)] 98.4 °F (36.9 °C)  Heart Rate:  [69-75] 69  Resp:  [16-18] 16  BP: (119-157)/(64-69) 157/64    Intake/Output Summary (Last 24 hours) at 7/12/2024 0944  Last data filed at 7/12/2024 0652  Gross per 24 hour   Intake 0 ml   Output 2950 ml   Net -2950 ml         Physical Exam:  Constitutional: Well appearing, well developed, no acute distress   HENT: Oropharynx clear and membrane moist  Eyes: Normal conjunctiva, no sclera icterus.  Neck: Supple, no carotid bruit bilaterally.  Cardiovascular: Regular rate and rhythm, No Murmur, No bilateral lower extremity edema.  Pulmonary: Normal respiratory effort, normal lung sounds, no wheezing.  Abdominal: Soft, nontender, no hepatosplenomegaly, liver is non-pulsatile.  Neurological: Alert and orient x 3.   Skin: Warm, dry, no ecchymosis, no rash.  Psych: Appropriate mood and affect. Normal judgment and insight.      Results Review:      Results from last 7 days   Lab Units 07/12/24  0511 07/11/24  0439 07/10/24  0253   SODIUM mmol/L 142 137 139   POTASSIUM mmol/L 3.6 3.7 3.7   CHLORIDE mmol/L 105 103 108*   CO2 mmol/L 25.3 20.3* 20.0*   BUN mg/dL 24* 24* 26*   CREATININE mg/dL 0.67* 0.86 0.65*   GLUCOSE mg/dL 124* 135* 113*   CALCIUM mg/dL 8.4* 8.4* 8.5*     Results from last 7 days   Lab Units 07/10/24  0253 07/10/24  0051   HSTROP T ng/L 20 23*     Results from last 7 days   Lab Units 07/12/24  0511 07/11/24  0439 07/10/24  0253   WBC 10*3/mm3 18.83* 23.17* 17.91*   HEMOGLOBIN g/dL 11.2* 12.8* 11.1*   HEMATOCRIT % 34.4* 39.0 33.9*   PLATELETS 10*3/mm3 310 334 298             Results from last 7 days   Lab Units  07/10/24  0253   MAGNESIUM mg/dL 2.2           I reviewed the patient's new clinical results.  I personally viewed and interpreted the patient's EKG/Telemetry data        Medication Review:   amLODIPine, 5 mg, Oral, Q24H  aspirin, 325 mg, Oral, Once  atenolol, 50 mg, Oral, BID  atorvastatin, 40 mg, Oral, Nightly  cetirizine, 10 mg, Oral, Daily  clopidogrel, 75 mg, Oral, Daily  meclizine, 12.5 mg, Oral, Daily  pantoprazole, 40 mg, Intravenous, BID AC  piperacillin-tazobactam, 3.375 g, Intravenous, Q8H  pregabalin, 150 mg, Oral, TID  sodium chloride, 10 mL, Intravenous, Q12H        amiodarone, 0.5 mg/min, Last Rate: 0.5 mg/min (07/12/24 0841)        Assessment & Plan       Left lower quadrant abdominal pain    Essential hypertension    S/P CABG (coronary artery bypass graft)    Chronic mesenteric ischemia    Chest pain    Leukocytosis    PAF (paroxysmal atrial fibrillation)    Acute respiratory failure with hypoxia    Ileus, postoperative    This is a jayson who came in with ischemic bowel there was initially unclear what was the cause of his pain but then he had a CT on Sunday which showed gas in his small bowel and he had an emergent resection of it by Hema Meredith who did his usual incredible job.  He has a small bowel ileus and now has had an NG tube and probably aspirated some before that.  He still has some rhonchi but he looks much improved today I told him we will get a get up and walk and be up in the chair.  He still is getting treated for aspiration pneumonia.  Now from a cardiac standpoint he has normal LV function he had a prior CABG he recently in May underwent complex PCI of his left main and LAD this was complicated by perforation that required a covered stent and because of that he has been on clopidogrel and aspirin I think we could just stop his aspirin at this point.  He looks remarkably better than yesterday      Negro Locke MD  07/12/24  09:44 EDT

## 2024-07-13 PROBLEM — E87.6 HYPOKALEMIA: Status: ACTIVE | Noted: 2024-01-01

## 2024-07-13 NOTE — PROGRESS NOTES
Name: Slade Martinez ADMIT: 2024   : 1950  PCP: Triny Hannon MD    MRN: [id number] LOS: 11 days   AGE/SEX: 73 y.o. male  ROOM: [facility]     Subjective   Subjective   Feeling about the same today. No N/V/abd pain. Several BMs after suppository yesterday. Passing flatus. Hiccups better. No SOA or CP or palp. Voiding well.       Objective   Objective   Vital Signs  Temp:  [97.1 °F (36.2 °C)-98.3 °F (36.8 °C)] 98.3 °F (36.8 °C)  Heart Rate:  [68-95] 75  Resp:  [16-22] 22  BP: (137-178)/(60-86) 150/68  SpO2:  [88 %-94 %] 91 %  on  Flow (L/min):  [2-6] 6;   Device (Oxygen Therapy): humidified;high-flow nasal cannula  Body mass index is 27.33 kg/m².  Physical Exam  Vitals and nursing note reviewed. Exam conducted with a chaperone present (Wife and RN).   Constitutional:       General: He is not in acute distress.     Appearance: He is ill-appearing. He is not toxic-appearing or diaphoretic.   HENT:      Head: Normocephalic.      Nose: Nose normal.      Comments: NG tube left nare     Mouth/Throat:      Mouth: Mucous membranes are moist.      Pharynx: Oropharynx is clear.   Eyes:      General: No scleral icterus.        Right eye: No discharge.         Left eye: No discharge.      Conjunctiva/sclera: Conjunctivae normal.   Cardiovascular:      Rate and Rhythm: Normal rate and regular rhythm.      Pulses: Normal pulses.      Comments: NSR on monitor  Pulmonary:      Effort: Pulmonary effort is normal. No respiratory distress.      Breath sounds: Normal breath sounds. No wheezing or rales.      Comments: Anteriorly   Abdominal:      General: There is no distension.      Palpations: Abdomen is soft.      Tenderness: There is abdominal tenderness (diffuse postoperative). There is no guarding or rebound.      Comments: Hypoactive BS   Musculoskeletal:         General: Swelling (trace in feet) present.      Cervical back: Neck supple.      Comments: SCDs in place   Skin:     General: Skin is warm and dry.  "     Capillary Refill: Capillary refill takes less than 2 seconds.      Coloration: Skin is not jaundiced.   Neurological:      General: No focal deficit present.      Mental Status: He is alert and oriented to person, place, and time. Mental status is at baseline.      Cranial Nerves: No cranial nerve deficit.      Coordination: Coordination normal.   Psychiatric:         Mood and Affect: Mood normal.         Behavior: Behavior normal.       Results Review     I reviewed the patient's new clinical results.  Results from last 7 days   Lab Units 07/13/24  0327 07/12/24  0511 07/11/24  0439 07/10/24  0253   WBC 10*3/mm3 17.89* 18.83* 23.17* 17.91*   HEMOGLOBIN g/dL 11.3* 11.2* 12.8* 11.1*   PLATELETS 10*3/mm3 277 310 334 298     Results from last 7 days   Lab Units 07/13/24  0327 07/12/24  0511 07/11/24  0439 07/10/24  0253   SODIUM mmol/L 145 142 137 139   POTASSIUM mmol/L 3.1* 3.6 3.7 3.7   CHLORIDE mmol/L 107 105 103 108*   CO2 mmol/L 23.4 25.3 20.3* 20.0*   BUN mg/dL 23 24* 24* 26*   CREATININE mg/dL 0.69* 0.67* 0.86 0.65*   GLUCOSE mg/dL 127* 124* 135* 113*   EGFR mL/min/1.73 97.1 98.0 90.9 98.9     Results from last 7 days   Lab Units 07/12/24  0511 07/11/24  0439 07/10/24  0253 07/09/24  0530 07/08/24  0522   ALBUMIN g/dL 2.3* 2.2* 2.3* 2.5* 2.5*   BILIRUBIN mg/dL 0.8 0.8  --  0.5 0.9   ALK PHOS U/L 110 115  --  97 91   AST (SGOT) U/L 62* 46*  --  25 19   ALT (SGPT) U/L 41 42*  --  18 20     Results from last 7 days   Lab Units 07/13/24 0327 07/12/24  0511 07/11/24  0439 07/10/24  0253 07/09/24  0530 07/08/24  0522 07/07/24  0725   CALCIUM mg/dL 8.3* 8.4* 8.4* 8.5* 8.7 8.2* 9.0   ALBUMIN g/dL  --  2.3* 2.2* 2.3* 2.5* 2.5* 3.0*   MAGNESIUM mg/dL  --   --   --  2.2 2.5* 2.3 2.0   PHOSPHORUS mg/dL  --   --   --  3.3 2.7 3.4 3.6     Results from last 7 days   Lab Units 07/07/24  1420   LACTATE mmol/L 3.3*     No results found for: \"HGBA1C\", \"POCGLU\"    XR Abdomen 2+ VW with Chest 1 VW    Result Date: 7/12/2024  1. " Multiple segments of moderately severely dilated small bowel as described. These appear more severely dilated than on yesterday's study. 2. Bilateral pulmonary infiltrates right worse than left. On the right these infiltrates have progressed somewhat since yesterday while in the left base this finding may be slightly improved. 3. Moderate gaseous distention of the stomach. 4. FINDINGS were communicated with the referring physician.  This report was finalized on 7/12/2024 11:37 PM by Dr. Dorian Whitney M.D on Workstation: [id number]       I have personally reviewed all medications:  Scheduled Medications  [Held by provider] amLODIPine, 5 mg, Oral, Q24H  aspirin, 325 mg, Oral, Once  [Held by provider] atenolol, 50 mg, Oral, BID  [Held by provider] atorvastatin, 40 mg, Oral, Nightly  [Held by provider] cetirizine, 10 mg, Oral, Daily  clopidogrel, 75 mg, Oral, Daily  ipratropium-albuterol, 3 mL, Nebulization, 4x Daily - RT  metoprolol tartrate, 5 mg, Intravenous, Q8H  pantoprazole, 40 mg, Intravenous, BID AC  piperacillin-tazobactam, 3.375 g, Intravenous, Q8H  [Held by provider] pregabalin, 150 mg, Oral, TID  sodium chloride, 10 mL, Intravenous, Q12H    Infusions  amiodarone, 0.5 mg/min, Last Rate: 0.5 mg/min (07/13/24 1145)    Diet  NPO Diet NPO Type: Sips with Meds    I have personally reviewed:  [x]  Laboratory   [x]  Microbiology   [x]  Radiology   [x]  EKG/Telemetry  [x]  Cardiology/Vascular   []  Pathology    [x]  Records       Assessment/Plan     Active Hospital Problems    Diagnosis  POA    **Left lower quadrant abdominal pain [R10.32]  Yes    Acute respiratory failure with hypoxia [J96.01]  No    Ileus, postoperative [K91.89, K56.7]  No    PAF (paroxysmal atrial fibrillation) [I48.0]  No    Leukocytosis [D72.829]  Yes    Chest pain [R07.9]  Yes    Chronic mesenteric ischemia [K55.1]  Yes    S/P CABG (coronary artery bypass graft) [Z95.1]  Not Applicable    Essential hypertension [I10]  Yes      Resolved  Hospital Problems    Diagnosis Date Resolved POA    Diarrhea [R19.7] 07/10/2024 Unknown    Hyponatremia [E87.1] 07/10/2024 Unknown    Small bowel ischemia [K55.9] 07/10/2024 Unknown         73 y.o. male with complicated recent history including CAD/PCI, recent UTI with multiple antibiotic courses, chronic SMA occlusion, admitted with left lower quadrant abdominal pain.     LLQ abdominal pain/diarrhea  Long segment of pneumatosis and gangrenous bowel  S/p laparotomy with SB resection (20in) on 7/7 by Viri  - Postoperative ileus: NG tube placed due to poor gastric emptying. Reviewed imaging and still had significant ileus on x-rays from yesterday morning. Some BMs and flatus since suppository. NG tube clamped now and may come out later today.  - Patient apparently does have some history of gastroparesis which may be making this worse although does have diffuse ileus. Hold off on additional Reglan per Dr. Meredith.  - Continue PPI twice daily  - Creon on hold. Eventual outpatient EUS per GI notes for pancreatic atrophy  - N.p.o. but allow sips to take Plavix (given recent stent). If unable to advance diet soon might have to consider TPN.  - Considered Thorazine for hiccups but contraindicated with amiodarone.     HTN, uncontrolled:  Has not been getting his oral antihypertensives. Added scheduled Lopressor and monitor closely. BPs acceptable.      Acute hypoxic respiratory failure: Likely related to aspiration pneumonitis. Remains on Zosyn per Pulm. Repeat CXR tomorrow. Wean O2 as able.     A-fib with RVR:  Appreciate Cardiology assistance. NSR now on amiodarone drip.     CAD with recent stent placement in May: Continue Plavix as above.    Hypokalemia: replace with protocol. Check Mg++ level.      SCDs for DVT prophylaxis.  Full code.  Discussed with patient, wife, and RN.  Anticipate discharge home with family, timing yet to be determined.  Expected Discharge Date: 7/15/2024; Expected Discharge Time:       Luis E JOEL  MD Willem  [facility]  07/13/24  11:50 EDT

## 2024-07-13 NOTE — PROGRESS NOTES
[facility]  [phone]  Dr. Jett Rios    Subjective:  LOS: 11    Chief Complaint: Respiratory failure    Still feels intermittently short of breath.  Requiring high flow cannula.  Continues to have abdominal pain.    Objective   Vital Signs past 24hrs  Temp range: Temp (24hrs), Av.8 °F (36.6 °C), Min:97.1 °F (36.2 °C), Max:98.3 °F (36.8 °C)    BP range: BP: (137-178)/(60-86) 150/86  Pulse range: Heart Rate:  [68-88] 81  Resp rate range: Resp:  [16-20] 20  Device (Oxygen Therapy): humidified;nasal cannulaFlow (L/min):  [2-5] 4  Oxygen range:SpO2:  [88 %-94 %] 93 %   Mechanical Ventilator:     Physical Exam  Eyes:      Pupils: Pupils are equal, round, and reactive to light.   Cardiovascular:      Rate and Rhythm: Normal rate and regular rhythm.      Heart sounds: No murmur heard.  Pulmonary:      Effort: Pulmonary effort is normal.      Breath sounds: Decreased breath sounds present.   Abdominal:      Palpations: Abdomen is soft. There is no mass.      Tenderness: There is abdominal tenderness.      Comments: Surgical wound   Musculoskeletal:         General: No swelling.   Neurological:      Mental Status: He is alert.       Results Review:    I have reviewed the laboratory and imaging data since the last note by [facility] physician.  My annotations are noted in assessment and plan.      Result Review:  I have personally reviewed the results from last note by [facility] physician to 2024 11:12 EDT and agree with these findings:  [x]  Laboratory list / accordion  [x]  Microbiology  [x]  Radiology  []  EKG/Telemetry   []  Cardiology/Vascular   []  Pathology  []  Old records  []  Other:    Medication Review:  I have reviewed the current MAR.  My annotations are noted in assessment and plan.    [Held by provider] amLODIPine, 5 mg, Oral, Q24H  aspirin, 325 mg, Oral, Once  [Held by provider] atenolol, 50 mg, Oral, BID  [Held by provider] atorvastatin, 40 mg, Oral, Nightly  [Held by provider] cetirizine,  10 mg, Oral, Daily  clopidogrel, 75 mg, Oral, Daily  metoprolol tartrate, 5 mg, Intravenous, Q8H  pantoprazole, 40 mg, Intravenous, BID AC  piperacillin-tazobactam, 3.375 g, Intravenous, Q8H  [Held by provider] pregabalin, 150 mg, Oral, TID  sodium chloride, 10 mL, Intravenous, Q12H        amiodarone, 0.5 mg/min, Last Rate: 0.5 mg/min (07/12/24 8498)      Lines, Drains & Airways       Active LDAs       Name Placement date Placement time Site Days    Peripheral IV 07/07/24 0608 Anterior;Distal;Right;Upper Antecubital 07/07/24  0608  Antecubital  6    Peripheral IV 07/10/24 1135 Left Antecubital 07/10/24  1135  Antecubital  2    Peripheral IV 07/12/24 1648 Anterior;Distal;Right Forearm 07/12/24  1648  Forearm  less than 1    NG/OG Tube Nasogastric 16 Fr Left nostril 07/11/24  0315  Left nostril  2                  Isolation status: No active isolations    Dietary Orders (From admission, onward)       Start     Ordered    07/12/24 1530  NPO Diet NPO Type: Sips with Meds  Diet Effective Now        Comments: May clamp tube to give meds if needed   Question:  NPO Type  Answer:  Sips with Meds    07/12/24 1530                    PCCM Problems  Acute hypoxic respiratory failure  Aspiration pneumonia  Postop ileus  Status post laparotomy  Gangrenous bowel with resection  Occlusion of the SMA  Chronic gastroparesis  CAD and recent PCI  A-fib with RVR        THESE ARE NEW MEDICAL PROBLEMS TO ME.    Plan of Treatment    He reports dyspnea.  Possibly due to splinting from abdominal pain as well.  Continue treatment for aspiration pneumonia.    Remains on antibiotics for aspiration pneumonia.    Postop ileus and general surgery is following.    A-fib RVR but currently rate controlled.    Will obtain another chest x-ray tomorrow.    Continue supplemental oxygen and wean as tolerated    Jett Rios MD  07/13/24  11:12 EDT      Part of this note may be an electronic transcription/translation of spoken language to printed text using  the Dragon Dictation System.

## 2024-07-13 NOTE — PROGRESS NOTES
Colorectal & General Surgery  Progress Note    Patient: Slade Martinez  YOB: 1950  MRN: [id number]      Assessment  Slade Martinez is a 73 y.o. male now postoperative day 6 from small bowel resection for gangrenous enteritis.  Nasogastric tube output significantly decreased and less bilious today.  Having multiple bowel movements.  I will clamp nasogastric tube for 4 hours.  If residual less than 200 and asymptomatic, we will remove the nasogastric tube and start a clear liquid diet today.    Multiple episodes of atrial fibrillation noted overnight, suspect this is due to sepsis secondary to pneumonia.  Agree with remainder of care.    Subjective  Feels well today.  Denies any significant abdominal pain.  Passing flatus and having bowel movements.    Objective    Vitals:    07/13/24 1135   BP:    Pulse: 75   Resp: 22   Temp:    SpO2: 91%       Physical Exam  Constitutional: Well-developed well-nourished, no acute distress  Neck: Supple, trachea midline  Respiratory: No increased work of breathing, Symmetric excursion  Cardiovascular: Well pefursed, no jugular venous distention evident   Abdominal: Incision is in good order with staples.  Soft, appropriately tender, less distended.    Skin: Warm, dry, no rash on visualized skin surfaces    Laboratory Results  I have personally reviewed CBC with WBC 17, hemoglobin 11, platelets 277.  BMP with creatinine 0.69, bicarb 23, potassium 3.1.    Radiology  None to review         Yunier Beebe MD  Colorectal & General Surgery  [facility]    [address], [address]  [place], [place], [address]  P: [phone]  F: [phone]

## 2024-07-13 NOTE — THERAPY TREATMENT NOTE
Patient Name: Slade Martinez  : 1950    MRN: [id number]                              Today's Date: 2024       Admit Date: 2024    Visit Dx:     ICD-10-CM ICD-9-CM   1. Left lower quadrant abdominal pain  R10.32 789.04   2. Chest pain, unspecified type  R07.9 786.50   3. Leukocytosis, unspecified type  D72.829 288.60   4. Lactic acidosis  E87.20 276.2   5. Hyponatremia  E87.1 276.1   6. Hypertensive urgency  I16.0 401.9   7. Small bowel ischemia  K55.9 557.9     Patient Active Problem List   Diagnosis    Dyslipidemia    Essential hypertension    Coronary atherosclerosis    S/P CABG (coronary artery bypass graft)    Peripheral artery disease    History of left-sided carotid endarterectomy    Fever of unknown origin    Closed traumatic nondisplaced fracture of two ribs of right side with routine healing    Altered mental status    Abdominal pain    Chronic mesenteric ischemia    Chest pain    Shingles    AVNRT (AV vinayak re-entry tachycardia)    Carotid artery stenosis    Acute UTI    Acute urinary retention    Left lower quadrant abdominal pain    Leukocytosis    PAF (paroxysmal atrial fibrillation)    Acute respiratory failure with hypoxia    Ileus, postoperative    Hypokalemia     Past Medical History:   Diagnosis Date    Anxiety     Atherosclerosis of native arteries of extremities with intermittent claudication, bilateral legs 2020    PVD    Bilateral carotid artery stenosis 2020    CAD (coronary artery disease)     unspecified    Carotid artery disease     Cholelithiasis     Constipation     COPD (chronic obstructive pulmonary disease)     Coronary atherosclerosis 2019    H/O CABG SVG to first diagonal branch and to the LAD as well as SVG to the lateral marginal branch of the circumflex complicated by right sided subcortical infarct with left sided hemiparesis    Degenerative disc disease, lumbar 2021    Dyslipidemia 2019    Essential hypertension 2019    GERD  (gastroesophageal reflux disease)     Hyperlipidemia     Hypertension     Injury of back     fractured vertebra 2/26/23    Ischemic heart disease     Peripheral edema     PVD (peripheral vascular disease) with claudication     latanya legs    Stroke 2011    Unilateral osteoarthritis of hip 11/24/2021    Vertigo      Past Surgical History:   Procedure Laterality Date    ANGIOPLASTY      x2    CARDIAC CATHETERIZATION N/A 05/06/2024    Procedure: Left Heart Cath;  Surgeon: Gurwinder Hurd MD;  Location:  [facility];  Service: Cardiovascular;  Laterality: N/A;    CARDIAC CATHETERIZATION N/A 05/06/2024    Procedure: Coronary angiography;  Surgeon: Gurwinder Hurd MD;  Location:  [facility];  Service: Cardiovascular;  Laterality: N/A;    CARDIAC CATHETERIZATION N/A 05/06/2024    Procedure: Percutaneous Coronary Intervention;  Surgeon: Gurwinder Hurd MD;  Location:  [facility];  Service: Cardiovascular;  Laterality: N/A;    CARDIAC CATHETERIZATION N/A 05/06/2024    Procedure: Stent TC coronary;  Surgeon: Gurwinder Hurd MD;  Location:  [facility];  Service: Cardiovascular;  Laterality: N/A;    CARDIAC CATHETERIZATION  05/06/2024    Procedure: Saphenous Vein Graft;  Surgeon: Gurwinder Hurd MD;  Location:  [facility];  Service: Cardiovascular;;    CAROTID ENDARTERECTOMY  2000    Left    CAROTID ENDARTERECTOMY Left 06/26/2001    CHOLECYSTECTOMY      COLON RESECTION SMALL BOWEL N/A 7/7/2024    Procedure: EXPLORATORY LAPAROTOMY, SMALL BOWEL RESECTION;  Surgeon: Mark Anthony Meredith MD;  Location: [facility] MAIN OR;  Service: General;  Laterality: N/A;    COLONOSCOPY  2010    COLONOSCOPY  2012    CORONARY ARTERY BYPASS GRAFT  08/2011    CORONARY STENT PLACEMENT      X17 stents    CORONARY STENT PLACEMENT      x5    INTERVENTIONAL RADIOLOGY PROCEDURE N/A 05/06/2024    Procedure: Intravascular Ultrasound;  Surgeon:  Gurwinder Hurd MD;  Location:  [facility];  Service: Cardiovascular;  Laterality: N/A;      General Information       Row Name 07/13/24 1610          Physical Therapy Time and Intention    Document Type therapy note (daily note)  -     Mode of Treatment individual therapy;physical therapy  -[facility] Name 07/13/24 1610          General Information    Patient Profile Reviewed yes  -     Existing Precautions/Restrictions fall;oxygen therapy device and L/min  NG to suction  -[facility] Name 07/13/24 1610          Cognition    Orientation Status (Cognition) oriented to;person;place  -[facility] Name 07/13/24 1610          Safety Issues, Functional Mobility    Impairments Affecting Function (Mobility) balance;endurance/activity tolerance;pain;strength  -               User Key  (r) = Recorded By, (t) = Taken By, (c) = Cosigned By      Initials Name Provider Type     Zhanna Pacheco PT Physical Therapist                   Mobility       Row Name 07/13/24 1611          Bed Mobility    Comment, (Bed Mobility) Patient UIC upon arrival  -[facility] Name 07/13/24 1611          Sit-Stand Transfer    Sit-Stand [place] (Transfers) minimum assist (75% patient effort);moderate assist (50% patient effort);verbal cues;1 person assist  -     Assistive Device (Sit-Stand Transfers) walker, front-wheeled  -     Comment, (Sit-Stand Transfer) Cues for lines. Patient continuously tangling himself in lines in sitting and standing  -[facility] Name 07/13/24 1611          Gait/Stairs (Locomotion)    Distance in Feet (Gait) 1  MIP x10  -               User Key  (r) = Recorded By, (t) = Taken By, (c) = Cosigned By      Initials Name Provider Type     Zhanna Pacheco PT Physical Therapist                   Obj/Interventions       Row Name 07/13/24 1612          Balance    Balance Assessment sitting static balance;sitting dynamic balance;sit to stand dynamic balance;standing dynamic  balance;standing static balance  -     Static Sitting Balance supervision  -     Dynamic Sitting Balance standby assist  -     Position, Sitting Balance sitting in chair  -     Sit to Stand Dynamic Balance minimal assist;verbal cues;moderate assist  -     Static Standing Balance contact guard  -     Dynamic Standing Balance minimal assist  -SM     Position/Device Used, Standing Balance supported;walker, front-wheeled  -     Balance Interventions sitting;standing;sit to stand;supported;static;dynamic  -SM               User Key  (r) = Recorded By, (t) = Taken By, (c) = Cosigned By      Initials Name Provider Type     Zhanna Pacheco, PT Physical Therapist                   Goals/Plan    No documentation.                  Clinical Impression       Row Name 07/13/24 1612          Pain    Pretreatment Pain Rating 0/10 - no pain  -     Posttreatment Pain Rating 0/10 - no pain  -SM       Row Name 07/13/24 1612          Plan of Care Review    Plan of Care Reviewed With patient  -     Outcome Evaluation Patient seen for PT session this PM. Patient sitting UI upon arrival. Patient required max encouragement to participate in PT. Patient performed STS 3x from chair with Govind-modA. Continuous cues for hand placement. On last attempt patient agreeable to attempt MIP with support of rwx and Govind to maintain sitting balance. Patient continues to have increased O2 needs with activity. Patient wanting to go home at d/c. At this time PT recommends SNF given current functional mobility demonstration. Acute PT will continue to monitor.  -       Row Name 07/13/24 1612          Vital Signs    O2 Delivery Pre Treatment hi-flow  -SM     O2 Delivery Intra Treatment hi-flow  -SM     O2 Delivery Post Treatment hi-flow  -SM     Pre Patient Position Sitting  -SM     Intra Patient Position Standing  -SM     Post Patient Position Sitting  -SM       Row Name 07/13/24 1612          Positioning and Restraints     Pre-Treatment Position sitting in chair/recliner  -     Post Treatment Position chair  -SM     In Chair notified nsg;sitting;call light within reach;encouraged to call for assist;with family/caregiver  No chair alarm on arrival. Wife at bedside  -               User Key  (r) = Recorded By, (t) = Taken By, (c) = Cosigned By      Initials Name Provider Type    Zhanna De La Cruz PT Physical Therapist                   Outcome Measures       Row Name 07/13/24 1615          How much help from another person do you currently need...    Turning from your back to your side while in flat bed without using bedrails? 3  -SM     Moving from lying on back to sitting on the side of a flat bed without bedrails? 3  -SM     Moving to and from a bed to a chair (including a wheelchair)? 2  -SM     Standing up from a chair using your arms (e.g., wheelchair, bedside chair)? 2  -SM     Climbing 3-5 steps with a railing? 1  -SM     To walk in hospital room? 2  -SM     AM-PAC 6 Clicks Score (PT) 13  -SM     Highest Level of Mobility Goal 4 --> Transfer to chair/commode  -       Row Name 07/13/24 1615          Functional Assessment    Outcome Measure Options AM-PAC 6 Clicks Basic Mobility (PT)  -               User Key  (r) = Recorded By, (t) = Taken By, (c) = Cosigned By      Initials Name Provider Type    Zhanna De La Cruz PT Physical Therapist                                 Physical Therapy Education       Title: PT OT SLP Therapies (In Progress)       Topic: Physical Therapy (In Progress)       Point: Mobility training (In Progress)       Learning Progress Summary             Patient Acceptance, E, NR by  at 7/13/2024 1615    Acceptance, E,TB, NR by  at 7/11/2024 0919                         Point: Home exercise program (In Progress)       Learning Progress Summary             Patient Acceptance, E, NR by NIDA at 7/13/2024 1615                         Point: Body mechanics (In Progress)       Learning Progress Summary              Patient Acceptance, E, NR by  at 7/13/2024 1615    Acceptance, E,TB, NR by  at 7/11/2024 0919                         Point: Precautions (In Progress)       Learning Progress Summary             Patient Acceptance, E, NR by  at 7/13/2024 1615    Acceptance, E,TB, NR by  at 7/11/2024 0919                                         User Key       Initials Effective Dates Name Provider Type Discipline     05/02/22 -  Zhanna Pacheco, MIRTA Physical Therapist PT    EVERARDO 09/22/22 -  Cira Ruth, MIRTA Physical Therapist PT                  PT Recommendation and Plan     Plan of Care Reviewed With: patient  Outcome Evaluation: Patient seen for PT session this PM. Patient sitting UIC upon arrival. Patient required max encouragement to participate in PT. Patient performed STS 3x from chair with Govind-modA. Continuous cues for hand placement. On last attempt patient agreeable to attempt MIP with support of rwx and Govind to maintain sitting balance. Patient continues to have increased O2 needs with activity. Patient wanting to go home at d/c. At this time PT recommends SNF given current functional mobility demonstration. Acute PT will continue to monitor.     Time Calculation:         PT Charges       Row Name 07/13/24 1615             Time Calculation    Start Time 1536  -SM      Stop Time 1551  -SM      Time Calculation (min) 15 min  -SM      PT Received On 07/13/24  -      PT - Next Appointment 07/15/24  -         Time Calculation- PT    Total Timed Code Minutes- PT 15 minute(s)  -SM         Timed Charges    81613 - PT Therapeutic Activity Minutes 15  -SM         Total Minutes    Timed Charges Total Minutes 15  -SM       Total Minutes 15  -SM                User Key  (r) = Recorded By, (t) = Taken By, (c) = Cosigned By      Initials Name Provider Type     Zhanna Pacheco, MIRTA Physical Therapist                  Therapy Charges for Today       Code Description Service Date Service Provider Modifiers  Qty    [id number]  PT THERAPEUTIC ACT EA 15 MIN 7/13/2024 Zhanna Pacheco, PT GP 1            PT G-Codes  Outcome Measure Options: AM-PAC 6 Clicks Basic Mobility (PT)  AM-PAC 6 Clicks Score (PT): 13  PT Discharge Summary  Anticipated Discharge Disposition (PT): skilled nursing facility    Zhanna Pacheco PT  7/13/2024

## 2024-07-13 NOTE — PROGRESS NOTES
LOS: 11 days   Patient Care Team:  Triny Hannon MD as PCP - General (Internal Medicine)  James Hubbard MD as Consulting Physician (Cardiology)    Chief Complaint:     Follow-up CAD, ischemic bowel    Interval History:     He overall is feeling better.  Is very weak - no chest pain.  Still dyspnea-struggled last night with some short windedness but is better now..     7/3/24  Interpretation Summary         Left ventricular systolic function is normal. Calculated left ventricular EF = 67.1%    Left ventricular wall thickness is consistent with mild concentric hypertrophy.    Left ventricular diastolic function is consistent with (grade I) impaired relaxation.      Objective   Vital Signs  Temp:  [97.1 °F (36.2 °C)-98.3 °F (36.8 °C)] 98.3 °F (36.8 °C)  Heart Rate:  [68-88] 81  Resp:  [16-20] 20  BP: (132-178)/(59-86) 150/86    Intake/Output Summary (Last 24 hours) at 7/13/2024 0903  Last data filed at 7/13/2024 0600  Gross per 24 hour   Intake --   Output 926 ml   Net -926 ml       Comfortable NAD  Neck supple, no JVD or thyromegaly appreciated  S1/S2 RRR, no m/r/g  Lungs decreased mid to base, normal effort  Abdomen soft, nondistended, tender (+) BS, no HSM appreciated  Extremities warm, no clubbing, cyanosis, or edema  No visible or palpable skin lesions  A/Ox4, mood and affect appropriate    Results Review:      Results from last 7 days   Lab Units 07/13/24 0327 07/12/24  0511 07/11/24  0439   SODIUM mmol/L 145 142 137   POTASSIUM mmol/L 3.1* 3.6 3.7   CHLORIDE mmol/L 107 105 103   CO2 mmol/L 23.4 25.3 20.3*   BUN mg/dL 23 24* 24*   CREATININE mg/dL 0.69* 0.67* 0.86   GLUCOSE mg/dL 127* 124* 135*   CALCIUM mg/dL 8.3* 8.4* 8.4*     Results from last 7 days   Lab Units 07/10/24  0253 07/10/24  0051   HSTROP T ng/L 20 23*     Results from last 7 days   Lab Units 07/13/24  0327 07/12/24  0511 07/11/24  0439   WBC 10*3/mm3 17.89* 18.83* 23.17*   HEMOGLOBIN g/dL 11.3* 11.2* 12.8*   HEMATOCRIT % 34.2* 34.4*  39.0   PLATELETS 10*3/mm3 277 310 334             Results from last 7 days   Lab Units 07/10/24  0253   MAGNESIUM mg/dL 2.2           I reviewed the patient's new clinical results.  I personally viewed and interpreted the patient's EKG/Telemetry data        Medication Review:   [Held by provider] amLODIPine, 5 mg, Oral, Q24H  aspirin, 325 mg, Oral, Once  [Held by provider] atenolol, 50 mg, Oral, BID  [Held by provider] atorvastatin, 40 mg, Oral, Nightly  [Held by provider] cetirizine, 10 mg, Oral, Daily  clopidogrel, 75 mg, Oral, Daily  metoprolol tartrate, 5 mg, Intravenous, Q8H  pantoprazole, 40 mg, Intravenous, BID AC  piperacillin-tazobactam, 3.375 g, Intravenous, Q8H  [Held by provider] pregabalin, 150 mg, Oral, TID  sodium chloride, 10 mL, Intravenous, Q12H        amiodarone, 0.5 mg/min, Last Rate: 0.5 mg/min (07/12/24 2153)        Assessment & Plan       Left lower quadrant abdominal pain    Essential hypertension    S/P CABG (coronary artery bypass graft)    Chronic mesenteric ischemia    Chest pain    Leukocytosis    PAF (paroxysmal atrial fibrillation)    Acute respiratory failure with hypoxia    Ileus, postoperative    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 But feeling better.   Aspiration pneumonitis-on Zosyn  Atrial flutter, converted to normal sinus rhythm has been sinus rhythm since 7/10/2024  CAD, s/p PCI of LM to mid LAD 5/6/2024  Hypertension, high still but able to better control with oral meds.     Doing better - continue iv amiodarone for now while npo - maybe to po meds by Monday as NGT is clamped and doing well and has moved bowels 4 times.     Will get up to chair.     Jessica Falk MD  07/13/24  09:03 EDT

## 2024-07-13 NOTE — PLAN OF CARE
Goal Outcome Evaluation:  Plan of Care Reviewed With: patient           Outcome Evaluation: Patient seen for PT session this PM. Patient sitting UIC upon arrival. Patient required max encouragement to participate in PT. Patient performed STS 3x from chair with Govind-modA. Continuous cues for hand placement. On last attempt patient agreeable to attempt MIP with support of rwx and Govind to maintain sitting balance. Patient continues to have increased O2 needs with activity. Patient wanting to go home at d/c. At this time PT recommends SNF given current functional mobility demonstration. Acute PT will continue to monitor.      Anticipated Discharge Disposition (PT): skilled nursing facility

## 2024-07-13 NOTE — PLAN OF CARE
Problem: Adult Inpatient Plan of Care  Goal: Plan of Care Review  Outcome: Ongoing, Progressing  Flowsheets (Taken 7/13/2024 0623)  Progress: no change  Plan of Care Reviewed With: patient  Outcome Evaluation: A&Ox2-3 more confused through the night, O2 3-5L needed a breathing tx during the night, sats in the low 90's sometimes hangs around 88%, 375cc out from NGT, turn q2hrs, amio gtt infusing, stayed in sinus through the night, dilaudid given once, plan of care continues.     Problem: Adult Inpatient Plan of Care  Goal: Absence of Hospital-Acquired Illness or Injury  Intervention: Prevent and Manage VTE (Venous Thromboembolism) Risk  Recent Flowsheet Documentation  Taken 7/13/2024 0400 by Dina Avila, RN  Activity Management: activity minimized  Taken 7/13/2024 0205 by Dina Avila, RN  Activity Management: activity minimized  Taken 7/13/2024 0000 by Dina Avila, RN  Activity Management: activity minimized  Taken 7/12/2024 2200 by Dina Avila, RN  Activity Management: activity minimized  Taken 7/12/2024 2022 by Dina Avila, RN  Activity Management: activity encouraged  VTE Prevention/Management: (patient on plavix)   patient refused intervention   sequential compression devices off

## 2024-07-14 NOTE — PLAN OF CARE
Goal Outcome Evaluation:  Plan of Care Reviewed With: (P) patient        Progress: (P) declining     AO x2 confused to time and situation. Pt sats in the high 80's, RT & Pulm notified, ABGs and Cxr ordered, pO2 51.1. high flow nc advanced to optiflow 50 100%. High RR. NSR w/ occasional Afib. NGT removed. IV potassium & antibiotics per order.

## 2024-07-14 NOTE — PLAN OF CARE
Problem: Aspiration (Enteral Nutrition)  Goal: Absence of Aspiration Signs and Symptoms  Outcome: Ongoing, Progressing     Problem: Device-Related Complication Risk (Enteral Nutrition)  Goal: Safe, Effective Therapy Delivery  Outcome: Ongoing, Progressing     Problem: Feeding Intolerance (Enteral Nutrition)  Goal: Feeding Tolerance  Outcome: Ongoing, Progressing   Goal Outcome Evaluation:

## 2024-07-14 NOTE — PLAN OF CARE
Goal Outcome Evaluation: Pt intubated shortly after arrival to ICU. Prop, Fentanyl, Levo gttps added, amio infusing upon arrival - PIV site extravisated, site marked/treated, PIVs removed from right arm for occlusion/infiltration. 3 new PIVs placed, Quad IJ placed after intubation along with OG tube to intermittent low wall suction - 400 out upon insertion. External cath placed so far no UOP - bladder scan 350. Family at bedside, updated on plan of care.

## 2024-07-14 NOTE — CONSULTS
"Nutrition Services    Patient Name:  Slade Martinez  YOB: 1950  MRN: [id number]  Admit Date:  7/1/2024Assessment Date:  07/14/24    CLINICAL NUTRITION ASSESSMENT    Comments: Consult for tube feeding assessment    POD 7 from small bowel resection for gangrenous enteritis. Worsening respiratory status requiring BIPAP and declared unsafe to eat by mouth d/t AMS and respiratory decline. Awaiting NG placement and then can initiate tube feeding @20ml/hr and advance slowly to goal rate of 70ml/hr. Monitor labs for refeeding (phos, Mg, K) considering pt has had minimal nutrition since admit.     Recommendations:  Formula: isosource HN    Method: pending NG placement   Feeding Schedule: continuous   Initial Rate/Volume: 20 mL  Goal Rate/Volume: 70 mL  Water Flush: 30 mL Q 4    RD to follow tolerance, labs and clinical course.     Encounter Information         Reason for Encounter TF assessment     Admitting Diagnosis Left lower quadrant abdominal pain     Pertinent Medical History CAD, COPD, HTN, GERD, PVD, stroke, chronic diverticulosis, chronic mesenteric ischemia    Current Issues Respiratory decline/AMS     Current Nutrition Orders & Evaluation of Intake       Oral Nutrition     Food Allergies NKFA   Current PO Diet NPO Diet NPO Type: Ice Chips, Strict NPO   Supplement NA   PO Evaluation      % PO Intake NPO    --  Anthropometrics          Height    Weight Height: 180.3 cm (71\")  Weight: 87.5 kg (192 lb 14.4 oz) (07/14/24 0622)    BMI kg/m2 Body mass index is 26.9 kg/m².    BMI Category Overweight (25 - 29.9)    Weight History  Wt Readings from Last 15 Encounters:   07/14/24 0622 87.5 kg (192 lb 14.4 oz)   07/12/24 0600 88.9 kg (195 lb 15.8 oz)   07/11/24 0500 89.4 kg (197 lb 1.5 oz)   07/10/24 0500 87.7 kg (193 lb 5.5 oz)   07/09/24 0500 89.8 kg (197 lb 15.6 oz)   07/07/24 0650 81.8 kg (180 lb 5.4 oz)   07/06/24 0635 82.1 kg (181 lb)   07/05/24 0628 83.2 kg (183 lb 6.8 oz)   07/04/24 0640 83.6 kg (184 " lb 4.9 oz)   07/03/24 1534 82.5 kg (181 lb 12.8 oz)   07/03/24 1116 81.6 kg (180 lb)   07/01/24 1354 81.6 kg (180 lb)   06/19/24 0323 86.2 kg (190 lb)   05/20/24 0843 86.2 kg (190 lb)   05/16/24 0951 86.2 kg (190 lb)   05/12/24 1535 86.6 kg (191 lb)   05/10/24 2124 87 kg (191 lb 14.4 oz)   05/10/24 1724 90.3 kg (199 lb 1.2 oz)   05/07/24 0934 90.3 kg (199 lb)   05/07/24 0706 90.3 kg (199 lb)   05/07/24 0500 87.4 kg (192 lb 10.9 oz)   05/05/24 1121 88.9 kg (196 lb)   05/05/24 0745 90.7 kg (199 lb 15.3 oz)   04/05/24 1234 90.7 kg (200 lb)   04/04/24 1147 90.7 kg (200 lb)   09/22/23 0419 92.7 kg (204 lb 5.9 oz)   09/21/23 1017 94.4 kg (208 lb 1.6 oz)   07/30/23 1000 90.7 kg (200 lb)   03/04/23 0044 98 kg (216 lb)   03/03/23 1945 98 kg (216 lb)   09/29/22 0827 101 kg (222 lb)   03/24/22 0832 106 kg (233 lb)   11/10/21 1129 103 kg (226 lb)   07/01/21 0810 106 kg (233 lb)   02/25/21 0933 102 kg (224 lb)        Estimated/Assessed Needs        Energy Requirements    Weight for Calculation 87.5 kg   Method for Estimation  20 kcal/kg, 25 kcal/kg   EST Needs (kcal/day) 1750-2188        Protein Requirements    Weight for Calculation 87.5 kg   EST Protein Needs (g/kg) 1.0 - 1.2 gm/kg   EST Daily Needs (g/day)         Fluid Requirements     Method for Estimation 1 mL/kcal    Estimated Needs (mL/day) 1750-2188         Physical Findings          Physical Appearance alert, oriented, overweight   Oral/Mouth Cavity WDL, dental appliance   Edema  2+ (mild)   Gastrointestinal abdominal pain, hypoactive bowel sounds, last bowel movement: 7/12 , passing flatus   Skin  surgical incision: abdomen    Tubes/Drains/Lines none   NFPE Not indicated at this time     Labs        Pertinent Labs Reviewed, listed below     Results from last 7 days   Lab Units 07/14/24  0629 07/13/24  0327 07/12/24  0511 07/11/24  0439   SODIUM mmol/L 143 145 142 137   POTASSIUM mmol/L 3.3* 3.1* 3.6 3.7   CHLORIDE mmol/L 106 107 105 103   CO2 mmol/L 25.0 23.4  25.3 20.3*   BUN mg/dL 20 23 24* 24*   CREATININE mg/dL 0.71* 0.69* 0.67* 0.86   CALCIUM mg/dL 8.0* 8.3* 8.4* 8.4*   BILIRUBIN mg/dL 1.5*  --  0.8 0.8   ALK PHOS U/L 86  --  110 115   ALT (SGPT) U/L 34  --  41 42*   AST (SGOT) U/L 44*  --  62* 46*   GLUCOSE mg/dL 136* 127* 124* 135*     Results from last 7 days   Lab Units 07/14/24  0629 07/11/24  0439 07/10/24  0253 07/09/24  0530   MAGNESIUM mg/dL 2.3  --  2.2 2.5*   PHOSPHORUS mg/dL 2.1*  --  3.3 2.7   HEMOGLOBIN g/dL 10.4*   < > 11.1* 10.5*   HEMATOCRIT % 32.0*   < > 33.9* 31.6*   WBC 10*3/mm3 19.67*   < > 17.91* 15.50*   ALBUMIN g/dL 2.0*   < > 2.3* 2.5*    < > = values in this interval not displayed.     Results from last 7 days   Lab Units 07/14/24  0629 07/13/24  0327 07/12/24  0511 07/11/24  0439 07/10/24  0253   PLATELETS 10*3/mm3 279 277 310 334 298     COVID19   Date Value Ref Range Status   06/19/2024 Not Detected Not Detected - Ref. Range Final     Lab Results   Component Value Date    HGBA1C 5.8 (H) 02/28/2023          Medications            Scheduled Medications [Held by provider] amLODIPine, 5 mg, Oral, Q24H  aspirin, 325 mg, Oral, Once  [Held by provider] atenolol, 50 mg, Oral, BID  [Held by provider] atorvastatin, 40 mg, Oral, Nightly  [Held by provider] cetirizine, 10 mg, Oral, Daily  clopidogrel, 75 mg, Oral, Daily  ipratropium-albuterol, 3 mL, Nebulization, 4x Daily - RT  metoprolol tartrate, 5 mg, Intravenous, Q8H  pantoprazole, 40 mg, Intravenous, BID AC  piperacillin-tazobactam, 3.375 g, Intravenous, Q8H  potassium chloride, 10 mEq, Intravenous, Q1H  [Held by provider] pregabalin, 150 mg, Oral, TID  sodium chloride, 10 mL, Intravenous, Q12H        Infusions amiodarone, 0.5 mg/min, Last Rate: 0.5 mg/min (07/14/24 1021)        PRN Medications   acetaminophen    HYDROcodone-acetaminophen    HYDROmorphone    ipratropium-albuterol    labetalol    meclizine    nitroglycerin    ondansetron    potassium chloride    Potassium Replacement - Follow  Nurse / BPA Driven Protocol    sodium chloride    [COMPLETED] Insert Peripheral IV **AND** sodium chloride    sodium chloride    sodium chloride     PES STATEMENT / NUTRITION DIAGNOSIS      Nutrition Dx Problem  Problem: Inadequate Oral Intake  Etiology: Factors Affecting Nutrition - worsening mental status and respiratory decline    Signs/Symptoms: NPO and need for enteral nutrition support      PLAN OF CARE  Intervention Goal        Intervention Goal(s) Initiate TF/PN, Maintain weight, and No significant weight loss     Nutrition Intervention         RD Action Await initiation of EN/PN, Continue to monitor, Care plan reviewed, and Recommend/order: enteral nutrition      Nutrition Prescription          Recommendation       Enteral Prescription:     Enteral Route tube placement pending    TF Delivery Method Continuous    Enteral Product Isosource HN    Modular None    Propofol Rate/Kcal     TF Start Rate/Volume  20 mL    TF Goal Rate/Volume 70 mL     Free Water Flush 30 mL Q 4    TF Provision at Goal:          Calories 2016 kcal, meets 100 % needs         Protein  91 gm protein, meets 100 % needs         Fluid (mL) 1361 mL free water + 180 mL in flushes    Prescription Ordered Yes     Monitor/Evaluation        Monitor Per protocol     RD to follow up per protocol.    Electronically signed by:  Georgia Cottrell RD  07/14/24 10:28 EDT

## 2024-07-14 NOTE — PLAN OF CARE
Problem: Adult Inpatient Plan of Care  Goal: Plan of Care Review  7/14/2024 1117 by Roxanne Britton RN  Outcome: Ongoing, Not Progressing   Goal Outcome Evaluation:        1015 Repeat ABG, paO2 in the 50s despite being placed on 50L 100% optiflow last night. Dyspneic on exertion, O2 sat drops mid80s, gradually gets to 90s after a couple min. Pulm at bedside, verbal order for CT Chest/Abd and transfer to ICU.     1100 Pt transferred to ICU.       Pt alert and confused, pupils 2 and reactive, follows commands,  moderate and equal on all extremities. SR with PAC's, on amio gtt 0.5mg. On 50L 100% Optiflow, has weak, nonproductive cough. Abdominal guarding when abd palpated, hypoactive bowel sounds noted. RLE +2 Edema. RUE +1 Edema. Pulses palpable. Plan for CT Chest/Abd. Report given to ICU RN. K currently being treated. Family at bedside updated.

## 2024-07-14 NOTE — PROGRESS NOTES
Pulm/CC note    Called for desaturations, has been on 15L HF since 12pm today.  Examined at bedside, diminished breath sounds, just received duoneb and states he feels better, no current increased work of breathing, spO2 90% 15L HF.  CXR ordered, ABG.  pO2 51, will start heated high flow.      0650: CXR worsening.  Checked on patient again, resting comfortably spO2 91% on 45L/100%.  Discussed with Dr Beebe at bedside, he is ok with bipap if we absolutely had to use if he continues to decline.  Spoke with wife at bedside about resp concerns, she wishes for intubation if needed.  Dr Beebe considering IV nutrition as opposed to advancing his diet, he did have a glass of water last night but no coughing with PO intake.  D/w Dr Mendoza

## 2024-07-14 NOTE — PROGRESS NOTES
Colorectal & General Surgery  Progress Note    Patient: Slade Martinez  YOB: 1950  MRN: [id number]    ADDENDUM  Mr. Martinez was transferred to the intensive care unit, and they plan to intubate.  CT scan demonstrates significant gastric distention secondary to positive pressure ventilation.  Given that he is so critically ill today, I think it is prudent to place a nasogastric tube and hold off on any enteral feeding today.  Tomorrow, we can decide whether to start enteral feeds versus TPN depending on his abdominal exam.    Please call with any questions or concerns.    Assessment  Salde Martinez is a 73 y.o. male now postoperative day 7 from small bowel resection for gangrenous enteritis.  Nasogastric tube was removed yesterday and he was started on clear liquid diet, which he tolerated well.  He continues to pass flatus, and his abdominal exam is benign.    Unfortunately, his respiratory status is significantly worsened overnight.  He is currently on maximum settings on high flow nasal cannula with oxygen saturation hovering around 90%.  I have discussed with our pulmonary colleagues, and they intend to place him on BiPAP if he worsens.  This certainly poses a risk to his relatively new small bowel anastomosis, but given his benign abdominal exam and the fact that he is 7 days out from surgery, I think BiPAP is reasonable.  Given his somewhat altered mental status from his respiratory decline, I do not think this safe for him to eat orally.  Plan to place nasal feeding tube today and start tube feeds at 20 cc/h.    No indication for antibiotics from the standpoint of his abdomen but agree with Zosyn for his pneumonia.    Please call with any questions or concerns.    Subjective  Worsening respiratory status overnight.  Resting comfortably this morning.    Objective    Vitals:    07/14/24 0622   BP:    Pulse: 85   Resp: 24   Temp:    SpO2: 92%       Physical Exam  Constitutional: Appears ill and  frail  Neck: Supple, trachea midline  Respiratory: Increased work of breathing, high flow nasal cannula in place  Cardiovascular: Well-perfused, no jugular distention  Abdominal: Soft, nontender, mildly distended, incision is in good order.  Skin: Warm, dry, no rash on visualized skin surfaces    Laboratory Results  I have personally reviewed CBC with WBC 19, hemoglobin 10, platelets 279.  CMP with creatinine 0.71, albumin 2.0, potassium 3.3, magnesium 2.3, phosphorus 2.1, total bilirubin 1.5, AST 44, ALT 34, alkaline phosphatase 86.    Radiology  I have personally reviewed chest x-ray which demonstrates worsening bilateral infiltrates.         Yunier Beebe MD  Colorectal & General Surgery  [facility]    [address], [address]  [place], [place], [address]  P: [phone]  F: [phone]

## 2024-07-14 NOTE — PROCEDURES
Procedure: Intubation for mechanical ventilation    Indication: respiratory failure    After sedation with propofol, cords were directly visualized with  Glidescope . Thereafter endotracheal tube size 8 was placed through the cords. Position was confirmed with bilateral air entry and change of color on capnometer. There were no complications of procedure.    Ventilator settings given to respiratory therapist. Stat portable chest x-ray has been requested and will be reviewed.    Electronically signed by Jett Rios MD, 07/14/24, 3:08 PM EDT.

## 2024-07-14 NOTE — PROGRESS NOTES
LOS: 12 days   Patient Care Team:  Triny Hannon MD as PCP - General (Internal Medicine)  James Hubbard MD as Consulting Physician (Cardiology)    Chief Complaint:     F/u resp failure.     Interval History:     Likely aspirated last night and went into respiratory distress is now intubated.  Also had IV amiodarone infiltrate in his right arm.    Objective   Vital Signs  Temp:  [98.2 °F (36.8 °C)-99.9 °F (37.7 °C)] 98.2 °F (36.8 °C)  Heart Rate:  [] 87  Resp:  [18-28] 20  BP: (150-177)/(60-77) 177/72  No intake or output data in the 24 hours ending 07/14/24 0829    Comfortable NAD  Neck supple, no JVD or thyromegaly appreciated  S1/S2 RRR, no m/r/g  Lungs rhonchorous throughout, normal effort  Abdomen S/NT/ND (+) BS, no HSM appreciated  Extremities warm, no clubbing, cyanosis, or edema  No visible or palpable skin lesions    Results Review:      Results from last 7 days   Lab Units 07/14/24  0629 07/13/24  0327 07/12/24  0511   SODIUM mmol/L 143 145 142   POTASSIUM mmol/L 3.3* 3.1* 3.6   CHLORIDE mmol/L 106 107 105   CO2 mmol/L 25.0 23.4 25.3   BUN mg/dL 20 23 24*   CREATININE mg/dL 0.71* 0.69* 0.67*   GLUCOSE mg/dL 136* 127* 124*   CALCIUM mg/dL 8.0* 8.3* 8.4*     Results from last 7 days   Lab Units 07/10/24  0253 07/10/24  0051   HSTROP T ng/L 20 23*     Results from last 7 days   Lab Units 07/14/24  0629 07/13/24  0327 07/12/24  0511   WBC 10*3/mm3 19.67* 17.89* 18.83*   HEMOGLOBIN g/dL 10.4* 11.3* 11.2*   HEMATOCRIT % 32.0* 34.2* 34.4*   PLATELETS 10*3/mm3 279 277 310             Results from last 7 days   Lab Units 07/14/24  0629   MAGNESIUM mg/dL 2.3           I reviewed the patient's new clinical results.  I personally viewed and interpreted the patient's EKG/Telemetry data        Medication Review:   [Held by provider] amLODIPine, 5 mg, Oral, Q24H  aspirin, 325 mg, Oral, Once  [Held by provider] atenolol, 50 mg, Oral, BID  [Held by provider] atorvastatin, 40 mg, Oral, Nightly  [Held by  provider] cetirizine, 10 mg, Oral, Daily  clopidogrel, 75 mg, Oral, Daily  ipratropium-albuterol, 3 mL, Nebulization, 4x Daily - RT  metoprolol tartrate, 5 mg, Intravenous, Q8H  pantoprazole, 40 mg, Intravenous, BID AC  piperacillin-tazobactam, 3.375 g, Intravenous, Q8H  potassium chloride, 10 mEq, Intravenous, Q1H  [Held by provider] pregabalin, 150 mg, Oral, TID  sodium chloride, 10 mL, Intravenous, Q12H        amiodarone, 0.5 mg/min, Last Rate: 0.5 mg/min (07/13/24 2250)  Pharmacy to Dose TPN,         Assessment & Plan       Left lower quadrant abdominal pain    Essential hypertension    S/P CABG (coronary artery bypass graft)    Chronic mesenteric ischemia    Chest pain    Leukocytosis    PAF (paroxysmal atrial fibrillation)    Acute respiratory failure with hypoxia    Ileus, postoperative    Hypokalemia    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 CXR looks better.   Aspiration pneumonitis with acute respiratory failure intubated, seems to aspirate again last night.  Was moved to the ICU-on Zosyn  Atrial flutter, converted to normal sinus rhythm has been sinus rhythm since 7/10/2024.  Has remained in normal sinus rhythm.  With this new respiratory decompensation-she could go back into atrial flutter, maintain IV amiodarone.  CAD, s/p PCI of LM to mid LAD 5/6/2024  Hypertension, high still but able to better control with oral meds.  Hypoalbuminemia    Getting PICC today- to start TPN.  Likely aspirated last night then decompensated respiratory status -intubated in the ICU.  Getting a lot out of the OG tube.  Start Lasix but continue IV fluid-he is volume overloaded and hypoalbuminemic so I think he needs a fluid but also needs some Lasix.  Lungs do not sound great-chest x-ray shows pulmonary edema.    Spoke with nursing and with Dr. Resendiz    Replace phosphorus and potassium.    Will  follow    Jessica Falk MD  07/14/24  08:29 EDT

## 2024-07-14 NOTE — PROGRESS NOTES
[facility]  [phone]  Dr. Jett Rios    Subjective:  LOS: 12    Chief Complaint: Respiratory failure    Worsening oxygenation overnight.  States his stomach feels rough.  Having nausea.  Denies pain but was very tender to the touch.    Objective   Vital Signs past 24hrs  Temp range: Temp (24hrs), Av.7 °F (37.1 °C), Min:98.2 °F (36.8 °C), Max:99.9 °F (37.7 °C)    BP range: BP: (150-177)/(60-77) 177/72  Pulse range: Heart Rate:  [] 87  Resp rate range: Resp:  [18-28] 20  Device (Oxygen Therapy): heated;humidified;high-flow nasal cannulaFlow (L/min):  [6-60] 60  Oxygen range:SpO2:  [78 %-96 %] 90 %   Mechanical Ventilator:     Physical Exam  Constitutional:       Appearance: He is ill-appearing.   Eyes:      Pupils: Pupils are equal, round, and reactive to light.   Cardiovascular:      Rate and Rhythm: Normal rate and regular rhythm.      Heart sounds: No murmur heard.  Pulmonary:      Effort: Pulmonary effort is normal.      Breath sounds: Decreased breath sounds present.   Abdominal:      General: Bowel sounds are normal.      Palpations: Abdomen is soft. There is no mass.      Tenderness: There is abdominal tenderness.      Comments: Surgical wound   Musculoskeletal:         General: No swelling.   Neurological:      Mental Status: He is alert.       Results Review:    I have reviewed the laboratory and imaging data since the last note by [facility] physician.  My annotations are noted in assessment and plan.      Result Review:  I have personally reviewed the results from last note by [facility] physician to 2024 10:10 EDT and agree with these findings:  [x]  Laboratory list / accordion  [x]  Microbiology  [x]  Radiology  []  EKG/Telemetry   []  Cardiology/Vascular   []  Pathology  []  Old records  []  Other:    Medication Review:  I have reviewed the current MAR.  My annotations are noted in assessment and plan.    [Held by provider] amLODIPine, 5 mg, Oral, Q24H  aspirin, 325 mg, Oral,  Once  [Held by provider] atenolol, 50 mg, Oral, BID  [Held by provider] atorvastatin, 40 mg, Oral, Nightly  [Held by provider] cetirizine, 10 mg, Oral, Daily  clopidogrel, 75 mg, Oral, Daily  ipratropium-albuterol, 3 mL, Nebulization, 4x Daily - RT  metoprolol tartrate, 5 mg, Intravenous, Q8H  pantoprazole, 40 mg, Intravenous, BID AC  piperacillin-tazobactam, 3.375 g, Intravenous, Q8H  potassium chloride, 10 mEq, Intravenous, Q1H  [Held by provider] pregabalin, 150 mg, Oral, TID  sodium chloride, 10 mL, Intravenous, Q12H        amiodarone, 0.5 mg/min, Last Rate: 0.5 mg/min (07/13/24 2250)      Lines, Drains & Airways       Active LDAs       Name Placement date Placement time Site Days    Peripheral IV 07/07/24 0608 Anterior;Distal;Right;Upper Antecubital 07/07/24  0608  Antecubital  6    Peripheral IV 07/10/24 1135 Left Antecubital 07/10/24  1135  Antecubital  2    Peripheral IV 07/12/24 1648 Anterior;Distal;Right Forearm 07/12/24  1648  Forearm  less than 1    NG/OG Tube Nasogastric 16 Fr Left nostril 07/11/24  0315  Left nostril  2                  Isolation status: No active isolations    Dietary Orders (From admission, onward)       Start     Ordered    07/14/24 0841  Feeding Tube Insertion - Cortrak System  (Feeding Tube Insertion - Cortrak System)  Once        Question:  Feeding Tube Type  Answer:  ND - Nasoduoenal    07/14/24 0841    07/14/24 0841  Tube Feeding: Formula: Impact Peptide 1.5 (Pivot 1.5); Feeding Type: Continuous; Start at: 20 mL/hr; Then Advance By: Do Not Advance; Water Flush: 30 mL; Every: 4 hours; Water Bolus: None  (Tube Feeding + NPO)  Diet Effective Now        Question Answer Comment   Formula Impact Peptide 1.5 (Pivot 1.5)    Feeding Type Continuous    Start at 20 mL/hr    Then Advance By Do Not Advance    Water Flush 30 mL    Every 4 hours    Water Bolus None        07/14/24 0841    07/14/24 0841  NPO Diet NPO Type: Tube Feeding  (Tube Feeding + NPO)  Diet Effective Now        Question:   NPO Type  Answer:  Tube Feeding    07/14/24 0841                    PCCM Problems  Acute hypoxic respiratory failure  Aspiration pneumonia  Postop ileus  Status post laparotomy  Gangrenous bowel with resection  Occlusion of the SMA  Chronic gastroparesis  CAD and recent PCI  A-fib with RVR        Plan of Treatment    Worsening oxygenation.  ABG just obtained on Optiflow shows pO2 in the 50s.  Will need to go to the ICU and explained to spouse as well.    Chest x-ray shows worsening aspiration pneumonia.  Note n.p.o. as per general surgery.    Postop ileus and general surgery is following.  Now NPO.  Bowel sounds are present.  However patient is extremely tender.  Will get CT abdomen.  Will also do CT chest as patient will be going to CT scan anyway.    A-fib RVR but currently rate controlled.  Patient is not on anticoagulation.  Remains on amiodarone drip.    Remains on aspirin and Plavix.  Recent PCI in May 2024.    Discussed with spouse at bedside.  Discussed with RN and RT at bedside.    Guarded prognosis.    I spent 35 mins critical care time in care of this patient outside of any procedures and not overlapping with any other provider.       Jett Rios MD  07/14/24  10:10 EDT      Part of this note may be an electronic transcription/translation of spoken language to printed text using the Dragon Dictation System.

## 2024-07-14 NOTE — PROCEDURES
Procedure: Placing central venous access in right internal jugular vein    Indication: vascular access    Informed consent was obtained from the patient/family/health care surrogate after explaining risks, benefits and alternatives. Risks including bleeding, pneumothorax, infection and arrhythmia were discussed. Informed consent was given after complete understanding and all questions were answered.    Time out was completed and patient ID was confirmed.    The right internal jugular vein was directly visualized by ultrasound. Thereafter the site was cleaned and draped. 1% lidocaine was given under the skin. Using sterile technique introducer needle was placed in the vein under direct visualization using ultrasound. A quadruple lumen catheter was placed over wire using Seldinger technique. All 4 ports withdrew blood and were flushed with saline. Sutures and dressing applied. No complications of procedure.    Stat portable chest x-ray has been requested and will be reviewed.        Electronically signed by Jett Rios MD, 07/14/24, 3:08 PM EDT.

## 2024-07-15 NOTE — PROGRESS NOTES
"[facility] Clinical Pharmacy Services: Total Parental Nutrition Initial Consult    Indication: Prolonged Paralytic Ileus  Route: central  Type: standard    Relevant clinical data and objective history reviewed:  73 y.o. male 180.3 cm (71\") 87.4 kg (192 lb 10.9 oz)    Results from last 7 days   Lab Units 07/15/24  0137 07/14/24  0629   SODIUM mmol/L 140 143   POTASSIUM mmol/L 4.2 3.3*   CHLORIDE mmol/L 104 106   CO2 mmol/L 23.0 25.0   BUN mg/dL 18 20   CREATININE mg/dL 0.73* 0.71*   CALCIUM mg/dL 7.6* 8.0*   ALBUMIN g/dL 1.9* 2.0*   BILIRUBIN mg/dL  --  1.5*   ALK PHOS U/L  --  86   ALT (SGPT) U/L  --  34   AST (SGOT) U/L  --  44*   GLUCOSE mg/dL 165* 136*   MAGNESIUM mg/dL 2.0 2.3   PHOSPHORUS mg/dL 3.1 2.1*        Estimated Creatinine Clearance: 109.7 mL/min (A) (by C-G formula based on SCr of 0.73 mg/dL (L)).    Active fluid orders: none    Dietary Orders (From admission, onward)       Start     Ordered    07/15/24 0944  NPO Diet NPO Type: Strict NPO  Diet Effective Now        Question:  NPO Type  Answer:  Strict NPO    07/15/24 0943                  Assessment  Ideal Body Weight: 75.3 kg  Body Weight Used for Calculations: 87.4 kg  Daily Est. Mao: 1670 kCal/Day  Estimated Fluid Requirements: 2.6 L mL/day    Goal   Protein: 1.5 grams/kg/day (131 grams/day)   Dextrose: 1400 Kcal/day   Lipids: 200 ml of 20% SMOF lipid infusion 5-7 days/week (if TG allow,  checking 7/16 am;    Plan  Will start TPN at approximately half goal and will taper up as appropriate. Will initiate TPN with the following macros:   Protein/Dextrose/Lipids: 100 g/500 kcal/currently on propofol    Volume: will use minimal volume    Based on the above labs, will add the following electrolytes/additives to the TPN.    Sodium Chloride: 50 mEq   Sodium Acetate: 0 mEq   Sodium Phosphate: 0 mEq   Potassium Chloride: 20 mEq   Potassium Acetate: 0 mEq   Potassium Phosphate: 0 mEq   Calcium Gluconate: 9 mEq   Magnesium Sulfate: 10 mEq   MVI for " TPN   Trace Elements              Other Additives: n/a    Labs to be ordered: cmp daily, TG in am,     Pharmacy will continue to follow.     James Cooley [facility]  Clinical Pharmacist

## 2024-07-15 NOTE — PROGRESS NOTES
LOS: 13 days   Patient Care Team:  Triny Hannon MD as PCP - General (Internal Medicine)  James Hubbard MD as Consulting Physician (Cardiology)    Chief Complaint:  Ischemic bowel    Interval History:   No further return of A-fib.  Ector sinus rhythm.  Remains intubated.     He has not been able to get his antiplatelets for the last few days due to his feeding tube requirements    Objective   Vital Signs  Temp:  [97.5 °F (36.4 °C)-98.3 °F (36.8 °C)] 97.5 °F (36.4 °C)  Heart Rate:  [61-97] 75  Resp:  [10-32] 10  BP: ()/() 132/61  FiO2 (%):  [79 %-100 %] 80 %    Intake/Output Summary (Last 24 hours) at 7/15/2024 0922  Last data filed at 7/15/2024 0800  Gross per 24 hour   Intake 2626.85 ml   Output 2230 ml   Net 396.85 ml       Comfortable NAD, resting on ventilator  PERRL, conjunctivae clear  Neck supple, no JVD or thyromegaly appreciated  S1/S2 RRR, no m/r/g appreciated  Coarse, intubated breath sounds, normal effort  Abdomen S/NT/ND (+) BS, no HSM appreciated  Extremities warm, no clubbing, cyanosis, 1+ bilateral upper and lower extremity edema  No visible or palpable skin lesions  Intubated and sedated, does not follow commands    Results Review:      Results from last 7 days   Lab Units 07/15/24  0137 07/14/24  0629 07/13/24  0327   SODIUM mmol/L 140 143 145   POTASSIUM mmol/L 4.2 3.3* 3.1*   CHLORIDE mmol/L 104 106 107   CO2 mmol/L 23.0 25.0 23.4   BUN mg/dL 18 20 23   CREATININE mg/dL 0.73* 0.71* 0.69*   GLUCOSE mg/dL 165* 136* 127*   CALCIUM mg/dL 7.6* 8.0* 8.3*     Results from last 7 days   Lab Units 07/10/24  0253 07/10/24  0051   HSTROP T ng/L 20 23*     Results from last 7 days   Lab Units 07/15/24  0137 07/14/24  0629 07/13/24  0327   WBC 10*3/mm3 27.88* 19.67* 17.89*   HEMOGLOBIN g/dL 10.4* 10.4* 11.3*   HEMATOCRIT % 32.4* 32.0* 34.2*   PLATELETS 10*3/mm3 294 279 277             Results from last 7 days   Lab Units 07/14/24  0629   MAGNESIUM mg/dL 2.3           I reviewed the  patient's new clinical results.  I personally viewed and interpreted the patient's EKG/Telemetry data        Medication Review:   [Held by provider] amLODIPine, 5 mg, Oral, Q24H  aspirin, 325 mg, Oral, Once  [Held by provider] atenolol, 50 mg, Oral, BID  [Held by provider] atorvastatin, 40 mg, Oral, Nightly  [Held by provider] cetirizine, 10 mg, Oral, Daily  clopidogrel, 75 mg, Oral, Daily  ipratropium-albuterol, 3 mL, Nebulization, 4x Daily - RT  metoprolol tartrate, 5 mg, Intravenous, Q8H  pantoprazole, 40 mg, Intravenous, BID AC  piperacillin-tazobactam, 3.375 g, Intravenous, Q8H  [Held by provider] pregabalin, 150 mg, Oral, TID  senna-docusate sodium, 2 tablet, Oral, BID  sodium chloride, 10 mL, Intravenous, Q12H  sodium chloride, 10 mL, Intravenous, Q12H  sodium chloride, 10 mL, Intravenous, Q12H  sodium chloride, 10 mL, Intravenous, Q12H  sodium chloride, 10 mL, Intravenous, Q12H        amiodarone, 0.5 mg/min, Last Rate: 0.5 mg/min (07/15/24 0638)  fentanyl 10 mcg/mL,  mcg/hr, Last Rate: 200 mcg/hr (07/15/24 0638)  norepinephrine, 0.02-0.3 mcg/kg/min (Order-Specific), Last Rate: 0.08 mcg/kg/min (07/15/24 0638)  Pharmacy to Dose TPN,   propofol, 5-100 mcg/kg/min, Last Rate: 50 mcg/kg/min (07/15/24 0638)        Assessment & Plan       Left lower quadrant abdominal pain    Essential hypertension    S/P CABG (coronary artery bypass graft)    Chronic mesenteric ischemia    Chest pain    Leukocytosis    PAF (paroxysmal atrial fibrillation)    Acute respiratory failure with hypoxia    Ileus, postoperative    Hypokalemia    Ischemic bowel with gangrene  Postoperative ileus, going to start TPN  Acute hypoxic respiratory failure, possibly due to aspiration event, currently intubated  Atrial flutter, previously in sinus after July 10 of being maintained on IV amiodarone to maintain rhythm control.  Continue 0.5 mg continuously for now while he remains n.p.o.  Currently off anticoagulation due to his surgical  issues  For now, I think that he should be maintained on the amiodarone, he has had some intermittently lower blood pressures in the last thing he needs his recurrent atrial flutter with RVR  CAD, status post PCI of left main to mid LAD May 6, 2024.  It does not appear that this was an ACS indication.  He has been maintained on Plavix monotherapy, but did not received the last few days.  Will follow-up with nursing staff, if he is able to receive the Plavix and have enteric access due to his NG tube requirements, we will give aspirin rectally    Mihir Peralta MD  07/15/24  09:22 EDT      Part of this note may be an electronic transcription/translation of spoken language to printed text using the Dragon Dictation System.

## 2024-07-15 NOTE — PROGRESS NOTES
Colorectal & General Surgery  Progress Note    Patient: Slade Martinez  YOB: 1950  MRN: [id number]      Assessment  Slade Martinez is a 73 y.o. male now postoperative day 8 from small bowel resection for gangrenous enteritis who was transferred to the intensive care unit yesterday for acute hypoxic respiratory failure secondary to pneumonia with septic shock.  He has mild postoperative ileus that continues to resolve.  I have reviewed his KUB, which demonstrates very mild dilation of the small bowel loops system with a mild resolving ileus.  Given his vasopressor requirement, I do not think that enteral nutrition is in his best interest today.  We will start TPN through the central line today.    Please feel free to contact me with any questions or concerns related to his care.    Subjective  Intubated yesterday afternoon for acute hypoxic respiratory failure.  No significant events overnight otherwise.  Nasogastric tube placed with relatively low output.    Objective    Vitals:    07/15/24 0715   BP: 125/60   Pulse: 64   Resp:    Temp:    SpO2: 98%       Physical Exam  Constitutional: Appears ill.  Neck: Supple, trachea midline, central line in place on the right.  Respiratory: Mechanically ventilated  Cardiovascular: On vasopressors.  Otherwise well-perfused.  Abdominal: Soft, mildly distended, not significantly tender, incision is in good order.    Laboratory Results  I have personally reviewed CBC with WC 27, hemoglobin 10, platelets 294.  Lactate 2.6.  BMP with creatinine 0.73, potassium 4.2, glucose 165, albumin 1.9.    Radiology  I have personally reviewed KUB which demonstrates appropriate positioning of the nasogastric tube with some some mild dilation of the small bowel loops consistent with resolving ileus.         Yunier Beebe MD  Colorectal & General Surgery  [facility], [place], operated by [facility]    [address], [address]  [place], [place], [address]  P: [phone]  F: [phone]

## 2024-07-15 NOTE — CONSULTS
"Nutrition Services    Patient Name:  Slade Martinez  YOB: 1950  MRN: [id number]  Admit Date:  7/1/2024    Assessment Date:  07/15/24    Summary: TPN Consult. POD 8 from small bowel resection for gangrenous enteritis .    Pt intubated yesterday afternoon, maxed on propofol providing ~700 lipid calories. Requiring pressors now and on amnio and fent drips. OG was placed to LWS yesterday as well, 575mL output x 24 hrs. + BM yesterday.  No plans for EN at this time due to pressor support and the surgeon wants to start TPN. Labs,  meds, skin reviewed.  Mildly elevated glucoses, Alb 1.9, 2-3 + edema.    Estimated nutrition needs:   2040 kcals (25kcals/kg)  98-122g protein (1.2-1.5 g/kg/d)    Plan/recommendations:  Macronutrient TPN Goal: 1000 Dextrose, 115g Amino Acids, No lipids while on propofol  Pharmacist to mange electrolytes  Begin EN once OK with general surgery.    RD to follow    CLINICAL NUTRITION ASSESSMENT      Reason for Assessment Follow-up Protocol     Diagnosis/Problem   Left lower quadrant abdominal pain, gangrenous enteritis ,S/P bowel resection 7/7. Septic shock, resp failure, intubated 7/14     Anthropometrics        Current Height  Current Weight  BMI kg/m2 Height: 180.3 cm (71\")  Weight: 87.4 kg (192 lb 10.9 oz) (07/15/24 0600)  Body mass index is 26.87 kg/m².   Adjusted BMI (if applicable)    BMI Category Overweight (25 - 29.9)   Ideal Body Weight (IBW) 172#   Usual Body Weight (UBW) 199-200#   Weight Trend Loss     Estimated Requirements         Weight used  81.6 kg    Calories  2,040 kcal (25 kcal/kg)    Protein  98 -122g (1.2 - 1.5 gm/kg)    Fluid   (1 mL/kcal)     Labs       Pertinent Labs    Results from last 7 days   Lab Units 07/15/24  0137 07/14/24  0629 07/13/24  0327 07/12/24  0511 07/11/24  0439   SODIUM mmol/L 140 143 145 142 137   POTASSIUM mmol/L 4.2 3.3* 3.1* 3.6 3.7   CHLORIDE mmol/L 104 106 107 105 103   CO2 mmol/L 23.0 25.0 23.4 25.3 20.3*   BUN mg/dL 18 20 23 24* " 24*   CREATININE mg/dL 0.73* 0.71* 0.69* 0.67* 0.86   CALCIUM mg/dL 7.6* 8.0* 8.3* 8.4* 8.4*   BILIRUBIN mg/dL  --  1.5*  --  0.8 0.8   ALK PHOS U/L  --  86  --  110 115   ALT (SGPT) U/L  --  34  --  41 42*   AST (SGOT) U/L  --  44*  --  62* 46*   GLUCOSE mg/dL 165* 136* 127* 124* 135*     Results from last 7 days   Lab Units 07/15/24  0137 07/14/24  0629 07/11/24  0439 07/10/24  0253   MAGNESIUM mg/dL 2.0 2.3  --  2.2   PHOSPHORUS mg/dL 3.1 2.1*  --  3.3   HEMOGLOBIN g/dL 10.4* 10.4*   < > 11.1*   HEMATOCRIT % 32.4* 32.0*   < > 33.9*   WBC 10*3/mm3 27.88* 19.67*   < > 17.91*   ALBUMIN g/dL 1.9* 2.0*   < > 2.3*    < > = values in this interval not displayed.     Results from last 7 days   Lab Units 07/15/24  0137 07/14/24  0629 07/13/24  0327 07/12/24  0511 07/11/24  0439   PLATELETS 10*3/mm3 294 279 277 310 334     COVID19   Date Value Ref Range Status   06/19/2024 Not Detected Not Detected - Ref. Range Final     Lab Results   Component Value Date    HGBA1C 5.8 (H) 02/28/2023          Medications           Scheduled Medications [Held by provider] amLODIPine, 5 mg, Oral, Q24H  aspirin, 325 mg, Oral, Once  [Held by provider] atenolol, 50 mg, Oral, BID  [Held by provider] atorvastatin, 40 mg, Oral, Nightly  [Held by provider] cetirizine, 10 mg, Oral, Daily  chlorhexidine, 15 mL, Mouth/Throat, Q12H  clopidogrel, 75 mg, Oral, Daily  insulin regular, 2-9 Units, Subcutaneous, Q6H  ipratropium-albuterol, 3 mL, Nebulization, 4x Daily - RT  pantoprazole, 40 mg, Intravenous, BID AC  piperacillin-tazobactam, 4.5 g, Intravenous, Q8H  [Held by provider] pregabalin, 150 mg, Oral, TID  senna-docusate sodium, 2 tablet, Oral, BID  sodium chloride, 10 mL, Intravenous, Q12H  sodium chloride, 10 mL, Intravenous, Q12H  sodium chloride, 10 mL, Intravenous, Q12H  sodium chloride, 10 mL, Intravenous, Q12H  sodium chloride, 10 mL, Intravenous, Q12H       Infusions Adult Central 2-in-1 TPN,   amiodarone, 0.5 mg/min, Last Rate: 0.5 mg/min  (07/15/24 1032)  fentanyl 10 mcg/mL,  mcg/hr, Last Rate: 200 mcg/hr (07/15/24 1104)  Pharmacy to Dose TPN,   phenylephrine, 0.5-3 mcg/kg/min, Last Rate: 0.8 mcg/kg/min (07/15/24 1107)  propofol, 5-100 mcg/kg/min, Last Rate: 50 mcg/kg/min (07/15/24 0955)       PRN Medications   acetaminophen    senna-docusate sodium **AND** polyethylene glycol **AND** bisacodyl **AND** bisacodyl    dextrose    dextrose    glucagon (human recombinant)    hyaluronidase 150 units in NS 10 ml syringe    HYDROcodone-acetaminophen    HYDROmorphone    ipratropium-albuterol    labetalol    meclizine    nitroglycerin    ondansetron    Pharmacy to Dose TPN    Potassium Replacement - Follow Nurse / BPA Driven Protocol    sodium chloride    [COMPLETED] Insert Peripheral IV **AND** sodium chloride    sodium chloride    sodium chloride    sodium chloride    sodium chloride    sodium chloride     Physical Findings          General Findings overweight, sedate, ventilator support   Oral/Mouth Cavity dental appliance, tooth or teeth missing   Edema  2+ (mild), 3+ (moderate)   Gastrointestinal hypoactive bowel sounds, last bowel movement: 7/14   Skin  surgical incision: abd   Tubes/Drains/Lines central line/PICC, OG tube   NFPE No clinical signs of muscle wasting or fat loss     Current Nutrition Orders & Evaluation of Intake       Oral Nutrition     Food Allergies NKFA   Current PO Diet NPO Diet NPO Type: Strict NPO  Adult Central 2-in-1 TPN   Supplement n/a    PO Evaluation     % PO Intake 0     Factors Affecting Intake: altered GI function, weakness      Parenteral Nutrition      TPN Route Central   TPN Rate/Volume 50 mL/hr, 1200 mL per day   Current TPN Order        Dextrose (kcal) 500       Amino Acid (gm) 100       Lipid Concentration other: none       Lipid Volume/Frequency  other:none   MVI Frequency  10mL daily   Trace Element Frequency  1mL daily   Total # Days on TPN 1   Propofol Rate/Kcal 26.3 (694 kcals)     PES STATEMENT / NUTRITION  DIAGNOSIS      Nutrition Dx Problem  Problem: Inadequate Oral Intake  Etiology: Medical Diagnosis - left lower quadrant abdominal pain    Signs/Symptoms: Report of Minimal PO Intake and Unintended Weight Change     NUTRITION INTERVENTION / PLAN OF CARE      Intervention Goal(s) Reduce/improve symptoms, Meet estimated needs, Disease management/therapy, Establish PO intake, Increase intake, Accepts oral nutrition supplement, Maintain weight, No significant weight loss, and PO intake goal %: 75         RD Intervention/Action Await initiation/advancement of PO diet, Continue to monitor, and Care plan reviewed   --      Prescription/Orders:       PO Diet       Supplements       Enteral Nutrition       Parenteral Nutrition       Parenteral Prescription:     TPN Route Central   TPN Rate (mL/hr) Per pharmacist (50mL/hr)   TPN Recommendation:        Dextrose (kcal) 1000       Amino Acid (gm) 115       Lipid Concentration other: hold while on propofol       Lipid Volume/Frequency  other:   Propofol Rate/Kcal 26.3 (694 kcals)   TPN Provision:   1460 kcals, 2154 kcal with propfol, 115 gm protein        Calories 105 % needs met        Protein  100 % needs met        Fluid 1200 mL total     New Prescription Ordered? Continue same per protocol, No changes at this time         Monitor/Evaluation Per protocol   Discharge Plan/Needs Pending clinical course   --    RD to follow per protocol.      Electronically signed by:  Jennifer Ch RD  07/15/24 11:40 EDT

## 2024-07-15 NOTE — PLAN OF CARE
Problem: Adult Inpatient Plan of Care  Goal: Plan of Care Review  Outcome: Ongoing, Progressing  Goal: Patient-Specific Goal (Individualized)  Outcome: Ongoing, Progressing  Goal: Absence of Hospital-Acquired Illness or Injury  Outcome: Ongoing, Progressing  Intervention: Identify and Manage Fall Risk  Recent Flowsheet Documentation  Taken 7/15/2024 0300 by Patrice Bowens RN  Safety Promotion/Fall Prevention:   activity supervised   safety round/check completed  Taken 7/15/2024 0200 by Patrice Bowens RN  Safety Promotion/Fall Prevention:   activity supervised   safety round/check completed  Taken 7/15/2024 0000 by Patrice Bowens RN  Safety Promotion/Fall Prevention:   activity supervised   safety round/check completed  Taken 7/14/2024 2300 by Patrice Bowens RN  Safety Promotion/Fall Prevention:   activity supervised   safety round/check completed  Taken 7/14/2024 2200 by Patrice Bowens RN  Safety Promotion/Fall Prevention:   activity supervised   safety round/check completed  Taken 7/14/2024 2100 by Patrice Bowens RN  Safety Promotion/Fall Prevention:   activity supervised   safety round/check completed  Taken 7/14/2024 2000 by Patrice Bowens RN  Safety Promotion/Fall Prevention:   activity supervised   safety round/check completed  Intervention: Prevent Infection  Recent Flowsheet Documentation  Taken 7/15/2024 0300 by Patrice Bowens RN  Infection Prevention: single patient room provided  Taken 7/15/2024 0200 by Patrice Bowens RN  Infection Prevention: single patient room provided  Taken 7/15/2024 0000 by Patrice Bowens RN  Infection Prevention: single patient room provided  Taken 7/14/2024 2300 by Patrice Bowens RN  Infection Prevention: single patient room provided  Taken 7/14/2024 2200 by Patrice Bowens RN  Infection Prevention: single patient room provided  Taken 7/14/2024 2100 by Patrice Bowens RN  Infection Prevention: single patient room provided  Taken 7/14/2024 2000  by Patrice Bowens RN  Infection Prevention: single patient room provided  Goal: Optimal Comfort and Wellbeing  Outcome: Ongoing, Progressing  Goal: Readiness for Transition of Care  Outcome: Ongoing, Progressing     Problem: Pain Acute  Goal: Acceptable Pain Control and Functional Ability  Outcome: Ongoing, Progressing  Intervention: Prevent or Manage Pain  Recent Flowsheet Documentation  Taken 7/15/2024 0300 by Patrice Bowens RN  Medication Review/Management: medications reviewed  Taken 7/15/2024 0200 by Patrice Bowens RN  Medication Review/Management: medications reviewed  Taken 7/15/2024 0000 by Patrice Bowens RN  Medication Review/Management: medications reviewed  Taken 7/14/2024 2300 by Patrice Bowens RN  Medication Review/Management: medications reviewed  Taken 7/14/2024 2200 by Patrice Bowens RN  Medication Review/Management: medications reviewed  Taken 7/14/2024 2100 by Patrice Bowens RN  Medication Review/Management: medications reviewed  Taken 7/14/2024 2000 by Patrice Bowens RN  Medication Review/Management: medications reviewed     Problem: Hypertension Comorbidity  Goal: Blood Pressure in Desired Range  Outcome: Ongoing, Progressing  Intervention: Maintain Blood Pressure Management  Recent Flowsheet Documentation  Taken 7/15/2024 0300 by Patrice Bowens RN  Medication Review/Management: medications reviewed  Taken 7/15/2024 0200 by Patrice Bowens RN  Medication Review/Management: medications reviewed  Taken 7/15/2024 0000 by Patrice Bowens RN  Medication Review/Management: medications reviewed  Taken 7/14/2024 2300 by Patrice Bowens RN  Medication Review/Management: medications reviewed  Taken 7/14/2024 2200 by Patrice Bowens RN  Medication Review/Management: medications reviewed  Taken 7/14/2024 2100 by Patrice Bowens RN  Medication Review/Management: medications reviewed  Taken 7/14/2024 2000 by Patrice Bowens RN  Medication Review/Management: medications  reviewed     Problem: Skin Injury Risk Increased  Goal: Skin Health and Integrity  Outcome: Ongoing, Progressing     Problem: Adjustment to Illness (Sepsis/Septic Shock)  Goal: Optimal Coping  Outcome: Ongoing, Progressing     Problem: Bleeding (Sepsis/Septic Shock)  Goal: Absence of Bleeding  Outcome: Ongoing, Progressing     Problem: Glycemic Control Impaired (Sepsis/Septic Shock)  Goal: Blood Glucose Level Within Desired Range  Outcome: Ongoing, Progressing     Problem: Infection Progression (Sepsis/Septic Shock)  Goal: Absence of Infection Signs and Symptoms  Outcome: Ongoing, Progressing  Intervention: Initiate Sepsis Management  Recent Flowsheet Documentation  Taken 7/15/2024 0300 by Patrice Bowens RN  Infection Prevention: single patient room provided  Taken 7/15/2024 0200 by Patrice Bowens RN  Infection Prevention: single patient room provided  Taken 7/15/2024 0000 by Patrice Bowens RN  Infection Prevention: single patient room provided  Taken 7/14/2024 2300 by Patrice Bowens RN  Infection Prevention: single patient room provided  Taken 7/14/2024 2200 by Patrice Bowens RN  Infection Prevention: single patient room provided  Taken 7/14/2024 2100 by Patrice Bowens RN  Infection Prevention: single patient room provided  Taken 7/14/2024 2000 by Patrice Bowens RN  Infection Prevention: single patient room provided     Problem: Nutrition Impaired (Sepsis/Septic Shock)  Goal: Optimal Nutrition Intake  Outcome: Ongoing, Progressing     Problem: Fall Injury Risk  Goal: Absence of Fall and Fall-Related Injury  Outcome: Ongoing, Progressing  Intervention: Identify and Manage Contributors  Recent Flowsheet Documentation  Taken 7/15/2024 0300 by Patrice Bowens RN  Medication Review/Management: medications reviewed  Taken 7/15/2024 0200 by Patrice Bowens RN  Medication Review/Management: medications reviewed  Taken 7/15/2024 0000 by Patrice Bowens RN  Medication Review/Management: medications  reviewed  Taken 7/14/2024 2300 by Patrice Bowens RN  Medication Review/Management: medications reviewed  Taken 7/14/2024 2200 by Patrice Bowens RN  Medication Review/Management: medications reviewed  Taken 7/14/2024 2100 by Patrice Bowens RN  Medication Review/Management: medications reviewed  Taken 7/14/2024 2000 by Patrice Bowens RN  Medication Review/Management: medications reviewed  Intervention: Promote Injury-Free Environment  Recent Flowsheet Documentation  Taken 7/15/2024 0300 by Patrice Bowens RN  Safety Promotion/Fall Prevention:   activity supervised   safety round/check completed  Taken 7/15/2024 0200 by Patrice Bowens RN  Safety Promotion/Fall Prevention:   activity supervised   safety round/check completed  Taken 7/15/2024 0000 by Patrice Bowens RN  Safety Promotion/Fall Prevention:   activity supervised   safety round/check completed  Taken 7/14/2024 2300 by Patrice Bowens RN  Safety Promotion/Fall Prevention:   activity supervised   safety round/check completed  Taken 7/14/2024 2200 by Patrice Bowens RN  Safety Promotion/Fall Prevention:   activity supervised   safety round/check completed  Taken 7/14/2024 2100 by Patrice Bowens RN  Safety Promotion/Fall Prevention:   activity supervised   safety round/check completed  Taken 7/14/2024 2000 by Patrice Bowens RN  Safety Promotion/Fall Prevention:   activity supervised   safety round/check completed     Problem: Diarrhea  Goal: Fluid and Electrolyte Balance  Outcome: Ongoing, Progressing  Intervention: Manage Diarrhea  Recent Flowsheet Documentation  Taken 7/15/2024 0300 by Patrice Bowens RN  Medication Review/Management: medications reviewed  Taken 7/15/2024 0200 by Patrice Bowens RN  Medication Review/Management: medications reviewed  Taken 7/15/2024 0000 by Patrice Bowens RN  Medication Review/Management: medications reviewed  Taken 7/14/2024 2300 by Patrice Bowens RN  Medication Review/Management:  medications reviewed  Taken 7/14/2024 2200 by Patrice Bowens, RN  Medication Review/Management: medications reviewed  Taken 7/14/2024 2100 by Patrice Bowens, RN  Medication Review/Management: medications reviewed  Taken 7/14/2024 2000 by Patrice Bowens, RN  Medication Review/Management: medications reviewed     Problem: Aspiration (Enteral Nutrition)  Goal: Absence of Aspiration Signs and Symptoms  Outcome: Ongoing, Progressing     Problem: Device-Related Complication Risk (Enteral Nutrition)  Goal: Safe, Effective Therapy Delivery  Outcome: Ongoing, Progressing     Problem: Feeding Intolerance (Enteral Nutrition)  Goal: Feeding Tolerance  Outcome: Ongoing, Progressing   Goal Outcome Evaluation:

## 2024-07-15 NOTE — PROGRESS NOTES
"  Daily Progress Note.   [facility] INTENSIVE CARE  7/15/2024    Patient:  Name:  Slade Martinez  MRN:  [id number]  1950  73 y.o.  male         CC: [id number], asp pna    Interval History:  Intubated, maintains on mech ventilation  On Ne and amiodarone  Propofol prn fentanyl      Physical Exam:  /61   Pulse 75   Temp 97.5 °F (36.4 °C) (Oral)   Resp 10   Ht 180.3 cm (71\")   Wt 87.4 kg (192 lb 10.9 oz)   SpO2 98%   BMI 26.87 kg/m²   Body mass index is 26.87 kg/m².    Intake/Output Summary (Last 24 hours) at 7/15/2024 0938  Last data filed at 7/15/2024 0800  Gross per 24 hour   Intake 2626.85 ml   Output 2230 ml   Net 396.85 ml   Vent Settings          Resp Rate (Set): 10  Pressure Support (cm H2O): 8 cm H20  FiO2 (%): 80 %  PEEP/CPAP (cm H2O): 6 cm H20    Minute Ventilation (L/min) (Obs): 6.46 L/min  Resp Rate (Observed) Vent: 11  I:E Ratio (Set): 1:5.00  I:E Ratio (Obs): 1:5    PIP Observed (cm H2O): 22 cm H2O  Plateau Pressure (cm H2O): 0 cm H2O  Driving Pressure (cm H2O): -5.6 cm H2O    General appearance: sedated ill   HENT: Atraumatic; oropharynx +ET tube  Lungs: taryn no sig wheeze mech air entry synchronous  CV: RRR, no rub   Abdomen: non rigid midline incision cdi  Extremities: No peripheral edema    Skin: WWP no diffuse visible rash  Psych/neuro sedated    Data Review:  Notable Labs:  Results from last 7 days   Lab Units 07/15/24  0137 07/14/24  0629 07/13/24  0327 07/12/24  0511 07/11/24  0439 07/10/24  0253 07/09/24  0530   WBC 10*3/mm3 27.88* 19.67* 17.89* 18.83* 23.17* 17.91* 15.50*   HEMOGLOBIN g/dL 10.4* 10.4* 11.3* 11.2* 12.8* 11.1* 10.5*   PLATELETS 10*3/mm3 294 279 277 310 334 298 244     Results from last 7 days   Lab Units 07/15/24  0137 07/14/24  0629 07/13/24  0327 07/12/24  0511 07/11/24  0439 07/10/24  0253 07/09/24  0530   SODIUM mmol/L 140 143 145 142 137 139 136   POTASSIUM mmol/L 4.2 3.3* 3.1* 3.6 3.7 3.7 4.0   CHLORIDE mmol/L 104 106 107 105 103 108* 103   CO2 " mmol/L 23.0 25.0 23.4 25.3 20.3* 20.0* 22.3   BUN mg/dL 18 20 23 24* 24* 26* 23   CREATININE mg/dL 0.73* 0.71* 0.69* 0.67* 0.86 0.65* 0.65*   GLUCOSE mg/dL 165* 136* 127* 124* 135* 113* 131*   CALCIUM mg/dL 7.6* 8.0* 8.3* 8.4* 8.4* 8.5* 8.7   MAGNESIUM mg/dL  --  2.3  --   --   --  2.2 2.5*   PHOSPHORUS mg/dL 3.1 2.1*  --   --   --  3.3 2.7   Estimated Creatinine Clearance: 109.7 mL/min (A) (by C-G formula based on SCr of 0.73 mg/dL (L)).    Results from last 7 days   Lab Units 07/15/24  0137 07/14/24  1637 07/14/24  0629 07/13/24  0327 07/12/24  0511 07/11/24  0439   AST (SGOT) U/L  --   --  44*  --  62* 46*   ALT (SGPT) U/L  --   --  34  --  41 42*   LACTATE mmol/L  --  2.6*  --   --   --   --    PLATELETS 10*3/mm3 294  --  279 277 310 334       Results from last 7 days   Lab Units 07/14/24  1428 07/14/24  1006 07/13/24  2059 07/11/24  0128 07/10/24  0054   PH, ARTERIAL pH units 7.394 7.513* 7.536* 7.402 7.436   PCO2, ARTERIAL mm Hg 42.5 32.8* 30.0* 38.4 30.8*   PO2 ART mm Hg 77.5* 51.1* 51.1* 62.9* 74.0*   HCO3 ART mmol/L 26.0 26.4 25.4 23.9 20.8*       Imaging:  Reviewed chest images personally from past 3 days    ASSESSMENT  /  PLAN:  Acute hypoxic respiratory failure sp intubation 7/14/2024  Aspiration pneumonia  Postop ileus  Status post laparotomy  Gangrenous bowel with resection  Occlusion of the SMA  Chronic gastroparesis  CAD and recent PCI  A-fib with RVR      [facility] vent reviwed adjustments as appropriate  Abg  Cxr  wean fio2    Given amiodarone needs, stop NE, start phenylephrine  Dc metoprolol holding atenolol  Asa 325  Plavix 75  amiodarone    Obtain blood cultures to help guide abx therapy  Increase dose zosyn to 4.5    TPN today per gen surg noted.  On pantoprazole bid iv    Ssi glycemic control at goal     Plan of care and patient course was reviewed with multidisciplinary team in morning rounds.      Total critical care time was 40 minutes, excluding any separately billable procedure time.  Time  did not overlap with any other provider.       Electronically signed by Willie Resendiz MD, 07/15/24, 9:38 AM EDT.  [facility]

## 2024-07-15 NOTE — SIGNIFICANT NOTE
07/15/24 1156   OTHER   Discipline physical therapist   Rehab Time/Intention   Session Not Performed other (see comments);unable to treat, medical status change  (Pt in ICU intubated on vent. Per RN, no plans for extubation yet. PT will sign-off and await new orders as appropriate.)   Therapy Assessment/Plan (PT)   Criteria for Skilled Interventions Met (PT) no

## 2024-07-15 NOTE — PLAN OF CARE
Goal Outcome Evaluation:   TPN to start today, RD and pharmacist following.     Problem: Parenteral Nutrition  Goal: Effective Intravenous Nutrition Therapy Delivery  Outcome: Ongoing, Progressing

## 2024-07-15 NOTE — PLAN OF CARE
Goal Outcome Evaluation: Sedation and Pressors titrated down today per MD plan. Levo chg to Jake then weaned off. Prop/Fent titrated lower. Pt tolerating well, BP WNL, monitored for s/s dicomfort/vent intolerance with no concerns.

## 2024-07-16 NOTE — PROGRESS NOTES
[facility] Clinical Pharmacy Services: TPN Daily Progress Note    TPN Day # 2   Indication: Prolonged Paralytic Ileus  Route: central  Type: standard    Subjective/Objective  Results from last 7 days   Lab Units 24  0327   SODIUM mmol/L 143   POTASSIUM mmol/L 3.5   CHLORIDE mmol/L 108*   CO2 mmol/L 26.0   BUN mg/dL 23   CREATININE mg/dL 0.71*   CALCIUM mg/dL 8.1*   ALBUMIN g/dL 1.7*   BILIRUBIN mg/dL 0.7   ALK PHOS U/L 86   ALT (SGPT) U/L 37   AST (SGOT) U/L 80*   GLUCOSE mg/dL 167*   MAGNESIUM mg/dL 2.2   PHOSPHORUS mg/dL 2.5   TRIGLYCERIDES mg/dL 201*        Diet Orders (active) (From admission, onward)       Start     Ordered    07/15/24 09  NPO Diet NPO Type: Strict NPO  Diet Effective Now         07/15/24 0943                  Additional insulin administration while previous TPN infusin units  (BG  150-167)  Additional electrolyte administration while previous TPN infusin/16 kcl 40 meq iv;  Acid suppression: iv ppi bid    Goal TPN Formula Recommendations: 115 g/1000 kcal/hold while on prop AA/Dex/Lipids; 2040 kcal/day  Current TPN Formula: 100 g/500 kcal/propofol AA/Dex/Lipids    Assessment/Plan    Macronutrients: Increase AA to goal and dextrose to 750 kcal tonight.  Electrolytes/Additives:    Sodium Chloride: 20 mEq  (was 50)   Sodium Acetate: 20 mEq   (was 0)   Sodium Phosphate: 0 mEq   Potassium Chloride: 20 mEq   Potassium Acetate: 0 mEq   Potassium Phosphate: 30 mEq  (was 10)   Calcium Gluconate: 9 mEq   Magnesium Sulfate: 8 mEq   (was 10)   MVI for TPN   Trace Elements              Other Additives: n/a    Labs: recheck TG, cmp, mg/phos in am  Comments: may start TF tomorrow    James Cooley [facility]  Clinical Pharmacist

## 2024-07-16 NOTE — PROGRESS NOTES
LOS: 14 days   Patient Care Team:  Triny Hannon MD as PCP - General (Internal Medicine)  James Hubbard MD as Consulting Physician (Cardiology)      Chief Complaint: Severe coronary disease with complex PCI in May 2024, normal LV function, atrial flutter, ischemic bowel status post resection, dense aspiration pneumonia on the right now intubated       Interval History: Intubated and sedated    Objective   Vital Signs  Temp:  [97.1 °F (36.2 °C)-98.9 °F (37.2 °C)] 97.8 °F (36.6 °C)  Heart Rate:  [] 96  Resp:  [14-18] 18  BP: ()/(46-67) 133/63  FiO2 (%):  [55 %-99 %] 55 %    Intake/Output Summary (Last 24 hours) at 7/16/2024 0933  Last data filed at 7/16/2024 0800  Gross per 24 hour   Intake 2672.22 ml   Output 1180 ml   Net 1492.22 ml         Physical Exam:  Constitutional: Well appearing, well developed, no acute distress   HENT: Oropharynx clear and membrane moist  Eyes: Normal conjunctiva, no sclera icterus.  Neck: Supple, no carotid bruit bilaterally.  Cardiovascular: Regular rate and rhythm, No Murmur, No bilateral lower extremity edema.  Pulmonary: Normal respiratory effort, normal lung sounds, no wheezing.  Abdominal: Soft, nontender, no hepatosplenomegaly, liver is non-pulsatile.  Neurological: Alert and orient x 3.   Skin: Warm, dry, no ecchymosis, no rash.  Psych: Appropriate mood and affect. Normal judgment and insight.      Results Review:      Results from last 7 days   Lab Units 07/16/24  0327 07/15/24  0137 07/14/24  0629   SODIUM mmol/L 143 140 143   POTASSIUM mmol/L 3.5 4.2 3.3*   CHLORIDE mmol/L 108* 104 106   CO2 mmol/L 26.0 23.0 25.0   BUN mg/dL 23 18 20   CREATININE mg/dL 0.71* 0.73* 0.71*   GLUCOSE mg/dL 167* 165* 136*   CALCIUM mg/dL 8.1* 7.6* 8.0*     Results from last 7 days   Lab Units 07/10/24  0253 07/10/24  0051   HSTROP T ng/L 20 23*     Results from last 7 days   Lab Units 07/16/24  0327 07/15/24  0137 07/14/24  0629   WBC 10*3/mm3 16.43* 27.88* 19.67*    HEMOGLOBIN g/dL 9.8* 10.4* 10.4*   HEMATOCRIT % 31.7* 32.4* 32.0*   PLATELETS 10*3/mm3 205 294 279             Results from last 7 days   Lab Units 07/16/24  0327   MAGNESIUM mg/dL 2.2     Results from last 7 days   Lab Units 07/16/24  0327   TRIGLYCERIDES mg/dL 201*       I reviewed the patient's new clinical results.  I personally viewed and interpreted the patient's EKG/Telemetry data        Medication Review:   chlorhexidine, 15 mL, Mouth/Throat, Q12H  clopidogrel, 75 mg, Oral, Daily  insulin regular, 2-9 Units, Subcutaneous, Q6H  ipratropium-albuterol, 3 mL, Nebulization, 4x Daily - RT  pantoprazole, 40 mg, Intravenous, BID AC  piperacillin-tazobactam, 4.5 g, Intravenous, Q8H  [Held by provider] pregabalin, 150 mg, Oral, TID  senna-docusate sodium, 2 tablet, Oral, BID  sodium chloride, 10 mL, Intravenous, Q12H  sodium chloride, 10 mL, Intravenous, Q12H  sodium chloride, 10 mL, Intravenous, Q12H  sodium chloride, 10 mL, Intravenous, Q12H  sodium chloride, 10 mL, Intravenous, Q12H        Adult Central 2-in-1 TPN, , Last Rate: 50 mL/hr at 07/16/24 0638  amiodarone, 0.5 mg/min, Last Rate: 0.5 mg/min (07/16/24 0638)  fentanyl 10 mcg/mL,  mcg/hr, Last Rate: 100 mcg/hr (07/16/24 0638)  Pharmacy to Dose TPN,   phenylephrine, 0.5-3 mcg/kg/min, Last Rate: Stopped (07/15/24 1655)  propofol, 5-100 mcg/kg/min, Last Rate: 10 mcg/kg/min (07/16/24 0638)        Assessment & Plan       Left lower quadrant abdominal pain    Essential hypertension    S/P CABG (coronary artery bypass graft)    Chronic mesenteric ischemia    Chest pain    Leukocytosis    PAF (paroxysmal atrial fibrillation)    Acute respiratory failure with hypoxia    Ileus, postoperative    Hypokalemia    This has been a really tough course for this gentleman's had ischemic bowel had resection then developed an ileus vomited aspirated developed a respiratory failure had to be intubated.  He is on amiodarone for his A-fib and a flutter.  He has been stable  with that but he is critically ill.  His albumin is in the 1 mid ones which is concerning.  Cardiac wise he has been stable we do need to try and keep him on his clopidogrel if possible.  I have spoken with his wife      Negro Locke MD  07/16/24  09:33 EDT

## 2024-07-16 NOTE — PROGRESS NOTES
Remains in ICU on ventilator    Afebrile, vital signs stable  WBC 16, down from 28 yesterday  Hemoglobin 9.8, stable  Platelets 205  Albumin 1.7  GFR 96    Abdomen is soft.  Nondistended.  Nontender on the left, grimaces somewhat with deep palpation on right.    Continue supportive care in ICU  Continue TPN, nasogastric output is quite low.  Would consider removing nasogastric tube and replacing with enteral feeding tube tomorrow if nasogastric output remains low

## 2024-07-16 NOTE — PLAN OF CARE
Problem: Adult Inpatient Plan of Care  Goal: Plan of Care Review  Outcome: Ongoing, Progressing  Goal: Patient-Specific Goal (Individualized)  Outcome: Ongoing, Progressing  Goal: Absence of Hospital-Acquired Illness or Injury  Outcome: Ongoing, Progressing  Intervention: Identify and Manage Fall Risk  Recent Flowsheet Documentation  Taken 7/16/2024 0400 by Patrice Bowens RN  Safety Promotion/Fall Prevention:   activity supervised   safety round/check completed  Taken 7/16/2024 0300 by Patrice Bowens RN  Safety Promotion/Fall Prevention:   activity supervised   safety round/check completed  Taken 7/16/2024 0200 by Patrice Bowens RN  Safety Promotion/Fall Prevention:   activity supervised   safety round/check completed  Taken 7/16/2024 0100 by Patrice Bowens RN  Safety Promotion/Fall Prevention:   activity supervised   safety round/check completed  Taken 7/16/2024 0000 by Patrice Bowens RN  Safety Promotion/Fall Prevention:   activity supervised   safety round/check completed  Taken 7/15/2024 2300 by Patrice Bowens RN  Safety Promotion/Fall Prevention:   activity supervised   safety round/check completed  Taken 7/15/2024 2200 by Patrice Bowens RN  Safety Promotion/Fall Prevention:   activity supervised   safety round/check completed  Taken 7/15/2024 2100 by Patrice Bowens RN  Safety Promotion/Fall Prevention:   activity supervised   safety round/check completed  Taken 7/15/2024 2000 by Patrice Bowens RN  Safety Promotion/Fall Prevention:   activity supervised   safety round/check completed  Intervention: Prevent Skin Injury  Recent Flowsheet Documentation  Taken 7/16/2024 0400 by Patrice Bowens RN  Body Position:   turned   left  Taken 7/16/2024 0200 by Patrice Bowens RN  Body Position:   turned   right  Taken 7/16/2024 0000 by Patriec Bowens RN  Body Position: supine  Taken 7/15/2024 2200 by Patrice Bowens RN  Body Position:   turned   left  Taken 7/15/2024 2000 by Kwan  MANI Ibrahim  Body Position:   right   turned  Intervention: Prevent Infection  Recent Flowsheet Documentation  Taken 7/16/2024 0400 by Patrice Bowens RN  Infection Prevention: single patient room provided  Taken 7/16/2024 0300 by Patrice Bowens RN  Infection Prevention: single patient room provided  Taken 7/16/2024 0000 by Patrice Bowens RN  Infection Prevention: single patient room provided  Taken 7/15/2024 2300 by Patrice Bowens RN  Infection Prevention: single patient room provided  Taken 7/15/2024 2200 by Patrice Bowens RN  Infection Prevention: single patient room provided  Taken 7/15/2024 2100 by Patrice Bowens RN  Infection Prevention: single patient room provided  Taken 7/15/2024 2000 by Patrice Bowens RN  Infection Prevention: single patient room provided  Goal: Optimal Comfort and Wellbeing  Outcome: Ongoing, Progressing  Goal: Readiness for Transition of Care  Outcome: Ongoing, Progressing     Problem: Pain Acute  Goal: Acceptable Pain Control and Functional Ability  Outcome: Ongoing, Progressing  Intervention: Prevent or Manage Pain  Recent Flowsheet Documentation  Taken 7/16/2024 0400 by Patrice Bowens RN  Medication Review/Management: medications reviewed  Taken 7/16/2024 0300 by Patrice Bowens RN  Medication Review/Management: medications reviewed  Taken 7/16/2024 0200 by Patrice Bowens RN  Medication Review/Management: medications reviewed  Taken 7/16/2024 0100 by Patrice Bowens RN  Medication Review/Management: medications reviewed  Taken 7/16/2024 0000 by Patrice Bowens RN  Medication Review/Management: medications reviewed  Taken 7/15/2024 2300 by Patrice Bowens RN  Medication Review/Management: medications reviewed  Taken 7/15/2024 2200 by Patrice Bowens RN  Medication Review/Management: medications reviewed  Taken 7/15/2024 2100 by Patrice Bowens RN  Medication Review/Management: medications reviewed  Taken 7/15/2024 2000 by Patrice Bowens  RN  Medication Review/Management: medications reviewed  Intervention: Optimize Psychosocial Wellbeing  Recent Flowsheet Documentation  Taken 7/16/2024 0400 by Patrice Bowens RN  Diversional Activities: television  Taken 7/16/2024 0200 by Patrice Bowens RN  Diversional Activities: television  Taken 7/16/2024 0000 by Patrice Bowens RN  Diversional Activities: television  Taken 7/15/2024 2200 by Patrice Bowens RN  Diversional Activities: television  Taken 7/15/2024 2000 by Patrice Bowens RN  Diversional Activities: television     Problem: Hypertension Comorbidity  Goal: Blood Pressure in Desired Range  Outcome: Ongoing, Progressing  Intervention: Maintain Blood Pressure Management  Recent Flowsheet Documentation  Taken 7/16/2024 0400 by Patrice Bowens RN  Medication Review/Management: medications reviewed  Taken 7/16/2024 0300 by Patrice Bowens RN  Medication Review/Management: medications reviewed  Taken 7/16/2024 0200 by Patrice Bowens RN  Medication Review/Management: medications reviewed  Taken 7/16/2024 0100 by Patrice Bowens RN  Medication Review/Management: medications reviewed  Taken 7/16/2024 0000 by Patrice Bowens RN  Medication Review/Management: medications reviewed  Taken 7/15/2024 2300 by Patrice Bowens RN  Medication Review/Management: medications reviewed  Taken 7/15/2024 2200 by Patrice Bowens RN  Medication Review/Management: medications reviewed  Taken 7/15/2024 2100 by Patrice Bowens RN  Medication Review/Management: medications reviewed  Taken 7/15/2024 2000 by Patrice Bowens RN  Medication Review/Management: medications reviewed     Problem: Skin Injury Risk Increased  Goal: Skin Health and Integrity  Outcome: Ongoing, Progressing     Problem: Adjustment to Illness (Sepsis/Septic Shock)  Goal: Optimal Coping  Outcome: Ongoing, Progressing     Problem: Bleeding (Sepsis/Septic Shock)  Goal: Absence of Bleeding  Outcome: Ongoing, Progressing     Problem:  Glycemic Control Impaired (Sepsis/Septic Shock)  Goal: Blood Glucose Level Within Desired Range  Outcome: Ongoing, Progressing     Problem: Infection Progression (Sepsis/Septic Shock)  Goal: Absence of Infection Signs and Symptoms  Outcome: Ongoing, Progressing  Intervention: Initiate Sepsis Management  Recent Flowsheet Documentation  Taken 7/16/2024 0400 by Patrice Bowens RN  Infection Prevention: single patient room provided  Taken 7/16/2024 0300 by Patrice Bowens RN  Infection Prevention: single patient room provided  Taken 7/16/2024 0000 by Patrice Bowens RN  Infection Prevention: single patient room provided  Taken 7/15/2024 2300 by Patrice Bowens RN  Infection Prevention: single patient room provided  Taken 7/15/2024 2200 by Patrice Bowens RN  Infection Prevention: single patient room provided  Taken 7/15/2024 2100 by Patrice Bowens RN  Infection Prevention: single patient room provided  Taken 7/15/2024 2000 by Patrice Bowens RN  Infection Prevention: single patient room provided     Problem: Nutrition Impaired (Sepsis/Septic Shock)  Goal: Optimal Nutrition Intake  Outcome: Ongoing, Progressing     Problem: Fall Injury Risk  Goal: Absence of Fall and Fall-Related Injury  Outcome: Ongoing, Progressing  Intervention: Identify and Manage Contributors  Recent Flowsheet Documentation  Taken 7/16/2024 0400 by Patrice Bowens RN  Medication Review/Management: medications reviewed  Taken 7/16/2024 0300 by Patrice Bowens RN  Medication Review/Management: medications reviewed  Taken 7/16/2024 0200 by Patrice Bowens RN  Medication Review/Management: medications reviewed  Taken 7/16/2024 0100 by Patrice Bowens RN  Medication Review/Management: medications reviewed  Taken 7/16/2024 0000 by Patrice Bowens RN  Medication Review/Management: medications reviewed  Taken 7/15/2024 2300 by Patrice Bowens RN  Medication Review/Management: medications reviewed  Taken 7/15/2024 2200 by Kwan  MANI Ibrahim  Medication Review/Management: medications reviewed  Taken 7/15/2024 2100 by Patrice Bowens RN  Medication Review/Management: medications reviewed  Taken 7/15/2024 2000 by Patrice Bowens RN  Medication Review/Management: medications reviewed  Intervention: Promote Injury-Free Environment  Recent Flowsheet Documentation  Taken 7/16/2024 0400 by Patrice Bowens RN  Safety Promotion/Fall Prevention:   activity supervised   safety round/check completed  Taken 7/16/2024 0300 by Patrice Bowens RN  Safety Promotion/Fall Prevention:   activity supervised   safety round/check completed  Taken 7/16/2024 0200 by Patrice Bowens RN  Safety Promotion/Fall Prevention:   activity supervised   safety round/check completed  Taken 7/16/2024 0100 by Patrice Bowens RN  Safety Promotion/Fall Prevention:   activity supervised   safety round/check completed  Taken 7/16/2024 0000 by Patrice Bowens RN  Safety Promotion/Fall Prevention:   activity supervised   safety round/check completed  Taken 7/15/2024 2300 by Patrice Bowens RN  Safety Promotion/Fall Prevention:   activity supervised   safety round/check completed  Taken 7/15/2024 2200 by Patrice Bowens RN  Safety Promotion/Fall Prevention:   activity supervised   safety round/check completed  Taken 7/15/2024 2100 by Patrice Bowens RN  Safety Promotion/Fall Prevention:   activity supervised   safety round/check completed  Taken 7/15/2024 2000 by Patrice Bowens RN  Safety Promotion/Fall Prevention:   activity supervised   safety round/check completed     Problem: Diarrhea  Goal: Fluid and Electrolyte Balance  Outcome: Ongoing, Progressing  Intervention: Manage Diarrhea  Recent Flowsheet Documentation  Taken 7/16/2024 0400 by Patrice Bowens RN  Medication Review/Management: medications reviewed  Taken 7/16/2024 0300 by Patrice Bowens RN  Medication Review/Management: medications reviewed  Taken 7/16/2024 0200 by Patrice Bowens  RN  Medication Review/Management: medications reviewed  Taken 7/16/2024 0100 by Patrice Bowens RN  Medication Review/Management: medications reviewed  Taken 7/16/2024 0000 by Patrice Bowens RN  Medication Review/Management: medications reviewed  Taken 7/15/2024 2300 by Patrice Bowens RN  Medication Review/Management: medications reviewed  Taken 7/15/2024 2200 by Patrice Bowens RN  Medication Review/Management: medications reviewed  Taken 7/15/2024 2100 by Patrice Bowens RN  Medication Review/Management: medications reviewed  Taken 7/15/2024 2000 by Patrice Bowens RN  Medication Review/Management: medications reviewed     Problem: Aspiration (Enteral Nutrition)  Goal: Absence of Aspiration Signs and Symptoms  Outcome: Ongoing, Progressing  Intervention: Minimize Aspiration Risk  Recent Flowsheet Documentation  Taken 7/16/2024 0000 by Patrice Bowens RN  Oral Care: swabbed with antiseptic solution  Taken 7/15/2024 2000 by Patrice Bowens RN  Oral Care: swabbed with antiseptic solution     Problem: Device-Related Complication Risk (Enteral Nutrition)  Goal: Safe, Effective Therapy Delivery  Outcome: Ongoing, Progressing     Problem: Feeding Intolerance (Enteral Nutrition)  Goal: Feeding Tolerance  Outcome: Ongoing, Progressing     Problem: Communication Impairment (Mechanical Ventilation, Invasive)  Goal: Effective Communication  Outcome: Ongoing, Progressing     Problem: Device-Related Complication Risk (Mechanical Ventilation, Invasive)  Goal: Optimal Device Function  Outcome: Ongoing, Progressing  Intervention: Optimize Device Care and Function  Recent Flowsheet Documentation  Taken 7/16/2024 0400 by Patrice Bowens RN  Airway Safety Measures:   mask valve resuscitator at bedside   oxygen flowmeter at bedside   suction at bedside  Taken 7/16/2024 0300 by Patrice Bowens RN  Airway Safety Measures:   mask valve resuscitator at bedside   oxygen flowmeter at bedside   suction at bedside  Taken  7/16/2024 0200 by Patrice Bowens RN  Airway Safety Measures:   mask valve resuscitator at bedside   oxygen flowmeter at bedside   suction at bedside  Taken 7/16/2024 0100 by Patrice Bowens RN  Airway Safety Measures:   mask valve resuscitator at bedside   oxygen flowmeter at bedside   suction at bedside  Taken 7/16/2024 0000 by Patrice Bowens RN  Airway Safety Measures:   mask valve resuscitator at bedside   oxygen flowmeter at bedside  Taken 7/15/2024 2300 by Patrice Bowens RN  Airway Safety Measures:   mask valve resuscitator at bedside   oxygen flowmeter at bedside   suction at bedside  Taken 7/15/2024 2200 by Patrice Bowens RN  Airway Safety Measures:   mask valve resuscitator at bedside   oxygen flowmeter at bedside   suction at bedside  Taken 7/15/2024 2100 by Patrice Bowens RN  Airway Safety Measures:   mask valve resuscitator at bedside   oxygen flowmeter at bedside   suction at bedside  Taken 7/15/2024 2000 by Patrice Bowens RN  Airway Safety Measures:   mask valve resuscitator at bedside   oxygen flowmeter at bedside   suction at bedside     Problem: Inability to Wean (Mechanical Ventilation, Invasive)  Goal: Mechanical Ventilation Liberation  Outcome: Ongoing, Progressing  Intervention: Promote Extubation and Mechanical Ventilation Liberation  Recent Flowsheet Documentation  Taken 7/16/2024 0400 by Patrice Bowens RN  Medication Review/Management: medications reviewed  Taken 7/16/2024 0300 by Patrice Bowens RN  Medication Review/Management: medications reviewed  Taken 7/16/2024 0200 by Patrice Bowens RN  Medication Review/Management: medications reviewed  Taken 7/16/2024 0100 by Patrice Bowens RN  Medication Review/Management: medications reviewed  Taken 7/16/2024 0000 by Patrice Bowens RN  Medication Review/Management: medications reviewed  Taken 7/15/2024 2300 by Patrice Bowens RN  Medication Review/Management: medications reviewed  Taken 7/15/2024 2200 by Kwan  MANI Ibrahim  Medication Review/Management: medications reviewed  Taken 7/15/2024 2100 by Patrice Bowens RN  Medication Review/Management: medications reviewed  Taken 7/15/2024 2000 by Patrice Bowens RN  Medication Review/Management: medications reviewed     Problem: Nutrition Impairment (Mechanical Ventilation, Invasive)  Goal: Optimal Nutrition Delivery  Outcome: Ongoing, Progressing     Problem: Skin and Tissue Injury (Mechanical Ventilation, Invasive)  Goal: Absence of Device-Related Skin and Tissue Injury  Outcome: Ongoing, Progressing     Problem: Ventilator-Induced Lung Injury (Mechanical Ventilation, Invasive)  Goal: Absence of Ventilator-Induced Lung Injury  Outcome: Ongoing, Progressing  Intervention: Prevent Ventilator-Associated Pneumonia  Recent Flowsheet Documentation  Taken 7/16/2024 0000 by Patrice Bowens RN  Oral Care: swabbed with antiseptic solution  Taken 7/15/2024 2000 by Patrice Bowens RN  Oral Care: swabbed with antiseptic solution     Problem: Parenteral Nutrition  Goal: Effective Intravenous Nutrition Therapy Delivery  Outcome: Ongoing, Progressing  Intervention: Optimize Intravenous Nutrition Delivery  Recent Flowsheet Documentation  Taken 7/16/2024 0400 by Patrice Bowens RN  Medication Review/Management: medications reviewed  Taken 7/16/2024 0300 by Patrice Bowens RN  Medication Review/Management: medications reviewed  Taken 7/16/2024 0200 by Patrice Bowens RN  Medication Review/Management: medications reviewed  Taken 7/16/2024 0100 by Patrice Bowens RN  Medication Review/Management: medications reviewed  Taken 7/16/2024 0000 by Patrice Bowens RN  Medication Review/Management: medications reviewed  Taken 7/15/2024 2300 by Patrice Bowens RN  Medication Review/Management: medications reviewed  Taken 7/15/2024 2200 by Patrice Bowens RN  Medication Review/Management: medications reviewed  Taken 7/15/2024 2100 by Patrice Bowens, MANI  Medication  Review/Management: medications reviewed  Taken 7/15/2024 2000 by Patrice Bowens RN  Medication Review/Management: medications reviewed     Problem: Restraint, Nonviolent  Goal: Absence of Harm or Injury  Outcome: Ongoing, Progressing  Intervention: Implement Least Restrictive Safety Strategies  Recent Flowsheet Documentation  Taken 7/16/2024 0400 by Patrice Bowens RN  Medical Device Protection:   IV pole/bag removed from visual field   tubing secured  Less Restrictive Alternative:   bed alarm in use   calming techniques promoted   family presence promoted  De-Escalation Techniques: increased round frequency  Diversional Activities: television  Taken 7/16/2024 0300 by Patrice Bowens RN  Medical Device Protection:   IV pole/bag removed from visual field   tubing secured  Taken 7/16/2024 0200 by Patrice Bowens RN  Medical Device Protection:   IV pole/bag removed from visual field   tubing secured  Less Restrictive Alternative:   bed alarm in use   calming techniques promoted   family presence promoted  De-Escalation Techniques: increased round frequency  Diversional Activities: television  Taken 7/16/2024 0100 by Patrice Bowens RN  Medical Device Protection:   IV pole/bag removed from visual field   tubing secured  Taken 7/16/2024 0000 by Patrice Bowens RN  Medical Device Protection:   IV pole/bag removed from visual field   tubing secured  Less Restrictive Alternative:   bed alarm in use   calming techniques promoted   family presence promoted  De-Escalation Techniques: increased round frequency  Diversional Activities: television  Taken 7/15/2024 2300 by Patrice Bowens RN  Medical Device Protection:   IV pole/bag removed from visual field   tubing secured  Taken 7/15/2024 2200 by Patrice Bowens RN  Medical Device Protection:   IV pole/bag removed from visual field   tubing secured  Less Restrictive Alternative:   bed alarm in use   calming techniques promoted   family presence  promoted  De-Escalation Techniques: increased round frequency  Diversional Activities: television  Taken 7/15/2024 2100 by Patrice Bowens RN  Medical Device Protection:   IV pole/bag removed from visual field   tubing secured  Taken 7/15/2024 2000 by Patrice Bowens RN  Medical Device Protection:   IV pole/bag removed from visual field   tubing secured  Less Restrictive Alternative:   bed alarm in use   calming techniques promoted   family presence promoted  De-Escalation Techniques: increased round frequency  Diversional Activities: television  Intervention: Protect Skin and Joint Integrity  Recent Flowsheet Documentation  Taken 7/16/2024 0400 by Patrice Bowens RN  Body Position:   turned   left  Taken 7/16/2024 0200 by Patrice Bowens RN  Body Position:   turned   right  Taken 7/16/2024 0000 by Patrice Bowens RN  Body Position: supine  Taken 7/15/2024 2200 by Patrice Bowens RN  Body Position:   turned   left  Taken 7/15/2024 2000 by Patrice Bowens RN  Body Position:   right   turned   Goal Outcome Evaluation:

## 2024-07-16 NOTE — PROGRESS NOTES
"  Daily Progress Note.   [facility] INTENSIVE CARE  7/16/2024    Patient:  Name:  Slade Martinez  MRN:  [id number]  1950  73 y.o.  male         CC: ahrf, asp pna    Interval History:  Intubated, maintains on mech ventilation  Sedated unable to give history      Physical Exam:  /57   Pulse 82   Temp 97.8 °F (36.6 °C) (Oral)   Resp 18   Ht 180.3 cm (71\")   Wt 87.4 kg (192 lb 10.9 oz)   SpO2 98%   BMI 26.87 kg/m²   Body mass index is 26.87 kg/m².    Intake/Output Summary (Last 24 hours) at 7/16/2024 0748  Last data filed at 7/16/2024 0638  Gross per 24 hour   Intake 2627.26 ml   Output 2035 ml   Net 592.26 ml   Vent Settings          Resp Rate (Set): 14  Pressure Support (cm H2O): 8 cm H20  FiO2 (%): 55 %  PEEP/CPAP (cm H2O): 6 cm H20    Minute Ventilation (L/min) (Obs): 10.7 L/min  Resp Rate (Observed) Vent: 19  I:E Ratio (Set): 1:3.29  I:E Ratio (Obs): 1:1.5    PIP Observed (cm H2O): 24 cm H2O  Plateau Pressure (cm H2O): 0 cm H2O  Driving Pressure (cm H2O): -5.5 cm H2O    General appearance: sedated ill   HENT: Atraumatic; oropharynx +ET tube  Lungs: taryn no sig wheeze mech air entry synchronous  CV: RRR, no rub   Abdomen: non rigid midline incision cdi  Extremities: No peripheral edema    Skin: WWP no diffuse visible rash  Psych/neuro sedated    Data Review:  Notable Labs:  Results from last 7 days   Lab Units 07/16/24  0327 07/15/24  0137 07/14/24  0629 07/13/24  0327 07/12/24  0511 07/11/24  0439 07/10/24  0253   WBC 10*3/mm3 16.43* 27.88* 19.67* 17.89* 18.83* 23.17* 17.91*   HEMOGLOBIN g/dL 9.8* 10.4* 10.4* 11.3* 11.2* 12.8* 11.1*   PLATELETS 10*3/mm3 205 294 279 277 310 334 298     Results from last 7 days   Lab Units 07/16/24  0327 07/15/24  0137 07/14/24  0629 07/13/24  0327 07/12/24  0511 07/11/24  0439 07/10/24  0253   SODIUM mmol/L 143 140 143 145 142 137 139   POTASSIUM mmol/L 3.5 4.2 3.3* 3.1* 3.6 3.7 3.7   CHLORIDE mmol/L 108* 104 106 107 105 103 108*   CO2 mmol/L 26.0 " 23.0 25.0 23.4 25.3 20.3* 20.0*   BUN mg/dL 23 18 20 23 24* 24* 26*   CREATININE mg/dL 0.71* 0.73* 0.71* 0.69* 0.67* 0.86 0.65*   GLUCOSE mg/dL 167* 165* 136* 127* 124* 135* 113*   CALCIUM mg/dL 8.1* 7.6* 8.0* 8.3* 8.4* 8.4* 8.5*   MAGNESIUM mg/dL  --  2.0 2.3  --   --   --  2.2   PHOSPHORUS mg/dL  --  3.1 2.1*  --   --   --  3.3   Estimated Creatinine Clearance: 112.8 mL/min (A) (by C-G formula based on SCr of 0.71 mg/dL (L)).    Results from last 7 days   Lab Units 07/16/24  0327 07/15/24  0137 07/14/24  1637 07/14/24  0629 07/13/24  0327 07/12/24  0511   AST (SGOT) U/L 80*  --   --  44*  --  62*   ALT (SGPT) U/L 37  --   --  34  --  41   LACTATE mmol/L  --   --  2.6*  --   --   --    PLATELETS 10*3/mm3 205 294  --  279   < > 310    < > = values in this interval not displayed.       Results from last 7 days   Lab Units 07/16/24  0406 07/15/24  1041 07/14/24  1428 07/14/24  1006 07/13/24  2059 07/11/24  0128 07/10/24  0054   PH, ARTERIAL pH units 7.387 7.521* 7.394 7.513* 7.536* 7.402 7.436   PCO2, ARTERIAL mm Hg 47.5* 35.4 42.5 32.8* 30.0* 38.4 30.8*   PO2 ART mm Hg 63.3* 69.3* 77.5* 51.1* 51.1* 62.9* 74.0*   HCO3 ART mmol/L 28.6* 29.0* 26.0 26.4 25.4 23.9 20.8*       Imaging:  Reviewed chest images personally from past 3 days    ASSESSMENT  /  PLAN:  Acute hypoxic respiratory failure sp intubation 7/14/2024  Aspiration pneumonia  Postop ileus  Status post laparotomy  Gangrenous bowel with resection  Occlusion of the SMA  Chronic gastroparesis  CAD and recent PCI  A-fib with RVR      Mech vent reviewed adjustments as appropriate  Abg  Cxr still sig infiltrate today  wean fio2 - making progerss    Given amiodarone needs,   Phenylephrine wean as able   Dc metoprolol holding atenolol  Asa 325  Plavix 75 - will see if gen surg can allow clamping to allow meds antionette rn.  amiodarone    Obtain blood cultures to help guide abx therapy  zosyn to 4.5    TPN per gen surg noted.  On pantoprazole bid iv    Ssi glycemic control at  goal    Dw wife all questions answered.     Plan of care and patient course was reviewed with multidisciplinary team in morning rounds.    Total critical care time was 38 minutes, excluding any separately billable procedure time.  Time did not overlap with any other provider.    Electronically signed by Willie Resendiz MD, 07/16/24, 8:11 AM EDT.

## 2024-07-17 NOTE — CONSULTS
"Nutrition Services    Patient Name:  Slade Martinez  YOB: 1950  MRN: [id number]  Admit Date:  7/1/2024    Assessment Date:  07/17/24    Summary: TPN Consult. POD 10 from small bowel resection for gangrenous enteritis .    Pt continues on TPN at 115g/750 kcal/propofol. No orders for EN yet. Propofol running at 10.5 ml/hr (277 additional kcals).     Estimated nutrition needs:   2040 kcals (25kcals/kg)  98-122g protein (1.2-1.5 g/kg/d)    Plan/recommendations:  Macronutrient TPN Goal: 1000 Dextrose, 115g Amino Acids, No lipids while on propofol  Pharmacist to mange electrolytes  Begin EN once OK with general surgery.    RD to follow    CLINICAL NUTRITION ASSESSMENT      Reason for Assessment Follow-up Protocol     Diagnosis/Problem   Left lower quadrant abdominal pain, gangrenous enteritis ,S/P bowel resection 7/7. Septic shock, resp failure, intubated 7/14     Anthropometrics        Current Height  Current Weight  BMI kg/m2 Height: 180.3 cm (71\")  Weight: 88.8 kg (195 lb 12.3 oz) (07/17/24 0600)  Body mass index is 27.3 kg/m².   Adjusted BMI (if applicable)    BMI Category Overweight (25 - 29.9)   Ideal Body Weight (IBW) 172#   Usual Body Weight (UBW) 199-200#   Weight Trend Loss     Estimated Requirements         Weight used  81.6 kg    Calories  2,040 kcal (25 kcal/kg)    Protein  98 -122g (1.2 - 1.5 gm/kg)    Fluid   (1 mL/kcal)     Labs       Pertinent Labs    Results from last 7 days   Lab Units 07/17/24  0321 07/16/24  2203 07/16/24  1237 07/16/24  0327 07/15/24  0137 07/14/24  0629   SODIUM mmol/L 144  --   --  143 140 143   POTASSIUM mmol/L 3.8 4.2 3.5 3.5 4.2 3.3*   CHLORIDE mmol/L 112*  --   --  108* 104 106   CO2 mmol/L 24.9  --   --  26.0 23.0 25.0   BUN mg/dL 23  --   --  23 18 20   CREATININE mg/dL 0.62*  --   --  0.71* 0.73* 0.71*   CALCIUM mg/dL 8.2*  --   --  8.1* 7.6* 8.0*   BILIRUBIN mg/dL 0.9  --   --  0.7  --  1.5*   ALK PHOS U/L 85  --   --  86  --  86   ALT (SGPT) U/L 102*  --   " --  37  --  34   AST (SGOT) U/L 287*  --   --  80*  --  44*   GLUCOSE mg/dL 213*  --   --  167* 165* 136*     Results from last 7 days   Lab Units 07/17/24  0321 07/16/24  0327 07/15/24  0137 07/15/24  0137   MAGNESIUM mg/dL 2.3 2.2  --  2.0   PHOSPHORUS mg/dL 2.3* 2.5  --  3.1   HEMOGLOBIN g/dL 9.6* 9.8*  --  10.4*   HEMATOCRIT % 30.7* 31.7*  --  32.4*   WBC 10*3/mm3 14.31* 16.43*  --  27.88*   TRIGLYCERIDES mg/dL 106 201*   < >  --    ALBUMIN g/dL 1.8* 1.7*  --  1.9*    < > = values in this interval not displayed.     Results from last 7 days   Lab Units 07/17/24  0321 07/16/24  0327 07/15/24  0137 07/14/24  0629 07/13/24 0327   PLATELETS 10*3/mm3 187 205 294 279 277     COVID19   Date Value Ref Range Status   06/19/2024 Not Detected Not Detected - Ref. Range Final     Lab Results   Component Value Date    HGBA1C 5.8 (H) 02/28/2023          Medications           Scheduled Medications chlorhexidine, 15 mL, Mouth/Throat, Q12H  clopidogrel, 75 mg, Nasogastric, Daily  insulin glargine, 10 Units, Subcutaneous, Daily  insulin regular, 4-24 Units, Subcutaneous, Q6H  ipratropium-albuterol, 3 mL, Nebulization, 4x Daily - RT  metoprolol tartrate, 5 mg, Intravenous, Q6H  pantoprazole, 40 mg, Intravenous, BID AC  piperacillin-tazobactam, 4.5 g, Intravenous, Q8H  [Held by provider] pregabalin, 150 mg, Oral, TID  senna-docusate sodium, 2 tablet, Nasogastric, BID  sodium chloride, 10 mL, Intravenous, Q12H  sodium chloride, 10 mL, Intravenous, Q12H  sodium chloride, 10 mL, Intravenous, Q12H  sodium chloride, 10 mL, Intravenous, Q12H  sodium chloride, 10 mL, Intravenous, Q12H       Infusions Adult Central 2-in-1 TPN, , Last Rate: 50 mL/hr at 07/17/24 0604  Adult Central 2-in-1 TPN,   amiodarone, 0.5 mg/min, Last Rate: 0.5 mg/min (07/17/24 1212)  fentanyl 10 mcg/mL,  mcg/hr, Last Rate: 90 mcg/hr (07/17/24 0604)  Pharmacy to Dose TPN,   phenylephrine, 0.5-3 mcg/kg/min, Last Rate: Stopped (07/15/24 1655)  propofol, 5-100  mcg/kg/min, Last Rate: 20 mcg/kg/min (07/17/24 0604)       PRN Medications   acetaminophen    acetaminophen    senna-docusate sodium **AND** polyethylene glycol **AND** bisacodyl **AND** bisacodyl    dextrose    dextrose    glucagon (human recombinant)    hyaluronidase 150 units in NS 10 ml syringe    ipratropium-albuterol    labetalol    meclizine    nitroglycerin    ondansetron    Pharmacy to Dose TPN    Potassium Replacement - Follow Nurse / BPA Driven Protocol    sodium chloride    [COMPLETED] Insert Peripheral IV **AND** sodium chloride    sodium chloride    sodium chloride    sodium chloride    sodium chloride    sodium chloride     Physical Findings          General Findings overweight, sedate, ventilator support   Oral/Mouth Cavity dental appliance, tooth or teeth missing   Edema  2+ (mild), 3+ (moderate)   Gastrointestinal hypoactive bowel sounds, last bowel movement: 7/14   Skin  surgical incision: abd   Tubes/Drains/Lines central line/PICC, OG tube   NFPE No clinical signs of muscle wasting or fat loss     Current Nutrition Orders & Evaluation of Intake       Oral Nutrition     Food Allergies NKFA   Current PO Diet Adult Central 2-in-1 TPN  NPO Diet NPO Type: Strict NPO, Other (see comments)  Adult Central 2-in-1 TPN   Supplement n/a    PO Evaluation     % PO Intake 0     Factors Affecting Intake: altered GI function, weakness      Parenteral Nutrition      TPN Route Central   TPN Rate/Volume 50 mL/hr, 1200 mL per day   Current TPN Order        Dextrose (kcal) 750       Amino Acid (gm) 115       Lipid Concentration other: none       Lipid Volume/Frequency  other:none   MVI Frequency     Trace Element Frequency     Total # Days on TPN 3   Propofol Rate/Kcal 10.5 (277 kcals)     PES STATEMENT / NUTRITION DIAGNOSIS      Nutrition Dx Problem  Problem: Inadequate Oral Intake  Etiology: Medical Diagnosis - left lower quadrant abdominal pain    Signs/Symptoms: Report of Minimal PO Intake and Unintended Weight  Change     NUTRITION INTERVENTION / PLAN OF CARE      Intervention Goal(s) Reduce/improve symptoms, Meet estimated needs, Disease management/therapy, Establish PO intake, Increase intake, Accepts oral nutrition supplement, Maintain weight, No significant weight loss, and PO intake goal %: 75         RD Intervention/Action Await initiation/advancement of PO diet, Continue to monitor, and Care plan reviewed   --      Prescription/Orders:       PO Diet       Supplements       Enteral Nutrition       Parenteral Nutrition       Parenteral Prescription:     TPN Route Central   TPN Rate (mL/hr) Per pharmacist (50mL/hr)   TPN Recommendation:        Dextrose (kcal) 1000       Amino Acid (gm) 115       Lipid Concentration other: hold while on propofol       Lipid Volume/Frequency  other:   Propofol Rate/Kcal 26.3 (694 kcals)   TPN Provision:   1460 kcals, 2154 kcal with propfol, 115 gm protein        Calories 105 % needs met        Protein  100 % needs met        Fluid 1200 mL total     New Prescription Ordered? Continue same per protocol, No changes at this time         Monitor/Evaluation Per protocol   Discharge Plan/Needs Pending clinical course   --    RD to follow per protocol.      Electronically signed by:  Andres Swanson  07/17/24 15:00 EDT

## 2024-07-17 NOTE — PLAN OF CARE
Problem: Adult Inpatient Plan of Care  Goal: Plan of Care Review  Outcome: Ongoing, Progressing  Goal: Patient-Specific Goal (Individualized)  Outcome: Ongoing, Progressing  Goal: Absence of Hospital-Acquired Illness or Injury  Outcome: Ongoing, Progressing  Intervention: Identify and Manage Fall Risk  Recent Flowsheet Documentation  Taken 7/17/2024 0600 by Patrice Bowens RN  Safety Promotion/Fall Prevention:   activity supervised   safety round/check completed  Taken 7/17/2024 0500 by Patrice Bowens RN  Safety Promotion/Fall Prevention:   activity supervised   safety round/check completed  Taken 7/17/2024 0400 by Patrice Bowens RN  Safety Promotion/Fall Prevention:   activity supervised   safety round/check completed  Taken 7/17/2024 0300 by Patrice Bowens RN  Safety Promotion/Fall Prevention:   activity supervised   safety round/check completed  Taken 7/17/2024 0200 by Patrice Bowens RN  Safety Promotion/Fall Prevention:   activity supervised   safety round/check completed  Taken 7/17/2024 0100 by Patrice Bowens RN  Safety Promotion/Fall Prevention:   activity supervised   safety round/check completed  Taken 7/17/2024 0000 by Patrice Bowens RN  Safety Promotion/Fall Prevention:   activity supervised   safety round/check completed  Taken 7/16/2024 2300 by Patrice Bowens RN  Safety Promotion/Fall Prevention:   activity supervised   safety round/check completed  Taken 7/16/2024 2200 by Patrice Bowens RN  Safety Promotion/Fall Prevention:   activity supervised   safety round/check completed  Taken 7/16/2024 2100 by Patrice Bowens RN  Safety Promotion/Fall Prevention:   activity supervised   safety round/check completed  Taken 7/16/2024 2000 by Patrice Bowens RN  Safety Promotion/Fall Prevention:   activity supervised   safety round/check completed  Intervention: Prevent Skin Injury  Recent Flowsheet Documentation  Taken 7/17/2024 0600 by Patrice Bowens RN  Body Position:    turned   left  Taken 7/17/2024 0400 by Patrice Bowens RN  Body Position: supine  Taken 7/17/2024 0200 by Patrice Bowens RN  Body Position:   turned   right  Taken 7/17/2024 0000 by Patrice Bowens RN  Body Position:   turned   left  Taken 7/16/2024 2200 by Patrice Bowens RN  Body Position: supine  Taken 7/16/2024 2000 by Patrice Bowens RN  Body Position:   right   turned  Intervention: Prevent Infection  Recent Flowsheet Documentation  Taken 7/17/2024 0600 by Patrice Bowens RN  Infection Prevention: single patient room provided  Taken 7/17/2024 0500 by Patrice Bowens RN  Infection Prevention: single patient room provided  Taken 7/17/2024 0400 by Patrice Bowens RN  Infection Prevention: single patient room provided  Taken 7/17/2024 0300 by Patrice Bowens RN  Infection Prevention: single patient room provided  Taken 7/17/2024 0200 by Patrice Bowens RN  Infection Prevention: single patient room provided  Taken 7/17/2024 0100 by Patrice Bowens RN  Infection Prevention: single patient room provided  Taken 7/17/2024 0000 by Patrice Bowens RN  Infection Prevention: single patient room provided  Taken 7/16/2024 2300 by Patrice Bowens RN  Infection Prevention: single patient room provided  Taken 7/16/2024 2200 by Patrice Bowens RN  Infection Prevention: single patient room provided  Taken 7/16/2024 2100 by Patrice Bowens RN  Infection Prevention: single patient room provided  Taken 7/16/2024 2000 by Patrice Bowens RN  Infection Prevention: single patient room provided  Goal: Optimal Comfort and Wellbeing  Outcome: Ongoing, Progressing  Goal: Readiness for Transition of Care  Outcome: Ongoing, Progressing     Problem: Pain Acute  Goal: Acceptable Pain Control and Functional Ability  Outcome: Ongoing, Progressing  Intervention: Prevent or Manage Pain  Recent Flowsheet Documentation  Taken 7/17/2024 0600 by Patrice Bowens RN  Medication Review/Management: medications  reviewed  Taken 7/17/2024 0500 by Patrice Bowens RN  Medication Review/Management: medications reviewed  Taken 7/17/2024 0400 by Patrice Bowens RN  Medication Review/Management: medications reviewed  Taken 7/17/2024 0300 by Patrice Bwoens RN  Medication Review/Management: medications reviewed  Taken 7/17/2024 0200 by Patrice Bowens RN  Medication Review/Management: medications reviewed  Taken 7/17/2024 0100 by Patrice Bowens RN  Medication Review/Management: medications reviewed  Taken 7/17/2024 0000 by Patrice Bowens RN  Medication Review/Management: medications reviewed  Taken 7/16/2024 2300 by Patrice Bowens RN  Medication Review/Management: medications reviewed  Taken 7/16/2024 2200 by Patrice Bowens RN  Medication Review/Management: medications reviewed  Taken 7/16/2024 2100 by Patrice Bowens RN  Medication Review/Management: medications reviewed  Taken 7/16/2024 2000 by Patrice Bowens RN  Medication Review/Management: medications reviewed  Intervention: Optimize Psychosocial Wellbeing  Recent Flowsheet Documentation  Taken 7/17/2024 0600 by Patrice Bowens RN  Diversional Activities: television  Taken 7/17/2024 0400 by Patrice Bowens RN  Diversional Activities: television  Taken 7/17/2024 0200 by Patrice Bowens RN  Diversional Activities: television  Taken 7/17/2024 0000 by Patrice Bowens RN  Diversional Activities: television  Taken 7/16/2024 2200 by Patrice Bowens RN  Diversional Activities: television  Taken 7/16/2024 2000 by Patrice Bowens RN  Diversional Activities: television     Problem: Hypertension Comorbidity  Goal: Blood Pressure in Desired Range  Outcome: Ongoing, Progressing  Intervention: Maintain Blood Pressure Management  Recent Flowsheet Documentation  Taken 7/17/2024 0600 by Patrice Bowens RN  Medication Review/Management: medications reviewed  Taken 7/17/2024 0500 by Patrice Bowens RN  Medication Review/Management: medications  reviewed  Taken 7/17/2024 0400 by Patrice Bowens RN  Medication Review/Management: medications reviewed  Taken 7/17/2024 0300 by Patrice Bowens RN  Medication Review/Management: medications reviewed  Taken 7/17/2024 0200 by Patrice Bowens RN  Medication Review/Management: medications reviewed  Taken 7/17/2024 0100 by Patrice Bowens RN  Medication Review/Management: medications reviewed  Taken 7/17/2024 0000 by Patrice Bowens RN  Medication Review/Management: medications reviewed  Taken 7/16/2024 2300 by Patrice Bowens RN  Medication Review/Management: medications reviewed  Taken 7/16/2024 2200 by Patrice Bowens RN  Medication Review/Management: medications reviewed  Taken 7/16/2024 2100 by Patrice Bowens RN  Medication Review/Management: medications reviewed  Taken 7/16/2024 2000 by Patrice Bowens RN  Medication Review/Management: medications reviewed     Problem: Skin Injury Risk Increased  Goal: Skin Health and Integrity  Outcome: Ongoing, Progressing     Problem: Adjustment to Illness (Sepsis/Septic Shock)  Goal: Optimal Coping  Outcome: Ongoing, Progressing     Problem: Bleeding (Sepsis/Septic Shock)  Goal: Absence of Bleeding  Outcome: Ongoing, Progressing     Problem: Glycemic Control Impaired (Sepsis/Septic Shock)  Goal: Blood Glucose Level Within Desired Range  Outcome: Ongoing, Progressing     Problem: Infection Progression (Sepsis/Septic Shock)  Goal: Absence of Infection Signs and Symptoms  Outcome: Ongoing, Progressing  Intervention: Initiate Sepsis Management  Recent Flowsheet Documentation  Taken 7/17/2024 0600 by Patrice Bowens RN  Infection Prevention: single patient room provided  Taken 7/17/2024 0500 by Patrice Bowens RN  Infection Prevention: single patient room provided  Taken 7/17/2024 0400 by Patrice Bowens RN  Infection Prevention: single patient room provided  Taken 7/17/2024 0300 by Patrice Bowens RN  Infection Prevention: single patient room  provided  Taken 7/17/2024 0200 by Patrice Bowens RN  Infection Prevention: single patient room provided  Taken 7/17/2024 0100 by Patrice Bowens RN  Infection Prevention: single patient room provided  Taken 7/17/2024 0000 by Patrice Bowens RN  Infection Prevention: single patient room provided  Taken 7/16/2024 2300 by Patrice Bowens RN  Infection Prevention: single patient room provided  Taken 7/16/2024 2200 by Patrice Bowens RN  Infection Prevention: single patient room provided  Taken 7/16/2024 2100 by Patrice Bowens RN  Infection Prevention: single patient room provided  Taken 7/16/2024 2000 by Patrice Bowens RN  Infection Prevention: single patient room provided     Problem: Nutrition Impaired (Sepsis/Septic Shock)  Goal: Optimal Nutrition Intake  Outcome: Ongoing, Progressing     Problem: Fall Injury Risk  Goal: Absence of Fall and Fall-Related Injury  Outcome: Ongoing, Progressing  Intervention: Identify and Manage Contributors  Recent Flowsheet Documentation  Taken 7/17/2024 0600 by Patrice Bowens RN  Medication Review/Management: medications reviewed  Taken 7/17/2024 0500 by Patrice Bowens RN  Medication Review/Management: medications reviewed  Taken 7/17/2024 0400 by Patrice Bowens RN  Medication Review/Management: medications reviewed  Taken 7/17/2024 0300 by Patrice Bowens RN  Medication Review/Management: medications reviewed  Taken 7/17/2024 0200 by Patrice Bowens RN  Medication Review/Management: medications reviewed  Taken 7/17/2024 0100 by Patrice Bowens RN  Medication Review/Management: medications reviewed  Taken 7/17/2024 0000 by Patrice Bowens RN  Medication Review/Management: medications reviewed  Taken 7/16/2024 2300 by Patrice Bowens RN  Medication Review/Management: medications reviewed  Taken 7/16/2024 2200 by Patrice Bowens RN  Medication Review/Management: medications reviewed  Taken 7/16/2024 2100 by Patrice Bowens RN  Medication  Review/Management: medications reviewed  Taken 7/16/2024 2000 by Patrice Bowens RN  Medication Review/Management: medications reviewed  Intervention: Promote Injury-Free Environment  Recent Flowsheet Documentation  Taken 7/17/2024 0600 by Patrice Bowens RN  Safety Promotion/Fall Prevention:   activity supervised   safety round/check completed  Taken 7/17/2024 0500 by Patrice Bowens RN  Safety Promotion/Fall Prevention:   activity supervised   safety round/check completed  Taken 7/17/2024 0400 by Patrice Bowens RN  Safety Promotion/Fall Prevention:   activity supervised   safety round/check completed  Taken 7/17/2024 0300 by Patrice Bowens RN  Safety Promotion/Fall Prevention:   activity supervised   safety round/check completed  Taken 7/17/2024 0200 by Patrice Bowens RN  Safety Promotion/Fall Prevention:   activity supervised   safety round/check completed  Taken 7/17/2024 0100 by Patrice Bowens RN  Safety Promotion/Fall Prevention:   activity supervised   safety round/check completed  Taken 7/17/2024 0000 by Patrice Bowens RN  Safety Promotion/Fall Prevention:   activity supervised   safety round/check completed  Taken 7/16/2024 2300 by Patrice Bowens RN  Safety Promotion/Fall Prevention:   activity supervised   safety round/check completed  Taken 7/16/2024 2200 by Patrice Bowens RN  Safety Promotion/Fall Prevention:   activity supervised   safety round/check completed  Taken 7/16/2024 2100 by Patrice Bowens RN  Safety Promotion/Fall Prevention:   activity supervised   safety round/check completed  Taken 7/16/2024 2000 by Patrice Bowens RN  Safety Promotion/Fall Prevention:   activity supervised   safety round/check completed     Problem: Diarrhea  Goal: Fluid and Electrolyte Balance  Outcome: Ongoing, Progressing  Intervention: Manage Diarrhea  Recent Flowsheet Documentation  Taken 7/17/2024 0600 by Patrice Bowens RN  Medication Review/Management: medications reviewed  Taken  7/17/2024 0500 by Patrice Bowens RN  Medication Review/Management: medications reviewed  Taken 7/17/2024 0400 by Patrice Bowens RN  Medication Review/Management: medications reviewed  Taken 7/17/2024 0300 by Patrice Bowens RN  Medication Review/Management: medications reviewed  Taken 7/17/2024 0200 by Patrice Bowens RN  Medication Review/Management: medications reviewed  Taken 7/17/2024 0100 by Patrice Bowens RN  Medication Review/Management: medications reviewed  Taken 7/17/2024 0000 by Patrice Bowens RN  Medication Review/Management: medications reviewed  Taken 7/16/2024 2300 by Patrice Bowens RN  Medication Review/Management: medications reviewed  Taken 7/16/2024 2200 by Patrice Bowens RN  Medication Review/Management: medications reviewed  Taken 7/16/2024 2100 by Patrice Bowens RN  Medication Review/Management: medications reviewed  Taken 7/16/2024 2000 by Patrice Bowens RN  Medication Review/Management: medications reviewed     Problem: Aspiration (Enteral Nutrition)  Goal: Absence of Aspiration Signs and Symptoms  Outcome: Ongoing, Progressing  Intervention: Minimize Aspiration Risk  Recent Flowsheet Documentation  Taken 7/17/2024 0000 by Patrice Bowens RN  Oral Care: swabbed with antiseptic solution  Taken 7/16/2024 2000 by Patrice Bowens RN  Oral Care: swabbed with antiseptic solution     Problem: Device-Related Complication Risk (Enteral Nutrition)  Goal: Safe, Effective Therapy Delivery  Outcome: Ongoing, Progressing     Problem: Feeding Intolerance (Enteral Nutrition)  Goal: Feeding Tolerance  Outcome: Ongoing, Progressing     Problem: Communication Impairment (Mechanical Ventilation, Invasive)  Goal: Effective Communication  Outcome: Ongoing, Progressing     Problem: Device-Related Complication Risk (Mechanical Ventilation, Invasive)  Goal: Optimal Device Function  Outcome: Ongoing, Progressing  Intervention: Optimize Device Care and Function  Recent Flowsheet  Documentation  Taken 7/17/2024 0600 by Patrice Bowens RN  Airway Safety Measures:   mask valve resuscitator at bedside   oxygen flowmeter at bedside   suction at bedside  Taken 7/17/2024 0500 by Patrice Bowens RN  Airway Safety Measures:   mask valve resuscitator at bedside   oxygen flowmeter at bedside   suction at bedside  Taken 7/17/2024 0400 by Patrice Bowens RN  Airway Safety Measures:   mask valve resuscitator at bedside   oxygen flowmeter at bedside   suction at bedside  Taken 7/17/2024 0300 by Patrice Bowens RN  Airway Safety Measures:   mask valve resuscitator at bedside   oxygen flowmeter at bedside   suction at bedside  Taken 7/17/2024 0200 by Patrice Bowens RN  Airway Safety Measures:   mask valve resuscitator at bedside   oxygen flowmeter at bedside   suction at bedside  Taken 7/17/2024 0100 by Patrice Bowens RN  Airway Safety Measures:   mask valve resuscitator at bedside   oxygen flowmeter at bedside   suction at bedside  Taken 7/17/2024 0000 by Patrice Bowens RN  Airway Safety Measures:   mask valve resuscitator at bedside   oxygen flowmeter at bedside   suction at bedside  Taken 7/16/2024 2300 by Patrice Bowens RN  Airway Safety Measures:   mask valve resuscitator at bedside   oxygen flowmeter at bedside   suction at bedside  Taken 7/16/2024 2200 by Patrice Bowens RN  Airway Safety Measures:   mask valve resuscitator at bedside   oxygen flowmeter at bedside   suction at bedside  Taken 7/16/2024 2100 by Patrice Bowens RN  Airway Safety Measures:   mask valve resuscitator at bedside   suction at bedside   oxygen flowmeter at bedside  Taken 7/16/2024 2000 by Patrice Bowens RN  Airway Safety Measures:   mask valve resuscitator at bedside   oxygen flowmeter at bedside   suction at bedside     Problem: Inability to Wean (Mechanical Ventilation, Invasive)  Goal: Mechanical Ventilation Liberation  Outcome: Ongoing, Progressing  Intervention: Promote Extubation and Mechanical  Ventilation Liberation  Recent Flowsheet Documentation  Taken 7/17/2024 0600 by Patrice Bowens RN  Medication Review/Management: medications reviewed  Taken 7/17/2024 0500 by Patrice Bowens RN  Medication Review/Management: medications reviewed  Taken 7/17/2024 0400 by Patrice Bowens RN  Medication Review/Management: medications reviewed  Taken 7/17/2024 0300 by Patrice Bowens RN  Medication Review/Management: medications reviewed  Taken 7/17/2024 0200 by Patrice Bowens RN  Medication Review/Management: medications reviewed  Taken 7/17/2024 0100 by Patrice Bowens RN  Medication Review/Management: medications reviewed  Taken 7/17/2024 0000 by Patrice Bowens RN  Medication Review/Management: medications reviewed  Taken 7/16/2024 2300 by Patrice Bowens RN  Medication Review/Management: medications reviewed  Taken 7/16/2024 2200 by Patrice Bowens RN  Medication Review/Management: medications reviewed  Taken 7/16/2024 2100 by Patrice Bowens RN  Medication Review/Management: medications reviewed  Taken 7/16/2024 2000 by Patrice Bowens RN  Medication Review/Management: medications reviewed     Problem: Nutrition Impairment (Mechanical Ventilation, Invasive)  Goal: Optimal Nutrition Delivery  Outcome: Ongoing, Progressing     Problem: Skin and Tissue Injury (Mechanical Ventilation, Invasive)  Goal: Absence of Device-Related Skin and Tissue Injury  Outcome: Ongoing, Progressing     Problem: Ventilator-Induced Lung Injury (Mechanical Ventilation, Invasive)  Goal: Absence of Ventilator-Induced Lung Injury  Outcome: Ongoing, Progressing  Intervention: Prevent Ventilator-Associated Pneumonia  Recent Flowsheet Documentation  Taken 7/17/2024 0000 by Patrice Bowens RN  Oral Care: swabbed with antiseptic solution  Taken 7/16/2024 2000 by Patrice Bowens RN  Oral Care: swabbed with antiseptic solution     Problem: Parenteral Nutrition  Goal: Effective Intravenous Nutrition Therapy  Delivery  Outcome: Ongoing, Progressing  Intervention: Optimize Intravenous Nutrition Delivery  Recent Flowsheet Documentation  Taken 7/17/2024 0600 by Patrice Bowens RN  Medication Review/Management: medications reviewed  Taken 7/17/2024 0500 by Patrice Bowens RN  Medication Review/Management: medications reviewed  Taken 7/17/2024 0400 by Patrice Bowens RN  Medication Review/Management: medications reviewed  Taken 7/17/2024 0300 by Patrice Bowens RN  Medication Review/Management: medications reviewed  Taken 7/17/2024 0200 by Patrice Bowens RN  Medication Review/Management: medications reviewed  Taken 7/17/2024 0100 by Patrice Bowens RN  Medication Review/Management: medications reviewed  Taken 7/17/2024 0000 by Patrice Bowens RN  Medication Review/Management: medications reviewed  Taken 7/16/2024 2300 by Patrice Bowens RN  Medication Review/Management: medications reviewed  Taken 7/16/2024 2200 by Patrice Bowens RN  Medication Review/Management: medications reviewed  Taken 7/16/2024 2100 by Patrice Bowens RN  Medication Review/Management: medications reviewed  Taken 7/16/2024 2000 by Patrice Bowens RN  Medication Review/Management: medications reviewed     Problem: Restraint, Nonviolent  Goal: Absence of Harm or Injury  Outcome: Ongoing, Progressing  Intervention: Implement Least Restrictive Safety Strategies  Recent Flowsheet Documentation  Taken 7/17/2024 0600 by Patrice Bowens RN  Medical Device Protection:   IV pole/bag removed from visual field   tubing secured  Less Restrictive Alternative:   bed alarm in use   calming techniques promoted   family presence promoted  De-Escalation Techniques: increased round frequency  Diversional Activities: television  Taken 7/17/2024 0500 by Patrice Bowens RN  Medical Device Protection:   IV pole/bag removed from visual field   tubing secured  Taken 7/17/2024 0400 by Patrice Bowens RN  Medical Device Protection:   IV pole/bag  removed from visual field   tubing secured  Less Restrictive Alternative:   bed alarm in use   calming techniques promoted   family presence promoted  De-Escalation Techniques: increased round frequency  Diversional Activities: television  Taken 7/17/2024 0300 by Patrice Bowens RN  Medical Device Protection:   IV pole/bag removed from visual field   tubing secured  Taken 7/17/2024 0200 by Patrice Bowens RN  Medical Device Protection:   IV pole/bag removed from visual field   tubing secured  Less Restrictive Alternative:   bed alarm in use   calming techniques promoted   family presence promoted  De-Escalation Techniques: increased round frequency  Diversional Activities: television  Taken 7/17/2024 0100 by Patrice Bowens RN  Medical Device Protection: IV pole/bag removed from visual field  Taken 7/17/2024 0000 by Patrice Bowens RN  Medical Device Protection:   IV pole/bag removed from visual field   tubing secured  Less Restrictive Alternative:   bed alarm in use   calming techniques promoted   family presence promoted  De-Escalation Techniques: increased round frequency  Diversional Activities: television  Taken 7/16/2024 2300 by Patrice Bowens RN  Medical Device Protection:   IV pole/bag removed from visual field   tubing secured  Taken 7/16/2024 2200 by Patrice Bowens RN  Medical Device Protection:   IV pole/bag removed from visual field   tubing secured  Less Restrictive Alternative:   bed alarm in use   calming techniques promoted   family presence promoted  De-Escalation Techniques: increased round frequency  Diversional Activities: television  Taken 7/16/2024 2100 by Patrice Bowens RN  Medical Device Protection:   IV pole/bag removed from visual field   tubing secured  Taken 7/16/2024 2000 by Patrice Bowens RN  Medical Device Protection:   IV pole/bag removed from visual field   tubing secured  Less Restrictive Alternative:   bed alarm in use   calming techniques promoted   family  presence promoted  De-Escalation Techniques: increased round frequency  Diversional Activities: television  Intervention: Protect Skin and Joint Integrity  Recent Flowsheet Documentation  Taken 7/17/2024 0600 by Patrice Bowens RN  Body Position:   turned   left  Taken 7/17/2024 0400 by Patrice Bowens RN  Body Position: supine  Taken 7/17/2024 0200 by Patrice Bowens RN  Body Position:   turned   right  Taken 7/17/2024 0000 by Patrice Bowens RN  Body Position:   turned   left  Taken 7/16/2024 2200 by Patrice Bowens RN  Body Position: supine  Taken 7/16/2024 2000 by Patrice Bowens RN  Body Position:   right   turned   Goal Outcome Evaluation:

## 2024-07-17 NOTE — PROGRESS NOTES
[facility] Clinical Pharmacy Services: TPN Daily Progress Note    TPN Day # 3   Indication: Prolonged Paralytic Ileus  Route: central  Type: standard    Subjective/Objective  Results from last 7 days   Lab Units 24  0321   SODIUM mmol/L 144   POTASSIUM mmol/L 3.8   CHLORIDE mmol/L 112*   CO2 mmol/L 24.9   BUN mg/dL 23   CREATININE mg/dL 0.62*   CALCIUM mg/dL 8.2*   ALBUMIN g/dL 1.8*   BILIRUBIN mg/dL 0.9   ALK PHOS U/L 85   ALT (SGPT) U/L 102*   AST (SGOT) U/L 287*   GLUCOSE mg/dL 213*   MAGNESIUM mg/dL 2.3   PHOSPHORUS mg/dL 2.3*   TRIGLYCERIDES mg/dL 106        Diet Orders (active) (From admission, onward)       Start     Ordered    07/15/24 09  NPO Diet NPO Type: Strict NPO  Diet Effective Now         07/15/24 0943                  Additional insulin administration while previous TPN infusing: 10 units  (BG  200-213)  Additional electrolyte administration while previous TPN infusin/16 kcl 40 meq iv in am and 40 meq in afternoon.  Acid suppression: iv ppi bid  Corrected calcium:   9.96  mg/dL    Goal TPN Formula Recommendations: 115 g/1000 kcal/hold while on prop AA/Dex/Lipids; 2040 kcal/da  Current TPN Formula: 115 g/750 kcal/propofol AA/Dex/Lipids    Assessment/Plan    Macronutrients: AA at goal; will keep dextrose same for tonight d/t hyperglycemia. Tube feeds may also start today. SSI has been increased.  Electrolytes/Additives:         Will remove NaCl (Cl 112) and reassess ,  phosphorous increased for tonight, Tg improved 201->106 on propofol.   Sodium Chloride: 0 mEq  (was 20)    Sodium Acetate: 20 mEq   (was 0)   Sodium Phosphate: 0 mEq   Potassium Chloride: 0 mEq  (was 20)   Potassium Acetate:   20 mEq  (was 0)   Potassium Phosphate: 50 mEq  (was 30)   Calcium Gluconate: 9 mEq   Magnesium Sulfate: 8 mEq      MVI for TPN   Trace Elements  Insulin R               5 units (was 0)    Labs: recheck  cmp, mg/phos in am  Comments: may start TF today    James Cooley [facility]  Clinical  Pharmacist

## 2024-07-17 NOTE — PROGRESS NOTES
"  Daily Progress Note.   [facility] INTENSIVE CARE  7/17/2024    Patient:  Name:  Slade Martinez  MRN:  [id number]  1950  73 y.o.  male         CC: ahrf, asp pna    Interval History:  Intubated, maintains on mech ventilation  Sedated unable to give history  One time fever 39.2  Fio2 still 55%    Physical Exam:  /71   Pulse 85   Temp 98.7 °F (37.1 °C) (Oral)   Resp 14   Ht 180.3 cm (71\")   Wt 88.8 kg (195 lb 12.3 oz)   SpO2 95%   BMI 27.30 kg/m²   Body mass index is 27.3 kg/m².    Intake/Output Summary (Last 24 hours) at 7/17/2024 0725  Last data filed at 7/17/2024 0604  Gross per 24 hour   Intake 2647.12 ml   Output 1260 ml   Net 1387.12 ml   Vent Settings          Resp Rate (Set): 14  Pressure Support (cm H2O): 8 cm H20  FiO2 (%): 70 %  PEEP/CPAP (cm H2O): 7 cm H20    Minute Ventilation (L/min) (Obs): 11.1 L/min  Resp Rate (Observed) Vent: 20  I:E Ratio (Set): 1:3.77  I:E Ratio (Obs): 1.41:1    PIP Observed (cm H2O): 23 cm H2O  Plateau Pressure (cm H2O): 0 cm H2O  Driving Pressure (cm H2O): -6.4 cm H2O    General appearance: sedated ill   HENT: Atraumatic; oropharynx +ET tube  Lungs: taryn no sig wheeze mech air entry synchronous  CV: RRR, no rub   Abdomen: non rigid midline incision cdi  Extremities: No peripheral edema    Skin: WWP no diffuse visible rash  Psych/neuro sedated    Data Review:  Notable Labs:  Results from last 7 days   Lab Units 07/17/24  0321 07/16/24  0327 07/15/24  0137 07/14/24  0629 07/13/24  0327 07/12/24  0511 07/11/24  0439   WBC 10*3/mm3 14.31* 16.43* 27.88* 19.67* 17.89* 18.83* 23.17*   HEMOGLOBIN g/dL 9.6* 9.8* 10.4* 10.4* 11.3* 11.2* 12.8*   PLATELETS 10*3/mm3 187 205 294 279 277 310 334     Results from last 7 days   Lab Units 07/17/24  0321 07/16/24  2203 07/16/24  1237 07/16/24  0327 07/15/24  0137 07/14/24  0629 07/13/24  0327 07/12/24  0511 07/11/24  0439   SODIUM mmol/L 144  --   --  143 140 143 145 142 137   POTASSIUM mmol/L 3.8 4.2 3.5 3.5 4.2 " 3.3* 3.1* 3.6 3.7   CHLORIDE mmol/L 112*  --   --  108* 104 106 107 105 103   CO2 mmol/L 24.9  --   --  26.0 23.0 25.0 23.4 25.3 20.3*   BUN mg/dL 23  --   --  23 18 20 23 24* 24*   CREATININE mg/dL 0.62*  --   --  0.71* 0.73* 0.71* 0.69* 0.67* 0.86   GLUCOSE mg/dL 213*  --   --  167* 165* 136* 127* 124* 135*   CALCIUM mg/dL 8.2*  --   --  8.1* 7.6* 8.0* 8.3* 8.4* 8.4*   MAGNESIUM mg/dL 2.3  --   --  2.2 2.0 2.3  --   --   --    PHOSPHORUS mg/dL 2.3*  --   --  2.5 3.1 2.1*  --   --   --    Estimated Creatinine Clearance: 131.3 mL/min (A) (by C-G formula based on SCr of 0.62 mg/dL (L)).    Results from last 7 days   Lab Units 07/17/24  0321 07/16/24  0327 07/15/24  0137 07/14/24  1637 07/14/24  0629   AST (SGOT) U/L 287* 80*  --   --  44*   ALT (SGPT) U/L 102* 37  --   --  34   LACTATE mmol/L  --   --   --  2.6*  --    PLATELETS 10*3/mm3 187 205 294  --  279       Results from last 7 days   Lab Units 07/17/24  0439 07/16/24  0406 07/15/24  1041 07/14/24  1428 07/14/24  1006 07/13/24  2059 07/11/24  0128   PH, ARTERIAL pH units 7.372 7.387 7.521* 7.394 7.513* 7.536* 7.402   PCO2, ARTERIAL mm Hg 52.0* 47.5* 35.4 42.5 32.8* 30.0* 38.4   PO2 ART mm Hg 55.2* 63.3* 69.3* 77.5* 51.1* 51.1* 62.9*   HCO3 ART mmol/L 30.1* 28.6* 29.0* 26.0 26.4 25.4 23.9       Imaging:  Reviewed chest images personally from past 3 days    ASSESSMENT  /  PLAN:  Acute hypoxic respiratory failure sp intubation 7/14/2024  Aspiration pneumonia  Postop ileus  Status post laparotomy  Gangrenous bowel with resection  Occlusion of the SMA  Chronic gastroparesis  CAD and recent PCI  A-fib with RVR  Transaminitis    Lft up -dw Dr Locke, monitor lfts, if go up in am, will need to stop amiodarone, trial metoprolol     Fever overnight - monitor, continue abx, monitor bcs    Mech vent reviewed adjustments as appropriate  Abg  Cxr still sig infiltrate today slight improvement  wean fio2 - stable to incerasd today    Given amiodarone needs,   Phenylephrine  wean as able   Dc metoprolol holding atenolol add back as bp improving  Asa progress  Plavix 75   amiodarone    TPN per gen surg noted.  On pantoprazole bid iv    Ssi glycemic   Start glargine 10    Dw wife all questions answered.     Plan of care and patient course was reviewed with multidisciplinary team in morning rounds.    Total critical care time was 40 minutes, excluding any separately billable procedure time.  Time did not overlap with any other provider.      Electronically signed by Willie Resendiz MD, 07/17/24, 8:36 AM EDT.

## 2024-07-17 NOTE — PROGRESS NOTES
05-Oct-2018 05:04 Pulm/ CC Note    Patient having increasing frequency of ectopy.  -160 throughout the evening.   HR .  Electrolytes WNL.    Home medication: Atenolol 50mg twice daily- has been discontinued.  Metoprolol 2.5mg q 6 hours initiated.

## 2024-07-17 NOTE — PROGRESS NOTES
LOS: 15 days   Patient Care Team:  Triny Hannon MD as PCP - General (Internal Medicine)  James Hubbard MD as Consulting Physician (Cardiology)      Chief Complaint: Severe coronary disease with complex PCI in May 2024, normal LV function, atrial flutter, ischemic bowel status post resection, dense aspiration pneumonia on the right now intubated       Interval History: Intubated and sedated    Objective   Vital Signs  Temp:  [98.7 °F (37.1 °C)-102.5 °F (39.2 °C)] 98.7 °F (37.1 °C)  Heart Rate:  [] 91  Resp:  [14-20] 18  BP: (124-181)/(57-72) 151/66  FiO2 (%):  [55 %-70 %] 70 %    Intake/Output Summary (Last 24 hours) at 7/17/2024 0827  Last data filed at 7/17/2024 0604  Gross per 24 hour   Intake 2320.16 ml   Output 1225 ml   Net 1095.16 ml         Physical Exam:  Constitutional: Well appearing, well developed, no acute distress   HENT: Oropharynx clear and membrane moist  Eyes: Normal conjunctiva, no sclera icterus.  Neck: Supple, no carotid bruit bilaterally.  Cardiovascular: Regular rate and rhythm, No Murmur, No bilateral lower extremity edema.  Pulmonary: Normal respiratory effort, normal lung sounds, no wheezing.  Abdominal: Soft, nontender, no hepatosplenomegaly, liver is non-pulsatile.  Neurological: Alert and orient x 3.   Skin: Warm, dry, no ecchymosis, no rash.  Psych: Appropriate mood and affect. Normal judgment and insight.      Results Review:      Results from last 7 days   Lab Units 07/17/24  0321 07/16/24  2203 07/16/24  1237 07/16/24  0327 07/15/24  0137   SODIUM mmol/L 144  --   --  143 140   POTASSIUM mmol/L 3.8 4.2 3.5 3.5 4.2   CHLORIDE mmol/L 112*  --   --  108* 104   CO2 mmol/L 24.9  --   --  26.0 23.0   BUN mg/dL 23  --   --  23 18   CREATININE mg/dL 0.62*  --   --  0.71* 0.73*   GLUCOSE mg/dL 213*  --   --  167* 165*   CALCIUM mg/dL 8.2*  --   --  8.1* 7.6*           Results from last 7 days   Lab Units 07/17/24  0321 07/16/24  0327 07/15/24  0137   WBC 10*3/mm3 14.31*  16.43* 27.88*   HEMOGLOBIN g/dL 9.6* 9.8* 10.4*   HEMATOCRIT % 30.7* 31.7* 32.4*   PLATELETS 10*3/mm3 187 205 294             Results from last 7 days   Lab Units 07/17/24  0321   MAGNESIUM mg/dL 2.3     Results from last 7 days   Lab Units 07/17/24  0321   TRIGLYCERIDES mg/dL 106       I reviewed the patient's new clinical results.  I personally viewed and interpreted the patient's EKG/Telemetry data        Medication Review:   chlorhexidine, 15 mL, Mouth/Throat, Q12H  clopidogrel, 75 mg, Nasogastric, Daily  insulin regular, 2-9 Units, Subcutaneous, Q6H  ipratropium-albuterol, 3 mL, Nebulization, 4x Daily - RT  metoprolol tartrate, 2.5 mg, Intravenous, Q6H  pantoprazole, 40 mg, Intravenous, BID AC  piperacillin-tazobactam, 4.5 g, Intravenous, Q8H  [Held by provider] pregabalin, 150 mg, Oral, TID  senna-docusate sodium, 2 tablet, Nasogastric, BID  sodium chloride, 10 mL, Intravenous, Q12H  sodium chloride, 10 mL, Intravenous, Q12H  sodium chloride, 10 mL, Intravenous, Q12H  sodium chloride, 10 mL, Intravenous, Q12H  sodium chloride, 10 mL, Intravenous, Q12H        Adult Central 2-in-1 TPN, , Last Rate: 50 mL/hr at 07/17/24 0604  amiodarone, 0.5 mg/min, Last Rate: 0.5 mg/min (07/17/24 0604)  fentanyl 10 mcg/mL,  mcg/hr, Last Rate: 90 mcg/hr (07/17/24 0604)  Pharmacy to Dose TPN,   phenylephrine, 0.5-3 mcg/kg/min, Last Rate: Stopped (07/15/24 1655)  propofol, 5-100 mcg/kg/min, Last Rate: 20 mcg/kg/min (07/17/24 0604)        Assessment & Plan       Left lower quadrant abdominal pain    Essential hypertension    S/P CABG (coronary artery bypass graft)    Chronic mesenteric ischemia    Chest pain    Leukocytosis    PAF (paroxysmal atrial fibrillation)    Acute respiratory failure with hypoxia    Ileus, postoperative    Hypokalemia    This has been a really tough course for this gentleman's had ischemic bowel had resection then developed an ileus vomited aspirated developed a respiratory failure had to be intubated.   He is on amiodarone for his A-fib and a flutter.  He has been stable with that but he is critically ill.      He had a fever overnight and on his labs this morning his LFTs were elevated.  Looking back at his echo from earlier in the month his LV function was good his RV function looked like it was okay was not dilated so I I do not think this is congestion it is possible amiodarone could cause this rise of his LFTs I do think it is probably helping to keep him in sinus.  I talked with Dr. Antoine if his LFTs go up tomorrow would stop the amiodarone we had do have him on metoprolol and I think his blood pressure would tolerate 5 mg every 6 hours    Negro Locke MD  07/17/24  08:27 EDT

## 2024-07-18 NOTE — PROGRESS NOTES
"    Patient Name: Slade Martinez  :1950  73 y.o.      Patient Care Team:  Triny Hannon MD as PCP - General (Internal Medicine)  James Hubbard MD as Consulting Physician (Cardiology)    Chief Complaint: sepsis    Interval History: pt new to me. Wife at bedside. Overnight HR in the 130s, atrial flutter. Amio stopped due to rising LFTs now in shock liver range. Remains sedated with precedex, receiving IVFs at 100/hr, antibiotics. HR now sinus 80s.        Objective   Vital Signs  Temp:  [99.3 °F (37.4 °C)-100.9 °F (38.3 °C)] 99.9 °F (37.7 °C)  Heart Rate:  [] 89  Resp:  [20-30] 28  BP: ()/(53-82) 107/63  FiO2 (%):  [45 %-99 %] 45 %    Intake/Output Summary (Last 24 hours) at 2024 0812  Last data filed at 2024 0600  Gross per 24 hour   Intake 3306.38 ml   Output 1100 ml   Net 2206.38 ml     Flowsheet Rows      Flowsheet Row First Filed Value   Admission Height 180.3 cm (71\") Documented at 2024 1354   Admission Weight 81.6 kg (180 lb) Documented at 2024 1354            Physical Exam:   General Appearance:    sedated   Lungs:     Vent sounds    Heart:    Regular rhythm and normal rate, no JVD or HJR   Chest Wall:    No abnormalities observed   Abdomen:     Soft, nontender, positive bowel sounds.  Not distended   Extremities:   no cyanosis, clubbing or edema. Warm.       Results Review:    Results from last 7 days   Lab Units 24  0244   SODIUM mmol/L 149*   POTASSIUM mmol/L 4.5   CHLORIDE mmol/L 114*   CO2 mmol/L 24.0   BUN mg/dL 45*   CREATININE mg/dL 1.10   GLUCOSE mg/dL 204*   CALCIUM mg/dL 8.2*         Results from last 7 days   Lab Units 24  0244   WBC 10*3/mm3 15.55*   HEMOGLOBIN g/dL 9.9*   HEMATOCRIT % 32.2*   PLATELETS 10*3/mm3 129*         Results from last 7 days   Lab Units 24  0244   MAGNESIUM mg/dL 2.7*     Results from last 7 days   Lab Units 24  0321   TRIGLYCERIDES mg/dL 106               Medication Review:   chlorhexidine, 15 " mL, Mouth/Throat, Q12H  clopidogrel, 75 mg, Nasogastric, Daily  insulin glargine, 10 Units, Subcutaneous, Daily  insulin regular, 4-24 Units, Subcutaneous, Q6H  ipratropium-albuterol, 3 mL, Nebulization, 4x Daily - RT  metoprolol tartrate, 5 mg, Intravenous, Q6H  pantoprazole, 40 mg, Intravenous, BID AC  piperacillin-tazobactam, 4.5 g, Intravenous, Q8H  [Held by provider] pregabalin, 150 mg, Oral, TID  senna-docusate sodium, 2 tablet, Nasogastric, BID  sodium chloride, 10 mL, Intravenous, Q12H  sodium chloride, 10 mL, Intravenous, Q12H  sodium chloride, 10 mL, Intravenous, Q12H  sodium chloride, 10 mL, Intravenous, Q12H  sodium chloride, 10 mL, Intravenous, Q12H         Adult Central 2-in-1 TPN, , Last Rate: 50 mL/hr at 07/17/24 1813  dexmedetomidine, 0.2-1.5 mcg/kg/hr, Last Rate: 0.2 mcg/kg/hr (07/18/24 0102)  fentanyl 10 mcg/mL,  mcg/hr, Last Rate: 50 mcg/hr (07/18/24 0103)  Pharmacy to Dose TPN,   phenylephrine, 0.5-3 mcg/kg/min, Last Rate: Stopped (07/15/24 1655)  propofol, 5-100 mcg/kg/min, Last Rate: Stopped (07/17/24 2030)  sodium chloride, 100 mL/hr, Last Rate: 100 mL/hr (07/17/24 2054)        Assessment & Plan   Severe CAD/mutlivessel LM/LAD s/p PCI 5/2024  Bowel ischemia, gangreneous bowel, SMA occlusion, s/p resection withp ost op ileus  Aspiration pneumonia  AF/ Aflutter  Shock liver    LFTs and lactate elevated most likely related to hypotension and shock. Pressors have not be restarted for unclear reasons.   I would give at least 1 L Bolus given ongoing hypotension and sepsis.   I would add levo and/or vaso, avoid phenylephrine direct alpha constrictor in the setting of shock liver  Hold off on repeat echo today given his clinical exam is not consistent with RV failure but in fact sepsis.      Chelsey Kline MD  [facility]  07/18/24  08:12 EDT

## 2024-07-18 NOTE — PROGRESS NOTES
[facility] Clinical Pharmacy Services: TPN Daily Progress Note    TPN Day # 3   Indication: Prolonged Paralytic Ileus  Route: central  Type: standard    Subjective/Objective  Results from last 7 days   Lab Units 07/18/24  0244 07/17/24  0321   SODIUM mmol/L 149* 144   POTASSIUM mmol/L 4.5 3.8   CHLORIDE mmol/L 114* 112*   CO2 mmol/L 24.0 24.9   BUN mg/dL 45* 23   CREATININE mg/dL 1.10 0.62*   CALCIUM mg/dL 8.2* 8.2*   ALBUMIN g/dL 1.7* 1.8*   BILIRUBIN mg/dL 1.3* 0.9   ALK PHOS U/L 91 85   ALT (SGPT) U/L 475* 102*   AST (SGOT) U/L 1,492* 287*   GLUCOSE mg/dL 204* 213*   MAGNESIUM mg/dL 2.7* 2.3   PHOSPHORUS mg/dL 2.5 2.3*   TRIGLYCERIDES mg/dL  --  106     Dietary Orders (From admission, onward)       Start     Ordered    07/18/24 0936  Tube Feeding: Formula: Peptamen AF (Vital AF 1.2); Feeding Type: Continuous; Start at: 15 mL/hr; Then Advance By: 15 mL/hr; Every: 12 hours; To Goal Rate of: 70 mL/hr; Water Flush: 20 mL; Every: 1 hour; Water Bolus: None  (Tube Feeding (RD to Initiate & Manage) + NPO)  Diet Effective Now        Question Answer Comment   Formula Peptamen AF (Vital AF 1.2)    Feeding Type Continuous    Start at 15 mL/hr    Then Advance By 15 mL/hr    Every 12 hours    To Goal Rate of 70 mL/hr    Water Flush 20 mL    Every 1 hour    Water Bolus None        07/18/24 0936    07/18/24 0740  NPO Diet NPO Type: Tube Feeding  (Tube Feeding (RD to Initiate & Manage) + NPO)  Diet Effective Now        Question:  NPO Type  Answer:  Tube Feeding    07/18/24 0739 07/18/24 0739  Feeding Tube Insertion - Cortrak System  (Feeding Tube Insertion - Cortrak System)  Once        Question:  Feeding Tube Type  Answer:  NG - Nasogastric    07/18/24 0739                  Tube Feeding: Formula: Peptamen AF (Vital AF 1.2); Feeding Type: Continuous; Start at: 15 mL/hr; Then Advance By: 15 mL/hr; Every: 12 hours; To Goal Rate of: 70 mL/hr; Water Flush: 20 mL; Every: 1 hour; Water Bolus: None    Additional insulin  administration while previous TPN infusing: (, 204, 169)   - Lantus 10 units SC q24h (7/17-present)  - high dose SSI q6h (8 units since current TPN began infusing at 1800)     Additional electrolyte administration while previous TPN infusing: none     Additional IV fluids: 1/2NS at 75 mL/hr     Acid suppression: iv ppi bid  Corrected calcium: 10.04  mg/dL    Goal TPN Formula Recommendations: 115 g/1000 kcal/hold while on prop AA/Dex/Lipids; 2040 kcal/day  Current TPN Formula: 115 g/750 kcal/off AA/Dex/Lipids    Assessment/Plan    Macronutrients:   AA at goal  Will leave dextrose unchanged due to hyperglycemia (improved today) & tube feeds starting today.   Propofol stopped last night at 20:30 but will hold on ordering IVFE today due to starting tube feeds & significant upward trend in LFTs. GIR is currently 1.73 mg/kg/min, well below threshold of >4-5 mg/kg/min associated with PN-induced liver disease. Patient is also receiving minimal total calories from TPN at 1460 kcal/day (~16.5 kcal/kg/day).     Electrolytes/Additives: Removed sodium due to upward trending serum sodium. RD to adjust FW in tube feeds and MD changed NS to 1/2NS. Noted SCr increased from 0.62 yesterday to 1.1 today with upward trending K after increase in TPN yesterday; removed K acetate and decreased Kphos slightly out of precaution. Removed magnesium from TPN for now due to mild hypermagnesemia.    Sodium Chloride: 0 mEq    Sodium Acetate: 0 mEq (was 20)   Sodium Phosphate: 0 mEq   Potassium Chloride: 0 mEq     Potassium Acetate: 0 mEq (was 20)   Potassium Phosphate: 40 mEq (was 50)   Calcium Gluconate: 9 mEq   Magnesium Sulfate: 0 mEq (was 8)     MVI for TPN   Trace Elements  Insulin R: 5 units     Labs: recheck  cmp, mg/phos in am  Comments: Started TFs today at 15 mL/hr     Denilson Myles, PharmD  Clinical Pharmacist

## 2024-07-18 NOTE — CONSULTS
"Nutrition Services    Patient Name:  Slade Martinez  YOB: 1950  MRN: [id number]  Admit Date:  7/1/2024    Assessment Date:  07/18/24    Summary: TF Consult. POD 11 from small bowel resection for gangrenous enteritis.    Pt continues on TPN.  Propofol is off and RN has no plans to turn it back on.  Very minimal output from OG and MD is OK with starting TF's today. + BM smear yesterday  Labs, meds, skin reviewed. Na+ 149 (free water deficit ~3.3L), Glu 199/204/169, Mg 2.7, Alb 1.7, alt 475, ast 1492, lactate 4.4.  Currently on precedex and fentanyl drips, levo has been stopped. NS@100mL/hr    Estimated nutrition needs:   2040 kcals (25kcals/kg)  98-122g protein (1.2-1.5 g/kg/d)    Plan/recommendations:  Begin TF's with Peptamen AF at 15mL/hr, Goal is 70mL/hr  Add free water of 20mL/hr to start  ? Change IVF  Wean TPN once tolerance to EN is established    RD to follow clinical course, nutrition needs.    CLINICAL NUTRITION ASSESSMENT      Reason for Assessment Follow-up Protocol     Diagnosis/Problem   Left lower quadrant abdominal pain, gangrenous enteritis ,S/P bowel resection 7/7. Septic shock, resp failure, intubated 7/14     Anthropometrics        Current Height  Current Weight  BMI kg/m2 Height: 180.3 cm (71\")  Weight: 88.8 kg (195 lb 12.3 oz) (07/17/24 0600)  Body mass index is 27.3 kg/m².   Adjusted BMI (if applicable)    BMI Category Overweight (25 - 29.9)   Ideal Body Weight (IBW) 172#   Usual Body Weight (UBW) 199-200#   Weight Trend Loss     Estimated Requirements         Weight used  81.6 kg    Calories  2,040 kcal (25 kcal/kg)    Protein  98 -122g (1.2 - 1.5 gm/kg)    Fluid   (1 mL/kcal)     Labs       Pertinent Labs    Results from last 7 days   Lab Units 07/18/24  0244 07/17/24  0321 07/16/24  2203 07/16/24  1237 07/16/24  0327   SODIUM mmol/L 149* 144  --   --  143   POTASSIUM mmol/L 4.5 3.8 4.2   < > 3.5   CHLORIDE mmol/L 114* 112*  --   --  108*   CO2 mmol/L 24.0 24.9  --   --  26.0 "   BUN mg/dL 45* 23  --   --  23   CREATININE mg/dL 1.10 0.62*  --   --  0.71*   CALCIUM mg/dL 8.2* 8.2*  --   --  8.1*   BILIRUBIN mg/dL 1.3* 0.9  --   --  0.7   ALK PHOS U/L 91 85  --   --  86   ALT (SGPT) U/L 475* 102*  --   --  37   AST (SGOT) U/L 1,492* 287*  --   --  80*   GLUCOSE mg/dL 204* 213*  --   --  167*    < > = values in this interval not displayed.     Results from last 7 days   Lab Units 07/18/24  0244 07/17/24  0321 07/16/24  0327   MAGNESIUM mg/dL 2.7* 2.3 2.2   PHOSPHORUS mg/dL 2.5 2.3* 2.5   HEMOGLOBIN g/dL 9.9* 9.6* 9.8*   HEMATOCRIT % 32.2* 30.7* 31.7*   WBC 10*3/mm3 15.55* 14.31* 16.43*   TRIGLYCERIDES mg/dL  --  106 201*   ALBUMIN g/dL 1.7* 1.8* 1.7*     Results from last 7 days   Lab Units 07/18/24  0244 07/17/24  0321 07/16/24  0327 07/15/24  0137 07/14/24  0629   PLATELETS 10*3/mm3 129* 187 205 294 279     COVID19   Date Value Ref Range Status   06/19/2024 Not Detected Not Detected - Ref. Range Final     Lab Results   Component Value Date    HGBA1C 5.8 (H) 02/28/2023          Medications           Scheduled Medications chlorhexidine, 15 mL, Mouth/Throat, Q12H  clopidogrel, 75 mg, Nasogastric, Daily  insulin glargine, 10 Units, Subcutaneous, Daily  insulin regular, 4-24 Units, Subcutaneous, Q6H  ipratropium-albuterol, 3 mL, Nebulization, 4x Daily - RT  metoprolol tartrate, 5 mg, Intravenous, Q6H  pantoprazole, 40 mg, Intravenous, BID AC  piperacillin-tazobactam, 4.5 g, Intravenous, Q8H  [Held by provider] pregabalin, 150 mg, Oral, TID  senna-docusate sodium, 2 tablet, Nasogastric, BID  sodium chloride, 1,000 mL, Intravenous, Once  sodium chloride, 10 mL, Intravenous, Q12H  sodium chloride, 10 mL, Intravenous, Q12H  sodium chloride, 10 mL, Intravenous, Q12H  sodium chloride, 10 mL, Intravenous, Q12H  sodium chloride, 10 mL, Intravenous, Q12H       Infusions Adult Central 2-in-1 TPN, , Last Rate: 50 mL/hr at 07/17/24 1813  dexmedetomidine, 0.2-1.5 mcg/kg/hr, Last Rate: Stopped (07/18/24  0845)  fentanyl 10 mcg/mL,  mcg/hr, Last Rate: 50 mcg/hr (07/18/24 0103)  norepinephrine, 0.02-0.3 mcg/kg/min, Last Rate: 0.02 mcg/kg/min (07/18/24 0840)  Pharmacy to Dose TPN,   phenylephrine, 0.5-3 mcg/kg/min, Last Rate: Stopped (07/15/24 1655)  propofol, 5-100 mcg/kg/min, Last Rate: Stopped (07/17/24 2030)  sodium chloride, 100 mL/hr, Last Rate: 100 mL/hr (07/18/24 0814)       PRN Medications   acetaminophen    acetaminophen    senna-docusate sodium **AND** polyethylene glycol **AND** bisacodyl **AND** bisacodyl    dextrose    dextrose    fentaNYL citrate (PF)    glucagon (human recombinant)    hyaluronidase 150 units in NS 10 ml syringe    ipratropium-albuterol    labetalol    meclizine    nitroglycerin    ondansetron    Pharmacy to Dose TPN    Potassium Replacement - Follow Nurse / BPA Driven Protocol    sodium chloride    [COMPLETED] Insert Peripheral IV **AND** sodium chloride    sodium chloride    sodium chloride    sodium chloride    sodium chloride    sodium chloride     Physical Findings          General Findings overweight, sedate, ventilator support   Oral/Mouth Cavity dental appliance, tooth or teeth missing   Edema  1+ (trace), 2+ (mild)   Gastrointestinal hypoactive bowel sounds, last bowel movement: 7/17 smear   Skin  surgical incision: abd   Tubes/Drains/Lines central line/PICC, OG tube   NFPE No clinical signs of muscle wasting or fat loss     Current Nutrition Orders & Evaluation of Intake       Oral Nutrition     Food Allergies NKFA   Current PO Diet Adult Central 2-in-1 TPN  NPO Diet NPO Type: Tube Feeding   Supplement n/a    PO Evaluation     % PO Intake 0     Factors Affecting Intake: altered GI function, weakness      Parenteral Nutrition      TPN Route Central   TPN Rate/Volume 50 mL/hr, 1200 mL per day   Current TPN Order        Dextrose (kcal) 750       Amino Acid (gm) 115       Lipid Concentration other: none       Lipid Volume/Frequency  other:none   MVI Frequency  10mL daily    Trace Element Frequency  1mL daily   Total # Days on TPN 4   Propofol Rate/Kcal 0     PES STATEMENT / NUTRITION DIAGNOSIS      Nutrition Dx Problem  Problem: Inadequate Oral Intake  Etiology: Medical Diagnosis - left lower quadrant abdominal pain    Signs/Symptoms: Report of Minimal PO Intake and Unintended Weight Change     NUTRITION INTERVENTION / PLAN OF CARE      Intervention Goal(s) Reduce/improve symptoms, Meet estimated needs, Disease management/therapy, Establish PO intake, Increase intake, Accepts oral nutrition supplement, Maintain weight, No significant weight loss, and PO intake goal %: 75         RD Intervention/Action Await initiation/advancement of PO diet, Continue to monitor, and Care plan reviewed   --      Prescription/Orders:       PO Diet       Supplements       Enteral Nutrition       Parenteral Nutrition       Enteral Prescription:     Enteral Route OG    TF Delivery Method Continuous    Enteral Product Peptamen AF    Modular None    Propofol Rate/Kcal 0    TF Start Rate  15 mL/hr    TF Goal Rate  70 mL/hr    Free Water Flush 20 mL Q 1 hr    Provision at Goal:          Calories 2016 kcal, meets 100% needs         Protein  127 gm protein, meets 104% needs         Fluid (mL) 1360 mL free water + 480 mL in flushes      Parenteral Prescription:     TPN Route Central   TPN Rate (mL/hr) Per pharmacist (50mL/hr)   TPN Recommendation:        Dextrose (kcal) 1000       Amino Acid (gm) 115       Lipid Concentration other: hold while on propofol       Lipid Volume/Frequency  other:   Propofol Rate/Kcal 26.3 (694 kcals)   TPN Provision:   1460 kcals, 2154 kcal with propfol, 115 gm protein        Calories 105 % needs met        Protein  100 % needs met        Fluid 1200 mL total     New Prescription Ordered? Continue same per protocol, No changes at this time         Monitor/Evaluation Per protocol   Discharge Plan/Needs Pending clinical course   --    RD to follow per protocol.      Electronically  signed by:  Jennifer Ch RD  07/18/24 09:16 EDT

## 2024-07-18 NOTE — PROGRESS NOTES
"  Daily Progress Note.   [facility] INTENSIVE CARE  7/18/2024    Patient:  Name:  Slade Martinez  MRN:  [id number]  1950  73 y.o.  male         CC: ahrf, asp pna    Interval History:  Patient remains intubated on mechanical ventilation.  Febrile to 38.3 with tachycardia overnight.  FiO2 on mechanical ventilation weaned down to 45%.  Patient does grimace to noxious stimuli otherwise sedated unable to give any reliable history.  Discussed with bedside nursing.    Physical Exam:  /69   Pulse 82   Temp 98.4 °F (36.9 °C) (Oral)   Resp 27   Ht 180.3 cm (71\")   Wt 88.8 kg (195 lb 12.3 oz)   SpO2 94%   BMI 27.30 kg/m²   Body mass index is 27.3 kg/m².    Intake/Output Summary (Last 24 hours) at 7/18/2024 1118  Last data filed at 7/18/2024 1048  Gross per 24 hour   Intake 4674.29 ml   Output 1220 ml   Net 3454.29 ml   Vent Settings          Resp Rate (Set): 14  Pressure Support (cm H2O): 8 cm H20  FiO2 (%): 45 %  PEEP/CPAP (cm H2O): 10 cm H20    Minute Ventilation (L/min) (Obs): 15 L/min  Resp Rate (Observed) Vent: 27  I:E Ratio (Set): 1:3.77  I:E Ratio (Obs): 1:1.4    PIP Observed (cm H2O): 26 cm H2O  Plateau Pressure (cm H2O): 0 cm H2O  Driving Pressure (cm H2O): -9.6 cm H2O    General appearance: sedated ill   HENT: Atraumatic; oropharynx +ET tube  Lungs: taryn no sig wheeze mech air entry synchronous  CV: RRR, no rub   Abdomen: non rigid midline incision cdi  Extremities: No peripheral edema    Skin: WWP no diffuse visible rash  Psych/neuro sedated    Data Review:  Notable Labs:  Results from last 7 days   Lab Units 07/18/24  0244 07/17/24  0321 07/16/24  0327 07/15/24  0137 07/14/24  0629 07/13/24  0327 07/12/24  0511   WBC 10*3/mm3 15.55* 14.31* 16.43* 27.88* 19.67* 17.89* 18.83*   HEMOGLOBIN g/dL 9.9* 9.6* 9.8* 10.4* 10.4* 11.3* 11.2*   PLATELETS 10*3/mm3 129* 187 205 294 279 277 310     Results from last 7 days   Lab Units 07/18/24  0244 07/17/24  0321 07/16/24  2203 07/16/24  1237 " 07/16/24  0327 07/15/24  0137 07/14/24  0629 07/13/24  0327 07/12/24  0511   SODIUM mmol/L 149* 144  --   --  143 140 143 145 142   POTASSIUM mmol/L 4.5 3.8 4.2 3.5 3.5 4.2 3.3* 3.1* 3.6   CHLORIDE mmol/L 114* 112*  --   --  108* 104 106 107 105   CO2 mmol/L 24.0 24.9  --   --  26.0 23.0 25.0 23.4 25.3   BUN mg/dL 45* 23  --   --  23 18 20 23 24*   CREATININE mg/dL 1.10 0.62*  --   --  0.71* 0.73* 0.71* 0.69* 0.67*   GLUCOSE mg/dL 204* 213*  --   --  167* 165* 136* 127* 124*   CALCIUM mg/dL 8.2* 8.2*  --   --  8.1* 7.6* 8.0* 8.3* 8.4*   MAGNESIUM mg/dL 2.7* 2.3  --   --  2.2 2.0 2.3  --   --    PHOSPHORUS mg/dL 2.5 2.3*  --   --  2.5 3.1 2.1*  --   --    Estimated Creatinine Clearance: 74 mL/min (by C-G formula based on SCr of 1.1 mg/dL).    Results from last 7 days   Lab Units 07/18/24  0917 07/18/24  0244 07/17/24 0321 07/16/24  0327 07/15/24  0137 07/14/24  1637   AST (SGOT) U/L  --  1,492* 287* 80*  --   --    ALT (SGPT) U/L  --  475* 102* 37  --   --    LACTATE mmol/L 4.9* 4.4*  --   --   --  2.6*   PLATELETS 10*3/mm3  --  129* 187 205   < >  --     < > = values in this interval not displayed.       Results from last 7 days   Lab Units 07/18/24  0332 07/17/24 2026 07/17/24  0439 07/16/24  0406 07/15/24  1041 07/14/24  1428 07/14/24  1006 07/13/24 2059   PH, ARTERIAL pH units 7.442 7.377 7.372 7.387 7.521* 7.394 7.513* 7.536*   PCO2, ARTERIAL mm Hg 36.2 43.3 52.0* 47.5* 35.4 42.5 32.8* 30.0*   PO2 ART mm Hg 102.0* 92.6 55.2* 63.3* 69.3* 77.5* 51.1* 51.1*   HCO3 ART mmol/L 24.7 25.5 30.1* 28.6* 29.0* 26.0 26.4 25.4       Imaging:  Reviewed chest images personally from past 3 days    ASSESSMENT  /  PLAN:  Acute hypoxic respiratory failure sp intubation 7/14/2024  Aspiration pneumonia  Postop ileus  Status post laparotomy  Gangrenous bowel with resection  Occlusion of the SMA  Chronic gastroparesis  CAD and recent PCI  A-fib with RVR  Transaminitis -concern for amiodarone versus shock liver  Lactic  acidosis      Transaminitis and lactic acidosis with fever overnight.  Chest x-ray improving  Lactic acidosis  Fever overnight - monitor, continue abx, monitor bcs  CT abdomen pelvis / chest    [facility] vent reviewed adjustments as appropriate  Abg  Cxr still sig infiltrate today slight improvement again  wean fio2 - stable to decreaed    Norepinephrine  Hold  metoprolol holding atenolol add back as bp improving  Asa progress  Plavix 75   amiodarone stopped  IVfs  Repeat echo  Discussed with     TPN per gen surg noted plans to start trickle feeds today  On pantoprazole bid iv  Half-normal saline secondary to hyponatremia.  Free water through enteral feeding.  Serial sodium    Ssi glycemic   Continue glargine 10 monitor is transitioning from TPN to tube feeds    Dw wife all questions answered.     Plan of care and patient course was reviewed with multidisciplinary team in morning rounds.    Total critical care time was 45 minutes, excluding any separately billable procedure time.  Time did not overlap with any other provider.    Electronically signed by Willie Resendiz MD, 07/18/24, 11:31 AM EDT.

## 2024-07-19 NOTE — PROGRESS NOTES
Vascular Surgery    (Global software glitch)  Pt with worsening distal ischemia in context of general clinical deterioration. Cannot anticoagulate due to critically low platelets, probable associated with DIC.  Little more to offer.  Family considering comfort measures, which seems appropriate.  Will see again on request.

## 2024-07-19 NOTE — PLAN OF CARE
Goal Outcome Evaluation:         Pt vent/sedated. 55%/10peep. R rad pulses/L DP/PT pulses absent@0130. Cyanotic/cap refill absent. Notified vascular/gen sx/pulm. Renal consulted-bicarb gtt ordered. Am labs significantly worse. Vasc suggested hep gtt-coag/plt levels contraindicated.d/c hep gtt. Heme/onc consulted for coagulopathy. Urine output-160ml for night shift. Family updated by Dr. Mendoza wife called in family awaiting their arrival for possible withdraw of care.  Tommy notified

## 2024-07-19 NOTE — PROGRESS NOTES
"RN reports suddenly unable to palpate or doppler left DP/PT pulses, bottom of foot appears with purplish discoloration.  Examined at bedside.  Does have doppler left femoral pulse.  Left foot cold from ankle down.  Per RN there was a palpable right radial pulse at 1 AM, now unable to palpate/doppler radial pulse.  Right hand is warm, same temperature as left hand.  Does have palpable right brachial pulse.  Cap refill>3 in every extremity, is on low dose pressor.    Apparently there was some concern with pulses in the right wrist during this hospital stay per spouse, she was told \"there were tiny clots around the right wrist\" as he was complaining of right hand pain on tele floor prior to ICU admission but I am unable to find any imaging related to this.     Vascular has seen this visit for SMA occlusion, appeared chronic and vascular signed off.  CT abdomen/pelvis without contrast yesterday with bulky calcified atherosclerotic disease in the left common femoral and superficial femoral arteries with suspected high-grade stenosis or occlusion.    Already on ASA/Plavix, Vascular surgery contacted via telephone who ordered Heparin gtt.  Of note platelet count dropped to 129 yesterday.  AM labs reveal coagulopathy, INR 2.98 and platelet count now 59.  Unfortunately will not be able to start Heparin gtt.  Awaiting remainder of AM labs to result.  Will notify vascular of morning labs.  Consult heme/onc stat.    Has had low urine output overnight, approx 150ccs. Morning ABG with metabolic acidosis, lactate hovering between 4 and 5, renal function declining.  Will consult renal stat.     Lactate has been hovering between 4-5, CT abd pelvis and chest obtained yesterday per Dr Resendiz with collapsed distal ileum and mildly distended bowel loops; differential including postop ileus and partial small bowel obstruction.  Will hold tube feeds for now.  Abdomen not rigid or distended on exam.  Is receiving TPN.    D/w Dr Mendoza    CC " time: 35 minutes

## 2024-07-19 NOTE — DISCHARGE SUMMARY
[facility]  Discharge Summary    Date of Admission: 2024  Date of Death:  2024       PCP: Triny Hannon MD    Principle Cause of Death:   Multisystem organ failure        Discharge diagnosis discharge diagnosis  Acute hypoxic respiratory failure sp intubation 2024  Aspiration pneumonia  Postop ileus  Status post laparotomy  Gangrenous bowel with resection  Occlusion of the SMA  Chronic gastroparesis  CAD and recent PCI  A-fib with RVR  Transaminitis -concern for amiodarone versus shock liver  Lactic acidosis  Lower limb ischemia  DIC     Hospital Course  Patient was admitted on 2024 with complaints of diarrhea chest discomfort had a history of coronary disease recent PCI and UTI chronic SMA occlusion was found a long segment of pneumatosis and gangrenous bowel had a laparotomy and resection on  went into A-fib RVR hyponatremia postop ileus.  He had worsening aspiration pneumonia septic shock acute hypoxemic respiratory failure requiring mechanical ventilation.  Unfortunately patient developed worsening DIC limb ischemia and given worsening prognosis the family like to proceed with comfort measures the patient  at this time around him.       Willie Resendiz MD  24  16:12 EDT

## 2024-07-27 NOTE — PROGRESS NOTES
"Enter Query Response Below      Query Response: Yes              If applicable, please update the problem list.   Patient: Slade Martinez        : 1950  Account: [id number]           Admit Date: 2024        How to Respond to this query:       a. Click New Note     b. Answer query within the yellow box.                c. Update the Problem List, if applicable.    If you have any questions about this query contact me at: [email or web address]     Dr. Meredith,     Based on inpatient coding guidelines, [facility] are not allowed to use diagnoses from pathology reports as the sole basis for billing.  Any findings noted on a pathology report must be affirmed by the treating physician before billing.     The pathologist reported that the specimen shows: Ileum, resection: A. Diffuse ischemic enteritis with ulceration, transmural congestion/edema and acute necrotizing inflammation. B. No epithelial dysplasia nor malignancy identified. C. Associated moderate to marked acute serositis with fibrous adhesion. D. Surgical margins appear viable.\"     Is this finding consistent with your clinical impression and treatment plan for this patient?    Yes  No  Other explanation for clinical impression and treatment plan_________  Unable to determine    By submitting this query, we are merely seeking further clarification of documentation to accurately reflect all conditions that you are monitoring, evaluating, treating or that extend the hospitalization or utilize additional resources of care. Please utilize your independent clinical judgment when addressing the question(s) above.     This query and your response, once completed, will be entered into the legal medical record.    Sincerely,  Tahira CAREY RN CCDS  System Director Clinical Documentation Integrity Program     "

## (undated) DEVICE — TREK CORONARY DILATATION CATHETER 3.0 MM X 20 MM / RAPID-EXCHANGE: Brand: TREK

## (undated) DEVICE — PINNACLE INTRODUCER SHEATH: Brand: PINNACLE

## (undated) DEVICE — ANTIBACTERIAL UNDYED BRAIDED (POLYGLACTIN 910), SYNTHETIC ABSORBABLE SUTURE: Brand: COATED VICRYL

## (undated) DEVICE — CATH DIAG IMPULSE PIG 5F 100CM

## (undated) DEVICE — SUT SILK 0 TIES 18IN SA66G

## (undated) DEVICE — CORONARY IMAGING CATHETER: Brand: OPTICROSS™ 6 HD

## (undated) DEVICE — DGW .035 FC J3MM 260CM TEF: Brand: EMERALD

## (undated) DEVICE — ELECTRD BLD EZ CLN MOD 6.5IN

## (undated) DEVICE — DRSNG WND BORDR/ADHS NONADHR/GZ LF 4X14IN STRL

## (undated) DEVICE — SUT SILK 2/0 TIES 18IN A185H

## (undated) DEVICE — GOWN ,SIRUS,NONREINFORCED SMALL: Brand: MEDLINE

## (undated) DEVICE — GUIDELINER CATHETERS ARE INTENDED TO BE USED IN CONJUNCTION WITH GUIDE CATHETERS TO ACCESS DISCRETE REGIONS OF THE CORONARY AND/OR PERIPHERAL VASCULATURE, AND TO FACILITATE PLACEMENT OF INTERVENTIONAL DEVICES.: Brand: GUIDELINER® V3 CATHETER

## (undated) DEVICE — Device

## (undated) DEVICE — PK CATH CARD 40

## (undated) DEVICE — CATH DIAG CARD PERFORM JR4.0 BT 4F100CM

## (undated) DEVICE — TREK CORONARY DILATATION CATHETER 3.0 MM X 12 MM / RAPID-EXCHANGE: Brand: TREK

## (undated) DEVICE — PK PROC MAJ 40

## (undated) DEVICE — KT MANIFLD CARDIAC

## (undated) DEVICE — WOUND RETRACTOR AND PROTECTOR: Brand: ALEXIS WOUND PROTECTOR-RETRACTOR

## (undated) DEVICE — SUT PDS 0 CT1 36IN Z346H

## (undated) DEVICE — TR BAND RADIAL ARTERY COMPRESSION DEVICE: Brand: TR BAND

## (undated) DEVICE — COVER,MAYO STAND,STERILE: Brand: MEDLINE

## (undated) DEVICE — SUT SILK 2/0 SH CR8 18IN CR8 C012D

## (undated) DEVICE — RADIFOCUS GLIDEWIRE ADVANTAGE GUIDEWIRE: Brand: GLIDEWIRE ADVANTAGE

## (undated) DEVICE — RUNTHROUGH NS EXTRA FLOPPY PTCA GUIDEWIRE: Brand: RUNTHROUGH

## (undated) DEVICE — GW HITORQUE/BAL MID/WT J W/HCOAT .014 3X190CM

## (undated) DEVICE — PENCL SMOKE/EVAC MEGADYNE TELESCP 10FT

## (undated) DEVICE — CATH DIAG EXPO .045 FL3  5F 100CM

## (undated) DEVICE — DEV INDEFLATOR P/N 580289

## (undated) DEVICE — CATH DIAG CARD PERFORMA JL3.5 BT 4F100CM

## (undated) DEVICE — CATH DIAG IMPULSE FL4 5F 100CM

## (undated) DEVICE — GLV SURG BIOGEL LTX PF 6

## (undated) DEVICE — 6F .070 XB 3 100CM: Brand: VISTA BRITE TIP

## (undated) DEVICE — NC TREK NEO™ CORONARY DILATATION CATHETER 4.00 MM X 20 MM / RAPID-EXCHANGE: Brand: NC TREK NEO™

## (undated) DEVICE — NC TREK NEO™ CORONARY DILATATION CATHETER 3.50 MM X 12 MM / RAPID-EXCHANGE: Brand: NC TREK NEO™

## (undated) DEVICE — CATH INTRAVASC/LITHO C2PLUS 3.5X12MM

## (undated) DEVICE — TOTAL TRAY, 16FR 10ML SIL FOLEY, URN: Brand: MEDLINE

## (undated) DEVICE — SKIN PREP TRAY W/CHG: Brand: MEDLINE INDUSTRIES, INC.

## (undated) DEVICE — GLV SURG PREMIERPRO ORTHO LTX PF SZ7.5 BRN

## (undated) DEVICE — MEDI-VAC YANKAUER SUCTION HANDLE W/BULBOUS TIP: Brand: CARDINAL HEALTH

## (undated) DEVICE — GLIDESHEATH SLENDER STAINLESS STEEL KIT: Brand: GLIDESHEATH SLENDER

## (undated) DEVICE — PROXIMATE RH ROTATING HEAD SKIN STAPLERS (35 REGULAR) CONTAINS 35 STAINLESS STEEL STAPLES: Brand: PROXIMATE

## (undated) DEVICE — NC TREK NEO™ CORONARY DILATATION CATHETER 4.00 MM X 12 MM / RAPID-EXCHANGE: Brand: NC TREK NEO™

## (undated) DEVICE — SUT GUT CHRM 3/0 SH 36IN G172H

## (undated) DEVICE — PATIENT RETURN ELECTRODE, SINGLE-USE, CONTACT QUALITY MONITORING, ADULT, WITH 9FT CORD, FOR PATIENTS WEIGING OVER 33LBS. (15KG): Brand: MEGADYNE

## (undated) DEVICE — TREK CORONARY DILATATION CATHETER 4.0 MM X 30 MM / RAPID-EXCHANGE: Brand: TREK

## (undated) DEVICE — ENSEAL TEMPERATURE CONTROLLED TISSUE SEALING TECHNOLOGY DISPOSABLE TISSUE SEALING DEVICE TAPTRONIC TRIGGER ACTIVATED POWER 5MM JAW STYLE: Brand: ENSEAL

## (undated) DEVICE — CATH DIAG IMPULSE FR5 5F 100CM

## (undated) DEVICE — SUT VIC 3/0 TIES 18IN J110T

## (undated) DEVICE — ELECTRD BLD EZ CLN MOD XLNG 2.75IN

## (undated) DEVICE — TREK CORONARY DILATATION CATHETER 2.50 MM X 25 MM / RAPID-EXCHANGE: Brand: TREK